# Patient Record
Sex: FEMALE | Race: WHITE | NOT HISPANIC OR LATINO | Employment: OTHER | URBAN - METROPOLITAN AREA
[De-identification: names, ages, dates, MRNs, and addresses within clinical notes are randomized per-mention and may not be internally consistent; named-entity substitution may affect disease eponyms.]

---

## 2017-01-10 ENCOUNTER — HOSPITAL ENCOUNTER (OUTPATIENT)
Dept: RADIOLOGY | Facility: HOSPITAL | Age: 72
Discharge: HOME | End: 2017-01-10
Attending: FAMILY MEDICINE
Payer: MEDICARE

## 2017-01-10 ENCOUNTER — TELEPHONE (OUTPATIENT)
Dept: INTERNAL MEDICINE | Facility: CLINIC | Age: 72
End: 2017-01-10

## 2017-01-10 ENCOUNTER — OFFICE VISIT (OUTPATIENT)
Dept: INTERNAL MEDICINE | Facility: CLINIC | Age: 72
End: 2017-01-10
Payer: MEDICARE

## 2017-01-10 VITALS
TEMPERATURE: 97 F | BODY MASS INDEX: 45.99 KG/M2 | OXYGEN SATURATION: 92 % | SYSTOLIC BLOOD PRESSURE: 138 MMHG | DIASTOLIC BLOOD PRESSURE: 78 MMHG | WEIGHT: 293 LBS | HEIGHT: 67 IN | HEART RATE: 88 BPM

## 2017-01-10 DIAGNOSIS — L03.115 CELLULITIS OF RIGHT LOWER EXTREMITY: Primary | ICD-10-CM

## 2017-01-10 DIAGNOSIS — R60.0 PEDAL EDEMA: ICD-10-CM

## 2017-01-10 DIAGNOSIS — K12.1 ORAL ULCER: ICD-10-CM

## 2017-01-10 PROCEDURE — 99999 PR PBB SHADOW E&M-EST. PATIENT-LVL III: CPT | Mod: PBBFAC,,, | Performed by: FAMILY MEDICINE

## 2017-01-10 PROCEDURE — 1125F AMNT PAIN NOTED PAIN PRSNT: CPT | Mod: S$GLB,,, | Performed by: FAMILY MEDICINE

## 2017-01-10 PROCEDURE — 3078F DIAST BP <80 MM HG: CPT | Mod: S$GLB,,, | Performed by: FAMILY MEDICINE

## 2017-01-10 PROCEDURE — 93970 EXTREMITY STUDY: CPT | Mod: TC

## 2017-01-10 PROCEDURE — 99214 OFFICE O/P EST MOD 30 MIN: CPT | Mod: S$GLB,,, | Performed by: FAMILY MEDICINE

## 2017-01-10 PROCEDURE — 3075F SYST BP GE 130 - 139MM HG: CPT | Mod: S$GLB,,, | Performed by: FAMILY MEDICINE

## 2017-01-10 PROCEDURE — 1160F RVW MEDS BY RX/DR IN RCRD: CPT | Mod: S$GLB,,, | Performed by: FAMILY MEDICINE

## 2017-01-10 PROCEDURE — 1159F MED LIST DOCD IN RCRD: CPT | Mod: S$GLB,,, | Performed by: FAMILY MEDICINE

## 2017-01-10 PROCEDURE — 1157F ADVNC CARE PLAN IN RCRD: CPT | Mod: S$GLB,,, | Performed by: FAMILY MEDICINE

## 2017-01-10 RX ORDER — DOXYCYCLINE HYCLATE 100 MG
100 TABLET ORAL EVERY 12 HOURS
Qty: 20 TABLET | Refills: 0 | Status: SHIPPED | OUTPATIENT
Start: 2017-01-10 | End: 2017-01-20

## 2017-01-10 RX ORDER — LEVOFLOXACIN 500 MG/1
500 TABLET, FILM COATED ORAL DAILY
Qty: 7 TABLET | Refills: 0 | Status: SHIPPED | OUTPATIENT
Start: 2017-01-10 | End: 2017-01-17

## 2017-01-10 RX ORDER — SULFAMETHOXAZOLE AND TRIMETHOPRIM 800; 160 MG/1; MG/1
1 TABLET ORAL 2 TIMES DAILY
Qty: 20 TABLET | Refills: 0 | Status: SHIPPED | OUTPATIENT
Start: 2017-01-10 | End: 2017-01-10

## 2017-01-10 RX ORDER — VALACYCLOVIR HYDROCHLORIDE 1 G/1
1000 TABLET, FILM COATED ORAL 2 TIMES DAILY
Qty: 8 TABLET | Refills: 1 | Status: SHIPPED | OUTPATIENT
Start: 2017-01-10 | End: 2017-10-19

## 2017-01-10 NOTE — PROGRESS NOTES
Chief Complaint:    Chief Complaint   Patient presents with    Mass     legs       History of Present Illness:  He presents today complaining of swelling and redness involving both legs but the redness is more from the right leg.  No fever.  She does have chronic swelling.  She mostly sits and is not able to raise her legs very much.  Does not walk very much.  She denies any chest pain or shortness of breath.  Also has had ulcers in the mouth which have been painful.    ROS:  Review of Systems   Constitutional: Negative for activity change, chills, fatigue, fever and unexpected weight change.   HENT: Negative for congestion, ear discharge, ear pain, hearing loss, postnasal drip and rhinorrhea.    Eyes: Negative for pain and visual disturbance.   Respiratory: Negative for cough, chest tightness and shortness of breath.    Cardiovascular: Negative for chest pain and palpitations.   Gastrointestinal: Negative for abdominal pain, diarrhea and vomiting.   Endocrine: Negative for heat intolerance.   Genitourinary: Negative for dysuria, flank pain, frequency and hematuria.   Musculoskeletal: Negative for back pain, gait problem and neck pain.   Skin: Negative for color change and rash.   Neurological: Negative for dizziness, tremors, seizures, numbness and headaches.   Psychiatric/Behavioral: Negative for agitation, hallucinations, self-injury, sleep disturbance and suicidal ideas. The patient is not nervous/anxious.        Past Medical History   Diagnosis Date    Abnormal nuclear stress test 11/1/2015    Background diabetic retinopathy 12/28/2015    CAD (coronary artery disease) 2002     stents    Cataract     Gait disorder 10/12/2012    Glaucoma     Obesity     CORINE (obstructive sleep apnea)     Other and unspecified hyperlipidemia     PD (Parkinson's disease) 2002     tremor-predominant (per patient)    Type II or unspecified type diabetes mellitus without mention of complication, not stated as uncontrolled  "2002       am    Unspecified essential hypertension        Social History:  Social History     Social History    Marital status:      Spouse name: N/A    Number of children: N/A    Years of education: N/A     Social History Main Topics    Smoking status: Former Smoker     Years: 40.00     Quit date: 10/12/2010    Smokeless tobacco: Never Used      Comment: On & off for the past few years    Alcohol use No    Drug use: No    Sexual activity: Not Currently     Other Topics Concern    None     Social History Narrative     . Lives with spouse. Has 2 children. 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       Family History:   family history includes Diabetes in her maternal grandmother; Glaucoma in her maternal grandmother and mother; Heart attack (age of onset: 65) in her father and mother; Hypertension in her father, maternal grandmother, and mother; Parkinsonism in her other and paternal aunt; Tremor in her father.    Health Maintenance   Topic Date Due    DEXA SCAN  06/13/2015    Eye Exam  12/28/2016    Foot Exam  02/18/2017    Urine Microalbumin  05/18/2017    Lipid Panel  10/19/2017    Hemoglobin A1c  10/19/2017    Mammogram  05/30/2018    TETANUS VACCINE  11/19/2025    Hepatitis C Screening  Addressed    Zoster Vaccine  Addressed    Pneumococcal (65+)  Completed    Influenza Vaccine  Completed       Physical Exam:    Vital Signs  Temp: 97 °F (36.1 °C)  Temp src: Tympanic  Pulse: 88  SpO2: (!) 92 %  BP: 138/78  Pain Score:   8  Pain Loc: Generalized  Height and Weight  Height: 5' 7" (170.2 cm)  Weight: (!) 140.7 kg (310 lb 3 oz)  BSA (Calculated - sq m): 2.58 sq meters  BMI (Calculated): 48.7  Weight in (lb) to have BMI = 25: 159.3]    Body mass index is 48.58 kg/(m^2).    Physical Exam   Constitutional: She is oriented to person, place, and time. She appears well-developed.   HENT:   Mouth/Throat: Oropharynx is clear and moist.       Eyes: Conjunctivae are " normal. Pupils are equal, round, and reactive to light.   Neck: Normal range of motion. Neck supple.   Cardiovascular: Normal rate, regular rhythm and normal heart sounds.    No murmur heard.  Pulmonary/Chest: Effort normal and breath sounds normal. No respiratory distress. She has no wheezes. She has no rales. She exhibits no tenderness.   Abdominal: Soft. She exhibits no distension and no mass. There is no tenderness. There is no guarding.   Musculoskeletal: She exhibits edema (3.  Spitting edema bilateral legs.). She exhibits no tenderness.        Legs:  Lymphadenopathy:     She has no cervical adenopathy.   Neurological: She is alert and oriented to person, place, and time. She has normal reflexes.   Skin: Skin is warm and dry.   Psychiatric: She has a normal mood and affect. Her behavior is normal. Judgment and thought content normal.       Lab Results   Component Value Date    CHOL 104 (L) 10/19/2016    CHOL 107 (L) 05/17/2016    CHOL 129 02/23/2016    TRIG 85 10/19/2016    TRIG 88 05/17/2016    TRIG 69 02/23/2016    HDL 35 (L) 10/19/2016    HDL 39 (L) 05/17/2016    HDL 48 02/23/2016    TOTALCHOLEST 3.0 10/19/2016    TOTALCHOLEST 2.7 05/17/2016    TOTALCHOLEST 2.7 02/23/2016    NONHDLCHOL 69 10/19/2016    NONHDLCHOL 68 05/17/2016    NONHDLCHOL 81 02/23/2016       Lab Results   Component Value Date    HGBA1C 7.1 (H) 10/19/2016           Assessment:      ICD-10-CM ICD-9-CM   1. Cellulitis of right lower extremity L03.115 682.6   2. Oral ulcer K12.1 528.9   3. Pedal edema R60.0 782.3         Plan:  1.  Venous Doppler to rule out DVT, patient has been given direct antibiotic.  Should the redness started to get worse please go to the ED.  2.  Oral apathae, prescription for Valtrex given.  3.  Pedal edema most likely dependent edema there is no evidence of CHF based on last echo.  Recommend using compression stocking and recommend increasing mobility.  Please come back for revisit of the redness  Orders Placed This  "Encounter   Procedures    US Lower Extremity Veins Bilateral       Current Outpatient Prescriptions   Medication Sig Dispense Refill    acetaminophen (TYLENOL) 325 MG tablet Take 2 tablets (650 mg total) by mouth every 6 (six) hours as needed.  0    blood sugar diagnostic Strp 1 strip by Misc.(Non-Drug; Combo Route) route 3 (three) times daily. Accuchek Felicia Plus Test Strips 300 strip 3    blood-glucose meter kit Accu-chek Felicia Plus Meter 1 each 0    carbidopa-levodopa  mg (SINEMET)  mg per tablet Take 1 tab 5 times daily. 150 tablet 11    carbidopa-levodopa  mg (SINEMET)  mg per tablet TAKE ONE TABLET BY MOUTH 5 TIMES A  tablet 11    insulin NPH-insulin regular, 70/30, (NOVOLIN 70/30) 100 unit/mL (70-30) injection Inject 55-60 Units into the skin 3 (three) times daily before meals. 5 vial 3    insulin syringe-needle U-100 1/2 mL 31 x 5/16" Syrg INJECT 1 SYRINGE INTO THE SKIN 3 TIMES DAILY 100 each 11    levothyroxine (SYNTHROID) 100 MCG tablet TAKE 1 TABLET BEFORE BREAKFAST 90 tablet 1    lidocaine-prilocaine (EMLA) cream Apply topically as needed. 25 g 0    metformin (GLUCOPHAGE) 500 MG tablet TAKE 1 TABLET (500 MG TOTAL) BY MOUTH 3 (THREE) TIMES DAILY. 270 tablet 3    metoprolol succinate (TOPROL-XL) 25 MG 24 hr tablet TAKE 1 TABLET EVERY DAY 90 tablet 0    multivit-min-FA-lycopen-lutein (CENTRUM SILVER) 0.4-300-250 mg-mcg-mcg Tab Take 1 tablet by mouth once daily.      MULTIVITAMIN W-MINERALS/LUTEIN (CENTRUM SILVER ORAL) Take by mouth Daily.      omeprazole (PRILOSEC) 40 MG capsule Take 1 capsule (40 mg total) by mouth once daily. 90 capsule 3    pramipexole (MIRAPEX) 1 MG tablet TAKE 1 TABLET BY MOUTH THREE TIMES DAILY 90 tablet 11    pravastatin (PRAVACHOL) 40 MG tablet TAKE 1 TABLET ONE TIME DAILY 90 tablet 3    ACCU-CHEK SOFTCLIX LANCETS Misc TEST THREE TIMES DAILY 300 each 3    aspirin (ECOTRIN) 81 MG EC tablet Take 1 tablet (81 mg total) by mouth once " daily. 30 tablet 0    doxycycline (VIBRA-TABS) 100 MG tablet Take 1 tablet (100 mg total) by mouth every 12 (twelve) hours. 20 tablet 0    isosorbide mononitrate (IMDUR) 120 MG 24 hr tablet Take 1 tablet (120 mg total) by mouth once daily. 90 tablet 3    levoFLOXacin (LEVAQUIN) 500 MG tablet Take 1 tablet (500 mg total) by mouth once daily. 7 tablet 0    triamcinolone acetonide 0.1% (KENALOG) 0.1 % cream Apply topically 2 (two) times daily. 1 Tube 1    valacyclovir (VALTREX) 1000 MG tablet Take 1 tablet (1,000 mg total) by mouth 2 (two) times daily. 8 tablet 1     No current facility-administered medications for this visit.        Medications Discontinued During This Encounter   Medication Reason    sulfamethoxazole-trimethoprim 800-160mg (BACTRIM DS) 800-160 mg Tab        Return in about 2 days (around 1/12/2017).      Dr Anna Penn MD    Disclaimer: This note is prepared using voice recognition system and as such is likely to have errors and is not proof read.

## 2017-01-10 NOTE — TELEPHONE ENCOUNTER
----- Message from Yusra Bosch sent at 1/10/2017 12:34 PM CST -----  Contact: Dawn Kumar  Ms Seymour states that patient is allergic to sulfur drugs and bactrim was called in, please call Ms Seymour back at [phone]. Thank you

## 2017-01-10 NOTE — MR AVS SNAPSHOT
[facility]  [address] [place] [address]  Phone: [phone]                  Ross Isbell   1/10/2017 10:20 AM   Office Visit    Description:  Female : 1945   Provider:  Anna Penn MD   Department:  [facility]           Reason for Visit     Mass           Diagnoses this Visit        Comments    Cellulitis of right lower extremity    -  Primary     Oral ulcer         Pedal edema                To Do List           Future Appointments        Provider Department Dept Phone    1/10/2017 12:30 PM [facility] -  [phone]    2017 10:00 AM Anna Penn MD [facility] Internal Medicine [phone]    2017 9:00 AM VINOD Cox MD [facility] - Ophthalmology [phone]      Goals (5 Years of Data)     None      Follow-Up and Disposition     Return in about 2 days (around 2017).       These Medications        Disp Refills Start End    doxycycline (VIBRA-TABS) 100 MG tablet 20 tablet 0 1/10/2017 2017    Take 1 tablet (100 mg total) by mouth every 12 (twelve) hours. - Oral    Pharmacy: [facility] [address] [place] [address] Ph #: [phone]       Notes to Pharmacy: Please review side effects with the pt.    valacyclovir (VALTREX) 1000 MG tablet 8 tablet 1 1/10/2017 2017    Take 1 tablet (1,000 mg total) by mouth 2 (two) times daily. - Oral    Pharmacy: [facility] [address] [place] [address] Ph #: [phone]       levoFLOXacin (LEVAQUIN) 500 MG tablet 7 tablet 0 1/10/2017 2017    Take 1 tablet (500 mg total) by mouth once daily. - Oral    Pharmacy: [facility] [address] [place] [address] Ph #: [phone]         [facility] On Call     [facility] On Call Nurse Care Line -  Assistance  Registered nurses in the [facility] On Call Center provide clinical advisement, health education, appointment booking, and other  "advisory services.  Call for this free service at [phone].             Medications           Message regarding Medications     Verify the changes and/or additions to your medication regime listed below are the same as discussed with your clinician today.  If any of these changes or additions are incorrect, please notify your healthcare provider.        START taking these NEW medications        Refills    doxycycline (VIBRA-TABS) 100 MG tablet 0    Sig: Take 1 tablet (100 mg total) by mouth every 12 (twelve) hours.    Class: Normal    Route: Oral    valacyclovir (VALTREX) 1000 MG tablet 1    Sig: Take 1 tablet (1,000 mg total) by mouth 2 (two) times daily.    Class: Normal    Route: Oral    levoFLOXacin (LEVAQUIN) 500 MG tablet 0    Sig: Take 1 tablet (500 mg total) by mouth once daily.    Class: Normal    Route: Oral           Verify that the below list of medications is an accurate representation of the medications you are currently taking.  If none reported, the list may be blank. If incorrect, please contact your healthcare provider. Carry this list with you in case of emergency.           Current Medications     acetaminophen (TYLENOL) 325 MG tablet Take 2 tablets (650 mg total) by mouth every 6 (six) hours as needed.    blood sugar diagnostic Strp 1 strip by Misc.(Non-Drug; Combo Route) route 3 (three) times daily. Accuchek Felicia Plus Test Strips    blood-glucose meter kit Accu-chek Felicia Plus Meter    carbidopa-levodopa  mg (SINEMET)  mg per tablet Take 1 tab 5 times daily.    carbidopa-levodopa  mg (SINEMET)  mg per tablet TAKE ONE TABLET BY MOUTH 5 TIMES A DAY    insulin NPH-insulin regular, 70/30, (NOVOLIN 70/30) 100 unit/mL (70-30) injection Inject 55-60 Units into the skin 3 (three) times daily before meals.    insulin syringe-needle U-100 1/2 mL 31 x 5/16" Syrg INJECT 1 SYRINGE INTO THE SKIN 3 TIMES DAILY    levothyroxine (SYNTHROID) 100 MCG tablet TAKE 1 TABLET BEFORE " "BREAKFAST    lidocaine-prilocaine (EMLA) cream Apply topically as needed.    metformin (GLUCOPHAGE) 500 MG tablet TAKE 1 TABLET (500 MG TOTAL) BY MOUTH 3 (THREE) TIMES DAILY.    metoprolol succinate (TOPROL-XL) 25 MG 24 hr tablet TAKE 1 TABLET EVERY DAY    multivit-min-FA-lycopen-lutein (CENTRUM SILVER) 0.4-300-250 mg-mcg-mcg Tab Take 1 tablet by mouth once daily.    MULTIVITAMIN W-MINERALS/LUTEIN (CENTRUM SILVER ORAL) Take by mouth Daily.    omeprazole (PRILOSEC) 40 MG capsule Take 1 capsule (40 mg total) by mouth once daily.    pramipexole (MIRAPEX) 1 MG tablet TAKE 1 TABLET BY MOUTH THREE TIMES DAILY    pravastatin (PRAVACHOL) 40 MG tablet TAKE 1 TABLET ONE TIME DAILY    ACCU-CHEK SOFTCLIX LANCETS Misc TEST THREE TIMES DAILY    aspirin (ECOTRIN) 81 MG EC tablet Take 1 tablet (81 mg total) by mouth once daily.    doxycycline (VIBRA-TABS) 100 MG tablet Take 1 tablet (100 mg total) by mouth every 12 (twelve) hours.    isosorbide mononitrate (IMDUR) 120 MG 24 hr tablet Take 1 tablet (120 mg total) by mouth once daily.    levoFLOXacin (LEVAQUIN) 500 MG tablet Take 1 tablet (500 mg total) by mouth once daily.    triamcinolone acetonide 0.1% (KENALOG) 0.1 % cream Apply topically 2 (two) times daily.    valacyclovir (VALTREX) 1000 MG tablet Take 1 tablet (1,000 mg total) by mouth 2 (two) times daily.           Clinical Reference Information           Vital Signs - Last Recorded  Most recent update: 1/10/2017 11:50 AM by Elsie Patino LPN    BP Pulse Temp Ht Wt SpO2    138/78 88 97 °F (36.1 °C) (Tympanic) 5' 7" (1.702 m) (!) 140.7 kg (310 lb 3 oz) (!) 92%    BMI                48.58 kg/m2          Blood Pressure          Most Recent Value    BP  138/78      Allergies as of 1/10/2017     Codeine    Codeine Sulfate    Diphenhydramine Hcl    Penicillins    Sulfa (Sulfonamide Antibiotics)      Immunizations Administered on Date of Encounter - 1/10/2017     None      Orders Placed During Today's Visit     Future " Labs/Procedures Expected by Expires    US Lower Extremity Veins Bilateral  1/10/2017 (Approximate) 1/10/2018

## 2017-01-13 ENCOUNTER — OFFICE VISIT (OUTPATIENT)
Dept: INTERNAL MEDICINE | Facility: CLINIC | Age: 72
End: 2017-01-13
Payer: MEDICARE

## 2017-01-13 VITALS
WEIGHT: 246.25 LBS | RESPIRATION RATE: 20 BRPM | OXYGEN SATURATION: 97 % | HEIGHT: 66 IN | BODY MASS INDEX: 39.58 KG/M2 | SYSTOLIC BLOOD PRESSURE: 125 MMHG | HEART RATE: 85 BPM | TEMPERATURE: 96 F | DIASTOLIC BLOOD PRESSURE: 67 MMHG

## 2017-01-13 DIAGNOSIS — R60.0 PEDAL EDEMA: Primary | ICD-10-CM

## 2017-01-13 DIAGNOSIS — G20.A1 PD (PARKINSON'S DISEASE): Chronic | ICD-10-CM

## 2017-01-13 DIAGNOSIS — E11.42 TYPE 2 DIABETES, CONTROLLED, WITH PERIPHERAL NEUROPATHY: Chronic | ICD-10-CM

## 2017-01-13 PROCEDURE — 3060F POS MICROALBUMINURIA REV: CPT | Mod: S$GLB,,, | Performed by: FAMILY MEDICINE

## 2017-01-13 PROCEDURE — 1126F AMNT PAIN NOTED NONE PRSNT: CPT | Mod: S$GLB,,, | Performed by: FAMILY MEDICINE

## 2017-01-13 PROCEDURE — 3074F SYST BP LT 130 MM HG: CPT | Mod: S$GLB,,, | Performed by: FAMILY MEDICINE

## 2017-01-13 PROCEDURE — 99213 OFFICE O/P EST LOW 20 MIN: CPT | Mod: S$GLB,,, | Performed by: FAMILY MEDICINE

## 2017-01-13 PROCEDURE — 2022F DILAT RTA XM EVC RTNOPTHY: CPT | Mod: S$GLB,,, | Performed by: FAMILY MEDICINE

## 2017-01-13 PROCEDURE — 1157F ADVNC CARE PLAN IN RCRD: CPT | Mod: S$GLB,,, | Performed by: FAMILY MEDICINE

## 2017-01-13 PROCEDURE — 3045F PR MOST RECENT HEMOGLOBIN A1C LEVEL 7.0-9.0%: CPT | Mod: S$GLB,,, | Performed by: FAMILY MEDICINE

## 2017-01-13 PROCEDURE — 3078F DIAST BP <80 MM HG: CPT | Mod: S$GLB,,, | Performed by: FAMILY MEDICINE

## 2017-01-13 PROCEDURE — 99999 PR PBB SHADOW E&M-EST. PATIENT-LVL IV: CPT | Mod: PBBFAC,,, | Performed by: FAMILY MEDICINE

## 2017-01-13 PROCEDURE — 1160F RVW MEDS BY RX/DR IN RCRD: CPT | Mod: S$GLB,,, | Performed by: FAMILY MEDICINE

## 2017-01-13 PROCEDURE — 1159F MED LIST DOCD IN RCRD: CPT | Mod: S$GLB,,, | Performed by: FAMILY MEDICINE

## 2017-01-13 PROCEDURE — 99499 UNLISTED E&M SERVICE: CPT | Mod: S$GLB,,, | Performed by: FAMILY MEDICINE

## 2017-01-13 RX ORDER — CARBIDOPA AND LEVODOPA 25; 250 MG/1; MG/1
TABLET ORAL
Qty: 150 TABLET | Refills: 11 | Status: SHIPPED | OUTPATIENT
Start: 2017-01-13 | End: 2017-02-06 | Stop reason: SDUPTHER

## 2017-01-13 RX ORDER — PRAMIPEXOLE DIHYDROCHLORIDE 1 MG/1
1 TABLET ORAL 3 TIMES DAILY
Qty: 90 TABLET | Refills: 11 | Status: SHIPPED | OUTPATIENT
Start: 2017-01-13 | End: 2017-02-06 | Stop reason: SDUPTHER

## 2017-01-13 RX ORDER — NAPROXEN SODIUM 220 MG
TABLET ORAL
Qty: 100 EACH | Refills: 11 | Status: SHIPPED | OUTPATIENT
Start: 2017-01-13 | End: 2018-05-15 | Stop reason: SDUPTHER

## 2017-01-13 RX ORDER — POTASSIUM CHLORIDE 20 MEQ/1
20 TABLET, EXTENDED RELEASE ORAL DAILY
Qty: 30 TABLET | Refills: 2 | Status: SHIPPED | OUTPATIENT
Start: 2017-01-13 | End: 2017-02-06 | Stop reason: SDUPTHER

## 2017-01-13 RX ORDER — FUROSEMIDE 20 MG/1
20 TABLET ORAL DAILY
Qty: 30 TABLET | Refills: 2 | Status: SHIPPED | OUTPATIENT
Start: 2017-01-13 | End: 2017-02-06 | Stop reason: SDUPTHER

## 2017-01-13 NOTE — MR AVS SNAPSHOT
[facility]  [address] [place] [address]  Phone: [phone]                  Ross Isbell   2017 10:00 AM   Office Visit    Description:  Female : 1945   Provider:  Anna Penn MD   Department:  [facility]           Reason for Visit     Follow-up           Diagnoses this Visit        Comments    Pedal edema    -  Primary     PD (Parkinson's disease)         Type 2 diabetes, controlled, with peripheral neuropathy                To Do List           Future Appointments        Provider Department Dept Phone    2017 1:20 PM Abby Germain DPM [facility] - Podiatry [phone]    2017 9:00 AM VINOD Cox MD [facility] - Ophthalmology [phone]    2017 9:00 AM SPECIMEN, [place] [facility] [phone] 9:30 AM LABORATORY, [place] [facility] [phone] 11:40 AM Anna Penn MD [facility] [phone]      Goals (5 Years of Data)     None      Follow-Up and Disposition     Return in about 3 months (around 2017).       These Medications        Disp Refills Start End    carbidopa-levodopa  mg (SINEMET)  mg per tablet 150 tablet 2017     Take 1 tab 5 times daily.    Pharmacy: [facility] [address] [place] [address] [id number] [place] [address] Ph #: [phone]       pramipexole (MIRAPEX) 1 MG tablet 90 tablet 2017     Take 1 tablet (1 mg total) by mouth 3 (three) times daily. - Oral    Pharmacy: [facility] [address] [place] [address] [place] [address] Ph #: [phone]       insulin syringe-needle U-100 0.5 mL 31 gauge x  Syrg 100 each 2017     INJECT 1 SYRINGE INTO THE SKIN 3 TIMES DAILY    Pharmacy: [facility] [address] [place] [id number] [place] [address] Ph #: [phone]       insulin NPH-insulin regular, 70/30, (NOVOLIN 70/30) 100 unit/mL (70-30) injection 5 vial 3  1/13/2017     Inject 55-60 Units into the skin 3 (three) times daily before meals. - Subcutaneous    Pharmacy: [address] #: [phone]       furosemide (LASIX) 20 MG tablet 30 tablet 2 1/13/2017 1/13/2018    Take 1 tablet (20 mg total) by mouth once daily. - Oral    Pharmacy: [address] #: [phone]       potassium chloride SA (K-DUR,KLOR-CON) 20 MEQ tablet 30 tablet 2 1/13/2017     Take 1 tablet (20 mEq total) by mouth once daily. - Oral    Pharmacy: [place] [id number] #: [phone]         [facility] On Call     [facility] On Call Nurse Care Line - 24/7 Assistance  Registered nurses in the [facility] On Call Center provide clinical advisement, health education, appointment booking, and other advisory services.  Call for this free service at [phone].             Medications           Message regarding Medications     Verify the changes and/or additions to your medication regime listed below are the same as discussed with your clinician today.  If any of these changes or additions are incorrect, please notify your healthcare provider.        START taking these NEW medications        Refills    furosemide (LASIX) 20 MG tablet 2    Sig: Take 1 tablet (20 mg total) by mouth once daily.    Class: Normal    Route: Oral    potassium chloride SA (K-DUR,KLOR-CON) 20 MEQ tablet 2    Sig: Take 1 tablet (20 mEq total) by mouth once daily.    Class: Normal    Route: Oral      CHANGE how you are taking these medications     Start Taking Instead of    pramipexole (MIRAPEX) 1 MG tablet pramipexole (MIRAPEX) 1 MG tablet    Dosage:  Take 1 tablet (1 mg total) by mouth 3 (three) times daily. Dosage:  TAKE 1 TABLET BY MOUTH THREE TIMES DAILY    Reason for Change:  Reorder     insulin syringe-needle U-100 0.5 mL 31 gauge x 5/16 Syrg insulin syringe-needle U-100 1/2  "mL 31 x 5/16" Syrg    Dosage:  INJECT 1 SYRINGE INTO THE SKIN 3 TIMES DAILY Dosage:  INJECT 1 SYRINGE INTO THE SKIN 3 TIMES DAILY    Reason for Change:  Reorder            Verify that the below list of medications is an accurate representation of the medications you are currently taking.  If none reported, the list may be blank. If incorrect, please contact your healthcare provider. Carry this list with you in case of emergency.           Current Medications     ACCU-CHEK SOFTCLIX LANCETS Misc TEST THREE TIMES DAILY    acetaminophen (TYLENOL) 325 MG tablet Take 2 tablets (650 mg total) by mouth every 6 (six) hours as needed.    blood sugar diagnostic Strp 1 strip by Misc.(Non-Drug; Combo Route) route 3 (three) times daily. Accuchek Felicia Plus Test Strips    blood-glucose meter kit Accu-chek Felicia Plus Meter    carbidopa-levodopa  mg (SINEMET)  mg per tablet Take 1 tab 5 times daily.    doxycycline (VIBRA-TABS) 100 MG tablet Take 1 tablet (100 mg total) by mouth every 12 (twelve) hours.    insulin NPH-insulin regular, 70/30, (NOVOLIN 70/30) 100 unit/mL (70-30) injection Inject 55-60 Units into the skin 3 (three) times daily before meals.    insulin syringe-needle U-100 0.5 mL 31 gauge x 5/16 Syrg INJECT 1 SYRINGE INTO THE SKIN 3 TIMES DAILY    levoFLOXacin (LEVAQUIN) 500 MG tablet Take 1 tablet (500 mg total) by mouth once daily.    levothyroxine (SYNTHROID) 100 MCG tablet TAKE 1 TABLET BEFORE BREAKFAST    lidocaine-prilocaine (EMLA) cream Apply topically as needed.    metformin (GLUCOPHAGE) 500 MG tablet TAKE 1 TABLET (500 MG TOTAL) BY MOUTH 3 (THREE) TIMES DAILY.    metoprolol succinate (TOPROL-XL) 25 MG 24 hr tablet TAKE 1 TABLET EVERY DAY    multivit-min-FA-lycopen-lutein (CENTRUM SILVER) 0.4-300-250 mg-mcg-mcg Tab Take 1 tablet by mouth once daily.    MULTIVITAMIN W-MINERALS/LUTEIN (CENTRUM SILVER ORAL) Take by mouth Daily.    omeprazole (PRILOSEC) 40 MG capsule Take 1 capsule (40 mg total) by mouth " "once daily.    pramipexole (MIRAPEX) 1 MG tablet Take 1 tablet (1 mg total) by mouth 3 (three) times daily.    pravastatin (PRAVACHOL) 40 MG tablet TAKE 1 TABLET ONE TIME DAILY    aspirin (ECOTRIN) 81 MG EC tablet Take 1 tablet (81 mg total) by mouth once daily.    furosemide (LASIX) 20 MG tablet Take 1 tablet (20 mg total) by mouth once daily.    isosorbide mononitrate (IMDUR) 120 MG 24 hr tablet Take 1 tablet (120 mg total) by mouth once daily.    potassium chloride SA (K-DUR,KLOR-CON) 20 MEQ tablet Take 1 tablet (20 mEq total) by mouth once daily.    triamcinolone acetonide 0.1% (KENALOG) 0.1 % cream Apply topically 2 (two) times daily.    valacyclovir (VALTREX) 1000 MG tablet Take 1 tablet (1,000 mg total) by mouth 2 (two) times daily.           Clinical Reference Information           Vital Signs - Last Recorded  Most recent update: 1/13/2017 10:15 AM by Glenna Ley MA    BP Pulse Temp Resp    125/67 (BP Location: Left arm, Patient Position: Sitting, BP Method: Manual) 85 96.2 °F (35.7 °C) (Tympanic) 20    Ht Wt SpO2 BMI    5' 6" (1.676 m) 111.7 kg (246 lb 4.1 oz) 97% 39.75 kg/m2      Blood Pressure          Most Recent Value    BP  125/67      Allergies as of 1/13/2017     Codeine    Codeine Sulfate    Diphenhydramine Hcl    Penicillins    Sulfa (Sulfonamide Antibiotics)      Immunizations Administered on Date of Encounter - 1/13/2017     None      Orders Placed During Today's Visit     Future Labs/Procedures Expected by Expires    Microalbumin/creatinine urine ratio  4/12/2017 (Approximate) 3/14/2018    CBC auto differential  4/13/2017 (Approximate) 7/12/2017    TSH  4/13/2017 (Approximate) 3/14/2018    Comprehensive metabolic panel  4/15/2017 (Approximate) 4/13/2018    Hemoglobin A1c  4/15/2017 (Approximate) 3/14/2018    Lipid panel  4/15/2017 (Approximate) 3/14/2018      "

## 2017-01-13 NOTE — PROGRESS NOTES
Chief Complaint:    Chief Complaint   Patient presents with    Follow-up       History of Present Illness:    She is here for recheck of her wound.  No fevers she's been taking antibiotic.    ROS:  Review of Systems    Past Medical History   Diagnosis Date    Abnormal nuclear stress test 11/1/2015    Background diabetic retinopathy 12/28/2015    CAD (coronary artery disease) 2002     stents    Cataract     Gait disorder 10/12/2012    Glaucoma     Obesity     CORINE (obstructive sleep apnea)     Other and unspecified hyperlipidemia     PD (Parkinson's disease) 2002     tremor-predominant (per patient)    Type II or unspecified type diabetes mellitus without mention of complication, not stated as uncontrolled 2002       am    Unspecified essential hypertension        Social History:  Social History     Social History    Marital status:      Spouse name: N/A    Number of children: N/A    Years of education: N/A     Social History Main Topics    Smoking status: Former Smoker     Years: 40.00     Quit date: 10/12/2010    Smokeless tobacco: Never Used      Comment: On & off for the past few years    Alcohol use No    Drug use: No    Sexual activity: Not Currently     Other Topics Concern    None     Social History Narrative     . Lives with spouse. Has 2 children. 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       Family History:   family history includes Diabetes in her maternal grandmother; Glaucoma in her maternal grandmother and mother; Heart attack (age of onset: 65) in her father and mother; Hypertension in her father, maternal grandmother, and mother; Parkinsonism in her other and paternal aunt; Tremor in her father.    Health Maintenance   Topic Date Due    DEXA SCAN  06/13/2015    Eye Exam  12/28/2016    Foot Exam  02/18/2017    Urine Microalbumin  05/18/2017    Mammogram  05/30/2017    Lipid Panel  10/19/2017    Hemoglobin A1c  10/19/2017    TETANUS  "VACCINE  11/19/2025    Hepatitis C Screening  Addressed    Zoster Vaccine  Addressed    Pneumococcal (65+)  Completed    Influenza Vaccine  Completed       Physical Exam:    Vital Signs  Temp: 96.2 °F (35.7 °C)  Temp src: Tympanic  Pulse: 85  Resp: 20  SpO2: 97 %  BP: 125/67  BP Location: Left arm  BP Method: Manual  Patient Position: Sitting  Pain Score: 0-No pain  Height and Weight  Height: 5' 6" (167.6 cm)  Weight: 111.7 kg (246 lb 4.1 oz)  BSA (Calculated - sq m): 2.28 sq meters  BMI (Calculated): 39.8  Weight in (lb) to have BMI = 25: 154.6]    Body mass index is 39.75 kg/(m^2).    Physical Exam  Examination of the ankles reveals that the redness appears to be somewhat improving, she still has some pedal edema.  Lab Results   Component Value Date    CHOL 104 (L) 10/19/2016    CHOL 107 (L) 05/17/2016    CHOL 129 02/23/2016    TRIG 85 10/19/2016    TRIG 88 05/17/2016    TRIG 69 02/23/2016    HDL 35 (L) 10/19/2016    HDL 39 (L) 05/17/2016    HDL 48 02/23/2016    TOTALCHOLEST 3.0 10/19/2016    TOTALCHOLEST 2.7 05/17/2016    TOTALCHOLEST 2.7 02/23/2016    NONHDLCHOL 69 10/19/2016    NONHDLCHOL 68 05/17/2016    NONHDLCHOL 81 02/23/2016       Lab Results   Component Value Date    HGBA1C 7.1 (H) 10/19/2016       Assessment:      ICD-10-CM ICD-9-CM   1. Pedal edema R60.0 782.3   2. PD (Parkinson's disease) G20 332.0   3. Type 2 diabetes, controlled, with peripheral neuropathy E11.42 250.60     357.2         Plan:  Please continue with antibiotic, the wound is redressed today continue with local antibody wound care.  Once the wounds heal completely recommend using compression stockings.  Also she's been prescribed Lasix 20 mg once a day to take and potassium tablets whenever she takes Lasix.  Keeping the legs elevated when in bed and will also help.  Follow-up in 3 months for routine diabetic check.  Orders Placed This Encounter   Procedures    Hemoglobin A1c    Microalbumin/creatinine urine ratio    Lipid panel "    Comprehensive metabolic panel    CBC auto differential    TSH       Current Outpatient Prescriptions   Medication Sig Dispense Refill    ACCU-CHEK SOFTCLIX LANCETS Misc TEST THREE TIMES DAILY 300 each 3    acetaminophen (TYLENOL) 325 MG tablet Take 2 tablets (650 mg total) by mouth every 6 (six) hours as needed.  0    blood sugar diagnostic Strp 1 strip by Misc.(Non-Drug; Combo Route) route 3 (three) times daily. Accuchek Felicia Plus Test Strips 300 strip 3    blood-glucose meter kit Accu-chek Felicia Plus Meter 1 each 0    carbidopa-levodopa  mg (SINEMET)  mg per tablet Take 1 tab 5 times daily. 150 tablet 11    doxycycline (VIBRA-TABS) 100 MG tablet Take 1 tablet (100 mg total) by mouth every 12 (twelve) hours. 20 tablet 0    insulin NPH-insulin regular, 70/30, (NOVOLIN 70/30) 100 unit/mL (70-30) injection Inject 55-60 Units into the skin 3 (three) times daily before meals. 5 vial 3    insulin syringe-needle U-100 0.5 mL 31 gauge x 5/16 Syrg INJECT 1 SYRINGE INTO THE SKIN 3 TIMES DAILY 100 each 11    levoFLOXacin (LEVAQUIN) 500 MG tablet Take 1 tablet (500 mg total) by mouth once daily. 7 tablet 0    levothyroxine (SYNTHROID) 100 MCG tablet TAKE 1 TABLET BEFORE BREAKFAST 90 tablet 1    lidocaine-prilocaine (EMLA) cream Apply topically as needed. 25 g 0    metformin (GLUCOPHAGE) 500 MG tablet TAKE 1 TABLET (500 MG TOTAL) BY MOUTH 3 (THREE) TIMES DAILY. 270 tablet 3    metoprolol succinate (TOPROL-XL) 25 MG 24 hr tablet TAKE 1 TABLET EVERY DAY 90 tablet 0    multivit-min-FA-lycopen-lutein (CENTRUM SILVER) 0.4-300-250 mg-mcg-mcg Tab Take 1 tablet by mouth once daily.      MULTIVITAMIN W-MINERALS/LUTEIN (CENTRUM SILVER ORAL) Take by mouth Daily.      omeprazole (PRILOSEC) 40 MG capsule Take 1 capsule (40 mg total) by mouth once daily. 90 capsule 3    pramipexole (MIRAPEX) 1 MG tablet Take 1 tablet (1 mg total) by mouth 3 (three) times daily. 90 tablet 11    pravastatin (PRAVACHOL) 40  "MG tablet TAKE 1 TABLET ONE TIME DAILY 90 tablet 3    aspirin (ECOTRIN) 81 MG EC tablet Take 1 tablet (81 mg total) by mouth once daily. 30 tablet 0    furosemide (LASIX) 20 MG tablet Take 1 tablet (20 mg total) by mouth once daily. 30 tablet 2    isosorbide mononitrate (IMDUR) 120 MG 24 hr tablet Take 1 tablet (120 mg total) by mouth once daily. 90 tablet 3    potassium chloride SA (K-DUR,KLOR-CON) 20 MEQ tablet Take 1 tablet (20 mEq total) by mouth once daily. 30 tablet 2    triamcinolone acetonide 0.1% (KENALOG) 0.1 % cream Apply topically 2 (two) times daily. 1 Tube 1    valacyclovir (VALTREX) 1000 MG tablet Take 1 tablet (1,000 mg total) by mouth 2 (two) times daily. 8 tablet 1     No current facility-administered medications for this visit.        Medications Discontinued During This Encounter   Medication Reason    carbidopa-levodopa  mg (SINEMET)  mg per tablet     furosemide (LASIX) 20 MG tablet     carbidopa-levodopa  mg (SINEMET)  mg per tablet Reorder    pramipexole (MIRAPEX) 1 MG tablet Reorder    insulin syringe-needle U-100 1/2 mL 31 x 5/16" Syrg Reorder    insulin NPH-insulin regular, 70/30, (NOVOLIN 70/30) 100 unit/mL (70-30) injection Reorder       Return in about 3 months (around 4/13/2017).      Dr Anna Penn MD    Disclaimer: This note is prepared using voice recognition system and as such is likely to have errors and is not proof read.  "

## 2017-01-16 ENCOUNTER — TELEPHONE (OUTPATIENT)
Dept: INTERNAL MEDICINE | Facility: CLINIC | Age: 72
End: 2017-01-16

## 2017-01-16 DIAGNOSIS — E11.9 TYPE II OR UNSPECIFIED TYPE DIABETES MELLITUS WITHOUT MENTION OF COMPLICATION, NOT STATED AS UNCONTROLLED: Primary | ICD-10-CM

## 2017-01-16 NOTE — TELEPHONE ENCOUNTER
Spoke with pt and they need a [facility] glucose meter [facility]. I will send this.

## 2017-01-16 NOTE — TELEPHONE ENCOUNTER
----- Message from Jessica Almaguer sent at 1/16/2017  7:18 AM CST -----  Contact: Pt   Pt is requesting a new Rx for diabetic supplies be sent to [facility] Pt can be reached at [phone] (home)

## 2017-01-18 ENCOUNTER — TELEPHONE (OUTPATIENT)
Dept: INTERNAL MEDICINE | Facility: CLINIC | Age: 72
End: 2017-01-18

## 2017-01-18 NOTE — TELEPHONE ENCOUNTER
Spoke with pt and insurance company will not cover Relion Novolin insulin , it has to be changed     Spoke with [facility] and they will change the rx, they said that the insulin got changed when the pt went into the hole with their insurance. It was changed to relion the [facility] brand to help with cost. Now they will change them back to a brand name insulin and it will cost them $37 for 3 boxes of insulin

## 2017-01-18 NOTE — TELEPHONE ENCOUNTER
----- Message from Tracy Woods sent at 1/18/2017  2:42 PM CST -----  Contact: pt  Pt requests refill for insulin. Pt asks that the nurse call her before ordering refill. Pt states she is completely out and would like refill today. Pt can be reached at  [phone] or [phone].

## 2017-02-06 DIAGNOSIS — I10 ESSENTIAL HYPERTENSION: ICD-10-CM

## 2017-02-06 DIAGNOSIS — I25.10 CORONARY ARTERY DISEASE INVOLVING NATIVE CORONARY ARTERY WITHOUT ANGINA PECTORIS, UNSPECIFIED WHETHER NATIVE OR TRANSPLANTED HEART: ICD-10-CM

## 2017-02-06 RX ORDER — FUROSEMIDE 20 MG/1
20 TABLET ORAL DAILY
Qty: 90 TABLET | Refills: 3 | Status: SHIPPED | OUTPATIENT
Start: 2017-02-06 | End: 2018-02-21

## 2017-02-06 RX ORDER — METOPROLOL SUCCINATE 25 MG/1
25 TABLET, EXTENDED RELEASE ORAL DAILY
Qty: 90 TABLET | Refills: 3 | Status: ON HOLD | OUTPATIENT
Start: 2017-02-06 | End: 2018-02-22 | Stop reason: HOSPADM

## 2017-02-06 RX ORDER — CARBIDOPA AND LEVODOPA 25; 250 MG/1; MG/1
TABLET ORAL
Qty: 150 TABLET | Refills: 11 | Status: ON HOLD | OUTPATIENT
Start: 2017-02-06 | End: 2018-02-22 | Stop reason: HOSPADM

## 2017-02-06 RX ORDER — PRAMIPEXOLE DIHYDROCHLORIDE 1 MG/1
1 TABLET ORAL 3 TIMES DAILY
Qty: 270 TABLET | Refills: 3 | Status: SHIPPED | OUTPATIENT
Start: 2017-02-06 | End: 2017-10-19 | Stop reason: SDUPTHER

## 2017-02-06 RX ORDER — ISOSORBIDE MONONITRATE 120 MG/1
120 TABLET, EXTENDED RELEASE ORAL DAILY
Qty: 90 TABLET | Refills: 3 | Status: SHIPPED | OUTPATIENT
Start: 2017-02-06 | End: 2017-11-14 | Stop reason: SDUPTHER

## 2017-02-06 RX ORDER — OMEPRAZOLE 40 MG/1
40 CAPSULE, DELAYED RELEASE ORAL DAILY
Qty: 90 CAPSULE | Refills: 3 | Status: SHIPPED | OUTPATIENT
Start: 2017-02-06 | End: 2017-03-13

## 2017-02-06 RX ORDER — LEVOTHYROXINE SODIUM 100 UG/1
TABLET ORAL
Qty: 90 TABLET | Refills: 3 | Status: SHIPPED | OUTPATIENT
Start: 2017-02-06 | End: 2018-01-18 | Stop reason: SDUPTHER

## 2017-02-06 RX ORDER — PRAVASTATIN SODIUM 40 MG/1
TABLET ORAL
Qty: 90 TABLET | Refills: 3 | Status: SHIPPED | OUTPATIENT
Start: 2017-02-06 | End: 2018-01-18 | Stop reason: SDUPTHER

## 2017-02-06 RX ORDER — POTASSIUM CHLORIDE 20 MEQ/1
20 TABLET, EXTENDED RELEASE ORAL DAILY
Qty: 90 TABLET | Refills: 3 | Status: SHIPPED | OUTPATIENT
Start: 2017-02-06 | End: 2018-07-05

## 2017-02-06 RX ORDER — METFORMIN HYDROCHLORIDE 500 MG/1
TABLET ORAL
Qty: 270 TABLET | Refills: 3 | Status: SHIPPED | OUTPATIENT
Start: 2017-02-06 | End: 2018-01-18 | Stop reason: SDUPTHER

## 2017-02-06 NOTE — TELEPHONE ENCOUNTER
----- Message from Yahaira Arias sent at 2/6/2017 10:24 AM CST -----  Pt states she has people health insurance and have some prescription change and sent to [facility] [address]. Pt can be reach at [phone].

## 2017-02-08 ENCOUNTER — TELEPHONE (OUTPATIENT)
Dept: INTERNAL MEDICINE | Facility: CLINIC | Age: 72
End: 2017-02-08

## 2017-02-08 NOTE — TELEPHONE ENCOUNTER
----- Message from Junie Saxena sent at 2/8/2017 10:52 AM CST -----  Pt is requesting a call from nurse to f/u on her insurance and medications.        Please call pt back at [phone]

## 2017-02-15 ENCOUNTER — OFFICE VISIT (OUTPATIENT)
Dept: PODIATRY | Facility: CLINIC | Age: 72
End: 2017-02-15
Payer: MEDICARE

## 2017-02-15 VITALS
WEIGHT: 246.25 LBS | SYSTOLIC BLOOD PRESSURE: 156 MMHG | DIASTOLIC BLOOD PRESSURE: 86 MMHG | RESPIRATION RATE: 18 BRPM | HEIGHT: 66 IN | HEART RATE: 91 BPM | BODY MASS INDEX: 39.58 KG/M2

## 2017-02-15 DIAGNOSIS — M20.42 HAMMER TOES OF BOTH FEET: ICD-10-CM

## 2017-02-15 DIAGNOSIS — E11.51 TYPE II DIABETES MELLITUS WITH PERIPHERAL CIRCULATORY DISORDER: Primary | ICD-10-CM

## 2017-02-15 DIAGNOSIS — L84 CORN OR CALLUS: ICD-10-CM

## 2017-02-15 DIAGNOSIS — B35.1 ONYCHOMYCOSIS DUE TO DERMATOPHYTE: ICD-10-CM

## 2017-02-15 DIAGNOSIS — M20.41 HAMMER TOES OF BOTH FEET: ICD-10-CM

## 2017-02-15 DIAGNOSIS — E11.42 DIABETIC POLYNEUROPATHY ASSOCIATED WITH TYPE 2 DIABETES MELLITUS: ICD-10-CM

## 2017-02-15 DIAGNOSIS — M62.469 GASTROCNEMIUS EQUINUS, UNSPECIFIED LATERALITY: ICD-10-CM

## 2017-02-15 PROCEDURE — 3079F DIAST BP 80-89 MM HG: CPT | Mod: S$GLB,,, | Performed by: PODIATRIST

## 2017-02-15 PROCEDURE — 11721 DEBRIDE NAIL 6 OR MORE: CPT | Mod: 59,Q8,S$GLB, | Performed by: PODIATRIST

## 2017-02-15 PROCEDURE — 3077F SYST BP >= 140 MM HG: CPT | Mod: S$GLB,,, | Performed by: PODIATRIST

## 2017-02-15 PROCEDURE — 1160F RVW MEDS BY RX/DR IN RCRD: CPT | Mod: S$GLB,,, | Performed by: PODIATRIST

## 2017-02-15 PROCEDURE — 2022F DILAT RTA XM EVC RTNOPTHY: CPT | Mod: S$GLB,,, | Performed by: PODIATRIST

## 2017-02-15 PROCEDURE — 1159F MED LIST DOCD IN RCRD: CPT | Mod: S$GLB,,, | Performed by: PODIATRIST

## 2017-02-15 PROCEDURE — 1125F AMNT PAIN NOTED PAIN PRSNT: CPT | Mod: S$GLB,,, | Performed by: PODIATRIST

## 2017-02-15 PROCEDURE — 3045F PR MOST RECENT HEMOGLOBIN A1C LEVEL 7.0-9.0%: CPT | Mod: S$GLB,,, | Performed by: PODIATRIST

## 2017-02-15 PROCEDURE — 3060F POS MICROALBUMINURIA REV: CPT | Mod: S$GLB,,, | Performed by: PODIATRIST

## 2017-02-15 PROCEDURE — 1157F ADVNC CARE PLAN IN RCRD: CPT | Mod: S$GLB,,, | Performed by: PODIATRIST

## 2017-02-15 PROCEDURE — 99999 PR PBB SHADOW E&M-EST. PATIENT-LVL III: CPT | Mod: PBBFAC,,, | Performed by: PODIATRIST

## 2017-02-15 PROCEDURE — 99203 OFFICE O/P NEW LOW 30 MIN: CPT | Mod: 25,S$GLB,, | Performed by: PODIATRIST

## 2017-02-15 PROCEDURE — 11056 PARNG/CUTG B9 HYPRKR LES 2-4: CPT | Mod: Q8,S$GLB,, | Performed by: PODIATRIST

## 2017-02-15 NOTE — PATIENT INSTRUCTIONS
Remember the importance of good nutrition and blood sugar control to help prevent foot complications of diabetes and see your internist at least ever six months. Always wear proper shoe gear. Inspect your feet daily. Always call the clinic immediately if you notice something on your feet that is not normal such as a blister, wound, or foreign object stuck in your foot.     Physical therapy as prescribed.

## 2017-02-15 NOTE — PROGRESS NOTES
Subjective:      Patient ID: Ross Isbell is a 71 y.o. female.    Chief Complaint: Diabetic Foot Exam (family doctor-Dr. [id number])    Ross Isbell is a 71 y.o. year-old diabetic female here by referral from Anna Penn MD with her  for diabetic foot evaluation and complaint of thick nails and calluses. The patient has been diagnosed with diabetes for 15 years now and is managing the diabetes under the care of Dr. Penn.  Ross has a past medical history of Abnormal nuclear stress test (11/1/2015); Background diabetic retinopathy (12/28/2015); CAD (coronary artery disease) (2002); Cataract; Gait disorder (10/12/2012); Glaucoma; Obesity; CORINE (obstructive sleep apnea); Other and unspecified hyperlipidemia; PD (Parkinson's disease) (2002); Type II or unspecified type diabetes mellitus without mention of complication, not stated as uncontrolled (2002 ); and Unspecified essential hypertension..    Ross Isbell denies history of podiatric care and any prior or previous history of wounds.     Ross Isbell does have complaints of numbness, tingling, burning of the feet. Ross Isbell is primarily here today for diabetic foot check and palliative care.    PCP: Anna Penn MD    Date Last Seen by PCP: January 13, 2017    Current shoe gear: vionic    Hemoglobin A1C   Date Value Ref Range Status   10/19/2016 7.1 (H) 4.5 - 6.2 % Final     Comment:     According to ADA guidelines, hemoglobin A1C <7.0% represents  optimal control in non-pregnant diabetic patients.  Different  metrics may apply to specific populations.   Standards of Medical Care in Diabetes - 2016.  For the purpose of screening for the presence of diabetes:  <5.7%     Consistent with the absence of diabetes  5.7-6.4%  Consistent with increasing risk for diabetes   (prediabetes)  >or=6.5%  Consistent with diabetes  Currently no consensus exists for use of hemoglobin A1C  for diagnosis of diabetes for children.     05/17/2016 7.4  (H) 4.5 - 6.2 % Final   12/22/2015 7.4 (H) 4.5 - 6.2 % Final         Review of Systems   Constitution: Negative for chills and fever.   Cardiovascular: Negative for chest pain.   Respiratory: Negative for shortness of breath.    Gastrointestinal: Negative for nausea and vomiting.           Objective:      Physical Exam   Constitutional: She is oriented to person, place, and time. She appears well-developed and well-nourished. No distress.   Cardiovascular:   Pulses:       Dorsalis pedis pulses are 2+ on the right side, and 2+ on the left side.        Posterior tibial pulses are 2+ on the right side, and 2+ on the left side.   Capillary fill time 3-5 seconds to digits bilateral feet.   Musculoskeletal:   Lower extremities:    Deformities: semi reducible hammertoe contractures bilaterally.    Normal arch height and rectus feet bilaterally.     5/5 muscle strength and tone in all four quadrants bilaterally.     Pain-free range of motion in all four quadrants with stiffness and limitation with 0 DF bilaterally.     Pain on palpation: None     Neurological: She is alert and oriented to person, place, and time. She displays no Babinski's sign on the right side. She displays no Babinski's sign on the left side.   Plantar protective threshold sensation by touch via 5.07 SWMF diminished to the digits bilaterally.      Skin:   Lower extremities:    Thin, dry, atrophic bilaterally. Digital hair absent bilaterally. Digits cool with proximal foot warmth.    Moderate bilateral ankle edema.    Varicosities, pigmentary changes bilaterally.     No wounds, drainage, malodor, erythema, interdigital maceration were noted.     Skin lesions: plantarmedial HIPJ and plantarmedial 1st mpj left.     Nails are thick, dystrophic and discolored bilateral feet.   Psychiatric: She has a normal mood and affect.   Nursing note and vitals reviewed.            Assessment:       Encounter Diagnoses   Name Primary?    Type II diabetes mellitus with  peripheral circulatory disorder Yes    Diabetic polyneuropathy associated with type 2 diabetes mellitus     Corn or callus     Onychomycosis due to dermatophyte     Hammer toes of both feet     Gastrocnemius equinus, unspecified laterality          Plan:       Ross was seen today for diabetic foot exam.    Diagnoses and all orders for this visit:    Type II diabetes mellitus with peripheral circulatory disorder  -     Ambulatory Referral to Physical/Occupational Therapy    Diabetic polyneuropathy associated with type 2 diabetes mellitus  -     Ambulatory Referral to Physical/Occupational Therapy    Corn or callus  -     Ambulatory Referral to Physical/Occupational Therapy    Onychomycosis due to dermatophyte  -     Ambulatory Referral to Physical/Occupational Therapy    Hammer toes of both feet  -     Ambulatory Referral to Physical/Occupational Therapy    Gastrocnemius equinus, unspecified laterality  -     Ambulatory Referral to Physical/Occupational Therapy      I counseled the patient on her conditions, their implications and medical management.      Because of continued pain and limitation in range of motion patient was referred to physical therapy.    After cleansing with alcohol prep pad, the above mentioned hyperkeratotic lesions were sharply excisionally debrided x2 utilizing #15 blade to a smooth base without incident. Patient tolerated procedure well and reported comfort to the debridement sites. Off-loading cushioned pads were applied and dispensed to the patient with information on how to order additional pads for maintenance care of keratotic build up. Patient was also given a prescription for urea lotion and information on over the counter alternatives to apply to areas of recurrent skin build up.  She was also given a prescription for diabetic shoes and insoles to better accommodate hammertoe contractures.    - Shoe inspection. Diabetic Foot Education. Patient reminded of the importance of  good nutrition and blood sugar control to help prevent podiatric complications of diabetes. Patient instructed on proper foot hygeine. We discussed wearing proper shoe gear, daily foot inspections, never walking without protective shoe gear, never putting sharp instruments to feet, routine podiatric nail visits every 2-3 months.      - With patient's permission, nails were aggressively reduced and debrided x 10 to their soft tissue attachment mechanically and with electric , removing all offending nail and debris. Patient relates relief following the procedure. She will continue to monitor the areas daily, inspect her feet, wear protective shoe gear when ambulatory, moisturizer to maintain skin integrity and follow in this office in approximately 2-3 months, sooner p.r.n.

## 2017-02-15 NOTE — LETTER
February 15, 2017      Anna Penn MD  [address] [address] [address]           [facility]  [address] [place] [id number]  Phone: [phone]  Fax: [phone]          Patient: Ross Isbell   MR Number: [id number]   YOB: 1945   Date of Visit: 2/15/2017       Dear Dr. Anna Penn:    Thank you for referring Ross Isbell to me for evaluation. Attached you will find relevant portions of my assessment and plan of care.    If you have questions, please do not hesitate to call me. I look forward to following Ross Isbell along with you.    Sincerely,    Elaine Owen, DPM    Enclosure  CC:  No Recipients    If you would like to receive this communication electronically, please contact [email or web address] or [phone] to request more information on [employer] Link access.    For providers and/or their staff who would like to refer a patient to [facility], please contact us through our one-stop-shop provider referral line, [facility] , at [phone].    If you feel you have received this communication in error or would no longer like to receive these types of communications, please e-mail [email or web address]

## 2017-02-15 NOTE — MR AVS SNAPSHOT
[facility]  [address] [facility] [address]  Phone: [phone]  Fax: [phone]                  Ross Isbell   2/15/2017 10:40 AM   Office Visit    Description:  Female : 1945   Provider:  Elaine Owen DPM   Department:  [facility]           Reason for Visit     Diabetic Foot Exam           Diagnoses this Visit        Comments    Type II diabetes mellitus with peripheral circulatory disorder    -  Primary     Diabetic polyneuropathy associated with type 2 diabetes mellitus         Corn or callus         Onychomycosis due to dermatophyte         Hammer toes of both feet         Gastrocnemius equinus, unspecified laterality                To Do List           Future Appointments        Provider Department Dept Phone    2017 9:00 AM SPECIMEN, [place] [facility] [phone] 9:30 AM LABORATORY, [place] [facility] [phone] 11:40 AM Anna Penn MD [facility] [phone] 10:00 AM VINOD Cox MD [facility] [phone]    2017 10:20 AM Elaine Owen DPM [facility] [phone]      Goals (5 Years of Data)     None      Follow-Up and Disposition     Return in about 3 months (around 5/15/2017).      [facility]     [facility] Nurse Care Line - 24/7 Assistance  Registered nurses in the [facility] provide clinical advisement, health education, appointment booking, and other advisory services.  Call for this free service at [phone].             Medications           Message regarding Medications     Verify the changes and/or additions to your medication regime listed below are the same as discussed with your clinician today.  If any of these changes or additions are incorrect, please notify your healthcare provider.             Verify that the below list of medications is an accurate  representation of the medications you are currently taking.  If none reported, the list may be blank. If incorrect, please contact your healthcare provider. Carry this list with you in case of emergency.           Current Medications     ACCU-CHEK SOFTCLIX LANCETS Misc TEST THREE TIMES DAILY    acetaminophen (TYLENOL) 325 MG tablet Take 2 tablets (650 mg total) by mouth every 6 (six) hours as needed.    aspirin (ECOTRIN) 81 MG EC tablet Take 1 tablet (81 mg total) by mouth once daily.    blood sugar diagnostic Strp 1 strip by Misc.(Non-Drug; Combo Route) route 3 (three) times daily. Accuchek Felicia Plus Test Strips    blood-glucose meter kit Accu-chek Felicia Plus Meter    carbidopa-levodopa  mg (SINEMET)  mg per tablet Take 1 tab 5 times daily.    furosemide (LASIX) 20 MG tablet Take 1 tablet (20 mg total) by mouth once daily.    insulin NPH-insulin regular, 70/30, (NOVOLIN 70/30) 100 unit/mL (70-30) injection Inject 55-60 Units into the skin 3 (three) times daily before meals.    insulin syringe-needle U-100 0.5 mL 31 gauge x 5/16 Syrg INJECT 1 SYRINGE INTO THE SKIN 3 TIMES DAILY    isosorbide mononitrate (IMDUR) 120 MG 24 hr tablet Take 1 tablet (120 mg total) by mouth once daily.    levothyroxine (SYNTHROID) 100 MCG tablet TAKE 1 TABLET BEFORE BREAKFAST    lidocaine-prilocaine (EMLA) cream Apply topically as needed.    metformin (GLUCOPHAGE) 500 MG tablet TAKE 1 TABLET (500 MG TOTAL) BY MOUTH 3 (THREE) TIMES DAILY.    metoprolol succinate (TOPROL-XL) 25 MG 24 hr tablet Take 1 tablet (25 mg total) by mouth once daily.    multivit-min-FA-lycopen-lutein (CENTRUM SILVER) 0.4-300-250 mg-mcg-mcg Tab Take 1 tablet by mouth once daily.    MULTIVITAMIN W-MINERALS/LUTEIN (CENTRUM SILVER ORAL) Take by mouth Daily.    omeprazole (PRILOSEC) 40 MG capsule Take 1 capsule (40 mg total) by mouth once daily.    potassium chloride SA (K-DUR,KLOR-CON) 20 MEQ tablet Take 1 tablet (20 mEq total) by mouth once daily.     "pramipexole (MIRAPEX) 1 MG tablet Take 1 tablet (1 mg total) by mouth 3 (three) times daily.    pravastatin (PRAVACHOL) 40 MG tablet TAKE 1 TABLET ONE TIME DAILY    triamcinolone acetonide 0.1% (KENALOG) 0.1 % cream Apply topically 2 (two) times daily.    valacyclovir (VALTREX) 1000 MG tablet Take 1 tablet (1,000 mg total) by mouth 2 (two) times daily.           Clinical Reference Information           Your Vitals Were     BP Pulse Resp Height Weight BMI    156/86 91 18 5' 6" (1.676 m) 111.7 kg (246 lb 4.1 oz) 39.75 kg/m2      Blood Pressure          Most Recent Value    BP  (!)  156/86      Allergies as of 2/15/2017     Codeine    Codeine Sulfate    Diphenhydramine Hcl    Penicillins    Sulfa (Sulfonamide Antibiotics)      Immunizations Administered on Date of Encounter - 2/15/2017     None      Orders Placed During Today's Visit      Normal Orders This Visit    Ambulatory Referral to Physical/Occupational Therapy       Instructions    Remember the importance of good nutrition and blood sugar control to help prevent foot complications of diabetes and see your internist at least ever six months. Always wear proper shoe gear. Inspect your feet daily. Always call the clinic immediately if you notice something on your feet that is not normal such as a blister, wound, or foreign object stuck in your foot.     Physical therapy as prescribed.             Language Assistance Services     ATTENTION: Language assistance services are available, free of charge. Please call [phone].      ATENCIÓN: Si vashti hawkins, tiene a tena disposición servicios gratuitos de asistencia lingüística. Llame al [phone].     CHÚ Ý: N?u b?n nói Ti?ng Vi?t, có các d?ch v? h? tr? ngôn ng? mi?n phí dành cho b?n. G?i s? [phone].         [facility] complies with applicable Federal civil rights laws and does not discriminate on the basis of race, color, national origin, age, disability, or sex.        "

## 2017-02-27 ENCOUNTER — TELEPHONE (OUTPATIENT)
Dept: PODIATRY | Facility: CLINIC | Age: 72
End: 2017-02-27

## 2017-02-27 NOTE — TELEPHONE ENCOUNTER
----- Message from Yvette Finnegan sent at 2/24/2017  8:44 AM CST -----  Contact: [facility]  [facility] calling to provide coverage decision that request is pending and doesn't meet the criti when calling back use member #[phone],ref#[id number]...[phone]

## 2017-02-27 NOTE — TELEPHONE ENCOUNTER
Phoned and spoke with a Representative in which explained to me that her Diabetic shoe prescription was partially denied and is currently under review.

## 2017-03-13 ENCOUNTER — TELEPHONE (OUTPATIENT)
Dept: INTERNAL MEDICINE | Facility: CLINIC | Age: 72
End: 2017-03-13

## 2017-03-13 RX ORDER — ESOMEPRAZOLE MAGNESIUM 40 MG/1
40 CAPSULE, DELAYED RELEASE ORAL
Qty: 30 CAPSULE | Refills: 11 | Status: SHIPPED | OUTPATIENT
Start: 2017-03-13 | End: 2017-10-18

## 2017-03-13 NOTE — TELEPHONE ENCOUNTER
Spoke with pt and she needs something else other than Omeprazole  Esomeprazole is [facility] preferred drug

## 2017-03-13 NOTE — TELEPHONE ENCOUNTER
----- Message from Lo Amaya sent at 3/13/2017  9:41 AM CDT -----  Pt at [phone]//states is calling regarding a prescription//all information given//please call//thanks/[facility]

## 2017-03-13 NOTE — TELEPHONE ENCOUNTER
----- Message from Gillian Venegas sent at 3/13/2017  1:27 PM CDT -----  Contact: pt  Pt returning nurse call, please call pt @ [phone]

## 2017-04-13 ENCOUNTER — LAB VISIT (OUTPATIENT)
Dept: LAB | Facility: HOSPITAL | Age: 72
End: 2017-04-13
Attending: FAMILY MEDICINE
Payer: MEDICARE

## 2017-04-13 DIAGNOSIS — E11.42 TYPE 2 DIABETES, CONTROLLED, WITH PERIPHERAL NEUROPATHY: Chronic | ICD-10-CM

## 2017-04-13 LAB
ALBUMIN SERPL BCP-MCNC: 3.6 G/DL
ALP SERPL-CCNC: 54 U/L
ALT SERPL W/O P-5'-P-CCNC: 13 U/L
ANION GAP SERPL CALC-SCNC: 13 MMOL/L
AST SERPL-CCNC: 31 U/L
BASOPHILS # BLD AUTO: 0.01 K/UL
BASOPHILS NFR BLD: 0.1 %
BILIRUB SERPL-MCNC: 0.7 MG/DL
BUN SERPL-MCNC: 19 MG/DL
CALCIUM SERPL-MCNC: 9.5 MG/DL
CHLORIDE SERPL-SCNC: 103 MMOL/L
CHOLEST/HDLC SERPL: 2.9 {RATIO}
CO2 SERPL-SCNC: 24 MMOL/L
CREAT SERPL-MCNC: 0.9 MG/DL
DIFFERENTIAL METHOD: NORMAL
EOSINOPHIL # BLD AUTO: 0.3 K/UL
EOSINOPHIL NFR BLD: 3.7 %
ERYTHROCYTE [DISTWIDTH] IN BLOOD BY AUTOMATED COUNT: 13.8 %
EST. GFR  (AFRICAN AMERICAN): >60 ML/MIN/1.73 M^2
EST. GFR  (NON AFRICAN AMERICAN): >60 ML/MIN/1.73 M^2
GLUCOSE SERPL-MCNC: 83 MG/DL
HCT VFR BLD AUTO: 37.7 %
HDL/CHOLESTEROL RATIO: 34.7 %
HDLC SERPL-MCNC: 101 MG/DL
HDLC SERPL-MCNC: 35 MG/DL
HGB BLD-MCNC: 12.3 G/DL
LDLC SERPL CALC-MCNC: 45.4 MG/DL
LYMPHOCYTES # BLD AUTO: 2.5 K/UL
LYMPHOCYTES NFR BLD: 37.1 %
MCH RBC QN AUTO: 28.1 PG
MCHC RBC AUTO-ENTMCNC: 32.6 %
MCV RBC AUTO: 86 FL
MONOCYTES # BLD AUTO: 0.7 K/UL
MONOCYTES NFR BLD: 9.7 %
NEUTROPHILS # BLD AUTO: 3.3 K/UL
NEUTROPHILS NFR BLD: 49.1 %
NONHDLC SERPL-MCNC: 66 MG/DL
PLATELET # BLD AUTO: 264 K/UL
PMV BLD AUTO: 10 FL
POTASSIUM SERPL-SCNC: 4 MMOL/L
PROT SERPL-MCNC: 6.5 G/DL
RBC # BLD AUTO: 4.37 M/UL
SODIUM SERPL-SCNC: 140 MMOL/L
TRIGL SERPL-MCNC: 103 MG/DL
TSH SERPL DL<=0.005 MIU/L-ACNC: 1.77 UIU/ML
WBC # BLD AUTO: 6.79 K/UL

## 2017-04-13 PROCEDURE — 80061 LIPID PANEL: CPT

## 2017-04-13 PROCEDURE — 85025 COMPLETE CBC W/AUTO DIFF WBC: CPT

## 2017-04-13 PROCEDURE — 84443 ASSAY THYROID STIM HORMONE: CPT

## 2017-04-13 PROCEDURE — 36415 COLL VENOUS BLD VENIPUNCTURE: CPT | Mod: PO

## 2017-04-13 PROCEDURE — 80053 COMPREHEN METABOLIC PANEL: CPT

## 2017-04-18 DIAGNOSIS — I25.10 CORONARY ARTERY DISEASE INVOLVING NATIVE CORONARY ARTERY WITHOUT ANGINA PECTORIS, UNSPECIFIED WHETHER NATIVE OR TRANSPLANTED HEART: ICD-10-CM

## 2017-04-19 ENCOUNTER — OFFICE VISIT (OUTPATIENT)
Dept: INTERNAL MEDICINE | Facility: CLINIC | Age: 72
End: 2017-04-19
Payer: MEDICARE

## 2017-04-19 VITALS
DIASTOLIC BLOOD PRESSURE: 90 MMHG | OXYGEN SATURATION: 91 % | HEART RATE: 105 BPM | SYSTOLIC BLOOD PRESSURE: 148 MMHG | TEMPERATURE: 97 F

## 2017-04-19 DIAGNOSIS — I10 ESSENTIAL HYPERTENSION: Chronic | ICD-10-CM

## 2017-04-19 DIAGNOSIS — Z78.0 ASYMPTOMATIC POSTMENOPAUSAL STATUS (AGE-RELATED) (NATURAL): ICD-10-CM

## 2017-04-19 DIAGNOSIS — Z12.39 BREAST CANCER SCREENING: ICD-10-CM

## 2017-04-19 DIAGNOSIS — G20.A1 PD (PARKINSON'S DISEASE): Chronic | ICD-10-CM

## 2017-04-19 DIAGNOSIS — E11.42 TYPE 2 DIABETES, CONTROLLED, WITH PERIPHERAL NEUROPATHY: Primary | Chronic | ICD-10-CM

## 2017-04-19 DIAGNOSIS — Z95.5 S/P CORONARY ARTERY STENT PLACEMENT: Chronic | ICD-10-CM

## 2017-04-19 PROCEDURE — 3077F SYST BP >= 140 MM HG: CPT | Mod: S$GLB,,, | Performed by: FAMILY MEDICINE

## 2017-04-19 PROCEDURE — 3045F PR MOST RECENT HEMOGLOBIN A1C LEVEL 7.0-9.0%: CPT | Mod: S$GLB,,, | Performed by: FAMILY MEDICINE

## 2017-04-19 PROCEDURE — 3080F DIAST BP >= 90 MM HG: CPT | Mod: S$GLB,,, | Performed by: FAMILY MEDICINE

## 2017-04-19 PROCEDURE — 1160F RVW MEDS BY RX/DR IN RCRD: CPT | Mod: S$GLB,,, | Performed by: FAMILY MEDICINE

## 2017-04-19 PROCEDURE — 1159F MED LIST DOCD IN RCRD: CPT | Mod: S$GLB,,, | Performed by: FAMILY MEDICINE

## 2017-04-19 PROCEDURE — 4010F ACE/ARB THERAPY RXD/TAKEN: CPT | Mod: S$GLB,,, | Performed by: FAMILY MEDICINE

## 2017-04-19 PROCEDURE — 99214 OFFICE O/P EST MOD 30 MIN: CPT | Mod: S$GLB,,, | Performed by: FAMILY MEDICINE

## 2017-04-19 PROCEDURE — 1126F AMNT PAIN NOTED NONE PRSNT: CPT | Mod: S$GLB,,, | Performed by: FAMILY MEDICINE

## 2017-04-19 PROCEDURE — 99999 PR PBB SHADOW E&M-EST. PATIENT-LVL III: CPT | Mod: PBBFAC,,, | Performed by: FAMILY MEDICINE

## 2017-04-19 PROCEDURE — 99499 UNLISTED E&M SERVICE: CPT | Mod: S$GLB,,, | Performed by: FAMILY MEDICINE

## 2017-04-19 RX ORDER — LOSARTAN POTASSIUM 50 MG/1
50 TABLET ORAL DAILY
Qty: 90 TABLET | Refills: 3 | Status: SHIPPED | OUTPATIENT
Start: 2017-04-19 | End: 2017-10-18

## 2017-04-19 NOTE — PROGRESS NOTES
Chief Complaint:    Chief Complaint   Patient presents with    Follow-up       History of Present Illness:    Patient is here for three-month follow-up,  His A1c has not done this time, last A1c was 7 current issues says her fasting sugars are about 100 no hypoglycemic episode.  Blood pressure is elevated today, patient is essentially wheelchair bound.  She has sleep apnea follows with pulmonary.  Coronary artery disease stable no new chest pain or shortness of breath.  She has Parkinson's disease which seems to be stable following with neurology.  Patient's chemistry panel is otherwise normal.    ROS:  Review of Systems   Constitutional: Negative for activity change, chills, fatigue, fever and unexpected weight change.   HENT: Negative for congestion, ear discharge, ear pain, hearing loss, postnasal drip and rhinorrhea.    Eyes: Negative for pain and visual disturbance.   Respiratory: Negative for cough, chest tightness and shortness of breath.    Cardiovascular: Negative for chest pain and palpitations.   Gastrointestinal: Negative for abdominal pain, diarrhea and vomiting.   Endocrine: Negative for heat intolerance.   Genitourinary: Negative for dysuria, flank pain, frequency and hematuria.   Musculoskeletal: Negative for back pain, gait problem and neck pain.   Skin: Negative for color change and rash.   Neurological: Negative for dizziness, tremors, seizures, numbness and headaches.   Psychiatric/Behavioral: Negative for agitation, hallucinations, self-injury, sleep disturbance and suicidal ideas. The patient is not nervous/anxious.        Past Medical History:   Diagnosis Date    Abnormal nuclear stress test 11/1/2015    Background diabetic retinopathy 12/28/2015    CAD (coronary artery disease) 2002    stents    Cataract     Gait disorder 10/12/2012    Glaucoma     Obesity     CORINE (obstructive sleep apnea)     Other and unspecified hyperlipidemia     PD (Parkinson's disease) 2002     tremor-predominant (per patient)    Type II or unspecified type diabetes mellitus without mention of complication, not stated as uncontrolled 2002      am    Unspecified essential hypertension        Social History:  Social History     Social History    Marital status:      Spouse name: N/A    Number of children: N/A    Years of education: N/A     Social History Main Topics    Smoking status: Former Smoker     Years: 40.00     Quit date: 10/12/2010    Smokeless tobacco: Never Used      Comment: On & off for the past few years    Alcohol use No    Drug use: No    Sexual activity: Not Currently     Other Topics Concern    None     Social History Narrative     . Lives with spouse. Has 2 children. 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       Family History:   family history includes Diabetes in her maternal grandmother; Glaucoma in her maternal grandmother and mother; Heart attack (age of onset: 65) in her father and mother; Hypertension in her father, maternal grandmother, and mother; Parkinsonism in her other and paternal aunt; Tremor in her father.    Health Maintenance   Topic Date Due    DEXA SCAN  06/13/2015    Eye Exam  12/28/2016    Mammogram  05/30/2017    Hemoglobin A1c  10/19/2017    Foot Exam  02/15/2018    Lipid Panel  04/13/2018    TETANUS VACCINE  11/19/2025    Hepatitis C Screening  Addressed    Zoster Vaccine  Addressed    Pneumococcal (65+)  Completed    Influenza Vaccine  Completed       Physical Exam:    Vital Signs  Temp: 96.9 °F (36.1 °C)  Temp src: Tympanic  Pulse: 105  SpO2: (!) 91 %  BP: (!) 148/90  BP Location: Left arm  BP Method: Manual  Patient Position: Sitting  Pain Score: 0-No pain]    There is no height or weight on file to calculate BMI.    Physical Exam   Constitutional: She is oriented to person, place, and time. She appears well-developed.   HENT:   Mouth/Throat: Oropharynx is clear and moist.   Eyes: Conjunctivae are normal.  Pupils are equal, round, and reactive to light.   Neck: Normal range of motion. Neck supple.   Cardiovascular: Normal rate, regular rhythm and normal heart sounds.    No murmur heard.  Pulmonary/Chest: Effort normal and breath sounds normal. No respiratory distress. She has no wheezes. She has no rales. She exhibits no tenderness.   Abdominal: Soft. She exhibits no distension and no mass. There is no tenderness. There is no guarding.   Musculoskeletal: She exhibits no edema or tenderness.   wc bound     Lymphadenopathy:     She has no cervical adenopathy.   Neurological: She is alert and oriented to person, place, and time.   Skin: Skin is warm and dry.   Psychiatric: She has a normal mood and affect. Her behavior is normal. Judgment and thought content normal.       Lab Results   Component Value Date    CHOL 101 (L) 04/13/2017    CHOL 104 (L) 10/19/2016    CHOL 107 (L) 05/17/2016    TRIG 103 04/13/2017    TRIG 85 10/19/2016    TRIG 88 05/17/2016    HDL 35 (L) 04/13/2017    HDL 35 (L) 10/19/2016    HDL 39 (L) 05/17/2016    TOTALCHOLEST 2.9 04/13/2017    TOTALCHOLEST 3.0 10/19/2016    TOTALCHOLEST 2.7 05/17/2016    NONHDLCHOL 66 04/13/2017    NONHDLCHOL 69 10/19/2016    NONHDLCHOL 68 05/17/2016       Lab Results   Component Value Date    HGBA1C 7.1 (H) 10/19/2016       Assessment:      ICD-10-CM ICD-9-CM   1. Type 2 diabetes, controlled, with peripheral neuropathy E11.42 250.60     357.2   2. Essential hypertension I10 401.9   3. Breast cancer screening Z12.39 V76.10   4. Asymptomatic postmenopausal status (age-related) (natural) Z78.0 V49.81   5. CAD: S/P coronary artery stent placement Z95.5 V45.82   6. PD (Parkinson's disease) G20 332.0         Plan:  Add losartan 50 mg once daily, continue metoprolol please monitor blood pressure carefully check it twice a day.  Other medical problems are stable we will continue current meds and plan.  Please continue to monitor portion sizes.  Schedule a mammogram and a DEXA  scan.  Follow-up 3 months.  Orders Placed This Encounter   Procedures    DXA Bone Density Spine And Hip    Mammo Digital Screening Bilat with CAD    Hemoglobin A1c    Comprehensive metabolic panel    Lipid panel    Microalbumin/creatinine urine ratio       Current Outpatient Prescriptions   Medication Sig Dispense Refill    ACCU-CHEK SOFTCLIX LANCETS Misc TEST THREE TIMES DAILY 300 each 3    acetaminophen (TYLENOL) 325 MG tablet Take 2 tablets (650 mg total) by mouth every 6 (six) hours as needed.  0    blood sugar diagnostic Strp 1 strip by Misc.(Non-Drug; Combo Route) route 3 (three) times daily. Accuchek Felicia Plus Test Strips 300 strip 3    blood-glucose meter kit Accu-chek Felicia Plus Meter 1 each 0    carbidopa-levodopa  mg (SINEMET)  mg per tablet Take 1 tab 5 times daily. 150 tablet 11    esomeprazole (NEXIUM) 40 MG capsule Take 1 capsule (40 mg total) by mouth before breakfast. 30 capsule 11    furosemide (LASIX) 20 MG tablet Take 1 tablet (20 mg total) by mouth once daily. 90 tablet 3    insulin NPH-insulin regular, 70/30, (NOVOLIN 70/30) 100 unit/mL (70-30) injection Inject 55-60 Units into the skin 3 (three) times daily before meals. 5 vial 3    insulin syringe-needle U-100 0.5 mL 31 gauge x 5/16 Syrg INJECT 1 SYRINGE INTO THE SKIN 3 TIMES DAILY 100 each 11    isosorbide mononitrate (IMDUR) 120 MG 24 hr tablet Take 1 tablet (120 mg total) by mouth once daily. 90 tablet 3    levothyroxine (SYNTHROID) 100 MCG tablet TAKE 1 TABLET BEFORE BREAKFAST 90 tablet 3    lidocaine-prilocaine (EMLA) cream Apply topically as needed. 25 g 0    metformin (GLUCOPHAGE) 500 MG tablet TAKE 1 TABLET (500 MG TOTAL) BY MOUTH 3 (THREE) TIMES DAILY. 270 tablet 3    metoprolol succinate (TOPROL-XL) 25 MG 24 hr tablet Take 1 tablet (25 mg total) by mouth once daily. 90 tablet 3    multivit-min-FA-lycopen-lutein (CENTRUM SILVER) 0.4-300-250 mg-mcg-mcg Tab Take 1 tablet by mouth once daily.       MULTIVITAMIN W-MINERALS/LUTEIN (CENTRUM SILVER ORAL) Take by mouth Daily.      potassium chloride SA (K-DUR,KLOR-CON) 20 MEQ tablet Take 1 tablet (20 mEq total) by mouth once daily. 90 tablet 3    pramipexole (MIRAPEX) 1 MG tablet Take 1 tablet (1 mg total) by mouth 3 (three) times daily. 270 tablet 3    pravastatin (PRAVACHOL) 40 MG tablet TAKE 1 TABLET ONE TIME DAILY 90 tablet 3    aspirin (ECOTRIN) 81 MG EC tablet Take 1 tablet (81 mg total) by mouth once daily. 30 tablet 0    losartan (COZAAR) 50 MG tablet Take 1 tablet (50 mg total) by mouth once daily. 90 tablet 3    triamcinolone acetonide 0.1% (KENALOG) 0.1 % cream Apply topically 2 (two) times daily. 1 Tube 1    valacyclovir (VALTREX) 1000 MG tablet Take 1 tablet (1,000 mg total) by mouth 2 (two) times daily. 8 tablet 1     No current facility-administered medications for this visit.        There are no discontinued medications.    Return in about 3 months (around 7/19/2017).      Dr Anna Penn MD    Disclaimer: This note is prepared using voice recognition system and as such is likely to have errors and is not proof read.

## 2017-04-19 NOTE — MR AVS SNAPSHOT
[facility]  [address] [place] [address]  Phone: [phone]                  Ross Isbell   2017 11:40 AM   Office Visit    Description:  Female : 1945   Provider:  Anna Penn MD   Department:  [facility]           Reason for Visit     Follow-up           Diagnoses this Visit        Comments    Type 2 diabetes, controlled, with peripheral neuropathy    -  Primary     Essential hypertension         Breast cancer screening         Asymptomatic postmenopausal status (age-related) (natural)         S/P coronary artery stent placement         PD (Parkinson's disease)                To Do List           Future Appointments        Provider Department Dept Phone    2017 10:00 AM VINOD Cox MD [facility] [phone]    2017 10:30 AM [facility] BMD1 [facility] [phone]    2017 11:00 AM ON[facility] MAMMO1 [facility] [phone]    2017 11:20 AM Lev Nichols MD [facility] Neurology [phone]    2017 10:20 AM Elaine Owen DPM [facility] Podiatry [phone]      Goals (5 Years of Data)     None      Follow-Up and Disposition     Return in about 3 months (around 2017).       These Medications        Disp Refills Start End    losartan (COZAAR) 50 MG tablet 90 tablet 3 2017    Take 1 tablet (50 mg total) by mouth once daily. - Oral    Pharmacy: [facility] [address] [place] [id number] [address] #: [phone]         [facility]     [facility] Nurse Care Line -  Assistance  Unless otherwise directed by your provider, please contact [facility], our nurse care line that is available for  assistance.     Registered nurses in the [facility] provide: appointment scheduling, clinical advisement, health education, and other advisory services.  Call: [phone] (toll free)               Medications            Message regarding Medications     Verify the changes and/or additions to your medication regime listed below are the same as discussed with your clinician today.  If any of these changes or additions are incorrect, please notify your healthcare provider.        START taking these NEW medications        Refills    losartan (COZAAR) 50 MG tablet 3    Sig: Take 1 tablet (50 mg total) by mouth once daily.    Class: Normal    Route: Oral           Verify that the below list of medications is an accurate representation of the medications you are currently taking.  If none reported, the list may be blank. If incorrect, please contact your healthcare provider. Carry this list with you in case of emergency.           Current Medications     ACCU-CHEK SOFTCLIX LANCETS Misc TEST THREE TIMES DAILY    acetaminophen (TYLENOL) 325 MG tablet Take 2 tablets (650 mg total) by mouth every 6 (six) hours as needed.    aspirin (ECOTRIN) 81 MG EC tablet Take 1 tablet (81 mg total) by mouth once daily.    blood sugar diagnostic Strp 1 strip by Misc.(Non-Drug; Combo Route) route 3 (three) times daily. Accuchek Felicia Plus Test Strips    blood-glucose meter kit Accu-chek Felicia Plus Meter    carbidopa-levodopa  mg (SINEMET)  mg per tablet Take 1 tab 5 times daily.    esomeprazole (NEXIUM) 40 MG capsule Take 1 capsule (40 mg total) by mouth before breakfast.    furosemide (LASIX) 20 MG tablet Take 1 tablet (20 mg total) by mouth once daily.    insulin NPH-insulin regular, 70/30, (NOVOLIN 70/30) 100 unit/mL (70-30) injection Inject 55-60 Units into the skin 3 (three) times daily before meals.    insulin syringe-needle U-100 0.5 mL 31 gauge x 5/16 Syrg INJECT 1 SYRINGE INTO THE SKIN 3 TIMES DAILY    isosorbide mononitrate (IMDUR) 120 MG 24 hr tablet Take 1 tablet (120 mg total) by mouth once daily.    levothyroxine (SYNTHROID) 100 MCG tablet TAKE 1 TABLET BEFORE BREAKFAST    lidocaine-prilocaine (EMLA) cream Apply topically as  needed.    losartan (COZAAR) 50 MG tablet Take 1 tablet (50 mg total) by mouth once daily.    metformin (GLUCOPHAGE) 500 MG tablet TAKE 1 TABLET (500 MG TOTAL) BY MOUTH 3 (THREE) TIMES DAILY.    metoprolol succinate (TOPROL-XL) 25 MG 24 hr tablet Take 1 tablet (25 mg total) by mouth once daily.    multivit-min-FA-lycopen-lutein (CENTRUM SILVER) 0.4-300-250 mg-mcg-mcg Tab Take 1 tablet by mouth once daily.    MULTIVITAMIN W-MINERALS/LUTEIN (CENTRUM SILVER ORAL) Take by mouth Daily.    potassium chloride SA (K-DUR,KLOR-CON) 20 MEQ tablet Take 1 tablet (20 mEq total) by mouth once daily.    pramipexole (MIRAPEX) 1 MG tablet Take 1 tablet (1 mg total) by mouth 3 (three) times daily.    pravastatin (PRAVACHOL) 40 MG tablet TAKE 1 TABLET ONE TIME DAILY    triamcinolone acetonide 0.1% (KENALOG) 0.1 % cream Apply topically 2 (two) times daily.    valacyclovir (VALTREX) 1000 MG tablet Take 1 tablet (1,000 mg total) by mouth 2 (two) times daily.           Clinical Reference Information           Your Vitals Were     BP Pulse Temp SpO2          148/90 (BP Location: Left arm, Patient Position: Sitting, BP Method: Manual) 105 96.9 °F (36.1 °C) (Tympanic) 91%        Blood Pressure          Most Recent Value    BP  (!)  148/90      Allergies as of 4/19/2017     Codeine    Codeine Sulfate    Diphenhydramine Hcl    Penicillins    Sulfa (Sulfonamide Antibiotics)      Immunizations Administered on Date of Encounter - 4/19/2017     None      Orders Placed During Today's Visit     Future Labs/Procedures Expected by Expires    DXA Bone Density Spine And Hip  4/19/2017 4/19/2018    Mammo Digital Screening Bilat with CAD  4/19/2017 (Approximate) 6/19/2018    Microalbumin/creatinine urine ratio  7/18/2017 (Approximate) 6/18/2018    Comprehensive metabolic panel  7/20/2017 (Approximate) 7/18/2018    Hemoglobin A1c  7/20/2017 (Approximate) 6/18/2018    Lipid panel  7/20/2017 (Approximate) 6/18/2018      Language Assistance Services      ATTENTION: Language assistance services are available, free of charge. Please call [phone].      ATENCIÓN: Si habla ross, tiene a tena disposición servicios gratuitos de asistencia lingüística. Llame al [phone].     CHÚ Ý: N?u b?n nói Ti?ng Vi?t, có các d?ch v? h? tr? ngôn ng? mi?n phí dành cho b?n. G?i s? [phone].         [facility] complies with applicable Federal civil rights laws and does not discriminate on the basis of race, color, national origin, age, disability, or sex.

## 2017-04-21 ENCOUNTER — TELEPHONE (OUTPATIENT)
Dept: OPHTHALMOLOGY | Facility: CLINIC | Age: 72
End: 2017-04-21

## 2017-05-08 ENCOUNTER — TELEPHONE (OUTPATIENT)
Dept: INTERNAL MEDICINE | Facility: CLINIC | Age: 72
End: 2017-05-08

## 2017-05-08 NOTE — TELEPHONE ENCOUNTER
----- Message from Yusra Bosch sent at 5/8/2017  2:21 PM CDT -----  Contact: Patient  Patient wants to give the nurse her BP and sugar readings for the week, please call her back [phone]. Thank you

## 2017-05-08 NOTE — TELEPHONE ENCOUNTER
Patients spouse dropped off patients blood sugar and blood pressure readings. Blood sugar ranged from 60-90 and blood pressure readings were pretty consistent in the 120's over 80's.

## 2017-05-19 ENCOUNTER — OFFICE VISIT (OUTPATIENT)
Dept: OPHTHALMOLOGY | Facility: CLINIC | Age: 72
End: 2017-05-19
Payer: MEDICARE

## 2017-05-19 DIAGNOSIS — H40.003 GLAUCOMA SUSPECT, BILATERAL: ICD-10-CM

## 2017-05-19 DIAGNOSIS — E11.9 TYPE 2 DIABETES MELLITUS WITHOUT COMPLICATION, WITH LONG-TERM CURRENT USE OF INSULIN: Primary | ICD-10-CM

## 2017-05-19 DIAGNOSIS — Z79.4 TYPE 2 DIABETES MELLITUS WITHOUT COMPLICATION, WITH LONG-TERM CURRENT USE OF INSULIN: Primary | ICD-10-CM

## 2017-05-19 PROCEDURE — 99999 PR PBB SHADOW E&M-EST. PATIENT-LVL II: CPT | Mod: PBBFAC,,, | Performed by: OPHTHALMOLOGY

## 2017-05-19 PROCEDURE — 92014 COMPRE OPH EXAM EST PT 1/>: CPT | Mod: S$GLB,,, | Performed by: OPHTHALMOLOGY

## 2017-05-19 PROCEDURE — 99499 UNLISTED E&M SERVICE: CPT | Mod: S$GLB,,, | Performed by: OPHTHALMOLOGY

## 2017-05-19 NOTE — PROGRESS NOTES
===============================  05/19/2017   Ross Isbell,   71 y.o. female   Last visit [facility]: :12/28/2015   Last visit eye dept. Visit date not found  [facility]:  Uncorrected distance visual acuity was 20/60 in the right eye and 20/60 in the left eye.  Tonometry     Tonometry (Applanation, 10:22 AM)      Right Left   Pressure 24 26                  Not recorded        Manifest Refraction     Manifest Refraction      Sphere Cylinder Axis Dist   Right -1.00 Sphere  20/30   Left -1.75 +0.25 180 20/30                 Chief Complaint   Patient presents with    Diabetic Eye Exam     DIABETIC EXAM        HPI     Diabetic Eye Exam    Additional comments: DIABETIC EXAM           Comments   Pt of dr woo  DM since 2002  GLAUCOMA SUSPECT - Dr. Moon       Last edited by VINOD Cox MD on 5/19/2017 11:17 AM. (History)      Read Studies:   Vitals  ________________  5/19/2017  Problem List Items Addressed This Visit     None      Visit Diagnoses     Type 2 diabetes mellitus without complication, with long-term current use of insulin    -  Primary    Glaucoma suspect, bilateral            Good from diabetic standpoint  Previously followed by Dr. Moon for COAG Suspect  High IOP today, but intra thoracic pressure and lid tension, very difficult  C:D 6-7  Recommend she return to Dr. Moon in next few weeks for VF and resume glaucoma care  rtc with me PRN.  .       ===========================

## 2017-05-19 NOTE — MR AVS SNAPSHOT
[facility]  [address] [place] [address]  Phone: [phone]  Fax: [phone]                  Ross Isbell   2017 10:00 AM   Office Visit    Description:  Female : 1945   Provider:  VINOD Cox MD   Department:  [facility]           Reason for Visit     Diabetic Eye Exam           Diagnoses this Visit        Comments    Type 2 diabetes mellitus without complication, with long-term current use of insulin    -  Primary     Glaucoma suspect, bilateral                To Do List           Future Appointments        Provider Department Dept Phone    2017 10:00 AM [facility] BMD1 [facility] [phone]    2017 10:40 AM ON[facility] MAMMO1 [facility] [phone]    2017 11:20 AM Lev Nichols MD [facility] Neurology [phone]    2017 10:20 AM Elaine Owen DPM [facility] Podiatry [phone]    2017 12:00 PM BOYLE, VISUAL-ONE [facility] [phone]      Goals (5 Years of Data)     None      Follow-Up and Disposition     Return in about 1 month (around 2017).      [facility]     [facility] Nurse Care Line - 24/ Assistance  Unless otherwise directed by your provider, please contact [facility], our nurse care line that is available for  assistance.     Registered nurses in the [facility] provide: appointment scheduling, clinical advisement, health education, and other advisory services.  Call: [phone] (toll free)               Medications           Message regarding Medications     Verify the changes and/or additions to your medication regime listed below are the same as discussed with your clinician today.  If any of these changes or additions are incorrect, please notify your healthcare provider.             Verify that the below list of medications is an accurate representation of the medications you are currently taking.  If none reported, the list  may be blank. If incorrect, please contact your healthcare provider. Carry this list with you in case of emergency.           Current Medications     ACCU-CHEK SOFTCLIX LANCETS Misc TEST THREE TIMES DAILY    acetaminophen (TYLENOL) 325 MG tablet Take 2 tablets (650 mg total) by mouth every 6 (six) hours as needed.    blood sugar diagnostic Strp 1 strip by Misc.(Non-Drug; Combo Route) route 3 (three) times daily. Accuchek Felicia Plus Test Strips    blood-glucose meter kit Accu-chek Felicia Plus Meter    carbidopa-levodopa  mg (SINEMET)  mg per tablet Take 1 tab 5 times daily.    esomeprazole (NEXIUM) 40 MG capsule Take 1 capsule (40 mg total) by mouth before breakfast.    furosemide (LASIX) 20 MG tablet Take 1 tablet (20 mg total) by mouth once daily.    insulin NPH-insulin regular, 70/30, (NOVOLIN 70/30) 100 unit/mL (70-30) injection Inject 55-60 Units into the skin 3 (three) times daily before meals.    insulin syringe-needle U-100 0.5 mL 31 gauge x 5/16 Syrg INJECT 1 SYRINGE INTO THE SKIN 3 TIMES DAILY    levothyroxine (SYNTHROID) 100 MCG tablet TAKE 1 TABLET BEFORE BREAKFAST    lidocaine-prilocaine (EMLA) cream Apply topically as needed.    losartan (COZAAR) 50 MG tablet Take 1 tablet (50 mg total) by mouth once daily.    metformin (GLUCOPHAGE) 500 MG tablet TAKE 1 TABLET (500 MG TOTAL) BY MOUTH 3 (THREE) TIMES DAILY.    metoprolol succinate (TOPROL-XL) 25 MG 24 hr tablet Take 1 tablet (25 mg total) by mouth once daily.    multivit-min-FA-lycopen-lutein (CENTRUM SILVER) 0.4-300-250 mg-mcg-mcg Tab Take 1 tablet by mouth once daily.    MULTIVITAMIN W-MINERALS/LUTEIN (CENTRUM SILVER ORAL) Take by mouth Daily.    potassium chloride SA (K-DUR,KLOR-CON) 20 MEQ tablet Take 1 tablet (20 mEq total) by mouth once daily.    pramipexole (MIRAPEX) 1 MG tablet Take 1 tablet (1 mg total) by mouth 3 (three) times daily.    pravastatin (PRAVACHOL) 40 MG tablet TAKE 1 TABLET ONE TIME DAILY    aspirin (ECOTRIN) 81 MG EC  tablet Take 1 tablet (81 mg total) by mouth once daily.    isosorbide mononitrate (IMDUR) 120 MG 24 hr tablet Take 1 tablet (120 mg total) by mouth once daily.    triamcinolone acetonide 0.1% (KENALOG) 0.1 % cream Apply topically 2 (two) times daily.    valacyclovir (VALTREX) 1000 MG tablet Take 1 tablet (1,000 mg total) by mouth 2 (two) times daily.           Clinical Reference Information           Allergies as of 5/19/2017     Codeine    Codeine Sulfate    Diphenhydramine Hcl    Penicillins    Sulfa (Sulfonamide Antibiotics)      Immunizations Administered on Date of Encounter - 5/19/2017     None      Language Assistance Services     ATTENTION: Language assistance services are available, free of charge. Please call [phone].      ATENCIÓN: Si maribella ross, tiene a tena disposición servicios gratuitos de asistencia lingüística. Llame al [phone].     CHÚ Ý: N?u b?n nói Ti?ng Vi?t, có các d?ch v? h? tr? ngôn ng? mi?n phí dành cho b?n. G?i s? [phone].         [facility] complies with applicable Federal civil rights laws and does not discriminate on the basis of race, color, national origin, age, disability, or sex.

## 2017-05-24 ENCOUNTER — TELEPHONE (OUTPATIENT)
Dept: PHARMACY | Facility: CLINIC | Age: 72
End: 2017-05-24

## 2017-05-24 NOTE — TELEPHONE ENCOUNTER
Patient was referred by Steffanie RUANO for assistance with [id number]/30. Spoke with  by phone. He states he can not drive to [place] or [place] to drop of the necessary paperwork to file for assistance so I told him he could mail me all the information I need to complete his application and he agreed. I mailed him a reminder letter which contained my contact info and told patient to call me with any questions or concerns.

## 2017-06-05 RX ORDER — CLOPIDOGREL BISULFATE 75 MG/1
TABLET ORAL
Qty: 30 TABLET | Refills: 0 | OUTPATIENT
Start: 2017-06-05

## 2017-06-26 ENCOUNTER — TELEPHONE (OUTPATIENT)
Dept: PHARMACY | Facility: CLINIC | Age: 72
End: 2017-06-26

## 2017-06-26 NOTE — TELEPHONE ENCOUNTER
Patients approved to receive Novolin  70/30 free of charge from  [employer] Patient Assistance Program. Enrollment is active thru 12/31/2017. Medication will be shipped to Dr. Penn's office for patient to  with 3 to 5 business days

## 2017-07-20 ENCOUNTER — HOSPITAL ENCOUNTER (OUTPATIENT)
Dept: RADIOLOGY | Facility: HOSPITAL | Age: 72
Discharge: HOME | End: 2017-07-20
Attending: FAMILY MEDICINE
Payer: MEDICARE

## 2017-07-20 VITALS — WEIGHT: 246 LBS | HEIGHT: 66 IN | BODY MASS INDEX: 39.53 KG/M2

## 2017-07-20 DIAGNOSIS — Z12.39 BREAST CANCER SCREENING: ICD-10-CM

## 2017-07-20 PROCEDURE — 77067 SCR MAMMO BI INCL CAD: CPT | Mod: TC

## 2017-07-20 PROCEDURE — 77067 SCR MAMMO BI INCL CAD: CPT | Mod: 26,,, | Performed by: RADIOLOGY

## 2017-07-21 ENCOUNTER — LAB VISIT (OUTPATIENT)
Dept: LAB | Facility: HOSPITAL | Age: 72
End: 2017-07-21
Attending: FAMILY MEDICINE
Payer: MEDICARE

## 2017-07-21 DIAGNOSIS — E11.42 TYPE 2 DIABETES, CONTROLLED, WITH PERIPHERAL NEUROPATHY: Chronic | ICD-10-CM

## 2017-07-21 LAB
CREAT UR-MCNC: 320 MG/DL
MICROALBUMIN UR DL<=1MG/L-MCNC: 96 UG/ML
MICROALBUMIN/CREATININE RATIO: 30 UG/MG

## 2017-07-21 PROCEDURE — 82570 ASSAY OF URINE CREATININE: CPT

## 2017-08-22 ENCOUNTER — OFFICE VISIT (OUTPATIENT)
Dept: FAMILY MEDICINE | Facility: CLINIC | Age: 72
End: 2017-08-22
Payer: MEDICARE

## 2017-08-22 VITALS
SYSTOLIC BLOOD PRESSURE: 126 MMHG | HEART RATE: 92 BPM | BODY MASS INDEX: 39.17 KG/M2 | OXYGEN SATURATION: 93 % | DIASTOLIC BLOOD PRESSURE: 72 MMHG | HEIGHT: 66 IN | TEMPERATURE: 97 F | WEIGHT: 243.75 LBS

## 2017-08-22 DIAGNOSIS — Z95.5 S/P CORONARY ARTERY STENT PLACEMENT: Chronic | ICD-10-CM

## 2017-08-22 DIAGNOSIS — E11.42 TYPE 2 DIABETES, CONTROLLED, WITH PERIPHERAL NEUROPATHY: Chronic | ICD-10-CM

## 2017-08-22 DIAGNOSIS — E66.01 MORBID OBESITY WITH BMI OF 40.0-44.9, ADULT: Chronic | ICD-10-CM

## 2017-08-22 DIAGNOSIS — Z79.4 INSULIN LONG-TERM USE: Chronic | ICD-10-CM

## 2017-08-22 DIAGNOSIS — I10 ESSENTIAL HYPERTENSION: Primary | Chronic | ICD-10-CM

## 2017-08-22 DIAGNOSIS — E03.4 HYPOTHYROIDISM DUE TO ACQUIRED ATROPHY OF THYROID: Chronic | ICD-10-CM

## 2017-08-22 DIAGNOSIS — E78.5 HYPERLIPIDEMIA LDL GOAL <70: ICD-10-CM

## 2017-08-22 PROCEDURE — 3074F SYST BP LT 130 MM HG: CPT | Mod: S$GLB,,, | Performed by: FAMILY MEDICINE

## 2017-08-22 PROCEDURE — 1159F MED LIST DOCD IN RCRD: CPT | Mod: S$GLB,,, | Performed by: FAMILY MEDICINE

## 2017-08-22 PROCEDURE — 3008F BODY MASS INDEX DOCD: CPT | Mod: S$GLB,,, | Performed by: FAMILY MEDICINE

## 2017-08-22 PROCEDURE — 3078F DIAST BP <80 MM HG: CPT | Mod: S$GLB,,, | Performed by: FAMILY MEDICINE

## 2017-08-22 PROCEDURE — 3044F HG A1C LEVEL LT 7.0%: CPT | Mod: S$GLB,,, | Performed by: FAMILY MEDICINE

## 2017-08-22 PROCEDURE — 99999 PR PBB SHADOW E&M-EST. PATIENT-LVL III: CPT | Mod: PBBFAC,,, | Performed by: FAMILY MEDICINE

## 2017-08-22 PROCEDURE — 1125F AMNT PAIN NOTED PAIN PRSNT: CPT | Mod: S$GLB,,, | Performed by: FAMILY MEDICINE

## 2017-08-22 PROCEDURE — 99214 OFFICE O/P EST MOD 30 MIN: CPT | Mod: S$GLB,,, | Performed by: FAMILY MEDICINE

## 2017-08-22 PROCEDURE — 99499 UNLISTED E&M SERVICE: CPT | Mod: S$GLB,,, | Performed by: FAMILY MEDICINE

## 2017-08-22 PROCEDURE — 4010F ACE/ARB THERAPY RXD/TAKEN: CPT | Mod: S$GLB,,, | Performed by: FAMILY MEDICINE

## 2017-08-22 NOTE — PROGRESS NOTES
Chief Complaint:    Chief Complaint   Patient presents with    Follow-up       History of Present Illness:    She presents today for three-month follow-up of chronic medical problems,  She is essentially wheelchair bound since she's had a small skin breakdown on her buttock region but it's improving now.  She has Parkinson's disease, sleep apnea, hypertension which is stable diabetes is also staying stable A1c has come down to 6.6.  Denies any the symptoms  Lipids and chemistries are also at goal.    ROS:  Review of Systems   Constitutional: Negative for activity change, chills, fatigue, fever and unexpected weight change.   HENT: Negative for congestion, ear discharge, ear pain, hearing loss, postnasal drip and rhinorrhea.    Eyes: Negative for pain and visual disturbance.   Respiratory: Negative for cough, chest tightness and shortness of breath.    Cardiovascular: Negative for chest pain and palpitations.   Gastrointestinal: Negative for abdominal pain, diarrhea and vomiting.   Endocrine: Negative for heat intolerance.   Genitourinary: Negative for dysuria, flank pain, frequency and hematuria.   Musculoskeletal: Negative for back pain, gait problem and neck pain.   Skin: Negative for color change and rash.   Neurological: Negative for dizziness, tremors, seizures, numbness and headaches.   Psychiatric/Behavioral: Negative for agitation, hallucinations, self-injury, sleep disturbance and suicidal ideas. The patient is not nervous/anxious.        Past Medical History:   Diagnosis Date    Abnormal nuclear stress test 11/1/2015    Background diabetic retinopathy 12/28/2015    CAD (coronary artery disease) 2002    stents    Cataract     Gait disorder 10/12/2012    Glaucoma     Obesity     CORINE (obstructive sleep apnea)     Other and unspecified hyperlipidemia     PD (Parkinson's disease) 2002    tremor-predominant (per patient)    Type II or unspecified type diabetes mellitus without mention of  "complication, not stated as uncontrolled 2002      am    Unspecified essential hypertension        Social History:  Social History     Social History    Marital status:      Spouse name: N/A    Number of children: N/A    Years of education: N/A     Social History Main Topics    Smoking status: Former Smoker     Years: 40.00     Quit date: 10/12/2010    Smokeless tobacco: Never Used      Comment: On & off for the past few years    Alcohol use No    Drug use: No    Sexual activity: Not Currently     Other Topics Concern    None     Social History Narrative     . Lives with spouse. Has 2 children. 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       Family History:   family history includes Breast cancer in her paternal grandmother; Diabetes in her maternal grandmother; Glaucoma in her maternal grandmother and mother; Heart attack (age of onset: 65) in her father and mother; Hypertension in her father, maternal grandmother, and mother; Ovarian cancer in her mother; Parkinsonism in her other and paternal aunt; Tremor in her father.    Health Maintenance   Topic Date Due    Influenza Vaccine  08/01/2017    Foot Exam  02/15/2018    Eye Exam  05/19/2018    Mammogram  07/20/2018    Lipid Panel  07/21/2018    Hemoglobin A1c  07/21/2018    DEXA SCAN  07/20/2020    TETANUS VACCINE  11/19/2025    Hepatitis C Screening  Addressed    Zoster Vaccine  Addressed    Pneumococcal (65+)  Completed       Physical Exam:    Vital Signs  Temp: 97 °F (36.1 °C)  Temp src: Tympanic  Pulse: 92  SpO2: (!) 93 %  BP: 126/72  BP Location: Left arm  Patient Position: Sitting  Pain Score:   7  Pain Loc: Buttocks  Height and Weight  Height: 5' 6" (167.6 cm)  Weight: 110.6 kg (243 lb 11.5 oz)  BSA (Calculated - sq m): 2.27 sq meters  BMI (Calculated): 39.4  Weight in (lb) to have BMI = 25: 154.6]    Body mass index is 39.34 kg/m².    Physical Exam   Constitutional: She is oriented to person, place, and " time. She appears well-developed.   HENT:   Mouth/Throat: Oropharynx is clear and moist.   Eyes: Conjunctivae are normal. Pupils are equal, round, and reactive to light.   Neck: Normal range of motion. Neck supple.   Cardiovascular: Normal rate, regular rhythm and normal heart sounds.    No murmur heard.  Pulmonary/Chest: Effort normal and breath sounds normal. No respiratory distress. She has no wheezes. She has no rales. She exhibits no tenderness.   Abdominal: Soft. She exhibits no distension and no mass. There is no tenderness. There is no guarding.   Musculoskeletal: She exhibits no edema or tenderness.   Patient is wheelchair-bound.   Lymphadenopathy:     She has no cervical adenopathy.   Neurological: She is alert and oriented to person, place, and time.   Skin: Skin is warm and dry.        Psychiatric: She has a normal mood and affect. Her behavior is normal. Judgment and thought content normal.       Lab Results   Component Value Date    CHOL 89 (L) 07/21/2017    CHOL 101 (L) 04/13/2017    CHOL 104 (L) 10/19/2016    TRIG 102 07/21/2017    TRIG 103 04/13/2017    TRIG 85 10/19/2016    HDL 32 (L) 07/21/2017    HDL 35 (L) 04/13/2017    HDL 35 (L) 10/19/2016    TOTALCHOLEST 2.8 07/21/2017    TOTALCHOLEST 2.9 04/13/2017    TOTALCHOLEST 3.0 10/19/2016    NONHDLCHOL 57 07/21/2017    NONHDLCHOL 66 04/13/2017    NONHDLCHOL 69 10/19/2016       Lab Results   Component Value Date    HGBA1C 6.6 (H) 07/21/2017       Assessment:      ICD-10-CM ICD-9-CM   1. Essential hypertension I10 401.9   2. Hyperlipidemia LDL goal <70 E78.5 272.4   3. Insulin long-term use Z79.4 V58.67   4. CAD: S/P coronary artery stent placement Z95.5 V45.82   5. Type 2 diabetes, controlled, with peripheral neuropathy E11.42 250.60     357.2   6. Hypothyroidism due to acquired atrophy of thyroid E03.4 244.8     246.8   7. Morbid obesity with BMI of 40.0-44.9, adult E66.01 278.01    Z68.41 V85.41         Plan:  Stage I decubitus please avoid pressure  does not stay in one spot for more than 30 minutes.  Local antibiotic dressing is recommended.  Medical problems is about stable no hypoglycemic episodes.  Diabetes at goal.  Up-to-date on health maintenance  Continue current meds follow up 3 months.  Orders Placed This Encounter   Procedures    Lipid panel    Hemoglobin A1c    Comprehensive metabolic panel    TSH       Current Outpatient Prescriptions   Medication Sig Dispense Refill    ACCU-CHEK SOFTCLIX LANCETS Misc TEST THREE TIMES DAILY 300 each 3    acetaminophen (TYLENOL) 325 MG tablet Take 2 tablets (650 mg total) by mouth every 6 (six) hours as needed.  0    aspirin (ECOTRIN) 81 MG EC tablet Take 1 tablet (81 mg total) by mouth once daily. 30 tablet 0    blood sugar diagnostic Strp 1 strip by Misc.(Non-Drug; Combo Route) route 3 (three) times daily. Accuchek Felicia Plus Test Strips 300 strip 3    blood-glucose meter kit Accu-chek Felicia Plus Meter 1 each 0    carbidopa-levodopa  mg (SINEMET)  mg per tablet Take 1 tab 5 times daily. 150 tablet 11    esomeprazole (NEXIUM) 40 MG capsule Take 1 capsule (40 mg total) by mouth before breakfast. 30 capsule 11    furosemide (LASIX) 20 MG tablet Take 1 tablet (20 mg total) by mouth once daily. 90 tablet 3    insulin syringe-needle U-100 0.5 mL 31 gauge x 5/16 Syrg INJECT 1 SYRINGE INTO THE SKIN 3 TIMES DAILY 100 each 11    isosorbide mononitrate (IMDUR) 120 MG 24 hr tablet Take 1 tablet (120 mg total) by mouth once daily. 90 tablet 3    levothyroxine (SYNTHROID) 100 MCG tablet TAKE 1 TABLET BEFORE BREAKFAST 90 tablet 3    losartan (COZAAR) 50 MG tablet Take 1 tablet (50 mg total) by mouth once daily. 90 tablet 3    metformin (GLUCOPHAGE) 500 MG tablet TAKE 1 TABLET (500 MG TOTAL) BY MOUTH 3 (THREE) TIMES DAILY. 270 tablet 3    metoprolol succinate (TOPROL-XL) 25 MG 24 hr tablet Take 1 tablet (25 mg total) by mouth once daily. 90 tablet 3    multivit-min-FA-lycopen-lutein (CENTRUM SILVER)  0.4-300-250 mg-mcg-mcg Tab Take 1 tablet by mouth once daily.      MULTIVITAMIN W-MINERALS/LUTEIN (CENTRUM SILVER ORAL) Take by mouth Daily.      potassium chloride SA (K-DUR,KLOR-CON) 20 MEQ tablet Take 1 tablet (20 mEq total) by mouth once daily. 90 tablet 3    pramipexole (MIRAPEX) 1 MG tablet Take 1 tablet (1 mg total) by mouth 3 (three) times daily. 270 tablet 3    pravastatin (PRAVACHOL) 40 MG tablet TAKE 1 TABLET ONE TIME DAILY 90 tablet 3    triamcinolone acetonide 0.1% (KENALOG) 0.1 % cream Apply topically 2 (two) times daily. 1 Tube 1    valacyclovir (VALTREX) 1000 MG tablet Take 1 tablet (1,000 mg total) by mouth 2 (two) times daily. 8 tablet 1     No current facility-administered medications for this visit.        Medications Discontinued During This Encounter   Medication Reason    insulin NPH-insulin regular, 70/30, (NOVOLIN 70/30) 100 unit/mL (70-30) injection Cost of medication    lidocaine-prilocaine (EMLA) cream Therapy completed       Return in about 3 months (around 11/22/2017).      Dr Anna Penn MD    Disclaimer: This note is prepared using voice recognition system and as such is likely to have errors and is not proof read.

## 2017-09-19 ENCOUNTER — TELEPHONE (OUTPATIENT)
Dept: FAMILY MEDICINE | Facility: CLINIC | Age: 72
End: 2017-09-19

## 2017-09-19 NOTE — TELEPHONE ENCOUNTER
----- Message from Kiley Keane sent at 9/19/2017  3:49 PM CDT -----  Call pt at [phone]///regarding a rx that I want to talk with you about,please call me//jeaneth bustos

## 2017-09-20 RX ORDER — HUMAN INSULIN 100 [USP'U]/ML
INJECTION, SUSPENSION SUBCUTANEOUS
Refills: 3 | OUTPATIENT
Start: 2017-09-20

## 2017-09-21 RX ORDER — HUMAN INSULIN 100 [USP'U]/ML
INJECTION, SUSPENSION SUBCUTANEOUS
Refills: 3 | OUTPATIENT
Start: 2017-09-21

## 2017-09-22 ENCOUNTER — OFFICE VISIT (OUTPATIENT)
Dept: OPHTHALMOLOGY | Facility: CLINIC | Age: 72
End: 2017-09-22
Payer: MEDICARE

## 2017-09-22 DIAGNOSIS — E11.3299 BACKGROUND DIABETIC RETINOPATHY: ICD-10-CM

## 2017-09-22 DIAGNOSIS — H40.003 GLAUCOMA SUSPECT, BILATERAL: Primary | ICD-10-CM

## 2017-09-22 DIAGNOSIS — E11.9 TYPE 2 DIABETES MELLITUS WITHOUT COMPLICATION, WITH LONG-TERM CURRENT USE OF INSULIN: ICD-10-CM

## 2017-09-22 DIAGNOSIS — Z79.4 TYPE 2 DIABETES MELLITUS WITHOUT COMPLICATION, WITH LONG-TERM CURRENT USE OF INSULIN: ICD-10-CM

## 2017-09-22 PROCEDURE — 92012 INTRM OPH EXAM EST PATIENT: CPT | Mod: S$GLB,,, | Performed by: OPHTHALMOLOGY

## 2017-09-22 PROCEDURE — 92133 CPTRZD OPH DX IMG PST SGM ON: CPT | Mod: S$GLB,,, | Performed by: OPHTHALMOLOGY

## 2017-09-22 PROCEDURE — 92083 EXTENDED VISUAL FIELD XM: CPT | Mod: S$GLB,,, | Performed by: OPHTHALMOLOGY

## 2017-09-22 PROCEDURE — 99499 UNLISTED E&M SERVICE: CPT | Mod: S$GLB,,, | Performed by: OPHTHALMOLOGY

## 2017-09-22 PROCEDURE — 99999 PR PBB SHADOW E&M-EST. PATIENT-LVL II: CPT | Mod: PBBFAC,,, | Performed by: OPHTHALMOLOGY

## 2017-09-22 RX ORDER — LATANOPROST 50 UG/ML
1 SOLUTION/ DROPS OPHTHALMIC NIGHTLY
Qty: 1 BOTTLE | Refills: 6 | Status: SHIPPED | OUTPATIENT
Start: 2017-09-22 | End: 2018-07-17 | Stop reason: SDUPTHER

## 2017-09-22 NOTE — PROGRESS NOTES
HPI     Glaucoma    Additional comments: HVF/IOP check per [facility]-resume care           Comments   Pt was followed by [facility] for diabetes and glaucoma, referred back to Dr. Moon to resume glaucoma care. Pt states no changes since she saw Dr. Cox. Not using any drops. Does note her eyes are crusty when she wake up   in the mornings, no pain or irritation. Vision is about the same.     Pt of dr woo  DM X 13 YEARS  GLAUCOMA SUSPECT       Last edited by Jose Lee on 9/22/2017  1:42 PM. (History)            Assessment /Plan     For exam results, see Encounter Report.      ICD-10-CM ICD-9-CM    1. Glaucoma suspect, bilateral H40.003 365.00 Kelly Visual Field - OU - Extended - Both Eyes  IOP not within acceptable range relative to target IOP with risk of irreversible visual loss. Additional treatment required.  Discussed options, risks, and benefits of additional medication, SLT laser, or incisional glaucoma surgery.     recommend med    Patient chooses med    Reviewed importance of continued compliance with treatment and follow up.      2. Type 2 diabetes mellitus without complication, with long-term current use of insulin E11.9 250.00 Followed by [facility]    Z79.4 V58.67    3. Background diabetic retinopathy E11.3299 250.50 Followed in the past with Dr Cox   MOCT Done today - no edema in either eye.     362.01        Please use your drops as follows:    Latanoprost once a day in both eyes(s)    Use this medication at the time of day that is easiest for you to remember. Please wipe the excess of this medication off the eyelid skin after instillation and place pressure on the lids near your nose for 1-2 minutes after using it.     Return to clinic 2 months IOP at [facility] with [facility]

## 2017-10-18 ENCOUNTER — OFFICE VISIT (OUTPATIENT)
Dept: NEUROLOGY | Facility: CLINIC | Age: 72
End: 2017-10-18
Payer: MEDICARE

## 2017-10-18 VITALS
HEART RATE: 68 BPM | BODY MASS INDEX: 38.89 KG/M2 | DIASTOLIC BLOOD PRESSURE: 64 MMHG | WEIGHT: 247.81 LBS | SYSTOLIC BLOOD PRESSURE: 108 MMHG | HEIGHT: 67 IN

## 2017-10-18 DIAGNOSIS — E66.01 MORBID OBESITY WITH BMI OF 40.0-44.9, ADULT: Chronic | ICD-10-CM

## 2017-10-18 DIAGNOSIS — G20.A1 PD (PARKINSON'S DISEASE): Primary | Chronic | ICD-10-CM

## 2017-10-18 PROCEDURE — 99999 PR PBB SHADOW E&M-EST. PATIENT-LVL III: CPT | Mod: PBBFAC,,, | Performed by: PSYCHIATRY & NEUROLOGY

## 2017-10-18 PROCEDURE — 99499 UNLISTED E&M SERVICE: CPT | Mod: S$GLB,,, | Performed by: PSYCHIATRY & NEUROLOGY

## 2017-10-18 PROCEDURE — 99214 OFFICE O/P EST MOD 30 MIN: CPT | Mod: S$GLB,,, | Performed by: PSYCHIATRY & NEUROLOGY

## 2017-10-18 NOTE — PROGRESS NOTES
This is a 72-year-old patient who has a long established history of Parkinson's disease.  Her Parkinson's disease is manifested by tremor as well as gait disturbance.  Her gait disturbance is aggravated also by her morbid obesity, Seda peripheral neuropathy, and lumbar canal stenosis.    The patient continues to be confined primarily to her wheelchair who is managed by her .  The patient states that she is able to do a stand and transfer on level surfaces.  As an example, she is able to get up from the wheelchair with assistance of the  twist and then own in her easy chair.  She has a lift chair that she stays in most of the day.  The patient continues to have significant difficulties with tremor which is primarily a resting tremor and is present in a bilateral distribution.    Additional Parkinson symptoms include speech difficulty.  The patient states that her voice volume is much softer now than it has been and that at times, is difficult for her to articulate clearly.  She has noted a slight tendency towards coughing with eating particularly with chunky foods such as meat.  The patient denies any aspiration of liquid.  She denies any constipation.  She denies any hallucinations.  She denies any dizziness or lightheadedness.  The patient seems to be tolerating her Parkinson medications without significant side effect.    The patient continues to be aware of significant numbness as well as weakness in both lower extremities related to her diabetic peripheral neuropathy as well as her lumbar canal stenosis.  Patient states that she has not had any severe incontinence of stool or urine but does have urinary urgency.      ROS:  GENERAL: No fever, chills, or weight loss.  SKIN: No rashes, itching or changes in color or texture of skin.  HEAD: No headaches or recent head trauma.  EYES: Visual acuity fine. No photophobia, ocular pain or diplopia.  EARS: Denies ear pain, discharge or vertigo.  NOSE: No  loss of smell, no epistaxis or postnasal drip.  MOUTH & THROAT: No hoarseness or change in voice. No excessive gum bleeding.  NODES: Denies swollen glands.  CHEST: Denies BENOIT, cyanosis, wheezing, cough and sputum production.  CARDIOVASCULAR: Denies chest pain, PND, orthopnea or reduced exercise tolerance.  ABDOMEN: Appetite fine. No weight loss. Denies diarrhea, abdominal pain, hematemesis or blood in stool.  URINARY: No flank pain, dysuria or hematuria.  MUSCULOSKELETAL: No joint stiffness or swelling.  NEUROLOGIC: No history of seizures, paralysis, or unexplained loss of consciousness    The patient's past history, family history and social history reviewed within the electronic medical record and with the patient and her .  No changes are made.    PE:   VITAL SIGNS: Blood pressure 108/64, pulse 68, weight 112.4 kg, height 5 foot 6-1/2 inches, BMI 39.40  APPEARANCE: Well nourished, well developed, in no acute distress as she sits in her wheelchair.    HEAD: Normocephalic, atraumatic.  EYES: PERRL. EOMI.  Non-icteric sclerae.    EARS: TM's intact. Light reflex normal. No retraction or perforation.    NOSE: Mucosa pink. Airway clear.  MOUTH & THROAT: No tonsillar enlargement. No pharyngeal erythema or exudate. No stridor.  NECK: Supple. No bruits.  CHEST: Lungs clear to auscultation.  CARDIOVASCULAR: Regular rhythm without significant murmurs.  ABDOMEN: Bowel sounds normal. Not distended.  MUSCULOSKELETAL:  No bony deformity seen.  Muscle tone is normal with passive range of motion at both wrists and both elbows.  No rigidity is found.  NEUROLOGIC:   Mental Status:  The patient is well oriented to person, time, place, and situation.  The patient is attentive to the environment and cooperative for the exam.  Cranial Nerves: II-XII grossly intact. Fundoscopic exam is normal.  No hemorrhage, exudate or papilledema is present. The extraocular muscles are intact in the cardinal directions of gaze.  No ptosis is  present. Facial features are symmetrical.  Speech is normal in fluency, diction, and phrasing.  Tongue protrudes in the midline.    Gait and Station: Patient is confined to her wheelchair.  Motor:  No downdrift of either arm when held at shoulder level.   strength is equally strong bilaterally.  She does have active ankle flexion and extension.  She has knee extension and knee flexion although this is markedly weakened and a generalized distribution.  Sensory: The patient reports diminished appreciation of vibratory sensation and monofilament testing in both lower extremities to the knees.  Cerebellar:  Resting tremor is seen in the fingers and hands bilaterally.  This is a typical pill-rolling tremor that is intermittent in appearance.  Finger tapping was done smoothly and rhythmically bilaterally although somewhat slowly.  She could not perform toe tapping.  Reflexes:  Stretch reflexes are absent both upper and lower extremities.  Plantar stimulation is flexor bilaterally and no pathological reflexes are seen      ASSESSMENT:  1.  Parkinson's disease  2.  Diabetes mellitus with diabetic peripheral neuropathy  3.  Morbid obesity (BMI 39.40)    RECOMMENDATIONS:  1.  The patient was advised to continue her current Parkinson medications.  There is no indication to increase or change these medications at the present time.  2.  The patient was advised that some form of physical exercise would be of great benefit to her if she could perform those exercises.  Patient states that she's tried in the past and was unable to continue any type therapy.  3.  Weight reduction was also discussed with the patient.  However the patient feels that because she cannot exercise, she will not be able to lose large quantities of weight.  4.  Return to neurology in 6 months.    This was a 40 minute visit with the patient and her  with over 50% of time spent counseling the patient regarding techniques for eating to prevent  aspiration as well as recommendations in regards to dietary management.    This note is generated with speech recognition software and is subject to transcription error and sound alike phrases that may be missed by proofreading.

## 2017-10-19 ENCOUNTER — OFFICE VISIT (OUTPATIENT)
Dept: CARDIOLOGY | Facility: CLINIC | Age: 72
End: 2017-10-19
Payer: MEDICARE

## 2017-10-19 VITALS
WEIGHT: 247 LBS | HEIGHT: 67 IN | HEART RATE: 106 BPM | SYSTOLIC BLOOD PRESSURE: 118 MMHG | BODY MASS INDEX: 38.77 KG/M2 | DIASTOLIC BLOOD PRESSURE: 72 MMHG

## 2017-10-19 DIAGNOSIS — I10 ESSENTIAL HYPERTENSION: Chronic | ICD-10-CM

## 2017-10-19 DIAGNOSIS — Z95.5 S/P CORONARY ARTERY STENT PLACEMENT: Chronic | ICD-10-CM

## 2017-10-19 DIAGNOSIS — E78.5 HYPERLIPIDEMIA LDL GOAL <70: Primary | ICD-10-CM

## 2017-10-19 DIAGNOSIS — I25.10 CAD (CORONARY ARTERY DISEASE): ICD-10-CM

## 2017-10-19 DIAGNOSIS — I25.10 CORONARY ARTERY DISEASE INVOLVING NATIVE CORONARY ARTERY OF NATIVE HEART WITHOUT ANGINA PECTORIS: Chronic | ICD-10-CM

## 2017-10-19 PROCEDURE — 99213 OFFICE O/P EST LOW 20 MIN: CPT | Mod: S$GLB,,, | Performed by: INTERNAL MEDICINE

## 2017-10-19 PROCEDURE — 93000 ELECTROCARDIOGRAM COMPLETE: CPT | Mod: S$GLB,,, | Performed by: INTERNAL MEDICINE

## 2017-10-19 PROCEDURE — 99499 UNLISTED E&M SERVICE: CPT | Mod: S$GLB,,, | Performed by: INTERNAL MEDICINE

## 2017-10-19 PROCEDURE — 99999 PR PBB SHADOW E&M-EST. PATIENT-LVL III: CPT | Mod: PBBFAC,,, | Performed by: INTERNAL MEDICINE

## 2017-10-19 RX ORDER — PRAMIPEXOLE DIHYDROCHLORIDE 1 MG/1
1 TABLET ORAL 3 TIMES DAILY
Qty: 270 TABLET | Refills: 3 | Status: SHIPPED | OUTPATIENT
Start: 2017-10-19 | End: 2018-11-27 | Stop reason: SDUPTHER

## 2017-10-19 NOTE — PROGRESS NOTES
"Subjective:    Patient ID:  Ross Isbell is a 72 y.o. female who presents for evaluation of Coronary Artery Disease; Hyperlipidemia; and Hypertension      HPI pt presents for f/u.  Pt followed by Dr. Pace and Dr. Miranda in past.  Has h/o CAD, HTN, DM, hyperlipidemia, AS, obesity, Parkinsons disease.  She had several cath done by Dr. Pace 2015 for cp and + stress test.  She was found to have severe multivessel CAD, turned down for CABG and had multivessel PCI TERRY 2015 Dr. Pace.  States she has chronic BENOIT, some occasional cp sxs but that they are stable and have not worsened since her PCI procedure.  ecg today shows sinus rhythm, LVH, no acute changes.  BP, DM and lipids are all controlled on current tx.    Review of Systems   Constitution: Positive for weakness and malaise/fatigue.   HENT: Negative.    Eyes: Negative.    Cardiovascular: Positive for chest pain and dyspnea on exertion.   Respiratory: Positive for shortness of breath.    Endocrine: Negative.    Hematologic/Lymphatic: Negative.    Skin: Negative.    Musculoskeletal: Positive for arthritis.   Gastrointestinal: Negative.    Genitourinary: Negative.    Neurological: Positive for tremors.   Psychiatric/Behavioral: Negative.    Allergic/Immunologic: Negative.        /72 (BP Location: Right arm)   Pulse 106   Ht 5' 6.5" (1.689 m)   Wt 112 kg (247 lb)   BMI 39.27 kg/m²          Objective:    Physical Exam   Constitutional: She is oriented to person, place, and time. Vital signs are normal. She appears well-developed and well-nourished. She is active and cooperative. She does not have a sickly appearance. She does not appear ill. No distress.   HENT:   Head: Normocephalic.   Neck: Neck supple. No JVD present. Carotid bruit is not present. No thyromegaly present.   Cardiovascular: Normal rate, regular rhythm, S1 normal, S2 normal and normal pulses.  PMI is not displaced.  Exam reveals no gallop and no friction rub.    Murmur heard.   Harsh " midsystolic murmur is present with a grade of 2/6  at the upper left sternal border  Pulses:       Radial pulses are 2+ on the right side, and 2+ on the left side.   Pulmonary/Chest: Effort normal and breath sounds normal. She has no wheezes. She has no rales.   Abdominal: Soft. Normal appearance and bowel sounds are normal. There is no tenderness.   Morbidly obese   Musculoskeletal: She exhibits no edema.   Lymphadenopathy:     She has no cervical adenopathy.   Neurological: She is alert and oriented to person, place, and time.   Skin: Skin is warm. She is not diaphoretic.   Psychiatric: She has a normal mood and affect. Her behavior is normal.   Nursing note and vitals reviewed.      Lab Results   Component Value Date    HGBA1C 6.6 (H) 07/21/2017     Lab Results   Component Value Date    CHOL 89 (L) 07/21/2017    CHOL 101 (L) 04/13/2017    CHOL 104 (L) 10/19/2016     Lab Results   Component Value Date    HDL 32 (L) 07/21/2017    HDL 35 (L) 04/13/2017    HDL 35 (L) 10/19/2016     Lab Results   Component Value Date    LDLCALC 36.6 (L) 07/21/2017    LDLCALC 45.4 (L) 04/13/2017    LDLCALC 52.0 (L) 10/19/2016     Lab Results   Component Value Date    TRIG 102 07/21/2017    TRIG 103 04/13/2017    TRIG 85 10/19/2016     Lab Results   Component Value Date    CHOLHDL 36.0 07/21/2017    CHOLHDL 34.7 04/13/2017    CHOLHDL 33.7 10/19/2016     Lab Results   Component Value Date    WBC 6.79 04/13/2017    HGB 12.3 04/13/2017    HCT 37.7 04/13/2017    MCV 86 04/13/2017     04/13/2017       Chemistry        Component Value Date/Time     07/21/2017 0920    K 4.0 07/21/2017 0920     07/21/2017 0920    CO2 21 (L) 07/21/2017 0920    BUN 20 07/21/2017 0920    CREATININE 0.8 07/21/2017 0920     (H) 07/21/2017 0920        Component Value Date/Time    CALCIUM 9.8 07/21/2017 0920    ALKPHOS 57 07/21/2017 0920    AST 32 07/21/2017 0920    ALT 19 07/21/2017 0920    BILITOT 0.4 07/21/2017 0920    ESTGFRAFRICA >60.0  07/21/2017 0920    EGFRNONAA >60.0 07/21/2017 0920              Assessment:       1. Hyperlipidemia LDL goal <70    2. CAD: S/P coronary artery stent placement    3. Essential hypertension    4. Coronary artery disease involving native coronary artery of native heart without angina pectoris         Plan:             Continue current medical tx.  Cardiac diet discussed with pt/.  Weight loss would be most helpful.    F/u with Dr. Pace at [facility] 6 months.

## 2017-11-14 RX ORDER — ISOSORBIDE MONONITRATE 120 MG/1
TABLET, EXTENDED RELEASE ORAL
Qty: 90 TABLET | Refills: 3 | Status: SHIPPED | OUTPATIENT
Start: 2017-11-14 | End: 2019-01-28 | Stop reason: SDUPTHER

## 2017-11-22 ENCOUNTER — IMMUNIZATION (OUTPATIENT)
Dept: FAMILY MEDICINE | Facility: CLINIC | Age: 72
End: 2017-11-22
Payer: MEDICARE

## 2017-11-22 ENCOUNTER — LAB VISIT (OUTPATIENT)
Dept: LAB | Facility: HOSPITAL | Age: 72
End: 2017-11-22
Attending: FAMILY MEDICINE
Payer: MEDICARE

## 2017-11-22 DIAGNOSIS — E78.5 HYPERLIPIDEMIA LDL GOAL <70: ICD-10-CM

## 2017-11-22 DIAGNOSIS — E11.42 TYPE 2 DIABETES, CONTROLLED, WITH PERIPHERAL NEUROPATHY: Chronic | ICD-10-CM

## 2017-11-22 DIAGNOSIS — I10 ESSENTIAL HYPERTENSION: Chronic | ICD-10-CM

## 2017-11-22 DIAGNOSIS — E03.4 HYPOTHYROIDISM DUE TO ACQUIRED ATROPHY OF THYROID: Chronic | ICD-10-CM

## 2017-11-22 LAB
ALBUMIN SERPL BCP-MCNC: 3.7 G/DL
ALP SERPL-CCNC: 47 U/L
ALT SERPL W/O P-5'-P-CCNC: 18 U/L
ANION GAP SERPL CALC-SCNC: 10 MMOL/L
AST SERPL-CCNC: 37 U/L
BILIRUB SERPL-MCNC: 0.4 MG/DL
BUN SERPL-MCNC: 19 MG/DL
CALCIUM SERPL-MCNC: 9.8 MG/DL
CHLORIDE SERPL-SCNC: 101 MMOL/L
CHOLEST SERPL-MCNC: 123 MG/DL
CHOLEST/HDLC SERPL: 3.1 {RATIO}
CO2 SERPL-SCNC: 27 MMOL/L
CREAT SERPL-MCNC: 0.8 MG/DL
EST. GFR  (AFRICAN AMERICAN): >60 ML/MIN/1.73 M^2
EST. GFR  (NON AFRICAN AMERICAN): >60 ML/MIN/1.73 M^2
ESTIMATED AVG GLUCOSE: 163 MG/DL
GLUCOSE SERPL-MCNC: 85 MG/DL
HBA1C MFR BLD HPLC: 7.3 %
HDLC SERPL-MCNC: 40 MG/DL
HDLC SERPL: 32.5 %
LDLC SERPL CALC-MCNC: 61.2 MG/DL
NONHDLC SERPL-MCNC: 83 MG/DL
POTASSIUM SERPL-SCNC: 4.2 MMOL/L
PROT SERPL-MCNC: 7 G/DL
SODIUM SERPL-SCNC: 138 MMOL/L
TRIGL SERPL-MCNC: 109 MG/DL
TSH SERPL DL<=0.005 MIU/L-ACNC: 3.16 UIU/ML

## 2017-11-22 PROCEDURE — G0008 ADMIN INFLUENZA VIRUS VAC: HCPCS | Mod: S$GLB,,, | Performed by: FAMILY MEDICINE

## 2017-11-22 PROCEDURE — 90662 IIV NO PRSV INCREASED AG IM: CPT | Mod: S$GLB,,, | Performed by: FAMILY MEDICINE

## 2017-11-22 PROCEDURE — 36415 COLL VENOUS BLD VENIPUNCTURE: CPT | Mod: PO

## 2017-11-22 PROCEDURE — 83036 HEMOGLOBIN GLYCOSYLATED A1C: CPT

## 2017-11-22 PROCEDURE — 80053 COMPREHEN METABOLIC PANEL: CPT

## 2017-11-22 PROCEDURE — 84443 ASSAY THYROID STIM HORMONE: CPT

## 2017-11-22 PROCEDURE — 80061 LIPID PANEL: CPT

## 2017-11-29 ENCOUNTER — OFFICE VISIT (OUTPATIENT)
Dept: FAMILY MEDICINE | Facility: CLINIC | Age: 72
End: 2017-11-29
Payer: MEDICARE

## 2017-11-29 VITALS
HEART RATE: 81 BPM | OXYGEN SATURATION: 95 % | TEMPERATURE: 97 F | WEIGHT: 251.19 LBS | DIASTOLIC BLOOD PRESSURE: 57 MMHG | SYSTOLIC BLOOD PRESSURE: 117 MMHG | BODY MASS INDEX: 39.43 KG/M2 | HEIGHT: 67 IN

## 2017-11-29 DIAGNOSIS — I10 ESSENTIAL HYPERTENSION: Chronic | ICD-10-CM

## 2017-11-29 DIAGNOSIS — Z95.5 S/P CORONARY ARTERY STENT PLACEMENT: Chronic | ICD-10-CM

## 2017-11-29 DIAGNOSIS — Z12.11 COLON CANCER SCREENING: ICD-10-CM

## 2017-11-29 DIAGNOSIS — E11.42 TYPE 2 DIABETES, CONTROLLED, WITH PERIPHERAL NEUROPATHY: Primary | Chronic | ICD-10-CM

## 2017-11-29 DIAGNOSIS — K62.5 RECTAL BLEED: ICD-10-CM

## 2017-11-29 DIAGNOSIS — R20.0 ARM NUMBNESS LEFT: ICD-10-CM

## 2017-11-29 DIAGNOSIS — E03.4 HYPOTHYROIDISM DUE TO ACQUIRED ATROPHY OF THYROID: Chronic | ICD-10-CM

## 2017-11-29 PROCEDURE — 99999 PR PBB SHADOW E&M-EST. PATIENT-LVL IV: CPT | Mod: PBBFAC,,, | Performed by: FAMILY MEDICINE

## 2017-11-29 PROCEDURE — 99214 OFFICE O/P EST MOD 30 MIN: CPT | Mod: S$GLB,,, | Performed by: FAMILY MEDICINE

## 2017-11-29 PROCEDURE — 99499 UNLISTED E&M SERVICE: CPT | Mod: S$GLB,,, | Performed by: FAMILY MEDICINE

## 2017-11-29 NOTE — PROGRESS NOTES
Chief Complaint:    Chief Complaint   Patient presents with    Follow-up       History of Present Illness:    Patient presents today for three-month follow-up,  She complains of some occasional rectal bleed sometimes slight discomfort upon having a bowel movement.  Is about time that is a colonoscopy screening more than 10 years since her last one.  She has not had any change of bowel habits.  Also has complaints of some numbness involving the left time.  Her diabetes is stable, blood pressures normal lipids chemistries stable.    ROS:  Review of Systems   Constitutional: Negative for activity change, chills, fatigue, fever and unexpected weight change.   HENT: Negative for congestion, ear discharge, ear pain, hearing loss, postnasal drip and rhinorrhea.    Eyes: Negative for pain and visual disturbance.   Respiratory: Negative for cough, chest tightness and shortness of breath.    Cardiovascular: Negative for chest pain and palpitations.   Gastrointestinal: Negative for abdominal pain, diarrhea and vomiting.   Endocrine: Negative for heat intolerance.   Genitourinary: Negative for dysuria, flank pain, frequency and hematuria.   Musculoskeletal: Negative for back pain, gait problem and neck pain.   Skin: Negative for color change and rash.   Neurological: Negative for dizziness, tremors, seizures, numbness and headaches.   Psychiatric/Behavioral: Negative for agitation, hallucinations, self-injury, sleep disturbance and suicidal ideas. The patient is not nervous/anxious.        Past Medical History:   Diagnosis Date    Abnormal nuclear stress test 11/1/2015    Background diabetic retinopathy 12/28/2015    CAD (coronary artery disease) 2002    stents    Cataract     Gait disorder 10/12/2012    Glaucoma     Obesity     CORINE (obstructive sleep apnea)     Other and unspecified hyperlipidemia     PD (Parkinson's disease) 2002    tremor-predominant (per patient)    Type II or unspecified type diabetes  "mellitus without mention of complication, not stated as uncontrolled 2002      am    Unspecified essential hypertension        Social History:  Social History     Social History    Marital status:      Spouse name: N/A    Number of children: N/A    Years of education: N/A     Social History Main Topics    Smoking status: Former Smoker     Years: 40.00     Quit date: 10/12/2010    Smokeless tobacco: Never Used      Comment: On & off for the past few years    Alcohol use No    Drug use: No    Sexual activity: Not Currently     Other Topics Concern    None     Social History Narrative     . Lives with spouse. Has 2 children. 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       Family History:   family history includes Breast cancer in her paternal grandmother; Diabetes in her maternal grandmother; Glaucoma in her maternal grandmother and mother; Heart attack (age of onset: 65) in her father and mother; Hypertension in her father, maternal grandmother, and mother; Ovarian cancer in her mother; Parkinsonism in her other and paternal aunt; Tremor in her father.    Health Maintenance   Topic Date Due    Foot Exam  02/15/2018    Mammogram  07/20/2018    Urine Microalbumin  07/21/2018    Eye Exam  09/22/2018    Lipid Panel  11/22/2018    Hemoglobin A1c  11/22/2018    DEXA SCAN  07/20/2020    TETANUS VACCINE  11/19/2025    Hepatitis C Screening  Addressed    Zoster Vaccine  Addressed    Pneumococcal (65+)  Completed    Influenza Vaccine  Completed       Physical Exam:    Vital Signs  Temp: 97.1 °F (36.2 °C)  Temp src: Tympanic  Pulse: 81  SpO2: 95 %  BP: (!) 117/57  BP Location: Right arm  Patient Position: Sitting  Pain Score:   8  Height and Weight  Height: 5' 6.5" (168.9 cm)  Weight: 114 kg (251 lb 3.4 oz)  BSA (Calculated - sq m): 2.31 sq meters  BMI (Calculated): 40  Weight in (lb) to have BMI = 25: 156.9]    Body mass index is 39.94 kg/m².    Physical Exam "   Constitutional: She is oriented to person, place, and time. She appears well-developed.   HENT:   Mouth/Throat: Oropharynx is clear and moist.   Eyes: Conjunctivae are normal. Pupils are equal, round, and reactive to light.   Neck: Normal range of motion. Neck supple.   Cardiovascular: Normal rate, regular rhythm and normal heart sounds.    No murmur heard.  Pulmonary/Chest: Effort normal and breath sounds normal. No respiratory distress. She has no wheezes. She has no rales. She exhibits no tenderness.   Abdominal: Soft. She exhibits no distension and no mass. There is no tenderness. There is no guarding.   Genitourinary:   Genitourinary Comments: Proctoscopic exam did reveal one internal hemorrhoid but there was no bleeders identified.  No masses upon digital rectal exam.   Musculoskeletal: She exhibits no edema or tenderness.   Lymphadenopathy:     She has no cervical adenopathy.   Neurological: She is alert and oriented to person, place, and time. She has normal reflexes.   Skin: Skin is warm and dry.   Psychiatric: She has a normal mood and affect. Her behavior is normal. Judgment and thought content normal.       Lab Results   Component Value Date    CHOL 123 11/22/2017    CHOL 89 (L) 07/21/2017    CHOL 101 (L) 04/13/2017    TRIG 109 11/22/2017    TRIG 102 07/21/2017    TRIG 103 04/13/2017    HDL 40 11/22/2017    HDL 32 (L) 07/21/2017    HDL 35 (L) 04/13/2017    TOTALCHOLEST 3.1 11/22/2017    TOTALCHOLEST 2.8 07/21/2017    TOTALCHOLEST 2.9 04/13/2017    NONHDLCHOL 83 11/22/2017    NONHDLCHOL 57 07/21/2017    NONHDLCHOL 66 04/13/2017       Lab Results   Component Value Date    HGBA1C 7.3 (H) 11/22/2017       Assessment:      ICD-10-CM ICD-9-CM   1. Type 2 diabetes, controlled, with peripheral neuropathy E11.42 250.60     357.2   2. Hypothyroidism due to acquired atrophy of thyroid E03.4 244.8     246.8   3. Essential hypertension I10 401.9   4. CAD: S/P coronary artery stent placement Z95.5 V45.82   5. Colon  cancer screening Z12.11 V76.51   6. Rectal bleed K62.5 569.3   7. Arm numbness left R20.0 782.0         Plan:  Proctoscopic examination is unremarkable but she does need a colonoscopy will send in a request.  Cholo a nerve conduction study to evaluate the numbness of the left arm.  Other medical problems is about stable continue current plan and work on losing some weight.  Follow-up 3 months  Orders Placed This Encounter   Procedures    Comprehensive metabolic panel    Lipid panel    Microalbumin/creatinine urine ratio    Hemoglobin A1c    Nerve conduction test       Current Outpatient Prescriptions   Medication Sig Dispense Refill    ACCU-CHEK SOFTCLIX LANCETS Misc TEST THREE TIMES DAILY 300 each 3    acetaminophen (TYLENOL) 325 MG tablet Take 2 tablets (650 mg total) by mouth every 6 (six) hours as needed.  0    blood sugar diagnostic Strp 1 strip by Misc.(Non-Drug; Combo Route) route 3 (three) times daily. Accuchek Felicia Plus Test Strips 300 strip 3    blood-glucose meter kit Accu-chek Felicia Plus Meter 1 each 0    carbidopa-levodopa  mg (SINEMET)  mg per tablet Take 1 tab 5 times daily. 150 tablet 11    furosemide (LASIX) 20 MG tablet Take 1 tablet (20 mg total) by mouth once daily. 90 tablet 3    insulin NPH-insulin regular, 70/30, (NOVOLIN 70/30) 100 unit/mL (70-30) injection Inject into the skin 2 (two) times daily.      insulin syringe-needle U-100 0.5 mL 31 gauge x 5/16 Syrg INJECT 1 SYRINGE INTO THE SKIN 3 TIMES DAILY 100 each 11    isosorbide mononitrate (IMDUR) 120 MG 24 hr tablet TAKE ONE TABLET BY MOUTH ONCE DAILY 90 tablet 3    latanoprost 0.005 % ophthalmic solution Place 1 drop into both eyes every evening. 1 Bottle 6    levothyroxine (SYNTHROID) 100 MCG tablet TAKE 1 TABLET BEFORE BREAKFAST 90 tablet 3    metformin (GLUCOPHAGE) 500 MG tablet TAKE 1 TABLET (500 MG TOTAL) BY MOUTH 3 (THREE) TIMES DAILY. 270 tablet 3    metoprolol succinate (TOPROL-XL) 25 MG 24 hr  tablet Take 1 tablet (25 mg total) by mouth once daily. 90 tablet 3    multivit-min-FA-lycopen-lutein (CENTRUM SILVER) 0.4-300-250 mg-mcg-mcg Tab Take 1 tablet by mouth once daily.      potassium chloride SA (K-DUR,KLOR-CON) 20 MEQ tablet Take 1 tablet (20 mEq total) by mouth once daily. 90 tablet 3    pramipexole (MIRAPEX) 1 MG tablet Take 1 tablet (1 mg total) by mouth 3 (three) times daily. 270 tablet 3    pravastatin (PRAVACHOL) 40 MG tablet TAKE 1 TABLET ONE TIME DAILY 90 tablet 3    aspirin (ECOTRIN) 81 MG EC tablet Take 1 tablet (81 mg total) by mouth once daily. 30 tablet 0    triamcinolone acetonide 0.1% (KENALOG) 0.1 % cream Apply topically 2 (two) times daily. 1 Tube 1     No current facility-administered medications for this visit.        There are no discontinued medications.    Return in about 3 months (around 2/28/2018).      Anna Penn MD

## 2018-01-18 DIAGNOSIS — I25.10 CORONARY ARTERY DISEASE INVOLVING NATIVE CORONARY ARTERY WITHOUT ANGINA PECTORIS, UNSPECIFIED WHETHER NATIVE OR TRANSPLANTED HEART: ICD-10-CM

## 2018-01-18 RX ORDER — PRAVASTATIN SODIUM 40 MG/1
TABLET ORAL
Qty: 90 TABLET | Refills: 3 | Status: SHIPPED | OUTPATIENT
Start: 2018-01-18 | End: 2018-11-19 | Stop reason: SDUPTHER

## 2018-01-18 RX ORDER — METFORMIN HYDROCHLORIDE 500 MG/1
TABLET ORAL
Qty: 270 TABLET | Refills: 3 | Status: SHIPPED | OUTPATIENT
Start: 2018-01-18 | End: 2018-12-18 | Stop reason: SDUPTHER

## 2018-01-18 RX ORDER — LEVOTHYROXINE SODIUM 100 UG/1
TABLET ORAL
Qty: 90 TABLET | Refills: 3 | Status: SHIPPED | OUTPATIENT
Start: 2018-01-18 | End: 2018-07-05 | Stop reason: SDUPTHER

## 2018-01-22 RX ORDER — HUMAN INSULIN 100 [USP'U]/ML
INJECTION, SUSPENSION SUBCUTANEOUS
Refills: 3 | OUTPATIENT
Start: 2018-01-22

## 2018-02-19 RX ORDER — CARBIDOPA AND LEVODOPA 25; 250 MG/1; MG/1
TABLET ORAL
Qty: 150 TABLET | Refills: 11 | Status: SHIPPED | OUTPATIENT
Start: 2018-02-19 | End: 2019-03-20 | Stop reason: SDUPTHER

## 2018-02-21 ENCOUNTER — HOSPITAL ENCOUNTER (OUTPATIENT)
Facility: HOSPITAL | Age: 73
Discharge: HOME | End: 2018-02-22
Attending: EMERGENCY MEDICINE | Admitting: INTERNAL MEDICINE
Payer: MEDICARE

## 2018-02-21 DIAGNOSIS — Z95.5 S/P CORONARY ARTERY STENT PLACEMENT: Chronic | ICD-10-CM

## 2018-02-21 DIAGNOSIS — R07.9 CHEST PAIN: ICD-10-CM

## 2018-02-21 DIAGNOSIS — R07.9 CHEST PAIN, UNSPECIFIED TYPE: Primary | ICD-10-CM

## 2018-02-21 LAB
ALBUMIN SERPL BCP-MCNC: 3.9 G/DL
ALP SERPL-CCNC: 47 U/L
ALT SERPL W/O P-5'-P-CCNC: 37 U/L
ANION GAP SERPL CALC-SCNC: 11 MMOL/L
AST SERPL-CCNC: 33 U/L
BACTERIA #/AREA URNS HPF: ABNORMAL /HPF
BASOPHILS # BLD AUTO: 0.02 K/UL
BASOPHILS NFR BLD: 0.2 %
BILIRUB SERPL-MCNC: 0.5 MG/DL
BILIRUB UR QL STRIP: NEGATIVE
BNP SERPL-MCNC: 19 PG/ML
BUN SERPL-MCNC: 17 MG/DL
CALCIUM SERPL-MCNC: 10.2 MG/DL
CHLORIDE SERPL-SCNC: 106 MMOL/L
CLARITY UR: ABNORMAL
CO2 SERPL-SCNC: 23 MMOL/L
COLOR UR: YELLOW
CREAT SERPL-MCNC: 0.9 MG/DL
DIFFERENTIAL METHOD: ABNORMAL
EOSINOPHIL # BLD AUTO: 0.3 K/UL
EOSINOPHIL NFR BLD: 2.7 %
ERYTHROCYTE [DISTWIDTH] IN BLOOD BY AUTOMATED COUNT: 13.1 %
EST. GFR  (AFRICAN AMERICAN): >60 ML/MIN/1.73 M^2
EST. GFR  (NON AFRICAN AMERICAN): >60 ML/MIN/1.73 M^2
ESTIMATED AVG GLUCOSE: 151 MG/DL
GLUCOSE SERPL-MCNC: 195 MG/DL
GLUCOSE UR QL STRIP: NEGATIVE
HBA1C MFR BLD HPLC: 6.9 %
HCT VFR BLD AUTO: 41.8 %
HGB BLD-MCNC: 13.9 G/DL
HGB UR QL STRIP: NEGATIVE
KETONES UR QL STRIP: ABNORMAL
LEUKOCYTE ESTERASE UR QL STRIP: ABNORMAL
LYMPHOCYTES # BLD AUTO: 2.6 K/UL
LYMPHOCYTES NFR BLD: 27.6 %
MCH RBC QN AUTO: 31.1 PG
MCHC RBC AUTO-ENTMCNC: 33.3 G/DL
MCV RBC AUTO: 94 FL
MICROSCOPIC COMMENT: ABNORMAL
MONOCYTES # BLD AUTO: 0.9 K/UL
MONOCYTES NFR BLD: 9.9 %
NEUTROPHILS # BLD AUTO: 5.5 K/UL
NEUTROPHILS NFR BLD: 59.6 %
NITRITE UR QL STRIP: NEGATIVE
PH UR STRIP: 6 [PH] (ref 5–8)
PLATELET # BLD AUTO: 205 K/UL
PMV BLD AUTO: 9.8 FL
POCT GLUCOSE: 105 MG/DL (ref 70–110)
POCT GLUCOSE: 139 MG/DL (ref 70–110)
POTASSIUM SERPL-SCNC: 4.4 MMOL/L
PROT SERPL-MCNC: 6.9 G/DL
PROT UR QL STRIP: NEGATIVE
RBC # BLD AUTO: 4.47 M/UL
RBC #/AREA URNS HPF: 4 /HPF (ref 0–4)
SODIUM SERPL-SCNC: 140 MMOL/L
SP GR UR STRIP: >=1.03 (ref 1–1.03)
SQUAMOUS #/AREA URNS HPF: 2 /HPF
TROPONIN I SERPL DL<=0.01 NG/ML-MCNC: 0.02 NG/ML
TROPONIN I SERPL DL<=0.01 NG/ML-MCNC: 0.03 NG/ML
URN SPEC COLLECT METH UR: ABNORMAL
UROBILINOGEN UR STRIP-ACNC: NEGATIVE EU/DL
WBC # BLD AUTO: 9.3 K/UL
WBC #/AREA URNS HPF: 20 /HPF (ref 0–5)
YEAST URNS QL MICRO: ABNORMAL

## 2018-02-21 PROCEDURE — 83036 HEMOGLOBIN GLYCOSYLATED A1C: CPT

## 2018-02-21 PROCEDURE — 99284 EMERGENCY DEPT VISIT MOD MDM: CPT | Mod: 25

## 2018-02-21 PROCEDURE — 81000 URINALYSIS NONAUTO W/SCOPE: CPT

## 2018-02-21 PROCEDURE — 93010 ELECTROCARDIOGRAM REPORT: CPT | Mod: ,,, | Performed by: INTERNAL MEDICINE

## 2018-02-21 PROCEDURE — 84484 ASSAY OF TROPONIN QUANT: CPT | Mod: 91

## 2018-02-21 PROCEDURE — 63600175 PHARM REV CODE 636 W HCPCS: Performed by: EMERGENCY MEDICINE

## 2018-02-21 PROCEDURE — 84484 ASSAY OF TROPONIN QUANT: CPT

## 2018-02-21 PROCEDURE — 85025 COMPLETE CBC W/AUTO DIFF WBC: CPT

## 2018-02-21 PROCEDURE — 25000003 PHARM REV CODE 250: Performed by: EMERGENCY MEDICINE

## 2018-02-21 PROCEDURE — G0378 HOSPITAL OBSERVATION PER HR: HCPCS

## 2018-02-21 PROCEDURE — 96374 THER/PROPH/DIAG INJ IV PUSH: CPT | Mod: 59

## 2018-02-21 PROCEDURE — 63600175 PHARM REV CODE 636 W HCPCS: Performed by: NURSE PRACTITIONER

## 2018-02-21 PROCEDURE — 25000003 PHARM REV CODE 250: Performed by: NURSE PRACTITIONER

## 2018-02-21 PROCEDURE — 80053 COMPREHEN METABOLIC PANEL: CPT

## 2018-02-21 PROCEDURE — 96372 THER/PROPH/DIAG INJ SC/IM: CPT

## 2018-02-21 PROCEDURE — 83880 ASSAY OF NATRIURETIC PEPTIDE: CPT

## 2018-02-21 RX ORDER — ISOSORBIDE MONONITRATE 60 MG/1
120 TABLET, EXTENDED RELEASE ORAL DAILY
Status: DISCONTINUED | OUTPATIENT
Start: 2018-02-22 | End: 2018-02-22 | Stop reason: HOSPADM

## 2018-02-21 RX ORDER — CARBIDOPA AND LEVODOPA 25; 250 MG/1; MG/1
1 TABLET ORAL
Status: DISCONTINUED | OUTPATIENT
Start: 2018-02-21 | End: 2018-02-22 | Stop reason: HOSPADM

## 2018-02-21 RX ORDER — ASPIRIN 81 MG/1
81 TABLET ORAL DAILY
Status: DISCONTINUED | OUTPATIENT
Start: 2018-02-22 | End: 2018-02-22 | Stop reason: HOSPADM

## 2018-02-21 RX ORDER — ENOXAPARIN SODIUM 100 MG/ML
40 INJECTION SUBCUTANEOUS EVERY 24 HOURS
Status: DISCONTINUED | OUTPATIENT
Start: 2018-02-21 | End: 2018-02-22 | Stop reason: HOSPADM

## 2018-02-21 RX ORDER — FAMOTIDINE 20 MG/1
20 TABLET, FILM COATED ORAL 2 TIMES DAILY
Status: DISCONTINUED | OUTPATIENT
Start: 2018-02-21 | End: 2018-02-22 | Stop reason: HOSPADM

## 2018-02-21 RX ORDER — PRAMIPEXOLE DIHYDROCHLORIDE 0.5 MG/1
1 TABLET ORAL 3 TIMES DAILY
Status: DISCONTINUED | OUTPATIENT
Start: 2018-02-21 | End: 2018-02-22 | Stop reason: HOSPADM

## 2018-02-21 RX ORDER — PRAVASTATIN SODIUM 20 MG/1
40 TABLET ORAL DAILY
Status: DISCONTINUED | OUTPATIENT
Start: 2018-02-22 | End: 2018-02-22 | Stop reason: HOSPADM

## 2018-02-21 RX ORDER — METOPROLOL SUCCINATE 25 MG/1
25 TABLET, EXTENDED RELEASE ORAL DAILY
Status: DISCONTINUED | OUTPATIENT
Start: 2018-02-22 | End: 2018-02-22

## 2018-02-21 RX ORDER — ACETAMINOPHEN 500 MG
1000 TABLET ORAL
Status: COMPLETED | OUTPATIENT
Start: 2018-02-21 | End: 2018-02-21

## 2018-02-21 RX ORDER — GLUCAGON 1 MG
1 KIT INJECTION
Status: DISCONTINUED | OUTPATIENT
Start: 2018-02-21 | End: 2018-02-22 | Stop reason: HOSPADM

## 2018-02-21 RX ORDER — NAPROXEN SODIUM 220 MG/1
324 TABLET, FILM COATED ORAL
Status: COMPLETED | OUTPATIENT
Start: 2018-02-21 | End: 2018-02-21

## 2018-02-21 RX ORDER — ONDANSETRON 2 MG/ML
4 INJECTION INTRAMUSCULAR; INTRAVENOUS EVERY 8 HOURS PRN
Status: DISCONTINUED | OUTPATIENT
Start: 2018-02-21 | End: 2018-02-22 | Stop reason: HOSPADM

## 2018-02-21 RX ORDER — IBUPROFEN 200 MG
24 TABLET ORAL
Status: DISCONTINUED | OUTPATIENT
Start: 2018-02-21 | End: 2018-02-22 | Stop reason: HOSPADM

## 2018-02-21 RX ORDER — IBUPROFEN 200 MG
16 TABLET ORAL
Status: DISCONTINUED | OUTPATIENT
Start: 2018-02-21 | End: 2018-02-22 | Stop reason: HOSPADM

## 2018-02-21 RX ORDER — LEVOTHYROXINE SODIUM 100 UG/1
100 TABLET ORAL
Status: DISCONTINUED | OUTPATIENT
Start: 2018-02-22 | End: 2018-02-22 | Stop reason: HOSPADM

## 2018-02-21 RX ORDER — NITROGLYCERIN 0.4 MG/1
0.4 TABLET SUBLINGUAL
Status: COMPLETED | OUTPATIENT
Start: 2018-02-21 | End: 2018-02-21

## 2018-02-21 RX ORDER — INSULIN ASPART 100 [IU]/ML
0-5 INJECTION, SOLUTION INTRAVENOUS; SUBCUTANEOUS
Status: DISCONTINUED | OUTPATIENT
Start: 2018-02-21 | End: 2018-02-22 | Stop reason: HOSPADM

## 2018-02-21 RX ADMIN — ENOXAPARIN SODIUM 40 MG: 100 INJECTION SUBCUTANEOUS at 07:02

## 2018-02-21 RX ADMIN — CARBIDOPA AND LEVODOPA 1 TABLET: 25; 250 TABLET ORAL at 07:02

## 2018-02-21 RX ADMIN — NITROGLYCERIN 0.4 MG: 0.4 TABLET SUBLINGUAL at 12:02

## 2018-02-21 RX ADMIN — PRAMIPEXOLE DIHYDROCHLORIDE 1 MG: 0.5 TABLET ORAL at 10:02

## 2018-02-21 RX ADMIN — ASPIRIN 81 MG 324 MG: 81 TABLET ORAL at 10:02

## 2018-02-21 RX ADMIN — ONDANSETRON 4 MG: 2 INJECTION INTRAMUSCULAR; INTRAVENOUS at 04:02

## 2018-02-21 RX ADMIN — ACETAMINOPHEN 1000 MG: 500 TABLET ORAL at 12:02

## 2018-02-21 RX ADMIN — CARBIDOPA AND LEVODOPA 1 TABLET: 25; 250 TABLET ORAL at 11:02

## 2018-02-21 NOTE — H&P
[facility] Hospital Medicine  History & Physical    Patient Name: Ross Isbell  MRN: [id number]  Admission Date: 2/21/2018  Attending Physician: Ivy Tam Do, MD   Primary Care Provider: Anna Penn MD         Patient information was obtained from patient, spouse/SO and ER records.     Subjective:     Principal Problem: Chest Pain    Chief Complaint:   Chief Complaint   Patient presents with    Chest Pain        HPI: Ross Isbell is a 72 y.o. female patient with a hx of CAD and MI x 3 with stents x 6 (last placed in 11/2015), Parkinson dx, DM, HTN, and hypopthyroidism who presents to the Emergency Department for sub-sternal CP.  Pt reports pain which radiated across her chest to left shoulder and down her left arm which onset gradually about 2 hours ago. Pt describes the pain as a tightness and pressure sensation. No mitigating or exacerbating factors reported. Associated sxs include: L arm numbness, nausea, and leg swelling. Patient denies any SOB, diaphoresis, palpitations, extremity weakness, leg pain, dizziness, cough, vomiting, and all other sxs at this time. No further complaints or concerns at this time. Pt states that she is wheelchair bound at home.     Past Medical History:   Diagnosis Date    Abnormal nuclear stress test 11/1/2015    Background diabetic retinopathy 12/28/2015    CAD (coronary artery disease) 2002    stents    Cataract     Gait disorder 10/12/2012    Glaucoma     MI (myocardial infarction)     Obesity     CORINE (obstructive sleep apnea)     Other and unspecified hyperlipidemia     PD (Parkinson's disease) 2002    tremor-predominant (per patient)    Type II or unspecified type diabetes mellitus without mention of complication, not stated as uncontrolled 2002      am    Unspecified essential hypertension        Past Surgical History:   Procedure Laterality Date    BREAST BIOPSY Left     benign - about 3 yrs ago    CORONARY ANGIOPLASTY  2002     CORONARY STENT PLACEMENT  2002    DILATION AND CURETTAGE OF UTERUS      FOREIGN BODY REMOVAL      INNER EAR SURGERY      TONSILLECTOMY, ADENOIDECTOMY         Review of patient's allergies indicates:   Allergen Reactions    Codeine      Other reaction(s): Nausea    Codeine sulfate      Unknown^    Diphenhydramine hcl      Unknown^  Other reaction(s): Rash    Penicillins      Unknown^  Other reaction(s): Hives    Sulfa (sulfonamide antibiotics) Hives and Nausea And Vomiting       No current facility-administered medications on file prior to encounter.      Current Outpatient Prescriptions on File Prior to Encounter   Medication Sig    aspirin (ECOTRIN) 81 MG EC tablet Take 1 tablet (81 mg total) by mouth once daily.    blood sugar diagnostic Strp 1 strip by Misc.(Non-Drug; Combo Route) route 3 (three) times daily. Accuchek Felicia Plus Test Strips    carbidopa-levodopa  mg (SINEMET)  mg per tablet Take 1 tab 5 times daily.    insulin NPH-insulin regular, 70/30, (NOVOLIN 70/30) 100 unit/mL (70-30) injection Inject into the skin 2 (two) times daily.    insulin syringe-needle U-100 0.5 mL 31 gauge x 5/16 Syrg INJECT 1 SYRINGE INTO THE SKIN 3 TIMES DAILY    isosorbide mononitrate (IMDUR) 120 MG 24 hr tablet TAKE ONE TABLET BY MOUTH ONCE DAILY    latanoprost 0.005 % ophthalmic solution Place 1 drop into both eyes every evening.    levothyroxine (SYNTHROID) 100 MCG tablet TAKE ONE TABLET BY MOUTH ONCE DAILY BEFORE BREAKFAST    metFORMIN (GLUCOPHAGE) 500 MG tablet TAKE ONE TABLET BY MOUTH THREE TIMES DAILY    metoprolol succinate (TOPROL-XL) 25 MG 24 hr tablet Take 1 tablet (25 mg total) by mouth once daily.    multivit-min-FA-lycopen-lutein (CENTRUM SILVER) 0.4-300-250 mg-mcg-mcg Tab Take 1 tablet by mouth once daily.    potassium chloride SA (K-DUR,KLOR-CON) 20 MEQ tablet Take 1 tablet (20 mEq total) by mouth once daily.    pramipexole (MIRAPEX) 1 MG tablet Take 1 tablet (1 mg total) by  mouth 3 (three) times daily.    pravastatin (PRAVACHOL) 40 MG tablet TAKE ONE TABLET BY MOUTH ONCE DAILY    ACCU-CHEK SOFTCLIX LANCETS Misc TEST THREE TIMES DAILY    acetaminophen (TYLENOL) 325 MG tablet Take 2 tablets (650 mg total) by mouth every 6 (six) hours as needed.    blood-glucose meter kit Accu-chek Felicia Plus Meter    carbidopa-levodopa  mg (SINEMET)  mg per tablet TAKE 1 TABLET BY MOUTH 5 TIMES DAILY    [DISCONTINUED] furosemide (LASIX) 20 MG tablet Take 1 tablet (20 mg total) by mouth once daily.    [DISCONTINUED] triamcinolone acetonide 0.1% (KENALOG) 0.1 % cream Apply topically 2 (two) times daily.     Family History     Problem Relation (Age of Onset)    Breast cancer Paternal Grandmother    Diabetes Maternal Grandmother    Glaucoma Mother, Maternal Grandmother    Heart attack Father (65), Mother (65)    Hypertension Father, Mother, Maternal Grandmother    Ovarian cancer Mother    Parkinsonism Other, Paternal Aunt    Tremor Father        Social History Main Topics    Smoking status: Former Smoker     Years: 40.00     Quit date: 10/12/2010    Smokeless tobacco: Never Used      Comment: On & off for the past few years    Alcohol use No    Drug use: No    Sexual activity: Not Currently     Review of Systems   Constitutional: Positive for activity change. Negative for appetite change, chills, fatigue and fever.   HENT: Negative for postnasal drip, sinus pressure and trouble swallowing.    Eyes: Negative.    Respiratory: Negative for cough, chest tightness, shortness of breath and wheezing.    Cardiovascular: Positive for chest pain and leg swelling. Negative for palpitations.   Gastrointestinal: Positive for nausea. Negative for abdominal distention, abdominal pain, diarrhea and vomiting.   Endocrine: Negative for cold intolerance and heat intolerance.   Genitourinary: Negative for dysuria, enuresis, flank pain, frequency and urgency.   Musculoskeletal: Positive for gait problem.  Negative for arthralgias, back pain and myalgias.   Skin: Negative for color change and wound.   Allergic/Immunologic: Negative for environmental allergies and food allergies.   Neurological: Positive for numbness (LUE). Negative for dizziness, syncope, weakness and headaches.   Hematological: Does not bruise/bleed easily.   Psychiatric/Behavioral: Negative for agitation, confusion and sleep disturbance. The patient is not nervous/anxious.      Objective:     Vital Signs (Most Recent):  Temp: 98 °F (36.7 °C) (02/21/18 0933)  Pulse: 82 (02/21/18 1431)  Resp: (!) 22 (02/21/18 1431)  BP: (!) 120/59 (02/21/18 1431)  SpO2: 95 % (02/21/18 1431) Vital Signs (24h Range):  Temp:  [98 °F (36.7 °C)] 98 °F (36.7 °C)  Pulse:  [80-90] 82  Resp:  [14-22] 22  SpO2:  [93 %-95 %] 95 %  BP: (111-159)/(53-92) 120/59        There is no height or weight on file to calculate BMI.    Physical Exam   Constitutional: She is oriented to person, place, and time. She appears well-developed and well-nourished.   HENT:   Head: Normocephalic.   Nose: Nose normal.   Mouth/Throat: Oropharynx is clear and moist.   Eyes: EOM are normal.   Neck: Normal range of motion. Neck supple.   Cardiovascular: Normal rate, regular rhythm and intact distal pulses.  Exam reveals no friction rub.    Murmur heard.  Pulmonary/Chest: Effort normal. She has decreased breath sounds in the right lower field and the left lower field.   Abdominal: Soft. Bowel sounds are normal. She exhibits no distension. There is no tenderness.   Genitourinary:   Genitourinary Comments: Deferred   Musculoskeletal: She exhibits edema (pedal ).   Neurological: She is alert and oriented to person, place, and time.   Skin: Skin is warm and dry. Capillary refill takes 2 to 3 seconds.   Psychiatric: She has a normal mood and affect. Her behavior is normal. Thought content normal.         CRANIAL NERVES     CN III, IV, VI   Extraocular motions are normal.        Significant Labs:   CBC:   Recent  Labs  Lab 02/21/18  1019   WBC 9.30   HGB 13.9   HCT 41.8        CMP:   Recent Labs  Lab 02/21/18  1019      K 4.4      CO2 23   *   BUN 17   CREATININE 0.9   CALCIUM 10.2   PROT 6.9   ALBUMIN 3.9   BILITOT 0.5   ALKPHOS 47*   AST 33   ALT 37   ANIONGAP 11   EGFRNONAA >60     Troponin:   Recent Labs  Lab 02/21/18  1019 02/21/18  1641   TROPONINI 0.023 0.028*       Significant Imaging:   Imaging Results          X-Ray Chest AP Portable (Final result)  Result time 02/21/18 10:14:12    Final result by MARIA VICTORIA Liang Sr., MD (02/21/18 10:14:12)                 Impression:        1. The lungs are clear.   2. The size of the heart is prominent. This may be secondary to magnification.  3. There are healed fractures on both sides of the thoracic cage.      Electronically signed by: MARIA VICTORIA LIANG MD  Date:     02/21/18  Time:    10:14              Narrative:    One-view chest x-ray    Clinical History: Chest pain    Finding: Comparison was made to prior examination performed on 12/22/2015. The size of the heart is prominent. The lungs are clear. There is no pneumothorax.  The costophrenic angles are sharp. There are healed fractures on both sides of the thoracic cage.                            Assessment/Plan:     Chest pain    -Pt admitted to Observation   -Cardiology consulted  -pain improved with Nitro  -Lovenox  Serial cardiac enzymes-will follow serial results    -Initial troponin negative  beta blocker, Imdur, and ASA  Nitrates prn  Oxygen therapy to maintain sats > 92 %  2 D ECHO results pending  Tobacco cessation  Evaluate for cardiac rehab referral - refer to outpatient visit  -EKG showed NSR with LVH            Hyperlipidemia LDL goal <70    -Statin continued           CAD: S/P coronary artery stent placement    -ASA, Beta blocker, Imdur, and Statin continued  -hx of MI x 3 with total of 6 stents placed             Morbid obesity with BMI of 40.0-44.9, adult    -BMI  -pt counseled to  maintain compliance with low sodium diet        Type 2 diabetes, controlled, with peripheral neuropathy    -Accuchecks and SSI continued         Hypothyroidism    -Synthroid continued          Essential hypertension    -home medications resumed           PD (Parkinson's disease)    -Sinemet and pramipexole continued            VTE Risk Mitigation     None             Cecilia Tinsley NP  Department of Hospital Medicine   [facility]

## 2018-02-21 NOTE — SUBJECTIVE & OBJECTIVE
Past Medical History:   Diagnosis Date    Abnormal nuclear stress test 11/1/2015    Background diabetic retinopathy 12/28/2015    CAD (coronary artery disease) 2002    stents    Cataract     Gait disorder 10/12/2012    Glaucoma     MI (myocardial infarction)     Obesity     CORINE (obstructive sleep apnea)     Other and unspecified hyperlipidemia     PD (Parkinson's disease) 2002    tremor-predominant (per patient)    Type II or unspecified type diabetes mellitus without mention of complication, not stated as uncontrolled 2002      am    Unspecified essential hypertension        Past Surgical History:   Procedure Laterality Date    BREAST BIOPSY Left     benign - about 3 yrs ago    CORONARY ANGIOPLASTY  2002    CORONARY STENT PLACEMENT  2002    DILATION AND CURETTAGE OF UTERUS      FOREIGN BODY REMOVAL      INNER EAR SURGERY      TONSILLECTOMY, ADENOIDECTOMY         Review of patient's allergies indicates:   Allergen Reactions    Codeine      Other reaction(s): Nausea    Codeine sulfate      Unknown^    Diphenhydramine hcl      Unknown^  Other reaction(s): Rash    Penicillins      Unknown^  Other reaction(s): Hives    Sulfa (sulfonamide antibiotics) Hives and Nausea And Vomiting       No current facility-administered medications on file prior to encounter.      Current Outpatient Prescriptions on File Prior to Encounter   Medication Sig    aspirin (ECOTRIN) 81 MG EC tablet Take 1 tablet (81 mg total) by mouth once daily.    blood sugar diagnostic Strp 1 strip by Misc.(Non-Drug; Combo Route) route 3 (three) times daily. Accuchek Felicia Plus Test Strips    carbidopa-levodopa  mg (SINEMET)  mg per tablet Take 1 tab 5 times daily.    insulin NPH-insulin regular, 70/30, (NOVOLIN 70/30) 100 unit/mL (70-30) injection Inject into the skin 2 (two) times daily.    insulin syringe-needle U-100 0.5 mL 31 gauge x 5/16 Syrg INJECT 1 SYRINGE INTO THE SKIN 3 TIMES DAILY    isosorbide  mononitrate (IMDUR) 120 MG 24 hr tablet TAKE ONE TABLET BY MOUTH ONCE DAILY    latanoprost 0.005 % ophthalmic solution Place 1 drop into both eyes every evening.    levothyroxine (SYNTHROID) 100 MCG tablet TAKE ONE TABLET BY MOUTH ONCE DAILY BEFORE BREAKFAST    metFORMIN (GLUCOPHAGE) 500 MG tablet TAKE ONE TABLET BY MOUTH THREE TIMES DAILY    metoprolol succinate (TOPROL-XL) 25 MG 24 hr tablet Take 1 tablet (25 mg total) by mouth once daily.    multivit-min-FA-lycopen-lutein (CENTRUM SILVER) 0.4-300-250 mg-mcg-mcg Tab Take 1 tablet by mouth once daily.    potassium chloride SA (K-DUR,KLOR-CON) 20 MEQ tablet Take 1 tablet (20 mEq total) by mouth once daily.    pramipexole (MIRAPEX) 1 MG tablet Take 1 tablet (1 mg total) by mouth 3 (three) times daily.    pravastatin (PRAVACHOL) 40 MG tablet TAKE ONE TABLET BY MOUTH ONCE DAILY    ACCU-CHEK SOFTCLIX LANCETS Misc TEST THREE TIMES DAILY    acetaminophen (TYLENOL) 325 MG tablet Take 2 tablets (650 mg total) by mouth every 6 (six) hours as needed.    blood-glucose meter kit Accu-chek Felicia Plus Meter    carbidopa-levodopa  mg (SINEMET)  mg per tablet TAKE 1 TABLET BY MOUTH 5 TIMES DAILY    [DISCONTINUED] furosemide (LASIX) 20 MG tablet Take 1 tablet (20 mg total) by mouth once daily.    [DISCONTINUED] triamcinolone acetonide 0.1% (KENALOG) 0.1 % cream Apply topically 2 (two) times daily.     Family History     Problem Relation (Age of Onset)    Breast cancer Paternal Grandmother    Diabetes Maternal Grandmother    Glaucoma Mother, Maternal Grandmother    Heart attack Father (65), Mother (65)    Hypertension Father, Mother, Maternal Grandmother    Ovarian cancer Mother    Parkinsonism Other, Paternal Aunt    Tremor Father        Social History Main Topics    Smoking status: Former Smoker     Years: 40.00     Quit date: 10/12/2010    Smokeless tobacco: Never Used      Comment: On & off for the past few years    Alcohol use No    Drug use: No     Sexual activity: Not Currently     Review of Systems   Constitutional: Positive for activity change. Negative for appetite change, chills, fatigue and fever.   HENT: Negative for postnasal drip, sinus pressure and trouble swallowing.    Eyes: Negative.    Respiratory: Negative for cough, chest tightness, shortness of breath and wheezing.    Cardiovascular: Positive for chest pain and leg swelling. Negative for palpitations.   Gastrointestinal: Positive for nausea. Negative for abdominal distention, abdominal pain, diarrhea and vomiting.   Endocrine: Negative for cold intolerance and heat intolerance.   Genitourinary: Negative for dysuria, enuresis, flank pain, frequency and urgency.   Musculoskeletal: Positive for gait problem. Negative for arthralgias, back pain and myalgias.   Skin: Negative for color change and wound.   Allergic/Immunologic: Negative for environmental allergies and food allergies.   Neurological: Positive for numbness (LUE). Negative for dizziness, syncope, weakness and headaches.   Hematological: Does not bruise/bleed easily.   Psychiatric/Behavioral: Negative for agitation, confusion and sleep disturbance. The patient is not nervous/anxious.      Objective:     Vital Signs (Most Recent):  Temp: 98 °F (36.7 °C) (02/21/18 0933)  Pulse: 82 (02/21/18 1431)  Resp: (!) 22 (02/21/18 1431)  BP: (!) 120/59 (02/21/18 1431)  SpO2: 95 % (02/21/18 1431) Vital Signs (24h Range):  Temp:  [98 °F (36.7 °C)] 98 °F (36.7 °C)  Pulse:  [80-90] 82  Resp:  [14-22] 22  SpO2:  [93 %-95 %] 95 %  BP: (111-159)/(53-92) 120/59        There is no height or weight on file to calculate BMI.    Physical Exam   Constitutional: She is oriented to person, place, and time. She appears well-developed and well-nourished.   HENT:   Head: Normocephalic.   Nose: Nose normal.   Mouth/Throat: Oropharynx is clear and moist.   Eyes: EOM are normal.   Neck: Normal range of motion. Neck supple.   Cardiovascular: Normal rate, regular rhythm  and intact distal pulses.  Exam reveals no friction rub.    Murmur heard.  Pulmonary/Chest: Effort normal. She has decreased breath sounds in the right lower field and the left lower field.   Abdominal: Soft. Bowel sounds are normal. She exhibits no distension. There is no tenderness.   Genitourinary:   Genitourinary Comments: Deferred   Musculoskeletal: She exhibits edema (pedal ).   Neurological: She is alert and oriented to person, place, and time.   Skin: Skin is warm and dry. Capillary refill takes 2 to 3 seconds.   Psychiatric: She has a normal mood and affect. Her behavior is normal. Thought content normal.         CRANIAL NERVES     CN III, IV, VI   Extraocular motions are normal.        Significant Labs:   CBC:   Recent Labs  Lab 02/21/18  1019   WBC 9.30   HGB 13.9   HCT 41.8        CMP:   Recent Labs  Lab 02/21/18  1019      K 4.4      CO2 23   *   BUN 17   CREATININE 0.9   CALCIUM 10.2   PROT 6.9   ALBUMIN 3.9   BILITOT 0.5   ALKPHOS 47*   AST 33   ALT 37   ANIONGAP 11   EGFRNONAA >60     Troponin:   Recent Labs  Lab 02/21/18  1019 02/21/18  1641   TROPONINI 0.023 0.028*       Significant Imaging:   Imaging Results          X-Ray Chest AP Portable (Final result)  Result time 02/21/18 10:14:12    Final result by MARIA VICTORIA Liang Sr., MD (02/21/18 10:14:12)                 Impression:        1. The lungs are clear.   2. The size of the heart is prominent. This may be secondary to magnification.  3. There are healed fractures on both sides of the thoracic cage.      Electronically signed by: MARIA VICTORIA LIANG MD  Date:     02/21/18  Time:    10:14              Narrative:    One-view chest x-ray    Clinical History: Chest pain    Finding: Comparison was made to prior examination performed on 12/22/2015. The size of the heart is prominent. The lungs are clear. There is no pneumothorax.  The costophrenic angles are sharp. There are healed fractures on both sides of the thoracic cage.

## 2018-02-21 NOTE — ED PROVIDER NOTES
SCRIBE #1 NOTE: I, Tanja Mcneil, am scribing for, and in the presence of, David Eldridge MD. I have scribed the entire note.      History      Chief Complaint   Patient presents with    Chest Pain       Review of patient's allergies indicates:   Allergen Reactions    Codeine      Other reaction(s): Nausea    Codeine sulfate      Unknown^    Diphenhydramine hcl      Unknown^  Other reaction(s): Rash    Penicillins      Unknown^  Other reaction(s): Hives    Sulfa (sulfonamide antibiotics) Hives and Nausea And Vomiting        HPI   HPI    2/21/2018, 9:37 AM   History obtained from the patient      History of Present Illness: Ross Isbell is a 72 y.o. female patient with a hx of Parkinson's who presents to the Emergency Department for sub-sternal CP that radiates across her chest which onset gradually about 2 hours ago. Pt describes the pain as a tightness and pressure sensation. Symptoms are constant and moderate in severity. No mitigating or exacerbating factors reported. Associated sxs include L arm numbness and leg swelling. Patient denies any SOB, diaphoresis, palpitations, extremity weakness, leg pain, dizziness, cough, n/v, and all other sxs at this time. Pt has had 3 pervious Mi's and previous stents placed. No further complaints or concerns at this time.         Arrival mode: Personal vehicle      PCP: Anna Penn MD       Past Medical History:  Past Medical History:   Diagnosis Date    Abnormal nuclear stress test 11/1/2015    Background diabetic retinopathy 12/28/2015    CAD (coronary artery disease) 2002    stents    Cataract     Gait disorder 10/12/2012    Glaucoma     Obesity     CORINE (obstructive sleep apnea)     Other and unspecified hyperlipidemia     PD (Parkinson's disease) 2002    tremor-predominant (per patient)    Type II or unspecified type diabetes mellitus without mention of complication, not stated as uncontrolled 2002      am    Unspecified essential  hypertension        Past Surgical History:  Past Surgical History:   Procedure Laterality Date    BREAST BIOPSY Left     benign - about 3 yrs ago    CORONARY ANGIOPLASTY  2002    CORONARY STENT PLACEMENT  2002    DILATION AND CURETTAGE OF UTERUS      FOREIGN BODY REMOVAL      INNER EAR SURGERY      TONSILLECTOMY, ADENOIDECTOMY           Family History:  Family History   Problem Relation Age of Onset    Parkinsonism Other     Tremor Father     Hypertension Father     Heart attack Father 65     MI    Parkinsonism Paternal Aunt     Glaucoma Mother     Hypertension Mother     Heart attack Mother 65     MI    Ovarian cancer Mother     Glaucoma Maternal Grandmother     Hypertension Maternal Grandmother     Diabetes Maternal Grandmother     Breast cancer Paternal Grandmother        Social History:  Social History     Social History Main Topics    Smoking status: Former Smoker     Years: 40.00     Quit date: 10/12/2010    Smokeless tobacco: Never Used      Comment: On & off for the past few years    Alcohol use No    Drug use: No    Sexual activity: Not Currently       ROS   Review of Systems   Constitutional: Negative for diaphoresis and fever.   HENT: Negative for sore throat.    Respiratory: Negative for cough and shortness of breath.    Cardiovascular: Positive for leg swelling. Negative for chest pain and palpitations.   Gastrointestinal: Negative for nausea and vomiting.   Genitourinary: Negative for dysuria.   Musculoskeletal: Negative for back pain.        (-) leg pain   Skin: Negative for rash.   Neurological: Positive for numbness. Negative for dizziness and weakness.   Hematological: Does not bruise/bleed easily.   All other systems reviewed and are negative.      Physical Exam      Initial Vitals   BP Pulse Resp Temp SpO2   02/21/18 1016 02/21/18 1016 02/21/18 1016 02/21/18 0933 02/21/18 1016   (!) 159/92 88 20 98 °F (36.7 °C) 95 %      MAP       02/21/18 1016       114.33           "    Physical Exam  Nursing Notes and Vital Signs Reviewed.  Constitutional: Patient is in no acute distress. Well-developed and well-nourished.  Head: Atraumatic. Normocephalic.  Eyes: PERRL. EOM intact. Conjunctivae are not pale. No scleral icterus.  ENT: Mucous membranes are moist. Oropharynx is clear and symmetric.    Neck: Supple. Full ROM. No lymphadenopathy.  Cardiovascular: Regular rate. Regular rhythm. No murmurs, rubs, or gallops. Distal pulses are 2+ and symmetric.  Pulmonary/Chest: No respiratory distress. Clear to auscultation bilaterally. No wheezing or rales.  Abdominal: Soft and non-distended.  There is no tenderness.  No rebound, guarding, or rigidity.   Musculoskeletal: Moves all extremities. No obvious deformities. Pitting edema in bilateral foot, ankle, and calf. Parkinsonian tremors in bilateral upper extremities.   Skin: Warm and dry.  Neurological:  Alert, awake, and appropriate.  Normal speech.  No acute focal neurological deficits are appreciated.  Psychiatric: Normal affect. Good eye contact. Appropriate in content.    ED Course    Procedures  ED Vital Signs:  Vitals:    02/21/18 0933 02/21/18 1016 02/21/18 1030 02/21/18 1047   BP:  (!) 159/92  (!) 111/53   Pulse:  88 90 80   Resp:  20  18   Temp: 98 °F (36.7 °C)      TempSrc: Oral      SpO2:  95%  (!) 93%   Height: 5' 6" (1.676 m)       02/21/18 1132 02/21/18 1158 02/21/18 1246 02/21/18 1302   BP: (!) 113/58  (!) 115/55 (!) 124/59   Pulse: 80 84 88 84   Resp: 14 20 (!) 21 15   Temp:       TempSrc:       SpO2: (!) 93% (!) 94% (!) 93% (!) 94%   Height:        02/21/18 1431   BP: (!) 120/59   Pulse: 82   Resp: (!) 22   Temp:    TempSrc:    SpO2: 95%   Height:        Abnormal Lab Results:  Labs Reviewed   CBC W/ AUTO DIFFERENTIAL - Abnormal; Notable for the following:        Result Value    MCH 31.1 (*)     All other components within normal limits   COMPREHENSIVE METABOLIC PANEL - Abnormal; Notable for the following:     Glucose 195 (*)     " Alkaline Phosphatase 47 (*)     All other components within normal limits   B-TYPE NATRIURETIC PEPTIDE   TROPONIN I        All Lab Results:  Results for orders placed or performed during the hospital encounter of 02/21/18   Brain natriuretic peptide   Result Value Ref Range    BNP 19 0 - 99 pg/mL   CBC auto differential   Result Value Ref Range    WBC 9.30 3.90 - 12.70 K/uL    RBC 4.47 4.00 - 5.40 M/uL    Hemoglobin 13.9 12.0 - 16.0 g/dL    Hematocrit 41.8 37.0 - 48.5 %    MCV 94 82 - 98 fL    MCH 31.1 (H) 27.0 - 31.0 pg    MCHC 33.3 32.0 - 36.0 g/dL    RDW 13.1 11.5 - 14.5 %    Platelets 205 150 - 350 K/uL    MPV 9.8 9.2 - 12.9 fL    Gran # (ANC) 5.5 1.8 - 7.7 K/uL    Lymph # 2.6 1.0 - 4.8 K/uL    Mono # 0.9 0.3 - 1.0 K/uL    Eos # 0.3 0.0 - 0.5 K/uL    Baso # 0.02 0.00 - 0.20 K/uL    Gran% 59.6 38.0 - 73.0 %    Lymph% 27.6 18.0 - 48.0 %    Mono% 9.9 4.0 - 15.0 %    Eosinophil% 2.7 0.0 - 8.0 %    Basophil% 0.2 0.0 - 1.9 %    Differential Method Automated    Comprehensive metabolic panel   Result Value Ref Range    Sodium 140 136 - 145 mmol/L    Potassium 4.4 3.5 - 5.1 mmol/L    Chloride 106 95 - 110 mmol/L    CO2 23 23 - 29 mmol/L    Glucose 195 (H) 70 - 110 mg/dL    BUN, Bld 17 8 - 23 mg/dL    Creatinine 0.9 0.5 - 1.4 mg/dL    Calcium 10.2 8.7 - 10.5 mg/dL    Total Protein 6.9 6.0 - 8.4 g/dL    Albumin 3.9 3.5 - 5.2 g/dL    Total Bilirubin 0.5 0.1 - 1.0 mg/dL    Alkaline Phosphatase 47 (L) 55 - 135 U/L    AST 33 10 - 40 U/L    ALT 37 10 - 44 U/L    Anion Gap 11 8 - 16 mmol/L    eGFR if African American >60 >60 mL/min/1.73 m^2    eGFR if non African American >60 >60 mL/min/1.73 m^2   Troponin I   Result Value Ref Range    Troponin I 0.023 0.000 - 0.026 ng/mL         Imaging Results:  Imaging Results          X-Ray Chest AP Portable (Final result)  Result time 02/21/18 10:14:12    Final result by MARIA VICTORIA Liang Sr., MD (02/21/18 10:14:12)                 Impression:        1. The lungs are clear.   2. The size of  the heart is prominent. This may be secondary to magnification.  3. There are healed fractures on both sides of the thoracic cage.      Electronically signed by: MARIA VICTORIA CHOW MD  Date:     02/21/18  Time:    10:14              Narrative:    One-view chest x-ray    Clinical History: Chest pain    Finding: Comparison was made to prior examination performed on 12/22/2015. The size of the heart is prominent. The lungs are clear. There is no pneumothorax.  The costophrenic angles are sharp. There are healed fractures on both sides of the thoracic cage.                             The EKG was ordered, reviewed, and independently interpreted by the ED provider.  Interpretation time: 0944  Rate: 90 BPM  Rhythm: normal sinus rhythm  Interpretation: Moderate voltage criteria for LVH. Prolonged Qt. Limited by tremors. No STEMI.           The Emergency Provider reviewed the vital signs and test results, which are outlined above.    ED Discussion     11:10 AM: Dr. Eldridge transfers care of pt to Dr. Shepard, pending lab results.    12:09 PM: Dr. Shepard re-evaluated pt. Pt is awake, alert, and in NAD at this time.  Pt states she is still experiencing some chest tightness. Pt reports she hasn't experienced CP in a couple years. Pt also complains of foot pain. Pt reports having 6 stents put in 6 months ago and a stress test performed a couple years ago.  2+DP pulse, no cellulitis, legs warm to touch bilaterally Pt does not take nitroglycerin on a normal day. D/w pt all pertinent results. D/w pt any concerns expressed at this time. Answered all questions. Pt expresses understanding at this time.    12:43 PM: Discussed case with NIKA Tinsley (Hospital Medicine). Dr. Bryant agrees with current care and management of pt and accepts admission. Pt pain free after NTG  Admitting Service: Hospital medicine   Admitting Physician: Dr. Bryant  Admit to: Tele-Obs    12:46 PM: Re-evaluated pt. CP was relieved by nitroglycerin. I have discussed test  results, shared treatment plan, and the need for admission with patient and family at bedside. Pt and family express understanding at this time and agree with all information. All questions answered. Pt and family have no further questions or concerns at this time. Pt is ready for admit.        ED Medication(s):  Medications   aspirin chewable tablet 324 mg (324 mg Oral Given 2/21/18 1010)   nitroGLYCERIN SL tablet 0.4 mg (0.4 mg Sublingual Given 2/21/18 1234)   acetaminophen tablet 1,000 mg (1,000 mg Oral Given 2/21/18 1233)       New Prescriptions    No medications on file             Medical Decision Making    Medical Decision Making:   Clinical Tests:   Lab Tests: Ordered and Reviewed  Radiological Study: Ordered and Reviewed  Medical Tests: Ordered and Reviewed           Scribe Attestation:   Scribe #1: I performed the above scribed service and the documentation accurately describes the services I performed. I attest to the accuracy of the note.    Attending:   Physician Attestation Statement for Scribe #1: I, David Eldridge MD, personally performed the services described in this documentation, as scribed by Tanja Mcneil, in my presence, and it is both accurate and complete.          Clinical Impression       ICD-10-CM ICD-9-CM   1. Chest pain, unspecified type R07.9 786.50       Disposition:   Disposition: Placed in Observation  Condition: Julissa Tam Do, MD  02/21/18 9211

## 2018-02-21 NOTE — ASSESSMENT & PLAN NOTE
-Pt admitted to Observation   -Cardiology consulted  -pain improved with Nitro  -Lovenox  Serial cardiac enzymes-will follow serial results    -Initial troponin negative  beta blocker, Imdur, and ASA  Nitrates prn  Oxygen therapy to maintain sats > 92 %  2 D ECHO results pending  Tobacco cessation  Evaluate for cardiac rehab referral - refer to outpatient visit

## 2018-02-21 NOTE — HPI
Ross Isbell is a 72 y.o. female patient with a hx of CAD and MI x 3 with stents x 6 (last placed in 11/2015), Parkinson dx, DM, HTN, and hypopthyroidism who presents to the Emergency Department for sub-sternal CP.  Pt reports pain which radiated across her chest to left shoulder and down her left arm which onset gradually about 2 hours ago. Pt describes the pain as a tightness and pressure sensation. No mitigating or exacerbating factors reported. Associated sxs include: L arm numbness, nausea, and leg swelling. Patient denies any SOB, diaphoresis, palpitations, extremity weakness, leg pain, dizziness, cough, vomiting, and all other sxs at this time. No further complaints or concerns at this time. Pt states that she is wheelchair bound at home.

## 2018-02-21 NOTE — ED TRIAGE NOTES
Patient reports pain to left side of chest into left shoulder. Patient with history of Parkinson so difficult to obtain BP in triage.

## 2018-02-22 VITALS
DIASTOLIC BLOOD PRESSURE: 59 MMHG | RESPIRATION RATE: 18 BRPM | TEMPERATURE: 97 F | HEIGHT: 66 IN | BODY MASS INDEX: 38.09 KG/M2 | SYSTOLIC BLOOD PRESSURE: 100 MMHG | OXYGEN SATURATION: 94 % | HEART RATE: 84 BPM | WEIGHT: 237 LBS

## 2018-02-22 LAB
ALBUMIN SERPL BCP-MCNC: 3.6 G/DL
ALP SERPL-CCNC: 43 U/L
ALT SERPL W/O P-5'-P-CCNC: 10 U/L
ANION GAP SERPL CALC-SCNC: 10 MMOL/L
AORTIC VALVE STENOSIS: ABNORMAL
AST SERPL-CCNC: 32 U/L
BASOPHILS # BLD AUTO: 0.02 K/UL
BASOPHILS NFR BLD: 0.2 %
BILIRUB SERPL-MCNC: 0.5 MG/DL
BUN SERPL-MCNC: 19 MG/DL
CALCIUM SERPL-MCNC: 9.3 MG/DL
CHLORIDE SERPL-SCNC: 106 MMOL/L
CO2 SERPL-SCNC: 24 MMOL/L
CREAT SERPL-MCNC: 0.9 MG/DL
DIASTOLIC DYSFUNCTION: NO
DIASTOLIC DYSFUNCTION: NO
DIFFERENTIAL METHOD: NORMAL
EOSINOPHIL # BLD AUTO: 0.3 K/UL
EOSINOPHIL NFR BLD: 3 %
ERYTHROCYTE [DISTWIDTH] IN BLOOD BY AUTOMATED COUNT: 13.2 %
EST. GFR  (AFRICAN AMERICAN): >60 ML/MIN/1.73 M^2
EST. GFR  (NON AFRICAN AMERICAN): >60 ML/MIN/1.73 M^2
GLUCOSE SERPL-MCNC: 124 MG/DL
HCT VFR BLD AUTO: 41.9 %
HGB BLD-MCNC: 13.6 G/DL
LYMPHOCYTES # BLD AUTO: 3 K/UL
LYMPHOCYTES NFR BLD: 31.2 %
MCH RBC QN AUTO: 30.7 PG
MCHC RBC AUTO-ENTMCNC: 32.5 G/DL
MCV RBC AUTO: 95 FL
MONOCYTES # BLD AUTO: 0.8 K/UL
MONOCYTES NFR BLD: 8.7 %
NEUTROPHILS # BLD AUTO: 5.5 K/UL
NEUTROPHILS NFR BLD: 56.9 %
PLATELET # BLD AUTO: 213 K/UL
PMV BLD AUTO: 10.3 FL
POCT GLUCOSE: 127 MG/DL (ref 70–110)
POCT GLUCOSE: 181 MG/DL (ref 70–110)
POCT GLUCOSE: 182 MG/DL (ref 70–110)
POTASSIUM SERPL-SCNC: 4.1 MMOL/L
PROT SERPL-MCNC: 6.4 G/DL
RBC # BLD AUTO: 4.43 M/UL
RETIRED EF AND QEF - SEE NOTES: 55 (ref 55–65)
SODIUM SERPL-SCNC: 140 MMOL/L
TROPONIN I SERPL DL<=0.01 NG/ML-MCNC: 0.01 NG/ML
WBC # BLD AUTO: 9.65 K/UL

## 2018-02-22 PROCEDURE — 93306 TTE W/DOPPLER COMPLETE: CPT | Mod: 26,,, | Performed by: INTERNAL MEDICINE

## 2018-02-22 PROCEDURE — 25000003 PHARM REV CODE 250: Performed by: NURSE PRACTITIONER

## 2018-02-22 PROCEDURE — 96375 TX/PRO/DX INJ NEW DRUG ADDON: CPT | Mod: 59

## 2018-02-22 PROCEDURE — 99214 OFFICE O/P EST MOD 30 MIN: CPT | Mod: 25,,, | Performed by: INTERNAL MEDICINE

## 2018-02-22 PROCEDURE — 80053 COMPREHEN METABOLIC PANEL: CPT

## 2018-02-22 PROCEDURE — 78452 HT MUSCLE IMAGE SPECT MULT: CPT | Mod: 26,,, | Performed by: INTERNAL MEDICINE

## 2018-02-22 PROCEDURE — 36415 COLL VENOUS BLD VENIPUNCTURE: CPT

## 2018-02-22 PROCEDURE — 63600175 PHARM REV CODE 636 W HCPCS: Performed by: PHYSICIAN ASSISTANT

## 2018-02-22 PROCEDURE — 63600175 PHARM REV CODE 636 W HCPCS: Performed by: NURSE PRACTITIONER

## 2018-02-22 PROCEDURE — 93016 CV STRESS TEST SUPVJ ONLY: CPT | Mod: ,,, | Performed by: INTERNAL MEDICINE

## 2018-02-22 PROCEDURE — G0378 HOSPITAL OBSERVATION PER HR: HCPCS

## 2018-02-22 PROCEDURE — 25000003 PHARM REV CODE 250: Performed by: PHYSICIAN ASSISTANT

## 2018-02-22 PROCEDURE — 85025 COMPLETE CBC W/AUTO DIFF WBC: CPT

## 2018-02-22 PROCEDURE — 93017 CV STRESS TEST TRACING ONLY: CPT | Performed by: GENERAL PRACTICE

## 2018-02-22 PROCEDURE — C8929 TTE W OR WO FOL WCON,DOPPLER: HCPCS

## 2018-02-22 PROCEDURE — 96374 THER/PROPH/DIAG INJ IV PUSH: CPT | Mod: 59

## 2018-02-22 PROCEDURE — 93018 CV STRESS TEST I&R ONLY: CPT | Mod: ,,, | Performed by: INTERNAL MEDICINE

## 2018-02-22 RX ORDER — METOPROLOL SUCCINATE 50 MG/1
50 TABLET, EXTENDED RELEASE ORAL DAILY
Qty: 30 TABLET | Refills: 1 | Status: SHIPPED | OUTPATIENT
Start: 2018-02-23 | End: 2018-07-05

## 2018-02-22 RX ORDER — METOPROLOL SUCCINATE 50 MG/1
50 TABLET, EXTENDED RELEASE ORAL DAILY
Status: DISCONTINUED | OUTPATIENT
Start: 2018-02-22 | End: 2018-02-22 | Stop reason: HOSPADM

## 2018-02-22 RX ORDER — REGADENOSON 0.08 MG/ML
0.4 INJECTION, SOLUTION INTRAVENOUS ONCE
Status: COMPLETED | OUTPATIENT
Start: 2018-02-22 | End: 2018-02-22

## 2018-02-22 RX ORDER — RANOLAZINE 500 MG/1
500 TABLET, EXTENDED RELEASE ORAL 2 TIMES DAILY
Qty: 60 TABLET | Refills: 1 | Status: SHIPPED | OUTPATIENT
Start: 2018-02-22 | End: 2018-03-23 | Stop reason: SDUPTHER

## 2018-02-22 RX ORDER — RANOLAZINE 500 MG/1
500 TABLET, EXTENDED RELEASE ORAL 2 TIMES DAILY
Status: DISCONTINUED | OUTPATIENT
Start: 2018-02-22 | End: 2018-02-22 | Stop reason: HOSPADM

## 2018-02-22 RX ORDER — ONDANSETRON 4 MG/1
4 TABLET, FILM COATED ORAL EVERY 8 HOURS PRN
Qty: 20 TABLET | Refills: 0 | Status: SHIPPED | OUTPATIENT
Start: 2018-02-22 | End: 2018-03-28

## 2018-02-22 RX ADMIN — PRAMIPEXOLE DIHYDROCHLORIDE 1 MG: 0.5 TABLET ORAL at 05:02

## 2018-02-22 RX ADMIN — ASPIRIN 81 MG: 81 TABLET, COATED ORAL at 09:02

## 2018-02-22 RX ADMIN — CARBIDOPA AND LEVODOPA 1 TABLET: 25; 250 TABLET ORAL at 01:02

## 2018-02-22 RX ADMIN — PRAMIPEXOLE DIHYDROCHLORIDE 1 MG: 0.5 TABLET ORAL at 01:02

## 2018-02-22 RX ADMIN — ISOSORBIDE MONONITRATE 120 MG: 60 TABLET, EXTENDED RELEASE ORAL at 09:02

## 2018-02-22 RX ADMIN — CARBIDOPA AND LEVODOPA 1 TABLET: 25; 250 TABLET ORAL at 05:02

## 2018-02-22 RX ADMIN — SALINE NASAL SPRAY 1 SPRAY: 1.5 SOLUTION NASAL at 06:02

## 2018-02-22 RX ADMIN — CARBIDOPA AND LEVODOPA 1 TABLET: 25; 250 TABLET ORAL at 09:02

## 2018-02-22 RX ADMIN — PRAVASTATIN SODIUM 40 MG: 20 TABLET ORAL at 09:02

## 2018-02-22 RX ADMIN — ONDANSETRON 4 MG: 2 INJECTION INTRAMUSCULAR; INTRAVENOUS at 01:02

## 2018-02-22 RX ADMIN — FAMOTIDINE 20 MG: 20 TABLET, FILM COATED ORAL at 09:02

## 2018-02-22 RX ADMIN — RANOLAZINE 500 MG: 500 TABLET, FILM COATED, EXTENDED RELEASE ORAL at 09:02

## 2018-02-22 RX ADMIN — LEVOTHYROXINE SODIUM 100 MCG: 100 TABLET ORAL at 05:02

## 2018-02-22 RX ADMIN — REGADENOSON 0.4 MG: 0.08 INJECTION, SOLUTION INTRAVENOUS at 03:02

## 2018-02-22 NOTE — HOSPITAL COURSE
On 2/21 patient was admitted to OBS secondary to Chest pain.  She has a h/o CAD s/p multiple stent placements.  She has denied CP and SOB today.  CXR negative.  Reports intermittent nausea but no vomiting or diaphoresis.  Troponin peaked at 0.028.  Cardiology consulted given history.  She is s/p stress test today that was free of evidence for myocardial ischemia or injury.  ECHO showed no evidence of pericardial effusion, intracavity mass, thrombi, or vegetation, EF 55-60%.  Metoprolol increased per cards, and Ranexa added.  Case d/w cardiology, ok to DC home today with outpatient f/u.  Case d/w Dr. Bryant.  Patient seen and examined and deemed medically stable to DC home today after she voids s/p Carlson removal.  Patient was instructed to f/u with her PCP within 3 days, and with cardiology within 1 week to monitor medication changes and verbalized + understanding.  Medications reconciled for DC home.

## 2018-02-22 NOTE — HPI
Ms. Everett is a 72 year old female patient with a PMHx of CAD s/p multivessel PCI in 2015 (proximal, mid, distal LAD, and D1), HTN, DM, hyperlipidemia, obesity, Parkinson's disease, and AS who presented to [facility] ED yesterday with a chief complaint of substernal chest pressure/tightness that began PTA. Per patient, the pain radiated down her left arm and upward to her shoulder. Associated symptoms included nausea. Patient denied any associated SOB, vomiting, or diaphoresis. Reported the pain persisted until she received nitro while in the ED. Initial workup negative and patient subsequently admitted for further evaluation and treatment. Cardiology consulted to assist with management. Patient seen and examined today, resting in bed. States she feels well. Complains of nausea. No recurrence of chest pain. No SOB. She reports compliance with her medications. Chart reviewed. Troponin 0.021>0.028>0.011. 2D echo pending. EKG reviewed, no acute changes from prior. Stress test pending.

## 2018-02-22 NOTE — PLAN OF CARE
Problem: Patient Care Overview  Goal: Plan of Care Review  Outcome: Ongoing (interventions implemented as appropriate)  POC discussed w/patient, verbalized understanding. NSR on monitor. VSS. Voids per valentine. Weight shift assistance provided.  No c/o pain.   Fall precautions in place, bed alarm on. Patient denies needs at this time.

## 2018-02-22 NOTE — ASSESSMENT & PLAN NOTE
-Patient with CAD s/p stents who presented with prolonged episode of chest pain, relieved with nitro  -Concerning for angina  -Serial troponin essentially normal (one bumped to 0.028)  -EKG without acute ischemic changes  -2D echo pending  -Will increase Metoprolol to 50 mg daily  -Add Ranexa 500 mg BID  -Stress test for further evaluation

## 2018-02-22 NOTE — SUBJECTIVE & OBJECTIVE
Past Medical History:   Diagnosis Date    Abnormal nuclear stress test 11/1/2015    Background diabetic retinopathy 12/28/2015    CAD (coronary artery disease) 2002    stents    Cataract     Gait disorder 10/12/2012    Glaucoma     MI (myocardial infarction)     Obesity     CORINE (obstructive sleep apnea)     Other and unspecified hyperlipidemia     PD (Parkinson's disease) 2002    tremor-predominant (per patient)    Type II or unspecified type diabetes mellitus without mention of complication, not stated as uncontrolled 2002      am    Unspecified essential hypertension        Past Surgical History:   Procedure Laterality Date    BREAST BIOPSY Left     benign - about 3 yrs ago    CORONARY ANGIOPLASTY  2002    CORONARY STENT PLACEMENT  2002    DILATION AND CURETTAGE OF UTERUS      FOREIGN BODY REMOVAL      INNER EAR SURGERY      TONSILLECTOMY, ADENOIDECTOMY         Review of patient's allergies indicates:   Allergen Reactions    Codeine      Other reaction(s): Nausea    Codeine sulfate      Unknown^    Diphenhydramine hcl      Unknown^  Other reaction(s): Rash    Penicillins      Unknown^  Other reaction(s): Hives    Sulfa (sulfonamide antibiotics) Hives and Nausea And Vomiting       No current facility-administered medications on file prior to encounter.      Current Outpatient Prescriptions on File Prior to Encounter   Medication Sig    aspirin (ECOTRIN) 81 MG EC tablet Take 1 tablet (81 mg total) by mouth once daily.    blood sugar diagnostic Strp 1 strip by Misc.(Non-Drug; Combo Route) route 3 (three) times daily. Accuchek Felicia Plus Test Strips    carbidopa-levodopa  mg (SINEMET)  mg per tablet Take 1 tab 5 times daily.    insulin NPH-insulin regular, 70/30, (NOVOLIN 70/30) 100 unit/mL (70-30) injection Inject into the skin 2 (two) times daily.    insulin syringe-needle U-100 0.5 mL 31 gauge x 5/16 Syrg INJECT 1 SYRINGE INTO THE SKIN 3 TIMES DAILY    isosorbide  mononitrate (IMDUR) 120 MG 24 hr tablet TAKE ONE TABLET BY MOUTH ONCE DAILY    latanoprost 0.005 % ophthalmic solution Place 1 drop into both eyes every evening.    levothyroxine (SYNTHROID) 100 MCG tablet TAKE ONE TABLET BY MOUTH ONCE DAILY BEFORE BREAKFAST    metFORMIN (GLUCOPHAGE) 500 MG tablet TAKE ONE TABLET BY MOUTH THREE TIMES DAILY    metoprolol succinate (TOPROL-XL) 25 MG 24 hr tablet Take 1 tablet (25 mg total) by mouth once daily.    multivit-min-FA-lycopen-lutein (CENTRUM SILVER) 0.4-300-250 mg-mcg-mcg Tab Take 1 tablet by mouth once daily.    potassium chloride SA (K-DUR,KLOR-CON) 20 MEQ tablet Take 1 tablet (20 mEq total) by mouth once daily.    pramipexole (MIRAPEX) 1 MG tablet Take 1 tablet (1 mg total) by mouth 3 (three) times daily.    pravastatin (PRAVACHOL) 40 MG tablet TAKE ONE TABLET BY MOUTH ONCE DAILY    ACCU-CHEK SOFTCLIX LANCETS Misc TEST THREE TIMES DAILY    acetaminophen (TYLENOL) 325 MG tablet Take 2 tablets (650 mg total) by mouth every 6 (six) hours as needed.    blood-glucose meter kit Accu-chek Felicia Plus Meter    carbidopa-levodopa  mg (SINEMET)  mg per tablet TAKE 1 TABLET BY MOUTH 5 TIMES DAILY     Family History     Problem Relation (Age of Onset)    Breast cancer Paternal Grandmother    Diabetes Maternal Grandmother    Glaucoma Mother, Maternal Grandmother    Heart attack Father (65), Mother (65)    Hypertension Father, Mother, Maternal Grandmother    Ovarian cancer Mother    Parkinsonism Other, Paternal Aunt    Tremor Father        Social History Main Topics    Smoking status: Former Smoker     Years: 40.00     Quit date: 10/12/2010    Smokeless tobacco: Never Used      Comment: On & off for the past few years    Alcohol use No    Drug use: No    Sexual activity: Not Currently     Review of Systems   Constitution: Negative.   HENT: Negative.    Eyes: Negative.    Cardiovascular: Positive for chest pain.   Respiratory: Negative.    Endocrine:  Negative.    Hematologic/Lymphatic: Negative.    Skin: Negative.    Musculoskeletal: Negative.    Gastrointestinal: Positive for nausea.   Genitourinary: Negative.    Neurological: Negative.    Psychiatric/Behavioral: Negative.    Allergic/Immunologic: Negative.      Objective:     Vital Signs (Most Recent):  Temp: 98.8 °F (37.1 °C) (02/22/18 0704)  Pulse: 87 (02/22/18 0900)  Resp: 17 (02/22/18 0704)  BP: 131/76 (02/22/18 0704)  SpO2: (!) 94 % (02/22/18 0704) Vital Signs (24h Range):  Temp:  [97.8 °F (36.6 °C)-98.8 °F (37.1 °C)] 98.8 °F (37.1 °C)  Pulse:  [70-92] 87  Resp:  [15-29] 17  SpO2:  [91 %-98 %] 94 %  BP: (114-168)/(55-78) 131/76     Weight: 107.5 kg (236 lb 15.9 oz)  Body mass index is 38.25 kg/m².    SpO2: (!) 94 %  O2 Device (Oxygen Therapy): room air      Intake/Output Summary (Last 24 hours) at 02/22/18 1206  Last data filed at 02/22/18 0600   Gross per 24 hour   Intake                0 ml   Output              875 ml   Net             -875 ml       Lines/Drains/Airways     Drain                 Urethral Catheter 02/21/18 1440 Double-lumen less than 1 day          Peripheral Intravenous Line                 Peripheral IV - Single Lumen 02/21/18 1020 Right Forearm 1 day                Physical Exam   Constitutional: She is oriented to person, place, and time. She appears well-developed and well-nourished. No distress.   HENT:   Head: Normocephalic and atraumatic.   Eyes: Pupils are equal, round, and reactive to light. Right eye exhibits no discharge. Left eye exhibits no discharge.   Neck: Neck supple. No JVD present.   Cardiovascular: Normal rate, regular rhythm, S1 normal and S2 normal.  Exam reveals no gallop, no S3, no S4 and no friction rub.    Murmur heard.   Harsh midsystolic murmur is present  at the upper right sternal border radiating to the neck  Pulmonary/Chest: Effort normal and breath sounds normal. No respiratory distress. She has no wheezes. She has no rales.   Abdominal: Soft. She  exhibits no distension. There is no tenderness. There is no rebound.   Obese   Musculoskeletal: She exhibits no edema.   Neurological: She is alert and oriented to person, place, and time.   Skin: Skin is warm and dry. She is not diaphoretic. No erythema.   Psychiatric: She has a normal mood and affect. Her behavior is normal. Thought content normal.   Nursing note and vitals reviewed.      Significant Labs:   CMP   Recent Labs  Lab 02/21/18  1019 02/22/18  0440    140   K 4.4 4.1    106   CO2 23 24   * 124*   BUN 17 19   CREATININE 0.9 0.9   CALCIUM 10.2 9.3   PROT 6.9 6.4   ALBUMIN 3.9 3.6   BILITOT 0.5 0.5   ALKPHOS 47* 43*   AST 33 32   ALT 37 10   ANIONGAP 11 10   ESTGFRAFRICA >60 >60   EGFRNONAA >60 >60   , CBC   Recent Labs  Lab 02/21/18  1019 02/22/18  0440   WBC 9.30 9.65   HGB 13.9 13.6   HCT 41.8 41.9    213   , Troponin   Recent Labs  Lab 02/21/18  1019 02/21/18  1641 02/21/18  2321   TROPONINI 0.023 0.028* 0.011    and All pertinent lab results from the last 24 hours have been reviewed.    Significant Imaging: Echocardiogram:   2D echo with color flow doppler:   Results for orders placed or performed during the hospital encounter of 02/21/18   2D echo with color flow doppler   Result Value Ref Range    EF 55 55 - 65    Diastolic Dysfunction No     Aortic Valve Stenosis MILD (A)    , EKG: Reviewed and X-Ray: CXR: X-Ray Chest 1 View (CXR): No results found for this visit on 02/21/18. and X-Ray Chest PA and Lateral (CXR): No results found for this visit on 02/21/18.

## 2018-02-22 NOTE — DISCHARGE SUMMARY
[facility] Hospital Medicine  Discharge Summary      Patient Name: Ross Isbell  MRN: [id number]  Admission Date: 2/21/2018  Hospital Length of Stay: 0 days  Discharge Date and Time:  02/22/2018 5:29 PM  Attending Physician: Teddy Bryant MD   Discharging Provider: Tanika Mckeon NP  Primary Care Provider: Anna Penn MD      HPI:   Ross Isbell is a 72 y.o. female patient with a hx of CAD and MI x 3 with stents x 6 (last placed in 11/2015), Parkinson dx, DM, HTN, and hypopthyroidism who presents to the Emergency Department for sub-sternal CP.  Pt reports pain which radiated across her chest to left shoulder and down her left arm which onset gradually about 2 hours ago. Pt describes the pain as a tightness and pressure sensation. No mitigating or exacerbating factors reported. Associated sxs include: L arm numbness, nausea, and leg swelling. Patient denies any SOB, diaphoresis, palpitations, extremity weakness, leg pain, dizziness, cough, vomiting, and all other sxs at this time. No further complaints or concerns at this time. Pt states that she is wheelchair bound at home.     * No surgery found *      Hospital Course:   On 2/21 patient was admitted to [facility] secondary to Chest pain.  She has a h/o CAD s/p multiple stent placements.  She has denied CP and SOB today.  CXR negative.  Reports intermittent nausea but no vomiting or diaphoresis.  Troponin peaked at 0.028.  Cardiology consulted given history.  She is s/p stress test today that was free of evidence for myocardial ischemia or injury.  ECHO showed no evidence of pericardial effusion, intracavity mass, thrombi, or vegetation, EF 55-60%.  Metoprolol increased per cards, and Ranexa added.  Case d/w cardiology, ok to DC home today with outpatient f/u.  Case d/w Dr. Bryant.  Patient seen and examined and deemed medically stable to DC home today after she voids s/p Carlson removal.  Patient was instructed to f/u with her PCP  within 3 days, and with cardiology within 1 week to monitor medication changes and verbalized + understanding.  Medications reconciled for DC home.     Consults:   Consults         Status Ordering Provider     Inpatient consult to Cardiology  Once     Provider:  Enrrique Huffman MD    Completed BINA BOSWELL          No new Assessment & Plan notes have been filed under this hospital service since the last note was generated.  Service: Hospital Medicine    Final Active Diagnoses:    Diagnosis Date Noted POA    PRINCIPAL PROBLEM:  CAD: S/P coronary artery stent placement [Z95.5] 10/06/2015 Not Applicable     Chronic    Hyperlipidemia LDL goal <70 [E78.5] 10/06/2015 Yes    Morbid obesity with BMI of 40.0-44.9, adult [E66.01, Z68.41] 06/17/2015 Not Applicable     Chronic    Type 2 diabetes, controlled, with peripheral neuropathy [E11.42] 05/29/2014 Yes     Chronic    Hypothyroidism [E03.9] 11/19/2013 Yes     Chronic    Essential hypertension [I10]  Yes     Chronic    PD (Parkinson's disease) [G20]  Yes     Chronic      Problems Resolved During this Admission:    Diagnosis Date Noted Date Resolved POA    Chest pain [R07.9] 12/23/2015 02/22/2018 Yes       Discharged Condition: stable    Disposition: Home or Self Care    Follow Up:  Follow-up Information     Anna Penn MD In 3 days.    Specialty:  Family Medicine  Contact information:  [address] [address] [address] [phone]             Enrrique Huffman MD In 1 week.    Specialties:  Cardiology, INTERVENTIONAL CARDIOLOGY  Why:  F/U with cardiology for medication management  Contact information:  [address] [facility] [address] [phone]                 Patient Instructions:     Diet diabetic     Activity as tolerated     Notify your health care provider if you experience any of the following:  persistent nausea and vomiting or diarrhea     Notify your health care provider if you experience any of the following:  severe uncontrolled pain      Notify your health care provider if you experience any of the following:  difficulty breathing or increased cough     Notify your health care provider if you experience any of the following:  persistent dizziness, light-headedness, or visual disturbances     Notify your health care provider if you experience any of the following:  increased confusion or weakness         Significant Diagnostic Studies: Labs:   BMP:   Recent Labs  Lab 02/21/18  1019 02/22/18  0440   * 124*    140   K 4.4 4.1    106   CO2 23 24   BUN 17 19   CREATININE 0.9 0.9   CALCIUM 10.2 9.3   , CBC   Recent Labs  Lab 02/21/18  1019 02/22/18  0440   WBC 9.30 9.65   HGB 13.9 13.6   HCT 41.8 41.9    213   , Lipid Panel   Lab Results   Component Value Date    CHOL 123 11/22/2017    HDL 40 11/22/2017    LDLCALC 61.2 (L) 11/22/2017    TRIG 109 11/22/2017    CHOLHDL 32.5 11/22/2017   , Troponin   Recent Labs  Lab 02/21/18  2321   TROPONINI 0.011    and A1C:   Recent Labs  Lab 11/22/17  0816 02/21/18  1652   HGBA1C 7.3* 6.9*       Pending Diagnostic Studies:     Procedure Component Value Units Date/Time    NM Myocardial Perfusion Spect Multi Pharmacologic [[id number]] Resulted:  02/22/18 0945    Order Status:  Sent Lab Status:  In process Updated:  02/22/18 1408         Imaging Results          X-Ray Chest AP Portable (Final result)  Result time 02/21/18 10:14:12    Final result by MARIA VICTORIA Liang Sr., MD (02/21/18 10:14:12)                 Impression:        1. The lungs are clear.   2. The size of the heart is prominent. This may be secondary to magnification.  3. There are healed fractures on both sides of the thoracic cage.      Electronically signed by: MARIA VICTORIA LIANG MD  Date:     02/21/18  Time:    10:14              Narrative:    One-view chest x-ray    Clinical History: Chest pain    Finding: Comparison was made to prior examination performed on 12/22/2015. The size of the heart is prominent. The lungs are clear. There  is no pneumothorax.  The costophrenic angles are sharp. There are healed fractures on both sides of the thoracic cage.                              Medications:  Reconciled Home Medications:   Current Discharge Medication List      START taking these medications    Details   ondansetron (ZOFRAN) 4 MG tablet Take 1 tablet (4 mg total) by mouth every 8 (eight) hours as needed for Nausea.  Qty: 20 tablet, Refills: 0      ranolazine (RANEXA) 500 MG Tb12 Take 1 tablet (500 mg total) by mouth 2 (two) times daily.  Qty: 60 tablet, Refills: 1         CONTINUE these medications which have CHANGED    Details   metoprolol succinate (TOPROL-XL) 50 MG 24 hr tablet Take 1 tablet (50 mg total) by mouth once daily.  Qty: 30 tablet, Refills: 1         CONTINUE these medications which have NOT CHANGED    Details   aspirin (ECOTRIN) 81 MG EC tablet Take 1 tablet (81 mg total) by mouth once daily.  Qty: 30 tablet, Refills: 0    Associated Diagnoses: Coronary artery disease involving native coronary artery of native heart without angina pectoris      blood sugar diagnostic Strp 1 strip by Misc.(Non-Drug; Combo Route) route 3 (three) times daily. Accuchek Felicia Plus Test Strips  Qty: 300 strip, Refills: 3      insulin NPH-insulin regular, 70/30, (NOVOLIN 70/30) 100 unit/mL (70-30) injection Inject into the skin 2 (two) times daily.      insulin syringe-needle U-100 0.5 mL 31 gauge x 5/16 Syrg INJECT 1 SYRINGE INTO THE SKIN 3 TIMES DAILY  Qty: 100 each, Refills: 11      isosorbide mononitrate (IMDUR) 120 MG 24 hr tablet TAKE ONE TABLET BY MOUTH ONCE DAILY  Qty: 90 tablet, Refills: 3      latanoprost 0.005 % ophthalmic solution Place 1 drop into both eyes every evening.  Qty: 1 Bottle, Refills: 6      levothyroxine (SYNTHROID) 100 MCG tablet TAKE ONE TABLET BY MOUTH ONCE DAILY BEFORE BREAKFAST  Qty: 90 tablet, Refills: 3      metFORMIN (GLUCOPHAGE) 500 MG tablet TAKE ONE TABLET BY MOUTH THREE TIMES DAILY  Qty: 270 tablet, Refills: 3       multivit-min-FA-lycopen-lutein (CENTRUM SILVER) 0.4-300-250 mg-mcg-mcg Tab Take 1 tablet by mouth once daily.      potassium chloride SA (K-DUR,KLOR-CON) 20 MEQ tablet Take 1 tablet (20 mEq total) by mouth once daily.  Qty: 90 tablet, Refills: 3      pramipexole (MIRAPEX) 1 MG tablet Take 1 tablet (1 mg total) by mouth 3 (three) times daily.  Qty: 270 tablet, Refills: 3      pravastatin (PRAVACHOL) 40 MG tablet TAKE ONE TABLET BY MOUTH ONCE DAILY  Qty: 90 tablet, Refills: 3    Associated Diagnoses: Coronary artery disease involving native coronary artery without angina pectoris, unspecified whether native or transplanted heart      ACCU-CHEK SOFTCLIX LANCETS Misc TEST THREE TIMES DAILY  Qty: 300 each, Refills: 3      acetaminophen (TYLENOL) 325 MG tablet Take 2 tablets (650 mg total) by mouth every 6 (six) hours as needed.  Refills: 0      blood-glucose meter kit Accu-chek Felicia Plus Meter  Qty: 1 each, Refills: 0      carbidopa-levodopa  mg (SINEMET)  mg per tablet TAKE 1 TABLET BY MOUTH 5 TIMES DAILY  Qty: 150 tablet, Refills: 11    Comments: Please consider 90 day supplies to promote better adherence         STOP taking these medications       furosemide (LASIX) 20 MG tablet Comments:   Reason for Stopping:         triamcinolone acetonide 0.1% (KENALOG) 0.1 % cream Comments:   Reason for Stopping:               Indwelling Lines/Drains at time of discharge:   Lines/Drains/Airways     Drain                 Urethral Catheter 02/21/18 1440 Double-lumen 1 day                Time spent on the discharge of patient: 40 minutes  Patient was seen and examined on the date of discharge and determined to be suitable for discharge.         Tanika Mckeon, URBAN, ACNP-BC  Department of Hospital Medicine  [facility]

## 2018-02-22 NOTE — CONSULTS
[facility]  Cardiology  Consult Note    Patient Name: Ross Isbell  MRN: [id number]  Admission Date: 2/21/2018  Hospital Length of Stay: 0 days  Code Status: Prior   Attending Provider: Teddy Bryant MD   Consulting Provider: Kimberly Gerber PA-C  Primary Care Physician: Anna Penn MD  Principal Problem:<principal problem not specified>    Patient information was obtained from patient, past medical records and ER records.     Inpatient consult to Cardiology  Consult performed by: KIMBERLY GERBER  Consult ordered by: BINA BOSWELL        Subjective:     Chief Complaint:  Chest pain    HPI:   Ms. Everett is a 72 year old female patient with a PMHx of CAD s/p multivessel PCI in 2015 (proximal, mid, distal LAD, and D1), HTN, DM, hyperlipidemia, obesity, Parkinson's disease, and AS who presented to [facility] ED yesterday with a chief complaint of substernal chest pressure/tightness that began PTA. Per patient, the pain radiated down her left arm and upward to her shoulder. Associated symptoms included nausea. Patient denied any associated SOB, vomiting, or diaphoresis. Reported the pain persisted until she received nitro while in the ED. Initial workup negative and patient subsequently admitted for further evaluation and treatment. Cardiology consulted to assist with management. Patient seen and examined today, resting in bed. States she feels well. Complains of nausea. No recurrence of chest pain. No SOB. She reports compliance with her medications. Chart reviewed. Troponin 0.021>0.028>0.011. 2D echo pending. EKG reviewed, no acute changes from prior. Stress test pending.     Past Medical History:   Diagnosis Date    Abnormal nuclear stress test 11/1/2015    Background diabetic retinopathy 12/28/2015    CAD (coronary artery disease) 2002    stents    Cataract     Gait disorder 10/12/2012    Glaucoma     MI (myocardial infarction)     Obesity     CORINE (obstructive sleep apnea)      Other and unspecified hyperlipidemia     PD (Parkinson's disease) 2002    tremor-predominant (per patient)    Type II or unspecified type diabetes mellitus without mention of complication, not stated as uncontrolled 2002      am    Unspecified essential hypertension        Past Surgical History:   Procedure Laterality Date    BREAST BIOPSY Left     benign - about 3 yrs ago    CORONARY ANGIOPLASTY  2002    CORONARY STENT PLACEMENT  2002    DILATION AND CURETTAGE OF UTERUS      FOREIGN BODY REMOVAL      INNER EAR SURGERY      TONSILLECTOMY, ADENOIDECTOMY         Review of patient's allergies indicates:   Allergen Reactions    Codeine      Other reaction(s): Nausea    Codeine sulfate      Unknown^    Diphenhydramine hcl      Unknown^  Other reaction(s): Rash    Penicillins      Unknown^  Other reaction(s): Hives    Sulfa (sulfonamide antibiotics) Hives and Nausea And Vomiting       No current facility-administered medications on file prior to encounter.      Current Outpatient Prescriptions on File Prior to Encounter   Medication Sig    aspirin (ECOTRIN) 81 MG EC tablet Take 1 tablet (81 mg total) by mouth once daily.    blood sugar diagnostic Strp 1 strip by Misc.(Non-Drug; Combo Route) route 3 (three) times daily. Accuchek Felicia Plus Test Strips    carbidopa-levodopa  mg (SINEMET)  mg per tablet Take 1 tab 5 times daily.    insulin NPH-insulin regular, 70/30, (NOVOLIN 70/30) 100 unit/mL (70-30) injection Inject into the skin 2 (two) times daily.    insulin syringe-needle U-100 0.5 mL 31 gauge x 5/16 Syrg INJECT 1 SYRINGE INTO THE SKIN 3 TIMES DAILY    isosorbide mononitrate (IMDUR) 120 MG 24 hr tablet TAKE ONE TABLET BY MOUTH ONCE DAILY    latanoprost 0.005 % ophthalmic solution Place 1 drop into both eyes every evening.    levothyroxine (SYNTHROID) 100 MCG tablet TAKE ONE TABLET BY MOUTH ONCE DAILY BEFORE BREAKFAST    metFORMIN (GLUCOPHAGE) 500 MG tablet TAKE ONE TABLET BY  MOUTH THREE TIMES DAILY    metoprolol succinate (TOPROL-XL) 25 MG 24 hr tablet Take 1 tablet (25 mg total) by mouth once daily.    multivit-min-FA-lycopen-lutein (CENTRUM SILVER) 0.4-300-250 mg-mcg-mcg Tab Take 1 tablet by mouth once daily.    potassium chloride SA (K-DUR,KLOR-CON) 20 MEQ tablet Take 1 tablet (20 mEq total) by mouth once daily.    pramipexole (MIRAPEX) 1 MG tablet Take 1 tablet (1 mg total) by mouth 3 (three) times daily.    pravastatin (PRAVACHOL) 40 MG tablet TAKE ONE TABLET BY MOUTH ONCE DAILY    ACCU-CHEK SOFTCLIX LANCETS Misc TEST THREE TIMES DAILY    acetaminophen (TYLENOL) 325 MG tablet Take 2 tablets (650 mg total) by mouth every 6 (six) hours as needed.    blood-glucose meter kit Accu-chek Felicia Plus Meter    carbidopa-levodopa  mg (SINEMET)  mg per tablet TAKE 1 TABLET BY MOUTH 5 TIMES DAILY     Family History     Problem Relation (Age of Onset)    Breast cancer Paternal Grandmother    Diabetes Maternal Grandmother    Glaucoma Mother, Maternal Grandmother    Heart attack Father (65), Mother (65)    Hypertension Father, Mother, Maternal Grandmother    Ovarian cancer Mother    Parkinsonism Other, Paternal Aunt    Tremor Father        Social History Main Topics    Smoking status: Former Smoker     Years: 40.00     Quit date: 10/12/2010    Smokeless tobacco: Never Used      Comment: On & off for the past few years    Alcohol use No    Drug use: No    Sexual activity: Not Currently     Review of Systems   Constitution: Negative.   HENT: Negative.    Eyes: Negative.    Cardiovascular: Positive for chest pain.   Respiratory: Negative.    Endocrine: Negative.    Hematologic/Lymphatic: Negative.    Skin: Negative.    Musculoskeletal: Negative.    Gastrointestinal: Positive for nausea.   Genitourinary: Negative.    Neurological: Negative.    Psychiatric/Behavioral: Negative.    Allergic/Immunologic: Negative.      Objective:     Vital Signs (Most Recent):  Temp: 98.8 °F  (37.1 °C) (02/22/18 0704)  Pulse: 87 (02/22/18 0900)  Resp: 17 (02/22/18 0704)  BP: 131/76 (02/22/18 0704)  SpO2: (!) 94 % (02/22/18 0704) Vital Signs (24h Range):  Temp:  [97.8 °F (36.6 °C)-98.8 °F (37.1 °C)] 98.8 °F (37.1 °C)  Pulse:  [70-92] 87  Resp:  [15-29] 17  SpO2:  [91 %-98 %] 94 %  BP: (114-168)/(55-78) 131/76     Weight: 107.5 kg (236 lb 15.9 oz)  Body mass index is 38.25 kg/m².    SpO2: (!) 94 %  O2 Device (Oxygen Therapy): room air      Intake/Output Summary (Last 24 hours) at 02/22/18 1206  Last data filed at 02/22/18 0600   Gross per 24 hour   Intake                0 ml   Output              875 ml   Net             -875 ml       Lines/Drains/Airways     Drain                 Urethral Catheter 02/21/18 1440 Double-lumen less than 1 day          Peripheral Intravenous Line                 Peripheral IV - Single Lumen 02/21/18 1020 Right Forearm 1 day                Physical Exam   Constitutional: She is oriented to person, place, and time. She appears well-developed and well-nourished. No distress.   HENT:   Head: Normocephalic and atraumatic.   Eyes: Pupils are equal, round, and reactive to light. Right eye exhibits no discharge. Left eye exhibits no discharge.   Neck: Neck supple. No JVD present.   Cardiovascular: Normal rate, regular rhythm, S1 normal and S2 normal.  Exam reveals no gallop, no S3, no S4 and no friction rub.    Murmur heard.   Harsh midsystolic murmur is present  at the upper right sternal border radiating to the neck  Pulmonary/Chest: Effort normal and breath sounds normal. No respiratory distress. She has no wheezes. She has no rales.   Abdominal: Soft. She exhibits no distension. There is no tenderness. There is no rebound.   Obese   Musculoskeletal: She exhibits no edema.   Neurological: She is alert and oriented to person, place, and time.   Skin: Skin is warm and dry. She is not diaphoretic. No erythema.   Psychiatric: She has a normal mood and affect. Her behavior is normal.  Thought content normal.   Nursing note and vitals reviewed.      Significant Labs:   CMP   Recent Labs  Lab 02/21/18  1019 02/22/18  0440    140   K 4.4 4.1    106   CO2 23 24   * 124*   BUN 17 19   CREATININE 0.9 0.9   CALCIUM 10.2 9.3   PROT 6.9 6.4   ALBUMIN 3.9 3.6   BILITOT 0.5 0.5   ALKPHOS 47* 43*   AST 33 32   ALT 37 10   ANIONGAP 11 10   ESTGFRAFRICA >60 >60   EGFRNONAA >60 >60   , CBC   Recent Labs  Lab 02/21/18  1019 02/22/18  0440   WBC 9.30 9.65   HGB 13.9 13.6   HCT 41.8 41.9    213   , Troponin   Recent Labs  Lab 02/21/18  1019 02/21/18  1641 02/21/18  2321   TROPONINI 0.023 0.028* 0.011    and All pertinent lab results from the last 24 hours have been reviewed.    Significant Imaging: Echocardiogram:   2D echo with color flow doppler:   Results for orders placed or performed during the hospital encounter of 02/21/18   2D echo with color flow doppler   Result Value Ref Range    EF 55 55 - 65    Diastolic Dysfunction No     Aortic Valve Stenosis MILD (A)    , EKG: Reviewed and X-Ray: CXR: X-Ray Chest 1 View (CXR): No results found for this visit on 02/21/18. and X-Ray Chest PA and Lateral (CXR): No results found for this visit on 02/21/18.    Assessment and Plan:   Patient with CAD history who presents with chest pain concerning for angina. Troponin essentially normal and EKG without acute ischemic changes. Will optimize regimen and check MPI stress test and 2D echo for further evaluation.     Chest pain    -Patient with CAD s/p stents who presented with prolonged episode of chest pain, relieved with nitro  -Concerning for angina  -Serial troponin essentially normal (one bumped to 0.028)  -EKG without acute ischemic changes  -2D echo pending  -Will increase Metoprolol to 50 mg daily  -Add Ranexa 500 mg BID  -Stress test for further evaluation        Hyperlipidemia LDL goal <70    -Continue statin        CAD: S/P coronary artery stent placement    -See plan under chest pain         Essential hypertension    -BP controlled  -See plan under chest pain for med rec's            VTE Risk Mitigation         Ordered     enoxaparin injection 40 mg  Daily     Route:  Subcutaneous        02/21/18 0082          Thank you for your consult. I will follow-up with patient. Please contact us if you have any additional questions.    Kimberly Vargas PA-C  Cardiology   [facility]    Chart reviewed. Dr. Huffman examined patient and agrees with plan as outlined above.

## 2018-02-22 NOTE — NURSING
Patient assessed for diabetes educational needs following chart review  She reports was diagnosed in 2002 and has attended diabetes education class in the past  She has a home glucose monitor and checks 3 times daily   She is out of test strips, but has info to call and reorder and will do so  She is consistent with administering her insulin   She uses the vial/syringe   Reports correct insulin storage and site selection/rotation  She verbalizes understanding of info to include target glucose values/hyper/hypoglycemia/portion control eating

## 2018-02-22 NOTE — NURSING
Pt. arrived to room via bed; Tele monitor initiated. Oriented to room x4. BG monitored/ POC reviewed with pt. Turn q2H/cardiology consult. Will continue to monitor.

## 2018-02-23 ENCOUNTER — TELEPHONE (OUTPATIENT)
Dept: CARDIOLOGY | Facility: HOSPITAL | Age: 73
End: 2018-02-23

## 2018-02-23 NOTE — PROGRESS NOTES
Discharge instructions explained. PT verbalized understanding. IV removed. Tele monitor removed. No signs of acute distress noted. PT transported via wheelchair to front entrance. Pt voided 100 ml.  Assisted to car with family member.

## 2018-02-26 ENCOUNTER — OFFICE VISIT (OUTPATIENT)
Dept: OPHTHALMOLOGY | Facility: CLINIC | Age: 73
End: 2018-02-26
Payer: MEDICARE

## 2018-02-26 DIAGNOSIS — Z79.4 TYPE 2 DIABETES MELLITUS WITHOUT COMPLICATION, WITH LONG-TERM CURRENT USE OF INSULIN: ICD-10-CM

## 2018-02-26 DIAGNOSIS — E11.9 TYPE 2 DIABETES MELLITUS WITHOUT COMPLICATION, WITH LONG-TERM CURRENT USE OF INSULIN: ICD-10-CM

## 2018-02-26 DIAGNOSIS — H40.003 GLAUCOMA SUSPECT, BILATERAL: Primary | ICD-10-CM

## 2018-02-26 PROCEDURE — 92012 INTRM OPH EXAM EST PATIENT: CPT | Mod: S$GLB,,, | Performed by: OPHTHALMOLOGY

## 2018-02-26 PROCEDURE — 99999 PR PBB SHADOW E&M-EST. PATIENT-LVL I: CPT | Mod: PBBFAC,,, | Performed by: OPHTHALMOLOGY

## 2018-02-26 NOTE — PROGRESS NOTES
HPI     Glaucoma suspect here for IOP check with tonopen.    DM X 13 YEARS  GLAUCOMA SUSPECT  Parkinson's     Latanoprost QAM OU    Last edited by Shakira Medina on 2/26/2018  8:50 AM. (History)            Assessment /Plan     For exam results, see Encounter Report.      ICD-10-CM ICD-9-CM    1. Glaucoma suspect, bilateral H40.003 365.00 Iop stable today. Follow    2. Type 2 diabetes mellitus without complication, with long-term current use of insulin E11.9 250.00 Follow     Z79.4 V58.67        RETURN TO CLINIC September DOA   Please use your drops as follows:    Latanoprost once a day in both eyes(s)    Use this medication at the time of day that is easiest for you to remember. Please wipe the excess of this medication off the eyelid skin after instillation and place pressure on the lids near your nose for 1-2 minutes after using it.

## 2018-03-19 RX ORDER — HUMAN INSULIN 100 [USP'U]/ML
INJECTION, SUSPENSION SUBCUTANEOUS
Qty: 10 ML | Refills: 3 | Status: SHIPPED | OUTPATIENT
Start: 2018-03-19 | End: 2018-03-28 | Stop reason: SDUPTHER

## 2018-03-20 NOTE — TELEPHONE ENCOUNTER
----- Message from Paris Piper sent at 3/20/2018 11:46 AM CDT -----  Contact: pt  Pt calling about her prescription for insulin, she can be reached [phone] Thanks

## 2018-03-21 ENCOUNTER — LAB VISIT (OUTPATIENT)
Dept: LAB | Facility: HOSPITAL | Age: 73
End: 2018-03-21
Attending: FAMILY MEDICINE
Payer: MEDICARE

## 2018-03-21 DIAGNOSIS — E11.42 TYPE 2 DIABETES, CONTROLLED, WITH PERIPHERAL NEUROPATHY: Chronic | ICD-10-CM

## 2018-03-21 LAB
ALBUMIN SERPL BCP-MCNC: 4.3 G/DL
ALP SERPL-CCNC: 52 U/L
ALT SERPL W/O P-5'-P-CCNC: 16 U/L
ANION GAP SERPL CALC-SCNC: 13 MMOL/L
AST SERPL-CCNC: 22 U/L
BILIRUB SERPL-MCNC: 0.6 MG/DL
BUN SERPL-MCNC: 24 MG/DL
CALCIUM SERPL-MCNC: 10.4 MG/DL
CHLORIDE SERPL-SCNC: 98 MMOL/L
CHOLEST SERPL-MCNC: 134 MG/DL
CHOLEST/HDLC SERPL: 3.5 {RATIO}
CO2 SERPL-SCNC: 25 MMOL/L
CREAT SERPL-MCNC: 1.1 MG/DL
EST. GFR  (AFRICAN AMERICAN): 58 ML/MIN/1.73 M^2
EST. GFR  (NON AFRICAN AMERICAN): 50.3 ML/MIN/1.73 M^2
ESTIMATED AVG GLUCOSE: 189 MG/DL
GLUCOSE SERPL-MCNC: 309 MG/DL
HBA1C MFR BLD HPLC: 8.2 %
HDLC SERPL-MCNC: 38 MG/DL
HDLC SERPL: 28.4 %
LDLC SERPL CALC-MCNC: 46 MG/DL
NONHDLC SERPL-MCNC: 96 MG/DL
POTASSIUM SERPL-SCNC: 4.4 MMOL/L
PROT SERPL-MCNC: 7.4 G/DL
SODIUM SERPL-SCNC: 136 MMOL/L
TRIGL SERPL-MCNC: 250 MG/DL

## 2018-03-21 PROCEDURE — 80053 COMPREHEN METABOLIC PANEL: CPT

## 2018-03-21 PROCEDURE — 80061 LIPID PANEL: CPT

## 2018-03-21 PROCEDURE — 83036 HEMOGLOBIN GLYCOSYLATED A1C: CPT

## 2018-03-21 PROCEDURE — 36415 COLL VENOUS BLD VENIPUNCTURE: CPT | Mod: PO

## 2018-03-22 ENCOUNTER — PATIENT OUTREACH (OUTPATIENT)
Dept: ADMINISTRATIVE | Facility: HOSPITAL | Age: 73
End: 2018-03-22

## 2018-03-23 ENCOUNTER — OFFICE VISIT (OUTPATIENT)
Dept: CARDIOLOGY | Facility: CLINIC | Age: 73
End: 2018-03-23
Payer: MEDICARE

## 2018-03-23 VITALS
SYSTOLIC BLOOD PRESSURE: 112 MMHG | WEIGHT: 236 LBS | HEIGHT: 66 IN | BODY MASS INDEX: 37.93 KG/M2 | DIASTOLIC BLOOD PRESSURE: 64 MMHG | HEART RATE: 76 BPM

## 2018-03-23 DIAGNOSIS — I10 ESSENTIAL HYPERTENSION: Chronic | ICD-10-CM

## 2018-03-23 DIAGNOSIS — I35.0 NONRHEUMATIC AORTIC VALVE STENOSIS: ICD-10-CM

## 2018-03-23 DIAGNOSIS — Z95.5 S/P CORONARY ARTERY STENT PLACEMENT: Primary | Chronic | ICD-10-CM

## 2018-03-23 DIAGNOSIS — E66.01 MORBID OBESITY WITH BMI OF 40.0-44.9, ADULT: Chronic | ICD-10-CM

## 2018-03-23 DIAGNOSIS — I25.10 CORONARY ARTERY DISEASE INVOLVING NATIVE CORONARY ARTERY OF NATIVE HEART WITHOUT ANGINA PECTORIS: Chronic | ICD-10-CM

## 2018-03-23 DIAGNOSIS — I20.9 AP (ANGINA PECTORIS): ICD-10-CM

## 2018-03-23 DIAGNOSIS — E11.42 TYPE 2 DIABETES, CONTROLLED, WITH PERIPHERAL NEUROPATHY: Chronic | ICD-10-CM

## 2018-03-23 DIAGNOSIS — E78.5 HYPERLIPIDEMIA LDL GOAL <70: ICD-10-CM

## 2018-03-23 PROCEDURE — 3074F SYST BP LT 130 MM HG: CPT | Mod: CPTII,S$GLB,, | Performed by: INTERNAL MEDICINE

## 2018-03-23 PROCEDURE — 3078F DIAST BP <80 MM HG: CPT | Mod: CPTII,S$GLB,, | Performed by: INTERNAL MEDICINE

## 2018-03-23 PROCEDURE — 99214 OFFICE O/P EST MOD 30 MIN: CPT | Mod: S$GLB,,, | Performed by: INTERNAL MEDICINE

## 2018-03-23 PROCEDURE — 99499 UNLISTED E&M SERVICE: CPT | Mod: S$GLB,,, | Performed by: INTERNAL MEDICINE

## 2018-03-23 PROCEDURE — 99999 PR PBB SHADOW E&M-EST. PATIENT-LVL II: CPT | Mod: PBBFAC,,, | Performed by: INTERNAL MEDICINE

## 2018-03-23 PROCEDURE — 3045F PR MOST RECENT HEMOGLOBIN A1C LEVEL 7.0-9.0%: CPT | Mod: CPTII,S$GLB,, | Performed by: INTERNAL MEDICINE

## 2018-03-23 RX ORDER — RANOLAZINE 500 MG/1
500 TABLET, EXTENDED RELEASE ORAL 2 TIMES DAILY
Qty: 60 TABLET | Refills: 12 | Status: SHIPPED | OUTPATIENT
Start: 2018-03-23 | End: 2018-07-05

## 2018-03-23 NOTE — PROGRESS NOTES
Subjective:    Patient ID:  Ross Isbell is a 72 y.o. female who presents for evaluation of Hypertension; Hyperlipidemia; Coronary Artery Disease; Risk Factor Management For Atherosclerosis; and Valvular Heart Disease      HPI pt presents for f/u.  Pt followed by Dr. Pace.   Has h/o CAD, HTN, DM, hyperlipidemia, AS, obesity, Parkinsons disease.  She had several cath done by Dr. Pace 2015 for cp and + stress test.  Pt admitted last month for cp, possible angina.    ranexa added, metoprolol increased.  Stress test no ischemia.  Echo normal EF, mild AS.  Pt is not having active anginal sxs at present time on current medical tx.  Has not had to take sl ntg since discharge.  No CHF sxs.  Denies dyspnea, pnd/orthopnea.  HTN well controlled on current meds.  Obesity is stable, no recent weight gain.  DM poorly controlled right now with HGAIC 8.2%.  Was off insulin for a few weeks now back on it.   LDL is excellent, < 70 but TG now elevated 250.    Current Outpatient Prescriptions:     ACCU-CHEK SOFTCLIX LANCETS [facility], TEST THREE TIMES DAILY, Disp: 300 each, Rfl: 3    acetaminophen (TYLENOL) 325 MG tablet, Take 2 tablets (650 mg total) by mouth every 6 (six) hours as needed., Disp: , Rfl: 0    aspirin (ECOTRIN) 81 MG EC tablet, Take 1 tablet (81 mg total) by mouth once daily., Disp: 30 tablet, Rfl: 0    blood sugar diagnostic Strp, 1 strip by Misc.(Non-Drug; Combo Route) route 3 (three) times daily. Accuchek Felicia Plus Test Strips, Disp: 300 strip, Rfl: 3    blood-glucose meter kit, Accu-chek Felicia Plus Meter, Disp: 1 each, Rfl: 0    carbidopa-levodopa  mg (SINEMET)  mg per tablet, TAKE 1 TABLET BY MOUTH 5 TIMES DAILY, Disp: 150 tablet, Rfl: 11    insulin NPH-insulin regular, 70/30, (NOVOLIN 70/30) 100 unit/mL (70-30) injection, Inject 55 Units into the skin 3 (three) times daily., Disp: 49.5 mL, Rfl: 6    insulin syringe-needle U-100 0.5 mL 31 gauge x 5/16 Syrg, INJECT 1 SYRINGE INTO THE SKIN 3  TIMES DAILY, Disp: 100 each, Rfl: 11    isosorbide mononitrate (IMDUR) 120 MG 24 hr tablet, TAKE ONE TABLET BY MOUTH ONCE DAILY, Disp: 90 tablet, Rfl: 3    latanoprost 0.005 % ophthalmic solution, Place 1 drop into both eyes every evening., Disp: 1 Bottle, Rfl: 6    levothyroxine (SYNTHROID) 100 MCG tablet, TAKE ONE TABLET BY MOUTH ONCE DAILY BEFORE BREAKFAST, Disp: 90 tablet, Rfl: 3    metFORMIN (GLUCOPHAGE) 500 MG tablet, TAKE ONE TABLET BY MOUTH THREE TIMES DAILY, Disp: 270 tablet, Rfl: 3    metoprolol succinate (TOPROL-XL) 50 MG 24 hr tablet, Take 1 tablet (50 mg total) by mouth once daily., Disp: 30 tablet, Rfl: 1    multivit-min-FA-lycopen-lutein (CENTRUM SILVER) 0.4-300-250 mg-mcg-mcg Tab, Take 1 tablet by mouth once daily., Disp: , Rfl:     NOVOLIN 70/30 U-100 INSULIN 100 unit/mL (70-30) injection, INJECT 55-60 UNITS INTO THE SKIN 3 TIMES DAILY BEFORE MEALS, Disp: 10 mL, Rfl: 3    ondansetron (ZOFRAN) 4 MG tablet, Take 1 tablet (4 mg total) by mouth every 8 (eight) hours as needed for Nausea., Disp: 20 tablet, Rfl: 0    potassium chloride SA (K-DUR,KLOR-CON) 20 MEQ tablet, Take 1 tablet (20 mEq total) by mouth once daily., Disp: 90 tablet, Rfl: 3    pramipexole (MIRAPEX) 1 MG tablet, Take 1 tablet (1 mg total) by mouth 3 (three) times daily., Disp: 270 tablet, Rfl: 3    pravastatin (PRAVACHOL) 40 MG tablet, TAKE ONE TABLET BY MOUTH ONCE DAILY, Disp: 90 tablet, Rfl: 3    ranolazine (RANEXA) 500 MG Tb12, Take 1 tablet (500 mg total) by mouth 2 (two) times daily., Disp: 60 tablet, Rfl: 12      Review of Systems   Constitution: Negative.   HENT: Negative.    Eyes: Negative.    Cardiovascular: Negative.    Respiratory: Negative.    Endocrine: Negative.    Hematologic/Lymphatic: Negative.    Skin: Negative.    Musculoskeletal: Negative.    Gastrointestinal: Negative.    Genitourinary: Negative.    Neurological: Positive for tremors.   Psychiatric/Behavioral: Negative.    Allergic/Immunologic: Negative.  "       /64 (BP Location: Right arm)   Pulse 76   Ht 5' 6" (1.676 m)   Wt 107 kg (236 lb)   BMI 38.09 kg/m²     Wt Readings from Last 3 Encounters:   03/23/18 107 kg (236 lb)   02/22/18 107.5 kg (236 lb 15.9 oz)   11/29/17 114 kg (251 lb 3.4 oz)     Temp Readings from Last 3 Encounters:   02/22/18 97.4 °F (36.3 °C) (Oral)   11/29/17 97.1 °F (36.2 °C) (Tympanic)   08/22/17 97 °F (36.1 °C) (Tympanic)     BP Readings from Last 3 Encounters:   03/23/18 112/64   02/22/18 (!) 100/59   11/29/17 (!) 117/57     Pulse Readings from Last 3 Encounters:   03/23/18 76   02/22/18 84   11/29/17 81          Objective:    Physical Exam   Constitutional: She is oriented to person, place, and time. Vital signs are normal. She appears well-developed and well-nourished. She is active and cooperative. She does not have a sickly appearance. She does not appear ill. No distress.   HENT:   Head: Normocephalic.   Neck: Neck supple. Normal carotid pulses and no JVD present. Carotid bruit is not present. No thyromegaly present.   Cardiovascular: Normal rate, regular rhythm, S1 normal, S2 normal and normal pulses.  PMI is not displaced.  Exam reveals no gallop and no friction rub.    Murmur heard.   Harsh systolic murmur is present with a grade of 2/6  at the upper left sternal border  Pulses:       Radial pulses are 2+ on the right side, and 2+ on the left side.   Pulmonary/Chest: Effort normal and breath sounds normal. She has no wheezes. She has no rales.   Abdominal: Soft. Normal appearance and bowel sounds are normal. She exhibits no mass. There is no tenderness.   obese   Musculoskeletal: She exhibits no edema.   Lymphadenopathy:     She has no cervical adenopathy.   Neurological: She is alert and oriented to person, place, and time.   Skin: Skin is warm. She is not diaphoretic.   Psychiatric: She has a normal mood and affect. Her behavior is normal.   Nursing note and vitals reviewed.          I have reviewed all pertinent labs " and cardiac studies.    Lab Results   Component Value Date    HGBA1C 8.2 (H) 03/21/2018     Lab Results   Component Value Date    CHOL 134 03/21/2018    CHOL 123 11/22/2017    CHOL 89 (L) 07/21/2017     Lab Results   Component Value Date    HDL 38 (L) 03/21/2018    HDL 40 11/22/2017    HDL 32 (L) 07/21/2017     Lab Results   Component Value Date    LDLCALC 46.0 (L) 03/21/2018    LDLCALC 61.2 (L) 11/22/2017    LDLCALC 36.6 (L) 07/21/2017     Lab Results   Component Value Date    TRIG 250 (H) 03/21/2018    TRIG 109 11/22/2017    TRIG 102 07/21/2017     Lab Results   Component Value Date    CHOLHDL 28.4 03/21/2018    CHOLHDL 32.5 11/22/2017    CHOLHDL 36.0 07/21/2017       Assessment:       1. CAD: S/P coronary artery stent placement    2. Coronary artery disease involving native coronary artery of native heart without angina pectoris    3. Essential hypertension    4. Hyperlipidemia LDL goal <70    5. Nonrheumatic aortic valve stenosis    6. AP (angina pectoris)    7. Morbid obesity with BMI of 40.0-44.9, adult    8. Type 2 diabetes, controlled, with peripheral neuropathy         Plan:             Discussed recent CV tests again with pt from her hospitalization last month with no ischemia noted on stress MPI and echo showing normal EF, mild AS.  Discussed AS condition with pt.  Stable CAD/CV conditions at present time on current medical tx.  Continue optimal medical tx for CAD.  Sl ntg prn for breakthrough angina.  Pt counseled on DM mgt and need to lower HGAIC to goal of < 7.0% and compliance with DM meds.  Weight loss discussed/encouraged.  Exercise as tolerated  Cardiac diet  No changes in meds today.      F/u 6 months with Dr. Pace or mid level.

## 2018-03-28 ENCOUNTER — OFFICE VISIT (OUTPATIENT)
Dept: FAMILY MEDICINE | Facility: CLINIC | Age: 73
End: 2018-03-28
Payer: MEDICARE

## 2018-03-28 VITALS
DIASTOLIC BLOOD PRESSURE: 56 MMHG | SYSTOLIC BLOOD PRESSURE: 102 MMHG | OXYGEN SATURATION: 93 % | HEART RATE: 89 BPM | TEMPERATURE: 97 F

## 2018-03-28 DIAGNOSIS — E11.42 TYPE 2 DIABETES, CONTROLLED, WITH PERIPHERAL NEUROPATHY: Primary | Chronic | ICD-10-CM

## 2018-03-28 DIAGNOSIS — I10 ESSENTIAL HYPERTENSION: Chronic | ICD-10-CM

## 2018-03-28 DIAGNOSIS — Z95.5 S/P CORONARY ARTERY STENT PLACEMENT: Chronic | ICD-10-CM

## 2018-03-28 DIAGNOSIS — E03.4 HYPOTHYROIDISM DUE TO ACQUIRED ATROPHY OF THYROID: Chronic | ICD-10-CM

## 2018-03-28 DIAGNOSIS — G20.A1 PD (PARKINSON'S DISEASE): Chronic | ICD-10-CM

## 2018-03-28 DIAGNOSIS — K59.00 CONSTIPATION, UNSPECIFIED CONSTIPATION TYPE: ICD-10-CM

## 2018-03-28 PROCEDURE — 3074F SYST BP LT 130 MM HG: CPT | Mod: CPTII,S$GLB,, | Performed by: FAMILY MEDICINE

## 2018-03-28 PROCEDURE — 3045F PR MOST RECENT HEMOGLOBIN A1C LEVEL 7.0-9.0%: CPT | Mod: CPTII,S$GLB,, | Performed by: FAMILY MEDICINE

## 2018-03-28 PROCEDURE — 3078F DIAST BP <80 MM HG: CPT | Mod: CPTII,S$GLB,, | Performed by: FAMILY MEDICINE

## 2018-03-28 PROCEDURE — 99499 UNLISTED E&M SERVICE: CPT | Mod: S$GLB,,, | Performed by: FAMILY MEDICINE

## 2018-03-28 PROCEDURE — 99214 OFFICE O/P EST MOD 30 MIN: CPT | Mod: S$GLB,,, | Performed by: FAMILY MEDICINE

## 2018-03-28 PROCEDURE — 99999 PR PBB SHADOW E&M-EST. PATIENT-LVL IV: CPT | Mod: PBBFAC,,, | Performed by: FAMILY MEDICINE

## 2018-03-28 RX ORDER — DOCUSATE SODIUM 100 MG/1
100 CAPSULE, LIQUID FILLED ORAL 2 TIMES DAILY
Qty: 60 CAPSULE | Refills: 3 | Status: SHIPPED | OUTPATIENT
Start: 2018-03-28 | End: 2018-07-18

## 2018-03-28 NOTE — PROGRESS NOTES
Chief Complaint:    Chief Complaint   Patient presents with    Follow-up       History of Present Illness:    Patient presents today for three-month follow-up,  Her A1c is up this time 8.2 because she ran out of her insulin.  Blood pressures normal  Lipids chemistries stable  She has coronary artery disease following with cardiology.  She complains of occasional constipation.      ROS:  Review of Systems   Constitutional: Negative for activity change, chills, fatigue, fever and unexpected weight change.   HENT: Negative for congestion, ear discharge, ear pain, hearing loss, postnasal drip and rhinorrhea.    Eyes: Negative for pain and visual disturbance.   Respiratory: Negative for cough, chest tightness and shortness of breath.    Cardiovascular: Negative for chest pain and palpitations.   Gastrointestinal: Positive for constipation. Negative for abdominal pain, diarrhea and vomiting.   Endocrine: Negative for heat intolerance.   Genitourinary: Negative for dysuria, flank pain, frequency and hematuria.   Musculoskeletal: Negative for back pain, gait problem and neck pain.   Skin: Negative for color change and rash.   Neurological: Negative for dizziness, tremors, seizures, numbness and headaches.   Psychiatric/Behavioral: Negative for agitation, hallucinations, self-injury, sleep disturbance and suicidal ideas. The patient is not nervous/anxious.        Past Medical History:   Diagnosis Date    Abnormal nuclear stress test 11/1/2015    Background diabetic retinopathy 12/28/2015    CAD (coronary artery disease) 2002    stents    Cataract     Gait disorder 10/12/2012    Glaucoma     MI (myocardial infarction)     Obesity     CORINE (obstructive sleep apnea)     Other and unspecified hyperlipidemia     PD (Parkinson's disease) 2002    tremor-predominant (per patient)    Type II or unspecified type diabetes mellitus without mention of complication, not stated as uncontrolled 2002      am    Unspecified  essential hypertension        Social History:  Social History     Social History    Marital status:      Spouse name: N/A    Number of children: N/A    Years of education: N/A     Social History Main Topics    Smoking status: Former Smoker     Years: 40.00     Quit date: 10/12/2010    Smokeless tobacco: Never Used      Comment: On & off for the past few years    Alcohol use No    Drug use: No    Sexual activity: Not Currently     Other Topics Concern    None     Social History Narrative     . Lives with spouse. Has 2 children. 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       Family History:   family history includes Breast cancer in her paternal grandmother; Diabetes in her maternal grandmother; Glaucoma in her maternal grandmother and mother; Heart attack (age of onset: 65) in her father and mother; Hypertension in her father, maternal grandmother, and mother; Ovarian cancer in her mother; Parkinsonism in her other and paternal aunt; Tremor in her father.    Health Maintenance   Topic Date Due    Mammogram  07/20/2018    Eye Exam  02/26/2019    Lipid Panel  03/21/2019    Hemoglobin A1c  03/21/2019    Urine Microalbumin  03/21/2019    Foot Exam  03/28/2019    DEXA SCAN  07/20/2020    TETANUS VACCINE  11/19/2025    Hepatitis C Screening  Addressed    Zoster Vaccine  Addressed    Pneumococcal (65+)  Completed    Influenza Vaccine  Completed       Physical Exam:    Vital Signs  Temp: 97 °F (36.1 °C)  Temp src: Tympanic  Pulse: 89  SpO2: (!) 93 %  BP: (!) 102/56  BP Location: Right arm  Patient Position: Sitting  Pain Score:   8  Pain Loc: Leg]    There is no height or weight on file to calculate BMI.    Physical Exam   Constitutional: She is oriented to person, place, and time. She appears well-developed.   HENT:   Mouth/Throat: Oropharynx is clear and moist.   Eyes: Conjunctivae are normal. Pupils are equal, round, and reactive to light.   Neck: Normal range of motion.  Neck supple.   Cardiovascular: Normal rate, regular rhythm and normal heart sounds.    No murmur heard.  Pulses:       Dorsalis pedis pulses are 3+ on the right side, and 3+ on the left side.        Posterior tibial pulses are 3+ on the right side, and 3+ on the left side.   Pulmonary/Chest: Effort normal and breath sounds normal. No respiratory distress. She has no wheezes. She has no rales. She exhibits no tenderness.   Abdominal: Soft. She exhibits no distension and no mass. There is no tenderness. There is no guarding.   Musculoskeletal: She exhibits no edema or tenderness.   She is wheelchair-bound   Feet:   Right Foot:   Protective Sensation: 10 sites tested. 10 sites sensed.   Left Foot:   Protective Sensation: 10 sites tested. 10 sites sensed.   Lymphadenopathy:     She has no cervical adenopathy.   Neurological: She is alert and oriented to person, place, and time. She has normal reflexes.   Skin: Skin is warm and dry.   Psychiatric: She has a normal mood and affect. Her behavior is normal. Judgment and thought content normal.         Diabetes Management Status    Statin: Taking  ACE/ARB: Not taking    Screening or Prevention Patient's value Goal Complete/Controlled?   HgA1C Testing and Control   Lab Results   Component Value Date    HGBA1C 8.2 (H) 03/21/2018      Annually/Less than 8% No   Lipid profile : 03/21/2018 Annually Yes   LDL control Lab Results   Component Value Date    LDLCALC 46.0 (L) 03/21/2018    Annually/Less than 100 mg/dl  Yes   Nephropathy screening Lab Results   Component Value Date    LABMICR 56.0 03/21/2018     Lab Results   Component Value Date    PROTEINUA Negative 02/21/2018    Annually Yes   Blood pressure BP Readings from Last 1 Encounters:   03/28/18 (!) 102/56    Less than 140/90 Yes   Dilated retinal exam : 02/26/2018 Annually Yes   Foot exam   : 03/28/2018 Annually Yes       Assessment:      ICD-10-CM ICD-9-CM   1. Type 2 diabetes, controlled, with peripheral neuropathy E11.42  250.60     357.2   2. Hypothyroidism due to acquired atrophy of thyroid E03.4 244.8     246.8   3. CAD: S/P coronary artery stent placement Z95.5 V45.82   4. Essential hypertension I10 401.9   5. PD (Parkinson's disease) G20 332.0   6. Constipation, unspecified constipation type K59.00 564.00         Plan:    Continue current plan please get back on insulin ASAP and start monitoring blood sugars carefully  Discussed carbohydrate-controlled discuss cutting back on junk food  Recommend Colace 100 mg twice a day to help with constipation  Medical problems is about stable  Follow-up 3 months    Orders Placed This Encounter   Procedures    Comprehensive metabolic panel    CBC auto differential    Hemoglobin A1c    Lipid panel    Microalbumin/creatinine urine ratio    TSH       Current Outpatient Prescriptions   Medication Sig Dispense Refill    ACCU-CHEK SOFTCLIX LANCETS Misc TEST THREE TIMES DAILY 300 each 3    acetaminophen (TYLENOL) 325 MG tablet Take 2 tablets (650 mg total) by mouth every 6 (six) hours as needed.  0    blood sugar diagnostic Strp 1 strip by Misc.(Non-Drug; Combo Route) route 3 (three) times daily. Accuchek Felicia Plus Test Strips 300 strip 3    blood-glucose meter kit Accu-chek Felicia Plus Meter 1 each 0    carbidopa-levodopa  mg (SINEMET)  mg per tablet TAKE 1 TABLET BY MOUTH 5 TIMES DAILY 150 tablet 11    insulin NPH-insulin regular, 70/30, (NOVOLIN 70/30) 100 unit/mL (70-30) injection Inject 55 Units into the skin 3 (three) times daily. 49.5 mL 6    insulin syringe-needle U-100 0.5 mL 31 gauge x 5/16 Syrg INJECT 1 SYRINGE INTO THE SKIN 3 TIMES DAILY 100 each 11    isosorbide mononitrate (IMDUR) 120 MG 24 hr tablet TAKE ONE TABLET BY MOUTH ONCE DAILY 90 tablet 3    latanoprost 0.005 % ophthalmic solution Place 1 drop into both eyes every evening. 1 Bottle 6    levothyroxine (SYNTHROID) 100 MCG tablet TAKE ONE TABLET BY MOUTH ONCE DAILY BEFORE BREAKFAST 90 tablet 3     metFORMIN (GLUCOPHAGE) 500 MG tablet TAKE ONE TABLET BY MOUTH THREE TIMES DAILY 270 tablet 3    metoprolol succinate (TOPROL-XL) 50 MG 24 hr tablet Take 1 tablet (50 mg total) by mouth once daily. 30 tablet 1    multivit-min-FA-lycopen-lutein (CENTRUM SILVER) 0.4-300-250 mg-mcg-mcg Tab Take 1 tablet by mouth once daily.      pramipexole (MIRAPEX) 1 MG tablet Take 1 tablet (1 mg total) by mouth 3 (three) times daily. 270 tablet 3    pravastatin (PRAVACHOL) 40 MG tablet TAKE ONE TABLET BY MOUTH ONCE DAILY 90 tablet 3    ranolazine (RANEXA) 500 MG Tb12 Take 1 tablet (500 mg total) by mouth 2 (two) times daily. 60 tablet 12    aspirin (ECOTRIN) 81 MG EC tablet Take 1 tablet (81 mg total) by mouth once daily. 30 tablet 0    docusate sodium (COLACE) 100 MG capsule Take 1 capsule (100 mg total) by mouth 2 (two) times daily. 60 capsule 3    potassium chloride SA (K-DUR,KLOR-CON) 20 MEQ tablet Take 1 tablet (20 mEq total) by mouth once daily. 90 tablet 3     No current facility-administered medications for this visit.        Medications Discontinued During This Encounter   Medication Reason    NOVOLIN 70/30 U-100 INSULIN 100 unit/mL (70-30) injection Duplicate Order    ondansetron (ZOFRAN) 4 MG tablet Patient no longer taking       Follow-up in about 3 months (around 6/28/2018).      Anna Penn MD

## 2018-04-17 DIAGNOSIS — I10 ESSENTIAL HYPERTENSION: ICD-10-CM

## 2018-04-17 RX ORDER — METOPROLOL SUCCINATE 25 MG/1
TABLET, EXTENDED RELEASE ORAL
Qty: 30 TABLET | Refills: 11 | Status: SHIPPED | OUTPATIENT
Start: 2018-04-17 | End: 2019-02-19 | Stop reason: SDUPTHER

## 2018-04-17 RX ORDER — LOSARTAN POTASSIUM 50 MG/1
TABLET ORAL
Qty: 90 TABLET | Refills: 3 | Status: SHIPPED | OUTPATIENT
Start: 2018-04-17 | End: 2019-03-21 | Stop reason: SDUPTHER

## 2018-05-15 RX ORDER — NAPROXEN SODIUM 220 MG
TABLET ORAL
Qty: 100 EACH | Refills: 11 | Status: SHIPPED | OUTPATIENT
Start: 2018-05-15

## 2018-05-21 ENCOUNTER — TELEPHONE (OUTPATIENT)
Dept: FAMILY MEDICINE | Facility: CLINIC | Age: 73
End: 2018-05-21

## 2018-05-21 ENCOUNTER — OFFICE VISIT (OUTPATIENT)
Dept: FAMILY MEDICINE | Facility: CLINIC | Age: 73
End: 2018-05-21
Payer: MEDICARE

## 2018-05-21 VITALS
HEART RATE: 100 BPM | OXYGEN SATURATION: 100 % | DIASTOLIC BLOOD PRESSURE: 62 MMHG | HEIGHT: 67 IN | SYSTOLIC BLOOD PRESSURE: 131 MMHG | TEMPERATURE: 98 F

## 2018-05-21 DIAGNOSIS — S09.93XA FACIAL INJURY, INITIAL ENCOUNTER: ICD-10-CM
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
## 2018-05-21 DIAGNOSIS — S89.90XA KNEE INJURY, UNSPECIFIED LATERALITY, INITIAL ENCOUNTER: ICD-10-CM

## 2018-05-21 PROCEDURE — 99999 PR PBB SHADOW E&M-EST. PATIENT-LVL III: CPT | Mod: PBBFAC,,, | Performed by: FAMILY MEDICINE

## 2018-05-21 PROCEDURE — 3078F DIAST BP <80 MM HG: CPT | Mod: CPTII,S$GLB,, | Performed by: FAMILY MEDICINE

## 2018-05-21 PROCEDURE — 3075F SYST BP GE 130 - 139MM HG: CPT | Mod: CPTII,S$GLB,, | Performed by: FAMILY MEDICINE

## 2018-05-21 PROCEDURE — 99213 OFFICE O/P EST LOW 20 MIN: CPT | Mod: S$GLB,,, | Performed by: FAMILY MEDICINE

## 2018-05-21 RX ORDER — CLINDAMYCIN HYDROCHLORIDE 150 MG/1
CAPSULE ORAL
COMMUNITY
Start: 2018-05-16 | End: 2018-07-18

## 2018-05-21 NOTE — PROGRESS NOTES
Chief Complaint:    Chief Complaint   Patient presents with    Fall       History of Present Illness:    Patient presents today because she fell about a week back while trying to turn a certain way.  She fell face down, did not pass out.  She did not seek any medical attention.  She denies any headache or memory loss or confusion, she is complaining of mostly knee pain.    ROS:  Review of Systems   Constitutional: Negative for activity change, chills, fatigue, fever and unexpected weight change.   HENT: Negative for congestion, ear discharge, ear pain, hearing loss, postnasal drip and rhinorrhea.    Eyes: Negative for pain and visual disturbance.   Respiratory: Negative for cough, chest tightness and shortness of breath.    Cardiovascular: Negative for chest pain and palpitations.   Gastrointestinal: Negative for abdominal pain, diarrhea and vomiting.   Endocrine: Negative for heat intolerance.   Genitourinary: Negative for dysuria, flank pain, frequency and hematuria.   Musculoskeletal: Positive for myalgias. Negative for back pain, gait problem and neck pain.   Skin: Negative for color change and rash.   Neurological: Negative for dizziness, tremors, seizures, numbness and headaches.   Psychiatric/Behavioral: Negative for agitation, hallucinations, self-injury, sleep disturbance and suicidal ideas. The patient is not nervous/anxious.        Past Medical History:   Diagnosis Date    Abnormal nuclear stress test 11/1/2015    Background diabetic retinopathy 12/28/2015    CAD (coronary artery disease) 2002    stents    Cataract     Gait disorder 10/12/2012    Glaucoma     MI (myocardial infarction)     Obesity     CORINE (obstructive sleep apnea)     Other and unspecified hyperlipidemia     PD (Parkinson's disease) 2002    tremor-predominant (per patient)    Type II or unspecified type diabetes mellitus without mention of complication, not stated as uncontrolled 2002      am    Unspecified essential  "hypertension        Social History:  Social History     Social History    Marital status:      Spouse name: N/A    Number of children: N/A    Years of education: N/A     Social History Main Topics    Smoking status: Former Smoker     Years: 40.00     Quit date: 10/12/2010    Smokeless tobacco: Never Used      Comment: On & off for the past few years    Alcohol use No    Drug use: No    Sexual activity: Not Currently     Other Topics Concern    None     Social History Narrative     . Lives with spouse. Has 2 children. 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       Family History:   family history includes Breast cancer in her paternal grandmother; Diabetes in her maternal grandmother; Glaucoma in her maternal grandmother and mother; Heart attack (age of onset: 65) in her father and mother; Hypertension in her father, maternal grandmother, and mother; Ovarian cancer in her mother; Parkinsonism in her other and paternal aunt; Tremor in her father.    Health Maintenance   Topic Date Due    Mammogram  07/20/2018    Influenza Vaccine  08/01/2018    Eye Exam  02/26/2019    Lipid Panel  03/21/2019    Hemoglobin A1c  03/21/2019    Foot Exam  03/28/2019    DEXA SCAN  07/20/2020    TETANUS VACCINE  11/19/2025    Hepatitis C Screening  Addressed    Zoster Vaccine  Addressed    Pneumococcal (65+)  Completed       Physical Exam:    Vital Signs  Temp: 98.1 °F (36.7 °C)  Temp src: Tympanic  Pulse: 100  SpO2: 100 %  BP: 131/62  BP Location: Left arm  Patient Position: Sitting  Pain Score: 10-Worst pain ever  Pain Loc: Face  Height and Weight  Height: 5' 6.5" (168.9 cm)  Weight in (lb) to have BMI = 25: 156.9]    There is no height or weight on file to calculate BMI.    Physical Exam   Constitutional: She is oriented to person, place, and time. She appears well-developed.   HENT:   Head:       Mouth/Throat: Oropharynx is clear and moist.   Eyes: Conjunctivae are normal. Pupils are " equal, round, and reactive to light.   Neck: Normal range of motion. Neck supple.   Cardiovascular: Normal rate, regular rhythm and normal heart sounds.    No murmur heard.  Pulmonary/Chest: Effort normal and breath sounds normal. No respiratory distress. She has no wheezes. She has no rales. She exhibits no tenderness.   Abdominal: Soft. She exhibits no distension and no mass. There is no tenderness. There is no guarding.   Musculoskeletal: She exhibits no edema or tenderness.        Right knee: She exhibits normal range of motion, no swelling and no effusion.        Left knee: She exhibits normal range of motion, no swelling, no effusion, no ecchymosis and no deformity.        Legs:  Lymphadenopathy:     She has no cervical adenopathy.   Neurological: She is alert and oriented to person, place, and time. She has normal reflexes.   Skin: Skin is warm and dry.   Psychiatric: She has a normal mood and affect. Her behavior is normal. Judgment and thought content normal.       Results for orders placed or performed in visit on 03/21/18   Comprehensive metabolic panel   Result Value Ref Range    Sodium 136 136 - 145 mmol/L    Potassium 4.4 3.5 - 5.1 mmol/L    Chloride 98 95 - 110 mmol/L    CO2 25 23 - 29 mmol/L    Glucose 309 (H) 70 - 110 mg/dL    BUN, Bld 24 (H) 8 - 23 mg/dL    Creatinine 1.1 0.5 - 1.4 mg/dL    Calcium 10.4 8.7 - 10.5 mg/dL    Total Protein 7.4 6.0 - 8.4 g/dL    Albumin 4.3 3.5 - 5.2 g/dL    Total Bilirubin 0.6 0.1 - 1.0 mg/dL    Alkaline Phosphatase 52 (L) 55 - 135 U/L    AST 22 10 - 40 U/L    ALT 16 10 - 44 U/L    Anion Gap 13 8 - 16 mmol/L    eGFR if African American 58.0 (A) >60 mL/min/1.73 m^2    eGFR if non  50.3 (A) >60 mL/min/1.73 m^2   Lipid panel   Result Value Ref Range    Cholesterol 134 120 - 199 mg/dL    Triglycerides 250 (H) 30 - 150 mg/dL    HDL 38 (L) 40 - 75 mg/dL    LDL Cholesterol 46.0 (L) 63.0 - 159.0 mg/dL    HDL/Chol Ratio 28.4 20.0 - 50.0 %    Total  Cholesterol/HDL Ratio 3.5 2.0 - 5.0    Non-HDL Cholesterol 96 mg/dL   Hemoglobin A1c   Result Value Ref Range    Hemoglobin A1C 8.2 (H) 4.0 - 5.6 %    Estimated Avg Glucose 189 (H) 68 - 131 mg/dL         Lab Results   Component Value Date    HGBA1C 8.2 (H) 03/21/2018       Assessment:      ICD-10-CM ICD-9-CM   1. Fall, initial encounter W19.XXXA E888.9   2. Facial injury, initial encounter S09.93XA 959.09   3. Knee injury, unspecified laterality, initial encounter S89.90XA 959.7         Plan:    Recommend doing CT of facial bones, x-ray of the knees, CT brain and cervical spine however patient is refusing to do any testing she says I know that nothing is broken.  I discussed with patient that it is not always possible to tell without proper imaging study and there is a any fracture or any other internal injury and sometimes not knowing about it can cause serious harm or death but she refused.  I have asked her to let me know if she changes her mind.  She has taken Tylenol for pain but does not want any narcotics because she gets very nauseated.  Will follow up as scheduled or sooner if needed.      No orders of the defined types were placed in this encounter.      Current Outpatient Prescriptions   Medication Sig Dispense Refill    ACCU-CHEK SOFTCLIX LANCETS [facility] [place] TEST THREE TIMES DAILY 300 each 3    acetaminophen (TYLENOL) 325 MG tablet Take 2 tablets (650 mg total) by mouth every 6 (six) hours as needed.  0    blood sugar diagnostic Strp 1 strip by Misc.(Non-Drug; Combo Route) route 3 (three) times daily. Accuchek Felicia Plus Test Strips 300 strip 3    blood-glucose meter kit Accu-chek Felicia Plus Meter 1 each 0    carbidopa-levodopa  mg (SINEMET)  mg per tablet TAKE 1 TABLET BY MOUTH 5 TIMES DAILY 150 tablet 11    insulin NPH-insulin regular, 70/30, (NOVOLIN 70/30) 100 unit/mL (70-30) injection Inject 55 Units into the skin 3 (three) times daily. 49.5 mL 6    insulin syringe-needle U-100 0.5 mL 31  gauge x 5/16 Syrg USE THREE TIMES DAILY 100 each 11    isosorbide mononitrate (IMDUR) 120 MG 24 hr tablet TAKE ONE TABLET BY MOUTH ONCE DAILY 90 tablet 3    latanoprost 0.005 % ophthalmic solution Place 1 drop into both eyes every evening. 1 Bottle 6    levothyroxine (SYNTHROID) 100 MCG tablet TAKE ONE TABLET BY MOUTH ONCE DAILY BEFORE BREAKFAST 90 tablet 3    losartan (COZAAR) 50 MG tablet TAKE ONE TABLET BY MOUTH ONCE DAILY 90 tablet 3    metFORMIN (GLUCOPHAGE) 500 MG tablet TAKE ONE TABLET BY MOUTH THREE TIMES DAILY 270 tablet 3    metoprolol succinate (TOPROL-XL) 25 MG 24 hr tablet TAKE ONE TABLET BY MOUTH ONCE DAILY 30 tablet 11    metoprolol succinate (TOPROL-XL) 50 MG 24 hr tablet Take 1 tablet (50 mg total) by mouth once daily. 30 tablet 1    multivit-min-FA-lycopen-lutein (CENTRUM SILVER) 0.4-300-250 mg-mcg-mcg Tab Take 1 tablet by mouth once daily.      pramipexole (MIRAPEX) 1 MG tablet Take 1 tablet (1 mg total) by mouth 3 (three) times daily. 270 tablet 3    pravastatin (PRAVACHOL) 40 MG tablet TAKE ONE TABLET BY MOUTH ONCE DAILY 90 tablet 3    aspirin (ECOTRIN) 81 MG EC tablet Take 1 tablet (81 mg total) by mouth once daily. 30 tablet 0    clindamycin (CLEOCIN) 150 MG capsule       docusate sodium (COLACE) 100 MG capsule Take 1 capsule (100 mg total) by mouth 2 (two) times daily. 60 capsule 3    potassium chloride SA (K-DUR,KLOR-CON) 20 MEQ tablet Take 1 tablet (20 mEq total) by mouth once daily. 90 tablet 3    ranolazine (RANEXA) 500 MG Tb12 Take 1 tablet (500 mg total) by mouth 2 (two) times daily. 60 tablet 12     No current facility-administered medications for this visit.        There are no discontinued medications.    No Follow-up on file.      Anna Penn MD

## 2018-05-21 NOTE — TELEPHONE ENCOUNTER
Spoke with pt and she had a fall on Wednesday, she fell and hurt her face, her eyes are swollen and black . Appt made for today

## 2018-05-21 NOTE — TELEPHONE ENCOUNTER
----- Message from Yahaira De La Garza sent at 5/21/2018  8:06 AM CDT -----  Contact: pt  Pt would like to be seen today. Pt fell last Wednesday and is very sore.

## 2018-06-28 ENCOUNTER — LAB VISIT (OUTPATIENT)
Dept: LAB | Facility: HOSPITAL | Age: 73
End: 2018-06-28
Attending: FAMILY MEDICINE
Payer: MEDICARE

## 2018-06-28 ENCOUNTER — PATIENT OUTREACH (OUTPATIENT)
Dept: ADMINISTRATIVE | Facility: HOSPITAL | Age: 73
End: 2018-06-28

## 2018-06-28 DIAGNOSIS — E11.42 TYPE 2 DIABETES, CONTROLLED, WITH PERIPHERAL NEUROPATHY: Chronic | ICD-10-CM

## 2018-06-28 DIAGNOSIS — E03.4 HYPOTHYROIDISM DUE TO ACQUIRED ATROPHY OF THYROID: Chronic | ICD-10-CM

## 2018-06-28 LAB
ALBUMIN SERPL BCP-MCNC: 3.8 G/DL
ALP SERPL-CCNC: 47 U/L
ALT SERPL W/O P-5'-P-CCNC: 21 U/L
ANION GAP SERPL CALC-SCNC: 10 MMOL/L
AST SERPL-CCNC: 32 U/L
BASOPHILS # BLD AUTO: 0.03 K/UL
BASOPHILS NFR BLD: 0.4 %
BILIRUB SERPL-MCNC: 0.7 MG/DL
BUN SERPL-MCNC: 23 MG/DL
CALCIUM SERPL-MCNC: 9.5 MG/DL
CHLORIDE SERPL-SCNC: 106 MMOL/L
CHOLEST SERPL-MCNC: 118 MG/DL
CHOLEST/HDLC SERPL: 3.4 {RATIO}
CO2 SERPL-SCNC: 24 MMOL/L
CREAT SERPL-MCNC: 0.9 MG/DL
DIFFERENTIAL METHOD: ABNORMAL
EOSINOPHIL # BLD AUTO: 0.4 K/UL
EOSINOPHIL NFR BLD: 4.2 %
ERYTHROCYTE [DISTWIDTH] IN BLOOD BY AUTOMATED COUNT: 12.9 %
EST. GFR  (AFRICAN AMERICAN): >60 ML/MIN/1.73 M^2
EST. GFR  (NON AFRICAN AMERICAN): >60 ML/MIN/1.73 M^2
ESTIMATED AVG GLUCOSE: 177 MG/DL
GLUCOSE SERPL-MCNC: 113 MG/DL
HBA1C MFR BLD HPLC: 7.8 %
HCT VFR BLD AUTO: 40.3 %
HDLC SERPL-MCNC: 35 MG/DL
HDLC SERPL: 29.7 %
HGB BLD-MCNC: 13 G/DL
IMM GRANULOCYTES # BLD AUTO: 0.05 K/UL
IMM GRANULOCYTES NFR BLD AUTO: 0.6 %
LDLC SERPL CALC-MCNC: 63.8 MG/DL
LYMPHOCYTES # BLD AUTO: 2.9 K/UL
LYMPHOCYTES NFR BLD: 33.7 %
MCH RBC QN AUTO: 30.5 PG
MCHC RBC AUTO-ENTMCNC: 32.3 G/DL
MCV RBC AUTO: 95 FL
MONOCYTES # BLD AUTO: 0.7 K/UL
MONOCYTES NFR BLD: 8.1 %
NEUTROPHILS # BLD AUTO: 4.6 K/UL
NEUTROPHILS NFR BLD: 53 %
NONHDLC SERPL-MCNC: 83 MG/DL
NRBC BLD-RTO: 0 /100 WBC
PLATELET # BLD AUTO: 272 K/UL
PMV BLD AUTO: 10.8 FL
POTASSIUM SERPL-SCNC: 4.3 MMOL/L
PROT SERPL-MCNC: 6.4 G/DL
RBC # BLD AUTO: 4.26 M/UL
SODIUM SERPL-SCNC: 140 MMOL/L
TRIGL SERPL-MCNC: 96 MG/DL
TSH SERPL DL<=0.005 MIU/L-ACNC: 7.57 UIU/ML
WBC # BLD AUTO: 8.57 K/UL

## 2018-06-28 PROCEDURE — 36415 COLL VENOUS BLD VENIPUNCTURE: CPT | Mod: PO

## 2018-06-28 PROCEDURE — 84439 ASSAY OF FREE THYROXINE: CPT

## 2018-06-28 PROCEDURE — 85025 COMPLETE CBC W/AUTO DIFF WBC: CPT

## 2018-06-28 PROCEDURE — 80061 LIPID PANEL: CPT

## 2018-06-28 PROCEDURE — 84443 ASSAY THYROID STIM HORMONE: CPT

## 2018-06-28 PROCEDURE — 83036 HEMOGLOBIN GLYCOSYLATED A1C: CPT

## 2018-06-28 PROCEDURE — 80053 COMPREHEN METABOLIC PANEL: CPT

## 2018-06-29 LAB — T4 FREE SERPL-MCNC: 0.95 NG/DL

## 2018-07-05 ENCOUNTER — HOSPITAL ENCOUNTER (OUTPATIENT)
Dept: RADIOLOGY | Facility: HOSPITAL | Age: 73
Discharge: HOME | End: 2018-07-05
Attending: FAMILY MEDICINE
Payer: MEDICARE

## 2018-07-05 ENCOUNTER — OFFICE VISIT (OUTPATIENT)
Dept: FAMILY MEDICINE | Facility: CLINIC | Age: 73
End: 2018-07-05
Payer: MEDICARE

## 2018-07-05 VITALS
OXYGEN SATURATION: 93 % | DIASTOLIC BLOOD PRESSURE: 59 MMHG | SYSTOLIC BLOOD PRESSURE: 124 MMHG | TEMPERATURE: 98 F | HEART RATE: 92 BPM

## 2018-07-05 DIAGNOSIS — R13.10 DYSPHAGIA, UNSPECIFIED TYPE: ICD-10-CM

## 2018-07-05 DIAGNOSIS — S69.91XA INJURY OF RIGHT WRIST, INITIAL ENCOUNTER: ICD-10-CM

## 2018-07-05 DIAGNOSIS — I10 ESSENTIAL HYPERTENSION: Primary | Chronic | ICD-10-CM

## 2018-07-05 DIAGNOSIS — Z95.5 S/P CORONARY ARTERY STENT PLACEMENT: Chronic | ICD-10-CM

## 2018-07-05 DIAGNOSIS — E11.42 TYPE 2 DIABETES, CONTROLLED, WITH PERIPHERAL NEUROPATHY: Chronic | ICD-10-CM

## 2018-07-05 DIAGNOSIS — E03.4 HYPOTHYROIDISM DUE TO ACQUIRED ATROPHY OF THYROID: Chronic | ICD-10-CM

## 2018-07-05 DIAGNOSIS — Z12.39 BREAST CANCER SCREENING: ICD-10-CM

## 2018-07-05 PROCEDURE — 99999 PR PBB SHADOW E&M-EST. PATIENT-LVL III: CPT | Mod: PBBFAC,,, | Performed by: FAMILY MEDICINE

## 2018-07-05 PROCEDURE — 73110 X-RAY EXAM OF WRIST: CPT | Mod: 26,RT,, | Performed by: RADIOLOGY

## 2018-07-05 PROCEDURE — 99214 OFFICE O/P EST MOD 30 MIN: CPT | Mod: S$GLB,,, | Performed by: FAMILY MEDICINE

## 2018-07-05 PROCEDURE — 3078F DIAST BP <80 MM HG: CPT | Mod: CPTII,S$GLB,, | Performed by: FAMILY MEDICINE

## 2018-07-05 PROCEDURE — 3074F SYST BP LT 130 MM HG: CPT | Mod: CPTII,S$GLB,, | Performed by: FAMILY MEDICINE

## 2018-07-05 PROCEDURE — 73110 X-RAY EXAM OF WRIST: CPT | Mod: TC,FY,PO,RT

## 2018-07-05 PROCEDURE — 3045F PR MOST RECENT HEMOGLOBIN A1C LEVEL 7.0-9.0%: CPT | Mod: CPTII,S$GLB,, | Performed by: FAMILY MEDICINE

## 2018-07-05 RX ORDER — LEVOTHYROXINE SODIUM 125 UG/1
125 TABLET ORAL
Qty: 30 TABLET | Refills: 5 | Status: SHIPPED | OUTPATIENT
Start: 2018-07-05 | End: 2019-01-16 | Stop reason: SDUPTHER

## 2018-07-05 NOTE — PROGRESS NOTES
Chief Complaint:    Chief Complaint   Patient presents with    Follow-up       History of Present Illness:   patient presents today for three-month follow-up,   She says she has been having some wrist pain ever since she sustained a fall.  Makes it hard for her to get out of a chair.    Her A1c is 7.8 slightly down from last time she says she is finding it hard to for the insulin.    Lipids chemistries stable   She complains of trouble swallowing she does suffer with Parkinson's disease.  He      ROS:  Review of Systems   Constitutional: Negative for activity change, chills, fatigue, fever and unexpected weight change.   HENT: Negative for congestion, ear discharge, ear pain, hearing loss, postnasal drip and rhinorrhea.    Eyes: Negative for pain and visual disturbance.   Respiratory: Negative for cough, chest tightness and shortness of breath.    Cardiovascular: Negative for chest pain and palpitations.   Gastrointestinal: Negative for abdominal pain, diarrhea and vomiting.   Endocrine: Negative for heat intolerance.   Genitourinary: Negative for dysuria, flank pain, frequency and hematuria.   Musculoskeletal: Negative for back pain, gait problem and neck pain.   Skin: Negative for color change and rash.   Neurological: Negative for dizziness, tremors, seizures, numbness and headaches.   Psychiatric/Behavioral: Negative for agitation, hallucinations, self-injury, sleep disturbance and suicidal ideas. The patient is not nervous/anxious.        Past Medical History:   Diagnosis Date    Abnormal nuclear stress test 11/1/2015    Background diabetic retinopathy 12/28/2015    CAD (coronary artery disease) 2002    stents    Cataract     Gait disorder 10/12/2012    Glaucoma     MI (myocardial infarction)     Obesity     CORINE (obstructive sleep apnea)     Other and unspecified hyperlipidemia     PD (Parkinson's disease) 2002    tremor-predominant (per patient)    Type II or unspecified type diabetes mellitus  without mention of complication, not stated as uncontrolled 2002      am    Unspecified essential hypertension        Social History:  Social History     Social History    Marital status:      Spouse name: N/A    Number of children: N/A    Years of education: N/A     Social History Main Topics    Smoking status: Former Smoker     Years: 40.00     Quit date: 10/12/2010    Smokeless tobacco: Never Used      Comment: On & off for the past few years    Alcohol use No    Drug use: No    Sexual activity: Not Currently     Other Topics Concern    None     Social History Narrative     . Lives with spouse. Has 2 children. 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       Family History:   family history includes Breast cancer in her paternal grandmother; Diabetes in her maternal grandmother; Glaucoma in her maternal grandmother and mother; Heart attack (age of onset: 65) in her father and mother; Hypertension in her father, maternal grandmother, and mother; Ovarian cancer in her mother; Parkinsonism in her other and paternal aunt; Tremor in her father.    Health Maintenance   Topic Date Due    Mammogram  07/20/2018    Influenza Vaccine  08/01/2018    Eye Exam  02/26/2019    Foot Exam  03/28/2019    Lipid Panel  06/28/2019    Hemoglobin A1c  06/28/2019    DEXA SCAN  07/20/2020    TETANUS VACCINE  11/19/2025    Hepatitis C Screening  Addressed    Zoster Vaccine  Addressed    Pneumococcal (65+)  Completed       Physical Exam:    Vital Signs  Temp: 97.5 °F (36.4 °C)  Temp src: Tympanic  Pulse: 92  SpO2: (!) 93 %  BP: (!) 124/59  BP Location: Left arm  Patient Position: Sitting  Pain Score:   5  Pain Loc: Leg]    There is no height or weight on file to calculate BMI.    Physical Exam   Constitutional: She is oriented to person, place, and time. She appears well-developed.   HENT:   Mouth/Throat: Oropharynx is clear and moist.   Eyes: Conjunctivae are normal. Pupils are equal,  round, and reactive to light.   Neck: Normal range of motion. Neck supple.   Cardiovascular: Normal rate, regular rhythm and normal heart sounds.    No murmur heard.  Pulmonary/Chest: Effort normal and breath sounds normal. No respiratory distress. She has no wheezes. She has no rales. She exhibits no tenderness.   Abdominal: Soft. She exhibits no distension and no mass. There is no tenderness. There is no guarding.   Musculoskeletal: She exhibits no edema or tenderness.        Hands:  Lymphadenopathy:     She has no cervical adenopathy.   Neurological: She is alert and oriented to person, place, and time. She has normal reflexes.   Skin: Skin is warm and dry.   Psychiatric: She has a normal mood and affect. Her behavior is normal. Judgment and thought content normal.         Diabetes Management Status    Statin: Taking  ACE/ARB: Taking    Screening or Prevention Patient's value Goal Complete/Controlled?   HgA1C Testing and Control   Lab Results   Component Value Date    HGBA1C 7.8 (H) 06/28/2018      Annually/Less than 8% Yes   Lipid profile : 06/28/2018 Annually Yes   LDL control Lab Results   Component Value Date    LDLCALC 63.8 06/28/2018    Annually/Less than 100 mg/dl  Yes   Nephropathy screening Lab Results   Component Value Date    LABMICR 19.0 06/28/2018     Lab Results   Component Value Date    PROTEINUA Negative 02/21/2018    Annually Yes   Blood pressure BP Readings from Last 1 Encounters:   07/05/18 (!) 124/59    Less than 140/90 Yes   Dilated retinal exam : 02/26/2018 Annually Yes   Foot exam   : 03/28/2018 Annually Yes       Assessment:      ICD-10-CM ICD-9-CM   1. Essential hypertension I10 401.9   2. Type 2 diabetes, controlled, with peripheral neuropathy E11.42 250.60     357.2   3. Hypothyroidism due to acquired atrophy of thyroid E03.4 244.8     246.8   4. CAD: S/P coronary artery stent placement Z95.5 V45.82   5. Injury of right wrist, initial encounter S69.91XA 959.3   6. Breast cancer  screening Z12.31 V76.10   7. Dysphagia, unspecified type R13.10 787.20         Plan:   he will x-ray the wrist to rule out a fracture.    Schedule a modified barium swallow up for the swallowing difficulty.    Other medical problems are stable    urge compliance with eating habits  Orders Placed This Encounter   Procedures    X-Ray Wrist Complete Right    Mammo Digital Screening Bilat with CAD    Fl Modified Barium Swallow Speech    Comprehensive metabolic panel    CBC auto differential    Lipid panel    Hemoglobin A1c    Microalbumin/creatinine urine ratio    SLP video swallow       Current Outpatient Prescriptions   Medication Sig Dispense Refill    ACCU-CHEK SOFTCLIX LANCETS Misc TEST THREE TIMES DAILY 300 each 3    acetaminophen (TYLENOL) 325 MG tablet Take 2 tablets (650 mg total) by mouth every 6 (six) hours as needed.  0    aspirin (ECOTRIN) 81 MG EC tablet Take 1 tablet (81 mg total) by mouth once daily. 30 tablet 0    blood sugar diagnostic Strp 1 strip by Misc.(Non-Drug; Combo Route) route 3 (three) times daily. Accuchek Felicia Plus Test Strips 300 strip 3    blood-glucose meter kit Accu-chek Felicia Plus Meter 1 each 0    carbidopa-levodopa  mg (SINEMET)  mg per tablet TAKE 1 TABLET BY MOUTH 5 TIMES DAILY 150 tablet 11    clindamycin (CLEOCIN) 150 MG capsule       insulin NPH-insulin regular, 70/30, (NOVOLIN 70/30) 100 unit/mL (70-30) injection Inject 55 Units into the skin 3 (three) times daily. (Patient taking differently: Inject 35 Units into the skin 3 (three) times daily. ) 49.5 mL 6    insulin syringe-needle U-100 0.5 mL 31 gauge x 5/16 Syrg USE THREE TIMES DAILY 100 each 11    isosorbide mononitrate (IMDUR) 120 MG 24 hr tablet TAKE ONE TABLET BY MOUTH ONCE DAILY 90 tablet 3    latanoprost 0.005 % ophthalmic solution Place 1 drop into both eyes every evening. 1 Bottle 6    levothyroxine (SYNTHROID) 125 MCG tablet Take 1 tablet (125 mcg total) by mouth before breakfast.  30 tablet 5    losartan (COZAAR) 50 MG tablet TAKE ONE TABLET BY MOUTH ONCE DAILY 90 tablet 3    metFORMIN (GLUCOPHAGE) 500 MG tablet TAKE ONE TABLET BY MOUTH THREE TIMES DAILY 270 tablet 3    metoprolol succinate (TOPROL-XL) 25 MG 24 hr tablet TAKE ONE TABLET BY MOUTH ONCE DAILY 30 tablet 11    multivit-min-FA-lycopen-lutein (CENTRUM SILVER) 0.4-300-250 mg-mcg-mcg Tab Take 1 tablet by mouth once daily.      pramipexole (MIRAPEX) 1 MG tablet Take 1 tablet (1 mg total) by mouth 3 (three) times daily. 270 tablet 3    pravastatin (PRAVACHOL) 40 MG tablet TAKE ONE TABLET BY MOUTH ONCE DAILY 90 tablet 3    docusate sodium (COLACE) 100 MG capsule Take 1 capsule (100 mg total) by mouth 2 (two) times daily. 60 capsule 3     No current facility-administered medications for this visit.        Medications Discontinued During This Encounter   Medication Reason    metoprolol succinate (TOPROL-XL) 50 MG 24 hr tablet Patient no longer taking    potassium chloride SA (K-DUR,KLOR-CON) 20 MEQ tablet Patient no longer taking    ranolazine (RANEXA) 500 MG Tb12 Patient no longer taking    levothyroxine (SYNTHROID) 100 MCG tablet Reorder       Follow-up in about 3 months (around 10/5/2018).      Anna Penn MD

## 2018-07-11 ENCOUNTER — TELEPHONE (OUTPATIENT)
Dept: FAMILY MEDICINE | Facility: CLINIC | Age: 73
End: 2018-07-11

## 2018-07-11 DIAGNOSIS — E11.42 DIABETIC POLYNEUROPATHY ASSOCIATED WITH TYPE 2 DIABETES MELLITUS: Primary | ICD-10-CM

## 2018-07-11 DIAGNOSIS — E11.42 TYPE 2 DIABETES MELLITUS WITH DIABETIC POLYNEUROPATHY, WITH LONG-TERM CURRENT USE OF INSULIN: ICD-10-CM

## 2018-07-11 DIAGNOSIS — Z79.4 TYPE 2 DIABETES MELLITUS WITH DIABETIC POLYNEUROPATHY, WITH LONG-TERM CURRENT USE OF INSULIN: ICD-10-CM

## 2018-07-18 ENCOUNTER — OFFICE VISIT (OUTPATIENT)
Dept: NEUROLOGY | Facility: CLINIC | Age: 73
End: 2018-07-18
Payer: MEDICARE

## 2018-07-18 VITALS
SYSTOLIC BLOOD PRESSURE: 140 MMHG | DIASTOLIC BLOOD PRESSURE: 80 MMHG | HEART RATE: 60 BPM | WEIGHT: 236 LBS | BODY MASS INDEX: 37.93 KG/M2 | HEIGHT: 66 IN

## 2018-07-18 DIAGNOSIS — G20.A1 PD (PARKINSON'S DISEASE): Primary | Chronic | ICD-10-CM

## 2018-07-18 DIAGNOSIS — I10 ESSENTIAL HYPERTENSION: Chronic | ICD-10-CM

## 2018-07-18 DIAGNOSIS — Z95.5 S/P CORONARY ARTERY STENT PLACEMENT: Chronic | ICD-10-CM

## 2018-07-18 DIAGNOSIS — I35.0 NONRHEUMATIC AORTIC VALVE STENOSIS: ICD-10-CM

## 2018-07-18 PROCEDURE — 99999 PR PBB SHADOW E&M-EST. PATIENT-LVL III: CPT | Mod: PBBFAC,,, | Performed by: PSYCHIATRY & NEUROLOGY

## 2018-07-18 PROCEDURE — 3079F DIAST BP 80-89 MM HG: CPT | Mod: CPTII,S$GLB,, | Performed by: PSYCHIATRY & NEUROLOGY

## 2018-07-18 PROCEDURE — 99214 OFFICE O/P EST MOD 30 MIN: CPT | Mod: S$GLB,,, | Performed by: PSYCHIATRY & NEUROLOGY

## 2018-07-18 PROCEDURE — 3077F SYST BP >= 140 MM HG: CPT | Mod: CPTII,S$GLB,, | Performed by: PSYCHIATRY & NEUROLOGY

## 2018-07-18 RX ORDER — LATANOPROST 50 UG/ML
SOLUTION/ DROPS OPHTHALMIC
Qty: 3 BOTTLE | Refills: 6 | Status: SHIPPED | OUTPATIENT
Start: 2018-07-18 | End: 2019-04-15 | Stop reason: SDUPTHER

## 2018-07-18 NOTE — PROGRESS NOTES
This is a 72-year-old patient who has an established diagnosis of parkinsonism as well as morbid obesity.  The patient returns today with her  for routine followup last having been seen in October 2017.    The patient states that since last being seen, she has become increasingly generally weakened to the point that she is now not able to ambulate at all.  The patient had previously been able to utilize a walker for short distances at a household level but now indicates that she can barely stand for pivot transfer.  The patient's  who has significant disabilities of his own, is having difficulty managing her at home.    The patient indicates that tremor is not always present but sometimes can interfere with activities of daily living.  The patient states that she spends most of her day in a wheelchair or in any easy chair.  The patient has not participated in physical therapy despite being recommended to her several times in the past.    The patient denies to me any dyspnea at rest but does become short of breath with activity and effort.  The patient has not had any chest pain. She denies any nausea or vomiting.  She denies any dizziness.      ROS:  GENERAL: No fever, chills,  or weight loss.  SKIN: No rashes, itching or changes in color or texture of skin.  HEAD: No headaches or recent head trauma.  EYES: Visual acuity fine. No photophobia, ocular pain or diplopia.  EARS: Denies ear pain, discharge or vertigo.  NOSE: No loss of smell, no epistaxis or postnasal drip.  MOUTH & THROAT: No hoarseness or change in voice. No excessive gum bleeding.  NODES: Denies swollen glands.  CHEST:  Reports BENOIT, but denies cyanosis, wheezing, cough and sputum production.  CARDIOVASCULAR: Denies chest pain, PND, orthopnea   ABDOMEN: Appetite fine. No weight loss. Denies diarrhea, abdominal pain, hematemesis or blood in stool.  URINARY: No flank pain, dysuria or hematuria.  PERIPHERAL VASCULAR: No claudication or  cyanosis.  MUSCULOSKELETAL: No joint stiffness or swelling. Denies back pain.  NEUROLOGIC: No history of seizures, or unexplained loss of consciousness.    The patient's past history, family history and social history were reviewed with the patient and her medical records.  No changes are made at this time.    PE:   VITAL SIGNS:  Blood pressure 140/80, pulse 68, weight 236 lb, height 5 ft 6 in, BMI 38.09  APPEARANCE: Well nourished, well developed, who is sitting in her wheelchair without any respiratory distress. She is not able to self propel her wheelchair.  Her  who ambulates with a single-point cane is able to manage the wheelchair.  HEAD: Normocephalic, atraumatic.  EYES: PERRL. EOMI.  Non-icteric sclerae.    EARS: TM's intact. Light reflex normal. No retraction or perforation.    NOSE: Mucosa pink. Airway clear.  MOUTH & THROAT: No tonsillar enlargement. No pharyngeal erythema or exudate. No stridor.  NECK: Supple. No bruits.  CHEST: Lungs clear to auscultation.  CARDIOVASCULAR: Regular rhythm without significant murmurs.  ABDOMEN: Bowel sounds normal. Not distended.  Significant truncal obesity is noted.  MUSCULOSKELETAL:  No bony deformity seen.  Muscle tone is normal. No cogwheel rigidity is felt with passive range of motion at either wrist or either elbow.  Muscle mass is extremely difficult to determine because of her obesity.  NEUROLOGIC:   Mental Status:  The patient is well oriented to person, time, place, and situation.  The patient is attentive to the environment and cooperative for the exam.  Cranial Nerves: II-XII grossly intact. Fundoscopic exam is normal.  No hemorrhage, exudate or papilledema is present. The extraocular muscles are intact in the cardinal directions of gaze.  No ptosis is present. Facial features are symmetrical.  Speech is normal in fluency, diction, and phrasing.  Tongue protrudes in the midline.    Gait and Station:  The patient is confined to her wheelchair.  Motor:   No downdrift of either arm when held at shoulder level.  Manual muscle testing of proximal and distal muscles of both upper and lower extremities demonstrates a rather severe degree of generalized upper and lower extremity weakness.  Sensory:  Intact both upper and lower extremities to pin prick, touch, and vibration.  Cerebellar:  Finger to nose done well.  The patient is able to perform finger tapping rather rapidly bilaterally. She however had marked difficulty with toe tapping.  I did not see any resting tremor on today's visit.  Reflexes:  Stretch reflexes are absent both upper and lower extremities.  Plantar stimulation is flexor bilaterally and no pathological reflexes are seen      Assessment:  1.  Parkinson's disease symptomatic  2.  Morbid obesity  3.  Severe generalized deconditioning    Recommendations:  1.  Referral to home health for physical therapy, occupational therapy and perhaps speech therapy to evaluate swallow because of her history of Parkinson's disease.  The patient has become severely deconditioned and is not produce being in any type of physical activity that is absolutely necessary to maintain her health.  2.  Continue same Parkinson's medications and routine follow-up with Neurology in 6 months.    This was a 35 min visit with the patient and her  with over 50% of the time spent counseling the patient regarding the significant deconditioning that she has and the need for active physical therapy.      This note is generated with speech recognition software and is subject to transcription error and sound alike phrases that may be missed by proofreading.

## 2018-07-19 ENCOUNTER — TELEPHONE (OUTPATIENT)
Dept: NEUROLOGY | Facility: CLINIC | Age: 73
End: 2018-07-19

## 2018-07-19 NOTE — TELEPHONE ENCOUNTER
----- Message from Petrona Blum LPN sent at 7/19/2018  9:38 AM CDT -----  Contact: [facility]      ----- Message -----  From: Emmie Cho  Sent: 7/19/2018   8:31 AM  To: [facility] Staff    Would like to consult with nurse regarding not being able to accept patient because they are not in network with her insurance. Please call back at [phone].      Thanks,  Emmie Cho

## 2018-07-20 ENCOUNTER — TELEPHONE (OUTPATIENT)
Dept: NEUROLOGY | Facility: CLINIC | Age: 73
End: 2018-07-20

## 2018-07-20 NOTE — TELEPHONE ENCOUNTER
----- Message from Janna Almaguer sent at 7/20/2018 11:40 AM CDT -----  Patient state she have questions/concerns. Please adv/call [phone].//cw

## 2018-07-20 NOTE — TELEPHONE ENCOUNTER
Returned pts call/asked her to contact her ins. Or her spouse and let us know who we can send her home health orders too. I've sent to [facility] and also [facility] and they both dont accept her ins.

## 2018-07-23 ENCOUNTER — TELEPHONE (OUTPATIENT)
Dept: FAMILY MEDICINE | Facility: CLINIC | Age: 73
End: 2018-07-23

## 2018-07-23 DIAGNOSIS — E13.42: Primary | ICD-10-CM

## 2018-07-24 ENCOUNTER — TELEPHONE (OUTPATIENT)
Dept: NEUROLOGY | Facility: CLINIC | Age: 73
End: 2018-07-24

## 2018-07-24 NOTE — TELEPHONE ENCOUNTER
----- Message from Vic Kellogg sent at 7/24/2018  4:31 PM CDT -----  Contact: pt   Is returning the nurse call.                                                         [phone] (home)

## 2018-07-24 NOTE — TELEPHONE ENCOUNTER
----- Message from Earline Huffman sent at 7/23/2018  4:48 PM CDT -----  Contact: pt  Calling in regards to please give pt a call back and please advise [phone] (home) [phone] (work)

## 2018-07-27 ENCOUNTER — TELEPHONE (OUTPATIENT)
Dept: NEUROLOGY | Facility: CLINIC | Age: 73
End: 2018-07-27

## 2018-07-27 NOTE — TELEPHONE ENCOUNTER
----- Message from Kimberley Rao sent at 7/27/2018  1:19 PM CDT -----  Contact: Pt   Pt request call back to see about getting Home Health Care. .[phone] ([place])

## 2018-07-27 NOTE — TELEPHONE ENCOUNTER
Pt will try to change ins. companys or contact peoples to find out who they use for Home health/and let us know so we can fax the referral to them,

## 2018-08-16 ENCOUNTER — HOSPITAL ENCOUNTER (OUTPATIENT)
Dept: RADIOLOGY | Facility: HOSPITAL | Age: 73
Discharge: HOME | End: 2018-08-16
Attending: FAMILY MEDICINE
Payer: MEDICARE

## 2018-08-16 VITALS — WEIGHT: 236 LBS | HEIGHT: 66 IN | BODY MASS INDEX: 37.93 KG/M2

## 2018-08-16 DIAGNOSIS — Z12.39 BREAST CANCER SCREENING: ICD-10-CM

## 2018-08-16 PROCEDURE — 77067 SCR MAMMO BI INCL CAD: CPT | Mod: 26,,, | Performed by: RADIOLOGY

## 2018-08-16 PROCEDURE — 77067 SCR MAMMO BI INCL CAD: CPT | Mod: TC

## 2018-10-05 ENCOUNTER — LAB VISIT (OUTPATIENT)
Dept: LAB | Facility: HOSPITAL | Age: 73
End: 2018-10-05
Attending: FAMILY MEDICINE
Payer: MEDICARE

## 2018-10-05 ENCOUNTER — IMMUNIZATION (OUTPATIENT)
Dept: FAMILY MEDICINE | Facility: CLINIC | Age: 73
End: 2018-10-05
Payer: MEDICARE

## 2018-10-05 DIAGNOSIS — E11.42 TYPE 2 DIABETES, CONTROLLED, WITH PERIPHERAL NEUROPATHY: Chronic | ICD-10-CM

## 2018-10-05 LAB
ALBUMIN SERPL BCP-MCNC: 3.8 G/DL
ALP SERPL-CCNC: 49 U/L
ALT SERPL W/O P-5'-P-CCNC: 23 U/L
ANION GAP SERPL CALC-SCNC: 10 MMOL/L
AST SERPL-CCNC: 39 U/L
BASOPHILS # BLD AUTO: 0.04 K/UL
BASOPHILS NFR BLD: 0.5 %
BILIRUB SERPL-MCNC: 0.6 MG/DL
BUN SERPL-MCNC: 16 MG/DL
CALCIUM SERPL-MCNC: 9.5 MG/DL
CHLORIDE SERPL-SCNC: 101 MMOL/L
CHOLEST SERPL-MCNC: 106 MG/DL
CHOLEST/HDLC SERPL: 3.3 {RATIO}
CO2 SERPL-SCNC: 25 MMOL/L
CREAT SERPL-MCNC: 0.9 MG/DL
DIFFERENTIAL METHOD: ABNORMAL
EOSINOPHIL # BLD AUTO: 0.3 K/UL
EOSINOPHIL NFR BLD: 3.2 %
ERYTHROCYTE [DISTWIDTH] IN BLOOD BY AUTOMATED COUNT: 13.4 %
EST. GFR  (AFRICAN AMERICAN): >60 ML/MIN/1.73 M^2
EST. GFR  (NON AFRICAN AMERICAN): >60 ML/MIN/1.73 M^2
ESTIMATED AVG GLUCOSE: 169 MG/DL
GLUCOSE SERPL-MCNC: 171 MG/DL
HBA1C MFR BLD HPLC: 7.5 %
HCT VFR BLD AUTO: 41.1 %
HDLC SERPL-MCNC: 32 MG/DL
HDLC SERPL: 30.2 %
HGB BLD-MCNC: 13.3 G/DL
IMM GRANULOCYTES # BLD AUTO: 0.05 K/UL
IMM GRANULOCYTES NFR BLD AUTO: 0.6 %
LDLC SERPL CALC-MCNC: 53.6 MG/DL
LYMPHOCYTES # BLD AUTO: 2.7 K/UL
LYMPHOCYTES NFR BLD: 32.8 %
MCH RBC QN AUTO: 30.2 PG
MCHC RBC AUTO-ENTMCNC: 32.4 G/DL
MCV RBC AUTO: 93 FL
MONOCYTES # BLD AUTO: 0.7 K/UL
MONOCYTES NFR BLD: 8.3 %
NEUTROPHILS # BLD AUTO: 4.4 K/UL
NEUTROPHILS NFR BLD: 54.6 %
NONHDLC SERPL-MCNC: 74 MG/DL
NRBC BLD-RTO: 0 /100 WBC
PLATELET # BLD AUTO: 249 K/UL
PMV BLD AUTO: 10.4 FL
POTASSIUM SERPL-SCNC: 4.3 MMOL/L
PROT SERPL-MCNC: 6.5 G/DL
RBC # BLD AUTO: 4.41 M/UL
SODIUM SERPL-SCNC: 136 MMOL/L
TRIGL SERPL-MCNC: 102 MG/DL
WBC # BLD AUTO: 8.1 K/UL

## 2018-10-05 PROCEDURE — 85025 COMPLETE CBC W/AUTO DIFF WBC: CPT

## 2018-10-05 PROCEDURE — 36415 COLL VENOUS BLD VENIPUNCTURE: CPT | Mod: PO

## 2018-10-05 PROCEDURE — 80053 COMPREHEN METABOLIC PANEL: CPT

## 2018-10-05 PROCEDURE — 80061 LIPID PANEL: CPT

## 2018-10-05 PROCEDURE — 90662 IIV NO PRSV INCREASED AG IM: CPT | Mod: PBBFAC,PO

## 2018-10-05 PROCEDURE — 83036 HEMOGLOBIN GLYCOSYLATED A1C: CPT

## 2018-10-18 ENCOUNTER — OFFICE VISIT (OUTPATIENT)
Dept: FAMILY MEDICINE | Facility: CLINIC | Age: 73
End: 2018-10-18
Payer: MEDICARE

## 2018-10-18 VITALS
HEIGHT: 67 IN | BODY MASS INDEX: 37.27 KG/M2 | DIASTOLIC BLOOD PRESSURE: 60 MMHG | TEMPERATURE: 96 F | OXYGEN SATURATION: 94 % | HEART RATE: 83 BPM | WEIGHT: 237.44 LBS | SYSTOLIC BLOOD PRESSURE: 120 MMHG

## 2018-10-18 DIAGNOSIS — E11.42 TYPE 2 DIABETES, CONTROLLED, WITH PERIPHERAL NEUROPATHY: Primary | Chronic | ICD-10-CM

## 2018-10-18 DIAGNOSIS — I10 ESSENTIAL HYPERTENSION: Chronic | ICD-10-CM

## 2018-10-18 DIAGNOSIS — E03.4 HYPOTHYROIDISM DUE TO ACQUIRED ATROPHY OF THYROID: Chronic | ICD-10-CM

## 2018-10-18 DIAGNOSIS — G20.A1 PD (PARKINSON'S DISEASE): Chronic | ICD-10-CM

## 2018-10-18 DIAGNOSIS — K21.9 GASTROESOPHAGEAL REFLUX DISEASE, ESOPHAGITIS PRESENCE NOT SPECIFIED: ICD-10-CM

## 2018-10-18 PROBLEM — I20.9 AP (ANGINA PECTORIS): Status: RESOLVED | Noted: 2018-03-23 | Resolved: 2018-10-18

## 2018-10-18 PROCEDURE — 99213 OFFICE O/P EST LOW 20 MIN: CPT | Mod: PBBFAC,PO | Performed by: FAMILY MEDICINE

## 2018-10-18 PROCEDURE — 3074F SYST BP LT 130 MM HG: CPT | Mod: CPTII,,, | Performed by: FAMILY MEDICINE

## 2018-10-18 PROCEDURE — 3078F DIAST BP <80 MM HG: CPT | Mod: CPTII,,, | Performed by: FAMILY MEDICINE

## 2018-10-18 PROCEDURE — 3045F PR MOST RECENT HEMOGLOBIN A1C LEVEL 7.0-9.0%: CPT | Mod: CPTII,,, | Performed by: FAMILY MEDICINE

## 2018-10-18 PROCEDURE — 1101F PT FALLS ASSESS-DOCD LE1/YR: CPT | Mod: CPTII,,, | Performed by: FAMILY MEDICINE

## 2018-10-18 PROCEDURE — 99214 OFFICE O/P EST MOD 30 MIN: CPT | Mod: S$PBB,,, | Performed by: FAMILY MEDICINE

## 2018-10-18 PROCEDURE — 99999 PR PBB SHADOW E&M-EST. PATIENT-LVL III: CPT | Mod: PBBFAC,,, | Performed by: FAMILY MEDICINE

## 2018-10-18 RX ORDER — POTASSIUM CHLORIDE 20 MEQ/1
20 TABLET, EXTENDED RELEASE ORAL DAILY
COMMUNITY
End: 2019-04-30 | Stop reason: SDUPTHER

## 2018-10-18 RX ORDER — PANTOPRAZOLE SODIUM 40 MG/1
40 TABLET, DELAYED RELEASE ORAL DAILY
Qty: 30 TABLET | Refills: 11 | Status: SHIPPED | OUTPATIENT
Start: 2018-10-18 | End: 2019-11-18

## 2018-10-18 NOTE — PROGRESS NOTES
Chief Complaint:    Chief Complaint   Patient presents with    Follow-up       History of Present Illness:    Patient is here for three-month follow-up,  A1c is down to 7.5, blood pressure is okay at home she monitors it  Lipids chemistries are at goal.  She wants to take a different good medication she says insurance will not pay for omeprazole or Nexium.  ROS:  Review of Systems   Constitutional: Negative for activity change, chills, fatigue, fever and unexpected weight change.   HENT: Negative for congestion, ear discharge, ear pain, hearing loss, postnasal drip and rhinorrhea.    Eyes: Negative for pain and visual disturbance.   Respiratory: Negative for cough, chest tightness and shortness of breath.    Cardiovascular: Negative for chest pain and palpitations.   Gastrointestinal: Negative for abdominal pain, diarrhea and vomiting.   Endocrine: Negative for heat intolerance.   Genitourinary: Negative for dysuria, flank pain, frequency and hematuria.   Musculoskeletal: Negative for back pain, gait problem and neck pain.   Skin: Negative for color change and rash.   Neurological: Negative for dizziness, tremors, seizures, numbness and headaches.   Psychiatric/Behavioral: Negative for agitation, hallucinations, self-injury, sleep disturbance and suicidal ideas. The patient is not nervous/anxious.        Past Medical History:   Diagnosis Date    Abnormal nuclear stress test 11/1/2015    Background diabetic retinopathy 12/28/2015    CAD (coronary artery disease) 2002    stents    Cataract     Gait disorder 10/12/2012    Glaucoma     MI (myocardial infarction)     Obesity     CORINE (obstructive sleep apnea)     Other and unspecified hyperlipidemia     PD (Parkinson's disease) 2002    tremor-predominant (per patient)    Type II or unspecified type diabetes mellitus without mention of complication, not stated as uncontrolled 2002      am    Unspecified essential hypertension        Social  "History:  Social History     Socioeconomic History    Marital status:      Spouse name: None    Number of children: None    Years of education: None    Highest education level: None   Social Needs    Financial resource strain: None    Food insecurity - worry: None    Food insecurity - inability: None    Transportation needs - medical: None    Transportation needs - non-medical: None   Occupational History    None   Tobacco Use    Smoking status: Former Smoker     Years: 40.00     Last attempt to quit: 10/12/2010     Years since quittin.0    Smokeless tobacco: Never Used    Tobacco comment: On & off for the past few years   Substance and Sexual Activity    Alcohol use: No     Alcohol/week: 0.0 oz    Drug use: No    Sexual activity: Not Currently   Other Topics Concern    None   Social History Narrative     . Lives with spouse. Has 2 children. 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       Family History:   family history includes Breast cancer in her paternal grandmother; Diabetes in her maternal grandmother; Glaucoma in her maternal grandmother and mother; Heart attack (age of onset: 65) in her father and mother; Hypertension in her father, maternal grandmother, and mother; Ovarian cancer in her mother; Parkinsonism in her other and paternal aunt; Tremor in her father.    Health Maintenance   Topic Date Due    Eye Exam  2019    Foot Exam  2019    Mammogram  2019    Lipid Panel  10/05/2019    Hemoglobin A1c  10/05/2019    DEXA SCAN  2020    TETANUS VACCINE  2025    Pneumococcal (65+)  Completed    Influenza Vaccine  Completed    Hepatitis C Screening  Addressed    Zoster Vaccine  Addressed       Physical Exam:    Vital Signs  Temp: 96.4 °F (35.8 °C)  Temp src: Tympanic  Pulse: 83  SpO2: (!) 94 %  BP: 120/60  BP Location: Left arm  Patient Position: Sitting  Pain Score: 0-No pain  Height and Weight  Height: 5' 6.5" (168.9 " cm)  Weight: 107.7 kg (237 lb 7 oz)  BSA (Calculated - sq m): 2.25 sq meters  BMI (Calculated): 37.8  Weight in (lb) to have BMI = 25: 156.9]    Body mass index is 37.75 kg/m².    Physical Exam   Constitutional: She is oriented to person, place, and time. She appears well-developed.   HENT:   Mouth/Throat: Oropharynx is clear and moist.   Eyes: Conjunctivae are normal. Pupils are equal, round, and reactive to light.   Neck: Normal range of motion. Neck supple.   Cardiovascular: Normal rate, regular rhythm and normal heart sounds.   No murmur heard.  Pulmonary/Chest: Effort normal and breath sounds normal. No respiratory distress. She has no wheezes. She has no rales. She exhibits no tenderness.   Abdominal: Soft. She exhibits no distension and no mass. There is no tenderness. There is no guarding.   Musculoskeletal: She exhibits no edema or tenderness.   wc bound     Lymphadenopathy:     She has no cervical adenopathy.   Neurological: She is alert and oriented to person, place, and time.   Skin: Skin is warm and dry.   Psychiatric: She has a normal mood and affect. Her behavior is normal. Judgment and thought content normal.       Lab Results   Component Value Date    CHOL 106 (L) 10/05/2018    CHOL 118 (L) 06/28/2018    CHOL 134 03/21/2018    TRIG 102 10/05/2018    TRIG 96 06/28/2018    TRIG 250 (H) 03/21/2018    HDL 32 (L) 10/05/2018    HDL 35 (L) 06/28/2018    HDL 38 (L) 03/21/2018    TOTALCHOLEST 3.3 10/05/2018    TOTALCHOLEST 3.4 06/28/2018    TOTALCHOLEST 3.5 03/21/2018    NONHDLCHOL 74 10/05/2018    NONHDLCHOL 83 06/28/2018    NONHDLCHOL 96 03/21/2018       Lab Results   Component Value Date    HGBA1C 7.5 (H) 10/05/2018       Assessment:      ICD-10-CM ICD-9-CM   1. Type 2 diabetes, controlled, with peripheral neuropathy E11.42 250.60     357.2   2. PD (Parkinson's disease) G20 332.0   3. Hypothyroidism due to acquired atrophy of thyroid E03.4 244.8     246.8   4. Essential hypertension I10 401.9   5.  Gastroesophageal reflux disease, esophagitis presence not specified K21.9 530.81         Plan:  Will start her on Protonix 40 mg once daily  Other medical problems stable  Please continue to monitor home blood pressure readings  Follow-up 3 months  Orders Placed This Encounter   Procedures    Comprehensive metabolic panel    CBC auto differential    Lipid panel    Hemoglobin A1c    Microalbumin/creatinine urine ratio    TSH       Current Outpatient Medications   Medication Sig Dispense Refill    ACCU-CHEK SOFTCLIX LANCETS Misc TEST THREE TIMES DAILY 300 each 3    acetaminophen (TYLENOL) 325 MG tablet Take 2 tablets (650 mg total) by mouth every 6 (six) hours as needed.  0    aspirin (ECOTRIN) 81 MG EC tablet Take 1 tablet (81 mg total) by mouth once daily. 30 tablet 0    blood sugar diagnostic Strp 1 strip by Misc.(Non-Drug; Combo Route) route 3 (three) times daily. Accuchek Felicia Plus Test Strips 300 strip 3    blood-glucose meter kit Accu-chek Felicia Plus Meter 1 each 0    carbidopa-levodopa  mg (SINEMET)  mg per tablet TAKE 1 TABLET BY MOUTH 5 TIMES DAILY 150 tablet 11    insulin NPH-insulin regular, 70/30, (NOVOLIN 70/30) 100 unit/mL (70-30) injection Inject 55 Units into the skin 3 (three) times daily. (Patient taking differently: Inject 35 Units into the skin 3 (three) times daily. ) 49.5 mL 6    insulin syringe-needle U-100 0.5 mL 31 gauge x 5/16 Syrg USE THREE TIMES DAILY 100 each 11    isosorbide mononitrate (IMDUR) 120 MG 24 hr tablet TAKE ONE TABLET BY MOUTH ONCE DAILY 90 tablet 3    latanoprost 0.005 % ophthalmic solution INSTILL ONE DROP INTO EACH EYE IN THE EVENING 3 Bottle 6    levothyroxine (SYNTHROID) 125 MCG tablet Take 1 tablet (125 mcg total) by mouth before breakfast. 30 tablet 5    losartan (COZAAR) 50 MG tablet TAKE ONE TABLET BY MOUTH ONCE DAILY 90 tablet 3    metFORMIN (GLUCOPHAGE) 500 MG tablet TAKE ONE TABLET BY MOUTH THREE TIMES DAILY 270 tablet 3     metoprolol succinate (TOPROL-XL) 25 MG 24 hr tablet TAKE ONE TABLET BY MOUTH ONCE DAILY 30 tablet 11    multivit-min-FA-lycopen-lutein (CENTRUM SILVER) 0.4-300-250 mg-mcg-mcg Tab Take 1 tablet by mouth once daily.      potassium chloride SA (K-DUR,KLOR-CON) 20 MEQ tablet Take 20 mEq by mouth once daily.      pramipexole (MIRAPEX) 1 MG tablet Take 1 tablet (1 mg total) by mouth 3 (three) times daily. 270 tablet 3    pravastatin (PRAVACHOL) 40 MG tablet TAKE ONE TABLET BY MOUTH ONCE DAILY 90 tablet 3    pantoprazole (PROTONIX) 40 MG tablet Take 1 tablet (40 mg total) by mouth once daily. 30 tablet 11     No current facility-administered medications for this visit.        There are no discontinued medications.    Follow-up in about 3 months (around 1/18/2019).      Dr Anna Penn MD    Disclaimer: This note is prepared using voice recognition system and as such is likely to have errors and is not proof read.

## 2018-11-19 DIAGNOSIS — I25.10 CORONARY ARTERY DISEASE INVOLVING NATIVE CORONARY ARTERY WITHOUT ANGINA PECTORIS, UNSPECIFIED WHETHER NATIVE OR TRANSPLANTED HEART: ICD-10-CM

## 2018-11-19 RX ORDER — PRAVASTATIN SODIUM 40 MG/1
TABLET ORAL
Qty: 90 TABLET | Refills: 3 | Status: SHIPPED | OUTPATIENT
Start: 2018-11-19

## 2018-11-19 RX ORDER — POTASSIUM CHLORIDE 1500 MG/1
TABLET, EXTENDED RELEASE ORAL
Qty: 90 TABLET | Refills: 3 | Status: SHIPPED | OUTPATIENT
Start: 2018-11-19 | End: 2019-04-30

## 2018-11-27 RX ORDER — PRAMIPEXOLE DIHYDROCHLORIDE 1 MG/1
TABLET ORAL
Qty: 270 TABLET | Refills: 3 | Status: SHIPPED | OUTPATIENT
Start: 2018-11-27

## 2018-12-18 RX ORDER — METFORMIN HYDROCHLORIDE 500 MG/1
TABLET ORAL
Qty: 270 TABLET | Refills: 3 | Status: SHIPPED | OUTPATIENT
Start: 2018-12-18

## 2019-01-16 RX ORDER — LEVOTHYROXINE SODIUM 125 UG/1
TABLET ORAL
Qty: 90 TABLET | Refills: 1 | Status: SHIPPED | OUTPATIENT
Start: 2019-01-16 | End: 2019-05-15 | Stop reason: SDUPTHER

## 2019-01-18 ENCOUNTER — LAB VISIT (OUTPATIENT)
Dept: LAB | Facility: HOSPITAL | Age: 74
End: 2019-01-18
Attending: FAMILY MEDICINE
Payer: MEDICARE

## 2019-01-18 DIAGNOSIS — E03.4 HYPOTHYROIDISM DUE TO ACQUIRED ATROPHY OF THYROID: Chronic | ICD-10-CM

## 2019-01-18 DIAGNOSIS — E11.42 TYPE 2 DIABETES, CONTROLLED, WITH PERIPHERAL NEUROPATHY: Chronic | ICD-10-CM

## 2019-01-18 LAB
ALBUMIN SERPL BCP-MCNC: 4 G/DL
ALP SERPL-CCNC: 51 U/L
ALT SERPL W/O P-5'-P-CCNC: 37 U/L
ANION GAP SERPL CALC-SCNC: 10 MMOL/L
AST SERPL-CCNC: 34 U/L
BASOPHILS # BLD AUTO: 0.03 K/UL
BASOPHILS NFR BLD: 0.4 %
BILIRUB SERPL-MCNC: 0.7 MG/DL
BUN SERPL-MCNC: 15 MG/DL
CALCIUM SERPL-MCNC: 10.4 MG/DL
CHLORIDE SERPL-SCNC: 100 MMOL/L
CHOLEST SERPL-MCNC: 163 MG/DL
CHOLEST/HDLC SERPL: 3.7 {RATIO}
CO2 SERPL-SCNC: 25 MMOL/L
CREAT SERPL-MCNC: 0.8 MG/DL
DIFFERENTIAL METHOD: NORMAL
EOSINOPHIL # BLD AUTO: 0.2 K/UL
EOSINOPHIL NFR BLD: 2.4 %
ERYTHROCYTE [DISTWIDTH] IN BLOOD BY AUTOMATED COUNT: 12.8 %
EST. GFR  (AFRICAN AMERICAN): >60 ML/MIN/1.73 M^2
EST. GFR  (NON AFRICAN AMERICAN): >60 ML/MIN/1.73 M^2
GLUCOSE SERPL-MCNC: 85 MG/DL
HCT VFR BLD AUTO: 40.8 %
HDLC SERPL-MCNC: 44 MG/DL
HDLC SERPL: 27 %
HGB BLD-MCNC: 13.3 G/DL
IMM GRANULOCYTES # BLD AUTO: 0.03 K/UL
IMM GRANULOCYTES NFR BLD AUTO: 0.4 %
LDLC SERPL CALC-MCNC: 97.2 MG/DL
LYMPHOCYTES # BLD AUTO: 2.5 K/UL
LYMPHOCYTES NFR BLD: 36.1 %
MCH RBC QN AUTO: 31 PG
MCHC RBC AUTO-ENTMCNC: 32.6 G/DL
MCV RBC AUTO: 95 FL
MONOCYTES # BLD AUTO: 0.6 K/UL
MONOCYTES NFR BLD: 8.8 %
NEUTROPHILS # BLD AUTO: 3.6 K/UL
NEUTROPHILS NFR BLD: 51.9 %
NONHDLC SERPL-MCNC: 119 MG/DL
NRBC BLD-RTO: 0 /100 WBC
PLATELET # BLD AUTO: 296 K/UL
PMV BLD AUTO: 10.1 FL
POTASSIUM SERPL-SCNC: 4.5 MMOL/L
PROT SERPL-MCNC: 7.3 G/DL
RBC # BLD AUTO: 4.29 M/UL
SODIUM SERPL-SCNC: 135 MMOL/L
T4 FREE SERPL-MCNC: 0.77 NG/DL
TRIGL SERPL-MCNC: 109 MG/DL
TSH SERPL DL<=0.005 MIU/L-ACNC: 19.83 UIU/ML
WBC # BLD AUTO: 6.95 K/UL

## 2019-01-18 PROCEDURE — 84443 ASSAY THYROID STIM HORMONE: CPT

## 2019-01-18 PROCEDURE — 80061 LIPID PANEL: CPT

## 2019-01-18 PROCEDURE — 83036 HEMOGLOBIN GLYCOSYLATED A1C: CPT

## 2019-01-18 PROCEDURE — 80053 COMPREHEN METABOLIC PANEL: CPT

## 2019-01-18 PROCEDURE — 36415 COLL VENOUS BLD VENIPUNCTURE: CPT | Mod: PO

## 2019-01-18 PROCEDURE — 84439 ASSAY OF FREE THYROXINE: CPT

## 2019-01-18 PROCEDURE — 85025 COMPLETE CBC W/AUTO DIFF WBC: CPT

## 2019-01-19 LAB
ESTIMATED AVG GLUCOSE: 160 MG/DL
HBA1C MFR BLD HPLC: 7.2 %

## 2019-01-23 ENCOUNTER — OFFICE VISIT (OUTPATIENT)
Dept: FAMILY MEDICINE | Facility: CLINIC | Age: 74
End: 2019-01-23
Payer: MEDICARE

## 2019-01-23 VITALS
DIASTOLIC BLOOD PRESSURE: 64 MMHG | BODY MASS INDEX: 37.91 KG/M2 | WEIGHT: 235.88 LBS | HEIGHT: 66 IN | OXYGEN SATURATION: 96 % | HEART RATE: 88 BPM | SYSTOLIC BLOOD PRESSURE: 126 MMHG

## 2019-01-23 DIAGNOSIS — E11.42 TYPE 2 DIABETES, CONTROLLED, WITH PERIPHERAL NEUROPATHY: Primary | Chronic | ICD-10-CM

## 2019-01-23 DIAGNOSIS — E03.4 HYPOTHYROIDISM DUE TO ACQUIRED ATROPHY OF THYROID: Chronic | ICD-10-CM

## 2019-01-23 DIAGNOSIS — I10 ESSENTIAL HYPERTENSION: Chronic | ICD-10-CM

## 2019-01-23 DIAGNOSIS — Z79.4 INSULIN LONG-TERM USE: Chronic | ICD-10-CM

## 2019-01-23 DIAGNOSIS — G20.A1 PARKINSON DISEASE: ICD-10-CM

## 2019-01-23 DIAGNOSIS — I25.10 CORONARY ARTERY DISEASE, ANGINA PRESENCE UNSPECIFIED, UNSPECIFIED VESSEL OR LESION TYPE, UNSPECIFIED WHETHER NATIVE OR TRANSPLANTED HEART: Primary | ICD-10-CM

## 2019-01-23 DIAGNOSIS — E66.01 MORBID OBESITY WITH BMI OF 40.0-44.9, ADULT: Chronic | ICD-10-CM

## 2019-01-23 DIAGNOSIS — E78.5 HYPERLIPIDEMIA LDL GOAL <70: ICD-10-CM

## 2019-01-23 PROCEDURE — 3074F SYST BP LT 130 MM HG: CPT | Mod: CPTII,S$GLB,, | Performed by: FAMILY MEDICINE

## 2019-01-23 PROCEDURE — 99214 PR OFFICE/OUTPT VISIT, EST, LEVL IV, 30-39 MIN: ICD-10-PCS | Mod: S$GLB,,, | Performed by: FAMILY MEDICINE

## 2019-01-23 PROCEDURE — 3074F PR MOST RECENT SYSTOLIC BLOOD PRESSURE < 130 MM HG: ICD-10-PCS | Mod: CPTII,S$GLB,, | Performed by: FAMILY MEDICINE

## 2019-01-23 PROCEDURE — 99214 OFFICE O/P EST MOD 30 MIN: CPT | Mod: S$GLB,,, | Performed by: FAMILY MEDICINE

## 2019-01-23 PROCEDURE — 1101F PT FALLS ASSESS-DOCD LE1/YR: CPT | Mod: CPTII,S$GLB,, | Performed by: FAMILY MEDICINE

## 2019-01-23 PROCEDURE — 1101F PR PT FALLS ASSESS DOC 0-1 FALLS W/OUT INJ PAST YR: ICD-10-PCS | Mod: CPTII,S$GLB,, | Performed by: FAMILY MEDICINE

## 2019-01-23 PROCEDURE — 3078F DIAST BP <80 MM HG: CPT | Mod: CPTII,S$GLB,, | Performed by: FAMILY MEDICINE

## 2019-01-23 PROCEDURE — 3045F PR MOST RECENT HEMOGLOBIN A1C LEVEL 7.0-9.0%: CPT | Mod: CPTII,S$GLB,, | Performed by: FAMILY MEDICINE

## 2019-01-23 PROCEDURE — 99999 PR PBB SHADOW E&M-EST. PATIENT-LVL III: ICD-10-PCS | Mod: PBBFAC,,, | Performed by: FAMILY MEDICINE

## 2019-01-23 PROCEDURE — 3078F PR MOST RECENT DIASTOLIC BLOOD PRESSURE < 80 MM HG: ICD-10-PCS | Mod: CPTII,S$GLB,, | Performed by: FAMILY MEDICINE

## 2019-01-23 PROCEDURE — 3045F PR MOST RECENT HEMOGLOBIN A1C LEVEL 7.0-9.0%: ICD-10-PCS | Mod: CPTII,S$GLB,, | Performed by: FAMILY MEDICINE

## 2019-01-23 PROCEDURE — 99999 PR PBB SHADOW E&M-EST. PATIENT-LVL III: CPT | Mod: PBBFAC,,, | Performed by: FAMILY MEDICINE

## 2019-01-23 NOTE — PROGRESS NOTES
Chief Complaint:    Chief Complaint   Patient presents with    Follow-up     3 mo f/u , doing okay today. Had a toothe pulled, has bruising in her face, shaking from Parkinson       History of Present Illness:    Patient is here for three-month follow-up,  A1c 7.2 blood pressure stable lipids chemistries at goal.  Complains of occasional pain in the left upper quadrant and when she changes her posture but no other symptoms no nausea vomiting diarrhea constipation.  This is been a chronic problem.  Thyroid levels are off because she ran out of her thyroid pills and her lipids are also off.    ROS:  Review of Systems   Constitutional: Negative for activity change, chills, fatigue, fever and unexpected weight change.   HENT: Negative for congestion, ear discharge, ear pain, hearing loss, postnasal drip and rhinorrhea.    Eyes: Negative for pain and visual disturbance.   Respiratory: Negative for cough, chest tightness and shortness of breath.    Cardiovascular: Negative for chest pain and palpitations.   Gastrointestinal: Negative for abdominal pain, diarrhea and vomiting.   Endocrine: Negative for heat intolerance.   Genitourinary: Negative for dysuria, flank pain, frequency and hematuria.   Musculoskeletal: Negative for back pain, gait problem and neck pain.   Skin: Negative for color change and rash.   Neurological: Negative for dizziness, tremors, seizures, numbness and headaches.   Psychiatric/Behavioral: Negative for agitation, hallucinations, self-injury, sleep disturbance and suicidal ideas. The patient is not nervous/anxious.        Past Medical History:   Diagnosis Date    Abnormal nuclear stress test 11/1/2015    Background diabetic retinopathy 12/28/2015    CAD (coronary artery disease) 2002    stents    Cataract     Gait disorder 10/12/2012    Glaucoma     MI (myocardial infarction)     Obesity     CORINE (obstructive sleep apnea)     Other and unspecified hyperlipidemia     PD (Parkinson's  disease)     tremor-predominant (per patient)    Type II or unspecified type diabetes mellitus without mention of complication, not stated as uncontrolled       am    Unspecified essential hypertension        Social History:  Social History     Socioeconomic History    Marital status:      Spouse name: None    Number of children: None    Years of education: None    Highest education level: None   Social Needs    Financial resource strain: None    Food insecurity - worry: None    Food insecurity - inability: None    Transportation needs - medical: None    Transportation needs - non-medical: None   Occupational History    None   Tobacco Use    Smoking status: Former Smoker     Years: 40.00     Last attempt to quit: 10/12/2010     Years since quittin.2    Smokeless tobacco: Never Used    Tobacco comment: On & off for the past few years   Substance and Sexual Activity    Alcohol use: No     Alcohol/week: 0.0 oz    Drug use: No    Sexual activity: Not Currently   Other Topics Concern    None   Social History Narrative     . Lives with spouse. Has 2 children. 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       Family History:   family history includes Breast cancer in her paternal grandmother; Diabetes in her maternal grandmother; Glaucoma in her maternal grandmother and mother; Heart attack (age of onset: 65) in her father and mother; Hypertension in her father, maternal grandmother, and mother; Ovarian cancer in her mother; Parkinsonism in her other and paternal aunt; Tremor in her father.    Health Maintenance   Topic Date Due    Foot Exam  2019    Mammogram  2019    Eye Exam  2019    Lipid Panel  2020    Hemoglobin A1c  2020    DEXA SCAN  2020    TETANUS VACCINE  2025    Pneumococcal Vaccine (65+ Low/Medium Risk)  Completed    Influenza Vaccine  Completed    Hepatitis C Screening  Addressed    Zoster  "Vaccine  Addressed       Physical Exam:    Vital Signs  Pulse: 88  SpO2: 96 %  BP: 126/64  BP Location: Left arm  Patient Position: Sitting  Pain Score: 0-No pain  Height and Weight  Height: 5' 6" (167.6 cm)  Weight: 107 kg (235 lb 14.3 oz)  BSA (Calculated - sq m): 2.23 sq meters  BMI (Calculated): 38.2  Weight in (lb) to have BMI = 25: 154.6]    Body mass index is 38.07 kg/m².    Physical Exam   Constitutional: She is oriented to person, place, and time. She appears well-developed.   HENT:   Mouth/Throat: Oropharynx is clear and moist.   Eyes: Conjunctivae are normal. Pupils are equal, round, and reactive to light.   Neck: Normal range of motion. Neck supple.   Cardiovascular: Normal rate, regular rhythm and normal heart sounds.   No murmur heard.  Pulmonary/Chest: Effort normal and breath sounds normal. No respiratory distress. She has no wheezes. She has no rales. She exhibits no tenderness.   Abdominal: Soft. She exhibits no distension and no mass. There is no tenderness. There is no guarding.   Musculoskeletal: She exhibits no edema or tenderness.   wc bound     Lymphadenopathy:     She has no cervical adenopathy.   Neurological: She is alert and oriented to person, place, and time.   Skin: Skin is warm and dry.   Psychiatric: She has a normal mood and affect. Her behavior is normal. Judgment and thought content normal.       Lab Results   Component Value Date    CHOL 163 01/18/2019    CHOL 106 (L) 10/05/2018    CHOL 118 (L) 06/28/2018    TRIG 109 01/18/2019    TRIG 102 10/05/2018    TRIG 96 06/28/2018    HDL 44 01/18/2019    HDL 32 (L) 10/05/2018    HDL 35 (L) 06/28/2018    TOTALCHOLEST 3.7 01/18/2019    TOTALCHOLEST 3.3 10/05/2018    TOTALCHOLEST 3.4 06/28/2018    NONHDLCHOL 119 01/18/2019    NONHDLCHOL 74 10/05/2018    NONHDLCHOL 83 06/28/2018       Lab Results   Component Value Date    HGBA1C 7.2 (H) 01/18/2019       Assessment:      ICD-10-CM ICD-9-CM   1. Type 2 diabetes, controlled, with peripheral " neuropathy E11.42 250.60     357.2   2. Hypothyroidism due to acquired atrophy of thyroid E03.4 244.8     246.8   3. Hyperlipidemia LDL goal <70 E78.5 272.4   4. Essential hypertension I10 401.9   5. Insulin long-term use Z79.4 V58.67   6. Parkinson disease G20 332.0   7. Morbid obesity with BMI of 40.0-44.9, adult E66.01 278.01    Z68.41 V85.41         Plan:  Patient has resumed her thyroid medication and statin drug  Other medical problems stable continue current meds and plan  The exam is unremarkable and the left upper quadrant and was limited by patient's obesity patient has been instructed to monitor his symptoms closely should she be get any worse or should she develop any new symptoms to call us back.    Follow-up 3 months  Orders Placed This Encounter   Procedures    CBC auto differential    Microalbumin/creatinine urine ratio    Lipid panel    Hemoglobin A1c    Comprehensive metabolic panel    TSH       Current Outpatient Medications   Medication Sig Dispense Refill    ACCU-CHEK SOFTCLIX LANCETS Misc TEST THREE TIMES DAILY 300 each 3    acetaminophen (TYLENOL) 325 MG tablet Take 2 tablets (650 mg total) by mouth every 6 (six) hours as needed.  0    aspirin (ECOTRIN) 81 MG EC tablet Take 1 tablet (81 mg total) by mouth once daily. 30 tablet 0    blood sugar diagnostic Strp 1 strip by Misc.(Non-Drug; Combo Route) route 3 (three) times daily. Accuchek Felicia Plus Test Strips 300 strip 3    blood-glucose meter kit Accu-chek Felicia Plus Meter 1 each 0    carbidopa-levodopa  mg (SINEMET)  mg per tablet TAKE 1 TABLET BY MOUTH 5 TIMES DAILY 150 tablet 11    insulin NPH-insulin regular, 70/30, (NOVOLIN 70/30) 100 unit/mL (70-30) injection Inject 55 Units into the skin 3 (three) times daily. (Patient taking differently: Inject 35 Units into the skin 3 (three) times daily. ) 49.5 mL 6    insulin syringe-needle U-100 0.5 mL 31 gauge x 5/16 Syrg USE THREE TIMES DAILY 100 each 11    isosorbide  mononitrate (IMDUR) 120 MG 24 hr tablet TAKE ONE TABLET BY MOUTH ONCE DAILY 90 tablet 3    KLOR-CON M20 20 mEq tablet TAKE ONE TABLET BY MOUTH ONCE DAILY 90 tablet 3    latanoprost 0.005 % ophthalmic solution INSTILL ONE DROP INTO EACH EYE IN THE EVENING 3 Bottle 6    levothyroxine (SYNTHROID) 125 MCG tablet TAKE 1 TABLET BY MOUTH IN THE MORNING BEFORE  BREAKFAST 90 tablet 1    losartan (COZAAR) 50 MG tablet TAKE ONE TABLET BY MOUTH ONCE DAILY 90 tablet 3    metFORMIN (GLUCOPHAGE) 500 MG tablet TAKE ONE TABLET BY MOUTH THREE TIMES DAILY 270 tablet 3    metoprolol succinate (TOPROL-XL) 25 MG 24 hr tablet TAKE ONE TABLET BY MOUTH ONCE DAILY 30 tablet 11    multivit-min-FA-lycopen-lutein (CENTRUM SILVER) 0.4-300-250 mg-mcg-mcg Tab Take 1 tablet by mouth once daily.      pantoprazole (PROTONIX) 40 MG tablet Take 1 tablet (40 mg total) by mouth once daily. 30 tablet 11    potassium chloride SA (K-DUR,KLOR-CON) 20 MEQ tablet Take 20 mEq by mouth once daily.      pramipexole (MIRAPEX) 1 MG tablet TAKE ONE TABLET BY MOUTH THREE TIMES DAILY 270 tablet 3    pravastatin (PRAVACHOL) 40 MG tablet TAKE ONE TABLET BY MOUTH ONCE DAILY 90 tablet 3     No current facility-administered medications for this visit.        There are no discontinued medications.    Follow-up in about 3 months (around 4/23/2019).      Dr Anna Penn MD    Disclaimer: This note is prepared using voice recognition system and as such is likely to have errors and is not proof read.

## 2019-01-24 ENCOUNTER — OFFICE VISIT (OUTPATIENT)
Dept: CARDIOLOGY | Facility: CLINIC | Age: 74
End: 2019-01-24
Payer: MEDICARE

## 2019-01-24 ENCOUNTER — CLINICAL SUPPORT (OUTPATIENT)
Dept: CARDIOLOGY | Facility: CLINIC | Age: 74
End: 2019-01-24
Payer: MEDICARE

## 2019-01-24 VITALS
BODY MASS INDEX: 38.05 KG/M2 | HEART RATE: 81 BPM | DIASTOLIC BLOOD PRESSURE: 58 MMHG | WEIGHT: 236.75 LBS | SYSTOLIC BLOOD PRESSURE: 122 MMHG | HEIGHT: 66 IN

## 2019-01-24 DIAGNOSIS — G20.A1 PD (PARKINSON'S DISEASE): Chronic | ICD-10-CM

## 2019-01-24 DIAGNOSIS — I35.0 NONRHEUMATIC AORTIC VALVE STENOSIS: ICD-10-CM

## 2019-01-24 DIAGNOSIS — E78.5 HYPERLIPIDEMIA LDL GOAL <70: ICD-10-CM

## 2019-01-24 DIAGNOSIS — Z79.4 INSULIN LONG-TERM USE: Chronic | ICD-10-CM

## 2019-01-24 DIAGNOSIS — I10 ESSENTIAL HYPERTENSION: Chronic | ICD-10-CM

## 2019-01-24 DIAGNOSIS — E66.01 MORBID OBESITY WITH BMI OF 40.0-44.9, ADULT: Chronic | ICD-10-CM

## 2019-01-24 DIAGNOSIS — I25.10 CORONARY ARTERY DISEASE, ANGINA PRESENCE UNSPECIFIED, UNSPECIFIED VESSEL OR LESION TYPE, UNSPECIFIED WHETHER NATIVE OR TRANSPLANTED HEART: ICD-10-CM

## 2019-01-24 DIAGNOSIS — R26.9 GAIT DISORDER: ICD-10-CM

## 2019-01-24 DIAGNOSIS — G20.A1 PARKINSON DISEASE: ICD-10-CM

## 2019-01-24 DIAGNOSIS — Z95.5 S/P CORONARY ARTERY STENT PLACEMENT: Chronic | ICD-10-CM

## 2019-01-24 DIAGNOSIS — E03.4 HYPOTHYROIDISM DUE TO ACQUIRED ATROPHY OF THYROID: Chronic | ICD-10-CM

## 2019-01-24 DIAGNOSIS — I25.10 CORONARY ARTERY DISEASE INVOLVING NATIVE CORONARY ARTERY OF NATIVE HEART WITHOUT ANGINA PECTORIS: Primary | Chronic | ICD-10-CM

## 2019-01-24 DIAGNOSIS — R20.2 PARESTHESIA: ICD-10-CM

## 2019-01-24 DIAGNOSIS — E11.42 TYPE 2 DIABETES, CONTROLLED, WITH PERIPHERAL NEUROPATHY: Chronic | ICD-10-CM

## 2019-01-24 DIAGNOSIS — E11.42 DIABETIC POLYNEUROPATHY ASSOCIATED WITH TYPE 2 DIABETES MELLITUS: ICD-10-CM

## 2019-01-24 DIAGNOSIS — G47.33 OSA (OBSTRUCTIVE SLEEP APNEA): Chronic | ICD-10-CM

## 2019-01-24 PROCEDURE — 99999 PR PBB SHADOW E&M-EST. PATIENT-LVL III: ICD-10-PCS | Mod: PBBFAC,,, | Performed by: INTERNAL MEDICINE

## 2019-01-24 PROCEDURE — 3078F PR MOST RECENT DIASTOLIC BLOOD PRESSURE < 80 MM HG: ICD-10-PCS | Mod: CPTII,S$GLB,, | Performed by: INTERNAL MEDICINE

## 2019-01-24 PROCEDURE — 99999 PR PBB SHADOW E&M-EST. PATIENT-LVL III: CPT | Mod: PBBFAC,,, | Performed by: INTERNAL MEDICINE

## 2019-01-24 PROCEDURE — 99214 PR OFFICE/OUTPT VISIT, EST, LEVL IV, 30-39 MIN: ICD-10-PCS | Mod: S$GLB,,, | Performed by: INTERNAL MEDICINE

## 2019-01-24 PROCEDURE — 3074F PR MOST RECENT SYSTOLIC BLOOD PRESSURE < 130 MM HG: ICD-10-PCS | Mod: CPTII,S$GLB,, | Performed by: INTERNAL MEDICINE

## 2019-01-24 PROCEDURE — 93000 ELECTROCARDIOGRAM COMPLETE: CPT | Mod: S$GLB,,, | Performed by: INTERNAL MEDICINE

## 2019-01-24 PROCEDURE — 99214 OFFICE O/P EST MOD 30 MIN: CPT | Mod: S$GLB,,, | Performed by: INTERNAL MEDICINE

## 2019-01-24 PROCEDURE — 3078F DIAST BP <80 MM HG: CPT | Mod: CPTII,S$GLB,, | Performed by: INTERNAL MEDICINE

## 2019-01-24 PROCEDURE — 3045F PR MOST RECENT HEMOGLOBIN A1C LEVEL 7.0-9.0%: CPT | Mod: CPTII,S$GLB,, | Performed by: INTERNAL MEDICINE

## 2019-01-24 PROCEDURE — 99499 UNLISTED E&M SERVICE: CPT | Mod: S$GLB,,, | Performed by: INTERNAL MEDICINE

## 2019-01-24 PROCEDURE — 1101F PR PT FALLS ASSESS DOC 0-1 FALLS W/OUT INJ PAST YR: ICD-10-PCS | Mod: CPTII,S$GLB,, | Performed by: INTERNAL MEDICINE

## 2019-01-24 PROCEDURE — 99499 RISK ADDL DX/OHS AUDIT: ICD-10-PCS | Mod: S$GLB,,, | Performed by: INTERNAL MEDICINE

## 2019-01-24 PROCEDURE — 93000 EKG 12-LEAD: ICD-10-PCS | Mod: S$GLB,,, | Performed by: INTERNAL MEDICINE

## 2019-01-24 PROCEDURE — 3074F SYST BP LT 130 MM HG: CPT | Mod: CPTII,S$GLB,, | Performed by: INTERNAL MEDICINE

## 2019-01-24 PROCEDURE — 3045F PR MOST RECENT HEMOGLOBIN A1C LEVEL 7.0-9.0%: ICD-10-PCS | Mod: CPTII,S$GLB,, | Performed by: INTERNAL MEDICINE

## 2019-01-24 PROCEDURE — 1101F PT FALLS ASSESS-DOCD LE1/YR: CPT | Mod: CPTII,S$GLB,, | Performed by: INTERNAL MEDICINE

## 2019-01-24 NOTE — PROGRESS NOTES
Subjective:   Patient ID:  Ross Isbell is a 73 y.o. female who presents for follow up of Follow-up (6 months per Dr. Huffman); light headed; and Leg Pain      HPI  A 72 yo feamle with parkinson who is very ;limited with ehr functional capacity she is on [facility] edentary side she has been washing clothes  She walks up the ramp of her house she does leg exercises. Shila gait is unsteady. Has had several falls. She ahs h/o cad that was treated by me percutaneously in 2015 after she was turned down for cabg. She had stenting of the lad diagonal and rca. .she has mild as. Has no angina that I can tell.she is diabetic he ra1c is 7.2% she tries to be compliant with diet. No chf symptoms. Has no angina last cardiolite negative in 1 year . Echo normal lvf as mild.  Has symptioms to suggest sleep apnea. Gets fatigued . She needs tooth extraction/.she is hypothyroid being addressed by DR DE LEON. HAS RAN OUT OF STATINS LEADING TO HER ABNORMAL LIPIDS.   Past Medical History:   Diagnosis Date    Abnormal nuclear stress test 11/1/2015    Background diabetic retinopathy 12/28/2015    CAD (coronary artery disease) 2002    stents    Cataract     Gait disorder 10/12/2012    Glaucoma     MI (myocardial infarction)     Obesity     CORINE (obstructive sleep apnea)     Other and unspecified hyperlipidemia     PD (Parkinson's disease) 2002    tremor-predominant (per patient)    Type II or unspecified type diabetes mellitus without mention of complication, not stated as uncontrolled 2002      am    Unspecified essential hypertension        Past Surgical History:   Procedure Laterality Date    BREAST BIOPSY Left     benign - about 3 yrs ago    CORONARY ANGIOPLASTY  2002    CORONARY STENT PLACEMENT  2002    DILATION AND CURETTAGE OF UTERUS      FOREIGN BODY REMOVAL      HEART CATH-LEFT Left 11/5/2015    [facility]    HEART CATH-LEFT Left 11/2/2015    [facility]    HEART CATH-LEFT/pci stent lad Left 11/10/2015    [facility] at [facility] CATH LAB    INNER EAR SURGERY      TONSILLECTOMY, ADENOIDECTOMY         Social History     Tobacco Use    Smoking status: Former Smoker     Years: 40.00     Last attempt to quit: 10/12/2010     Years since quittin.2    Smokeless tobacco: Never Used    Tobacco comment: On & off for the past few years   Substance Use Topics    Alcohol use: No     Alcohol/week: 0.0 oz    Drug use: No       Family History   Problem Relation Age of Onset    Parkinsonism Other     Tremor Father     Hypertension Father     Heart attack Father 65        MI    Parkinsonism Paternal Aunt     Glaucoma Mother     Hypertension Mother     Heart attack Mother 65        MI    Ovarian cancer Mother     Glaucoma Maternal Grandmother     Hypertension Maternal Grandmother     Diabetes Maternal Grandmother     Breast cancer Paternal Grandmother        Current Outpatient Medications   Medication Sig    ACCU-CHEK SOFTCLIX LANCETS Misc TEST THREE TIMES DAILY    acetaminophen (TYLENOL) 325 MG tablet Take 2 tablets (650 mg total) by mouth every 6 (six) hours as needed.    aspirin (ECOTRIN) 81 MG EC tablet Take 1 tablet (81 mg total) by mouth once daily.    blood sugar diagnostic Strp 1 strip by Misc.(Non-Drug; Combo Route) route 3 (three) times daily. Accuchek Felicia Plus Test Strips    blood-glucose meter kit Accu-chek Felicia Plus Meter    carbidopa-levodopa  mg (SINEMET)  mg per tablet TAKE 1 TABLET BY MOUTH 5 TIMES DAILY    insulin NPH-insulin regular, 70/30, (NOVOLIN 70/30) 100 unit/mL (70-30) injection Inject 55 Units into the skin 3 (three) times daily. (Patient taking differently: Inject 35 Units into the skin 3 (three) times daily. )    insulin syringe-needle U-100 0.5 mL 31 gauge x 5/16 Syrg USE THREE TIMES DAILY    isosorbide mononitrate (IMDUR) 120 MG 24 hr tablet TAKE ONE TABLET BY MOUTH ONCE  DAILY    latanoprost 0.005 % ophthalmic solution INSTILL ONE DROP INTO EACH EYE IN THE EVENING    levothyroxine (SYNTHROID) 125 MCG tablet TAKE 1 TABLET BY MOUTH IN THE MORNING BEFORE  BREAKFAST    losartan (COZAAR) 50 MG tablet TAKE ONE TABLET BY MOUTH ONCE DAILY    metFORMIN (GLUCOPHAGE) 500 MG tablet TAKE ONE TABLET BY MOUTH THREE TIMES DAILY    metoprolol succinate (TOPROL-XL) 25 MG 24 hr tablet TAKE ONE TABLET BY MOUTH ONCE DAILY    multivit-min-FA-lycopen-lutein (CENTRUM SILVER) 0.4-300-250 mg-mcg-mcg Tab Take 1 tablet by mouth once daily.    pantoprazole (PROTONIX) 40 MG tablet Take 1 tablet (40 mg total) by mouth once daily.    potassium chloride SA (K-DUR,KLOR-CON) 20 MEQ tablet Take 20 mEq by mouth once daily.    pramipexole (MIRAPEX) 1 MG tablet TAKE ONE TABLET BY MOUTH THREE TIMES DAILY    pravastatin (PRAVACHOL) 40 MG tablet TAKE ONE TABLET BY MOUTH ONCE DAILY    KLOR-CON M20 20 mEq tablet TAKE ONE TABLET BY MOUTH ONCE DAILY     No current facility-administered medications for this visit.      Current Outpatient Medications on File Prior to Visit   Medication Sig    ACCU-CHEK SOFTCLIX LANCETS Misc TEST THREE TIMES DAILY    acetaminophen (TYLENOL) 325 MG tablet Take 2 tablets (650 mg total) by mouth every 6 (six) hours as needed.    aspirin (ECOTRIN) 81 MG EC tablet Take 1 tablet (81 mg total) by mouth once daily.    blood sugar diagnostic Strp 1 strip by Misc.(Non-Drug; Combo Route) route 3 (three) times daily. Accuchek Felicia Plus Test Strips    blood-glucose meter kit Accu-chek Felicia Plus Meter    carbidopa-levodopa  mg (SINEMET)  mg per tablet TAKE 1 TABLET BY MOUTH 5 TIMES DAILY    insulin NPH-insulin regular, 70/30, (NOVOLIN 70/30) 100 unit/mL (70-30) injection Inject 55 Units into the skin 3 (three) times daily. (Patient taking differently: Inject 35 Units into the skin 3 (three) times daily. )    insulin syringe-needle U-100 0.5 mL 31 gauge x 5/16 Syrg USE THREE  TIMES DAILY    isosorbide mononitrate (IMDUR) 120 MG 24 hr tablet TAKE ONE TABLET BY MOUTH ONCE DAILY    latanoprost 0.005 % ophthalmic solution INSTILL ONE DROP INTO EACH EYE IN THE EVENING    levothyroxine (SYNTHROID) 125 MCG tablet TAKE 1 TABLET BY MOUTH IN THE MORNING BEFORE  BREAKFAST    losartan (COZAAR) 50 MG tablet TAKE ONE TABLET BY MOUTH ONCE DAILY    metFORMIN (GLUCOPHAGE) 500 MG tablet TAKE ONE TABLET BY MOUTH THREE TIMES DAILY    metoprolol succinate (TOPROL-XL) 25 MG 24 hr tablet TAKE ONE TABLET BY MOUTH ONCE DAILY    multivit-min-FA-lycopen-lutein (CENTRUM SILVER) 0.4-300-250 mg-mcg-mcg Tab Take 1 tablet by mouth once daily.    pantoprazole (PROTONIX) 40 MG tablet Take 1 tablet (40 mg total) by mouth once daily.    potassium chloride SA (K-DUR,KLOR-CON) 20 MEQ tablet Take 20 mEq by mouth once daily.    pramipexole (MIRAPEX) 1 MG tablet TAKE ONE TABLET BY MOUTH THREE TIMES DAILY    pravastatin (PRAVACHOL) 40 MG tablet TAKE ONE TABLET BY MOUTH ONCE DAILY    KLOR-CON M20 20 mEq tablet TAKE ONE TABLET BY MOUTH ONCE DAILY     No current facility-administered medications on file prior to visit.      Review of patient's allergies indicates:   Allergen Reactions    Codeine      Other reaction(s): Nausea    Codeine sulfate      Unknown^    Diphenhydramine hcl      Unknown^  Other reaction(s): Rash    Penicillins      Unknown^  Other reaction(s): Hives    Sulfa (sulfonamide antibiotics) Hives and Nausea And Vomiting       Review of Systems   Constitution: Positive for malaise/fatigue. Negative for diaphoresis, weakness and weight gain.   HENT: Negative for hoarse voice.    Eyes: Negative for double vision and visual disturbance.   Cardiovascular: Negative for chest pain, claudication, cyanosis, dyspnea on exertion, irregular heartbeat, leg swelling, near-syncope, orthopnea, palpitations, paroxysmal nocturnal dyspnea and syncope.   Respiratory: Positive for sleep disturbances due to breathing  and snoring. Negative for cough, hemoptysis and shortness of breath.    Hematologic/Lymphatic: Negative for bleeding problem. Does not bruise/bleed easily.   Skin: Negative for color change and poor wound healing.   Musculoskeletal: Positive for falls. Negative for muscle cramps, muscle weakness and myalgias.   Gastrointestinal: Negative for bloating, abdominal pain, change in bowel habit, diarrhea, heartburn, hematemesis, hematochezia, melena and nausea.   Neurological: Positive for disturbances in coordination and tremors. Negative for excessive daytime sleepiness, dizziness, headaches, light-headedness, loss of balance and numbness.   Psychiatric/Behavioral: Negative for memory loss. The patient does not have insomnia.    Allergic/Immunologic: Negative for hives.       Objective:   Physical Exam   Constitutional: She is oriented to person, place, and time. She appears well-developed and well-nourished. She does not appear ill. No distress.   HENT:   Head: Normocephalic and atraumatic.   Eyes: EOM are normal. Pupils are equal, round, and reactive to light. No scleral icterus.   Neck: Normal range of motion. Neck supple. Normal carotid pulses, no hepatojugular reflux and no JVD present. Carotid bruit is not present. No tracheal deviation present. No thyromegaly present.   Cardiovascular: Normal rate and regular rhythm. Exam reveals no gallop and no friction rub.   Murmur heard.   Harsh midsystolic murmur is present with a grade of 2/6 at the upper right sternal border radiating to the neck.  Pulses:       Carotid pulses are 2+ on the right side, and 2+ on the left side.       Radial pulses are 2+ on the right side, and 2+ on the left side.        Popliteal pulses are 2+ on the right side, and 2+ on the left side.        Dorsalis pedis pulses are 0 on the right side, and 0 on the left side.        Posterior tibial pulses are 0 on the right side, and 0 on the left side.   Pulmonary/Chest: Effort normal and breath  "sounds normal. No respiratory distress. She has no wheezes. She has no rhonchi. She has no rales. She exhibits no tenderness.   Abdominal: Soft. Normal appearance, normal aorta and bowel sounds are normal. She exhibits no distension, no abdominal bruit, no ascites and no pulsatile midline mass. There is no hepatomegaly. There is no tenderness.   obese   Musculoskeletal: She exhibits no edema.        Right shoulder: She exhibits no deformity.   Leg varicosities noted    Neurological: She is alert and oriented to person, place, and time. She has normal strength. No cranial nerve deficit. Coordination normal.   Skin: Skin is warm and dry. No rash noted. She is not diaphoretic. No cyanosis or erythema. Nails show no clubbing.   Psychiatric: She has a normal mood and affect. Her speech is normal and behavior is normal.   Nursing note and vitals reviewed.    Vitals:    01/24/19 1051   BP: (!) 122/58   BP Location: Right arm   Patient Position: Sitting   BP Method: Medium (Manual)   Pulse: 81   Weight: 107.4 kg (236 lb 11.7 oz)   Height: 5' 6" (1.676 m)     Lab Results   Component Value Date    CHOL 163 01/18/2019    CHOL 106 (L) 10/05/2018    CHOL 118 (L) 06/28/2018     Lab Results   Component Value Date    HDL 44 01/18/2019    HDL 32 (L) 10/05/2018    HDL 35 (L) 06/28/2018     Lab Results   Component Value Date    LDLCALC 97.2 01/18/2019    LDLCALC 53.6 (L) 10/05/2018    LDLCALC 63.8 06/28/2018     Lab Results   Component Value Date    TRIG 109 01/18/2019    TRIG 102 10/05/2018    TRIG 96 06/28/2018     Lab Results   Component Value Date    CHOLHDL 27.0 01/18/2019    CHOLHDL 30.2 10/05/2018    CHOLHDL 29.7 06/28/2018       Chemistry        Component Value Date/Time     (L) 01/18/2019 0811    K 4.5 01/18/2019 0811     01/18/2019 0811    CO2 25 01/18/2019 0811    BUN 15 01/18/2019 0811    CREATININE 0.8 01/18/2019 0811    GLU 85 01/18/2019 0811        Component Value Date/Time    CALCIUM 10.4 01/18/2019 0811    " ALKPHOS 51 (L) 01/18/2019 0811    AST 34 01/18/2019 0811    ALT 37 01/18/2019 0811    BILITOT 0.7 01/18/2019 0811    ESTGFRAFRICA >60.0 01/18/2019 0811    EGFRNONAA >60.0 01/18/2019 0811          Lab Results   Component Value Date    TSH 19.829 (H) 01/18/2019     Lab Results   Component Value Date    INR 1.0 12/22/2015    INR 1.1 10/22/2015     Lab Results   Component Value Date    WBC 6.95 01/18/2019    HGB 13.3 01/18/2019    HCT 40.8 01/18/2019    MCV 95 01/18/2019     01/18/2019     BMP  Sodium   Date Value Ref Range Status   01/18/2019 135 (L) 136 - 145 mmol/L Final     Potassium   Date Value Ref Range Status   01/18/2019 4.5 3.5 - 5.1 mmol/L Final     Chloride   Date Value Ref Range Status   01/18/2019 100 95 - 110 mmol/L Final     CO2   Date Value Ref Range Status   01/18/2019 25 23 - 29 mmol/L Final     BUN, Bld   Date Value Ref Range Status   01/18/2019 15 8 - 23 mg/dL Final     Creatinine   Date Value Ref Range Status   01/18/2019 0.8 0.5 - 1.4 mg/dL Final     Calcium   Date Value Ref Range Status   01/18/2019 10.4 8.7 - 10.5 mg/dL Final     Anion Gap   Date Value Ref Range Status   01/18/2019 10 8 - 16 mmol/L Final     eGFR if    Date Value Ref Range Status   01/18/2019 >60.0 >60 mL/min/1.73 m^2 Final     eGFR if non    Date Value Ref Range Status   01/18/2019 >60.0 >60 mL/min/1.73 m^2 Final     Comment:     Calculation used to obtain the estimated glomerular filtration  rate (eGFR) is the CKD-EPI equation.        Estimated Creatinine Clearance: 77.6 mL/min (based on SCr of 0.8 mg/dL).    Assessment:     1. Coronary artery disease involving native coronary artery of native heart without angina pectoris    2. PD (Parkinson's disease)    3. Essential hypertension    4. Hypothyroidism due to acquired atrophy of thyroid    5. Type 2 diabetes, controlled, with peripheral neuropathy    6. Insulin long-term use    7. Morbid obesity with BMI of 40.0-44.9, adult    8. CAD: S/P  coronary artery stent placement    9. CORINE (obstructive sleep apnea)    10. Gait disorder    11. Diabetic polyneuropathy associated with type 2 diabetes mellitus    12. Paresthesia    13. Parkinson disease    14. Nonrheumatic aortic valve stenosis    15. Hyperlipidemia LDL goal <70      ASYMPTOMATIC CAD AFTER MULTIVESSEL STENTING  LDL NOT ON TARGET DUE TO MISSING MEDS COUNSELED ABOUT COMPLIANCE WITH MED SAND DIET   A1C NOT ON TARGET NEEDS BETTER CONTROL COUNSELED.  HYPOTHYROID THERAPY PER PCP CAUSING SOME OF HER SYMPTOMS.  NEEDS TEETH EXTRACTION NO CONTRAINDICATION CARDIAC CAMP.  Plan:   Continue current therapy  Cardiac low salt diet.  Risk factor modification and excercise program.  F/u in 6 months with lipid cmp A1C.

## 2019-01-28 RX ORDER — ISOSORBIDE MONONITRATE 120 MG/1
120 TABLET, EXTENDED RELEASE ORAL DAILY
Qty: 90 TABLET | Refills: 3 | Status: SHIPPED | OUTPATIENT
Start: 2019-01-28

## 2019-02-19 DIAGNOSIS — I10 ESSENTIAL HYPERTENSION: ICD-10-CM

## 2019-02-20 RX ORDER — METOPROLOL SUCCINATE 25 MG/1
TABLET, EXTENDED RELEASE ORAL
Qty: 90 TABLET | Refills: 3 | Status: SHIPPED | OUTPATIENT
Start: 2019-02-20

## 2019-03-20 RX ORDER — CARBIDOPA AND LEVODOPA 25; 250 MG/1; MG/1
TABLET ORAL
Qty: 150 TABLET | Refills: 11 | Status: SHIPPED | OUTPATIENT
Start: 2019-03-20

## 2019-03-21 RX ORDER — LOSARTAN POTASSIUM 50 MG/1
TABLET ORAL
Qty: 90 TABLET | Refills: 3 | Status: SHIPPED | OUTPATIENT
Start: 2019-03-21

## 2019-03-21 RX ORDER — HUMAN INSULIN 100 [USP'U]/ML
INJECTION, SUSPENSION SUBCUTANEOUS
Qty: 10 ML | Refills: 6 | Status: SHIPPED | OUTPATIENT
Start: 2019-03-21 | End: 2019-03-22 | Stop reason: SDUPTHER

## 2019-03-22 RX ORDER — HUMAN INSULIN 100 [USP'U]/ML
55 INJECTION, SUSPENSION SUBCUTANEOUS 3 TIMES DAILY
Qty: 40 ML | Refills: 6 | Status: ON HOLD | OUTPATIENT
Start: 2019-03-22 | End: 2019-08-29 | Stop reason: SDUPTHER

## 2019-04-16 RX ORDER — LATANOPROST 50 UG/ML
SOLUTION/ DROPS OPHTHALMIC
Qty: 3 BOTTLE | Refills: 3 | Status: SHIPPED | OUTPATIENT
Start: 2019-04-16

## 2019-04-23 ENCOUNTER — LAB VISIT (OUTPATIENT)
Dept: LAB | Facility: HOSPITAL | Age: 74
End: 2019-04-23
Attending: FAMILY MEDICINE
Payer: MEDICARE

## 2019-04-23 DIAGNOSIS — E78.5 HYPERLIPIDEMIA LDL GOAL <70: ICD-10-CM

## 2019-04-23 DIAGNOSIS — E11.42 TYPE 2 DIABETES, CONTROLLED, WITH PERIPHERAL NEUROPATHY: Chronic | ICD-10-CM

## 2019-04-23 DIAGNOSIS — I10 ESSENTIAL HYPERTENSION: Chronic | ICD-10-CM

## 2019-04-23 DIAGNOSIS — E03.4 HYPOTHYROIDISM DUE TO ACQUIRED ATROPHY OF THYROID: Chronic | ICD-10-CM

## 2019-04-23 LAB
ALBUMIN SERPL BCP-MCNC: 3.2 G/DL (ref 3.5–5.2)
ALP SERPL-CCNC: 68 U/L (ref 55–135)
ALT SERPL W/O P-5'-P-CCNC: 10 U/L (ref 10–44)
ANION GAP SERPL CALC-SCNC: 14 MMOL/L (ref 8–16)
AST SERPL-CCNC: 81 U/L (ref 10–40)
BASOPHILS # BLD AUTO: 0.04 K/UL (ref 0–0.2)
BASOPHILS NFR BLD: 0.2 % (ref 0–1.9)
BILIRUB SERPL-MCNC: 1.2 MG/DL (ref 0.1–1)
BUN SERPL-MCNC: 24 MG/DL (ref 8–23)
CALCIUM SERPL-MCNC: 9.4 MG/DL (ref 8.7–10.5)
CHLORIDE SERPL-SCNC: 93 MMOL/L (ref 95–110)
CHOLEST SERPL-MCNC: 116 MG/DL (ref 120–199)
CHOLEST/HDLC SERPL: 4.8 {RATIO} (ref 2–5)
CO2 SERPL-SCNC: 23 MMOL/L (ref 23–29)
CREAT SERPL-MCNC: 1.1 MG/DL (ref 0.5–1.4)
DIFFERENTIAL METHOD: ABNORMAL
EOSINOPHIL # BLD AUTO: 0.1 K/UL (ref 0–0.5)
EOSINOPHIL NFR BLD: 0.6 % (ref 0–8)
ERYTHROCYTE [DISTWIDTH] IN BLOOD BY AUTOMATED COUNT: 12.5 % (ref 11.5–14.5)
EST. GFR  (AFRICAN AMERICAN): 57.6 ML/MIN/1.73 M^2
EST. GFR  (NON AFRICAN AMERICAN): 49.9 ML/MIN/1.73 M^2
ESTIMATED AVG GLUCOSE: 177 MG/DL (ref 68–131)
GLUCOSE SERPL-MCNC: 165 MG/DL (ref 70–110)
HBA1C MFR BLD HPLC: 7.8 % (ref 4–5.6)
HCT VFR BLD AUTO: 37.1 % (ref 37–48.5)
HDLC SERPL-MCNC: 24 MG/DL (ref 40–75)
HDLC SERPL: 20.7 % (ref 20–50)
HGB BLD-MCNC: 12.1 G/DL (ref 12–16)
IMM GRANULOCYTES # BLD AUTO: 0.22 K/UL (ref 0–0.04)
IMM GRANULOCYTES NFR BLD AUTO: 1.3 % (ref 0–0.5)
LDLC SERPL CALC-MCNC: 73.4 MG/DL (ref 63–159)
LYMPHOCYTES # BLD AUTO: 2 K/UL (ref 1–4.8)
LYMPHOCYTES NFR BLD: 11.7 % (ref 18–48)
MCH RBC QN AUTO: 29.4 PG (ref 27–31)
MCHC RBC AUTO-ENTMCNC: 32.6 G/DL (ref 32–36)
MCV RBC AUTO: 90 FL (ref 82–98)
MONOCYTES # BLD AUTO: 1.5 K/UL (ref 0.3–1)
MONOCYTES NFR BLD: 8.5 % (ref 4–15)
NEUTROPHILS # BLD AUTO: 13.3 K/UL (ref 1.8–7.7)
NEUTROPHILS NFR BLD: 77.7 % (ref 38–73)
NONHDLC SERPL-MCNC: 92 MG/DL
NRBC BLD-RTO: 0 /100 WBC
PLATELET # BLD AUTO: 433 K/UL (ref 150–350)
PMV BLD AUTO: 10 FL (ref 9.2–12.9)
POTASSIUM SERPL-SCNC: 4.2 MMOL/L (ref 3.5–5.1)
PROT SERPL-MCNC: 7.2 G/DL (ref 6–8.4)
RBC # BLD AUTO: 4.12 M/UL (ref 4–5.4)
SODIUM SERPL-SCNC: 130 MMOL/L (ref 136–145)
T4 FREE SERPL-MCNC: 1.08 NG/DL (ref 0.71–1.51)
TRIGL SERPL-MCNC: 93 MG/DL (ref 30–150)
TSH SERPL DL<=0.005 MIU/L-ACNC: 7.71 UIU/ML (ref 0.4–4)
WBC # BLD AUTO: 17.13 K/UL (ref 3.9–12.7)

## 2019-04-23 PROCEDURE — 84439 ASSAY OF FREE THYROXINE: CPT

## 2019-04-23 PROCEDURE — 85025 COMPLETE CBC W/AUTO DIFF WBC: CPT

## 2019-04-23 PROCEDURE — 80053 COMPREHEN METABOLIC PANEL: CPT

## 2019-04-23 PROCEDURE — 80061 LIPID PANEL: CPT

## 2019-04-23 PROCEDURE — 84443 ASSAY THYROID STIM HORMONE: CPT

## 2019-04-23 PROCEDURE — 83036 HEMOGLOBIN GLYCOSYLATED A1C: CPT

## 2019-04-23 PROCEDURE — 36415 COLL VENOUS BLD VENIPUNCTURE: CPT | Mod: PO

## 2019-04-30 ENCOUNTER — HOSPITAL ENCOUNTER (EMERGENCY)
Facility: HOSPITAL | Age: 74
Discharge: HOME | End: 2019-04-30
Attending: FAMILY MEDICINE
Payer: MEDICARE

## 2019-04-30 ENCOUNTER — OFFICE VISIT (OUTPATIENT)
Dept: FAMILY MEDICINE | Facility: CLINIC | Age: 74
End: 2019-04-30
Payer: MEDICARE

## 2019-04-30 VITALS
DIASTOLIC BLOOD PRESSURE: 68 MMHG | RESPIRATION RATE: 24 BRPM | TEMPERATURE: 97 F | HEIGHT: 66 IN | HEART RATE: 84 BPM | OXYGEN SATURATION: 94 % | BODY MASS INDEX: 35.57 KG/M2 | WEIGHT: 221.31 LBS | SYSTOLIC BLOOD PRESSURE: 144 MMHG

## 2019-04-30 VITALS
HEART RATE: 107 BPM | DIASTOLIC BLOOD PRESSURE: 64 MMHG | TEMPERATURE: 96 F | SYSTOLIC BLOOD PRESSURE: 122 MMHG | OXYGEN SATURATION: 95 %

## 2019-04-30 DIAGNOSIS — K64.8 INTERNAL HEMORRHOIDS WITHOUT COMPLICATION: ICD-10-CM

## 2019-04-30 DIAGNOSIS — E03.4 HYPOTHYROIDISM DUE TO ACQUIRED ATROPHY OF THYROID: Chronic | ICD-10-CM

## 2019-04-30 DIAGNOSIS — I10 ESSENTIAL HYPERTENSION: Chronic | ICD-10-CM

## 2019-04-30 DIAGNOSIS — R10.9 ABDOMINAL PAIN: ICD-10-CM

## 2019-04-30 DIAGNOSIS — E11.42 TYPE 2 DIABETES, CONTROLLED, WITH PERIPHERAL NEUROPATHY: Primary | Chronic | ICD-10-CM

## 2019-04-30 DIAGNOSIS — R10.84 GENERALIZED ABDOMINAL PAIN: ICD-10-CM

## 2019-04-30 DIAGNOSIS — T30.0 BURN: ICD-10-CM

## 2019-04-30 DIAGNOSIS — Z95.5 S/P CORONARY ARTERY STENT PLACEMENT: Chronic | ICD-10-CM

## 2019-04-30 LAB
ALBUMIN SERPL BCP-MCNC: 2.8 G/DL (ref 3.5–5.2)
ALP SERPL-CCNC: 57 U/L (ref 55–135)
ALT SERPL W/O P-5'-P-CCNC: 5 U/L (ref 10–44)
ANION GAP SERPL CALC-SCNC: 14 MMOL/L (ref 8–16)
AST SERPL-CCNC: 27 U/L (ref 10–40)
BASOPHILS # BLD AUTO: 0.01 K/UL (ref 0–0.2)
BASOPHILS NFR BLD: 0.1 % (ref 0–1.9)
BILIRUB SERPL-MCNC: 0.6 MG/DL (ref 0.1–1)
BILIRUB UR QL STRIP: NEGATIVE
BUN SERPL-MCNC: 16 MG/DL (ref 8–23)
CALCIUM SERPL-MCNC: 11 MG/DL (ref 8.7–10.5)
CHLORIDE SERPL-SCNC: 94 MMOL/L (ref 95–110)
CLARITY UR: CLEAR
CO2 SERPL-SCNC: 24 MMOL/L (ref 23–29)
COLOR UR: YELLOW
CREAT SERPL-MCNC: 0.9 MG/DL (ref 0.5–1.4)
DIFFERENTIAL METHOD: ABNORMAL
EOSINOPHIL # BLD AUTO: 0.1 K/UL (ref 0–0.5)
EOSINOPHIL NFR BLD: 0.8 % (ref 0–8)
ERYTHROCYTE [DISTWIDTH] IN BLOOD BY AUTOMATED COUNT: 13.2 % (ref 11.5–14.5)
EST. GFR  (AFRICAN AMERICAN): >60 ML/MIN/1.73 M^2
EST. GFR  (NON AFRICAN AMERICAN): >60 ML/MIN/1.73 M^2
GLUCOSE SERPL-MCNC: 175 MG/DL (ref 70–110)
GLUCOSE UR QL STRIP: NEGATIVE
HCT VFR BLD AUTO: 33.8 % (ref 37–48.5)
HGB BLD-MCNC: 11 G/DL (ref 12–16)
HGB UR QL STRIP: NEGATIVE
KETONES UR QL STRIP: NEGATIVE
LACTATE SERPL-SCNC: 1.3 MMOL/L (ref 0.5–2.2)
LACTATE SERPL-SCNC: 3.8 MMOL/L (ref 0.5–2.2)
LEUKOCYTE ESTERASE UR QL STRIP: NEGATIVE
LYMPHOCYTES # BLD AUTO: 2.1 K/UL (ref 1–4.8)
LYMPHOCYTES NFR BLD: 15.8 % (ref 18–48)
MCH RBC QN AUTO: 29.2 PG (ref 27–31)
MCHC RBC AUTO-ENTMCNC: 32.5 G/DL (ref 32–36)
MCV RBC AUTO: 90 FL (ref 82–98)
MONOCYTES # BLD AUTO: 1.2 K/UL (ref 0.3–1)
MONOCYTES NFR BLD: 9.1 % (ref 4–15)
NEUTROPHILS # BLD AUTO: 9.8 K/UL (ref 1.8–7.7)
NEUTROPHILS NFR BLD: 74.2 % (ref 38–73)
NITRITE UR QL STRIP: NEGATIVE
PH UR STRIP: 6 [PH] (ref 5–8)
PLATELET # BLD AUTO: 349 K/UL (ref 150–350)
PMV BLD AUTO: 8.5 FL (ref 9.2–12.9)
POTASSIUM SERPL-SCNC: 4.3 MMOL/L (ref 3.5–5.1)
PROCALCITONIN SERPL IA-MCNC: 0.19 NG/ML
PROT SERPL-MCNC: 6.5 G/DL (ref 6–8.4)
PROT UR QL STRIP: NEGATIVE
RBC # BLD AUTO: 3.77 M/UL (ref 4–5.4)
SODIUM SERPL-SCNC: 132 MMOL/L (ref 136–145)
SP GR UR STRIP: 1.01 (ref 1–1.03)
URN SPEC COLLECT METH UR: NORMAL
UROBILINOGEN UR STRIP-ACNC: NEGATIVE EU/DL
WBC # BLD AUTO: 13.17 K/UL (ref 3.9–12.7)

## 2019-04-30 PROCEDURE — 86803 HEPATITIS C AB TEST: CPT

## 2019-04-30 PROCEDURE — 3074F SYST BP LT 130 MM HG: CPT | Mod: CPTII,S$GLB,, | Performed by: FAMILY MEDICINE

## 2019-04-30 PROCEDURE — 99499 RISK ADDL DX/OHS AUDIT: ICD-10-PCS | Mod: S$GLB,,, | Performed by: FAMILY MEDICINE

## 2019-04-30 PROCEDURE — S0030 INJECTION, METRONIDAZOLE: HCPCS | Performed by: FAMILY MEDICINE

## 2019-04-30 PROCEDURE — 1101F PR PT FALLS ASSESS DOC 0-1 FALLS W/OUT INJ PAST YR: ICD-10-PCS | Mod: CPTII,S$GLB,, | Performed by: FAMILY MEDICINE

## 2019-04-30 PROCEDURE — 99999 PR PBB SHADOW E&M-EST. PATIENT-LVL III: ICD-10-PCS | Mod: PBBFAC,,, | Performed by: FAMILY MEDICINE

## 2019-04-30 PROCEDURE — 93010 ELECTROCARDIOGRAM REPORT: CPT | Mod: ,,, | Performed by: INTERNAL MEDICINE

## 2019-04-30 PROCEDURE — 3078F DIAST BP <80 MM HG: CPT | Mod: CPTII,S$GLB,, | Performed by: FAMILY MEDICINE

## 2019-04-30 PROCEDURE — 81003 URINALYSIS AUTO W/O SCOPE: CPT

## 2019-04-30 PROCEDURE — 96368 THER/DIAG CONCURRENT INF: CPT

## 2019-04-30 PROCEDURE — 83605 ASSAY OF LACTIC ACID: CPT

## 2019-04-30 PROCEDURE — 99214 PR OFFICE/OUTPT VISIT, EST, LEVL IV, 30-39 MIN: ICD-10-PCS | Mod: S$GLB,,, | Performed by: FAMILY MEDICINE

## 2019-04-30 PROCEDURE — 87040 BLOOD CULTURE FOR BACTERIA: CPT

## 2019-04-30 PROCEDURE — 85025 COMPLETE CBC W/AUTO DIFF WBC: CPT

## 2019-04-30 PROCEDURE — 3074F PR MOST RECENT SYSTOLIC BLOOD PRESSURE < 130 MM HG: ICD-10-PCS | Mod: CPTII,S$GLB,, | Performed by: FAMILY MEDICINE

## 2019-04-30 PROCEDURE — 99285 EMERGENCY DEPT VISIT HI MDM: CPT | Mod: 25

## 2019-04-30 PROCEDURE — 80053 COMPREHEN METABOLIC PANEL: CPT

## 2019-04-30 PROCEDURE — 96365 THER/PROPH/DIAG IV INF INIT: CPT

## 2019-04-30 PROCEDURE — 3045F PR MOST RECENT HEMOGLOBIN A1C LEVEL 7.0-9.0%: ICD-10-PCS | Mod: CPTII,S$GLB,, | Performed by: FAMILY MEDICINE

## 2019-04-30 PROCEDURE — 99214 OFFICE O/P EST MOD 30 MIN: CPT | Mod: S$GLB,,, | Performed by: FAMILY MEDICINE

## 2019-04-30 PROCEDURE — 96361 HYDRATE IV INFUSION ADD-ON: CPT

## 2019-04-30 PROCEDURE — 3045F PR MOST RECENT HEMOGLOBIN A1C LEVEL 7.0-9.0%: CPT | Mod: CPTII,S$GLB,, | Performed by: FAMILY MEDICINE

## 2019-04-30 PROCEDURE — 3078F PR MOST RECENT DIASTOLIC BLOOD PRESSURE < 80 MM HG: ICD-10-PCS | Mod: CPTII,S$GLB,, | Performed by: FAMILY MEDICINE

## 2019-04-30 PROCEDURE — 25000003 PHARM REV CODE 250: Performed by: FAMILY MEDICINE

## 2019-04-30 PROCEDURE — 93010 EKG 12-LEAD: ICD-10-PCS | Mod: ,,, | Performed by: INTERNAL MEDICINE

## 2019-04-30 PROCEDURE — 87186 SC STD MICRODIL/AGAR DIL: CPT

## 2019-04-30 PROCEDURE — 99499 UNLISTED E&M SERVICE: CPT | Mod: S$GLB,,, | Performed by: FAMILY MEDICINE

## 2019-04-30 PROCEDURE — 63600175 PHARM REV CODE 636 W HCPCS: Performed by: FAMILY MEDICINE

## 2019-04-30 PROCEDURE — 99999 PR PBB SHADOW E&M-EST. PATIENT-LVL III: CPT | Mod: PBBFAC,,, | Performed by: FAMILY MEDICINE

## 2019-04-30 PROCEDURE — 1101F PT FALLS ASSESS-DOCD LE1/YR: CPT | Mod: CPTII,S$GLB,, | Performed by: FAMILY MEDICINE

## 2019-04-30 PROCEDURE — 93005 ELECTROCARDIOGRAM TRACING: CPT

## 2019-04-30 PROCEDURE — 36415 COLL VENOUS BLD VENIPUNCTURE: CPT

## 2019-04-30 PROCEDURE — 84145 PROCALCITONIN (PCT): CPT

## 2019-04-30 RX ORDER — KETOROLAC TROMETHAMINE 30 MG/ML
15 INJECTION, SOLUTION INTRAMUSCULAR; INTRAVENOUS
Status: COMPLETED | OUTPATIENT
Start: 2019-04-30 | End: 2019-04-30

## 2019-04-30 RX ORDER — METRONIDAZOLE 500 MG/100ML
500 INJECTION, SOLUTION INTRAVENOUS
Status: COMPLETED | OUTPATIENT
Start: 2019-04-30 | End: 2019-04-30

## 2019-04-30 RX ADMIN — SODIUM CHLORIDE 3012 ML: 0.9 INJECTION, SOLUTION INTRAVENOUS at 04:04

## 2019-04-30 RX ADMIN — KETOROLAC TROMETHAMINE 15 MG: 30 INJECTION, SOLUTION INTRAMUSCULAR; INTRAVENOUS at 06:04

## 2019-04-30 RX ADMIN — METRONIDAZOLE 500 MG: 500 INJECTION, SOLUTION INTRAVENOUS at 06:04

## 2019-04-30 RX ADMIN — CEFTRIAXONE 2 G: 2 INJECTION, SOLUTION INTRAVENOUS at 06:04
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
## 2019-04-30 NOTE — ED PROVIDER NOTES
SCRIBE #1 NOTE: I, Leonora Chavarria, am scribing for, and in the presence of, Kirsten Cazares MD. I have scribed the HPI, ROS, and PEx.     SCRIBE #2 NOTE: I, Laurie De Paz, am scribing for, and in the presence of,  Jermaine Miller MD. I have scribed the remaining portions of the note not scribed by Scribe #1.      History     Chief Complaint   Patient presents with    Abdominal Pain     Pt c/o of lower abdominal pain xs 10 days with diarrhea.      Review of patient's allergies indicates:   Allergen Reactions    Codeine      Other reaction(s): Nausea    Codeine sulfate      Unknown^    Diphenhydramine hcl      Unknown^  Other reaction(s): Rash    Penicillins      Unknown^  Other reaction(s): Hives    Sulfa (sulfonamide antibiotics) Hives and Nausea And Vomiting         History of Present Illness     HPI    4/30/2019, 5:04 PM  History obtained from the patient      History of Present Illness: Ross Isbell is a 73 y.o. female patient with a PMHx of MI who presents to the Emergency Department for evaluation of lower abd pain which onset gradually 3 weeks ago. Pt sent by Dr. Penn ([facility]) for further evaluation. Symptoms are constant and moderate in severity. No mitigating or exacerbating factors reported. No associated sxs. Patient denies any fever, chills, CP, SOB, n/v/d, blood in stool, constipation, dysuria, frequency, and all other sxs at this time. No prior tx. No further complaints or concerns at this time.       Arrival mode: Personal vehicle     PCP: Anna Penn MD        Past Medical History:  Past Medical History:   Diagnosis Date    Abnormal nuclear stress test 11/1/2015    Background diabetic retinopathy 12/28/2015    CAD (coronary artery disease) 2002    stents    Cataract     Gait disorder 10/12/2012    Glaucoma     MI (myocardial infarction)     Obesity     CORINE (obstructive sleep apnea)     Other and unspecified hyperlipidemia     PD (Parkinson's disease) 2002     tremor-predominant (per patient)    Type II or unspecified type diabetes mellitus without mention of complication, not stated as uncontrolled       am    Unspecified essential hypertension        Past Surgical History:  Past Surgical History:   Procedure Laterality Date    BREAST BIOPSY Left     benign - about 3 yrs ago    CORONARY ANGIOPLASTY      CORONARY STENT PLACEMENT      DILATION AND CURETTAGE OF UTERUS      FOREIGN BODY REMOVAL      HEART CATH-LEFT Left 2015    [facility]    HEART CATH-LEFT Left 2015    [facility]    HEART CATH-LEFT/pci stent lad Left 11/10/2015    [facility]    INNER EAR SURGERY      TONSILLECTOMY, ADENOIDECTOMY           Family History:  Family History   Problem Relation Age of Onset    Parkinsonism Other     Tremor Father     Hypertension Father     Heart attack Father 65        MI    Parkinsonism Paternal Aunt     Glaucoma Mother     Hypertension Mother     Heart attack Mother 65        MI    Ovarian cancer Mother     Glaucoma Maternal Grandmother     Hypertension Maternal Grandmother     Diabetes Maternal Grandmother     Breast cancer Paternal Grandmother        Social History:  Social History     Tobacco Use    Smoking status: Former Smoker     Years: 40.00     Last attempt to quit: 10/12/2010     Years since quittin.5    Smokeless tobacco: Never Used    Tobacco comment: On & off for the past few years   Substance and Sexual Activity    Alcohol use: No     Alcohol/week: 0.0 oz    Drug use: No    Sexual activity: Not Currently        Review of Systems     Review of Systems   Constitutional: Negative for chills and fever.   HENT: Negative for congestion and sore throat.    Respiratory: Negative for cough and shortness of breath.    Cardiovascular: Negative for chest pain.   Gastrointestinal: Positive for abdominal  "pain (Lower). Negative for blood in stool, constipation, diarrhea, nausea and vomiting.   Genitourinary: Negative for dysuria and frequency.   Musculoskeletal: Negative for back pain and neck pain.   Skin: Negative for rash.   Neurological: Negative for dizziness, weakness, numbness and headaches.   Hematological: Does not bruise/bleed easily.   All other systems reviewed and are negative.       Physical Exam     Initial Vitals [04/30/19 1444]   BP Pulse Resp Temp SpO2   107/61 105 20 97.2 °F (36.2 °C) (!) 92 %      MAP       --          Physical Exam  Nursing Notes and Vital Signs Reviewed.  Constitutional: Patient is in no acute distress. Obese.  Head: Atraumatic. Normocephalic.  Eyes: PERRL. EOM intact. Conjunctivae are not pale. No scleral icterus.  ENT: Mucous membranes are moist. Oropharynx is clear and symmetric.    Neck: Supple. Full ROM. No lymphadenopathy.  Cardiovascular: Regular rate. Regular rhythm. No murmurs, rubs, or gallops. Distal pulses are 2+ and symmetric.  Pulmonary/Chest: No respiratory distress. Clear to auscultation bilaterally. No wheezing or rales.  Abdominal: Soft and non-distended. Bilat lower quadrant tenderness.  No rebound, guarding, or rigidity. Good bowel sounds.  Genitourinary: No CVA tenderness  Musculoskeletal: Moves all extremities. No obvious deformities. No edema. No calf tenderness.  Skin: Warm and dry.  Neurological:  Alert, awake, and appropriate.  Normal speech.  No acute focal neurological deficits are appreciated.  Psychiatric: Normal affect. Good eye contact. Appropriate in content.     ED Course   Procedures  ED Vital Signs:  Vitals:    04/30/19 1444 04/30/19 1600 04/30/19 1639 04/30/19 1717   BP: 107/61   (!) 142/68   Pulse: 105 87 89 86   Resp: 20   (!) 31   Temp: 97.2 °F (36.2 °C)      TempSrc: Oral      SpO2: (!) 92%   95%   Weight:  100.4 kg (221 lb 5.5 oz)     Height: 5' 6" (1.676 m)       04/30/19 1802 04/30/19 1901 04/30/19 2001 04/30/19 2046   BP: (!) 145/67 " (!) 125/59 (!) 126/57 (!) 120/57   Pulse: 89 84 83 83   Resp: (!) 24 (!) 27 (!) 22 (!) 23   Temp:       TempSrc:       SpO2: 95% (!) 94% (!) 94% 95%   Weight:       Height:        04/30/19 2131 04/30/19 2201 04/30/19 2231   BP: (!) 149/87 129/60 (!) 144/68   Pulse: 84 84 84   Resp: (!) 24 19 (!) 24   Temp:      TempSrc:      SpO2: (!) 94% 95% (!) 94%   Weight:      Height:          Abnormal Lab Results:  Labs Reviewed   CBC W/ AUTO DIFFERENTIAL - Abnormal; Notable for the following components:       Result Value    WBC 13.17 (*)     RBC 3.77 (*)     Hemoglobin 11.0 (*)     Hematocrit 33.8 (*)     MPV 8.5 (*)     Gran # (ANC) 9.8 (*)     Mono # 1.2 (*)     Gran% 74.2 (*)     Lymph% 15.8 (*)     All other components within normal limits   COMPREHENSIVE METABOLIC PANEL - Abnormal; Notable for the following components:    Sodium 132 (*)     Chloride 94 (*)     Glucose 175 (*)     Calcium 11.0 (*)     Albumin 2.8 (*)     ALT 5 (*)     All other components within normal limits   LACTIC ACID, PLASMA - Abnormal; Notable for the following components:    Lactate (Lactic Acid) 3.8 (*)     All other components within normal limits    Narrative:       LACTIC ACID critical result(s) called and verbal readback obtained   from JOVITA LEO RN, 04/30/2019 17:35   CULTURE, BLOOD   CULTURE, BLOOD   URINALYSIS, REFLEX TO URINE CULTURE    Narrative:     Preferred Collection Type->Urine, Clean Catch   PROCALCITONIN   LACTIC ACID, PLASMA   HEPATITIS C ANTIBODY        All Lab Results:  Results for orders placed or performed during the hospital encounter of 04/30/19   CBC auto differential   Result Value Ref Range    WBC 13.17 (H) 3.90 - 12.70 K/uL    RBC 3.77 (L) 4.00 - 5.40 M/uL    Hemoglobin 11.0 (L) 12.0 - 16.0 g/dL    Hematocrit 33.8 (L) 37.0 - 48.5 %    Mean Corpuscular Volume 90 82 - 98 fL    Mean Corpuscular Hemoglobin 29.2 27.0 - 31.0 pg    Mean Corpuscular Hemoglobin Conc 32.5 32.0 - 36.0 g/dL    RDW 13.2 11.5 - 14.5 %     Platelets 349 150 - 350 K/uL    MPV 8.5 (L) 9.2 - 12.9 fL    Gran # (ANC) 9.8 (H) 1.8 - 7.7 K/uL    Lymph # 2.1 1.0 - 4.8 K/uL    Mono # 1.2 (H) 0.3 - 1.0 K/uL    Eos # 0.1 0.0 - 0.5 K/uL    Baso # 0.01 0.00 - 0.20 K/uL    Gran% 74.2 (H) 38.0 - 73.0 %    Lymph% 15.8 (L) 18.0 - 48.0 %    Mono% 9.1 4.0 - 15.0 %    Eosinophil% 0.8 0.0 - 8.0 %    Basophil% 0.1 0.0 - 1.9 %    Differential Method Automated    Comprehensive metabolic panel   Result Value Ref Range    Sodium 132 (L) 136 - 145 mmol/L    Potassium 4.3 3.5 - 5.1 mmol/L    Chloride 94 (L) 95 - 110 mmol/L    CO2 24 23 - 29 mmol/L    Glucose 175 (H) 70 - 110 mg/dL    BUN, Bld 16 8 - 23 mg/dL    Creatinine 0.9 0.5 - 1.4 mg/dL    Calcium 11.0 (H) 8.7 - 10.5 mg/dL    Total Protein 6.5 6.0 - 8.4 g/dL    Albumin 2.8 (L) 3.5 - 5.2 g/dL    Total Bilirubin 0.6 0.1 - 1.0 mg/dL    Alkaline Phosphatase 57 55 - 135 U/L    AST 27 10 - 40 U/L    ALT 5 (L) 10 - 44 U/L    Anion Gap 14 8 - 16 mmol/L    eGFR if African American >60 >60 mL/min/1.73 m^2    eGFR if non African American >60 >60 mL/min/1.73 m^2   Lactic acid, plasma #1   Result Value Ref Range    Lactate (Lactic Acid) 3.8 (HH) 0.5 - 2.2 mmol/L   Urinalysis, Reflex to Urine Culture Urine, Clean Catch   Result Value Ref Range    Specimen UA Urine, Catheterized     Color, UA Yellow Yellow, Straw, Radha    Appearance, UA Clear Clear    pH, UA 6.0 5.0 - 8.0    Specific Gravity, UA 1.010 1.005 - 1.030    Protein, UA Negative Negative    Glucose, UA Negative Negative    Ketones, UA Negative Negative    Bilirubin (UA) Negative Negative    Occult Blood UA Negative Negative    Nitrite, UA Negative Negative    Urobilinogen, UA Negative <2.0 EU/dL    Leukocytes, UA Negative Negative   Procalcitonin   Result Value Ref Range    Procalcitonin 0.19 <0.25 ng/mL   Lactic acid, plasma #2   Result Value Ref Range    Lactate (Lactic Acid) 1.3 0.5 - 2.2 mmol/L       Imaging Results:  Imaging Results          CT Abdomen Pelvis  Without  Contrast (Final result)  Result time 04/30/19 18:21:22    Final result by Phong Perea Jr., MD (04/30/19 18:21:22)                 Impression:      1. No acute findings.  2. Small hiatal hernia.  3. Cholelithiasis.      Electronically signed by: Phong Perea Jr., MD  Date:    04/30/2019  Time:    18:21             Narrative:    EXAMINATION:  CT ABDOMEN PELVIS WITHOUT CONTRAST    CLINICAL HISTORY:  Abdominal pain, unspecified;    TECHNIQUE:  Low dose axial images, sagittal and coronal reformations were obtained from the lung bases to the pubic symphysis, Oral contrast was not administered.  All CT scans at this facility use dose modulation, iterative reconstruction and/or weight based dosing when appropriate to reduce radiation dose to as low as reasonably achievable.    COMPARISON:  None    FINDINGS:  Heart: Normal in size. No pericardial effusion.  Coronary artery calcifications.    Lung Bases: Posterior pleural lines and parenchymal bands likely relate to atelectasis or potentially scarring.  No dominant nodular densities.    Liver: Normal in size and attenuation, with no focal hepatic lesions.    Gallbladder: Multiple calcified stones within the gallbladder neck.    Bile Ducts: No evidence of dilated ducts.    Pancreas: No mass or peripancreatic fat stranding.    Spleen: Unremarkable.    Adrenals: Unremarkable.    Kidneys/ Ureters: Unremarkable.    Bladder: No evidence of wall thickening.    Reproductive organs: Unremarkable.    GI Tract/Mesentery: Small hiatal hernia.  No evidence of bowel obstruction or inflammation.    Peritoneal Space: No ascites. No free air.    Retroperitoneum: No lymphadenopathy.    Abdominal wall: Unremarkable.    Vasculature: No significant atherosclerosis or aneurysm.    Bones: No acute fracture.  Old bilateral rib fractures                               X-Ray Chest AP Portable (Final result)  Result time 04/30/19 16:50:02    Final result by Rashad Clark MD (04/30/19 16:50:02)                  Impression:      Patchy infiltrate left lower lobe with small left-sided pleural effusion.      Electronically signed by: Rashad Clark MD  Date:    04/30/2019  Time:    16:50             Narrative:    EXAMINATION:  XR CHEST AP PORTABLE    CLINICAL HISTORY:  Sepsis;    FINDINGS:  Single view of the chest.  Aorta demonstrates atherosclerotic disease. Tortuosity of the descending aorta can be seen with chronic hypertension.    Cardiac silhouette is enlarged but stable.  Patchy infiltrate left lower lobe with small left-sided pleural effusion.  No pneumothorax.  Bones demonstrate degenerative changes.                                 The EKG was ordered, reviewed, and independently interpreted by the ED provider.  Interpretation time: 1559  Rate: 97 BPM  Rhythm: normal sinus rhythm  Interpretation: No ST changes. No STEMI.           The Emergency Provider reviewed the vital signs and test results, which are outlined above.     ED Discussion     6:26 PM: Re-evaluated pt. Pt is resting comfortably and is in no acute distress.  D/w pt all pertinent results. D/w pt any concerns expressed at this time. Answered all questions. Pt expresses understanding at this time.    8:00 PM: Dr. Cazares transfers care of pt to Dr. Miller, pending lab results.    9:54 PM: Dr. Miller evaluated pt. Pt is resting comfortably and is in no acute distress.  Reexamined the pt.  Exam is completely normal.  D/w pt all pertinent results. Elevated lactate likely secondary to poor PO intake.  Pt has normal pro calcitonin with no fever, cough, SOB, urinary complaints, SSTI or any other clinical findings of infectious pathology at this time.  Lactate cleared with IV fluids.  Serial abdominal exams were consistent for a soft and non-tender abdomen.  Pt has been wheel chair bound due to Parkinson's and her  was counseled to call her PCP and discuss the options for home health as well as physical therapy.  D/w pt any concerns expressed  at this time. Answered all questions. Pt expresses understanding at this time.    10:21 PM: Reassessed pt at this time. Discussed with pt all pertinent ED information and results. Discussed pt dx and plan of tx. Spoke with pt and family that the pt's health is declining secondary to Parkinson's disease. Pt will need to f/u with PCP for inpatient physical therapy or Home Health. Pt and family verbalize understanding. Gave pt all f/u and return to the ED instructions. All questions and concerns were addressed at this time. Pt expresses understanding of information and instructions, and is comfortable with plan to discharge. Pt is stable for discharge.    I discussed with patient and/or family/caretaker that evaluation in the ED does not suggest any emergent or life threatening medical conditions requiring immediate intervention beyond what was provided in the ED, and I believe patient is safe for discharge.  Regardless, an unremarkable evaluation in the ED does not preclude the development or presence of a serious of life threatening condition. As such, patient was instructed to return immediately for any worsening or change in current symptoms.    ED Medication(s):  Medications   sodium chloride 0.9% bolus 3,012 mL (0 mL/kg × 100.4 kg Intravenous Stopped 4/30/19 1800)   cefTRIAXone (ROCEPHIN) 2 g in dextrose 5 % 50 mL IVPB (0 g Intravenous Stopped 4/30/19 1903)   metronidazole IVPB 500 mg (0 mg Intravenous Stopped 4/30/19 1916)   ketorolac injection 15 mg (15 mg Intravenous Given 4/30/19 1802)       Discharge Medication List as of 4/30/2019 10:21 PM          Follow-up Information     Schedule an appointment as soon as possible for a visit  with Anna Penn MD.    Specialty:  Family Medicine  Why:  to discuss home health and physical therapy options  Contact information:  [address] [address] [address]  [phone]             Go to  [facility]    Specialty:  Emergency Medicine  Why:   As needed, If symptoms worsen  Contact information:  [address] [facility] [address] [phone]                       Medical Decision Making:   Clinical Tests:   Lab Tests: Reviewed and Ordered  Radiological Study: Reviewed and Ordered  Medical Tests: Reviewed and Ordered             Scribe Attestation:   Scribe #1: I performed the above scribed service and the documentation accurately describes the services I performed. I attest to the accuracy of the note.     Attending:   Physician Attestation Statement for Scribe #1: I, Kirsten Cazares MD, personally performed the services described in this documentation, as scribed by Leonora Chavarria, in my presence, and it is both accurate and complete.       Scribe Attestation:   Scribe #2: I performed the above scribed service and the documentation accurately describes the services I performed. I attest to the accuracy of the note.    Attending Attestation:           Physician Attestation for Scribe:    Physician Attestation Statement for Scribe #2: I, Jermaine Miller MD, reviewed documentation, as scribed by Laurie De Paz in my presence, and it is both accurate and complete. I also acknowledge and confirm the content of the note done by Scribe #1.           Clinical Impression       ICD-10-CM ICD-9-CM   1. Abdominal pain R10.9 789.00       Disposition:   Disposition: Discharged  Condition: Stable         Jermaine Miller MD  05/01/19 0027

## 2019-05-01 ENCOUNTER — TELEPHONE (OUTPATIENT)
Dept: FAMILY MEDICINE | Facility: CLINIC | Age: 74
End: 2019-05-01

## 2019-05-01 ENCOUNTER — TELEPHONE (OUTPATIENT)
Dept: EMERGENCY MEDICINE | Facility: HOSPITAL | Age: 74
End: 2019-05-01

## 2019-05-01 DIAGNOSIS — G20.A1 PD (PARKINSON'S DISEASE): Primary | Chronic | ICD-10-CM

## 2019-05-01 DIAGNOSIS — E78.5 HYPERLIPIDEMIA LDL GOAL <70: ICD-10-CM

## 2019-05-01 DIAGNOSIS — I10 ESSENTIAL HYPERTENSION: Chronic | ICD-10-CM

## 2019-05-01 DIAGNOSIS — R26.9 GAIT DISORDER: ICD-10-CM

## 2019-05-01 DIAGNOSIS — E03.9 HYPOTHYROIDISM, UNSPECIFIED TYPE: Chronic | ICD-10-CM

## 2019-05-01 DIAGNOSIS — E11.42 TYPE 2 DIABETES, CONTROLLED, WITH PERIPHERAL NEUROPATHY: Chronic | ICD-10-CM

## 2019-05-01 LAB — HCV AB SERPL QL IA: NEGATIVE

## 2019-05-01 NOTE — TELEPHONE ENCOUNTER
----- Message from Aniya Cuevas sent at 5/1/2019 11:14 AM CDT -----  Contact: Pt   Pt is calling regarding requesting to have nurse call pt back. Pt states that she was in the hospital last night on 04/30/2019/ Pt states that doctors told her that they could not help her and reasons for the hospital visit was related to Her  Parkinson. Pt states that she was advised to follow up with PCP regarding  Home Health Care and Physical Therapy.  ..[phone] (home) [phone] (work)            ..Thank You  Dyana Cuevas

## 2019-05-01 NOTE — ED NOTES
Pt resting in bed in NAD.   at BS.  Denies any new complaints or needs.  Bed low, SR up.  Call light in reach.

## 2019-05-01 NOTE — TELEPHONE ENCOUNTER
Patients  called and said that they were discharged from the ER last night, he said that the ER doctor told them nothing was wrong with her and that she just needed us to order home health to come out and do some therapy to help her regain  Her strength back , her Parkinson's disease was causing her to have weakness .      If okay with HH , please sign pended order     KEEP the first order and DO NOT ORDER for the second set

## 2019-05-01 NOTE — ED NOTES
Patient sitting up in bed, no acute distress noted, awake, alert, and oriented x 3, calm, respirations even and unlabored, and skin warm and dry. Patient denies needs at this time. Side rails up x 2, call light in reach, bed low and locked. Family at bedside. Will continue to monitor.

## 2019-05-02 ENCOUNTER — TELEPHONE (OUTPATIENT)
Dept: FAMILY MEDICINE | Facility: CLINIC | Age: 74
End: 2019-05-02

## 2019-05-03 LAB
BACTERIA BLD CULT: NORMAL

## 2019-05-06 ENCOUNTER — TELEPHONE (OUTPATIENT)
Dept: FAMILY MEDICINE | Facility: CLINIC | Age: 74
End: 2019-05-06

## 2019-05-06 NOTE — TELEPHONE ENCOUNTER
----- Message from Divya Carrasco sent at 5/6/2019 11:21 AM CDT -----  Contact: Greer- [facility]  Greer is calling to speak with Petrona in regards to home health orders for pt. Paige is requesting pt's last clinical notes. Please call Paige back at [phone] or fax to [phone]. Request is pending until notes are received.       Thanks,   Divya Carrasco

## 2019-05-06 NOTE — TELEPHONE ENCOUNTER
----- Message from Sandy Fonseca sent at 5/6/2019  3:25 PM CDT -----  Contact: [facility]- [phone]   Would  Like to consult with the nurse about orders.  Please call back at [phone].  x-

## 2019-05-06 NOTE — TELEPHONE ENCOUNTER
Spoke with Greer with [facility] home health nurse is requesting a SW visit. Verbal order for SW visit given to Greer at this time.

## 2019-05-07 ENCOUNTER — TELEPHONE (OUTPATIENT)
Dept: FAMILY MEDICINE | Facility: CLINIC | Age: 74
End: 2019-05-07

## 2019-05-07 NOTE — TELEPHONE ENCOUNTER
----- Message from Paula Jasmine sent at 5/7/2019  9:58 AM CDT -----  Contact: pt   Pt is calling staff requesting more test strips for diabetes because some was bad. Pt is now testing  three times a day . Pt stated that the supplies comes from [facility] [employer].          Pt call back [phone]   thanks

## 2019-05-10 ENCOUNTER — TELEPHONE (OUTPATIENT)
Dept: FAMILY MEDICINE | Facility: CLINIC | Age: 74
End: 2019-05-10

## 2019-05-10 NOTE — TELEPHONE ENCOUNTER
Spoke with Mounika gave a verbal to start process with evaluating for a rehab facility. She will send any necessary paperwork if needed.

## 2019-05-10 NOTE — TELEPHONE ENCOUNTER
----- Message from Sally Jones sent at 5/10/2019  1:44 PM CDT -----  Contact: Mounika with [facility]  States the caregiver and patient are requesting going into rehab facility. Need orders if approved by provider. Please call nurse @ [phone] or [phone]. Thanks, clara

## 2019-05-15 ENCOUNTER — TELEPHONE (OUTPATIENT)
Dept: NEUROLOGY | Facility: CLINIC | Age: 74
End: 2019-05-15

## 2019-05-15 RX ORDER — LEVOTHYROXINE SODIUM 125 UG/1
TABLET ORAL
Qty: 90 TABLET | Refills: 1 | Status: SHIPPED | OUTPATIENT
Start: 2019-05-15

## 2019-05-15 NOTE — TELEPHONE ENCOUNTER
----- Message from Jennifer Rodarte sent at 5/15/2019 11:02 AM CDT -----  Contact: pt   Pt states that she need to speak with nurse. Pt didn't state the reason for the call.   .[phone] (home) [phone] (work)

## 2019-05-15 NOTE — TELEPHONE ENCOUNTER
----- Message from Aniya Cuevas sent at 5/15/2019 10:56 AM CDT -----  Contact: Pt    Pt  is calling .Type:  Needs Medical Advice    Who Called: Pt    Symptoms (please be specific): Groin pain  How long has patient had these symptoms:  A month now.   Pharmacy name and phone #:  .  [facility] [address] [place], [place] - [address] [address] [place] [address]  [address] [address] [address]  Phone: [phone] Fax: [phone]  Would the patient rather a call back or a response via [facility]? Call Back   Best Call Back Number:  [phone]         .Thank You  Dyana Cuevas

## 2019-05-15 NOTE — TELEPHONE ENCOUNTER
"Spoke with patient's caregiver/ Jean-Pierre and informed him of Dr Penn's response. Jean-Pierre said, "I don't want to hear it. I don't want to to hear any of that. I can't get her up there and he knows that. Bye," and hung up the phone.  "

## 2019-05-15 NOTE — TELEPHONE ENCOUNTER
Spoke with patient's caregiver/ Jean-Pierre and states patient is requesting something be called in for groin pain. Jean-Pierre states patient has seen Dr Penn for the pain. Please advise.

## 2019-05-15 NOTE — TELEPHONE ENCOUNTER
Spoke to pt and her spouse, I cannot understand her, she is in pain and  sent her to the ER, they both are asking for pain medication, they couldn't find anything in the hospital wrong, she thinks its her parkinsons/ please advise

## 2019-05-15 NOTE — TELEPHONE ENCOUNTER
We do not know where the pain is coming from, ct was neg, I will like to revaluate her and then decide the best option for her.

## 2019-05-15 NOTE — TELEPHONE ENCOUNTER
Pt called and I think she was confused, she states Penn sent her to the ER and she is home now and in a lot of pain and requesting something for pain and  said its not Parkinson related  Can you Please advise

## 2019-05-15 NOTE — TELEPHONE ENCOUNTER
Spoke with patient and requesting something for groin pain. See telephone message from 5/15/19 @ 1103 am.    Please advise.

## 2019-05-22 ENCOUNTER — TELEPHONE (OUTPATIENT)
Dept: FAMILY MEDICINE | Facility: CLINIC | Age: 74
End: 2019-05-22

## 2019-05-22 NOTE — TELEPHONE ENCOUNTER
Will need to have her see a gastroenterologist on a asap basis.  We do not know the source of pain at this time.  I know that the CAT scan was normal.  Okay to take to the ED.

## 2019-05-22 NOTE — TELEPHONE ENCOUNTER
Spoke with Mounika with [facility] and informed her of Dr Penn's response. Mounika verbalized understanding.

## 2019-05-22 NOTE — TELEPHONE ENCOUNTER
----- Message from Sun Moran sent at 5/22/2019  2:19 PM CDT -----  Contact: ken from [facility]   Type:  Needs Medical Advice    Who Called: ken from [facility]   Symptoms (please be specific): severe abdominal pains   How long has patient had these symptoms:  About a mnth   Pharmacy name and phone #:    Would the patient rather a call back or a response via [facility]? Phone   Best Call Back Number: [phone]  Additional Information:

## 2019-05-22 NOTE — TELEPHONE ENCOUNTER
Spoke with Mounika with [facility] and states patient is still complaining of severe abdominal pain x a month. Mounika states she will go see the pt tomorrow morning (saw her Monday 5/20/19) and if the patient is still complaining of pain, she'll send her to the ER. Please advise.

## 2019-05-23 ENCOUNTER — HOSPITAL ENCOUNTER (INPATIENT)
Facility: HOSPITAL | Age: 74
LOS: 7 days | Discharge: NURSING, REHAB OR LONG-TERM CARE | DRG: 981 | End: 2019-05-30
Attending: EMERGENCY MEDICINE | Admitting: INTERNAL MEDICINE
Payer: MEDICARE

## 2019-05-23 DIAGNOSIS — R78.81 GRAM-POSITIVE COCCI BACTEREMIA: ICD-10-CM

## 2019-05-23 DIAGNOSIS — G06.1 ABSCESS IN EPIDURAL SPACE OF LUMBAR SPINE: Primary | ICD-10-CM

## 2019-05-23 DIAGNOSIS — B95.5 STREPTOCOCCAL BACTEREMIA: ICD-10-CM

## 2019-05-23 DIAGNOSIS — E66.01 MORBID OBESITY WITH BMI OF 40.0-44.9, ADULT: Chronic | ICD-10-CM

## 2019-05-23 DIAGNOSIS — R78.81 STREPTOCOCCAL BACTEREMIA: ICD-10-CM

## 2019-05-23 DIAGNOSIS — L89.103 PRESSURE INJURY OF BACK, STAGE 3: ICD-10-CM

## 2019-05-23 DIAGNOSIS — R26.9 GAIT DISORDER: ICD-10-CM

## 2019-05-23 DIAGNOSIS — R07.9 CHEST PAIN: ICD-10-CM

## 2019-05-23 DIAGNOSIS — Z79.4 INSULIN LONG-TERM USE: Chronic | ICD-10-CM

## 2019-05-23 DIAGNOSIS — Z98.890 STATUS POST LUMBAR LAMINECTOMY: ICD-10-CM

## 2019-05-23 DIAGNOSIS — M46.26 OSTEOMYELITIS OF LUMBAR SPINE: ICD-10-CM

## 2019-05-23 DIAGNOSIS — N39.0 URINARY TRACT INFECTION WITHOUT HEMATURIA, SITE UNSPECIFIED: ICD-10-CM

## 2019-05-23 PROBLEM — E87.1 HYPONATREMIA: Status: ACTIVE | Noted: 2019-05-23

## 2019-05-23 LAB
ALBUMIN SERPL BCP-MCNC: 2.9 G/DL (ref 3.5–5.2)
ALP SERPL-CCNC: 90 U/L (ref 55–135)
ALT SERPL W/O P-5'-P-CCNC: 6 U/L (ref 10–44)
ANION GAP SERPL CALC-SCNC: 14 MMOL/L (ref 8–16)
AST SERPL-CCNC: 22 U/L (ref 10–40)
BACTERIA #/AREA URNS HPF: ABNORMAL /HPF
BASOPHILS # BLD AUTO: 0.02 K/UL (ref 0–0.2)
BASOPHILS NFR BLD: 0.2 % (ref 0–1.9)
BILIRUB SERPL-MCNC: 0.5 MG/DL (ref 0.1–1)
BILIRUB UR QL STRIP: NEGATIVE
BUN SERPL-MCNC: 16 MG/DL (ref 8–23)
CALCIUM SERPL-MCNC: 9.4 MG/DL (ref 8.7–10.5)
CHLORIDE SERPL-SCNC: 94 MMOL/L (ref 95–110)
CLARITY UR: CLEAR
CO2 SERPL-SCNC: 21 MMOL/L (ref 23–29)
COLOR UR: YELLOW
CREAT SERPL-MCNC: 0.7 MG/DL (ref 0.5–1.4)
CRP SERPL-MCNC: 41.5 MG/L (ref 0–8.2)
DIFFERENTIAL METHOD: ABNORMAL
EOSINOPHIL # BLD AUTO: 0.1 K/UL (ref 0–0.5)
EOSINOPHIL NFR BLD: 1.1 % (ref 0–8)
ERYTHROCYTE [DISTWIDTH] IN BLOOD BY AUTOMATED COUNT: 13.8 % (ref 11.5–14.5)
ERYTHROCYTE [SEDIMENTATION RATE] IN BLOOD BY WESTERGREN METHOD: 101 MM/HR (ref 0–20)
EST. GFR  (AFRICAN AMERICAN): >60 ML/MIN/1.73 M^2
EST. GFR  (NON AFRICAN AMERICAN): >60 ML/MIN/1.73 M^2
GLUCOSE SERPL-MCNC: 57 MG/DL (ref 70–110)
GLUCOSE UR QL STRIP: NEGATIVE
HCT VFR BLD AUTO: 33.5 % (ref 37–48.5)
HGB BLD-MCNC: 11 G/DL (ref 12–16)
HGB UR QL STRIP: NEGATIVE
KETONES UR QL STRIP: NEGATIVE
LEUKOCYTE ESTERASE UR QL STRIP: NEGATIVE
LIPASE SERPL-CCNC: 17 U/L (ref 4–60)
LYMPHOCYTES # BLD AUTO: 1.7 K/UL (ref 1–4.8)
LYMPHOCYTES NFR BLD: 13.2 % (ref 18–48)
MCH RBC QN AUTO: 28.3 PG (ref 27–31)
MCHC RBC AUTO-ENTMCNC: 32.8 G/DL (ref 32–36)
MCV RBC AUTO: 86 FL (ref 82–98)
MICROSCOPIC COMMENT: ABNORMAL
MONOCYTES # BLD AUTO: 1.2 K/UL (ref 0.3–1)
MONOCYTES NFR BLD: 9.5 % (ref 4–15)
NEUTROPHILS # BLD AUTO: 10 K/UL (ref 1.8–7.7)
NEUTROPHILS NFR BLD: 76 % (ref 38–73)
NITRITE UR QL STRIP: POSITIVE
PH UR STRIP: 6 [PH] (ref 5–8)
PLATELET # BLD AUTO: 454 K/UL (ref 150–350)
PMV BLD AUTO: 8.2 FL (ref 9.2–12.9)
POCT GLUCOSE: 119 MG/DL (ref 70–110)
POCT GLUCOSE: 49 MG/DL (ref 70–110)
POTASSIUM SERPL-SCNC: 4.1 MMOL/L (ref 3.5–5.1)
PROT SERPL-MCNC: 7.2 G/DL (ref 6–8.4)
PROT UR QL STRIP: ABNORMAL
RBC # BLD AUTO: 3.89 M/UL (ref 4–5.4)
SODIUM SERPL-SCNC: 129 MMOL/L (ref 136–145)
SP GR UR STRIP: 1.02 (ref 1–1.03)
URN SPEC COLLECT METH UR: ABNORMAL
UROBILINOGEN UR STRIP-ACNC: NEGATIVE EU/DL
WBC # BLD AUTO: 13.08 K/UL (ref 3.9–12.7)
WBC #/AREA URNS HPF: 3 /HPF (ref 0–5)

## 2019-05-23 PROCEDURE — 86140 C-REACTIVE PROTEIN: CPT

## 2019-05-23 PROCEDURE — 36415 COLL VENOUS BLD VENIPUNCTURE: CPT

## 2019-05-23 PROCEDURE — 25000003 PHARM REV CODE 250: Performed by: EMERGENCY MEDICINE

## 2019-05-23 PROCEDURE — 25500020 PHARM REV CODE 255: Performed by: EMERGENCY MEDICINE

## 2019-05-23 PROCEDURE — 21400001 HC TELEMETRY ROOM

## 2019-05-23 PROCEDURE — 25000003 PHARM REV CODE 250: Performed by: NURSE PRACTITIONER

## 2019-05-23 PROCEDURE — 93010 EKG 12-LEAD: ICD-10-PCS | Mod: ,,, | Performed by: INTERNAL MEDICINE

## 2019-05-23 PROCEDURE — 63600175 PHARM REV CODE 636 W HCPCS: Performed by: EMERGENCY MEDICINE

## 2019-05-23 PROCEDURE — 96375 TX/PRO/DX INJ NEW DRUG ADDON: CPT

## 2019-05-23 PROCEDURE — 63600175 PHARM REV CODE 636 W HCPCS: Performed by: NURSE PRACTITIONER

## 2019-05-23 PROCEDURE — 11000001 HC ACUTE MED/SURG PRIVATE ROOM

## 2019-05-23 PROCEDURE — 85025 COMPLETE CBC W/AUTO DIFF WBC: CPT

## 2019-05-23 PROCEDURE — 83690 ASSAY OF LIPASE: CPT

## 2019-05-23 PROCEDURE — 87186 SC STD MICRODIL/AGAR DIL: CPT

## 2019-05-23 PROCEDURE — 81000 URINALYSIS NONAUTO W/SCOPE: CPT

## 2019-05-23 PROCEDURE — 87040 BLOOD CULTURE FOR BACTERIA: CPT | Mod: 59

## 2019-05-23 PROCEDURE — P9612 CATHETERIZE FOR URINE SPEC: HCPCS

## 2019-05-23 PROCEDURE — 96365 THER/PROPH/DIAG IV INF INIT: CPT | Mod: 59

## 2019-05-23 PROCEDURE — 96361 HYDRATE IV INFUSION ADD-ON: CPT

## 2019-05-23 PROCEDURE — 93005 ELECTROCARDIOGRAM TRACING: CPT

## 2019-05-23 PROCEDURE — 99291 CRITICAL CARE FIRST HOUR: CPT

## 2019-05-23 PROCEDURE — 80053 COMPREHEN METABOLIC PANEL: CPT

## 2019-05-23 PROCEDURE — 85651 RBC SED RATE NONAUTOMATED: CPT

## 2019-05-23 PROCEDURE — 93010 ELECTROCARDIOGRAM REPORT: CPT | Mod: ,,, | Performed by: INTERNAL MEDICINE

## 2019-05-23 RX ORDER — GLUCAGON 1 MG
1 KIT INJECTION
Status: DISCONTINUED | OUTPATIENT
Start: 2019-05-23 | End: 2019-05-26

## 2019-05-23 RX ORDER — HEPARIN SODIUM 5000 [USP'U]/ML
5000 INJECTION, SOLUTION INTRAVENOUS; SUBCUTANEOUS EVERY 8 HOURS
Status: DISCONTINUED | OUTPATIENT
Start: 2019-05-23 | End: 2019-05-24

## 2019-05-23 RX ORDER — METOPROLOL SUCCINATE 25 MG/1
25 TABLET, EXTENDED RELEASE ORAL DAILY
Status: DISCONTINUED | OUTPATIENT
Start: 2019-05-24 | End: 2019-05-30 | Stop reason: HOSPADM

## 2019-05-23 RX ORDER — ASPIRIN 81 MG/1
81 TABLET ORAL DAILY
Status: DISCONTINUED | OUTPATIENT
Start: 2019-05-24 | End: 2019-05-24

## 2019-05-23 RX ORDER — IBUPROFEN 200 MG
16 TABLET ORAL
Status: DISCONTINUED | OUTPATIENT
Start: 2019-05-23 | End: 2019-05-26

## 2019-05-23 RX ORDER — MORPHINE SULFATE 4 MG/ML
4 INJECTION, SOLUTION INTRAMUSCULAR; INTRAVENOUS
Status: COMPLETED | OUTPATIENT
Start: 2019-05-23 | End: 2019-05-23

## 2019-05-23 RX ORDER — PRAMIPEXOLE DIHYDROCHLORIDE 0.5 MG/1
1 TABLET ORAL 3 TIMES DAILY
Status: DISCONTINUED | OUTPATIENT
Start: 2019-05-23 | End: 2019-05-30 | Stop reason: HOSPADM

## 2019-05-23 RX ORDER — CARBIDOPA AND LEVODOPA 25; 250 MG/1; MG/1
1 TABLET ORAL 4 TIMES DAILY
Status: DISCONTINUED | OUTPATIENT
Start: 2019-05-23 | End: 2019-05-30 | Stop reason: HOSPADM

## 2019-05-23 RX ORDER — ONDANSETRON 2 MG/ML
4 INJECTION INTRAMUSCULAR; INTRAVENOUS
Status: COMPLETED | OUTPATIENT
Start: 2019-05-23 | End: 2019-05-23

## 2019-05-23 RX ORDER — PANTOPRAZOLE SODIUM 40 MG/1
40 TABLET, DELAYED RELEASE ORAL DAILY
Status: DISCONTINUED | OUTPATIENT
Start: 2019-05-24 | End: 2019-05-30 | Stop reason: HOSPADM

## 2019-05-23 RX ORDER — VANCOMYCIN HCL IN 5 % DEXTROSE 1.5G/250ML
1500 PLASTIC BAG, INJECTION (ML) INTRAVENOUS
Status: DISCONTINUED | OUTPATIENT
Start: 2019-05-24 | End: 2019-05-26

## 2019-05-23 RX ORDER — ONDANSETRON 2 MG/ML
4 INJECTION INTRAMUSCULAR; INTRAVENOUS EVERY 8 HOURS PRN
Status: DISCONTINUED | OUTPATIENT
Start: 2019-05-23 | End: 2019-05-29

## 2019-05-23 RX ORDER — PRAVASTATIN SODIUM 20 MG/1
40 TABLET ORAL DAILY
Status: DISCONTINUED | OUTPATIENT
Start: 2019-05-24 | End: 2019-05-30 | Stop reason: HOSPADM

## 2019-05-23 RX ORDER — ACETAMINOPHEN 325 MG/1
650 TABLET ORAL EVERY 6 HOURS PRN
Status: DISCONTINUED | OUTPATIENT
Start: 2019-05-23 | End: 2019-05-30 | Stop reason: HOSPADM

## 2019-05-23 RX ORDER — LOSARTAN POTASSIUM 50 MG/1
50 TABLET ORAL DAILY
Status: DISCONTINUED | OUTPATIENT
Start: 2019-05-24 | End: 2019-05-30 | Stop reason: HOSPADM

## 2019-05-23 RX ORDER — SODIUM CHLORIDE 9 MG/ML
INJECTION, SOLUTION INTRAVENOUS ONCE
Status: COMPLETED | OUTPATIENT
Start: 2019-05-23 | End: 2019-05-23

## 2019-05-23 RX ORDER — LATANOPROST 50 UG/ML
1 SOLUTION/ DROPS OPHTHALMIC NIGHTLY
Status: DISCONTINUED | OUTPATIENT
Start: 2019-05-23 | End: 2019-05-30 | Stop reason: HOSPADM

## 2019-05-23 RX ORDER — IBUPROFEN 200 MG
24 TABLET ORAL
Status: DISCONTINUED | OUTPATIENT
Start: 2019-05-23 | End: 2019-05-26

## 2019-05-23 RX ORDER — LEVOTHYROXINE SODIUM 125 UG/1
125 TABLET ORAL
Status: DISCONTINUED | OUTPATIENT
Start: 2019-05-24 | End: 2019-05-30 | Stop reason: HOSPADM

## 2019-05-23 RX ORDER — INSULIN ASPART 100 [IU]/ML
0-5 INJECTION, SOLUTION INTRAVENOUS; SUBCUTANEOUS
Status: DISCONTINUED | OUTPATIENT
Start: 2019-05-23 | End: 2019-05-26

## 2019-05-23 RX ORDER — ISOSORBIDE MONONITRATE 60 MG/1
120 TABLET, EXTENDED RELEASE ORAL DAILY
Status: DISCONTINUED | OUTPATIENT
Start: 2019-05-24 | End: 2019-05-30 | Stop reason: HOSPADM

## 2019-05-23 RX ORDER — MORPHINE SULFATE 2 MG/ML
2 INJECTION, SOLUTION INTRAMUSCULAR; INTRAVENOUS EVERY 6 HOURS PRN
Status: DISCONTINUED | OUTPATIENT
Start: 2019-05-23 | End: 2019-05-24

## 2019-05-23 RX ADMIN — PIPERACILLIN AND TAZOBACTAM 4.5 G: 4; .5 INJECTION, POWDER, LYOPHILIZED, FOR SOLUTION INTRAVENOUS; PARENTERAL at 01:05

## 2019-05-23 RX ADMIN — SODIUM CHLORIDE 1000 ML: 0.9 INJECTION, SOLUTION INTRAVENOUS at 12:05

## 2019-05-23 RX ADMIN — ACETAMINOPHEN 650 MG: 325 TABLET ORAL at 07:05

## 2019-05-23 RX ADMIN — DEXTROSE MONOHYDRATE 25 G: 25 INJECTION, SOLUTION INTRAVENOUS at 09:05

## 2019-05-23 RX ADMIN — IOHEXOL 30 ML: 350 INJECTION, SOLUTION INTRAVENOUS at 12:05

## 2019-05-23 RX ADMIN — VANCOMYCIN HYDROCHLORIDE 2500 MG: 100 INJECTION, POWDER, LYOPHILIZED, FOR SOLUTION INTRAVENOUS at 02:05

## 2019-05-23 RX ADMIN — SODIUM CHLORIDE: 0.9 INJECTION, SOLUTION INTRAVENOUS at 05:05

## 2019-05-23 RX ADMIN — MORPHINE SULFATE 2 MG: 2 INJECTION, SOLUTION INTRAMUSCULAR; INTRAVENOUS at 04:05

## 2019-05-23 RX ADMIN — CARBIDOPA AND LEVODOPA 1 TABLET: 25; 250 TABLET ORAL at 08:05

## 2019-05-23 RX ADMIN — PRAMIPEXOLE DIHYDROCHLORIDE 1 MG: 0.5 TABLET ORAL at 08:05

## 2019-05-23 RX ADMIN — MORPHINE SULFATE 4 MG: 4 INJECTION INTRAVENOUS at 10:05

## 2019-05-23 RX ADMIN — CARBIDOPA AND LEVODOPA 1 TABLET: 25; 250 TABLET ORAL at 05:05

## 2019-05-23 RX ADMIN — IOHEXOL 100 ML: 350 INJECTION, SOLUTION INTRAVENOUS at 12:05

## 2019-05-23 RX ADMIN — ONDANSETRON 4 MG: 2 INJECTION INTRAMUSCULAR; INTRAVENOUS at 04:05

## 2019-05-23 RX ADMIN — HEPARIN SODIUM 5000 UNITS: 5000 INJECTION, SOLUTION INTRAVENOUS; SUBCUTANEOUS at 09:05

## 2019-05-23 RX ADMIN — ONDANSETRON 4 MG: 2 INJECTION INTRAMUSCULAR; INTRAVENOUS at 10:05

## 2019-05-23 RX ADMIN — LATANOPROST 1 DROP: 50 SOLUTION OPHTHALMIC at 09:05

## 2019-05-23 RX ADMIN — PIPERACILLIN AND TAZOBACTAM 4.5 G: 4; .5 INJECTION, POWDER, LYOPHILIZED, FOR SOLUTION INTRAVENOUS; PARENTERAL at 09:05

## 2019-05-23 NOTE — HPI
Ross Isbell is a 73 year old female with a PMHx of DM II, Parkinson's, CORINE, MI, CAD, and Glaucoma who presented to the Emergency Department with c/o fatigue. Associated symptoms include: generalized weakness and BLE pain x 2 weeks. Pt was informed by home health nurse to go to ED for increasing weakness, abdominal pain and leg pain. ED workup showed: WBC 13.08K, Hgb/Hct 11.0/33.5, Na+ 129, lipase 17. UA (+) nitrites, few bacteria. CXR with no acute findings. CT abdomen/pelvis with contrast showed moderate inflammatory changes centered on the intervertebral disc space between the L2 and L3 vertebral bodies. There is bony destruction on both sides of the intervertebral disc space between the L2 and L3 vertebral bodies.  This is consistent with the patient's history and characteristic of discitis and osteomyelitis. ED discussed case with Dr. Nicolas (neurosurgery) who reported pt did not need to be transferred out at this time. Obtain MRI thoracic/lumbar spine w wo contrast. Start pt on IV Vancomycin and Zosyn but consult Dr. Gomez (ID). Daughter: Uzma Gdofrey [phone] is the surrogate decision maker. Admitted for Osteomyelitis of lumbar spine.

## 2019-05-23 NOTE — H&P
[facility] Hospital Medicine  History & Physical    Patient Name: Ross Isbell  MRN: [id number]  Admission Date: 5/23/2019  Attending Physician: Teddy Bryant MD   Primary Care Provider: Anna Penn MD         Patient information was obtained from patient and ER records.     Subjective:     Principal Problem:Osteomyelitis of lumbar spine    Chief Complaint:   Chief Complaint   Patient presents with    Fatigue     sent per home health nurse with increasing weakness and increasing abdominal/leg pain.  home health nurse states need for nursing home placement as fmy can't take care of her at home        HPI: Ross Isbell is a 73 year old female with a PMHx of DM II, Parkinson's, CORINE, MI, CAD, and Glaucoma who presented to the Emergency Department with c/o fatigue. Associated symptoms include: generalized weakness and BLE pain x 2 weeks. Pt was informed by home health nurse to go to ED for increasing weakness, abdominal pain and leg pain. ED workup showed: WBC 13.08K, Hgb/Hct 11.0/33.5, Na+ 129, lipase 17. UA (+) nitrites, few bacteria. CXR with no acute findings. CT abdomen/pelvis with contrast showed moderate inflammatory changes centered on the intervertebral disc space between the L2 and L3 vertebral bodies. There is bony destruction on both sides of the intervertebral disc space between the L2 and L3 vertebral bodies.  This is consistent with the patient's history and characteristic of discitis and osteomyelitis. ED discussed case with Dr. Nicolas (neurosurgery) who reported pt did not need to be transferred out at this time. Obtain MRI thoracic/lumbar spine w wo contrast. Start pt on IV Vancomycin and Zosyn but consult Dr. Gomez (ID).     Past Medical History:   Diagnosis Date    Abnormal nuclear stress test 11/1/2015    Background diabetic retinopathy 12/28/2015    CAD (coronary artery disease) 2002    stents    Cataract     Gait disorder 10/12/2012    Glaucoma     MI  (myocardial infarction)     Obesity     CORINE (obstructive sleep apnea)     Other and unspecified hyperlipidemia     PD (Parkinson's disease) 2002    tremor-predominant (per patient)    Type II or unspecified type diabetes mellitus without mention of complication, not stated as uncontrolled 2002      am    Unspecified essential hypertension        Past Surgical History:   Procedure Laterality Date    BREAST BIOPSY Left     benign - about 3 yrs ago    CORONARY ANGIOPLASTY  2002    CORONARY STENT PLACEMENT  2002    DILATION AND CURETTAGE OF UTERUS      FOREIGN BODY REMOVAL      HEART CATH-LEFT Left 11/5/2015    [facility]    HEART CATH-LEFT Left 11/2/2015    [facility] CATH [facility]    HEART CATH-LEFT/pci stent lad Left 11/10/2015    [facility] CATH LAB    INNER EAR SURGERY      TONSILLECTOMY, ADENOIDECTOMY         Review of patient's allergies indicates:   Allergen Reactions    Codeine      Other reaction(s): Nausea    Codeine sulfate      Unknown^    Diphenhydramine hcl      Unknown^  Other reaction(s): Rash    Sulfa (sulfonamide antibiotics) Hives and Nausea And Vomiting    Penicillins Rash     Rash only as a child  Tolerated ceftriaxone 4/30/19       No current facility-administered medications on file prior to encounter.      Current Outpatient Medications on File Prior to Encounter   Medication Sig    aspirin (ECOTRIN) 81 MG EC tablet Take 1 tablet (81 mg total) by mouth once daily.    carbidopa-levodopa  mg (SINEMET)  mg per tablet TAKE 1 TABLET BY MOUTH 5 TIMES DAILY    isosorbide mononitrate (IMDUR) 120 MG 24 hr tablet Take 1 tablet (120 mg total) by mouth once daily.    levothyroxine (SYNTHROID) 125 MCG tablet TAKE 1 TABLET BY MOUTH IN THE MORNING BEFORE BREAKFAST    losartan (COZAAR) 50 MG tablet TAKE 1 TABLET BY MOUTH ONCE DAILY    metFORMIN (GLUCOPHAGE) 500 MG tablet TAKE  ONE TABLET BY MOUTH THREE TIMES DAILY    metoprolol succinate (TOPROL-XL) 25 MG 24 hr tablet TAKE 1 TABLET BY MOUTH ONCE DAILY    NOVOLIN 70/30 U-100 INSULIN 100 unit/mL (70-30) injection Inject 55 Units into the skin 3 (three) times daily.    pantoprazole (PROTONIX) 40 MG tablet Take 1 tablet (40 mg total) by mouth once daily.    pramipexole (MIRAPEX) 1 MG tablet TAKE ONE TABLET BY MOUTH THREE TIMES DAILY    pravastatin (PRAVACHOL) 40 MG tablet TAKE ONE TABLET BY MOUTH ONCE DAILY    ACCU-CHEK SOFTCLIX LANCETS Misc TEST THREE TIMES DAILY    acetaminophen (TYLENOL) 325 MG tablet Take 2 tablets (650 mg total) by mouth every 6 (six) hours as needed.    blood sugar diagnostic Strp 1 strip by Misc.(Non-Drug; Combo Route) route 3 (three) times daily. Accuchek Felicia Plus Test Strips    blood-glucose meter kit Accu-chek Felicia Plus Meter    insulin syringe-needle U-100 0.5 mL 31 gauge x 5/16 Syrg USE THREE TIMES DAILY    latanoprost 0.005 % ophthalmic solution INSTILL 1 DROP INTO EACH EYE IN THE EVENING    multivit-min-FA-lycopen-lutein (CENTRUM SILVER) 0.4-300-250 mg-mcg-mcg Tab Take 1 tablet by mouth once daily.     Family History     Problem Relation (Age of Onset)    Breast cancer Paternal Grandmother    Diabetes Maternal Grandmother    Glaucoma Mother, Maternal Grandmother    Heart attack Father (65), Mother (65)    Hypertension Father, Mother, Maternal Grandmother    Ovarian cancer Mother    Parkinsonism Other, Paternal Aunt    Tremor Father        Tobacco Use    Smoking status: Former Smoker     Years: 40.00     Last attempt to quit: 10/12/2010     Years since quittin.6    Smokeless tobacco: Never Used    Tobacco comment: On & off for the past few years   Substance and Sexual Activity    Alcohol use: No     Alcohol/week: 0.0 oz    Drug use: No    Sexual activity: Not Currently     Review of Systems   Constitutional: Positive for activity change. Negative for chills and fever.   HENT: Negative for  trouble swallowing.    Eyes: Negative for visual disturbance.   Respiratory: Negative for apnea, cough, choking, chest tightness, shortness of breath, wheezing and stridor.    Cardiovascular: Negative for chest pain, palpitations and leg swelling.   Gastrointestinal: Positive for abdominal pain and nausea. Negative for constipation, diarrhea and vomiting.   Genitourinary: Negative for difficulty urinating, dysuria, flank pain, frequency and urgency.   Musculoskeletal: Positive for arthralgias and gait problem. Negative for back pain, joint swelling, myalgias, neck pain and neck stiffness.   Skin: Negative for color change.   Neurological: Positive for weakness. Negative for dizziness, tremors, seizures, syncope, facial asymmetry, speech difficulty, light-headedness, numbness and headaches.   Psychiatric/Behavioral: Negative for agitation and behavioral problems.     Objective:     Vital Signs (Most Recent):  Temp: 97.1 °F (36.2 °C) (05/23/19 1017)  Pulse: 90 (05/23/19 1502)  Resp: 17 (05/23/19 1502)  BP: 124/60 (05/23/19 1502)  SpO2: 97 % (05/23/19 1502) Vital Signs (24h Range):  Temp:  [97.1 °F (36.2 °C)] 97.1 °F (36.2 °C)  Pulse:  [85-90] 90  Resp:  [15-17] 17  SpO2:  [91 %-97 %] 97 %  BP: (116-128)/(58-69) 124/60     Weight: 104.8 kg (231 lb)  Body mass index is 37.28 kg/m².    Physical Exam   Constitutional: She is oriented to person, place, and time. She appears well-developed. She is cooperative. She is easily aroused. She appears distressed (appears in pain from BLE cramping).   HENT:   Head: Normocephalic and atraumatic.   Eyes: EOM are normal.   Neck: Normal range of motion. Neck supple.   Cardiovascular: Normal rate, regular rhythm and intact distal pulses.   No murmur heard.  Pulmonary/Chest: Effort normal and breath sounds normal. No stridor. No respiratory distress. She has no wheezes. She has no rales. She exhibits no tenderness.   Abdominal: Soft. Bowel sounds are normal. She exhibits no distension.  There is no tenderness. There is no rebound and no guarding.   Genitourinary:   Genitourinary Comments: Deferred   Musculoskeletal: She exhibits tenderness. She exhibits no edema or deformity.   Neurological: She is alert, oriented to person, place, and time and easily aroused. No sensory deficit.   BUE tremors, hx of parkinson's   Skin: Skin is warm and dry. Capillary refill takes less than 2 seconds.   Psychiatric: She has a normal mood and affect. Her behavior is normal. Thought content normal.   Nursing note and vitals reviewed.        CRANIAL NERVES     CN III, IV, VI   Extraocular motions are normal.        Significant Labs:   CBC:   Recent Labs   Lab 05/23/19  1036   WBC 13.08*   HGB 11.0*   HCT 33.5*   *     CMP:   Recent Labs   Lab 05/23/19  1036   *   K 4.1   CL 94*   CO2 21*   GLU 57*   BUN 16   CREATININE 0.7   CALCIUM 9.4   PROT 7.2   ALBUMIN 2.9*   BILITOT 0.5   ALKPHOS 90   AST 22   ALT 6*   ANIONGAP 14   EGFRNONAA >60     Lipase:   Recent Labs   Lab 05/23/19  1036   LIPASE 17     Urine Studies:   Recent Labs   Lab 05/23/19  1055   COLORU Yellow   APPEARANCEUA Clear   PHUR 6.0   SPECGRAV 1.025   PROTEINUA Trace*   GLUCUA Negative   KETONESU Negative   BILIRUBINUA Negative   OCCULTUA Negative   NITRITE Positive*   UROBILINOGEN Negative   LEUKOCYTESUR Negative   WBCUA 3   BACTERIA Few*       Significant Imaging:   Imaging Results          CT Abdomen Pelvis With Contrast (Final result)  Result time 05/23/19 13:14:23    Final result by MARIA VICTORIA Liang Sr., MD (05/23/19 13:14:23)                 Impression:      1. There are moderate inflammatory changes centered on the intervertebral disc space between the L2 and L3 vertebral bodies. There is bony destruction on both sides of the intervertebral disc space between the L2 and L3 vertebral bodies.  This is consistent with the patient's history and characteristic of discitis and osteomyelitis.  2. This is a limited examination secondary to  patient motion. The appendix is not well seen secondary to the motion artifact.  3. The spleen is enlarged. It measures 13.7 cm in length.  4. There is grade 1 anterolisthesis of L4 on L5.  5. There is mild cardiomegaly.  6. The above findings and impressions were discussed with Dr. Shepard via the telephone at 13:10 on 05/23/2019.  I observed these findings at 13:05 on 05/23/2019.  All CT scans at this facility use dose modulation, iterative reconstruction, and/or weight base dosing when appropriate to reduce radiation dose when appropriate to reduce radiation dose to as low as reasonably achievable.      Electronically signed by: Farzad Liang MD  Date:    05/23/2019  Time:    13:14             Narrative:    EXAMINATION:  CT ABDOMEN PELVIS WITH CONTRAST    CLINICAL HISTORY:  Abd pain, fever, abscess suspected;    TECHNIQUE:  Standard abdomen and pelvis CT protocol with oral and IV contrast was performed.    COMPARISON:  04/30/2019    FINDINGS:  Finding: There is mild cardiomegaly.  There are mild dependent atelectatic changes in both lungs.  There is no pneumothorax or pleural effusion.  There are healed fractures on the left side of the thoracic cage.    This is a limited examination secondary to patient motion.  The appendix is not well seen secondary to the motion artifact.  There is no gastrointestinal obstruction.  There are small stones in the dependent portion of the gallbladder.  There is moderate generalized atrophy of the pancreas.  The spleen is enlarged.  It measures 13.7 cm in length.  The liver, pancreas, adrenals, and kidneys are normal in appearance. The ureters and the urinary bladder are normal in appearance.  The uterus and ovaries are not optimally visualized.  There is no free fluid within the abdomen or pelvis. There is no pneumoperitoneum.  There are 5 lumbar-type vertebral bodies.  There are moderate inflammatory changes centered on the intervertebral disc space between the L2 and L3 vertebral  bodies.  There is bony destruction on both sides of the intervertebral disc space between the L2 and L3 vertebral bodies.  There is grade 1 anterolisthesis of L4 on L5.                               X-Ray Chest AP Portable (Final result)  Result time 05/23/19 10:57:30    Final result by Lior Juárez MD (05/23/19 10:57:30)                 Impression:      Interval significant improvement in appearance of the left lung base patchy opacification compared to 04/30/2019.      Electronically signed by: Lior Juárez  Date:    05/23/2019  Time:    10:57             Narrative:    EXAMINATION:  XR CHEST AP PORTABLE    CLINICAL HISTORY:  cough;.    TECHNIQUE:  Single frontal portable view of the chest was performed.    COMPARISON:  04/30/2019    FINDINGS:  Support devices: None    Interval significant improvement in appearance of the left lung base patchy opacification compared to 04/30/2019.  No appreciable pleural effusion.  Chronic interstitial coarsening unchanged.  Atherosclerotic calcifications again noted.  Mild cardiomegaly.  Unchanged healed fracture deformities of the left ribs.  No pneumothorax.  Degenerative changes of the shoulders, moderate to severe.                              Assessment/Plan:     * Osteomyelitis of lumbar spine  - CT abdomen/pelvis with contrast showed moderate inflammatory changes centered on the intervertebral disc space between the L2 and L3 vertebral bodies. There is bony destruction on both sides of the intervertebral disc space between the L2 and L3 vertebral bodies.  This is consistent with the patient's history and characteristic of discitis and osteomyelitis  - ED discussed case with Neurosurgery  - MRI thoracic and lumbar spine w wo contrast pending  - Inpatient consult to ID  - Blood cultures obtained, results pending  - Neurovascular checks  - Analgesics PRN    Hyponatremia  - Na+ 129  - IVF  - Neuro checks      UTI (urinary tract infection)  - UA (+) nitrites. Micro few  bacteria  - Continue Zosyn  - Urine culture pending      Type 2 diabetes, controlled, with peripheral neuropathy  - HgbA1c on 04/23/19 -- 7.8  - Accuchecks and low dose SSI  - Pt takes Novolin 70/30 55 units SQ TID. Glucose in ED 57 mg/dL. Will hold Novolin for now, use low dose SSI, and monitor glucose      Hypothyroidism  - Continue Levothyroxine      Essential hypertension  - Continue home medications including Metoprolol and Losartan      CAD (coronary artery disease)  - Continue ASA, Statin, and Imdur      PD (Parkinson's disease)  - Continue Pramipexole and Sinemet      VTE Risk Mitigation (From admission, onward)        Ordered     heparin (porcine) injection 5,000 Units  Every 8 hours      05/23/19 3684             KACIE Cowart  Department of Hospital Medicine   [facility]

## 2019-05-23 NOTE — ED PROVIDER NOTES
"SCRIBE #1 NOTE: I, Rajan Pandey, am scribing for, and in the presence of, Ivy Tam Do, MD. I have scribed the entire note.       History     Chief Complaint   Patient presents with    Fatigue     sent per home health nurse with increasing weakness and increasing abdominal/leg pain.  home health nurse states need for nursing home placement as fmy can't take care of her at home     Review of patient's allergies indicates:   Allergen Reactions    Codeine      Other reaction(s): Nausea    Codeine sulfate      Unknown^    Diphenhydramine hcl      Unknown^  Other reaction(s): Rash    Sulfa (sulfonamide antibiotics) Hives and Nausea And Vomiting    Penicillins Rash     Rash only as a child  Tolerated ceftriaxone 4/30/19  Tolerated piperacillin-tazobactam 5/23/19         History of Present Illness     HPI    5/23/2019, 10:27 AM  History obtained from the patient and EMS      History of Present Illness: Ross Isbell is a 73 y.o. female patient with a PMHx of Parkinson's Disease, CAD, MI, and DM who presents to the Emergency Department for evaluation of generalized weakness. Pt sent to ED by home health nurse who stated pt "needs placement" on account of pt's family being unable to care for her, pt being unable to walk, and pt having chronic abdominal and leg pain. Symptoms are constant and moderate in severity. No mitigating or exacerbating factors reported. No other sxs reported. Patient denies any chest pain, SOB, fever, chills, nausea, vomiting, diarrhea, back pain, and all other sxs at this time. No further complaints or concerns at this time. Pt states she has not walked in 5 years.       Arrival mode: EMS     PCP: Anna Penn MD        Past Medical History:  Past Medical History:   Diagnosis Date    Abnormal nuclear stress test 11/1/2015    Background diabetic retinopathy 12/28/2015    CAD (coronary artery disease) 2002    stents    Cataract     Gait disorder 10/12/2012    Glaucoma     MI " (myocardial infarction)     Obesity     CORINE (obstructive sleep apnea)     Other and unspecified hyperlipidemia     PD (Parkinson's disease)     tremor-predominant (per patient)    Type II or unspecified type diabetes mellitus without mention of complication, not stated as uncontrolled       am    Unspecified essential hypertension        Past Surgical History:  Past Surgical History:   Procedure Laterality Date    BREAST BIOPSY Left     benign - about 3 yrs ago    CORONARY ANGIOPLASTY  2002    CORONARY STENT PLACEMENT      DILATION AND CURETTAGE OF UTERUS      FOREIGN BODY REMOVAL      HEART CATH-LEFT Left 2015    [facility]    HEART CATH-LEFT Left 2015    [facility] CATH [facility]    HEART CATH-LEFT/pci stent lad Left 11/10/2015    [facility] at [facility] CATH LAB    INNER EAR SURGERY      TONSILLECTOMY, ADENOIDECTOMY           Family History:  Family History   Problem Relation Age of Onset    Parkinsonism Other     Tremor Father     Hypertension Father     Heart attack Father 65        MI    Parkinsonism Paternal Aunt     Glaucoma Mother     Hypertension Mother     Heart attack Mother 65        MI    Ovarian cancer Mother     Glaucoma Maternal Grandmother     Hypertension Maternal Grandmother     Diabetes Maternal Grandmother     Breast cancer Paternal Grandmother        Social History:  Social History     Tobacco Use    Smoking status: Former Smoker     Years: 40.00     Last attempt to quit: 10/12/2010     Years since quittin.6    Smokeless tobacco: Never Used    Tobacco comment: On & off for the past few years   Substance and Sexual Activity    Alcohol use: No     Alcohol/week: 0.0 oz    Drug use: No    Sexual activity: Not Currently        Review of Systems     Review of Systems   Constitutional: Negative for chills and fever.   HENT: Negative for sore throat.     Respiratory: Negative for shortness of breath.    Cardiovascular: Negative for chest pain.   Gastrointestinal: Positive for abdominal pain. Negative for diarrhea, nausea and vomiting.   Genitourinary: Negative for dysuria.   Musculoskeletal: Negative for back pain.        (+) leg pain   Skin: Negative for rash.   Neurological: Positive for weakness (generalized).   Hematological: Does not bruise/bleed easily.   All other systems reviewed and are negative.       Physical Exam     Initial Vitals   BP Pulse Resp Temp SpO2   05/23/19 1013 05/23/19 1013 05/23/19 1013 05/23/19 1017 05/23/19 1013   116/62 85 15 97.1 °F (36.2 °C) 95 %      MAP       --                 Physical Exam  Nursing Notes and Vital Signs Reviewed.  Constitutional: Patient is in no acute distress. Well-developed and well-nourished.  Head: Atraumatic. Normocephalic.  Eyes: PERRL. EOM intact. Conjunctivae are not pale. No scleral icterus.  ENT: Mucous membranes are moist. Oropharynx is clear and symmetric.    Neck: Supple. Full ROM. No lymphadenopathy.  Cardiovascular: Regular rate. Regular rhythm. No murmurs, rubs, or gallops. Distal pulses are 2+ and symmetric.  Pulmonary/Chest: No respiratory distress. Clear to auscultation bilaterally. No wheezing or rales.  Abdominal: Soft and non-distended.  There is minimal tenderness throughout on palpation. No rebound, guarding, or rigidity. No point tenderness. Good bowel sounds.  Genitourinary: No CVA tenderness  Musculoskeletal: Moves all extremities. No obvious deformities. No edema.  Skin: Warm and dry. Stage 2 lumbar decubitus ulcer noted.   Neurological:  Alert, awake, and appropriate.  Normal speech.  No acute focal neurological deficits are appreciated. Weak tremors.   Psychiatric: Normal affect. Good eye contact. Appropriate in content.     ED Course   Critical Care  Date/Time: 5/23/2019 2:15 PM  Performed by: Ivy Tam Do, MD  Authorized by: Ivy Tam Do, MD   Direct patient critical  "care time: 10 minutes  Additional history critical care time: 8 minutes  Ordering / reviewing critical care time: 7 minutes  Documentation critical care time: 5 minutes  Consulting other physicians critical care time: 5 minutes  Total critical care time (exclusive of procedural time) : 35 minutes  Critical care time was exclusive of separately billable procedures and treating other patients and teaching time.  Critical care was necessary to treat or prevent imminent or life-threatening deterioration of the following conditions: osteomyelitis   Critical care was time spent personally by me on the following activities: blood draw for specimens, discussions with consultants, development of treatment plan with patient or surrogate, interpretation of cardiac output measurements, evaluation of patient's response to treatment, examination of patient, obtaining history from patient or surrogate, ordering and performing treatments and interventions, ordering and review of laboratory studies, ordering and review of radiographic studies, pulse oximetry, re-evaluation of patient's condition and review of old charts.        ED Vital Signs:  Vitals:    05/23/19 1013 05/23/19 1017 05/23/19 1202 05/23/19 1232   BP: 116/62  128/69 116/63   Pulse: 85  85    Resp: 15      Temp:  97.1 °F (36.2 °C)     TempSrc: Oral Axillary     SpO2: 95%  97%    Weight: 104.8 kg (231 lb)      Height: 5' 6" (1.676 m)       05/23/19 1331 05/23/19 1347 05/23/19 1402 05/23/19 1502   BP: 119/63 127/63 (!) 125/58 124/60   Pulse: 88 88 89 90   Resp:   16 17   Temp:       TempSrc:       SpO2: (!) 94% (!) 93% (!) 91% 97%   Weight:       Height:        05/23/19 1543   BP: 131/78   Pulse: 92   Resp: 16   Temp: 97.8 °F (36.6 °C)   TempSrc:    SpO2: (!) 92%   Weight:    Height:        Abnormal Lab Results:  Labs Reviewed   CBC W/ AUTO DIFFERENTIAL - Abnormal; Notable for the following components:       Result Value    WBC 13.08 (*)     RBC 3.89 (*)     Hemoglobin " 11.0 (*)     Hematocrit 33.5 (*)     Platelets 454 (*)     MPV 8.2 (*)     Gran # (ANC) 10.0 (*)     Mono # 1.2 (*)     Gran% 76.0 (*)     Lymph% 13.2 (*)     All other components within normal limits   COMPREHENSIVE METABOLIC PANEL - Abnormal; Notable for the following components:    Sodium 129 (*)     Chloride 94 (*)     CO2 21 (*)     Glucose 57 (*)     Albumin 2.9 (*)     ALT 6 (*)     All other components within normal limits   URINALYSIS, REFLEX TO URINE CULTURE - Abnormal; Notable for the following components:    Protein, UA Trace (*)     Nitrite, UA Positive (*)     All other components within normal limits    Narrative:     Preferred Collection Type->Urine, Catheterized   URINALYSIS MICROSCOPIC - Abnormal; Notable for the following components:    Bacteria Few (*)     All other components within normal limits    Narrative:     Preferred Collection Type->Urine, Catheterized   CULTURE, BLOOD   CULTURE, BLOOD   LIPASE        All Lab Results:  Results for orders placed or performed during the hospital encounter of 05/23/19   CBC W/ AUTO DIFFERENTIAL   Result Value Ref Range    WBC 13.08 (H) 3.90 - 12.70 K/uL    RBC 3.89 (L) 4.00 - 5.40 M/uL    Hemoglobin 11.0 (L) 12.0 - 16.0 g/dL    Hematocrit 33.5 (L) 37.0 - 48.5 %    Mean Corpuscular Volume 86 82 - 98 fL    Mean Corpuscular Hemoglobin 28.3 27.0 - 31.0 pg    Mean Corpuscular Hemoglobin Conc 32.8 32.0 - 36.0 g/dL    RDW 13.8 11.5 - 14.5 %    Platelets 454 (H) 150 - 350 K/uL    MPV 8.2 (L) 9.2 - 12.9 fL    Gran # (ANC) 10.0 (H) 1.8 - 7.7 K/uL    Lymph # 1.7 1.0 - 4.8 K/uL    Mono # 1.2 (H) 0.3 - 1.0 K/uL    Eos # 0.1 0.0 - 0.5 K/uL    Baso # 0.02 0.00 - 0.20 K/uL    Gran% 76.0 (H) 38.0 - 73.0 %    Lymph% 13.2 (L) 18.0 - 48.0 %    Mono% 9.5 4.0 - 15.0 %    Eosinophil% 1.1 0.0 - 8.0 %    Basophil% 0.2 0.0 - 1.9 %    Differential Method Automated    Comp. Metabolic Panel   Result Value Ref Range    Sodium 129 (L) 136 - 145 mmol/L    Potassium 4.1 3.5 - 5.1 mmol/L     Chloride 94 (L) 95 - 110 mmol/L    CO2 21 (L) 23 - 29 mmol/L    Glucose 57 (L) 70 - 110 mg/dL    BUN, Bld 16 8 - 23 mg/dL    Creatinine 0.7 0.5 - 1.4 mg/dL    Calcium 9.4 8.7 - 10.5 mg/dL    Total Protein 7.2 6.0 - 8.4 g/dL    Albumin 2.9 (L) 3.5 - 5.2 g/dL    Total Bilirubin 0.5 0.1 - 1.0 mg/dL    Alkaline Phosphatase 90 55 - 135 U/L    AST 22 10 - 40 U/L    ALT 6 (L) 10 - 44 U/L    Anion Gap 14 8 - 16 mmol/L    eGFR if African American >60 >60 mL/min/1.73 m^2    eGFR if non African American >60 >60 mL/min/1.73 m^2   Lipase   Result Value Ref Range    Lipase 17 4 - 60 U/L   Urinalysis, Reflex to Urine Culture Urine, Catheterized   Result Value Ref Range    Specimen UA Urine, Catheterized     Color, UA Yellow Yellow, Straw, Radha    Appearance, UA Clear Clear    pH, UA 6.0 5.0 - 8.0    Specific Gravity, UA 1.025 1.005 - 1.030    Protein, UA Trace (A) Negative    Glucose, UA Negative Negative    Ketones, UA Negative Negative    Bilirubin (UA) Negative Negative    Occult Blood UA Negative Negative    Nitrite, UA Positive (A) Negative    Urobilinogen, UA Negative <2.0 EU/dL    Leukocytes, UA Negative Negative   Urinalysis Microscopic   Result Value Ref Range    WBC, UA 3 0 - 5 /hpf    Bacteria Few (A) None-Occ /hpf    Microscopic Comment SEE COMMENT    C-reactive protein   Result Value Ref Range    CRP 41.5 (H) 0.0 - 8.2 mg/L         Imaging Results:  Imaging Results          MRI Thoracic Spine W WO Cont (In process)                MRI Lumbar Spine W WO Cont (In process)                CT Abdomen Pelvis With Contrast (Final result)  Result time 05/23/19 13:14:23    Final result by MARIA VICTORIA Liang Sr., MD (05/23/19 13:14:23)                 Impression:      1. There are moderate inflammatory changes centered on the intervertebral disc space between the L2 and L3 vertebral bodies. There is bony destruction on both sides of the intervertebral disc space between the L2 and L3 vertebral bodies.  This is consistent with the  patient's history and characteristic of discitis and osteomyelitis.  2. This is a limited examination secondary to patient motion. The appendix is not well seen secondary to the motion artifact.  3. The spleen is enlarged. It measures 13.7 cm in length.  4. There is grade 1 anterolisthesis of L4 on L5.  5. There is mild cardiomegaly.  6. The above findings and impressions were discussed with Dr. Shepard via the telephone at 13:10 on 05/23/2019.  I observed these findings at 13:05 on 05/23/2019.  All CT scans at this facility use dose modulation, iterative reconstruction, and/or weight base dosing when appropriate to reduce radiation dose when appropriate to reduce radiation dose to as low as reasonably achievable.      Electronically signed by: Farzad Liang MD  Date:    05/23/2019  Time:    13:14             Narrative:    EXAMINATION:  CT ABDOMEN PELVIS WITH CONTRAST    CLINICAL HISTORY:  Abd pain, fever, abscess suspected;    TECHNIQUE:  Standard abdomen and pelvis CT protocol with oral and IV contrast was performed.    COMPARISON:  04/30/2019    FINDINGS:  Finding: There is mild cardiomegaly.  There are mild dependent atelectatic changes in both lungs.  There is no pneumothorax or pleural effusion.  There are healed fractures on the left side of the thoracic cage.    This is a limited examination secondary to patient motion.  The appendix is not well seen secondary to the motion artifact.  There is no gastrointestinal obstruction.  There are small stones in the dependent portion of the gallbladder.  There is moderate generalized atrophy of the pancreas.  The spleen is enlarged.  It measures 13.7 cm in length.  The liver, pancreas, adrenals, and kidneys are normal in appearance. The ureters and the urinary bladder are normal in appearance.  The uterus and ovaries are not optimally visualized.  There is no free fluid within the abdomen or pelvis. There is no pneumoperitoneum.  There are 5 lumbar-type vertebral bodies.   There are moderate inflammatory changes centered on the intervertebral disc space between the L2 and L3 vertebral bodies.  There is bony destruction on both sides of the intervertebral disc space between the L2 and L3 vertebral bodies.  There is grade 1 anterolisthesis of L4 on L5.                               X-Ray Chest AP Portable (Final result)  Result time 05/23/19 10:57:30    Final result by Lior Juárez MD (05/23/19 10:57:30)                 Impression:      Interval significant improvement in appearance of the left lung base patchy opacification compared to 04/30/2019.      Electronically signed by: Lior Juárez  Date:    05/23/2019  Time:    10:57             Narrative:    EXAMINATION:  XR CHEST AP PORTABLE    CLINICAL HISTORY:  cough;.    TECHNIQUE:  Single frontal portable view of the chest was performed.    COMPARISON:  04/30/2019    FINDINGS:  Support devices: None    Interval significant improvement in appearance of the left lung base patchy opacification compared to 04/30/2019.  No appreciable pleural effusion.  Chronic interstitial coarsening unchanged.  Atherosclerotic calcifications again noted.  Mild cardiomegaly.  Unchanged healed fracture deformities of the left ribs.  No pneumothorax.  Degenerative changes of the shoulders, moderate to severe.                                 The EKG was ordered, reviewed, and independently interpreted by the ED provider.  Interpretation time: 1033   Rate: 84 BPM  Rhythm: Accelerated junctional rhythm   Interpretation: voltage criteria for left ventricular hypertrophy. Cannot rule out septal infarct. No STEMI.           The Emergency Provider reviewed the vital signs and test results, which are outlined above.     ED Discussion     1:33 PM: Discussed pt's case with Dr. Nicolas (Spinal Surgery) who states pt does not need to be transferred, pt needs a inpatient MRI of thoracic and lumbar spine to decide if pt needs surgical vs regular biopsy. States he will  start pt on Zosyn and Vanco but advises to consult Dr. Gomez for antibiotics choices.    1:42 PM: Re-evaluated pt. I have discussed test results, shared treatment plan, and the need for admission with patient at bedside. Pt states she does not feel she can take care of herself anymore and states she would liked to be placed in a nursing home. Pt express understanding at this time and agree with all information. All questions answered. Pt  have no further questions or concerns at this time. Pt is ready for admit.     1:59 PM: Discussed case with Joycelyn Mcgraw ([facility]). Dr. Bryant agrees with current care and management of pt and accepts admission.   Admitting Service: Hospital Medicine  Admitting Physician: Dr. Bryant  Admit to: Med surg        ED Medication(s):  Medications   acetaminophen tablet 650 mg (has no administration in time range)   aspirin EC tablet 81 mg (has no administration in time range)   isosorbide mononitrate 24 hr tablet 120 mg (has no administration in time range)   levothyroxine tablet 125 mcg (has no administration in time range)   losartan tablet 50 mg (has no administration in time range)   metoprolol succinate (TOPROL-XL) 24 hr tablet 25 mg (has no administration in time range)   pantoprazole EC tablet 40 mg (has no administration in time range)   pravastatin tablet 40 mg (has no administration in time range)   pramipexole tablet 1 mg (has no administration in time range)   carbidopa-levodopa  mg per tablet 1 tablet (1 tablet Oral Given 5/23/19 1745)   glucose chewable tablet 16 g (has no administration in time range)   glucose chewable tablet 24 g (has no administration in time range)   dextrose 50% injection 12.5 g (has no administration in time range)   dextrose 50% injection 25 g (has no administration in time range)   glucagon (human recombinant) injection 1 mg (has no administration in time range)   insulin aspart U-100 pen 0-5 Units (has no administration in  time range)   latanoprost 0.005 % ophthalmic solution 1 drop (has no administration in time range)   ondansetron injection 4 mg (4 mg Intravenous Given 5/23/19 1638)   promethazine (PHENERGAN) 6.25 mg in dextrose 5 % 50 mL IVPB (has no administration in time range)   morphine injection 2 mg (2 mg Intravenous Given 5/23/19 1632)   heparin (porcine) injection 5,000 Units (has no administration in time range)   piperacillin-tazobactam 4.5 g in dextrose 5 % 100 mL IVPB (ready to mix system) (has no administration in time range)   morphine injection 4 mg (4 mg Intravenous Given 5/23/19 1041)   ondansetron injection 4 mg (4 mg Intravenous Given 5/23/19 1040)   iohexol (OMNIPAQUE 350) injection 30 mL (30 mLs Oral Given 5/23/19 1244)   sodium chloride 0.9% bolus 1,000 mL (0 mLs Intravenous Stopped 5/23/19 1415)   iohexol (OMNIPAQUE 350) injection 100 mL (100 mLs Intravenous Given 5/23/19 1244)   piperacillin-tazobactam 4.5 g in dextrose 5 % 100 mL IVPB (ready to mix system) (0 g Intravenous Stopped 5/23/19 1509)   vancomycin (VANCOCIN) 2,500 mg in dextrose 5 % 500 mL IVPB (2,500 mg Intravenous New Bag 5/23/19 1414)   0.9%  NaCl infusion ( Intravenous New Bag 5/23/19 1730)       Current Discharge Medication List                    Medical Decision Making:   Clinical Tests:   Lab Tests: Ordered and Reviewed  Radiological Study: Ordered and Reviewed  Medical Tests: Ordered and Reviewed             Scribe Attestation:   Scribe #1: I performed the above scribed service and the documentation accurately describes the services I performed. I attest to the accuracy of the note.     Attending:   Physician Attestation Statement for Scribe #1: I, Ivy Tam Do, MD, personally performed the services described in this documentation, as scribed by Rajan Pandey, in my presence, and it is both accurate and complete.           Clinical Impression       ICD-10-CM ICD-9-CM   1. Urinary tract infection without hematuria, site unspecified  N39.0 599.0   2. Chest pain R07.9 786.50   3. Osteomyelitis of lumbar spine M46.26 730.28       Disposition:   Disposition: Admitted  Condition: Fair         Ivy Tam Do, MD  05/23/19 0257

## 2019-05-23 NOTE — ASSESSMENT & PLAN NOTE
- CT abdomen/pelvis with contrast showed moderate inflammatory changes centered on the intervertebral disc space between the L2 and L3 vertebral bodies. There is bony destruction on both sides of the intervertebral disc space between the L2 and L3 vertebral bodies.  This is consistent with the patient's history and characteristic of discitis and osteomyelitis  - ED discussed case with Neurosurgery  - MRI thoracic and lumbar spine w wo contrast pending  - Inpatient consult to ID  - Blood cultures obtained, results pending  - Neurovascular checks  - Analgesics PRN

## 2019-05-23 NOTE — SUBJECTIVE & OBJECTIVE
Past Medical History:   Diagnosis Date    Abnormal nuclear stress test 11/1/2015    Background diabetic retinopathy 12/28/2015    CAD (coronary artery disease) 2002    stents    Cataract     Gait disorder 10/12/2012    Glaucoma     MI (myocardial infarction)     Obesity     CORINE (obstructive sleep apnea)     Other and unspecified hyperlipidemia     PD (Parkinson's disease) 2002    tremor-predominant (per patient)    Type II or unspecified type diabetes mellitus without mention of complication, not stated as uncontrolled 2002      am    Unspecified essential hypertension        Past Surgical History:   Procedure Laterality Date    BREAST BIOPSY Left     benign - about 3 yrs ago    CORONARY ANGIOPLASTY  2002    CORONARY STENT PLACEMENT  2002    DILATION AND CURETTAGE OF UTERUS      FOREIGN BODY REMOVAL      HEART CATH-LEFT Left 11/5/2015    [facility] C[facility]    HEART CATH-LEFT Left 11/2/2015    [facility] C[facility]    HEART CATH-LEFT/pci stent lad Left 11/10/2015    [facility] CATH [facility]    INNER EAR SURGERY      TONSILLECTOMY, ADENOIDECTOMY         Review of patient's allergies indicates:   Allergen Reactions    Codeine      Other reaction(s): Nausea    Codeine sulfate      Unknown^    Diphenhydramine hcl      Unknown^  Other reaction(s): Rash    Sulfa (sulfonamide antibiotics) Hives and Nausea And Vomiting    Penicillins Rash     Rash only as a child  Tolerated ceftriaxone 4/30/19       No current facility-administered medications on file prior to encounter.      Current Outpatient Medications on File Prior to Encounter   Medication Sig    aspirin (ECOTRIN) 81 MG EC tablet Take 1 tablet (81 mg total) by mouth once daily.    carbidopa-levodopa  mg (SINEMET)  mg per tablet TAKE 1 TABLET BY MOUTH 5 TIMES DAILY    isosorbide mononitrate (IMDUR) 120 MG 24 hr tablet Take 1 tablet (120 mg  total) by mouth once daily.    levothyroxine (SYNTHROID) 125 MCG tablet TAKE 1 TABLET BY MOUTH IN THE MORNING BEFORE BREAKFAST    losartan (COZAAR) 50 MG tablet TAKE 1 TABLET BY MOUTH ONCE DAILY    metFORMIN (GLUCOPHAGE) 500 MG tablet TAKE ONE TABLET BY MOUTH THREE TIMES DAILY    metoprolol succinate (TOPROL-XL) 25 MG 24 hr tablet TAKE 1 TABLET BY MOUTH ONCE DAILY    NOVOLIN 70/30 U-100 INSULIN 100 unit/mL (70-30) injection Inject 55 Units into the skin 3 (three) times daily.    pantoprazole (PROTONIX) 40 MG tablet Take 1 tablet (40 mg total) by mouth once daily.    pramipexole (MIRAPEX) 1 MG tablet TAKE ONE TABLET BY MOUTH THREE TIMES DAILY    pravastatin (PRAVACHOL) 40 MG tablet TAKE ONE TABLET BY MOUTH ONCE DAILY    ACCU-CHEK SOFTCLIX LANCETS Misc TEST THREE TIMES DAILY    acetaminophen (TYLENOL) 325 MG tablet Take 2 tablets (650 mg total) by mouth every 6 (six) hours as needed.    blood sugar diagnostic Strp 1 strip by Misc.(Non-Drug; Combo Route) route 3 (three) times daily. Accuchek Felicia Plus Test Strips    blood-glucose meter kit Accu-chek Felicia Plus Meter    insulin syringe-needle U-100 0.5 mL 31 gauge x 5/16 Syrg USE THREE TIMES DAILY    latanoprost 0.005 % ophthalmic solution INSTILL 1 DROP INTO EACH EYE IN THE EVENING    multivit-min-FA-lycopen-lutein (CENTRUM SILVER) 0.4-300-250 mg-mcg-mcg Tab Take 1 tablet by mouth once daily.     Family History     Problem Relation (Age of Onset)    Breast cancer Paternal Grandmother    Diabetes Maternal Grandmother    Glaucoma Mother, Maternal Grandmother    Heart attack Father (65), Mother (65)    Hypertension Father, Mother, Maternal Grandmother    Ovarian cancer Mother    Parkinsonism Other, Paternal Aunt    Tremor Father        Tobacco Use    Smoking status: Former Smoker     Years: 40.00     Last attempt to quit: 10/12/2010     Years since quittin.6    Smokeless tobacco: Never Used    Tobacco comment: On & off for the past few years    Substance and Sexual Activity    Alcohol use: No     Alcohol/week: 0.0 oz    Drug use: No    Sexual activity: Not Currently     Review of Systems   Constitutional: Positive for activity change. Negative for chills and fever.   HENT: Negative for trouble swallowing.    Eyes: Negative for visual disturbance.   Respiratory: Negative for apnea, cough, choking, chest tightness, shortness of breath, wheezing and stridor.    Cardiovascular: Negative for chest pain, palpitations and leg swelling.   Gastrointestinal: Positive for abdominal pain and nausea. Negative for constipation, diarrhea and vomiting.   Genitourinary: Negative for difficulty urinating, dysuria, flank pain, frequency and urgency.   Musculoskeletal: Positive for arthralgias and gait problem. Negative for back pain, joint swelling, myalgias, neck pain and neck stiffness.   Skin: Negative for color change.   Neurological: Positive for weakness. Negative for dizziness, tremors, seizures, syncope, facial asymmetry, speech difficulty, light-headedness, numbness and headaches.   Psychiatric/Behavioral: Negative for agitation and behavioral problems.     Objective:     Vital Signs (Most Recent):  Temp: 97.1 °F (36.2 °C) (05/23/19 1017)  Pulse: 90 (05/23/19 1502)  Resp: 17 (05/23/19 1502)  BP: 124/60 (05/23/19 1502)  SpO2: 97 % (05/23/19 1502) Vital Signs (24h Range):  Temp:  [97.1 °F (36.2 °C)] 97.1 °F (36.2 °C)  Pulse:  [85-90] 90  Resp:  [15-17] 17  SpO2:  [91 %-97 %] 97 %  BP: (116-128)/(58-69) 124/60     Weight: 104.8 kg (231 lb)  Body mass index is 37.28 kg/m².    Physical Exam   Constitutional: She is oriented to person, place, and time. She appears well-developed. She is cooperative. She is easily aroused. She appears distressed (appears in pain from BLE cramping).   HENT:   Head: Normocephalic and atraumatic.   Eyes: EOM are normal.   Neck: Normal range of motion. Neck supple.   Cardiovascular: Normal rate, regular rhythm and intact distal pulses.    No murmur heard.  Pulmonary/Chest: Effort normal and breath sounds normal. No stridor. No respiratory distress. She has no wheezes. She has no rales. She exhibits no tenderness.   Abdominal: Soft. Bowel sounds are normal. She exhibits no distension. There is no tenderness. There is no rebound and no guarding.   Genitourinary:   Genitourinary Comments: Deferred   Musculoskeletal: She exhibits tenderness. She exhibits no edema or deformity.   Neurological: She is alert, oriented to person, place, and time and easily aroused. No sensory deficit.   BUE tremors, hx of parkinson's   Skin: Skin is warm and dry. Capillary refill takes less than 2 seconds.   Psychiatric: She has a normal mood and affect. Her behavior is normal. Thought content normal.   Nursing note and vitals reviewed.        CRANIAL NERVES     CN III, IV, VI   Extraocular motions are normal.        Significant Labs:   CBC:   Recent Labs   Lab 05/23/19  1036   WBC 13.08*   HGB 11.0*   HCT 33.5*   *     CMP:   Recent Labs   Lab 05/23/19  1036   *   K 4.1   CL 94*   CO2 21*   GLU 57*   BUN 16   CREATININE 0.7   CALCIUM 9.4   PROT 7.2   ALBUMIN 2.9*   BILITOT 0.5   ALKPHOS 90   AST 22   ALT 6*   ANIONGAP 14   EGFRNONAA >60     Lipase:   Recent Labs   Lab 05/23/19  1036   LIPASE 17     Urine Studies:   Recent Labs   Lab 05/23/19  1055   COLORU Yellow   APPEARANCEUA Clear   PHUR 6.0   SPECGRAV 1.025   PROTEINUA Trace*   GLUCUA Negative   KETONESU Negative   BILIRUBINUA Negative   OCCULTUA Negative   NITRITE Positive*   UROBILINOGEN Negative   LEUKOCYTESUR Negative   WBCUA 3   BACTERIA Few*       Significant Imaging:   Imaging Results          CT Abdomen Pelvis With Contrast (Final result)  Result time 05/23/19 13:14:23    Final result by MARIA VICTORIA Liang Sr., MD (05/23/19 13:14:23)                 Impression:      1. There are moderate inflammatory changes centered on the intervertebral disc space between the L2 and L3 vertebral bodies. There is  bony destruction on both sides of the intervertebral disc space between the L2 and L3 vertebral bodies.  This is consistent with the patient's history and characteristic of discitis and osteomyelitis.  2. This is a limited examination secondary to patient motion. The appendix is not well seen secondary to the motion artifact.  3. The spleen is enlarged. It measures 13.7 cm in length.  4. There is grade 1 anterolisthesis of L4 on L5.  5. There is mild cardiomegaly.  6. The above findings and impressions were discussed with Dr. Shepard via the telephone at 13:10 on 05/23/2019.  I observed these findings at 13:05 on 05/23/2019.  All CT scans at this facility use dose modulation, iterative reconstruction, and/or weight base dosing when appropriate to reduce radiation dose when appropriate to reduce radiation dose to as low as reasonably achievable.      Electronically signed by: Farzad Liang MD  Date:    05/23/2019  Time:    13:14             Narrative:    EXAMINATION:  CT ABDOMEN PELVIS WITH CONTRAST    CLINICAL HISTORY:  Abd pain, fever, abscess suspected;    TECHNIQUE:  Standard abdomen and pelvis CT protocol with oral and IV contrast was performed.    COMPARISON:  04/30/2019    FINDINGS:  Finding: There is mild cardiomegaly.  There are mild dependent atelectatic changes in both lungs.  There is no pneumothorax or pleural effusion.  There are healed fractures on the left side of the thoracic cage.    This is a limited examination secondary to patient motion.  The appendix is not well seen secondary to the motion artifact.  There is no gastrointestinal obstruction.  There are small stones in the dependent portion of the gallbladder.  There is moderate generalized atrophy of the pancreas.  The spleen is enlarged.  It measures 13.7 cm in length.  The liver, pancreas, adrenals, and kidneys are normal in appearance. The ureters and the urinary bladder are normal in appearance.  The uterus and ovaries are not optimally  visualized.  There is no free fluid within the abdomen or pelvis. There is no pneumoperitoneum.  There are 5 lumbar-type vertebral bodies.  There are moderate inflammatory changes centered on the intervertebral disc space between the L2 and L3 vertebral bodies.  There is bony destruction on both sides of the intervertebral disc space between the L2 and L3 vertebral bodies.  There is grade 1 anterolisthesis of L4 on L5.                               X-Ray Chest AP Portable (Final result)  Result time 05/23/19 10:57:30    Final result by Lior Juárez MD (05/23/19 10:57:30)                 Impression:      Interval significant improvement in appearance of the left lung base patchy opacification compared to 04/30/2019.      Electronically signed by: Lior Juárez  Date:    05/23/2019  Time:    10:57             Narrative:    EXAMINATION:  XR CHEST AP PORTABLE    CLINICAL HISTORY:  cough;.    TECHNIQUE:  Single frontal portable view of the chest was performed.    COMPARISON:  04/30/2019    FINDINGS:  Support devices: None    Interval significant improvement in appearance of the left lung base patchy opacification compared to 04/30/2019.  No appreciable pleural effusion.  Chronic interstitial coarsening unchanged.  Atherosclerotic calcifications again noted.  Mild cardiomegaly.  Unchanged healed fracture deformities of the left ribs.  No pneumothorax.  Degenerative changes of the shoulders, moderate to severe.

## 2019-05-23 NOTE — ASSESSMENT & PLAN NOTE
- HgbA1c on 04/23/19 -- 7.8  - Accuchecks and low dose SSI  - Pt takes Novolin 70/30 55 units SQ TID. Glucose in ED 57 mg/dL. Will hold Novolin for now, use low dose SSI, and monitor glucose

## 2019-05-24 ENCOUNTER — TELEPHONE (OUTPATIENT)
Dept: ADMINISTRATIVE | Facility: CLINIC | Age: 74
End: 2019-05-24

## 2019-05-24 PROBLEM — G06.1 ABSCESS IN EPIDURAL SPACE OF LUMBAR SPINE: Status: ACTIVE | Noted: 2019-05-24

## 2019-05-24 PROBLEM — L30.4 INTERTRIGO: Status: ACTIVE | Noted: 2019-05-24

## 2019-05-24 PROBLEM — T14.8XXA DEEP TISSUE INJURY: Status: ACTIVE | Noted: 2019-05-24

## 2019-05-24 PROBLEM — L89.103 PRESSURE INJURY OF BACK, STAGE 3: Status: ACTIVE | Noted: 2019-05-24

## 2019-05-24 PROBLEM — R78.81 GRAM-POSITIVE COCCI BACTEREMIA: Status: ACTIVE | Noted: 2019-05-24

## 2019-05-24 LAB
ABO + RH BLD: NORMAL
ALBUMIN SERPL BCP-MCNC: 2.7 G/DL (ref 3.5–5.2)
ALP SERPL-CCNC: 92 U/L (ref 55–135)
ALT SERPL W/O P-5'-P-CCNC: 8 U/L (ref 10–44)
ANION GAP SERPL CALC-SCNC: 8 MMOL/L (ref 8–16)
APTT BLDCRRT: 35.3 SEC (ref 21–32)
AST SERPL-CCNC: 31 U/L (ref 10–40)
BASOPHILS # BLD AUTO: 0.01 K/UL (ref 0–0.2)
BASOPHILS NFR BLD: 0.1 % (ref 0–1.9)
BILIRUB SERPL-MCNC: 0.7 MG/DL (ref 0.1–1)
BLD GP AB SCN CELLS X3 SERPL QL: NORMAL
BUN SERPL-MCNC: 10 MG/DL (ref 8–23)
CALCIUM SERPL-MCNC: 8.9 MG/DL (ref 8.7–10.5)
CHLORIDE SERPL-SCNC: 95 MMOL/L (ref 95–110)
CO2 SERPL-SCNC: 25 MMOL/L (ref 23–29)
CREAT SERPL-MCNC: 0.8 MG/DL (ref 0.5–1.4)
DIFFERENTIAL METHOD: ABNORMAL
EOSINOPHIL # BLD AUTO: 0.1 K/UL (ref 0–0.5)
EOSINOPHIL NFR BLD: 0.7 % (ref 0–8)
ERYTHROCYTE [DISTWIDTH] IN BLOOD BY AUTOMATED COUNT: 13.9 % (ref 11.5–14.5)
EST. GFR  (AFRICAN AMERICAN): >60 ML/MIN/1.73 M^2
EST. GFR  (NON AFRICAN AMERICAN): >60 ML/MIN/1.73 M^2
GLUCOSE SERPL-MCNC: 78 MG/DL (ref 70–110)
GRAM STN SPEC: NORMAL
GRAM STN SPEC: NORMAL
HCT VFR BLD AUTO: 32 % (ref 37–48.5)
HGB BLD-MCNC: 10.2 G/DL (ref 12–16)
INR PPP: 1.1 (ref 0.8–1.2)
LYMPHOCYTES # BLD AUTO: 1.4 K/UL (ref 1–4.8)
LYMPHOCYTES NFR BLD: 12.8 % (ref 18–48)
MCH RBC QN AUTO: 27.6 PG (ref 27–31)
MCHC RBC AUTO-ENTMCNC: 31.9 G/DL (ref 32–36)
MCV RBC AUTO: 87 FL (ref 82–98)
MONOCYTES # BLD AUTO: 1.3 K/UL (ref 0.3–1)
MONOCYTES NFR BLD: 11.7 % (ref 4–15)
NEUTROPHILS # BLD AUTO: 8.1 K/UL (ref 1.8–7.7)
NEUTROPHILS NFR BLD: 74.7 % (ref 38–73)
PLATELET # BLD AUTO: 383 K/UL (ref 150–350)
PMV BLD AUTO: 8.4 FL (ref 9.2–12.9)
POCT GLUCOSE: 101 MG/DL (ref 70–110)
POCT GLUCOSE: 115 MG/DL (ref 70–110)
POCT GLUCOSE: 92 MG/DL (ref 70–110)
POTASSIUM SERPL-SCNC: 3.7 MMOL/L (ref 3.5–5.1)
PROT SERPL-MCNC: 6.6 G/DL (ref 6–8.4)
PROTHROMBIN TIME: 11.9 SEC (ref 9–12.5)
RBC # BLD AUTO: 3.7 M/UL (ref 4–5.4)
SODIUM SERPL-SCNC: 128 MMOL/L (ref 136–145)
VANCOMYCIN TROUGH SERPL-MCNC: 14.6 UG/ML (ref 10–22)
WBC # BLD AUTO: 10.85 K/UL (ref 3.9–12.7)

## 2019-05-24 PROCEDURE — 97167 OT EVAL HIGH COMPLEX 60 MIN: CPT

## 2019-05-24 PROCEDURE — 21400001 HC TELEMETRY ROOM

## 2019-05-24 PROCEDURE — 85025 COMPLETE CBC W/AUTO DIFF WBC: CPT

## 2019-05-24 PROCEDURE — 99900038 HC OT GENERIC THERAPY SCREENING (STAT)

## 2019-05-24 PROCEDURE — 97530 THERAPEUTIC ACTIVITIES: CPT

## 2019-05-24 PROCEDURE — 63600175 PHARM REV CODE 636 W HCPCS: Performed by: RADIOLOGY

## 2019-05-24 PROCEDURE — 80053 COMPREHEN METABOLIC PANEL: CPT

## 2019-05-24 PROCEDURE — 99223 1ST HOSP IP/OBS HIGH 75: CPT | Mod: ,,, | Performed by: NEUROLOGICAL SURGERY

## 2019-05-24 PROCEDURE — 87075 CULTR BACTERIA EXCEPT BLOOD: CPT

## 2019-05-24 PROCEDURE — 25000003 PHARM REV CODE 250: Performed by: NURSE PRACTITIONER

## 2019-05-24 PROCEDURE — 25000003 PHARM REV CODE 250: Performed by: INTERNAL MEDICINE

## 2019-05-24 PROCEDURE — 96372 THER/PROPH/DIAG INJ SC/IM: CPT

## 2019-05-24 PROCEDURE — 99223 PR INITIAL HOSPITAL CARE,LEVL III: ICD-10-PCS | Mod: ,,, | Performed by: NEUROLOGICAL SURGERY

## 2019-05-24 PROCEDURE — 63600175 PHARM REV CODE 636 W HCPCS: Performed by: NURSE PRACTITIONER

## 2019-05-24 PROCEDURE — 85610 PROTHROMBIN TIME: CPT

## 2019-05-24 PROCEDURE — 86901 BLOOD TYPING SEROLOGIC RH(D): CPT

## 2019-05-24 PROCEDURE — 85730 THROMBOPLASTIN TIME PARTIAL: CPT

## 2019-05-24 PROCEDURE — 97163 PT EVAL HIGH COMPLEX 45 MIN: CPT

## 2019-05-24 PROCEDURE — 97802 MEDICAL NUTRITION INDIV IN: CPT

## 2019-05-24 PROCEDURE — 86920 COMPATIBILITY TEST SPIN: CPT

## 2019-05-24 PROCEDURE — 87070 CULTURE OTHR SPECIMN AEROBIC: CPT

## 2019-05-24 PROCEDURE — 36415 COLL VENOUS BLD VENIPUNCTURE: CPT

## 2019-05-24 PROCEDURE — 80202 ASSAY OF VANCOMYCIN: CPT

## 2019-05-24 PROCEDURE — 63600175 PHARM REV CODE 636 W HCPCS: Performed by: INTERNAL MEDICINE

## 2019-05-24 PROCEDURE — 87205 SMEAR GRAM STAIN: CPT

## 2019-05-24 PROCEDURE — 99900037 HC PT THERAPY SCREENING (STAT)

## 2019-05-24 RX ORDER — MORPHINE SULFATE 2 MG/ML
2 INJECTION, SOLUTION INTRAMUSCULAR; INTRAVENOUS EVERY 4 HOURS PRN
Status: DISCONTINUED | OUTPATIENT
Start: 2019-05-24 | End: 2019-05-26

## 2019-05-24 RX ORDER — HYDROCODONE BITARTRATE AND ACETAMINOPHEN 10; 325 MG/1; MG/1
1 TABLET ORAL EVERY 6 HOURS PRN
Status: DISCONTINUED | OUTPATIENT
Start: 2019-05-24 | End: 2019-05-30 | Stop reason: HOSPADM

## 2019-05-24 RX ORDER — HYDROCODONE BITARTRATE AND ACETAMINOPHEN 7.5; 325 MG/1; MG/1
1 TABLET ORAL EVERY 6 HOURS PRN
Status: DISCONTINUED | OUTPATIENT
Start: 2019-05-24 | End: 2019-05-30 | Stop reason: HOSPADM

## 2019-05-24 RX ORDER — MIDAZOLAM HYDROCHLORIDE 1 MG/ML
INJECTION INTRAMUSCULAR; INTRAVENOUS CODE/TRAUMA/SEDATION MEDICATION
Status: COMPLETED | OUTPATIENT
Start: 2019-05-24 | End: 2019-05-24

## 2019-05-24 RX ORDER — MORPHINE SULFATE 2 MG/ML
2 INJECTION, SOLUTION INTRAMUSCULAR; INTRAVENOUS
Status: DISCONTINUED | OUTPATIENT
Start: 2019-05-24 | End: 2019-05-26

## 2019-05-24 RX ORDER — MICONAZOLE NITRATE 2 %
POWDER (GRAM) TOPICAL 2 TIMES DAILY
Status: DISCONTINUED | OUTPATIENT
Start: 2019-05-24 | End: 2019-05-30 | Stop reason: HOSPADM

## 2019-05-24 RX ORDER — SODIUM CHLORIDE 9 MG/ML
INJECTION, SOLUTION INTRAVENOUS CONTINUOUS
Status: DISCONTINUED | OUTPATIENT
Start: 2019-05-24 | End: 2019-05-30 | Stop reason: HOSPADM

## 2019-05-24 RX ORDER — METHOCARBAMOL 500 MG/1
500 TABLET, FILM COATED ORAL 4 TIMES DAILY PRN
Status: DISCONTINUED | OUTPATIENT
Start: 2019-05-24 | End: 2019-05-25

## 2019-05-24 RX ORDER — CEFEPIME HYDROCHLORIDE 2 G/50ML
2 INJECTION, SOLUTION INTRAVENOUS
Status: DISCONTINUED | OUTPATIENT
Start: 2019-05-24 | End: 2019-05-25

## 2019-05-24 RX ORDER — FENTANYL CITRATE 50 UG/ML
INJECTION, SOLUTION INTRAMUSCULAR; INTRAVENOUS CODE/TRAUMA/SEDATION MEDICATION
Status: COMPLETED | OUTPATIENT
Start: 2019-05-24 | End: 2019-05-24

## 2019-05-24 RX ADMIN — Medication: at 09:05

## 2019-05-24 RX ADMIN — CEFEPIME HYDROCHLORIDE 2 G: 2 INJECTION, SOLUTION INTRAVENOUS at 09:05

## 2019-05-24 RX ADMIN — Medication: at 10:05

## 2019-05-24 RX ADMIN — HEPARIN SODIUM 5000 UNITS: 5000 INJECTION, SOLUTION INTRAVENOUS; SUBCUTANEOUS at 01:05

## 2019-05-24 RX ADMIN — HYDROCODONE BITARTRATE AND ACETAMINOPHEN 1 TABLET: 7.5; 325 TABLET ORAL at 05:05

## 2019-05-24 RX ADMIN — ACETAMINOPHEN 650 MG: 325 TABLET ORAL at 05:05

## 2019-05-24 RX ADMIN — PRAMIPEXOLE DIHYDROCHLORIDE 1 MG: 0.5 TABLET ORAL at 09:05

## 2019-05-24 RX ADMIN — ASPIRIN 81 MG: 81 TABLET, COATED ORAL at 08:05

## 2019-05-24 RX ADMIN — ONDANSETRON 4 MG: 2 INJECTION INTRAMUSCULAR; INTRAVENOUS at 08:05

## 2019-05-24 RX ADMIN — PRAMIPEXOLE DIHYDROCHLORIDE 1 MG: 0.5 TABLET ORAL at 08:05

## 2019-05-24 RX ADMIN — LEVOTHYROXINE SODIUM 125 MCG: 125 TABLET ORAL at 05:05

## 2019-05-24 RX ADMIN — MORPHINE SULFATE 2 MG: 2 INJECTION, SOLUTION INTRAMUSCULAR; INTRAVENOUS at 07:05

## 2019-05-24 RX ADMIN — SODIUM CHLORIDE: 0.9 INJECTION, SOLUTION INTRAVENOUS at 03:05

## 2019-05-24 RX ADMIN — LATANOPROST 1 DROP: 50 SOLUTION OPHTHALMIC at 09:05

## 2019-05-24 RX ADMIN — PIPERACILLIN AND TAZOBACTAM 4.5 G: 4; .5 INJECTION, POWDER, LYOPHILIZED, FOR SOLUTION INTRAVENOUS; PARENTERAL at 05:05

## 2019-05-24 RX ADMIN — CARBIDOPA AND LEVODOPA 1 TABLET: 25; 250 TABLET ORAL at 09:05

## 2019-05-24 RX ADMIN — VANCOMYCIN HYDROCHLORIDE 1500 MG: 100 INJECTION, POWDER, LYOPHILIZED, FOR SOLUTION INTRAVENOUS at 01:05

## 2019-05-24 RX ADMIN — LOSARTAN POTASSIUM 50 MG: 50 TABLET, FILM COATED ORAL at 08:05

## 2019-05-24 RX ADMIN — MORPHINE SULFATE 2 MG: 2 INJECTION, SOLUTION INTRAMUSCULAR; INTRAVENOUS at 01:05

## 2019-05-24 RX ADMIN — MIDAZOLAM HYDROCHLORIDE 0.5 MG: 1 INJECTION, SOLUTION INTRAMUSCULAR; INTRAVENOUS at 03:05

## 2019-05-24 RX ADMIN — HEPARIN SODIUM 5000 UNITS: 5000 INJECTION, SOLUTION INTRAVENOUS; SUBCUTANEOUS at 05:05

## 2019-05-24 RX ADMIN — CARBIDOPA AND LEVODOPA 1 TABLET: 25; 250 TABLET ORAL at 08:05

## 2019-05-24 RX ADMIN — METOPROLOL SUCCINATE 25 MG: 25 TABLET, EXTENDED RELEASE ORAL at 08:05

## 2019-05-24 RX ADMIN — CARBIDOPA AND LEVODOPA 1 TABLET: 25; 250 TABLET ORAL at 05:05

## 2019-05-24 RX ADMIN — ISOSORBIDE MONONITRATE 120 MG: 60 TABLET, EXTENDED RELEASE ORAL at 08:05

## 2019-05-24 RX ADMIN — VANCOMYCIN HYDROCHLORIDE 1500 MG: 100 INJECTION, POWDER, LYOPHILIZED, FOR SOLUTION INTRAVENOUS at 04:05

## 2019-05-24 RX ADMIN — PIPERACILLIN AND TAZOBACTAM 4.5 G: 4; .5 INJECTION, POWDER, LYOPHILIZED, FOR SOLUTION INTRAVENOUS; PARENTERAL at 01:05

## 2019-05-24 RX ADMIN — PRAMIPEXOLE DIHYDROCHLORIDE 1 MG: 0.5 TABLET ORAL at 05:05

## 2019-05-24 RX ADMIN — CARBIDOPA AND LEVODOPA 1 TABLET: 25; 250 TABLET ORAL at 01:05

## 2019-05-24 RX ADMIN — PRAVASTATIN SODIUM 40 MG: 20 TABLET ORAL at 08:05

## 2019-05-24 RX ADMIN — FENTANYL CITRATE 25 MCG: 50 INJECTION, SOLUTION INTRAMUSCULAR; INTRAVENOUS at 03:05

## 2019-05-24 RX ADMIN — PANTOPRAZOLE SODIUM 40 MG: 40 TABLET, DELAYED RELEASE ORAL at 08:05

## 2019-05-24 RX ADMIN — SODIUM CHLORIDE: 0.9 INJECTION, SOLUTION INTRAVENOUS at 04:05

## 2019-05-24 NOTE — PT/OT/SLP PROGRESS
Physical Therapy      Patient Name:  Ross Isbell   MRN:  [id number]    PMELA BARBA INITIATED THIS AM VIA CHART REVIEW, PT CURRENTLY WAITING FOR PAIN MED, WILL ASSESS PT LATER THIS AM    Kirsten Dang, PT   5/24/2019  [id number]

## 2019-05-24 NOTE — PT/OT/SLP EVAL
Physical Therapy Evaluation    Patient Name:  Ross Isbell   MRN:  [id number]    Recommendations:     Discharge Recommendations:  nursing facility, skilled   Discharge Equipment Recommendations: none   Barriers to discharge: None    Assessment:     Ross Isbell is a 73 y.o. female admitted with a medical diagnosis of Osteomyelitis of lumbar spine.  She presents with the following impairments/functional limitations:  weakness, impaired endurance, impaired functional mobilty, impaired balance, decreased lower extremity function, decreased upper extremity function, decreased coordination, decreased safety awareness, pain.    Rehab Prognosis: Fair; patient would benefit from acute skilled PT services to address these deficits and reach maximum level of function.    Recent Surgery: * No surgery found *     Plan:     During this hospitalization, patient to be seen 5 x/week to address the identified rehab impairments via therapeutic activities, therapeutic exercises and progress toward the following goals:    · Plan of Care Expires:  05/31/19    Subjective     Chief Complaint: ABD. PAIN  Patient/Family Comments/goals:   Pain/Comfort:  · Pain Rating 1: 10/10  · Location - Orientation 1: lower  · Location 1: abdomen    Patients cultural, spiritual, Catholic conflicts given the current situation:      Living Environment:  PT LIVES WITH  WHO IS ABLE TO ASSIST AS NEEDED, PROVIDES 24 HOUR CARE, 1 STORY HOUSE WITH RAMP TO ENTER, W/C BOUND FOR APPROX. 8 YEARS, NO GAIT, REQUIRES ASSISTANCE FOR ALL TF'S, SLEEPS IN LIFT CHAIR,  ASSISTS WITH BATHING AND DRESSING, SPONGE BATH ONLY  Prior to admission, patients level of function was.  Equipment used at home: wheelchair, hospital bed, rollator, grab bar, raised toilet(LIFT CHAIR).  DME owned (not currently used): none.  Upon discharge, patient will have assistance from .    Objective:     Communicated with NURSE GRIFFIN prior to session.  Patient found supine  with telemetry, peripheral IV, oxygen  upon PT entry to room.    General Precautions: Standard, fall, respiratory   Orthopedic Precautions:N/A   Braces: N/A     Exams:  · Cognitive Exam:  Patient is oriented to Person, Place, Time and Situation  · Postural Exam:  Patient presented with the following abnormalities:    · -       Rounded shoulders  · -       Forward head  · Sensation:    · -       Intact  · RLE ROM: LIMITED  · RLE Strength: LIMITED  · LLE ROM: LIMITED  · LLE Strength: LIMITED    Functional Mobility:  · Bed Mobility:     · Rolling Left:  contact guard assistance  · Rolling Right: contact guard assistance  · Scooting: maximal assistance  · Supine to Sit: maximal assistance  · Sit to Supine: maximal assistance  · Balance: POOR    Therapeutic Activities and Exercises:   PT EDUCATED IN ROLE OF P.T. AND POC, ATTEMPTED SITTING AT EOB BUT PT UNABLE TO TOLERATE ABD. PAIN SO ASSISTED BACK TO SUPINE, PT ABLE TO LATERAL ROLL WITH CGA FOR CHANGING SOILED LINEN AND SOILED BRIEF, PT EDUCATED IN BLE THEREX TO PERFORM WHILE SUPINE IN BED    AM-PAC 6 CLICK MOBILITY  Total Score:9     Patient left right sidelying with all lines intact, call button in reach, NURSE notified and DAUGHTER present.    GOALS:   Multidisciplinary Problems     Physical Therapy Goals        Problem: Physical Therapy Goal    Goal Priority Disciplines Outcome Goal Variances Interventions   Physical Therapy Goal     PT, PT/OT      Description:  LTG'S TO BE MET IN 7 DAYS (5-31-19)  1. PT WILL REQUIRE MODA FOR BED MOBILITY  2. PT WILL REQUIRE MODA FOR TF'S  3. PT WILL TOLERATE BLE THEREX X 20 REPS AROM                      History:     Past Medical History:   Diagnosis Date    Abnormal nuclear stress test 11/1/2015    Background diabetic retinopathy 12/28/2015    CAD (coronary artery disease) 2002    stents    Cataract     Gait disorder 10/12/2012    Glaucoma     MI (myocardial infarction)     Obesity     CORINE (obstructive sleep apnea)      Other and unspecified hyperlipidemia     PD (Parkinson's disease) 2002    tremor-predominant (per patient)    Type II or unspecified type diabetes mellitus without mention of complication, not stated as uncontrolled 2002      am    Unspecified essential hypertension        Past Surgical History:   Procedure Laterality Date    BREAST BIOPSY Left     benign - about 3 yrs ago    CORONARY ANGIOPLASTY  2002    CORONARY STENT PLACEMENT  2002    DILATION AND CURETTAGE OF UTERUS      FOREIGN BODY REMOVAL      HEART CATH-LEFT Left 11/5/2015    [facility]    HEART CATH-LEFT Left 11/2/2015    [facility]    HEART CATH-LEFT/pci stent lad Left 11/10/2015    [facility] CATH LAB    INNER EAR SURGERY      TONSILLECTOMY, ADENOIDECTOMY         Time Tracking:     PT Received On: 05/24/19  PT Start Time: 1045     PT Stop Time: 1110  PT Total Time (min): 25 min     Billable Minutes: Evaluation 15 and Therapeutic Activity 10     PT ENCOURAGED TO CALL FOR ASSISTANCE WITH ALL NEEDS DUE TO FALL RISK STATUS, PT AGREEABLE    Kirsten Dang, PT  05/24/2019

## 2019-05-24 NOTE — CONSULTS
Pharmacokinetic Initial Assessment: IV Vancomycin    Assessment/Plan:  Initiate IV vancomycin with a loading dose of 2500 mg once followed by a maintenance dose of vancomycin 1500 mg IV every 12 hours    Trough due tomorrow 5/24 @ 1400 (checking before 3rd dose instead of 4th to assess for toxicity, not efficacy)  Goal trough: 15-20 mcg/ml    Pharmacy will continue to follow and monitor vancomycin.      Please contact pharmacy at extension [phone] with any questions regarding this assessment.     Thank you for the consult,   Katherine McArdle, Pharm.D. 5/23/2019 8:30 PM      Patient brief summary:  Ross Isbell is a 73 y.o. female initiated on antimicrobial therapy with IV vancomycin for treatment of osteomyelitis of lumbar spine.     Drug Allergies:   Review of patient's allergies indicates:   Allergen Reactions    Codeine      Other reaction(s): Nausea    Codeine sulfate      Unknown^    Diphenhydramine hcl      Unknown^  Other reaction(s): Rash    Sulfa (sulfonamide antibiotics) Hives and Nausea And Vomiting    Penicillins Rash     Rash only as a child  Tolerated ceftriaxone 4/30/19  Tolerated piperacillin-tazobactam 5/23/19     Actual Body Weight:   104.8 kg    Renal Function:   Estimated Creatinine Clearance: 87.6 mL/min (based on SCr of 0.7 mg/dL).   Note: pt also on Zosyn & received 100 mL IV contrast today (& is no longer on IV fluids) --> we will monitor vancomycin levels closely    CBC (last 72 hours):  Recent Labs   Lab Result Units 05/23/19  1036   WBC K/uL 13.08*   Hemoglobin g/dL 11.0*   Hematocrit % 33.5*   Platelets K/uL 454*   Gran% % 76.0*   Lymph% % 13.2*   Mono% % 9.5   Eosinophil% % 1.1   Basophil% % 0.2   Differential Method  Automated     Metabolic Panel (last 72 hours):  Recent Labs   Lab Result Units 05/23/19  1036 05/23/19  1055   Sodium mmol/L 129*  --    Potassium mmol/L 4.1  --    Chloride mmol/L 94*  --    CO2 mmol/L 21*  --    Glucose mg/dL 57*  --    Glucose, UA   --  Negative    BUN, Bld mg/dL 16  --    Creatinine mg/dL 0.7  --    Albumin g/dL 2.9*  --    Total Bilirubin mg/dL 0.5  --    Alkaline Phosphatase U/L 90  --    AST U/L 22  --    ALT U/L 6*  --      Microbiologic Results:  Microbiology Results (last 7 days)       Procedure Component Value Units Date/Time    Blood culture #2 **CANNOT BE ORDERED STAT** [[id number]] Collected:  05/23/19 1345    Order Status:  Sent Specimen:  Blood from Peripheral, Wrist, Left Updated:  05/23/19 1345    Blood culture #1 **CANNOT BE ORDERED STAT** [[id number]] Collected:  05/23/19 1328    Order Status:  Sent Specimen:  Blood from Peripheral, Antecubital, Left Updated:  05/23/19 1322

## 2019-05-24 NOTE — PLAN OF CARE
Problem: Adult Inpatient Plan of Care  Goal: Absence of Hospital-Acquired Illness or Injury  Outcome: Ongoing (interventions implemented as appropriate)  Intervention: Prevent Skin Injury  Patient on turn schedule every two hours.  Intervention: Prevent Infection  Aseptic technique maintained.    Goal: Optimal Comfort and Wellbeing  Outcome: Ongoing (interventions implemented as appropriate)  Intervention: Monitor Pain and Promote Comfort  Patient complains of excruciating pain in lower back and legs upon assessment. Pain regimen orders in place.      Comments: Chart reviewed. No signs or symptoms of acute distress noted.

## 2019-05-24 NOTE — PLAN OF CARE
Problem: Adult Inpatient Plan of Care  Goal: Plan of Care Review  Outcome: Ongoing (interventions implemented as appropriate)  Recommendations     Recommendation: 1. Add cardiac restriction to current diet. 2. Add boost glucose control BID. 3.Will continue to monitor.   Intervention: coordination of care   Goals: Meet > 85 % EEN/EPN while admitted  Nutrition Goal Status: new  Communication of RD Recs: (POC, sticky note, reviewed with RN)

## 2019-05-24 NOTE — PT/OT/SLP PROGRESS
Occupational Therapy      Patient Name:  Ross Isbell   MRN:  [id number]    Patient not seen today secondary to eval initiated via chart review and interview. Pt c/o severe pain and unable to complete eval. Will complete eval at later time  Cyndie Hernandez OT  5/24/2019   0915

## 2019-05-24 NOTE — INTERVAL H&P NOTE
The patient has been examined and the H&P has been reviewed:    I concur with the findings and no changes have occurred since H&P was written.        Active Hospital Problems    Diagnosis  POA    *Osteomyelitis of lumbar spine [M46.26]  Yes    UTI (urinary tract infection) [N39.0]  Yes    Hyponatremia [E87.1]  Yes    Type 2 diabetes, controlled, with peripheral neuropathy [E11.42]  Yes     Chronic    Hypothyroidism [E03.9]  Yes     Chronic    Essential hypertension [I10]  Yes     Chronic    PD (Parkinson's disease) [G20]  Yes     Chronic    CAD (coronary artery disease) [I25.10]  Yes     Chronic     stents        Resolved Hospital Problems   No resolved problems to display.

## 2019-05-24 NOTE — SUBJECTIVE & OBJECTIVE
Interval History: Blood culture shows GPC. Needle Bx of L 2-3 ordered. ID consult pending.    Review of Systems   Constitutional: Positive for activity change. Negative for chills and fever.   HENT: Negative for trouble swallowing.    Eyes: Negative for visual disturbance.   Respiratory: Negative for apnea, cough, choking, chest tightness, shortness of breath, wheezing and stridor.    Cardiovascular: Negative for chest pain, palpitations and leg swelling.   Gastrointestinal: Positive for abdominal pain, diarrhea and nausea. Negative for constipation and vomiting.   Genitourinary: Negative for difficulty urinating, dysuria, flank pain, frequency and urgency.   Musculoskeletal: Positive for arthralgias, back pain (lumbar) and gait problem. Negative for joint swelling, myalgias, neck pain and neck stiffness.   Skin: Negative for color change.   Neurological: Positive for weakness. Negative for dizziness, tremors, seizures, syncope, facial asymmetry, speech difficulty, light-headedness, numbness and headaches.   Psychiatric/Behavioral: Positive for dysphoric mood. Negative for agitation and behavioral problems.     Objective:     Vital Signs (Most Recent):  Temp: 98.8 °F (37.1 °C) (05/24/19 1207)  Pulse: 86 (05/24/19 1207)  Resp: 18 (05/24/19 1207)  BP: (!) 90/50 (05/24/19 1207)  SpO2: 96 % (05/24/19 1207) Vital Signs (24h Range):  Temp:  [97.3 °F (36.3 °C)-101 °F (38.3 °C)] 98.8 °F (37.1 °C)  Pulse:  [] 86  Resp:  [16-22] 18  SpO2:  [90 %-99 %] 96 %  BP: ()/(50-93) 90/50     Weight: 104.8 kg (231 lb)  Body mass index is 37.28 kg/m².    Intake/Output Summary (Last 24 hours) at 5/24/2019 6449  Last data filed at 5/24/2019 1300  Gross per 24 hour   Intake 1300 ml   Output --   Net 1300 ml      Physical Exam   Constitutional: She is oriented to person, place, and time. She appears well-developed. She is cooperative. She is easily aroused. She appears distressed (appears in pain from BLE cramping).   HENT:    Head: Normocephalic and atraumatic.   Eyes: EOM are normal.   Neck: Normal range of motion. Neck supple.   Cardiovascular: Normal rate, regular rhythm and intact distal pulses.   No murmur heard.  Pulmonary/Chest: Effort normal and breath sounds normal. No stridor. No respiratory distress. She has no wheezes. She has no rales. She exhibits no tenderness.   Abdominal: Soft. She exhibits no distension. There is tenderness (BL quadrant). There is no rebound and no guarding.   Diminished Bowel sounds   Genitourinary:   Genitourinary Comments: Deferred   Musculoskeletal: She exhibits tenderness (back and abdomen). She exhibits no edema or deformity.   Neurological: She is alert, oriented to person, place, and time and easily aroused. No sensory deficit.   BUE tremors, hx of parkinson's   Skin: Skin is warm and dry. Capillary refill takes less than 2 seconds.   Psychiatric: She has a normal mood and affect. Her behavior is normal. Thought content normal.   Nursing note and vitals reviewed.    Significant Labs:   Blood Culture:   Recent Labs   Lab 05/23/19  1328 05/23/19  1345   LABBLOO Gram stain aer bottle: Gram positive cocci   Results called to and read back by:Krista Abdi RN 05/24/2019  14:22 Gram stain aer bottle: Gram positive cocci   Results called to and read back by:Krista Abdi RN 05/24/2019  14:23     CBC:   Recent Labs   Lab 05/23/19  1036 05/24/19  0533   WBC 13.08* 10.85   HGB 11.0* 10.2*   HCT 33.5* 32.0*   * 383*     CMP:   Recent Labs   Lab 05/23/19  1036 05/24/19  0533   * 128*   K 4.1 3.7   CL 94* 95   CO2 21* 25   GLU 57* 78   BUN 16 10   CREATININE 0.7 0.8   CALCIUM 9.4 8.9   PROT 7.2 6.6   ALBUMIN 2.9* 2.7*   BILITOT 0.5 0.7   ALKPHOS 90 92   AST 22 31   ALT 6* 8*   ANIONGAP 14 8   EGFRNONAA >60 >60       Significant Imaging: I have reviewed and interpreted all pertinent imaging results/findings within the past 24 hours.

## 2019-05-24 NOTE — ASSESSMENT & PLAN NOTE
- CT abdomen/pelvis with contrast showed moderate inflammatory changes centered on the intervertebral disc space between the L2 and L3 vertebral bodies. There is bony destruction on both sides of the intervertebral disc space between the L2 and L3 vertebral bodies.  This is consistent with the patient's history and characteristic of discitis and osteomyelitis  - Neurosurgery, Dr. Nicolas, states no surgical intervention at this time  -Needs Needle bx L 2-3 ordered 5/24/19  - MRI thoracic and lumbar spine: osteomyelitis  - Inpatient consult to ID  - Blood cultures: GPC  - Neurovascular checks  - Analgesics PRN

## 2019-05-24 NOTE — PROGRESS NOTES
"  [facility] -   Adult Nutrition  Progress Note    SUMMARY     Recommendations    Recommendation: 1. Add cardiac restriction to current diet. 2. Add boost glucose control BID. 3.Will continue to monitor.   Intervention: coordination of care   Goals: Meet > 85 % EEN/EPN while admitted  Nutrition Goal Status: new  Communication of RD Recs: (POC, sticky note, reviewed with RN)    Reason for Assessment    Reason For Assessment: identified at risk by screening criteria   Dx:  1. Urinary tract infection without hematuria, site unspecified    2. Chest pain    3. Osteomyelitis of lumbar spine      Hx: CAD, MI, Obesity, CORINE, PD, HTN, DM type 2.   General info comments: Pt on nutrition risk list for cultural or Taoism food preferences. Pt and pt's daugher state that pt eats everything. Pt does not have any cultural/Taoism food preferences. Pt reports good appetite and intake PTA, no wt loss. Per epic records, pt weighed 221 lbs on 4/30/19, current wt= 231 lbs (no recent wt loss). NFPE not performed d/t pt w/ no s/s of malnutrition. Per pt's daughter, PO intake 50 % today.  Discharge plan: diabetic cardiac diet       Nutrition Risk Screen    Nutrition Risk Screen: cultural or Taoism food preferences    Nutrition/Diet History    Spiritual, Cultural Beliefs, Mandaeism Practices, Values that Affect Care: no    Anthropometrics    Temp: 98.8 °F (37.1 °C)  Height Method: Stated  Height: 5' 6" (167.6 cm)  Height (inches): 66 in  Weight Method: Bed Scale  Weight: 104.8 kg (231 lb)  Weight (lb): 231 lb  Ideal Body Weight (IBW), Female: 130 lb  % Ideal Body Weight, Female (lb): 177.69 lb  BMI (Calculated): 37.4  BMI Grade: 35 - 39.9 - obesity - grade II       Lab/Procedures/Meds    Pertinent Labs Reviewed: reviewed  BMP  Lab Results   Component Value Date     (L) 05/24/2019    K 3.7 05/24/2019    CL 95 05/24/2019    CO2 25 05/24/2019    BUN 10 05/24/2019    CREATININE 0.8 05/24/2019    CALCIUM 8.9 05/24/2019 "    ANIONGAP 8 05/24/2019    ESTGFRAFRICA >60 05/24/2019    EGFRNONAA >60 05/24/2019     Lab Results   Component Value Date    CALCIUM 8.9 05/24/2019     Lab Results   Component Value Date    ALBUMIN 2.7 (L) 05/24/2019     Recent Labs   Lab 05/24/19  1223   POCTGLUCOSE 115*     Lab Results   Component Value Date    HGBA1C 7.8 (H) 04/23/2019       Pertinent Medications Reviewed: reviewed      Estimated/Assessed Needs    Weight Used For Calorie Calculations: 104.8 kg (231 lb 0.7 oz)  Energy Calorie Requirements (kcal): 1569 - 1882  Energy Need Method: [place]-St Jeor(x1 - 1.2 )  Protein Requirements: 104 g  Weight Used For Protein Calculations: 104.8 kg (231 lb 0.7 oz)     Estimated Fluid Requirement Method: RDA Method(or per MD)  RDA Method (mL): 1569  CHO Requirement: 50 % EEN      Nutrition Prescription Ordered    Nutrition Order Comments: diabetic 1500 kcal diet     Evaluation of Received Nutrient/Fluid Intake          Intake/Output Summary (Last 24 hours) at 5/24/2019 1243  Last data filed at 5/24/2019 0632  Gross per 24 hour   Intake 1900 ml   Output --   Net 1900 ml       % Intake of Estimated Energy Needs: 75 - 100 %  % Meal Intake: 75 - 100 %    Nutrition Risk      1xweekly    Assessment and Plan    Nutrition Problem  Decreased nutrient needs (sodium, fat)     Related to (etiology):   Past medical hx     Signs and Symptoms (as evidenced by):   Hx of CAD, HTN and MI    Interventions/Recommendations (treatment strategy):  See above     Nutrition Diagnosis Status:   New      Monitor and Evaluation    Food and Nutrient Intake: energy intake, food and beverage intake  Food and Nutrient Adminstration: diet order  Anthropometric Measurements: weight  Biochemical Data, Medical Tests and Procedures: electrolyte and renal panel, glucose/endocrine profile  Nutrition-Focused Physical Findings: overall appearance              Nutrition Follow-Up    RD Follow-up?: Yes

## 2019-05-24 NOTE — SEDATION DOCUMENTATION
Pt in ct on table prone with bilateral arms above head, vs monitor in place, vss.  Pt verbalized understanding of procedure.

## 2019-05-24 NOTE — CONSULTS
[facility] -   Neurosurgery  Consult Note    Consults  Subjective:     Chief Complaint/Reason for Admission: **    History of Present Illness:   Patient is a 73-year-old female who presented to the ER with worsening abdominal and lower back pain worsening over the past several weeks.  Patient is currently a nursing home resident and staff had been unable to take care of her secondary to her symptoms.  The patient states that she has had lower back pain, abdominal pain, and lower extremity pain 10/10 along with numbness and tingling.  She does not have weakness however she is unable to maintain a CD position or a walking position secondary to streak extreme pain.  She has not walked for over a month.  She denies any urinary retention    She has a past medical history of Parkinson's, diabetes, coronary artery disease, glaucoma, MI  CT of the chest abdomen pelvis was ordered and diskitis between L2 and L3 was found  Patient was subsequently admitted to the medical service and ID was consulted.  The patient was started on vanc and Zosyn empirically.    MRI was obtained in the patient had  Fluid is identified throughout the L2-L3 disc compatible with osteomyelitis.  Severe associated marrow edema identified within L2 and L3 with mild collapse of the left L3 superior endplate.  Findings compatible with osteomyelitis.  Mixed signal intensity is identified within the anterior epidural space, extending from the posterior aspect of L2 to the through the posterior aspect of L3 measuring approximately 3.8 cm (cc) by 3.7 cm (transverse) by 0.5 cm (AP).  Finding highly suspicious for epidural abscess.  Associated advanced facet arthropathy identified at L2-L3 the combination of findings results in moderate central canal stenosis.  Severe neural foraminal stenosis also identified at L2-3.  Severe paravertebral soft tissue edema is identified with severe soft tissue edema within the iliopsoas muscle bellies.  No  discrete intramuscular abscess detected.    Patient had blood cultures positive for gram-positive cocci  CT-guided biopsy was performed earlier this afternoon      PTA Medications   Medication Sig    aspirin (ECOTRIN) 81 MG EC tablet Take 1 tablet (81 mg total) by mouth once daily.    carbidopa-levodopa  mg (SINEMET)  mg per tablet TAKE 1 TABLET BY MOUTH 5 TIMES DAILY    isosorbide mononitrate (IMDUR) 120 MG 24 hr tablet Take 1 tablet (120 mg total) by mouth once daily.    levothyroxine (SYNTHROID) 125 MCG tablet TAKE 1 TABLET BY MOUTH IN THE MORNING BEFORE BREAKFAST    losartan (COZAAR) 50 MG tablet TAKE 1 TABLET BY MOUTH ONCE DAILY    metFORMIN (GLUCOPHAGE) 500 MG tablet TAKE ONE TABLET BY MOUTH THREE TIMES DAILY    metoprolol succinate (TOPROL-XL) 25 MG 24 hr tablet TAKE 1 TABLET BY MOUTH ONCE DAILY    NOVOLIN 70/30 U-100 INSULIN 100 unit/mL (70-30) injection Inject 55 Units into the skin 3 (three) times daily.    pantoprazole (PROTONIX) 40 MG tablet Take 1 tablet (40 mg total) by mouth once daily.    pramipexole (MIRAPEX) 1 MG tablet TAKE ONE TABLET BY MOUTH THREE TIMES DAILY    pravastatin (PRAVACHOL) 40 MG tablet TAKE ONE TABLET BY MOUTH ONCE DAILY    ACCU-CHEK SOFTCLIX LANCETS Misc TEST THREE TIMES DAILY    acetaminophen (TYLENOL) 325 MG tablet Take 2 tablets (650 mg total) by mouth every 6 (six) hours as needed.    blood sugar diagnostic Strp 1 strip by Misc.(Non-Drug; Combo Route) route 3 (three) times daily. Accuchek Felicia Plus Test Strips    blood-glucose meter kit Accu-chek Felicia Plus Meter    insulin syringe-needle U-100 0.5 mL 31 gauge x 5/16 Syrg USE THREE TIMES DAILY    latanoprost 0.005 % ophthalmic solution INSTILL 1 DROP INTO EACH EYE IN THE EVENING    multivit-min-FA-lycopen-lutein (CENTRUM SILVER) 0.4-300-250 mg-mcg-mcg Tab Take 1 tablet by mouth once daily.       Review of patient's allergies indicates:   Allergen Reactions    Codeine      Other reaction(s):  Nausea.  Patient tolerated morphine 5/2019 with no reported problems.     Codeine sulfate      Unknown^    Diphenhydramine hcl      Unknown^  Other reaction(s): Rash    Sulfa (sulfonamide antibiotics) Hives and Nausea And Vomiting    Penicillins Rash     Rash only as a child  Tolerated ceftriaxone 4/30/19  Tolerated piperacillin-tazobactam 5/23/19       Past Medical History:   Diagnosis Date    Abnormal nuclear stress test 11/1/2015    Background diabetic retinopathy 12/28/2015    CAD (coronary artery disease) 2002    stents    Cataract     Gait disorder 10/12/2012    Glaucoma     MI (myocardial infarction)     Obesity     CORINE (obstructive sleep apnea)     Other and unspecified hyperlipidemia     PD (Parkinson's disease) 2002    tremor-predominant (per patient)    Type II or unspecified type diabetes mellitus without mention of complication, not stated as uncontrolled 2002      am    Unspecified essential hypertension      Past Surgical History:   Procedure Laterality Date    BREAST BIOPSY Left     benign - about 3 yrs ago    CORONARY ANGIOPLASTY  2002    CORONARY STENT PLACEMENT  2002    DILATION AND CURETTAGE OF UTERUS      FOREIGN BODY REMOVAL      HEART CATH-LEFT Left 11/5/2015    [facility]    HEART CATH-LEFT Left 11/2/2015    [facility] CATH LAB    HEART CATH-LEFT/pci stent lad Left 11/10/2015    [facility] CATH LAB    INNER EAR SURGERY      TONSILLECTOMY, ADENOIDECTOMY       Family History     Problem Relation (Age of Onset)    Breast cancer Paternal Grandmother    Diabetes Maternal Grandmother    Glaucoma Mother, Maternal Grandmother    Heart attack Father (65), Mother (65)    Hypertension Father, Mother, Maternal Grandmother    Ovarian cancer Mother    Parkinsonism Other, Paternal Aunt    Tremor Father        Tobacco Use    Smoking status: Former Smoker     Years: 40.00     Last  attempt to quit: 10/12/2010     Years since quittin.6    Smokeless tobacco: Never Used    Tobacco comment: On & off for the past few years   Substance and Sexual Activity    Alcohol use: No     Alcohol/week: 0.0 oz    Drug use: No    Sexual activity: Not Currently     Review of Systems   Constitutional: Positive for activity change and fatigue.   HENT: Negative for ear discharge and mouth sores.    Respiratory: Positive for cough.    Cardiovascular: Positive for leg swelling. Negative for chest pain.   Gastrointestinal: Positive for abdominal pain.   Endocrine: Positive for cold intolerance.   Genitourinary: Positive for flank pain. Negative for difficulty urinating.   Musculoskeletal: Positive for arthralgias, back pain, gait problem and myalgias.   Skin: Positive for rash.   Neurological: Positive for weakness and numbness. Negative for facial asymmetry and headaches.   Psychiatric/Behavioral: Positive for agitation and confusion.     Objective:     Weight: 104.8 kg (231 lb)  Body mass index is 37.28 kg/m².  Vital Signs (Most Recent):  Temp: 97.5 °F (36.4 °C) (19 1645)  Pulse: 78 (19 1645)  Resp: 18 (19 164)  BP: 105/61 (19 1645)  SpO2: 96 % (19 1645) Vital Signs (24h Range):  Temp:  [97.3 °F (36.3 °C)-101 °F (38.3 °C)] 97.5 °F (36.4 °C)  Pulse:  [] 78  Resp:  [18-96] 18  SpO2:  [22 %-100 %] 96 %  BP: ()/(50-93) 105/61     Date 19 0700 - 19 0659   Shift 5336-9235 3265-2962 5783-3010 24 Hour Total   INTAKE   P.O. 400   400   Shift Total(mL/kg) 400(3.8)   400(3.8)   OUTPUT   Shift Total(mL/kg)       Weight (kg) 104.8 104.8 104.8 104.8                 Nursing note and vitals reviewed  Gen:  Patient has dementia at baseline however answers questions and is cooperative             Appears to be in severe distress secondary to pain  Skin: no rashes or lesions identified   Head:Normocephalic and atraumatic.  Nose: Nose normal.    Eyes: EOM are normal. Pupils  are equal, round, and reactive to light.   Neck: Neck supple. No masses or lesions palpated  Cardiovascular: Intact distal pulses.    Abdominal: Soft.  Patient has tenderness radiating from her back around her abdominal wall  Neurological:  Dementia at [facility] No cranial nerve deficit.  Coordination normal. Normal muscle tone  Psychiatric: Normal mood and affect. Behavior is normal.      Back:        Tenderness bilaterally Paraspinal muscle spasms    Not able to assess Pain with flexion and extention    Limited secondary to pain Range of motion     Positive bilaterally to 15° Straight leg raise     Motor   Right Right Left Left  Level Group   5 4 5 4 L2 Hip flexor (Psoas)   5 4 5 4 L3 Leg extension (Quads)   5 4 5 4 L4 Dorsiflexion & foot inversion (Tibialis Anterior)   5 4 5 4 L5 Great toe extension ( EHL)   5 4 5 4 S1 Foot eversion (Gastroc, PL & PB)     Sensation  NL Decreased (R/L/BL) Level Sensation     x bilateral light touch L2 Anterio-medial thigh    x L3 Medial thigh around knee    x L4 Medial foot    x L5 Dorsum foot    x S1 Lateral foot     Reflex  2+  Patellar tendon (L4)   2+  Achilles tendon (S1)                Neurosurgery Physical Exam    Significant Labs:  Recent Labs   Lab 05/23/19  1036 05/24/19  0533   GLU 57* 78   * 128*   K 4.1 3.7   CL 94* 95   CO2 21* 25   BUN 16 10   CREATININE 0.7 0.8   CALCIUM 9.4 8.9     Recent Labs   Lab 05/23/19  1036 05/24/19  0533   WBC 13.08* 10.85   HGB 11.0* 10.2*   HCT 33.5* 32.0*   * 383*     Recent Labs   Lab 05/24/19  1422   INR 1.1   APTT 35.3*     Microbiology Results (last 7 days)     Procedure Component Value Units Date/Time    Blood culture #2 **CANNOT BE ORDERED STAT** [[id number]] Collected:  05/23/19 1345    Order Status:  Completed Specimen:  Blood from Peripheral, Wrist, Left Updated:  05/24/19 1717     Blood Culture, Routine Gram stain aer bottle: Gram positive cocci      Blood Culture, Routine Results called to and read back by:Krista  Jin FRAZIER 05/24/2019  14:23     Blood Culture, Routine Gram stain flaco bottle: Gram positive cocci      Blood Culture, Routine Positive results previously called    Gram stain [[id number]] Collected:  05/24/19 1540    Order Status:  Sent Specimen:  Aspirate from Back Updated:  05/24/19 1627    Culture, Anaerobe [[id number]] Collected:  05/24/19 1540    Order Status:  Sent Specimen:  Aspirate from Back Updated:  05/24/19 1541    Aerobic culture [[id number]] Collected:  05/24/19 1540    Order Status:  Sent Specimen:  Aspirate from Back Updated:  05/24/19 1541    Blood culture #1 **CANNOT BE ORDERED STAT** [[id number]] Collected:  05/23/19 1328    Order Status:  Completed Specimen:  Blood from Peripheral, Antecubital, Left Updated:  05/24/19 1521     Blood Culture, Routine Gram stain aer bottle: Gram positive cocci      Blood Culture, Routine Results called to and read back by:Krista Abdi RN 05/24/2019  14:22     Blood Culture, Routine Gram stain flaco bottle: Gram positive cocci      Blood Culture, Routine Positive results previously called    Clostridium difficile EIA [[id number]]     Order Status:  No result Specimen:  Stool         CMP:   Recent Labs   Lab 05/23/19  1036 05/24/19  0533   GLU 57* 78   CALCIUM 9.4 8.9   ALBUMIN 2.9* 2.7*   PROT 7.2 6.6   * 128*   K 4.1 3.7   CO2 21* 25   CL 94* 95   BUN 16 10   CREATININE 0.7 0.8   ALKPHOS 90 92   ALT 6* 8*   AST 22 31   BILITOT 0.5 0.7     Significant Diagnostics:  MRI:  And CT scan results reviewed see above    Assessment/Plan:     Active Diagnoses:    Diagnosis Date Noted POA    PRINCIPAL PROBLEM:  Osteomyelitis of lumbar spine [M46.26] 05/23/2019 Yes    Gram-positive cocci bacteremia [R78.81] 05/24/2019 Yes    UTI (urinary tract infection) [N39.0] 05/23/2019 Yes    Hyponatremia [E87.1] 05/23/2019 Yes    Type 2 diabetes, controlled, with peripheral neuropathy [E11.42] 05/29/2014 Yes     Chronic    Hypothyroidism [E03.9] 11/19/2013 Yes     Chronic     Essential hypertension [I10]  Yes     Chronic    PD (Parkinson's disease) [G20]  Yes     Chronic    CAD (coronary artery disease) [I25.10]  Yes     Chronic      Problems Resolved During this Admission:       Thank you for your consult. I will follow-up with patient. Please contact us if you have any additional questions.  Patient is a is in severe distress secondary to lower back pain.  She has stenosis at L2-3 with diskitis and epidural abscess located anterior to the thecal sac.  Patient attempted to sit up with physical therapy however she was unable secondary to severe pain.  We have obtained biopsy for tissue diagnosis however after speaking with infectious disease along with the patient's family.  Would be willing to take the patient back for a posterior laminectomy at L2-3 for drainage of epidural abscess.  They understand the goal of the surgery is not to remove all of the abscess however just to irrigate and decompress the thecal sac and removed as much as the abscesses possible with leaving epidural antibiotics to hopefully help with some of her pain control.  With the diskitis she will likely continue to have some lower back pain until the infection is treated however I believe her leg pain and her abdominal symptoms hopefully.    The patient was informed of all benefits and potential risk of the operation including but not limited to:  Pain, infection, bleeding, coma, paralysis, death.  Cerebrospinal fluid leak, failure of any instrumentation, the need for additional procedures in the future. No guarantee was given that this procedure would alleviate all of the symptoms.    NPO after midnight, type and screen today, labs are already in the system, hold heparin in a.m., I will meet the patient's daughter and the patient's  in the a.m. to sign consent.  Case tentatively scheduled for 8:00 a.m. tomorrow morning.    Stanislav Nicolas MD  Neurosurgery  [facility] -

## 2019-05-24 NOTE — OP NOTE
Pre Op Diagnosis: discitis     Post Op Diagnosis: same     Procedure:  Lumbar Disc FNA     Procedure performed by: Amber SIBLEY, Krystal REID     Written Informed Consent Obtained: Yes     Specimen Removed:  yes     Estimated Blood Loss:  minimal     Findings: Local anesthesia and moderate sedation were used.     The patient tolerated the procedure well and there were no complications.      Sterile technique was performed in the lower back, lidocaine was used as a local anesthetic.  Multiple samples taken from the L2 disc space and aspirate.  Pt tolerated the procedure well without immediate complications.  Please see radiologist report for details. F/u with PCP and/or ordering physician.

## 2019-05-24 NOTE — PLAN OF CARE
Problem: Adult Inpatient Plan of Care  Goal: Plan of Care Review  Outcome: Ongoing (interventions implemented as appropriate)  Pt complains of generalized back pain. Pain medication is effective. Pt denies numbness/tingling to upper and lower extremities. Blood glucose is being monitored. Pt became afebrile. PRN medication is effective. No injuries. Will continue to monitor. 12 hour chart check is completed.
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
## 2019-05-24 NOTE — HOSPITAL COURSE
5/24/19 Dr. Nicolas, Neurosurgeon, doesn't recommend spinal surgery, But Needle biopsy of L 2-3 disk abscess with cultures. This am, patient c/o of severe abdominal pain 20/10, Norco 10 mg ordered PRN with IV 2 mg Morphine PRN. Blood culture is Positive for GPC. Echo pending. Patient had severe back pain and refused OT/PT. Daughter stated she has had diarrhea x 1 month. Stool for C.Diff ordered. T. Max 101. IV Vanco and Zosyn. IR, Dr. Juárez, consulted for Needle biopsy. Daughter at bedside. ID consult pending. Cont Vanc and Zosyn. CT abdomen/pelvis positive for osteomyelitis.  To OR with Dr. Nicolas 25 May for laminectomy and vertebroplasty.  Post-procedure in ICU due to post-op hypotension.  Able to wean vasopressors.  Anemia due to blood loss related to the procedure.  Transfused 1 unit PRBC.  Interval improvement in abdominal and back pain.  Tolerated limited degree of physical therapy.  Pursuing skilled placement.  Discharge plan to transfer to [facility] skilled nursing facility.  Complete 8 week Ceftriaxone 2 g daily IV stop date 23 July.  Weekly ESR and CRP.  PT and OT.  Prevena incisional wound VAC.

## 2019-05-24 NOTE — PROGRESS NOTES
[facility] Hospital Medicine  Progress Note    Patient Name: Ross Isbell  MRN: [id number]  Patient Class: IP- Inpatient   Admission Date: 5/23/2019  Length of Stay: 1 days  Attending Physician: Teddy Bryant MD  Primary Care Provider: Anna Penn MD    Subjective:     Principal Problem:Osteomyelitis of lumbar spine    HPI:  Ross Isbell is a 73 year old female with a PMHx of DM II, Parkinson's, CORINE, MI, CAD, and Glaucoma who presented to the Emergency Department with c/o fatigue. Associated symptoms include: generalized weakness and BLE pain x 2 weeks. Pt was informed by home health nurse to go to ED for increasing weakness, abdominal pain and leg pain. ED workup showed: WBC 13.08K, Hgb/Hct 11.0/33.5, Na+ 129, lipase 17. UA (+) nitrites, few bacteria. CXR with no acute findings. CT abdomen/pelvis with contrast showed moderate inflammatory changes centered on the intervertebral disc space between the L2 and L3 vertebral bodies. There is bony destruction on both sides of the intervertebral disc space between the L2 and L3 vertebral bodies.  This is consistent with the patient's history and characteristic of discitis and osteomyelitis. ED discussed case with Dr. Nicolas (neurosurgery) who reported pt did not need to be transferred out at this time. Obtain MRI thoracic/lumbar spine w wo contrast. Start pt on IV Vancomycin and Zosyn but consult Dr. Gomez (ID). Daughter: Uzma Godfrey [phone] is the surrogate decision maker. Admitted for Osteomyelitis of lumbar spine.    Hospital Course:  Dr. Nicolas, Neurosurgeon, doesn't recommend spinal surgery, But Needle biopsy of L 2-3 disk abscess with cultures. Blood culture is Positive for GPC. Echo pending. Patient had severe back pain and refused OT/PT. Daughter stated she has had diarrhea x 1 month. Stool for C.Diff ordered. T. Max 101. IV Vanco and Zosyn. IR, Dr. Juárez, consulted for Needle biopsy. Daughter at bedside. ID consult  pending. Cont Vanc and Zosyn. CT abdomen/pelvis positive for osteomyelitis.     Interval History: Blood culture shows GPC. Needle Bx of L 2-3 ordered. ID consult pending.    Review of Systems   Constitutional: Positive for activity change. Negative for chills and fever.   HENT: Negative for trouble swallowing.    Eyes: Negative for visual disturbance.   Respiratory: Negative for apnea, cough, choking, chest tightness, shortness of breath, wheezing and stridor.    Cardiovascular: Negative for chest pain, palpitations and leg swelling.   Gastrointestinal: Positive for abdominal pain, diarrhea and nausea. Negative for constipation and vomiting.   Genitourinary: Negative for difficulty urinating, dysuria, flank pain, frequency and urgency.   Musculoskeletal: Positive for arthralgias, back pain (lumbar) and gait problem. Negative for joint swelling, myalgias, neck pain and neck stiffness.   Skin: Negative for color change.   Neurological: Positive for weakness. Negative for dizziness, tremors, seizures, syncope, facial asymmetry, speech difficulty, light-headedness, numbness and headaches.   Psychiatric/Behavioral: Positive for dysphoric mood. Negative for agitation and behavioral problems.     Objective:     Vital Signs (Most Recent):  Temp: 98.8 °F (37.1 °C) (05/24/19 1207)  Pulse: 86 (05/24/19 1207)  Resp: 18 (05/24/19 1207)  BP: (!) 90/50 (05/24/19 1207)  SpO2: 96 % (05/24/19 1207) Vital Signs (24h Range):  Temp:  [97.3 °F (36.3 °C)-101 °F (38.3 °C)] 98.8 °F (37.1 °C)  Pulse:  [] 86  Resp:  [16-22] 18  SpO2:  [90 %-99 %] 96 %  BP: ()/(50-93) 90/50     Weight: 104.8 kg (231 lb)  Body mass index is 37.28 kg/m².    Intake/Output Summary (Last 24 hours) at 5/24/2019 8151  Last data filed at 5/24/2019 1300  Gross per 24 hour   Intake 1300 ml   Output --   Net 1300 ml      Physical Exam   Constitutional: She is oriented to person, place, and time. She appears well-developed. She is cooperative. She is easily  aroused. She appears distressed (appears in pain from BLE cramping).   HENT:   Head: Normocephalic and atraumatic.   Eyes: EOM are normal.   Neck: Normal range of motion. Neck supple.   Cardiovascular: Normal rate, regular rhythm and intact distal pulses.   No murmur heard.  Pulmonary/Chest: Effort normal and breath sounds normal. No stridor. No respiratory distress. She has no wheezes. She has no rales. She exhibits no tenderness.   Abdominal: Soft. She exhibits no distension. There is tenderness (BL quadrant). There is no rebound and no guarding.   Diminished Bowel sounds   Genitourinary:   Genitourinary Comments: Deferred   Musculoskeletal: She exhibits tenderness (back and abdomen). She exhibits no edema or deformity.   Neurological: She is alert, oriented to person, place, and time and easily aroused. No sensory deficit.   BUE tremors, hx of parkinson's   Skin: Skin is warm and dry. Capillary refill takes less than 2 seconds.   Psychiatric: She has a normal mood and affect. Her behavior is normal. Thought content normal.   Nursing note and vitals reviewed.    Significant Labs:   Blood Culture:   Recent Labs   Lab 05/23/19  1328 05/23/19  1345   LABBLOO Gram stain aer bottle: Gram positive cocci   Results called to and read back by:Krista Abdi RN 05/24/2019  14:22 Gram stain aer bottle: Gram positive cocci   Results called to and read back by:Krista Abdi RN 05/24/2019  14:23     CBC:   Recent Labs   Lab 05/23/19  1036 05/24/19  0533   WBC 13.08* 10.85   HGB 11.0* 10.2*   HCT 33.5* 32.0*   * 383*     CMP:   Recent Labs   Lab 05/23/19  1036 05/24/19  0533   * 128*   K 4.1 3.7   CL 94* 95   CO2 21* 25   GLU 57* 78   BUN 16 10   CREATININE 0.7 0.8   CALCIUM 9.4 8.9   PROT 7.2 6.6   ALBUMIN 2.9* 2.7*   BILITOT 0.5 0.7   ALKPHOS 90 92   AST 22 31   ALT 6* 8*   ANIONGAP 14 8   EGFRNONAA >60 >60       Significant Imaging: I have reviewed and interpreted all pertinent imaging results/findings within  the past 24 hours.    Assessment/Plan:      * Osteomyelitis of lumbar spine  - CT abdomen/pelvis with contrast showed moderate inflammatory changes centered on the intervertebral disc space between the L2 and L3 vertebral bodies. There is bony destruction on both sides of the intervertebral disc space between the L2 and L3 vertebral bodies.  This is consistent with the patient's history and characteristic of discitis and osteomyelitis  - Neurosurgery, Dr. Nicolas, states no surgical intervention at this time  -Needs Needle bx L 2-3 ordered 5/24/19  - MRI thoracic and lumbar spine: osteomyelitis  - Inpatient consult to ID  - Blood cultures: GPC  - Neurovascular checks  - Analgesics PRN    Gram-positive cocci bacteremia  - ID consult  -On IV Vanco/Zosyn      Hyponatremia  - Na+ 128, monitor  - IVF  - Neuro checks      UTI (urinary tract infection)  - UA (+) nitrites. Micro few bacteria  - Continue Zosyn  - Urine culture pending      Type 2 diabetes, controlled, with peripheral neuropathy  - HgbA1c on 04/23/19 -- 7.8  - Accuchecks and low dose SSI  - Pt takes Novolin 70/30 55 units SQ TID. Glucose in ED 57 mg/dL. Will hold Novolin for now, use low dose SSI, and monitor glucose      Hypothyroidism  - Continue Levothyroxine      Essential hypertension  - Continue home medications including Metoprolol and Losartan      CAD (coronary artery disease)  - Continue ASA, Statin, and Imdur      PD (Parkinson's disease)  - Continue Pramipexole and Sinemet        VTE Risk Mitigation (From admission, onward)        Ordered     heparin (porcine) injection 5,000 Units  Every 8 hours      05/23/19 7602              Lillie Millard NP  Department of Hospital Medicine   [facility]

## 2019-05-24 NOTE — NURSING
Chart reviewed for diabetes  Patient has been seen by this nurse on previous admit  No further action unless diabetes educational needs are identifed

## 2019-05-24 NOTE — PLAN OF CARE
Attempted to meet with patient and family, patient in procedure. Spoke with patient's primary caregiver, Jessenia Forman, patient's daughter @ [phone]. Patient current with [facility].  Patient's hh nurse told patient and family that the patient needs nursing home placement. Discussed possible skilled placement.  Encouraged her to tour the facilities that she is considering. CM will follow. Updated white board with 's name and number. Transitional Care Folder, Discharge Planning Begins on Admission pamphlet, [facility] Delivery pamphlet, Advance Directive information given to patient along with the contact information given.Instructed patient or family to call with any questions or concerns.    Discussed accessing the Awesome[employer] via the AwesomeTouch Instant Activation. Patient has active portal.          [facility] [address] [place], [place] - [address] [address] [address]  [address] [address] [address]  Phone: [phone] Fax: [phone]    Anna Penn MD  Payor: [employer] MEDICARE / Plan: [employer] 65 / Product Type: Medicare Advantage /           05/24/19 1529   Discharge Assessment   Assessment Type Discharge Planning Assessment   Confirmed/corrected address and phone number on facesheet? Yes   Assessment information obtained from? Caregiver;Medical Record   Expected Length of Stay (days)   (tbd)   Communicated expected length of stay with patient/caregiver yes   Prior to hospitilization cognitive status: Alert/Oriented   Prior to hospitalization functional status: Assistive Equipment   Current cognitive status: Alert/Oriented   Current Functional Status: Needs Assistance;Partially Dependent   Facility Arrived From: home   Lives With spouse   Able to Return to Prior Arrangements   (tbd)   Is patient able to care for self after discharge? Unable to determine at this time (comments)   Who are your caregiver(s) and their phone number(s)? Jessenia  Dasia ( daughter ) [phone]   Patient's perception of discharge disposition skilled nursing facility   Readmission Within the Last 30 Days no previous admission in last 30 days   Patient currently being followed by outpatient case management? No   Patient currently receives home health services? Yes  ([facility] )   Patient currently receives any other outside agency services? No   Equipment Currently Used at Home wheelchair;hospital bed;raised toilet;grab bar   Do you have any problems affording any of your prescribed medications? No   Is the patient taking medications as prescribed? yes   Does the patient have transportation home? Yes   Transportation Anticipated family or friend will provide   Does the patient receive services at the [facility]? No   Discharge Plan A Skilled Nursing Facility   Discharge Plan B Home;Home with family;Home Health   DME Needed Upon Discharge  none   Patient/Family in Agreement with Plan yes

## 2019-05-24 NOTE — H&P (VIEW-ONLY)
[facility] -   Neurosurgery  Consult Note    Consults  Subjective:     Chief Complaint/Reason for Admission: **    History of Present Illness:   Patient is a 73-year-old female who presented to the ER with worsening abdominal and lower back pain worsening over the past several weeks.  Patient is currently a nursing home resident and staff had been unable to take care of her secondary to her symptoms.  The patient states that she has had lower back pain, abdominal pain, and lower extremity pain 10/10 along with numbness and tingling.  She does not have weakness however she is unable to maintain a CD position or a walking position secondary to streak extreme pain.  She has not walked for over a month.  She denies any urinary retention    She has a past medical history of Parkinson's, diabetes, coronary artery disease, glaucoma, MI  CT of the chest abdomen pelvis was ordered and diskitis between L2 and L3 was found  Patient was subsequently admitted to the medical service and ID was consulted.  The patient was started on vanc and Zosyn empirically.    MRI was obtained in the patient had  Fluid is identified throughout the L2-L3 disc compatible with osteomyelitis.  Severe associated marrow edema identified within L2 and L3 with mild collapse of the left L3 superior endplate.  Findings compatible with osteomyelitis.  Mixed signal intensity is identified within the anterior epidural space, extending from the posterior aspect of L2 to the through the posterior aspect of L3 measuring approximately 3.8 cm (cc) by 3.7 cm (transverse) by 0.5 cm (AP).  Finding highly suspicious for epidural abscess.  Associated advanced facet arthropathy identified at L2-L3 the combination of findings results in moderate central canal stenosis.  Severe neural foraminal stenosis also identified at L2-3.  Severe paravertebral soft tissue edema is identified with severe soft tissue edema within the iliopsoas muscle bellies.  No  discrete intramuscular abscess detected.    Patient had blood cultures positive for gram-positive cocci  CT-guided biopsy was performed earlier this afternoon      PTA Medications   Medication Sig    aspirin (ECOTRIN) 81 MG EC tablet Take 1 tablet (81 mg total) by mouth once daily.    carbidopa-levodopa  mg (SINEMET)  mg per tablet TAKE 1 TABLET BY MOUTH 5 TIMES DAILY    isosorbide mononitrate (IMDUR) 120 MG 24 hr tablet Take 1 tablet (120 mg total) by mouth once daily.    levothyroxine (SYNTHROID) 125 MCG tablet TAKE 1 TABLET BY MOUTH IN THE MORNING BEFORE BREAKFAST    losartan (COZAAR) 50 MG tablet TAKE 1 TABLET BY MOUTH ONCE DAILY    metFORMIN (GLUCOPHAGE) 500 MG tablet TAKE ONE TABLET BY MOUTH THREE TIMES DAILY    metoprolol succinate (TOPROL-XL) 25 MG 24 hr tablet TAKE 1 TABLET BY MOUTH ONCE DAILY    NOVOLIN 70/30 U-100 INSULIN 100 unit/mL (70-30) injection Inject 55 Units into the skin 3 (three) times daily.    pantoprazole (PROTONIX) 40 MG tablet Take 1 tablet (40 mg total) by mouth once daily.    pramipexole (MIRAPEX) 1 MG tablet TAKE ONE TABLET BY MOUTH THREE TIMES DAILY    pravastatin (PRAVACHOL) 40 MG tablet TAKE ONE TABLET BY MOUTH ONCE DAILY    ACCU-CHEK SOFTCLIX LANCETS Misc TEST THREE TIMES DAILY    acetaminophen (TYLENOL) 325 MG tablet Take 2 tablets (650 mg total) by mouth every 6 (six) hours as needed.    blood sugar diagnostic Strp 1 strip by Misc.(Non-Drug; Combo Route) route 3 (three) times daily. Accuchek Felicia Plus Test Strips    blood-glucose meter kit Accu-chek Felicia Plus Meter    insulin syringe-needle U-100 0.5 mL 31 gauge x 5/16 Syrg USE THREE TIMES DAILY    latanoprost 0.005 % ophthalmic solution INSTILL 1 DROP INTO EACH EYE IN THE EVENING    multivit-min-FA-lycopen-lutein (CENTRUM SILVER) 0.4-300-250 mg-mcg-mcg Tab Take 1 tablet by mouth once daily.       Review of patient's allergies indicates:   Allergen Reactions    Codeine      Other reaction(s):  Nausea.  Patient tolerated morphine 5/2019 with no reported problems.     Codeine sulfate      Unknown^    Diphenhydramine hcl      Unknown^  Other reaction(s): Rash    Sulfa (sulfonamide antibiotics) Hives and Nausea And Vomiting    Penicillins Rash     Rash only as a child  Tolerated ceftriaxone 4/30/19  Tolerated piperacillin-tazobactam 5/23/19       Past Medical History:   Diagnosis Date    Abnormal nuclear stress test 11/1/2015    Background diabetic retinopathy 12/28/2015    CAD (coronary artery disease) 2002    stents    Cataract     Gait disorder 10/12/2012    Glaucoma     MI (myocardial infarction)     Obesity     CORINE (obstructive sleep apnea)     Other and unspecified hyperlipidemia     PD (Parkinson's disease) 2002    tremor-predominant (per patient)    Type II or unspecified type diabetes mellitus without mention of complication, not stated as uncontrolled 2002      am    Unspecified essential hypertension      Past Surgical History:   Procedure Laterality Date    BREAST BIOPSY Left     benign - about 3 yrs ago    CORONARY ANGIOPLASTY  2002    CORONARY STENT PLACEMENT  2002    DILATION AND CURETTAGE OF UTERUS      FOREIGN BODY REMOVAL      HEART CATH-LEFT Left 11/5/2015    [facility]    HEART CATH-LEFT Left 11/2/2015    [facility] C[facility]    HEART CATH-LEFT/pci stent lad Left 11/10/2015    [facility] CATH [facility]    INNER EAR SURGERY      TONSILLECTOMY, ADENOIDECTOMY       Family History     Problem Relation (Age of Onset)    Breast cancer Paternal Grandmother    Diabetes Maternal Grandmother    Glaucoma Mother, Maternal Grandmother    Heart attack Father (65), Mother (65)    Hypertension Father, Mother, Maternal Grandmother    Ovarian cancer Mother    Parkinsonism Other, Paternal Aunt    Tremor Father        Tobacco Use    Smoking status: Former Smoker     Years: 40.00     Last  attempt to quit: 10/12/2010     Years since quittin.6    Smokeless tobacco: Never Used    Tobacco comment: On & off for the past few years   Substance and Sexual Activity    Alcohol use: No     Alcohol/week: 0.0 oz    Drug use: No    Sexual activity: Not Currently     Review of Systems   Constitutional: Positive for activity change and fatigue.   HENT: Negative for ear discharge and mouth sores.    Respiratory: Positive for cough.    Cardiovascular: Positive for leg swelling. Negative for chest pain.   Gastrointestinal: Positive for abdominal pain.   Endocrine: Positive for cold intolerance.   Genitourinary: Positive for flank pain. Negative for difficulty urinating.   Musculoskeletal: Positive for arthralgias, back pain, gait problem and myalgias.   Skin: Positive for rash.   Neurological: Positive for weakness and numbness. Negative for facial asymmetry and headaches.   Psychiatric/Behavioral: Positive for agitation and confusion.     Objective:     Weight: 104.8 kg (231 lb)  Body mass index is 37.28 kg/m².  Vital Signs (Most Recent):  Temp: 97.5 °F (36.4 °C) (19 1645)  Pulse: 78 (19 1645)  Resp: 18 (19 164)  BP: 105/61 (19 1645)  SpO2: 96 % (19 1645) Vital Signs (24h Range):  Temp:  [97.3 °F (36.3 °C)-101 °F (38.3 °C)] 97.5 °F (36.4 °C)  Pulse:  [] 78  Resp:  [18-96] 18  SpO2:  [22 %-100 %] 96 %  BP: ()/(50-93) 105/61     Date 19 0700 - 19 0659   Shift 9588-7163 8481-1548 8231-6459 24 Hour Total   INTAKE   P.O. 400   400   Shift Total(mL/kg) 400(3.8)   400(3.8)   OUTPUT   Shift Total(mL/kg)       Weight (kg) 104.8 104.8 104.8 104.8                 Nursing note and vitals reviewed  Gen:  Patient has dementia at baseline however answers questions and is cooperative             Appears to be in severe distress secondary to pain  Skin: no rashes or lesions identified   Head:Normocephalic and atraumatic.  Nose: Nose normal.    Eyes: EOM are normal. Pupils  are equal, round, and reactive to light.   Neck: Neck supple. No masses or lesions palpated  Cardiovascular: Intact distal pulses.    Abdominal: Soft.  Patient has tenderness radiating from her back around her abdominal wall  Neurological:  D[facility] at [facility] No cranial nerve deficit.  Coordination normal. Normal muscle tone  Psychiatric: Normal mood and affect. Behavior is normal.      Back:        Tenderness bilaterally Paraspinal muscle spasms    Not able to assess Pain with flexion and extention    Limited secondary to pain Range of motion     Positive bilaterally to 15° Straight leg raise     Motor   Right Right Left Left  Level Group   5 4 5 4 L2 Hip flexor (Psoas)   5 4 5 4 L3 Leg extension (Quads)   5 4 5 4 L4 Dorsiflexion & foot inversion (Tibialis Anterior)   5 4 5 4 L5 Great toe extension ( EHL)   5 4 5 4 S1 Foot eversion (Gastroc, PL & PB)     Sensation  NL Decreased (R/L/BL) Level Sensation     x bilateral light touch L2 Anterio-medial thigh    x L3 Medial thigh around knee    x L4 Medial foot    x L5 Dorsum foot    x S1 Lateral foot     Reflex  2+  Patellar tendon (L4)   2+  Achilles tendon (S1)                Neurosurgery Physical Exam    Significant Labs:  Recent Labs   Lab 05/23/19  1036 05/24/19  0533   GLU 57* 78   * 128*   K 4.1 3.7   CL 94* 95   CO2 21* 25   BUN 16 10   CREATININE 0.7 0.8   CALCIUM 9.4 8.9     Recent Labs   Lab 05/23/19  1036 05/24/19  0533   WBC 13.08* 10.85   HGB 11.0* 10.2*   HCT 33.5* 32.0*   * 383*     Recent Labs   Lab 05/24/19  1422   INR 1.1   APTT 35.3*     Microbiology Results (last 7 days)     Procedure Component Value Units Date/Time    Blood culture #2 **CANNOT BE ORDERED STAT** [[id number]] Collected:  05/23/19 1345    Order Status:  Completed Specimen:  Blood from Peripheral, Wrist, Left Updated:  05/24/19 1717     Blood Culture, Routine Gram stain aer bottle: Gram positive cocci      Blood Culture, Routine Results called to and read back by:Krista  Jin FRAZIER 05/24/2019  14:23     Blood Culture, Routine Gram stain flaco bottle: Gram positive cocci      Blood Culture, Routine Positive results previously called    Gram stain [[id number]] Collected:  05/24/19 1540    Order Status:  Sent Specimen:  Aspirate from Back Updated:  05/24/19 1627    Culture, Anaerobe [[id number]] Collected:  05/24/19 1540    Order Status:  Sent Specimen:  Aspirate from Back Updated:  05/24/19 1541    Aerobic culture [[id number]] Collected:  05/24/19 1540    Order Status:  Sent Specimen:  Aspirate from Back Updated:  05/24/19 1541    Blood culture #1 **CANNOT BE ORDERED STAT** [[id number]] Collected:  05/23/19 1328    Order Status:  Completed Specimen:  Blood from Peripheral, Antecubital, Left Updated:  05/24/19 1521     Blood Culture, Routine Gram stain aer bottle: Gram positive cocci      Blood Culture, Routine Results called to and read back by:Krista Abdi RN 05/24/2019  14:22     Blood Culture, Routine Gram stain flaco bottle: Gram positive cocci      Blood Culture, Routine Positive results previously called    Clostridium difficile EIA [[id number]]     Order Status:  No result Specimen:  Stool         CMP:   Recent Labs   Lab 05/23/19  1036 05/24/19  0533   GLU 57* 78   CALCIUM 9.4 8.9   ALBUMIN 2.9* 2.7*   PROT 7.2 6.6   * 128*   K 4.1 3.7   CO2 21* 25   CL 94* 95   BUN 16 10   CREATININE 0.7 0.8   ALKPHOS 90 92   ALT 6* 8*   AST 22 31   BILITOT 0.5 0.7     Significant Diagnostics:  MRI:  And CT scan results reviewed see above    Assessment/Plan:     Active Diagnoses:    Diagnosis Date Noted POA    PRINCIPAL PROBLEM:  Osteomyelitis of lumbar spine [M46.26] 05/23/2019 Yes    Gram-positive cocci bacteremia [R78.81] 05/24/2019 Yes    UTI (urinary tract infection) [N39.0] 05/23/2019 Yes    Hyponatremia [E87.1] 05/23/2019 Yes    Type 2 diabetes, controlled, with peripheral neuropathy [E11.42] 05/29/2014 Yes     Chronic    Hypothyroidism [E03.9] 11/19/2013 Yes     Chronic     Essential hypertension [I10]  Yes     Chronic    PD (Parkinson's disease) [G20]  Yes     Chronic    CAD (coronary artery disease) [I25.10]  Yes     Chronic      Problems Resolved During this Admission:       Thank you for your consult. I will follow-up with patient. Please contact us if you have any additional questions.  Patient is a is in severe distress secondary to lower back pain.  She has stenosis at L2-3 with diskitis and epidural abscess located anterior to the thecal sac.  Patient attempted to sit up with physical therapy however she was unable secondary to severe pain.  We have obtained biopsy for tissue diagnosis however after speaking with infectious disease along with the patient's family.  Would be willing to take the patient back for a posterior laminectomy at L2-3 for drainage of epidural abscess.  They understand the goal of the surgery is not to remove all of the abscess however just to irrigate and decompress the thecal sac and removed as much as the abscesses possible with leaving epidural antibiotics to hopefully help with some of her pain control.  With the diskitis she will likely continue to have some lower back pain until the infection is treated however I believe her leg pain and her abdominal symptoms hopefully.    The patient was informed of all benefits and potential risk of the operation including but not limited to:  Pain, infection, bleeding, coma, paralysis, death.  Cerebrospinal fluid leak, failure of any instrumentation, the need for additional procedures in the future. No guarantee was given that this procedure would alleviate all of the symptoms.    NPO after midnight, type and screen today, labs are already in the system, hold heparin in a.m., I will meet the patient's daughter and the patient's  in the a.m. to sign consent.  Case tentatively scheduled for 8:00 a.m. tomorrow morning.    Stanislav Nicolas MD  Neurosurgery  [facility] -

## 2019-05-24 NOTE — PT/OT/SLP EVAL
Occupational Therapy   Evaluation    Name: Ross Isbell  MRN: [id number]  Admitting Diagnosis:  Osteomyelitis of lumbar spine      Recommendations:     Discharge Recommendations: nursing facility, skilled  Discharge Equipment Recommendations:  none  Barriers to discharge:  None    Assessment:     Ross Isbell is a 73 y.o. female with a medical diagnosis of Osteomyelitis of lumbar spine.  She presents with debility and generalized weakness. Performance deficits affecting function: weakness, impaired endurance, impaired self care skills, impaired functional mobilty, gait instability, impaired balance, decreased upper extremity function, decreased lower extremity function, decreased safety awareness, pain.      Rehab Prognosis: Fair; patient would benefit from acute skilled OT services to address these deficits and reach maximum level of function.       Plan:     Patient to be seen 3 x/week to address the above listed problems via self-care/home management, therapeutic activities, therapeutic exercises  · Plan of Care Expires: 05/31/19  · Plan of Care Reviewed with: patient    Subjective     Chief Complaint: debility and generalized weakness  Patient/Family Comments/goals:     Occupational Profile:  Living Environment: lives with spouse with ramp the enter  Previous level of function:  Assistance with adl's  Roles and Routines: occupational therapy  Equipment Used at Home:  wheelchair  Assistance upon Discharge:     Pain/Comfort:  · Pain Rating 1: 10/10  · Location - Side 1: Bilateral  · Location - Orientation 1: upper  · Location 1: abdomen    Patients cultural, spiritual, Gnosticist conflicts given the current situation:      Objective:     Communicated with: nurse and epic chart review prior to session.  Patient found right sidelying upon OT entry to room.    General Precautions: Standard, fall   Orthopedic Precautions:N/A   Braces:       Occupational Performance:    Bed Mobility:    · Patient completed  Rolling/Turning to Left with  minimum assistance  · Patient completed Rolling/Turning to Right with minimum assistance  · Patient completed Supine to Sit with maximal assistance and 2 persons  · Patient completed Sit to Supine with maximal assistance and 2 persons    Activities of Daily Living:  · Upper Body Dressing: total assistance .  · Toileting: total assistance .    Cognitive/Visual Perceptual:  Cognitive/Psychosocial Skills:     -       Oriented to: Person, Place, Time and Situation   -       Follows Commands/attention:Follows one-step commands  -       Communication: clear/fluent  -       Memory: No Deficits noted  -       Safety awareness/insight to disability: impaired   Visual/Perceptual:      -Intact .    Physical Exam:  Upper Extremity Range of Motion:     -       Right Upper Extremity: WFL  -       Left Upper Extremity: WFL  Upper Extremity Strength:    -       Right Upper Extremity: mmt: 3/5 grossly  -       Left Upper Extremity: mmt: 3/5 grossly   Strength:    -       Right Upper Extremity: mmt: 3/5 grossly  -       Left Upper Extremity: mmt: 3/5 grossly    AMPAC 6 Click ADL:  AMPAC Total Score: 9    Treatment & Education:    Education:    Patient left right sidelying with all lines intact, call button in reach, nurse Jerica  notified and cna and daughter present    GOALS:   Multidisciplinary Problems     Occupational Therapy Goals        Problem: Occupational Therapy Goal    Goal Priority Disciplines Outcome Interventions   Occupational Therapy Goal     OT, PT/OT     Description:  ot goals to be met 5-31-19  s with ue dressing  Pt will tolerate 1 set x 15 reps b ue rom exercise  Min a with bed supine<>sit  min a with bsc t/f's                    History:     Past Medical History:   Diagnosis Date    Abnormal nuclear stress test 11/1/2015    Background diabetic retinopathy 12/28/2015    CAD (coronary artery disease) 2002    stents    Cataract     Gait disorder 10/12/2012    Glaucoma     MI  (myocardial infarction)     Obesity     CORINE (obstructive sleep apnea)     Other and unspecified hyperlipidemia     PD (Parkinson's disease) 2002    tremor-predominant (per patient)    Type II or unspecified type diabetes mellitus without mention of complication, not stated as uncontrolled 2002      am    Unspecified essential hypertension        Past Surgical History:   Procedure Laterality Date    BREAST BIOPSY Left     benign - about 3 yrs ago    CORONARY ANGIOPLASTY  2002    CORONARY STENT PLACEMENT  2002    DILATION AND CURETTAGE OF UTERUS      FOREIGN BODY REMOVAL      HEART CATH-LEFT Left 11/5/2015    [facility] C[facility]    HEART CATH-LEFT Left 11/2/2015    [facility] CATH LAB    HEART CATH-LEFT/pci stent lad Left 11/10/2015    [facility] CATH LAB    INNER EAR SURGERY      TONSILLECTOMY, ADENOIDECTOMY         Time Tracking:     OT Date of Treatment: 05/24/19  OT Start Time: 1100  OT Stop Time: 1125  OT Total Time (min): 25 min    Billable Minutes:Evaluation 10 minutes  Therapeutic Activity 15 minutes    Cyndie Hernandez, OT  5/24/2019

## 2019-05-24 NOTE — SEDATION DOCUMENTATION
Procedure complete, pt tolerated well.  vss band aid placed to back puncture site, site WDL.  Pt tolerated well.  Vss.  Pt transferred to pt bed and transported to pt room.  Report called to pt nurse Krista.

## 2019-05-25 ENCOUNTER — ANESTHESIA EVENT (OUTPATIENT)
Dept: SURGERY | Facility: HOSPITAL | Age: 74
DRG: 981 | End: 2019-05-25
Payer: MEDICARE

## 2019-05-25 ENCOUNTER — ANESTHESIA (OUTPATIENT)
Dept: SURGERY | Facility: HOSPITAL | Age: 74
DRG: 981 | End: 2019-05-25
Payer: MEDICARE

## 2019-05-25 PROBLEM — M46.26 ACUTE OSTEOMYELITIS OF LUMBAR SPINE: Status: ACTIVE | Noted: 2019-05-25

## 2019-05-25 PROBLEM — Z98.890 STATUS POST LUMBAR LAMINECTOMY: Status: ACTIVE | Noted: 2019-05-25

## 2019-05-25 PROBLEM — I95.81 POSTPROCEDURAL HYPOTENSION: Status: ACTIVE | Noted: 2019-05-25

## 2019-05-25 PROBLEM — R07.9 CHEST PAIN: Status: ACTIVE | Noted: 2019-05-25

## 2019-05-25 LAB
ALBUMIN SERPL BCP-MCNC: 2.8 G/DL (ref 3.5–5.2)
ALP SERPL-CCNC: 83 U/L (ref 55–135)
ALT SERPL W/O P-5'-P-CCNC: 7 U/L (ref 10–44)
ANION GAP SERPL CALC-SCNC: 10 MMOL/L (ref 8–16)
AST SERPL-CCNC: 30 U/L (ref 10–40)
BASOPHILS # BLD AUTO: 0.02 K/UL (ref 0–0.2)
BASOPHILS NFR BLD: 0.2 % (ref 0–1.9)
BILIRUB SERPL-MCNC: 0.5 MG/DL (ref 0.1–1)
BUN SERPL-MCNC: 9 MG/DL (ref 8–23)
CALCIUM SERPL-MCNC: 9.1 MG/DL (ref 8.7–10.5)
CHLORIDE SERPL-SCNC: 96 MMOL/L (ref 95–110)
CO2 SERPL-SCNC: 25 MMOL/L (ref 23–29)
CREAT SERPL-MCNC: 0.8 MG/DL (ref 0.5–1.4)
DIFFERENTIAL METHOD: ABNORMAL
EOSINOPHIL # BLD AUTO: 0.1 K/UL (ref 0–0.5)
EOSINOPHIL NFR BLD: 1.5 % (ref 0–8)
ERYTHROCYTE [DISTWIDTH] IN BLOOD BY AUTOMATED COUNT: 13.9 % (ref 11.5–14.5)
EST. GFR  (AFRICAN AMERICAN): >60 ML/MIN/1.73 M^2
EST. GFR  (NON AFRICAN AMERICAN): >60 ML/MIN/1.73 M^2
GLUCOSE SERPL-MCNC: 122 MG/DL (ref 70–110)
HCT VFR BLD AUTO: 30.8 % (ref 37–48.5)
HCT VFR BLD AUTO: 32.9 % (ref 37–48.5)
HGB BLD-MCNC: 10.6 G/DL (ref 12–16)
HGB BLD-MCNC: 9.9 G/DL (ref 12–16)
LYMPHOCYTES # BLD AUTO: 1.8 K/UL (ref 1–4.8)
LYMPHOCYTES NFR BLD: 20.4 % (ref 18–48)
MAGNESIUM SERPL-MCNC: 1.4 MG/DL (ref 1.6–2.6)
MCH RBC QN AUTO: 28.1 PG (ref 27–31)
MCHC RBC AUTO-ENTMCNC: 32.2 G/DL (ref 32–36)
MCV RBC AUTO: 87 FL (ref 82–98)
MONOCYTES # BLD AUTO: 0.9 K/UL (ref 0.3–1)
MONOCYTES NFR BLD: 10.7 % (ref 4–15)
NEUTROPHILS # BLD AUTO: 5.8 K/UL (ref 1.8–7.7)
NEUTROPHILS NFR BLD: 67.2 % (ref 38–73)
PLATELET # BLD AUTO: 394 K/UL (ref 150–350)
PMV BLD AUTO: 8.7 FL (ref 9.2–12.9)
POCT GLUCOSE: 138 MG/DL (ref 70–110)
POCT GLUCOSE: 149 MG/DL (ref 70–110)
POCT GLUCOSE: 156 MG/DL (ref 70–110)
POCT GLUCOSE: 180 MG/DL (ref 70–110)
POTASSIUM SERPL-SCNC: 3.8 MMOL/L (ref 3.5–5.1)
PROT SERPL-MCNC: 6.8 G/DL (ref 6–8.4)
RBC # BLD AUTO: 3.77 M/UL (ref 4–5.4)
SODIUM SERPL-SCNC: 131 MMOL/L (ref 136–145)
VANCOMYCIN TROUGH SERPL-MCNC: 17.7 UG/ML (ref 10–22)
WBC # BLD AUTO: 8.66 K/UL (ref 3.9–12.7)

## 2019-05-25 PROCEDURE — 27800903 OPTIME MED/SURG SUP & DEVICES OTHER IMPLANTS: Performed by: NEUROLOGICAL SURGERY

## 2019-05-25 PROCEDURE — 99233 PR SUBSEQUENT HOSPITAL CARE,LEVL III: ICD-10-PCS | Mod: ,,, | Performed by: NEUROLOGICAL SURGERY

## 2019-05-25 PROCEDURE — 80202 ASSAY OF VANCOMYCIN: CPT

## 2019-05-25 PROCEDURE — 63600175 PHARM REV CODE 636 W HCPCS: Performed by: INTERNAL MEDICINE

## 2019-05-25 PROCEDURE — 36000712 HC OR TIME LEV V 1ST 15 MIN: Performed by: NEUROLOGICAL SURGERY

## 2019-05-25 PROCEDURE — 25000003 PHARM REV CODE 250: Performed by: INTERNAL MEDICINE

## 2019-05-25 PROCEDURE — 99233 SBSQ HOSP IP/OBS HIGH 50: CPT | Mod: ,,, | Performed by: NEUROLOGICAL SURGERY

## 2019-05-25 PROCEDURE — 37000009 HC ANESTHESIA EA ADD 15 MINS: Performed by: NEUROLOGICAL SURGERY

## 2019-05-25 PROCEDURE — 25000003 PHARM REV CODE 250: Performed by: NEUROLOGICAL SURGERY

## 2019-05-25 PROCEDURE — 99291 PR CRITICAL CARE, E/M 30-74 MINUTES: ICD-10-PCS | Mod: ,,, | Performed by: INTERNAL MEDICINE

## 2019-05-25 PROCEDURE — 25000003 PHARM REV CODE 250: Performed by: NURSE ANESTHETIST, CERTIFIED REGISTERED

## 2019-05-25 PROCEDURE — 25000003 PHARM REV CODE 250: Performed by: NURSE PRACTITIONER

## 2019-05-25 PROCEDURE — 36415 COLL VENOUS BLD VENIPUNCTURE: CPT

## 2019-05-25 PROCEDURE — 99291 CRITICAL CARE FIRST HOUR: CPT | Mod: ,,, | Performed by: INTERNAL MEDICINE

## 2019-05-25 PROCEDURE — 71000039 HC RECOVERY, EACH ADD'L HOUR: Performed by: NEUROLOGICAL SURGERY

## 2019-05-25 PROCEDURE — 80053 COMPREHEN METABOLIC PANEL: CPT

## 2019-05-25 PROCEDURE — 20000000 HC ICU ROOM

## 2019-05-25 PROCEDURE — P9045 ALBUMIN (HUMAN), 5%, 250 ML: HCPCS | Mod: JG | Performed by: NURSE ANESTHETIST, CERTIFIED REGISTERED

## 2019-05-25 PROCEDURE — P9045 ALBUMIN (HUMAN), 5%, 250 ML: HCPCS | Mod: JG | Performed by: STUDENT IN AN ORGANIZED HEALTH CARE EDUCATION/TRAINING PROGRAM

## 2019-05-25 PROCEDURE — 85018 HEMOGLOBIN: CPT

## 2019-05-25 PROCEDURE — 63600175 PHARM REV CODE 636 W HCPCS: Performed by: NURSE PRACTITIONER

## 2019-05-25 PROCEDURE — 25000003 PHARM REV CODE 250: Performed by: STUDENT IN AN ORGANIZED HEALTH CARE EDUCATION/TRAINING PROGRAM

## 2019-05-25 PROCEDURE — 36000713 HC OR TIME LEV V EA ADD 15 MIN: Performed by: NEUROLOGICAL SURGERY

## 2019-05-25 PROCEDURE — 27201423 OPTIME MED/SURG SUP & DEVICES STERILE SUPPLY: Performed by: NEUROLOGICAL SURGERY

## 2019-05-25 PROCEDURE — 85014 HEMATOCRIT: CPT

## 2019-05-25 PROCEDURE — 71000033 HC RECOVERY, INTIAL HOUR: Performed by: NEUROLOGICAL SURGERY

## 2019-05-25 PROCEDURE — C1729 CATH, DRAINAGE: HCPCS | Performed by: NEUROLOGICAL SURGERY

## 2019-05-25 PROCEDURE — 36569 INSJ PICC 5 YR+ W/O IMAGING: CPT

## 2019-05-25 PROCEDURE — C1751 CATH, INF, PER/CENT/MIDLINE: HCPCS

## 2019-05-25 PROCEDURE — C9290 INJ, BUPIVACAINE LIPOSOME: HCPCS | Performed by: NEUROLOGICAL SURGERY

## 2019-05-25 PROCEDURE — A4216 STERILE WATER/SALINE, 10 ML: HCPCS | Performed by: STUDENT IN AN ORGANIZED HEALTH CARE EDUCATION/TRAINING PROGRAM

## 2019-05-25 PROCEDURE — 63600175 PHARM REV CODE 636 W HCPCS: Performed by: NURSE ANESTHETIST, CERTIFIED REGISTERED

## 2019-05-25 PROCEDURE — 85025 COMPLETE CBC W/AUTO DIFF WBC: CPT

## 2019-05-25 PROCEDURE — 83735 ASSAY OF MAGNESIUM: CPT

## 2019-05-25 PROCEDURE — 63600175 PHARM REV CODE 636 W HCPCS: Performed by: NEUROLOGICAL SURGERY

## 2019-05-25 PROCEDURE — 37000008 HC ANESTHESIA 1ST 15 MINUTES: Performed by: NEUROLOGICAL SURGERY

## 2019-05-25 PROCEDURE — C1713 ANCHOR/SCREW BN/BN,TIS/BN: HCPCS | Performed by: NEUROLOGICAL SURGERY

## 2019-05-25 PROCEDURE — 63600175 PHARM REV CODE 636 W HCPCS: Mod: JG | Performed by: STUDENT IN AN ORGANIZED HEALTH CARE EDUCATION/TRAINING PROGRAM

## 2019-05-25 DEVICE — SCREW HORIZON SOLERA 6.5X35
Type: IMPLANTABLE DEVICE | Site: BACK | Status: NON-FUNCTIONAL
Removed: 2019-08-26

## 2019-05-25 DEVICE — SET SCREW HORIZON SOLERA 5.5-6
Type: IMPLANTABLE DEVICE | Site: BACK | Status: NON-FUNCTIONAL
Removed: 2019-08-26

## 2019-05-25 DEVICE — SCREW HORIZON SOLERA 6.5X40
Type: IMPLANTABLE DEVICE | Site: BACK | Status: NON-FUNCTIONAL
Removed: 2019-08-26

## 2019-05-25 RX ORDER — NEOSTIGMINE METHYLSULFATE 1 MG/ML
INJECTION, SOLUTION INTRAVENOUS
Status: DISCONTINUED | OUTPATIENT
Start: 2019-05-25 | End: 2019-05-25

## 2019-05-25 RX ORDER — LIDOCAINE HYDROCHLORIDE 10 MG/ML
INJECTION INFILTRATION; PERINEURAL
Status: DISCONTINUED | OUTPATIENT
Start: 2019-05-25 | End: 2019-05-25

## 2019-05-25 RX ORDER — ACETAMINOPHEN 10 MG/ML
1000 INJECTION, SOLUTION INTRAVENOUS ONCE
Status: COMPLETED | OUTPATIENT
Start: 2019-05-25 | End: 2019-05-25

## 2019-05-25 RX ORDER — ALBUMIN HUMAN 50 G/1000ML
12.5 SOLUTION INTRAVENOUS ONCE
Status: DISCONTINUED | OUTPATIENT
Start: 2019-05-25 | End: 2019-05-26

## 2019-05-25 RX ORDER — PROPOFOL 10 MG/ML
VIAL (ML) INTRAVENOUS
Status: DISCONTINUED | OUTPATIENT
Start: 2019-05-25 | End: 2019-05-25

## 2019-05-25 RX ORDER — VANCOMYCIN HYDROCHLORIDE 1 G/20ML
INJECTION, POWDER, LYOPHILIZED, FOR SOLUTION INTRAVENOUS
Status: DISCONTINUED | OUTPATIENT
Start: 2019-05-25 | End: 2019-05-25 | Stop reason: HOSPADM

## 2019-05-25 RX ORDER — SODIUM CHLORIDE, SODIUM LACTATE, POTASSIUM CHLORIDE, CALCIUM CHLORIDE 600; 310; 30; 20 MG/100ML; MG/100ML; MG/100ML; MG/100ML
INJECTION, SOLUTION INTRAVENOUS CONTINUOUS PRN
Status: DISCONTINUED | OUTPATIENT
Start: 2019-05-25 | End: 2019-05-25

## 2019-05-25 RX ORDER — METHOCARBAMOL 750 MG/1
750 TABLET, FILM COATED ORAL EVERY 8 HOURS PRN
Status: DISCONTINUED | OUTPATIENT
Start: 2019-05-25 | End: 2019-05-30 | Stop reason: HOSPADM

## 2019-05-25 RX ORDER — MIDAZOLAM HYDROCHLORIDE 1 MG/ML
INJECTION, SOLUTION INTRAMUSCULAR; INTRAVENOUS
Status: DISCONTINUED | OUTPATIENT
Start: 2019-05-25 | End: 2019-05-25

## 2019-05-25 RX ORDER — ROCURONIUM BROMIDE 10 MG/ML
INJECTION, SOLUTION INTRAVENOUS
Status: DISCONTINUED | OUTPATIENT
Start: 2019-05-25 | End: 2019-05-25

## 2019-05-25 RX ORDER — ONDANSETRON 2 MG/ML
4 INJECTION INTRAMUSCULAR; INTRAVENOUS DAILY PRN
Status: DISCONTINUED | OUTPATIENT
Start: 2019-05-25 | End: 2019-05-26

## 2019-05-25 RX ORDER — FENTANYL CITRATE 50 UG/ML
INJECTION, SOLUTION INTRAMUSCULAR; INTRAVENOUS
Status: DISCONTINUED | OUTPATIENT
Start: 2019-05-25 | End: 2019-05-25

## 2019-05-25 RX ORDER — BUPIVACAINE HYDROCHLORIDE 2.5 MG/ML
INJECTION, SOLUTION EPIDURAL; INFILTRATION; INTRACAUDAL
Status: DISCONTINUED | OUTPATIENT
Start: 2019-05-25 | End: 2019-05-25 | Stop reason: HOSPADM

## 2019-05-25 RX ORDER — ALBUMIN HUMAN 50 G/1000ML
SOLUTION INTRAVENOUS CONTINUOUS PRN
Status: DISCONTINUED | OUTPATIENT
Start: 2019-05-25 | End: 2019-05-25

## 2019-05-25 RX ORDER — ALBUMIN HUMAN 50 G/1000ML
SOLUTION INTRAVENOUS
Status: DISPENSED
Start: 2019-05-25 | End: 2019-05-26

## 2019-05-25 RX ORDER — FENTANYL CITRATE 50 UG/ML
25 INJECTION, SOLUTION INTRAMUSCULAR; INTRAVENOUS EVERY 5 MIN PRN
Status: COMPLETED | OUTPATIENT
Start: 2019-05-25 | End: 2019-05-25

## 2019-05-25 RX ORDER — SODIUM CHLORIDE 0.9 % (FLUSH) 0.9 %
3 SYRINGE (ML) INJECTION
Status: DISCONTINUED | OUTPATIENT
Start: 2019-05-25 | End: 2019-05-26

## 2019-05-25 RX ORDER — BACITRACIN ZINC 500 UNIT/G
OINTMENT (GRAM) TOPICAL
Status: DISCONTINUED | OUTPATIENT
Start: 2019-05-25 | End: 2019-05-25 | Stop reason: HOSPADM

## 2019-05-25 RX ORDER — SODIUM CHLORIDE 0.9 % (FLUSH) 0.9 %
3 SYRINGE (ML) INJECTION EVERY 8 HOURS
Status: DISCONTINUED | OUTPATIENT
Start: 2019-05-25 | End: 2019-05-26

## 2019-05-25 RX ORDER — GLYCOPYRROLATE 0.2 MG/ML
INJECTION INTRAMUSCULAR; INTRAVENOUS
Status: DISCONTINUED | OUTPATIENT
Start: 2019-05-25 | End: 2019-05-25

## 2019-05-25 RX ORDER — HYDROMORPHONE HYDROCHLORIDE 2 MG/ML
0.2 INJECTION, SOLUTION INTRAMUSCULAR; INTRAVENOUS; SUBCUTANEOUS EVERY 5 MIN PRN
Status: DISCONTINUED | OUTPATIENT
Start: 2019-05-25 | End: 2019-05-26

## 2019-05-25 RX ORDER — EPHEDRINE SULFATE 50 MG/ML
INJECTION, SOLUTION INTRAVENOUS
Status: DISCONTINUED | OUTPATIENT
Start: 2019-05-25 | End: 2019-05-25

## 2019-05-25 RX ORDER — PHENYLEPHRINE HYDROCHLORIDE 10 MG/ML
INJECTION INTRAVENOUS
Status: DISCONTINUED | OUTPATIENT
Start: 2019-05-25 | End: 2019-05-25

## 2019-05-25 RX ADMIN — Medication 3 ML: at 09:05

## 2019-05-25 RX ADMIN — PHENYLEPHRINE HYDROCHLORIDE 0.5 MCG/KG/MIN: 10 INJECTION INTRAVENOUS at 05:05

## 2019-05-25 RX ADMIN — HYDROMORPHONE HYDROCHLORIDE 0.2 MG: 2 INJECTION INTRAMUSCULAR; INTRAVENOUS; SUBCUTANEOUS at 05:05

## 2019-05-25 RX ADMIN — CARBIDOPA AND LEVODOPA 1 TABLET: 25; 250 TABLET ORAL at 08:05

## 2019-05-25 RX ADMIN — FENTANYL CITRATE 25 MCG: 50 INJECTION INTRAMUSCULAR; INTRAVENOUS at 04:05

## 2019-05-25 RX ADMIN — ROCURONIUM BROMIDE 50 MG: 10 INJECTION, SOLUTION INTRAVENOUS at 10:05

## 2019-05-25 RX ADMIN — NEOSTIGMINE METHYLSULFATE 3 MG: 1 INJECTION INTRAVENOUS at 03:05

## 2019-05-25 RX ADMIN — ISOSORBIDE MONONITRATE 120 MG: 60 TABLET, EXTENDED RELEASE ORAL at 08:05

## 2019-05-25 RX ADMIN — LOSARTAN POTASSIUM 50 MG: 50 TABLET, FILM COATED ORAL at 08:05

## 2019-05-25 RX ADMIN — HYDROCODONE BITARTRATE AND ACETAMINOPHEN 1 TABLET: 7.5; 325 TABLET ORAL at 02:05

## 2019-05-25 RX ADMIN — EPHEDRINE SULFATE 25 MG: 50 INJECTION INTRAMUSCULAR; INTRAVENOUS; SUBCUTANEOUS at 10:05

## 2019-05-25 RX ADMIN — PRAMIPEXOLE DIHYDROCHLORIDE 1 MG: 0.5 TABLET ORAL at 08:05

## 2019-05-25 RX ADMIN — PRAVASTATIN SODIUM 40 MG: 20 TABLET ORAL at 08:05

## 2019-05-25 RX ADMIN — PHENYLEPHRINE HYDROCHLORIDE 100 MCG: 10 INJECTION INTRAVENOUS at 12:05

## 2019-05-25 RX ADMIN — METOPROLOL SUCCINATE 25 MG: 25 TABLET, EXTENDED RELEASE ORAL at 08:05

## 2019-05-25 RX ADMIN — PROPOFOL 90 MG: 10 INJECTION, EMULSION INTRAVENOUS at 10:05

## 2019-05-25 RX ADMIN — ROCURONIUM BROMIDE 30 MG: 10 INJECTION, SOLUTION INTRAVENOUS at 11:05

## 2019-05-25 RX ADMIN — FENTANYL CITRATE 100 MCG: 50 INJECTION, SOLUTION INTRAMUSCULAR; INTRAVENOUS at 10:05

## 2019-05-25 RX ADMIN — LATANOPROST 1 DROP: 50 SOLUTION OPHTHALMIC at 08:05

## 2019-05-25 RX ADMIN — FENTANYL CITRATE 25 MCG: 50 INJECTION INTRAMUSCULAR; INTRAVENOUS at 03:05

## 2019-05-25 RX ADMIN — LEVOTHYROXINE SODIUM 125 MCG: 125 TABLET ORAL at 06:05

## 2019-05-25 RX ADMIN — SODIUM CHLORIDE, SODIUM LACTATE, POTASSIUM CHLORIDE, AND CALCIUM CHLORIDE: 600; 310; 30; 20 INJECTION, SOLUTION INTRAVENOUS at 10:05

## 2019-05-25 RX ADMIN — FENTANYL CITRATE 25 MCG: 50 INJECTION INTRAMUSCULAR; INTRAVENOUS at 05:05

## 2019-05-25 RX ADMIN — VANCOMYCIN HYDROCHLORIDE 1500 MG: 100 INJECTION, POWDER, LYOPHILIZED, FOR SOLUTION INTRAVENOUS at 05:05

## 2019-05-25 RX ADMIN — ACETAMINOPHEN 1000 MG: 10 INJECTION, SOLUTION INTRAVENOUS at 03:05

## 2019-05-25 RX ADMIN — VANCOMYCIN HYDROCHLORIDE 1500 MG: 100 INJECTION, POWDER, LYOPHILIZED, FOR SOLUTION INTRAVENOUS at 09:05

## 2019-05-25 RX ADMIN — SODIUM CHLORIDE, SODIUM LACTATE, POTASSIUM CHLORIDE, AND CALCIUM CHLORIDE 500 ML: .6; .31; .03; .02 INJECTION, SOLUTION INTRAVENOUS at 05:05

## 2019-05-25 RX ADMIN — ROBINUL 0.4 MG: 0.2 INJECTION INTRAMUSCULAR; INTRAVENOUS at 03:05

## 2019-05-25 RX ADMIN — Medication: at 08:05

## 2019-05-25 RX ADMIN — CEFTRIAXONE 2 G: 2 INJECTION, SOLUTION INTRAVENOUS at 08:05

## 2019-05-25 RX ADMIN — SODIUM CHLORIDE: 0.9 INJECTION, SOLUTION INTRAVENOUS at 05:05

## 2019-05-25 RX ADMIN — PROMETHAZINE HYDROCHLORIDE 6.25 MG: 25 INJECTION INTRAMUSCULAR; INTRAVENOUS at 06:05

## 2019-05-25 RX ADMIN — PANTOPRAZOLE SODIUM 40 MG: 40 TABLET, DELAYED RELEASE ORAL at 08:05

## 2019-05-25 RX ADMIN — PHENYLEPHRINE HYDROCHLORIDE 200 MCG: 10 INJECTION INTRAVENOUS at 11:05

## 2019-05-25 RX ADMIN — BUPIVACAINE 266 MG: 13.3 INJECTION, SUSPENSION, LIPOSOMAL INFILTRATION at 01:05

## 2019-05-25 RX ADMIN — ALBUMIN (HUMAN): 12.5 INJECTION, SOLUTION INTRAVENOUS at 01:05

## 2019-05-25 RX ADMIN — ONDANSETRON 4 MG: 2 INJECTION INTRAMUSCULAR; INTRAVENOUS at 02:05

## 2019-05-25 RX ADMIN — MIDAZOLAM 2 MG: 1 INJECTION INTRAMUSCULAR; INTRAVENOUS at 10:05

## 2019-05-25 RX ADMIN — HYDROMORPHONE HYDROCHLORIDE 0.2 MG: 2 INJECTION INTRAMUSCULAR; INTRAVENOUS; SUBCUTANEOUS at 04:05

## 2019-05-25 RX ADMIN — SODIUM CHLORIDE, SODIUM LACTATE, POTASSIUM CHLORIDE, AND CALCIUM CHLORIDE 1000 ML: .6; .31; .03; .02 INJECTION, SOLUTION INTRAVENOUS at 04:05

## 2019-05-25 RX ADMIN — SODIUM CHLORIDE, SODIUM LACTATE, POTASSIUM CHLORIDE, AND CALCIUM CHLORIDE: 600; 310; 30; 20 INJECTION, SOLUTION INTRAVENOUS at 11:05

## 2019-05-25 RX ADMIN — LIDOCAINE HYDROCHLORIDE 50 MG: 10 INJECTION, SOLUTION INFILTRATION; PERINEURAL at 10:05

## 2019-05-25 RX ADMIN — SODIUM CHLORIDE 500 ML: 0.9 INJECTION, SOLUTION INTRAVENOUS at 06:05

## 2019-05-25 NOTE — CONSULTS
[facility]  Infectious Disease  Consult Note    Patient Name: Ross Isbell  MRN: [id number]  Admission Date: 5/23/2019  Hospital Length of Stay: 2 days  Attending Physician: Teddy Bryant MD  Primary Care Provider: Anna Penn MD     Isolation Status: Special Contact    Patient information was obtained from patient, past medical records and ER records.      Consults  Assessment/Plan:     * Osteomyelitis of lumbar spine  05/24- will need up to 6 weeks of parenteral therapy , will continue Rocephin /vanco until final drug susceptibility of organisim is known     Abscess in epidural space of lumbar spine  05/24- case discussed with primary team/Dr Nicolas -will plan for surgery in AM,  Will continue Rocephin/Vancomycin .  Blood cultures-strep   Will follow repeat blood cultures    Gram-positive cocci bacteremia  05/24- will follow final ID and drug susceptibility of organism.  Will continue Vanco/rocephin    Type 2 diabetes, controlled, with peripheral neuropathy  05/24- insulin sliding scale, closely monitor glucose    Essential hypertension  05/24, BP control as per primary team    PD (Parkinson's disease)  05/24- out patient neurology follow up .        Thank you for your consult. I will follow-up with patient. Please contact us if you have any additional questions.    Dada Gomez MD  Infectious Disease  [facility]    Subjective:     Principal Problem: Osteomyelitis of lumbar spine    HPI: 73 year old female with history of DM II, Parkinson's, CORINE, MI, CAD, and Glaucoma who presented to the Emergency Department with c/o fatigue. There is associated symptom of generalized weakness, bilateral lower extremity pain. Since admission, all cultures were reviewed -   Blood cultures -  05/23- strep   04/30-  Streptococcus gallinaceus   She was seen in the ED on 04/30 and blood cultures at that time was also positive for strep gallinaceous .She was discharged from the ED as her  symptoms were attributed to Parkinsonism .CT abdomen/pelvis with contrast showed moderate inflammatory changes centered on the intervertebral disc space between the L2 and L3 vertebral bodies. There is bony destruction on both sides of the intervertebral disc space between the L2 and L3 vertebral bodies.  This is consistent with the patient's history and characteristic of discitis and osteomyelitis.   MRI of the thoracic spine -Negative for evidence of discitis or osteomyelitis of the thoracic spine.    Low-grade scattered degenerative changes.  MRI of the lumbar spine-Findings compatible with discitis L2-3, with osteomyelitis of L2 and L3.  Additional evidence of epidural abscess within the anterior epidural space.  Severe paravertebral soft tissue edema, as detailed above.  3. Small amount of fluid within the L3-L4 disc space suspicious for early osteomyelitis      Past Medical History:   Diagnosis Date    Abnormal nuclear stress test 11/1/2015    Background diabetic retinopathy 12/28/2015    CAD (coronary artery disease) 2002    stents    Cataract     Gait disorder 10/12/2012    Glaucoma     MI (myocardial infarction)     Obesity     CORINE (obstructive sleep apnea)     Other and unspecified hyperlipidemia     PD (Parkinson's disease) 2002    tremor-predominant (per patient)    Type II or unspecified type diabetes mellitus without mention of complication, not stated as uncontrolled 2002      am    Unspecified essential hypertension        Past Surgical History:   Procedure Laterality Date    BREAST BIOPSY Left     benign - about 3 yrs ago    CORONARY ANGIOPLASTY  2002    CORONARY STENT PLACEMENT  2002    DILATION AND CURETTAGE OF UTERUS      FOREIGN BODY REMOVAL      HEART CATH-LEFT Left 11/5/2015    [facility]    HEART CATH-LEFT Left 11/2/2015    [facility] CATH [facility]    HEART CATH-LEFT/pci stent lad Left 11/10/2015     [facility]    INNER EAR SURGERY      TONSILLECTOMY, ADENOIDECTOMY         Review of patient's allergies indicates:   Allergen Reactions    Codeine      Other reaction(s): Nausea.  Patient tolerated morphine 5/2019 with no reported problems.     Codeine sulfate      Unknown^    Diphenhydramine hcl      Unknown^  Other reaction(s): Rash    Sulfa (sulfonamide antibiotics) Hives and Nausea And Vomiting    Penicillins Rash     Rash only as a child  Tolerated ceftriaxone 4/30/19  Tolerated piperacillin-tazobactam 5/23/19       Medications:  Medications Prior to Admission   Medication Sig    aspirin (ECOTRIN) 81 MG EC tablet Take 1 tablet (81 mg total) by mouth once daily.    carbidopa-levodopa  mg (SINEMET)  mg per tablet TAKE 1 TABLET BY MOUTH 5 TIMES DAILY    isosorbide mononitrate (IMDUR) 120 MG 24 hr tablet Take 1 tablet (120 mg total) by mouth once daily.    levothyroxine (SYNTHROID) 125 MCG tablet TAKE 1 TABLET BY MOUTH IN THE MORNING BEFORE BREAKFAST    losartan (COZAAR) 50 MG tablet TAKE 1 TABLET BY MOUTH ONCE DAILY    metFORMIN (GLUCOPHAGE) 500 MG tablet TAKE ONE TABLET BY MOUTH THREE TIMES DAILY    metoprolol succinate (TOPROL-XL) 25 MG 24 hr tablet TAKE 1 TABLET BY MOUTH ONCE DAILY    NOVOLIN 70/30 U-100 INSULIN 100 unit/mL (70-30) injection Inject 55 Units into the skin 3 (three) times daily.    pantoprazole (PROTONIX) 40 MG tablet Take 1 tablet (40 mg total) by mouth once daily.    pramipexole (MIRAPEX) 1 MG tablet TAKE ONE TABLET BY MOUTH THREE TIMES DAILY    pravastatin (PRAVACHOL) 40 MG tablet TAKE ONE TABLET BY MOUTH ONCE DAILY    ACCU-CHEK SOFTCLIX LANCETS Misc TEST THREE TIMES DAILY    acetaminophen (TYLENOL) 325 MG tablet Take 2 tablets (650 mg total) by mouth every 6 (six) hours as needed.    blood sugar diagnostic Strp 1 strip by Misc.(Non-Drug; Combo Route) route 3 (three) times daily. Accuchek Felicia Plus Test Strips     blood-glucose meter kit Accu-chek Felicia Plus Meter    insulin syringe-needle U-100 0.5 mL 31 gauge x 5/16 Syrg USE THREE TIMES DAILY    latanoprost 0.005 % ophthalmic solution INSTILL 1 DROP INTO EACH EYE IN THE EVENING    multivit-min-FA-lycopen-lutein (CENTRUM SILVER) 0.4-300-250 mg-mcg-mcg Tab Take 1 tablet by mouth once daily.     Antibiotics (From admission, onward)    Start     Stop Route Frequency Ordered    05/24/19 2230  ceFEPIme in dextrose 5% 2 gram/50 mL IVPB 2 g      -- IV Every 12 hours (non-standard times) 05/24/19 2120 05/24/19 0230  vancomycin 1.5 g in 5 % dextrose 250 mL IVPB      -- IV Every 12 hours (non-standard times) 05/23/19 2001        Antifungals (From admission, onward)    Start     Stop Route Frequency Ordered    05/24/19 1015  miconazole NITRATE 2 % top powder      -- Top 2 times daily 05/24/19 0909        Antivirals (From admission, onward)    None           Immunization History   Administered Date(s) Administered    Influenza 10/30/2007, 10/29/2009, 12/02/2010    Influenza - High Dose 01/05/2012, 10/31/2012, 10/23/2014, 10/19/2016, 11/22/2017, 10/05/2018    Pneumococcal Conjugate - 13 Valent 11/19/2015    Pneumococcal Polysaccharide - 23 Valent 12/02/2010    Tdap 11/19/2015       Family History     Problem Relation (Age of Onset)    Breast cancer Paternal Grandmother    Diabetes Maternal Grandmother    Glaucoma Mother, Maternal Grandmother    Heart attack Father (65), Mother (65)    Hypertension Father, Mother, Maternal Grandmother    Ovarian cancer Mother    Parkinsonism Other, Paternal Aunt    Tremor Father        Social History     Socioeconomic History    Marital status:      Spouse name: Not on file    Number of children: Not on file    Years of education: Not on file    Highest education level: Not on file   Occupational History    Not on file   Social Needs    Financial resource strain: Not on file    Food insecurity:     Worry: Not on file      Inability: Not on file    Transportation needs:     Medical: Not on file     Non-medical: Not on file   Tobacco Use    Smoking status: Former Smoker     Years: 40.00     Last attempt to quit: 10/12/2010     Years since quittin.6    Smokeless tobacco: Never Used    Tobacco comment: On & off for the past few years   Substance and Sexual Activity    Alcohol use: No     Alcohol/week: 0.0 oz    Drug use: No    Sexual activity: Not Currently   Lifestyle    Physical activity:     Days per week: Not on file     Minutes per session: Not on file    Stress: Not on file   Relationships    Social connections:     Talks on phone: Not on file     Gets together: Not on file     Attends Spiritism service: Not on file     Active member of club or organization: Not on file     Attends meetings of clubs or organizations: Not on file     Relationship status: Not on file   Other Topics Concern    Not on file   Social History Narrative     . Lives with spouse. Has 2 children. 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     Review of Systems   Constitutional: Positive for activity change. Negative for chills and fever.   HENT: Negative for trouble swallowing.    Eyes: Negative for visual disturbance.   Respiratory: Negative for apnea, cough, choking, chest tightness, shortness of breath, wheezing and stridor.    Cardiovascular: Negative for chest pain, palpitations and leg swelling.   Gastrointestinal: Positive for abdominal pain, diarrhea and nausea. Negative for constipation and vomiting.   Genitourinary: Negative for difficulty urinating, dysuria, flank pain, frequency and urgency.   Musculoskeletal: Positive for arthralgias, back pain (lumbar) and gait problem. Negative for joint swelling, myalgias, neck pain and neck stiffness.   Skin: Negative for color change.   Neurological: Positive for weakness. Negative for dizziness, tremors, seizures, syncope, facial asymmetry, speech difficulty,  light-headedness, numbness and headaches.   Psychiatric/Behavioral: Positive for dysphoric mood. Negative for agitation and behavioral problems.     Objective:     Vital Signs (Most Recent):  Temp: 97.6 °F (36.4 °C) (05/25/19 0312)  Pulse: 70 (05/25/19 0312)  Resp: 18 (05/25/19 0312)  BP: (!) 114/51 (05/25/19 0312)  SpO2: 97 % (05/25/19 0312) Vital Signs (24h Range):  Temp:  [97.3 °F (36.3 °C)-98.8 °F (37.1 °C)] 97.6 °F (36.4 °C)  Pulse:  [70-88] 70  Resp:  [16-96] 18  SpO2:  [22 %-100 %] 97 %  BP: ()/(50-68) 114/51     Weight: 104.8 kg (231 lb)  Body mass index is 37.28 kg/m².    Estimated Creatinine Clearance: 76.6 mL/min (based on SCr of 0.8 mg/dL).    Physical Exam   Constitutional: She is oriented to person, place, and time. She appears well-developed. She is cooperative. She is easily aroused. She appears distressed (appears in pain from BLE cramping).   HENT:   Head: Normocephalic and atraumatic.   Eyes: EOM are normal.   Neck: Normal range of motion. Neck supple.   Cardiovascular: Normal rate, regular rhythm and intact distal pulses.   No murmur heard.  Pulmonary/Chest: Effort normal and breath sounds normal. No stridor. No respiratory distress. She has no wheezes. She has no rales. She exhibits no tenderness.   Abdominal: Soft. She exhibits no distension. There is tenderness (BL quadrant). There is no rebound and no guarding.   Diminished Bowel sounds   Genitourinary:   Genitourinary Comments: Deferred   Musculoskeletal: She exhibits tenderness (back and abdomen). She exhibits no edema or deformity.   Neurological: She is alert, oriented to person, place, and time and easily aroused. No sensory deficit.   BUE tremors, hx of parkinson's   Skin: Skin is warm and dry. Capillary refill takes less than 2 seconds.   Psychiatric: She has a normal mood and affect. Her behavior is normal. Thought content normal.   Nursing note and vitals reviewed.      Significant Labs:   Blood Culture:   Recent Labs   Lab  04/30/19  1610 04/30/19  1640 05/23/19  1328 05/23/19  1345   LABBLOO Gram stain aer bottle: Gram positive cocci in chains resembling Strep  Gram stain flaco bottle: Gram positive cocci in chains resembling Strep  Results called to and read back by: Adwoa Ramirez RN 05/01/2019  10:43  STREPTOCOCCUS SPECIES  Further identified as Streptococcus gallinaceus  For susceptibility see order #[id number]   Gram stain aer bottle: Gram positive cocci in chains resembling Strep  Gram stain flaco bottle: Gram positive cocci in chains resembling Strep  Results called to and read back by: Adwoa Ramirez RN 05/01/2019  10:42  STREPTOCOCCUS SPECIES  Further identified as Streptococcus gallinaceus   Gram stain aer bottle: Gram positive cocci   Results called to and read back by:Krista Abdi RN 05/24/2019  14:22  Gram stain flaco bottle: Gram positive cocci   Positive results previously called Gram stain aer bottle: Gram positive cocci   Results called to and read back by:Krista Abdi RN 05/24/2019  14:23  Gram stain flaco bottle: Gram positive cocci   Positive results previously called     CBC:   Recent Labs   Lab 05/23/19  1036 05/24/19  0533 05/25/19  0509   WBC 13.08* 10.85 8.66   HGB 11.0* 10.2* 10.6*   HCT 33.5* 32.0* 32.9*   * 383* 394*     All pertinent labs within the past 24 hours have been reviewed.    Significant Imaging: I have reviewed all pertinent imaging results/findings within the past 24 hours.

## 2019-05-25 NOTE — SUBJECTIVE & OBJECTIVE
Past Medical History:   Diagnosis Date    Abnormal nuclear stress test 11/1/2015    Background diabetic retinopathy 12/28/2015    CAD (coronary artery disease) 2002    stents    Cataract     Gait disorder 10/12/2012    Glaucoma     MI (myocardial infarction)     Obesity     CORINE (obstructive sleep apnea)     Other and unspecified hyperlipidemia     PD (Parkinson's disease) 2002    tremor-predominant (per patient)    Type II or unspecified type diabetes mellitus without mention of complication, not stated as uncontrolled 2002      am    Unspecified essential hypertension        Past Surgical History:   Procedure Laterality Date    BREAST BIOPSY Left     benign - about 3 yrs ago    CORONARY ANGIOPLASTY  2002    CORONARY STENT PLACEMENT  2002    DILATION AND CURETTAGE OF UTERUS      FOREIGN BODY REMOVAL      HEART CATH-LEFT Left 11/5/2015    [facility] C[facility]    HEART CATH-LEFT Left 11/2/2015    [facility] CATH LAB    HEART CATH-LEFT/pci stent lad Left 11/10/2015    [facility] at [facility] CATH LAB    INNER EAR SURGERY      TONSILLECTOMY, ADENOIDECTOMY         Review of patient's allergies indicates:   Allergen Reactions    Codeine      Other reaction(s): Nausea.  Patient tolerated morphine 5/2019 with no reported problems.     Codeine sulfate      Unknown^    Diphenhydramine hcl      Unknown^  Other reaction(s): Rash    Sulfa (sulfonamide antibiotics) Hives and Nausea And Vomiting    Penicillins Rash     Rash only as a child  Tolerated ceftriaxone 4/30/19  Tolerated piperacillin-tazobactam 5/23/19       Medications:  Medications Prior to Admission   Medication Sig    aspirin (ECOTRIN) 81 MG EC tablet Take 1 tablet (81 mg total) by mouth once daily.    carbidopa-levodopa  mg (SINEMET)  mg per tablet TAKE 1 TABLET BY MOUTH 5 TIMES DAILY    isosorbide mononitrate (IMDUR) 120 MG 24 hr tablet Take 1  tablet (120 mg total) by mouth once daily.    levothyroxine (SYNTHROID) 125 MCG tablet TAKE 1 TABLET BY MOUTH IN THE MORNING BEFORE BREAKFAST    losartan (COZAAR) 50 MG tablet TAKE 1 TABLET BY MOUTH ONCE DAILY    metFORMIN (GLUCOPHAGE) 500 MG tablet TAKE ONE TABLET BY MOUTH THREE TIMES DAILY    metoprolol succinate (TOPROL-XL) 25 MG 24 hr tablet TAKE 1 TABLET BY MOUTH ONCE DAILY    NOVOLIN 70/30 U-100 INSULIN 100 unit/mL (70-30) injection Inject 55 Units into the skin 3 (three) times daily.    pantoprazole (PROTONIX) 40 MG tablet Take 1 tablet (40 mg total) by mouth once daily.    pramipexole (MIRAPEX) 1 MG tablet TAKE ONE TABLET BY MOUTH THREE TIMES DAILY    pravastatin (PRAVACHOL) 40 MG tablet TAKE ONE TABLET BY MOUTH ONCE DAILY    ACCU-CHEK SOFTCLIX LANCETS Misc TEST THREE TIMES DAILY    acetaminophen (TYLENOL) 325 MG tablet Take 2 tablets (650 mg total) by mouth every 6 (six) hours as needed.    blood sugar diagnostic Strp 1 strip by Misc.(Non-Drug; Combo Route) route 3 (three) times daily. Accuchek Felicia Plus Test Strips    blood-glucose meter kit Accu-chek Felicia Plus Meter    insulin syringe-needle U-100 0.5 mL 31 gauge x 5/16 Syrg USE THREE TIMES DAILY    latanoprost 0.005 % ophthalmic solution INSTILL 1 DROP INTO EACH EYE IN THE EVENING    multivit-min-FA-lycopen-lutein (CENTRUM SILVER) 0.4-300-250 mg-mcg-mcg Tab Take 1 tablet by mouth once daily.     Antibiotics (From admission, onward)    Start     Stop Route Frequency Ordered    05/24/19 2230  ceFEPIme in dextrose 5% 2 gram/50 mL IVPB 2 g      -- IV Every 12 hours (non-standard times) 05/24/19 2120    05/24/19 0230  vancomycin 1.5 g in 5 % dextrose 250 mL IVPB      -- IV Every 12 hours (non-standard times) 05/23/19 2001        Antifungals (From admission, onward)    Start     Stop Route Frequency Ordered    05/24/19 1015  miconazole NITRATE 2 % top powder      -- Top 2 times daily 05/24/19 0909        Antivirals (From admission, onward)     None           Immunization History   Administered Date(s) Administered    Influenza 10/30/2007, 10/29/2009, 2010    Influenza - High Dose 2012, 10/31/2012, 10/23/2014, 10/19/2016, 2017, 10/05/2018    Pneumococcal Conjugate - 13 Valent 2015    Pneumococcal Polysaccharide - 23 Valent 2010    Tdap 2015       Family History     Problem Relation (Age of Onset)    Breast cancer Paternal Grandmother    Diabetes Maternal Grandmother    Glaucoma Mother, Maternal Grandmother    Heart attack Father (65), Mother (65)    Hypertension Father, Mother, Maternal Grandmother    Ovarian cancer Mother    Parkinsonism Other, Paternal Aunt    Tremor Father        Social History     Socioeconomic History    Marital status:      Spouse name: Not on file    Number of children: Not on file    Years of education: Not on file    Highest education level: Not on file   Occupational History    Not on file   Social Needs    Financial resource strain: Not on file    Food insecurity:     Worry: Not on file     Inability: Not on file    Transportation needs:     Medical: Not on file     Non-medical: Not on file   Tobacco Use    Smoking status: Former Smoker     Years: 40.00     Last attempt to quit: 10/12/2010     Years since quittin.6    Smokeless tobacco: Never Used    Tobacco comment: On & off for the past few years   Substance and Sexual Activity    Alcohol use: No     Alcohol/week: 0.0 oz    Drug use: No    Sexual activity: Not Currently   Lifestyle    Physical activity:     Days per week: Not on file     Minutes per session: Not on file    Stress: Not on file   Relationships    Social connections:     Talks on phone: Not on file     Gets together: Not on file     Attends Denominational service: Not on file     Active member of club or organization: Not on file     Attends meetings of clubs or organizations: Not on file     Relationship status: Not on file   Other Topics Concern     Not on file   Social History Narrative     . Lives with spouse. Has 2 children. 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     Review of Systems   Constitutional: Positive for activity change. Negative for chills and fever.   HENT: Negative for trouble swallowing.    Eyes: Negative for visual disturbance.   Respiratory: Negative for apnea, cough, choking, chest tightness, shortness of breath, wheezing and stridor.    Cardiovascular: Negative for chest pain, palpitations and leg swelling.   Gastrointestinal: Positive for abdominal pain, diarrhea and nausea. Negative for constipation and vomiting.   Genitourinary: Negative for difficulty urinating, dysuria, flank pain, frequency and urgency.   Musculoskeletal: Positive for arthralgias, back pain (lumbar) and gait problem. Negative for joint swelling, myalgias, neck pain and neck stiffness.   Skin: Negative for color change.   Neurological: Positive for weakness. Negative for dizziness, tremors, seizures, syncope, facial asymmetry, speech difficulty, light-headedness, numbness and headaches.   Psychiatric/Behavioral: Positive for dysphoric mood. Negative for agitation and behavioral problems.     Objective:     Vital Signs (Most Recent):  Temp: 97.6 °F (36.4 °C) (05/25/19 0312)  Pulse: 70 (05/25/19 0312)  Resp: 18 (05/25/19 0312)  BP: (!) 114/51 (05/25/19 0312)  SpO2: 97 % (05/25/19 0312) Vital Signs (24h Range):  Temp:  [97.3 °F (36.3 °C)-98.8 °F (37.1 °C)] 97.6 °F (36.4 °C)  Pulse:  [70-88] 70  Resp:  [16-96] 18  SpO2:  [22 %-100 %] 97 %  BP: ()/(50-68) 114/51     Weight: 104.8 kg (231 lb)  Body mass index is 37.28 kg/m².    Estimated Creatinine Clearance: 76.6 mL/min (based on SCr of 0.8 mg/dL).    Physical Exam   Constitutional: She is oriented to person, place, and time. She appears well-developed. She is cooperative. She is easily aroused. She appears distressed (appears in pain from BLE cramping).   HENT:   Head: Normocephalic and  atraumatic.   Eyes: EOM are normal.   Neck: Normal range of motion. Neck supple.   Cardiovascular: Normal rate, regular rhythm and intact distal pulses.   No murmur heard.  Pulmonary/Chest: Effort normal and breath sounds normal. No stridor. No respiratory distress. She has no wheezes. She has no rales. She exhibits no tenderness.   Abdominal: Soft. She exhibits no distension. There is tenderness (BL quadrant). There is no rebound and no guarding.   Diminished Bowel sounds   Genitourinary:   Genitourinary Comments: Deferred   Musculoskeletal: She exhibits tenderness (back and abdomen). She exhibits no edema or deformity.   Neurological: She is alert, oriented to person, place, and time and easily aroused. No sensory deficit.   BUE tremors, hx of parkinson's   Skin: Skin is warm and dry. Capillary refill takes less than 2 seconds.   Psychiatric: She has a normal mood and affect. Her behavior is normal. Thought content normal.   Nursing note and vitals reviewed.      Significant Labs:   Blood Culture:   Recent Labs   Lab 04/30/19  1610 04/30/19  1640 05/23/19  1328 05/23/19  1345   LABBLOO Gram stain aer bottle: Gram positive cocci in chains resembling Strep  Gram stain flaco bottle: Gram positive cocci in chains resembling Strep  Results called to and read back by: Adwoa Ramirez RN 05/01/2019  10:43  STREPTOCOCCUS SPECIES  Further identified as Streptococcus gallinaceus  For susceptibility see order #[id number]   Gram stain aer bottle: Gram positive cocci in chains resembling Strep  Gram stain flaco bottle: Gram positive cocci in chains resembling Strep  Results called to and read back by: Adwoa Ramirez RN 05/01/2019  10:42  STREPTOCOCCUS SPECIES  Further identified as Streptococcus gallinaceus   Gram stain aer bottle: Gram positive cocci   Results called to and read back by:Krista Abdi RN 05/24/2019  14:22  Gram stain flaco bottle: Gram positive cocci   Positive results previously called Gram stain aer bottle: Gram  positive cocci   Results called to and read back by:Krista Abdi RN 05/24/2019  14:23  Gram stain flaco bottle: Gram positive cocci   Positive results previously called     CBC:   Recent Labs   Lab 05/23/19  1036 05/24/19  0533 05/25/19  0509   WBC 13.08* 10.85 8.66   HGB 11.0* 10.2* 10.6*   HCT 33.5* 32.0* 32.9*   * 383* 394*     All pertinent labs within the past 24 hours have been reviewed.    Significant Imaging: I have reviewed all pertinent imaging results/findings within the past 24 hours.

## 2019-05-25 NOTE — PLAN OF CARE
Problem: Adult Inpatient Plan of Care  Goal: Plan of Care Review  Outcome: Ongoing (interventions implemented as appropriate)  Vitals signs closely monitored. Fall precautions in place.  Pain assessed. Safety promoted. Infection risks reduced.     Admitted to ICU from PACU at 1825. Davie paused to assess blood pressure. 500 mL NS bolus given. Surgical site- Mid line back c/d/i. Hemovac drain wdl. PIV x2 . Pt in NAD but very drowsy

## 2019-05-25 NOTE — PLAN OF CARE
Problem: Adult Inpatient Plan of Care  Goal: Plan of Care Review  Outcome: Ongoing (interventions implemented as appropriate)  No acute distress noted. Iv fluids infusing. Blood glucose being monitored. Safety measures are in place. Call bell within reach. Pain being managed. Pt free of falls. Will continue to monitor. Chart check complete.

## 2019-05-25 NOTE — PROGRESS NOTES
Attempted to meet with pt at bedside to verify demographics; however, pt not in room.  Reached out to Narendra with [employer] who instructed CM to reach out to orthopedics at [phone]; however, phoned number multiple times; no answer.   Referral faxed to [facility] [phone] for LSO brace.  Email fax confirmation received.

## 2019-05-25 NOTE — HPI
73 year old female with history of DM II, Parkinson's, CORINE, MI, CAD, and Glaucoma who presented to the Emergency Department with c/o fatigue. There is associated symptom of generalized weakness, bilateral lower extremity pain. Since admission, all cultures were reviewed -   Blood cultures -  05/23- strep   04/30-  Streptococcus gallinaceus   She was seen in the ED on 04/30 and blood cultures at that time was also positive for strep gallinaceous .She was discharged from the ED as her symptoms were attributed to Parkinsonism .CT abdomen/pelvis with contrast showed moderate inflammatory changes centered on the intervertebral disc space between the L2 and L3 vertebral bodies. There is bony destruction on both sides of the intervertebral disc space between the L2 and L3 vertebral bodies.  This is consistent with the patient's history and characteristic of discitis and osteomyelitis.   MRI of the thoracic spine -Negative for evidence of discitis or osteomyelitis of the thoracic spine.    Low-grade scattered degenerative changes.  MRI of the lumbar spine-Findings compatible with discitis L2-3, with osteomyelitis of L2 and L3.  Additional evidence of epidural abscess within the anterior epidural space.  Severe paravertebral soft tissue edema, as detailed above.  3. Small amount of fluid within the L3-L4 disc space suspicious for early osteomyelitis

## 2019-05-25 NOTE — PLAN OF CARE
Problem: Adult Inpatient Plan of Care  Goal: Patient-Specific Goal (Individualization)  Outcome: Ongoing (interventions implemented as appropriate)  Patient remains injury free.  No signs or symptoms of distress noted.  Patient denies any pain or discomfort at this time.  Will continue to monitor.

## 2019-05-25 NOTE — ASSESSMENT & PLAN NOTE
05/24- will need up to 6 weeks of parenteral therapy , will continue Rocephin /vanco until final drug susceptibility of organisim is known

## 2019-05-25 NOTE — ANESTHESIA PREPROCEDURE EVALUATION
05/25/2019  Ross Isbell is a 73 y.o., female.    Anesthesia Evaluation    I have reviewed the Patient Summary Reports.    I have reviewed the Nursing Notes.   I have reviewed the Medications.     Review of Systems  Anesthesia Hx:  No problems with previous Anesthesia Denies Hx of Anesthetic complications  History of prior surgery of interest to airway management or planning:  Denies Personal Hx of Anesthesia complications.   Social:  Former Smoker    Cardiovascular:   Hypertension Past MI CAD  CABG/stent   Coronary Artery Disease: S/P Percutaneous Coronary Intervention (PCI) Hx of Myocardial Infarction  Hypertension, Essential Hypertension    Pulmonary:   Sleep Apnea    Neurological:  Movement Disorder Dx, Parkinson's Disease   Endocrine:   Diabetes, type 2        Physical Exam  General:  Obesity            Mental Status:  Mental Status Findings:  Cooperative, Alert and Oriented         Anesthesia Plan  Type of Anesthesia, risks & benefits discussed:  Anesthesia Type:  general  Patient's Preference:   Intra-op Monitoring Plan: standard ASA monitors  Intra-op Monitoring Plan Comments:   Post Op Pain Control Plan: multimodal analgesia  Post Op Pain Control Plan Comments:   Induction:   IV  Beta Blocker:  Patient is not currently on a Beta-Blocker (No further documentation required).       Informed Consent: Patient understands risks and agrees with Anesthesia plan.  Questions answered. Anesthesia consent signed with patient.  ASA Score: 3     Day of Surgery Review of History & Physical: I have interviewed and examined the patient. I have reviewed the patient's H&P dated:    H&P update referred to the surgeon.         Ready For Surgery From Anesthesia Perspective.

## 2019-05-25 NOTE — PT/OT/SLP PROGRESS
Physical Therapy      Patient Name:  Ross Isbell   MRN:  [id number]    Patient not seen today secondary to surgery procedure. Will follow-up tomorrow.    Gavin Ruano, LYNDA

## 2019-05-25 NOTE — PROGRESS NOTES
Plan:  Based on the reconstructed CT scan showing the extensive destruction of the endplate particularly at L3 with a shift of the vertebral body on L2 and L3 along with the extensive osteomyelitis and diskitis at these levels I recommend stabilization with posterior laminectomy and debridement of the disc space. The family has updated the consent and are willing to proceed with the operation.  Will plan for L1 through L4 posterior stabilization and pedicle screw fusion with laminectomy and debridement at L2-3.      Interval History:  Patient with L2-3 osteomyelitis and diskitis.  I initially spoke with the family about potentially performing a 1 level laminectomy for stenosis and debridement of the disc space. However CT reconstructions of the lumbar spine show extensive destruction of the vertebral endplate particularly at L3 with a shift of the vertebral body at L2 and L3.  I have discussed treatment options with them    She remains in severe pain 10/10 and has generalized weakness throughout.  She has been unable to ambulate on her own for the past month.  She complains of pain radiating around her abdomen.  Sensation is diminished to light touch bilateral lower extremities    Medications:  Continuous Infusions:   sodium chloride 0.9% 75 mL/hr at 05/24/19 1659     Scheduled Meds:   carbidopa-levodopa  mg  1 tablet Oral QID    cefTRIAXone (ROCEPHIN) IVPB  2 g Intravenous Q24H    isosorbide mononitrate  120 mg Oral Daily    latanoprost  1 drop Both Eyes QHS    levothyroxine  125 mcg Oral Before breakfast    lorazepam  1 mg Intravenous Once    losartan  50 mg Oral Daily    metoprolol succinate  25 mg Oral Daily    miconazole NITRATE 2 %   Topical (Top) BID    pantoprazole  40 mg Oral Daily    pramipexole  1 mg Oral TID    pravastatin  40 mg Oral Daily    vancomycin (VANCOCIN) IVPB  1,500 mg Intravenous Q12H     PRN Meds:acetaminophen, dextrose 50%, dextrose 50%, glucagon (human recombinant),  glucose, glucose, HYDROcodone-acetaminophen, HYDROcodone-acetaminophen, insulin aspart U-100, methocarbamol, morphine, morphine, ondansetron, promethazine (PHENERGAN) IVPB     Review of Systems  Objective:     Weight: 104.8 kg (231 lb)  Body mass index is 37.28 kg/m².  Vital Signs (Most Recent):  Temp: 97.5 °F (36.4 °C) (05/25/19 0741)  Pulse: 67 (05/25/19 0741)  Resp: 20 (05/25/19 0741)  BP: 134/63 (05/25/19 0741)  SpO2: 97 % (05/25/19 0741) Vital Signs (24h Range):  Temp:  [97.5 °F (36.4 °C)-98.8 °F (37.1 °C)] 97.5 °F (36.4 °C)  Pulse:  [67-88] 67  Resp:  [16-96] 20  SpO2:  [22 %-100 %] 97 %  BP: ()/(50-68) 134/63     Date 05/25/19 0700 - 05/26/19 0659   Shift 7724-1909 9082-4979 6787-4901 24 Hour Total   INTAKE   P.O. 0   0   Shift Total(mL/kg) 0(0)   0(0)   OUTPUT   Shift Total(mL/kg)       Weight (kg) 104.8 104.8 104.8 104.8             Nursing note and vitals reviewed  Gen:Oriented to person, place, and time.             Appears stated age   Skin: no rashes or lesions identified   Head:Normocephalic and atraumatic.  Nose: Nose normal.    Eyes: EOM are normal. Pupils are equal, round, and reactive to light.   Neck: Neck supple. No masses or lesions palpated  Cardiovascular: Intact distal pulses.    Abdominal: Soft.   Neurological: Alert and oriented to person, place, and time.  No cranial nerve deficit.  Coordination normal. Normal muscle tone  Psychiatric: Normal mood and affect. Behavior is normal.      Back:       None  Paraspinal muscle spasms   None  Pain with flexion and extention   WNL  Range of motion    Neg  Straight leg raise     Motor   Right Right Left Left  Level Group    4  4 L2 Hip flexor (Psoas)    4  4 L3 Leg extension (Quads)    4  4 L4 Dorsiflexion & foot inversion (Tibialis Anterior)    4  4 L5 Great toe extension ( EHL)    4  4 S1 Foot eversion (Gastroc, PL & PB)     Sensation  NL Decreased (R/L/BL) Level Sensation     Diminished to light touch L2-S1 bilaterally L2 Anterio-medial  thigh     L3 Medial thigh around knee     L4 Medial foot     L5 Dorsum foot     S1 Lateral foot     Significant Labs:  Recent Labs   Lab 05/23/19  1036 05/24/19  0533 05/25/19  0509   GLU 57* 78 122*   * 128* 131*   K 4.1 3.7 3.8   CL 94* 95 96   CO2 21* 25 25   BUN 16 10 9   CREATININE 0.7 0.8 0.8   CALCIUM 9.4 8.9 9.1   MG  --   --  1.4*     Recent Labs   Lab 05/23/19  1036 05/24/19  0533 05/25/19  0509   WBC 13.08* 10.85 8.66   HGB 11.0* 10.2* 10.6*   HCT 33.5* 32.0* 32.9*   * 383* 394*     Recent Labs   Lab 05/24/19  1422   INR 1.1   APTT 35.3*     Microbiology Results (last 7 days)     Procedure Component Value Units Date/Time    Gram stain [[id number]] Collected:  05/24/19 1540    Order Status:  Completed Specimen:  Aspirate from Back Updated:  05/24/19 2307     Gram Stain Result Rare WBC's      No organisms seen    Culture, Anaerobe [[id number]] Collected:  05/24/19 1540    Order Status:  Sent Specimen:  Aspirate from Back Updated:  05/24/19 2208    Aerobic culture [[id number]] Collected:  05/24/19 1540    Order Status:  Sent Specimen:  Aspirate from Back Updated:  05/24/19 2208    Blood culture #2 **CANNOT BE ORDERED STAT** [[id number]] Collected:  05/23/19 1345    Order Status:  Completed Specimen:  Blood from Peripheral, Wrist, Left Updated:  05/24/19 1717     Blood Culture, Routine Gram stain aer bottle: Gram positive cocci      Blood Culture, Routine Results called to and read back by:Krista Abdi RN 05/24/2019  14:23     Blood Culture, Routine Gram stain flaco bottle: Gram positive cocci      Blood Culture, Routine Positive results previously called    Blood culture #1 **CANNOT BE ORDERED STAT** [[id number]] Collected:  05/23/19 1328    Order Status:  Completed Specimen:  Blood from Peripheral, Antecubital, Left Updated:  05/24/19 1521     Blood Culture, Routine Gram stain aer bottle: Gram positive cocci      Blood Culture, Routine Results called to and read back by:Krista Abdi RN 05/24/2019   14:22     Blood Culture, Routine Gram stain flaco bottle: Gram positive cocci      Blood Culture, Routine Positive results previously called    Clostridium difficile EIA [[id number]]     Order Status:  No result Specimen:  Stool         All pertinent labs from the last 24 hours have been reviewed.    Significant Diagnostics:  I have reviewed all pertinent imaging results/findings within the past 24 hours.

## 2019-05-25 NOTE — ANESTHESIA POSTPROCEDURE EVALUATION
Anesthesia Post Evaluation    Patient: Ross Isbell    Procedure(s) Performed: Procedure(s) (LRB):  FUSION, SPINE, POSTERIOR SPINAL COLUMN, LUMBAR, USING COMPUTER-ASSISTED NAVIGATION  FUSION, SPINE, LUMBAR, TLIF, POSTERIOR APPROACH, USING PEDICLE SCREW (N/A)  LAMINECTOMY, SPINE, LUMBAR    Final Anesthesia Type: general  Patient location during evaluation: ICU  Patient participation: Yes- Able to Participate  Level of consciousness: awake and confused  Post-procedure vital signs reviewed and stable: stable on neosynephrine infusion   Pain management: adequate  Airway patency: patent  PONV status at discharge: No PONV  Anesthetic complications: no      Cardiovascular status: hypotensive (stable on neosynephrine infusion )  Respiratory status: unassisted  Hydration status: euvolemic            Vitals Value Taken Time   BP 83/46 5/25/2019  5:04 PM   Temp 36.4 °C (97.5 °F) 5/25/2019  7:41 AM   Pulse 92 5/25/2019  5:09 PM   Resp 18 5/25/2019  5:09 PM   SpO2 89 % 5/25/2019  5:09 PM   Vitals shown include unvalidated device data.      No case tracking events are documented in the log.      Pain/Tripp Score: Pain Rating Prior to Med Admin: 8 (5/25/2019  4:30 PM)  Pain Rating Post Med Admin: 6 (5/25/2019  9:37 AM)  Tripp Score: 7 (5/25/2019  3:45 PM)

## 2019-05-25 NOTE — ANESTHESIA RELEASE NOTE
"Anesthesia Release from PACU Note    Patient: Ross Isbell    Procedure(s) Performed: Procedure(s) (LRB):  FUSION, SPINE, POSTERIOR SPINAL COLUMN, LUMBAR, USING COMPUTER-ASSISTED NAVIGATION  FUSION, SPINE, LUMBAR, TLIF, POSTERIOR APPROACH, USING PEDICLE SCREW (N/A)  LAMINECTOMY, SPINE, LUMBAR    Anesthesia type: general    Post pain: Adequate analgesia    Post assessment: no apparent anesthetic complications    Last Vitals:   Visit Vitals  /63 (Patient Position: Lying)   Pulse 72   Temp 36.4 °C (97.5 °F) (Oral)   Resp 20   Ht 5' 6" (1.676 m)   Wt 104.8 kg (231 lb)   SpO2 97%   Breastfeeding? No   BMI 37.28 kg/m²       Post vital signs: stable    Level of consciousness: awake    Nausea/Vomiting: no nausea/no vomiting    Complications: none    Airway Patency: patent    Respiratory: unassisted    Cardiovascular: stable and blood pressure at baseline    Hydration: euvolemic  "

## 2019-05-25 NOTE — ASSESSMENT & PLAN NOTE
05/24- case discussed with primary team/Dr Nicolas -will plan for surgery in AM,  Will continue Rocephin/Vancomycin .  Blood cultures-strep   Will follow repeat blood cultures

## 2019-05-25 NOTE — TRANSFER OF CARE
"Anesthesia Transfer of Care Note    Patient: Ross Isbell    Procedure(s) Performed: Procedure(s) (LRB):  FUSION, SPINE, POSTERIOR SPINAL COLUMN, LUMBAR, USING COMPUTER-ASSISTED NAVIGATION  FUSION, SPINE, LUMBAR, TLIF, POSTERIOR APPROACH, USING PEDICLE SCREW (N/A)  LAMINECTOMY, SPINE, LUMBAR    Patient location: PACU    Anesthesia Type: general    Transport from OR: Transported from OR on room air with adequate spontaneous ventilation    Post pain: adequate analgesia    Post assessment: no apparent anesthetic complications    Post vital signs: stable    Level of consciousness: awake    Nausea/Vomiting: no nausea/vomiting    Complications: none    Transfer of care protocol was followed      Last vitals:   Visit Vitals  /63 (Patient Position: Lying)   Pulse 72   Temp 36.4 °C (97.5 °F) (Oral)   Resp 20   Ht 5' 6" (1.676 m)   Wt 104.8 kg (231 lb)   SpO2 97%   Breastfeeding? No   BMI 37.28 kg/m²     "

## 2019-05-25 NOTE — INTERVAL H&P NOTE
The patient has been examined and the H&P has been reviewed:    I concur with the findings and no changes have occurred since H&P was written.    Anesthesia/Surgery risks, benefits and alternative options discussed and understood by patient/family.          Active Hospital Problems    Diagnosis  POA    *Osteomyelitis of lumbar spine [M46.26]  Yes    Abscess in epidural space of lumbar spine [G06.1]  Yes     Priority: 1 - High    Gram-positive cocci bacteremia [R78.81]  Yes    Pressure injury of back, stage 3 [L89.103]  Yes    Intertrigo [L30.4]  Yes    Deep tissue injury [T14.8XXA]  Yes    UTI (urinary tract infection) [N39.0]  Yes    Hyponatremia [E87.1]  Yes    Type 2 diabetes, controlled, with peripheral neuropathy [E11.42]  Yes     Chronic    Hypothyroidism [E03.9]  Yes     Chronic    Essential hypertension [I10]  Yes     Chronic    PD (Parkinson's disease) [G20]  Yes     Chronic    CAD (coronary artery disease) [I25.10]  Yes     Chronic     stents        Resolved Hospital Problems   No resolved problems to display.

## 2019-05-25 NOTE — BRIEF OP NOTE
[facility] -   Brief Operative Note    SUMMARY     Surgery Date: 5/25/2019     Surgeon(s) and Role:     * Stanislav Nicolas MD - Primary    Assisting Surgeon: None    Pre-op Diagnosis:  Osteomyelitis of lumbar spine [M46.26]  Gait disorder [R26.9]    Post-op Diagnosis:  Post-Op Diagnosis Codes:     * Osteomyelitis of lumbar spine [M46.26]     * Gait disorder [R26.9]    Procedure(s) (LRB):  FUSION, SPINE, POSTERIOR SPINAL COLUMN, LUMBAR, USING COMPUTER-ASSISTED NAVIGATION  L1-4 with L2-3 laminectomy for debridement and drainage of epidural abscess     Anesthesia: General    Description of Procedure: L2-3 osteomyelitis with epidural abscess     Description of the findings of the procedure: bone destruction and instability L2-3     Estimated Blood Loss: * No values recorded between 5/25/2019 10:56 AM and 5/25/2019  3:15 PM *         Specimens:   Specimen (12h ago, onward)    None

## 2019-05-26 PROBLEM — B95.5 STREPTOCOCCAL BACTEREMIA: Status: ACTIVE | Noted: 2019-05-24

## 2019-05-26 PROBLEM — E87.8 ELECTROLYTE ABNORMALITY: Status: ACTIVE | Noted: 2019-05-26

## 2019-05-26 PROBLEM — Z98.890 S/P LUMBAR LAMINECTOMY: Status: ACTIVE | Noted: 2019-05-26

## 2019-05-26 LAB
ALBUMIN SERPL BCP-MCNC: 2.6 G/DL (ref 3.5–5.2)
ANION GAP SERPL CALC-SCNC: 7 MMOL/L (ref 8–16)
AORTIC VALVE STENOSIS: ABNORMAL
BACTERIA BLD CULT: NORMAL
BASOPHILS # BLD AUTO: 0.01 K/UL (ref 0–0.2)
BASOPHILS NFR BLD: 0.1 % (ref 0–1.9)
BLD PROD TYP BPU: NORMAL
BLOOD UNIT EXPIRATION DATE: NORMAL
BLOOD UNIT TYPE CODE: 6200
BLOOD UNIT TYPE: NORMAL
BUN SERPL-MCNC: 6 MG/DL (ref 8–23)
CALCIUM SERPL-MCNC: 8.5 MG/DL (ref 8.7–10.5)
CHLORIDE SERPL-SCNC: 99 MMOL/L (ref 95–110)
CO2 SERPL-SCNC: 26 MMOL/L (ref 23–29)
CODING SYSTEM: NORMAL
CREAT SERPL-MCNC: 0.7 MG/DL (ref 0.5–1.4)
DIFFERENTIAL METHOD: ABNORMAL
DISPENSE STATUS: NORMAL
EOSINOPHIL # BLD AUTO: 0.1 K/UL (ref 0–0.5)
EOSINOPHIL NFR BLD: 1 % (ref 0–8)
ERYTHROCYTE [DISTWIDTH] IN BLOOD BY AUTOMATED COUNT: 14.1 % (ref 11.5–14.5)
EST. GFR  (AFRICAN AMERICAN): >60 ML/MIN/1.73 M^2
EST. GFR  (NON AFRICAN AMERICAN): >60 ML/MIN/1.73 M^2
ESTIMATED PA SYSTOLIC PRESSURE: 36.24
GLUCOSE SERPL-MCNC: 114 MG/DL (ref 70–110)
HCT VFR BLD AUTO: 22.8 % (ref 37–48.5)
HCT VFR BLD AUTO: 23.9 % (ref 37–48.5)
HGB BLD-MCNC: 7.6 G/DL (ref 12–16)
HGB BLD-MCNC: 8 G/DL (ref 12–16)
LYMPHOCYTES # BLD AUTO: 1.6 K/UL (ref 1–4.8)
LYMPHOCYTES NFR BLD: 14.1 % (ref 18–48)
MAGNESIUM SERPL-MCNC: 1.3 MG/DL (ref 1.6–2.6)
MCH RBC QN AUTO: 28.4 PG (ref 27–31)
MCHC RBC AUTO-ENTMCNC: 33.3 G/DL (ref 32–36)
MCV RBC AUTO: 85 FL (ref 82–98)
MITRAL VALVE MOBILITY: NORMAL
MONOCYTES # BLD AUTO: 1.2 K/UL (ref 0.3–1)
MONOCYTES NFR BLD: 10.1 % (ref 4–15)
NEUTROPHILS # BLD AUTO: 8.6 K/UL (ref 1.8–7.7)
NEUTROPHILS NFR BLD: 75.8 % (ref 38–73)
NUM UNITS TRANS PACKED RBC: NORMAL
PHOSPHATE SERPL-MCNC: 2.1 MG/DL (ref 2.7–4.5)
PLATELET # BLD AUTO: 363 K/UL (ref 150–350)
PMV BLD AUTO: 7.8 FL (ref 9.2–12.9)
POCT GLUCOSE: 115 MG/DL (ref 70–110)
POCT GLUCOSE: 191 MG/DL (ref 70–110)
POCT GLUCOSE: 216 MG/DL (ref 70–110)
POTASSIUM SERPL-SCNC: 4.3 MMOL/L (ref 3.5–5.1)
RBC # BLD AUTO: 2.68 M/UL (ref 4–5.4)
RETIRED EF AND QEF - SEE NOTES: 60 (ref 55–65)
SODIUM SERPL-SCNC: 132 MMOL/L (ref 136–145)
TRICUSPID VALVE REGURGITATION: ABNORMAL
WBC # BLD AUTO: 11.55 K/UL (ref 3.9–12.7)

## 2019-05-26 PROCEDURE — 93306 TTE W/DOPPLER COMPLETE: CPT | Mod: 26,,, | Performed by: INTERNAL MEDICINE

## 2019-05-26 PROCEDURE — 83735 ASSAY OF MAGNESIUM: CPT

## 2019-05-26 PROCEDURE — 97110 THERAPEUTIC EXERCISES: CPT

## 2019-05-26 PROCEDURE — 63600175 PHARM REV CODE 636 W HCPCS: Performed by: NEUROLOGICAL SURGERY

## 2019-05-26 PROCEDURE — 99233 PR SUBSEQUENT HOSPITAL CARE,LEVL III: ICD-10-PCS | Mod: ,,, | Performed by: INTERNAL MEDICINE

## 2019-05-26 PROCEDURE — 85014 HEMATOCRIT: CPT

## 2019-05-26 PROCEDURE — 80069 RENAL FUNCTION PANEL: CPT

## 2019-05-26 PROCEDURE — 25000003 PHARM REV CODE 250: Performed by: INTERNAL MEDICINE

## 2019-05-26 PROCEDURE — 99233 SBSQ HOSP IP/OBS HIGH 50: CPT | Mod: ,,, | Performed by: INTERNAL MEDICINE

## 2019-05-26 PROCEDURE — 21400001 HC TELEMETRY ROOM

## 2019-05-26 PROCEDURE — 63600175 PHARM REV CODE 636 W HCPCS: Performed by: NURSE PRACTITIONER

## 2019-05-26 PROCEDURE — 63600175 PHARM REV CODE 636 W HCPCS: Performed by: INTERNAL MEDICINE

## 2019-05-26 PROCEDURE — 94761 N-INVAS EAR/PLS OXIMETRY MLT: CPT

## 2019-05-26 PROCEDURE — C8929 TTE W OR WO FOL WCON,DOPPLER: HCPCS

## 2019-05-26 PROCEDURE — 97163 PT EVAL HIGH COMPLEX 45 MIN: CPT

## 2019-05-26 PROCEDURE — 93306 2D ECHO WITH COLOR FLOW DOPPLER: ICD-10-PCS | Mod: 26,,, | Performed by: INTERNAL MEDICINE

## 2019-05-26 PROCEDURE — 85018 HEMOGLOBIN: CPT

## 2019-05-26 PROCEDURE — G8981 BODY POS CURRENT STATUS: HCPCS | Mod: CM

## 2019-05-26 PROCEDURE — 85025 COMPLETE CBC W/AUTO DIFF WBC: CPT

## 2019-05-26 PROCEDURE — P9016 RBC LEUKOCYTES REDUCED: HCPCS

## 2019-05-26 PROCEDURE — 97167 OT EVAL HIGH COMPLEX 60 MIN: CPT

## 2019-05-26 PROCEDURE — 25000003 PHARM REV CODE 250: Performed by: NURSE PRACTITIONER

## 2019-05-26 PROCEDURE — 99900035 HC TECH TIME PER 15 MIN (STAT)

## 2019-05-26 PROCEDURE — G8982 BODY POS GOAL STATUS: HCPCS | Mod: CL

## 2019-05-26 PROCEDURE — 25000003 PHARM REV CODE 250: Performed by: NEUROLOGICAL SURGERY

## 2019-05-26 PROCEDURE — 36430 TRANSFUSION BLD/BLD COMPNT: CPT

## 2019-05-26 RX ORDER — IBUPROFEN 200 MG
16 TABLET ORAL
Status: DISCONTINUED | OUTPATIENT
Start: 2019-05-26 | End: 2019-05-30 | Stop reason: HOSPADM

## 2019-05-26 RX ORDER — HYDROCODONE BITARTRATE AND ACETAMINOPHEN 500; 5 MG/1; MG/1
TABLET ORAL
Status: DISCONTINUED | OUTPATIENT
Start: 2019-05-26 | End: 2019-05-26

## 2019-05-26 RX ORDER — OXYCODONE AND ACETAMINOPHEN 5; 325 MG/1; MG/1
1 TABLET ORAL EVERY 4 HOURS PRN
Status: DISCONTINUED | OUTPATIENT
Start: 2019-05-26 | End: 2019-05-30 | Stop reason: HOSPADM

## 2019-05-26 RX ORDER — VANCOMYCIN HCL IN 5 % DEXTROSE 1G/250ML
1000 PLASTIC BAG, INJECTION (ML) INTRAVENOUS
Status: DISCONTINUED | OUTPATIENT
Start: 2019-05-26 | End: 2019-05-26

## 2019-05-26 RX ORDER — INSULIN ASPART 100 [IU]/ML
1-10 INJECTION, SOLUTION INTRAVENOUS; SUBCUTANEOUS
Status: DISCONTINUED | OUTPATIENT
Start: 2019-05-26 | End: 2019-05-30 | Stop reason: HOSPADM

## 2019-05-26 RX ORDER — LANOLIN ALCOHOL/MO/W.PET/CERES
400 CREAM (GRAM) TOPICAL 2 TIMES DAILY
Status: DISPENSED | OUTPATIENT
Start: 2019-05-26 | End: 2019-05-27

## 2019-05-26 RX ORDER — ACETAMINOPHEN 325 MG/1
650 TABLET ORAL EVERY 6 HOURS PRN
Status: DISCONTINUED | OUTPATIENT
Start: 2019-05-26 | End: 2019-05-26

## 2019-05-26 RX ORDER — HYDROMORPHONE HYDROCHLORIDE 1 MG/ML
2 INJECTION, SOLUTION INTRAMUSCULAR; INTRAVENOUS; SUBCUTANEOUS
Status: DISCONTINUED | OUTPATIENT
Start: 2019-05-26 | End: 2019-05-30 | Stop reason: HOSPADM

## 2019-05-26 RX ORDER — HYDROCODONE BITARTRATE AND ACETAMINOPHEN 500; 5 MG/1; MG/1
TABLET ORAL
Status: DISCONTINUED | OUTPATIENT
Start: 2019-05-26 | End: 2019-05-30 | Stop reason: HOSPADM

## 2019-05-26 RX ORDER — IBUPROFEN 200 MG
24 TABLET ORAL
Status: DISCONTINUED | OUTPATIENT
Start: 2019-05-26 | End: 2019-05-30 | Stop reason: HOSPADM

## 2019-05-26 RX ORDER — MAG HYDROX/ALUMINUM HYD/SIMETH 200-200-20
30 SUSPENSION, ORAL (FINAL DOSE FORM) ORAL EVERY 4 HOURS PRN
Status: DISCONTINUED | OUTPATIENT
Start: 2019-05-26 | End: 2019-05-30 | Stop reason: HOSPADM

## 2019-05-26 RX ORDER — ONDANSETRON 8 MG/1
8 TABLET, ORALLY DISINTEGRATING ORAL EVERY 6 HOURS PRN
Status: DISCONTINUED | OUTPATIENT
Start: 2019-05-26 | End: 2019-05-30 | Stop reason: HOSPADM

## 2019-05-26 RX ORDER — MORPHINE SULFATE 4 MG/ML
4 INJECTION, SOLUTION INTRAMUSCULAR; INTRAVENOUS EVERY 4 HOURS PRN
Status: DISCONTINUED | OUTPATIENT
Start: 2019-05-26 | End: 2019-05-30 | Stop reason: HOSPADM

## 2019-05-26 RX ORDER — DOCUSATE SODIUM 100 MG/1
100 CAPSULE, LIQUID FILLED ORAL DAILY
Status: DISCONTINUED | OUTPATIENT
Start: 2019-05-26 | End: 2019-05-30 | Stop reason: HOSPADM

## 2019-05-26 RX ORDER — GLUCAGON 1 MG
1 KIT INJECTION
Status: DISCONTINUED | OUTPATIENT
Start: 2019-05-26 | End: 2019-05-30 | Stop reason: HOSPADM

## 2019-05-26 RX ORDER — MORPHINE SULFATE 4 MG/ML
0.5 INJECTION, SOLUTION INTRAMUSCULAR; INTRAVENOUS
Status: DISCONTINUED | OUTPATIENT
Start: 2019-05-26 | End: 2019-05-30 | Stop reason: HOSPADM

## 2019-05-26 RX ORDER — MAGNESIUM SULFATE HEPTAHYDRATE 40 MG/ML
2 INJECTION, SOLUTION INTRAVENOUS ONCE
Status: COMPLETED | OUTPATIENT
Start: 2019-05-26 | End: 2019-05-26

## 2019-05-26 RX ADMIN — CEFTRIAXONE 2 G: 2 INJECTION, SOLUTION INTRAVENOUS at 08:05

## 2019-05-26 RX ADMIN — LEVOTHYROXINE SODIUM 125 MCG: 125 TABLET ORAL at 07:05

## 2019-05-26 RX ADMIN — METOPROLOL SUCCINATE 25 MG: 25 TABLET, EXTENDED RELEASE ORAL at 08:05

## 2019-05-26 RX ADMIN — CARBIDOPA AND LEVODOPA 1 TABLET: 25; 250 TABLET ORAL at 08:05

## 2019-05-26 RX ADMIN — INSULIN ASPART 2 UNITS: 100 INJECTION, SOLUTION INTRAVENOUS; SUBCUTANEOUS at 11:05

## 2019-05-26 RX ADMIN — HYDROCODONE BITARTRATE AND ACETAMINOPHEN 1 TABLET: 7.5; 325 TABLET ORAL at 03:05

## 2019-05-26 RX ADMIN — MAGNESIUM SULFATE IN WATER 2 G: 40 INJECTION, SOLUTION INTRAVENOUS at 10:05

## 2019-05-26 RX ADMIN — Medication: at 08:05

## 2019-05-26 RX ADMIN — MAGNESIUM OXIDE TAB 400 MG (241.3 MG ELEMENTAL MG) 400 MG: 400 (241.3 MG) TAB at 10:05

## 2019-05-26 RX ADMIN — LATANOPROST 1 DROP: 50 SOLUTION OPHTHALMIC at 07:05

## 2019-05-26 RX ADMIN — CARBIDOPA AND LEVODOPA 1 TABLET: 25; 250 TABLET ORAL at 07:05

## 2019-05-26 RX ADMIN — Medication: at 07:05

## 2019-05-26 RX ADMIN — PRAMIPEXOLE DIHYDROCHLORIDE 1 MG: 0.5 TABLET ORAL at 07:05

## 2019-05-26 RX ADMIN — VANCOMYCIN HYDROCHLORIDE 1500 MG: 100 INJECTION, POWDER, LYOPHILIZED, FOR SOLUTION INTRAVENOUS at 10:05

## 2019-05-26 RX ADMIN — PANTOPRAZOLE SODIUM 40 MG: 40 TABLET, DELAYED RELEASE ORAL at 08:05

## 2019-05-26 RX ADMIN — CARBIDOPA AND LEVODOPA 1 TABLET: 25; 250 TABLET ORAL at 05:05

## 2019-05-26 RX ADMIN — PRAMIPEXOLE DIHYDROCHLORIDE 1 MG: 0.5 TABLET ORAL at 08:05

## 2019-05-26 RX ADMIN — SODIUM CHLORIDE: 0.9 INJECTION, SOLUTION INTRAVENOUS at 08:05

## 2019-05-26 RX ADMIN — THERA TABS 1 TABLET: TAB at 08:05

## 2019-05-26 RX ADMIN — HYDROMORPHONE HYDROCHLORIDE 2 MG: 1 INJECTION, SOLUTION INTRAMUSCULAR; INTRAVENOUS; SUBCUTANEOUS at 07:05

## 2019-05-26 RX ADMIN — PRAVASTATIN SODIUM 40 MG: 20 TABLET ORAL at 08:05

## 2019-05-26 RX ADMIN — PHENYLEPHRINE HYDROCHLORIDE 0.2 MCG/KG/MIN: 10 INJECTION INTRAVENOUS at 07:05

## 2019-05-26 RX ADMIN — CARBIDOPA AND LEVODOPA 1 TABLET: 25; 250 TABLET ORAL at 01:05

## 2019-05-26 RX ADMIN — PRAMIPEXOLE DIHYDROCHLORIDE 1 MG: 0.5 TABLET ORAL at 03:05

## 2019-05-26 RX ADMIN — DOCUSATE SODIUM 100 MG: 100 CAPSULE, LIQUID FILLED ORAL at 08:05

## 2019-05-26 NOTE — ASSESSMENT & PLAN NOTE
SP drainage for source control  Keep MAP> 65  IVF increased to 125 mls/hr  Davie synephrine ordered: PICC ordered since will also need extended abx  Emperic Abx: VANCOMYCIN and CEFTRIAXONE

## 2019-05-26 NOTE — PLAN OF CARE
Problem: Occupational Therapy Goal  Goal: Occupational Therapy Goal  ot goals to be met 5-31-19  s with ue dressing  Pt will tolerate 1 set x 15 reps b ue rom exercise  Min a with bed supine<>sit  min a with bsc t/f's   Outcome: Ongoing (interventions implemented as appropriate)  CONT TO ADDRESS ESTABLISHED GOALS WITH PLAN TO UPGRADE OR DOWNGRADE AS NEEDED.

## 2019-05-26 NOTE — PROGRESS NOTES
[facility] Hospital Medicine  Progress Note    Patient Name: Ross Isbell  MRN: [id number]  Patient Class: IP- Inpatient   Admission Date: 5/23/2019  Length of Stay: 3 days  Attending Physician: Teddy Bryant MD  Primary Care Provider: Anna Penn MD        Subjective:     Principal Problem:Status post lumbar laminectomy    HPI:  Ross Isbell is a 73 year old female with a PMHx of DM II, Parkinson's, CORINE, MI, CAD, and Glaucoma who presented to the Emergency Department with c/o fatigue. Associated symptoms include: generalized weakness and BLE pain x 2 weeks. Pt was informed by home health nurse to go to ED for increasing weakness, abdominal pain and leg pain. ED workup showed: WBC 13.08K, Hgb/Hct 11.0/33.5, Na+ 129, lipase 17. UA (+) nitrites, few bacteria. CXR with no acute findings. CT abdomen/pelvis with contrast showed moderate inflammatory changes centered on the intervertebral disc space between the L2 and L3 vertebral bodies. There is bony destruction on both sides of the intervertebral disc space between the L2 and L3 vertebral bodies.  This is consistent with the patient's history and characteristic of discitis and osteomyelitis. ED discussed case with Dr. Nicolas (neurosurgery) who reported pt did not need to be transferred out at this time. Obtain MRI thoracic/lumbar spine w wo contrast. Start pt on IV Vancomycin and Zosyn but consult Dr. Gomez (ID). Daughter: Uzma Godfrey [phone] is the surrogate decision maker. Admitted for Osteomyelitis of lumbar spine.    Hospital Course:  5/24/19 Dr. Nicolas, Neurosurgeon, doesn't recommend spinal surgery, But Needle biopsy of L 2-3 disk abscess with cultures. This am, patient c/o of severe abdominal pain 20/10, Norco 10 mg ordered PRN with IV 2 mg Morphine PRN. Blood culture is Positive for GPC. Echo pending. Patient had severe back pain and refused OT/PT. Daughter stated she has had diarrhea x 1 month. Stool for C.Diff  ordered. T. Max 101. IV Vanco and Zosyn. IR, Dr. Juárez, consulted for Needle biopsy. Daughter at bedside. ID consult pending. Cont Vanc and Zosyn. CT abdomen/pelvis positive for osteomyelitis.  To OR with Dr. Nicolas 25 May for laminectomy and vertebroplasty.  Post-procedure in ICU due to post-op hypotension.  Able to wean vasopressors.  Anemia due to blood loss related to the procedure.  Transfused 1 unit PRBC.Interval improvement in abdominal and back pain.    Interval History:  Able to wean vasopressors.  Anemia due to blood loss related to the procedure.  Transfused 1 unit PRBC.Interval improvement in abdominal and back pain.    Review of Systems   Constitutional: Positive for activity change. Negative for chills and fever.   HENT: Negative for trouble swallowing.    Eyes: Negative for visual disturbance.   Respiratory: Negative for apnea, cough, choking, chest tightness, shortness of breath, wheezing and stridor.    Cardiovascular: Negative for chest pain, palpitations and leg swelling.   Gastrointestinal: Positive for abdominal pain. Negative for constipation, diarrhea, nausea and vomiting.   Genitourinary: Negative for difficulty urinating, dysuria, flank pain, frequency and urgency.   Musculoskeletal: Positive for arthralgias, back pain (lumbar) and gait problem. Negative for joint swelling, myalgias, neck pain and neck stiffness.   Skin: Negative for color change.   Neurological: Positive for weakness. Negative for dizziness, tremors, seizures, syncope, facial asymmetry, speech difficulty, light-headedness, numbness and headaches.   Psychiatric/Behavioral: Positive for dysphoric mood. Negative for agitation and behavioral problems.     Objective:     Vital Signs (Most Recent):  Temp: 100 °F (37.8 °C) (05/26/19 1115)  Pulse: 93 (05/26/19 1115)  Resp: (!) 23 (05/26/19 1115)  BP: (!) 100/34 (05/26/19 1100)  SpO2: 96 % (05/26/19 1115) Vital Signs (24h Range):  Temp:  [97 °F (36.1 °C)-100 °F (37.8 °C)] 100 °F  (37.8 °C)  Pulse:  [] 93  Resp:  [6-33] 23  SpO2:  [89 %-100 %] 96 %  BP: ()/(28-83) 100/34     Weight: 104.8 kg (231 lb)  Body mass index is 37.28 kg/m².    Intake/Output Summary (Last 24 hours) at 5/26/2019 1115  Last data filed at 5/26/2019 1100  Gross per 24 hour   Intake 4548.03 ml   Output 2665 ml   Net 1883.03 ml      Physical Exam   Constitutional: She is oriented to person, place, and time. She appears well-developed. She is cooperative. She is easily aroused. She appears distressed (appears in pain from BLE cramping).   HENT:   Head: Normocephalic and atraumatic.   Eyes: EOM are normal.   Neck: Normal range of motion. Neck supple.   Cardiovascular: Normal rate, regular rhythm and intact distal pulses.   No murmur heard.  Pulmonary/Chest: Effort normal and breath sounds normal. No stridor. No respiratory distress. She has no wheezes. She has no rales. She exhibits no tenderness.   Abdominal: Soft. She exhibits no distension. There is tenderness (BL quadrant). There is no rebound and no guarding.   Diminished Bowel sounds   Genitourinary:   Genitourinary Comments: Deferred   Musculoskeletal: She exhibits tenderness (back and abdomen). She exhibits no edema or deformity.   Neurological: She is alert, oriented to person, place, and time and easily aroused. No sensory deficit.   BUE tremors, hx of parkinson's   Skin: Skin is warm and dry. Capillary refill takes less than 2 seconds.   Psychiatric: She has a normal mood and affect. Her behavior is normal. Thought content normal.   Nursing note and vitals reviewed.    Significant Labs:   Blood Culture:   No results for input(s): LABBLOO in the last 48 hours.  CBC:   Recent Labs   Lab 05/25/19  0509 05/25/19  1521 05/26/19  0324   WBC 8.66  --  11.55   HGB 10.6* 9.9* 7.6*   HCT 32.9* 30.8* 22.8*   *  --  363*     CMP:   Recent Labs   Lab 05/25/19  0509 05/26/19  0324   * 132*   K 3.8 4.3   CL 96 99   CO2 25 26   * 114*   BUN 9 6*    CREATININE 0.8 0.7   CALCIUM 9.1 8.5*   PROT 6.8  --    ALBUMIN 2.8* 2.6*   BILITOT 0.5  --    ALKPHOS 83  --    AST 30  --    ALT 7*  --    ANIONGAP 10 7*   EGFRNONAA >60 >60       Significant Imaging: I have reviewed and interpreted all pertinent imaging results/findings within the past 24 hours.    Assessment/Plan:      * Status post lumbar laminectomy  Laminectomy and vertebroplasty by Dr. Nicolas 25 May.  Modest post-op blood loss.  Transfusing 1 unit PRBC.    Abscess in epidural space of lumbar spine  Laminectomy and drainage per Neurosurgery.  Cultures growing Streptococcus species awaiting antibiotic sensitivity.    Streptococcal bacteremia  - ID consult  -On IV Vanco/Zosyn  Adjust based on culture results.  Streptococci.    Hyponatremia  - Na+ 128, monitor  - IVF 75 cc/hr  - Neuro checks      UTI (urinary tract infection)  - UA (+) nitrites. Micro few bacteria  - Continue Zosyn  - Urine culture pending      Osteomyelitis of lumbar spine  - CT abdomen/pelvis with contrast showed moderate inflammatory changes centered on the intervertebral disc space between the L2 and L3 vertebral bodies. There is bony destruction on both sides of the intervertebral disc space between the L2 and L3 vertebral bodies.  This is consistent with the patient's history and characteristic of discitis and osteomyelitis  - Neurosurgery, Dr. Nicloas, states no surgical intervention at this time  -Needs Needle bx L 2-3 ordered 5/24/19  - MRI thoracic and lumbar spine: osteomyelitis  - Inpatient consult to ID  - Blood cultures: GPC  - Neurovascular checks  - Analgesics PRN    Type 2 diabetes, controlled, with peripheral neuropathy  - HgbA1c on 04/23/19 -- 7.8  - Accuchecks and low dose SSI  - Pt takes Novolin 70/30 55 units SQ TID. Glucose in ED 57 mg/dL. Will hold Novolin for now, use low dose SSI, and monitor glucose      Hypothyroidism  - Continue Levothyroxine      Essential hypertension  - Continue home medications including  Metoprolol and Losartan      CAD (coronary artery disease)  - Continue ASA, Statin, and Imdur      PD (Parkinson's disease)  - Continue Pramipexole and Sinemet        VTE Risk Mitigation (From admission, onward)        Ordered     IP VTE HIGH RISK PATIENT  Once      05/26/19 0627     Place QUIQUE hose  Until discontinued      05/26/19 0627     Place sequential compression device  Until discontinued      05/26/19 0627          Critical care time spent on the evaluation and treatment of severe organ dysfunction, review of pertinent labs and imaging studies, discussions with consulting providers and discussions with patient/family: 30 minutes.    Teddy Bryant MD  Department of Hospital Medicine   [facility]

## 2019-05-26 NOTE — SUBJECTIVE & OBJECTIVE
Interval History: 73 year old woman  with strep bacteremia/epidural abscess s/p L1-4 with L2-3 laminectomy for debridement and drainage of epidural abscess .  She is drowsy post operative procedure.    Review of Systems   Unable to perform ROS: Acuity of condition     Objective:     Vital Signs (Most Recent):  Temp: 99 °F (37.2 °C) (05/26/19 0200)  Pulse: 95 (05/26/19 0500)  Resp: 20 (05/26/19 0500)  BP: (!) 125/52 (05/26/19 0500)  SpO2: 98 % (05/26/19 0500) Vital Signs (24h Range):  Temp:  [97 °F (36.1 °C)-99 °F (37.2 °C)] 99 °F (37.2 °C)  Pulse:  [] 95  Resp:  [6-33] 20  SpO2:  [89 %-100 %] 98 %  BP: ()/(28-83) 125/52     Weight: 104.8 kg (231 lb)  Body mass index is 37.28 kg/m².    Estimated Creatinine Clearance: 87.6 mL/min (based on SCr of 0.7 mg/dL).    Physical Exam   Constitutional: She is oriented to person, place, and time. She appears well-developed. She is cooperative. She is easily aroused. No distress (-  ).   HENT:   Head: Normocephalic and atraumatic.   Eyes: EOM are normal.   Neck: Normal range of motion. Neck supple.   Cardiovascular: Normal rate, regular rhythm and intact distal pulses.   No murmur heard.  Pulmonary/Chest: Effort normal and breath sounds normal. No stridor. No respiratory distress. She has no wheezes. She has no rales. She exhibits no tenderness.   Abdominal: Soft. She exhibits no distension. There is tenderness (BL quadrant). There is no rebound and no guarding.   Diminished Bowel sounds   Genitourinary:   Genitourinary Comments: Deferred   Musculoskeletal: She exhibits tenderness (back and abdomen). She exhibits no edema or deformity.   Neurological: She is alert, oriented to person, place, and time and easily aroused. No sensory deficit.   BUE tremors, hx of parkinson's   Skin: Skin is warm and dry. Capillary refill takes less than 2 seconds.   Psychiatric: She has a normal mood and affect. Her behavior is normal. Thought content normal.   Nursing note and vitals  reviewed.      Significant Labs:   Blood Culture:   Recent Labs   Lab 04/30/19  1610 04/30/19  1640 05/23/19  1328 05/23/19  1345   LABBLOO Gram stain aer bottle: Gram positive cocci in chains resembling Strep  Gram stain flaco bottle: Gram positive cocci in chains resembling Strep  Results called to and read back by: Adwoa Ramirez RN 05/01/2019  10:43  STREPTOCOCCUS SPECIES  Further identified as Streptococcus gallinaceus  For susceptibility see order #[id number]   Gram stain aer bottle: Gram positive cocci in chains resembling Strep  Gram stain flaco bottle: Gram positive cocci in chains resembling Strep  Results called to and read back by: Adwoa Ramirez RN 05/01/2019  10:42  STREPTOCOCCUS SPECIES  Further identified as Streptococcus gallinaceus   Gram stain aer bottle: Gram positive cocci   Results called to and read back by:Krista Abdi RN 05/24/2019  14:22  Gram stain flaco bottle: Gram positive cocci   Positive results previously called  STREPTOCOCCUS SPECIES  Identification and susceptibility pending   Gram stain aer bottle: Gram positive cocci   Results called to and read back by:Krista Abdi RN 05/24/2019  14:23  Gram stain flaco bottle: Gram positive cocci   Positive results previously called  STREPTOCOCCUS SPECIES  Identification pending  For susceptibility see order #[id number]       BMP:   Recent Labs   Lab 05/25/19  0509 05/26/19  0324   * 114*   * 132*   K 3.8 4.3   CL 96 99   CO2 25 26   BUN 9 6*   CREATININE 0.8 0.7   CALCIUM 9.1 8.5*   MG 1.4*  --      CBC:   Recent Labs   Lab 05/25/19  0509 05/25/19  1521 05/26/19  0324   WBC 8.66  --  11.55   HGB 10.6* 9.9* 7.6*   HCT 32.9* 30.8* 22.8*   *  --  363*     All pertinent labs within the past 24 hours have been reviewed.    Significant Imaging: I have reviewed all pertinent imaging results/findings within the past 24 hours.

## 2019-05-26 NOTE — PROGRESS NOTES
PICC line placed, CXR confirmed placement.  Neosynephrine gtt restarted for b/p support.      Patient is much more awake, alert and appropriate than when first admitted.  Speech is clearer and is easily reoriented to time and situation.

## 2019-05-26 NOTE — ASSESSMENT & PLAN NOTE
Wound care per surgery  POD #1  Neuro vital signs  Bed rest  Monitor drain output  Pain intervention: HYDROCORDONE ACETAMINOPHEN and Fentanyl IV

## 2019-05-26 NOTE — ASSESSMENT & PLAN NOTE
Laminectomy and drainage per Neurosurgery.  Cultures growing Streptococcus species awaiting antibiotic sensitivity.

## 2019-05-26 NOTE — SUBJECTIVE & OBJECTIVE
Past Medical History:   Diagnosis Date    Abnormal nuclear stress test 11/1/2015    Background diabetic retinopathy 12/28/2015    CAD (coronary artery disease) 2002    stents    Cataract     Gait disorder 10/12/2012    Glaucoma     MI (myocardial infarction)     Obesity     CORINE (obstructive sleep apnea)     Other and unspecified hyperlipidemia     PD (Parkinson's disease) 2002    tremor-predominant (per patient)    Type II or unspecified type diabetes mellitus without mention of complication, not stated as uncontrolled 2002      am    Unspecified essential hypertension        Past Surgical History:   Procedure Laterality Date    BREAST BIOPSY Left     benign - about 3 yrs ago    CORONARY ANGIOPLASTY  2002    CORONARY STENT PLACEMENT  2002    DILATION AND CURETTAGE OF UTERUS      FOREIGN BODY REMOVAL      HEART CATH-LEFT Left 11/5/2015    Performed by [facility] C[facility] LAB    HEART CATH-LEFT Left 11/2/2015    Performed by Con[facility] at [facility] [place] CATH LAB    HEART CATH-LEFT/pci stent lad Left 11/10/2015    Performed by Con[facility] at [facility] [place] CATH LAB    INNER EAR SURGERY      TONSILLECTOMY, ADENOIDECTOMY         Review of patient's allergies indicates:   Allergen Reactions    Codeine      Other reaction(s): Nausea.  Patient tolerated morphine 5/2019 with no reported problems.     Codeine sulfate      Unknown^    Diphenhydramine hcl      Unknown^  Other reaction(s): Rash    Sulfa (sulfonamide antibiotics) Hives and Nausea And Vomiting    Penicillins Rash     Rash only as a child  Tolerated ceftriaxone 4/30/19  Tolerated piperacillin-tazobactam 5/23/19       Family History     Problem Relation (Age of Onset)    Breast cancer Paternal Grandmother    Diabetes Maternal Grandmother    Glaucoma Mother, Maternal Grandmother    Heart attack Father (65), Mother (65)    Hypertension Father, Mother, Maternal Grandmother    Ovarian cancer Mother    Parkinsonism Other, Paternal  Aunt    Tremor Father        Tobacco Use    Smoking status: Former Smoker     Years: 40.00     Last attempt to quit: 10/12/2010     Years since quittin.6    Smokeless tobacco: Never Used    Tobacco comment: On & off for the past few years   Substance and Sexual Activity    Alcohol use: No     Alcohol/week: 0.0 oz    Drug use: No    Sexual activity: Not Currently         Review of Systems   Reason unable to perform ROS:     Constitutional: Positive for fatigue.   Respiratory: Negative for cough and shortness of breath.    Cardiovascular: Negative.    Gastrointestinal: Negative.    Endocrine: Negative.    Genitourinary: Negative.    Musculoskeletal: Positive for back pain.   Allergic/Immunologic: Negative.    Neurological: Negative.      Objective:     Vital Signs (Most Recent):  Temp: 98.9 °F (37.2 °C) (19 1045)  Pulse: 93 (19 1045)  Resp: (!) 26 (19 1045)  BP: (!) 87/40 (19 1045)  SpO2: (!) 94 % (19 1045) Vital Signs (24h Range):  Temp:  [97 °F (36.1 °C)-99 °F (37.2 °C)] 98.9 °F (37.2 °C)  Pulse:  [] 93  Resp:  [6-33] 26  SpO2:  [89 %-100 %] 94 %  BP: ()/(28-83) 87/40     Weight: 104.8 kg (231 lb)  Body mass index is 37.28 kg/m².      Intake/Output Summary (Last 24 hours) at 2019 1103  Last data filed at 2019 1000  Gross per 24 hour   Intake 4226.4 ml   Output 2390 ml   Net 1836.4 ml       Physical Exam   Constitutional: She is oriented to person, place, and time. Vital signs are normal. She appears well-developed and well-nourished. She appears ill. Nasal cannula in place.       HENT:   Head: Normocephalic and atraumatic.   Nose: Nose normal.   Eyes: Pupils are equal, round, and reactive to light. Conjunctivae and EOM are normal.   Neck: Normal range of motion. Neck supple.   Cardiovascular: Normal rate and regular rhythm.   No murmur heard.  Pulmonary/Chest: Effort normal and breath sounds normal.   Abdominal: Soft. Bowel sounds are normal.    Musculoskeletal: Normal range of motion. She exhibits no edema.   Neurological: She is alert and oriented to person, place, and time. No cranial nerve deficit.   Skin: Skin is warm and dry. Capillary refill takes 2 to 3 seconds.   Psychiatric: She has a normal mood and affect.   Nursing note and vitals reviewed.      Vents:  Oxygen Concentration (%): 98 (05/25/19 1545)    Lines/Drains/Airways     Peripherally Inserted Central Catheter Line                 PICC Double Lumen 05/25/19 2040 left basilic less than 1 day          Drain                 Urethral Catheter 05/25/19 1040 Latex;Straight-tip 16 Fr. 1 day         Closed/Suction Drain 05/25/19 1414 Medial Back Accordion 10 Fr. less than 1 day          Peripheral Intravenous Line                 Peripheral IV - Single Lumen 05/23/19 1350 20 G Left Antecubital 2 days         Peripheral IV - Single Lumen 05/25/19 1027 18 G Right Hand 1 day                Significant Labs:    CBC/Anemia Profile:  Recent Labs   Lab 05/25/19  0509 05/25/19  1521 05/26/19  0324   WBC 8.66  --  11.55   HGB 10.6* 9.9* 7.6*   HCT 32.9* 30.8* 22.8*   *  --  363*   MCV 87  --  85   RDW 13.9  --  14.1        Chemistries:  Recent Labs   Lab 05/25/19  0509 05/26/19  0324   * 132*   K 3.8 4.3   CL 96 99   CO2 25 26   BUN 9 6*   CREATININE 0.8 0.7   CALCIUM 9.1 8.5*   ALBUMIN 2.8* 2.6*   PROT 6.8  --    BILITOT 0.5  --    ALKPHOS 83  --    ALT 7*  --    AST 30  --    MG 1.4* 1.3*   PHOS  --  2.1*       ABGs: No results for input(s): PH, PCO2, HCO3, POCSATURATED, BE in the last 48 hours.  Blood Culture: No results for input(s): LABBLOO in the last 48 hours.  Lactic Acid: No results for input(s): LACTATE in the last 48 hours.  Troponin: No results for input(s): TROPONINI in the last 48 hours.  All pertinent labs within the past 24 hours have been reviewed.    Significant Imaging:   I have reviewed and interpreted all pertinent imaging results/findings within the past 24 hours.     MRI  L spine  FINDINGS:  Examination is degraded due to motion artifact.  Minimal grade 1 spondylolisthesis.  Otherwise the alignment of the lumbar spine is normal.    Fluid is identified throughout the L2-L3 disc compatible with osteomyelitis.  Severe associated marrow edema identified within L2 and L3 with mild collapse of the left L3 superior endplate.  Findings compatible with osteomyelitis.  Mixed signal intensity is identified within the anterior epidural space, extending from the posterior aspect of L2 to the through the posterior aspect of L3 measuring approximately 3.8 cm (cc) by 3.7 cm (transverse) by 0.5 cm (AP).  Finding highly suspicious for epidural abscess.  Associated advanced facet arthropathy identified at L2-L3 the combination of findings results in moderate central canal stenosis.  Severe neural foraminal stenosis also identified at L2-3.  Severe paravertebral soft tissue edema is identified with severe soft tissue edema within the iliopsoas muscle bellies.  No discrete intramuscular abscess detected.    At L3-4, minimal fluid identified within the disc space which could indicate early discitis.  Mild to moderate generalized disc bulging and moderate facet arthropathy.  Central canal is patent.  Left lateral recess stenosis is present.  Severe bilateral neural foraminal stenosis.    At L4-5, grade 1 spondylolisthesis is present with mild disc bulging and severe facet arthropathy.  Moderate-severe central canal and lateral recess stenosis.  Moderate neural foraminal stenosis.    Moderate facet arthropathy identified L5-S1.  Central canal neural foramina patent.    Paravertebral soft tissues demonstrate moderate grade soft tissue edema within the posterior paravertebral musculature at the L3 through L5 levels, worse on the left.      Impression       1. Limited exam due to motion.  2. Findings compatible with discitis L2-3, with osteomyelitis of L2 and L3.  Additional evidence of epidural abscess within  the anterior epidural space.  Severe paravertebral soft tissue edema, as detailed above.  3. Small amount of fluid within the L3-L4 disc space suspicious for early osteomyelitis.     CXR  Interval significant improvement in appearance of the left lung base patchy opacification compared to 04/30/2019.  No appreciable pleural effusion.  Chronic interstitial coarsening unchanged.  Atherosclerotic calcifications again noted.  Mild cardiomegaly.  Unchanged healed fracture deformities of the left ribs.  No pneumothorax.  Degenerative changes of the shoulders, moderate to severe.      Impression       Interval significant improvement in appearance of the left lung base patchy opacification compared to 04/30/2019.      04/30  STREPTOCOCCUS SPECIES   Further identified as Streptococcus gallinaceus     Resulting Agency [facility]   Susceptibility      Streptococcus species     CULTURE, BLOOD     Cefepime <=0.25 mcg/mL Sensitive     Ceftriaxone <=0.25 mcg/mL Sensitive     Penicillin <=0.03 mcg/mL Sensitive     Vancomycin 0.5 mcg/mL Sensitive

## 2019-05-26 NOTE — PROGRESS NOTES
Admitted from PACU s/p Epidural abscess I and D with lumbar spinal fusion.  Admitted for Hypotension on Neosynephrine gtt. Arouses to name. Is lethargic with delayed responses.  Speech is garbled / slurred, but responses to orientation questions were eventually appropriate.  HUMPHREY with generalized weakness.  +sensation to BLE.  Able to correctly identify R and L when pressure applied to nailbeds. BBS CTA.  Oxygenating well on 2L nasal cannula.  Obese. Abd. Soft - BS + x 4 quads.  Carlson with CYU.  MD Tillman to bedside for assessment.  Ordered to give 500cc NS bolus and stop Davie gtt to assess if pressor required. /80 at this time.  Back with midline dressing CDI, has Drain to Hvac with dark bloody drainage noted.

## 2019-05-26 NOTE — PLAN OF CARE
Problem: Adult Inpatient Plan of Care  Goal: Plan of Care Review  Outcome: Ongoing (interventions implemented as appropriate)  Pt awake and alert, pt getting more restless and intermittent confusion throughout the shift.  Pt is NSR/ST on the heart monitor.  Resp: sats stable on 2L NC, pt has increased work of breathing with she get restless, IVF decreased.  GI: pt had one large soft/formed BM today.  : valentine intact with good uop.  Skin: midline back incision CDI with hemovac drain intact.  Pt turned and repositioned with use of pillows and wedge.  PICC intact with no redness, swelling or drainage.  Bed low, wheels locked, call light in reach.  Pt instructed to call for assistance. Family at bedside.  Plan of care reviewed.  Pt verbalizes understanding. Will continue to monitor.

## 2019-05-26 NOTE — ASSESSMENT & PLAN NOTE
05/24- will need up to 6 weeks of parenteral therapy , will continue Rocephin /vanco until final drug susceptibility of organisim is known   05/25- operative note reviewed and imaging test reviewed .  Neurosurgery input noted.  S/p L1-4 with L2-3 laminectomy for debridement and drainage of epidural abscess-will follow drug susceptibility of strep .  Will plan to adjust therapy soon. Will continue Rocephin /vanco

## 2019-05-26 NOTE — PROGRESS NOTES
MD Nicolas phoned, spoke to FREDDIE BROCK - updated on condition and new orders per MD Tillman.  No new orders.  Patient has been stable off Neosynephrine thus far.  PICC line placement ordered and team called out.

## 2019-05-26 NOTE — ASSESSMENT & PLAN NOTE
Await Echo  Site: epidural abscess has been surgically treated  Abx: vancomycin , Ceftriaxone  PICC for extended Abx

## 2019-05-26 NOTE — ASSESSMENT & PLAN NOTE
Wound care per surgery  Neuro vital sign Q 1 hrs  Bed rest  Monitor drain output  Pain intervention: HYDROCORDONE ACETAMINOPHEN and Fentanyl IV

## 2019-05-26 NOTE — OP NOTE
[facility] -   Neurosurgery  Operative Note    SUMMARY      Date of Procedure: 5/25/2019     Procedure: Procedure(s) (LRB):  FUSION, SPINE, POSTERIOR SPINAL COLUMN, LUMBAR, USING COMPUTER-ASSISTED NAVIGATION L1-4    LAMINECTOMY, SPINE, LUMBAR 2.3 with resection and drainage of epidural abscess and disc material     Surgeon(s) and Role:     * Stanislav Nicolas MD - Primary    Assisting Surgeon: None    Pre-Operative Diagnosis: Osteomyelitis of lumbar spine [M46.26]  Gait disorder [R26.9]    Post-Operative Diagnosis: Post-Op Diagnosis Codes:     * Osteomyelitis of lumbar spine [M46.26]     * Gait disorder [R26.9]    Anesthesia: General    Technical Procedures Used:   1.  Placement of pedicle screws bilaterally L1-L4  2.  Medial facetectomy laminectomy bilaterally at L2 and L3 for epidural abscess  3.  Removal of disc material at L2-3 for debridement of epidural abscess and diskitis  4.  Posterior lateral arthrodesis from L1-L4  5.  Use of allograft bone  6.  Use of autograft bone  7.  Use of intraoperative sterotactic navigation      Description of the Findings of the Procedure:  Osteomyelitis and diskitis at L2-3    Complications: No    Estimated Blood Loss (EBL): * No values recorded between 5/25/2019 10:56 AM and 5/25/2019  3:16 PM *           Specimens:   Specimen (12h ago, onward)    None           Implants:   Implant Name Type Inv. Item Serial No.  Lot No. LRB No. Used   MAGNIFUSE BONE GRAFT   [id number]   N/A 1   FENESTRATED SCREW      N/A 2   MAS SCREW      N/A 2   SCREW HORIZON SOLERA 6.5X35 - [id number]  SCREW HORIZON SOLERA 6.5X35  [employer]  N/A 3   SCREW HORIZON SOLERA 6.5X40 - [id number]  SCREW HORIZON SOLERA 6.5X40  [employer]  N/A 1   SET SCREW HORIZON SOLERA 5.5-6 - [id number]  SET SCREW HORIZON SOLERA 5.5-6  [employer] [place]  N/A 8   RODS COLBOT CROME      N/A 2              Condition: Stable    Disposition: ICU - extubated and stable.    Attestation: I was present and  scrubbed for the entire procedure.     Patient is a 73-year-old female who has a 1 month long history of abdominal pain and lower back pain which had progressively worsened in severity.  She presented to the ER with the inability to walk secondary to severe back pain and pain radiating down the lower extremities.  CT of the chest abdomen pelvis revealed bone destruction with possible diskitis at L2-3.  MRI was done which did reveal endplate destruction at L2-3 with evidence of diskitis and epidural abscess ventrally at L2-3 causing severe to moderate stenosis in the canal.  Patient was started on prophylactic antibiotics.  She had blood cultures drawn which did show streptococcal bacteremia.  Options were discussed with the patient and family initially was planned for laminectomy with debridement of the disc material however secondary to the severe bone destruction it was decided to take the patient back for diskectomy laminectomy debridemen of the tissue along with stabilization from L1- L4.    The patient was informed of all benefits and potential risk of the operation including but not limited to:  Pain, infection, bleeding, coma, paralysis, death.  Cerebrospinal fluid leak, failure of any instrumentation, the need for additional procedures in the future. No guarantee was given that this procedure would alleviate all of the symptoms.    Description of procedure  The patient was transferred to the operating room and was given her prescribed IV antibiotics.  The Anesthesia Service sedated and intubated the patient.  The eyes were taped shut after ointment was applied to prevent corneal abrasion.  A Donna Hugger was placed over the upper body to maintain control of the core body temperature.  Carlson catheter was inserted.  The patient was turned prone on the Ace table.  All pressure points were carefully padded.       Operative Technique:  The patient was prepped and draped in the standard sterile fashion.  The  C-arm fluoroscopy was draped and brought into the operative field and the L1-4 level was identified.  The skin was subsequently opened sharply with a #10 blade.  Dissection was carried down superiorly and inferiorly in the midline to expose the supraspinous ligament and the lamina.  The musculature was elevated subperiosteally to expose the facets on the bilaterally with the Bovie electrocautery as well as the Shore elevator.  Hemostasis was achieved.  Self retraining retractors were then inserted.     Next the stereotactic reference frame was fastened to the spinous process at the level above the planned procedure.  Sterile drapes were then prior placed around the operative site.  The O-arm navigation system was then brought into the operative field and intraoperative spin was done.  The O-arm was then removed.  Using stereotactic navigation a 4.5mm tap was used to create the trajectory for the pedicle screws from L1-4 levels bilaterally.  A ball-tip probe was used to feel down the pedicle tracts to make sure there was bone all the way around.  Next the lumbar pedicle screws were placed bilaterally using stereotactic navigation at L1-4 levels.      At this point, a high-speed drill was used to remove the spinous process, lamina, and medial facets at the L2-3 level. The thecal sac was completely decompressed and the nerve roots were visualized and the foramen remained appeared free bilaterally.  Next using a [place] tool the thecal sac was gently retracted medially and a [place] was used to feel anterior to the thecal sac revealing the compressive phlegmon anteriorly to the vertebral body.  This was removed and irrigated as much as possible.  Next using a downgoing curette the disc space bilaterally was debrided.  The entire solution was irrigate with a vancomycin solution.  After an adequate decompression was obtained our attention turned towards finishing the instrumentation portion of the procedure.     Next the  inter screw distance was measured and a speedy was placed bilaterally into the poly axial screws.  A final O arm spin was done to confirm the accuracy of the placement of the instrumentation.  Once I was satisfied the the pedicle screws were in an ideal location. Inner threaded caps were secured to the polyaxial screws to ensure fixation of the rods. I secured the inner threaded caps to 80 feet/pounds with the final torquing wrench.     The patient's own bone chips which were stripped of any remaining muscle and processed in the bone mill was placed laterally for the auto graft bone.  Then a 10 cm Magna Fuse bone product was placed posterior-laterally to the levels L 1-4 after decortication for the arthrodesis and allograft product for the case.      The wound was copiously irrigated with antibiotic saline solution.  A 1/8 Hemovac drain was placed and brought out through a separate stab incision. 1 g of vancomycin powder was placed to the surgical cavity.  The fascia was subsequently closed utilizing 0 Vicryl sutures.  Exparel was injected into the muscle for postoperative pain control.  The subcuticular layer was closed with a 2-0 Vicryl in an inverted fashion.  The skin was then approximated with staples.  The incision was dressed in a clean and dry dressing.     All sponge counts, needle counts and instrument counts were correct at the end of the case x 2.  The patient tolerated the procedure well without any complication and was transferred in a stable condition to the recovery room and ultimately transferred to the ICU for further care

## 2019-05-26 NOTE — PT/OT/SLP EVAL
Physical Therapy Evaluation    Patient Name:  Ross Isbell   MRN:  [id number]    Recommendations:     Discharge Recommendations:  nursing facility, skilled   Discharge Equipment Recommendations: none   Barriers to discharge: Decreased caregiver support    Assessment:     Ross Isbell is a 73 y.o. female admitted with a medical diagnosis of Status post lumbar laminectomy.  She presents with the following impairments/functional limitations:  weakness, impaired endurance, impaired self care skills, impaired functional mobilty, pain, orthopedic precautions.  Unable to perform any upright exercises today as still waiting for lumbar brace.    Rehab Prognosis: Fair; patient would benefit from acute skilled PT services to address these deficits and reach maximum level of function.    Recent Surgery: Procedure(s) (LRB):  FUSION, SPINE, POSTERIOR SPINAL COLUMN, LUMBAR, USING COMPUTER-ASSISTED NAVIGATION  FUSION, SPINE, LUMBAR, TLIF, POSTERIOR APPROACH, USING PEDICLE SCREW (N/A)  LAMINECTOMY, SPINE, LUMBAR 1 Day Post-Op    Plan:     During this hospitalization, patient to be seen 5 x/week to address the identified rehab impairments via therapeutic activities, therapeutic exercises and progress toward the following goals:    · Plan of Care Expires:  06/02/19    Subjective     Chief Complaint: pain  Patient/Family Comments/goals: able to sit with brace on  Pain/Comfort:  · Pain Rating 1: 4/10  · Location 1: back  · Pain Rating Post-Intervention 1: 4/10    Patients cultural, spiritual, [employer]ism conflicts given the current situation:      Living Environment:  See [facility] 5/24   Prior to admission, patients level of function was needs device and assist.  Pt WC bound.  Transfer only with assist.  Equipment used at home: hospital bed, wheelchair, grab bar.  DME owned (not currently used): none.  Upon discharge, patient will have assistance from .    Objective:     Communicated with nurse Patricia prior to session.  Patient  found supine with telemetry, peripheral IV, oxygen, blood pressure cuff  upon PT entry to room.    General Precautions: Standard, fall, respiratory   Orthopedic Precautions:spinal precautions(Needs brace for all out of bed)   Braces:       Exams:  · RLE ROM: Deficits: mod loss  · RLE Strength: Deficits: 2/5  · LLE ROM: Deficits: mod loss  · LLE Strength: Deficits: 2/5    Functional Mobility:  · Deferred.      Therapeutic Activities and Exercises:   Assessed LE ROM and MMT.  Pt instructed in LE exercise to perform until brace arrives.  COmpleted exercises today.  Ankle pumps, heel slides, quad sets, and glute sets    AM-PAC 6 CLICK MOBILITY  Total Score:7     Patient left supine with all lines intact, call button in reach and nursing notified.    GOALS:   Multidisciplinary Problems     Physical Therapy Goals        Problem: Physical Therapy Goal    Goal Priority Disciplines Outcome Goal Variances Interventions   Physical Therapy Goal     PT, PT/OT      Description:  LTG'S TO BE MET IN 7 DAYS (5-31-19)  1. PT WILL REQUIRE MODA FOR BED MOBILITY  2. PT WILL REQUIRE MODA FOR TF'S  3. PT WILL TOLERATE BLE THEREX X 20 REPS AROM             Problem: Physical Therapy Goal    Goal Priority Disciplines Outcome Goal Variances Interventions   Physical Therapy Goal     PT, PT/OT      Description:  PT eval complete. Prior goals remain appropriate and should be met by 6/2/2019                    History:     Past Medical History:   Diagnosis Date    Abnormal nuclear stress test 11/1/2015    Background diabetic retinopathy 12/28/2015    CAD (coronary artery disease) 2002    stents    Cataract     Gait disorder 10/12/2012    Glaucoma     MI (myocardial infarction)     Obesity     CORINE (obstructive sleep apnea)     Other and unspecified hyperlipidemia     PD (Parkinson's disease) 2002    tremor-predominant (per patient)    Type II or unspecified type diabetes mellitus without mention of complication, not stated as  uncontrolled 2002      am    Unspecified essential hypertension        Past Surgical History:   Procedure Laterality Date    BREAST BIOPSY Left     benign - about 3 yrs ago    CORONARY ANGIOPLASTY  2002    CORONARY STENT PLACEMENT  2002    DILATION AND CURETTAGE OF UTERUS      FOREIGN BODY REMOVAL      HEART CATH-LEFT Left 11/5/2015    [facility] CATH LAB    HEART CATH-LEFT Left 11/2/2015    [facility] at [facility] CATH LAB    HEART CATH-LEFT/pci stent lad Left 11/10/2015    [facility] at [facility] CATH LAB    INNER EAR SURGERY      TONSILLECTOMY, ADENOIDECTOMY         Time Tracking:     PT Received On: 05/26/19  PT Start Time: 0900     PT Stop Time: 0925  PT Total Time (min): 25 min     Billable Minutes: Evaluation 15 and Therapeutic Exercise 10      Juan Lala, PT  05/26/2019

## 2019-05-26 NOTE — PROGRESS NOTES
Therapy with vancomycin complete and/or consult discontinued by provider.  Pharmacy will sign off, please re-consult as needed.    Thank you for allowing us to participate in this patient's care.    Trudy Cota, PharmD 5/26/2019 3:46 PM

## 2019-05-26 NOTE — PROCEDURES
"Ross Isbell is a 73 y.o. female patient.    Temp: 98.2 °F (36.8 °C) (05/25/19 1837)  Pulse: 92 (05/25/19 2000)  Resp: 16 (05/25/19 2000)  BP: 104/62 (05/25/19 2000)  SpO2: 100 % (05/25/19 2000)  Weight: 104.8 kg (231 lb) (05/23/19 1013)  Height: 5' 6" (167.6 cm) (05/23/19 1013)    PICC  Date/Time: 5/25/2019 8:40 PM  Location procedure was performed: [facility] PICC LINE PLACEMENT  Performed by: Zena Barnes RN  Consent Done: Yes  Time out: Immediately prior to procedure a time out was called to verify the correct patient, procedure, equipment, support staff and site/side marked as required  Indications: med administration  Anesthesia: local infiltration  Local anesthetic: lidocaine 1% without epinephrine  Anesthetic Total (mL): 5  Preparation: skin prepped with ChloraPrep  Skin prep agent dried: skin prep agent completely dried prior to procedure  Sterile barriers: all five maximum sterile barriers used - cap, mask, sterile gown, sterile gloves, and large sterile sheet  Hand hygiene: hand hygiene performed prior to central venous catheter insertion  Location details: left basilic  Catheter type: double lumen  Catheter size: 5 Fr  Catheter Length: 42cm    Ultrasound guidance: yes  Vessel Caliber: small and patent, compressibility normal  Vascular Doppler: not done  Needle advanced into vessel with real time Ultrasound guidance.  Guidewire confirmed in vessel.  Sterile sheath used.  no esophageal manometryNumber of attempts: 1  Post-procedure: blood return through all ports, chlorhexidine patch and sterile dressing applied  Estimated blood loss (mL): 0  Specimens: No  Implants: No  Assessment: placement verified by x-ray, tip termination and successful placement  Complications: none          Zena Barnes  5/25/2019    "

## 2019-05-26 NOTE — PROGRESS NOTES
[facility]  Neurosurgery  Progress Note    Subjective:     History of Present Illness: No notes on file    Post-Op Info:  Procedure(s) (LRB):  FUSION, SPINE, POSTERIOR SPINAL COLUMN, LUMBAR, USING COMPUTER-ASSISTED NAVIGATION  FUSION, SPINE, LUMBAR, TLIF, POSTERIOR APPROACH, USING PEDICLE SCREW (N/A)  LAMINECTOMY, SPINE, LUMBAR   1 Day Post-Op   Overnight transferred to ICU for hypotension now on IV fluids and vasopressors which have been weaned down overnight  Clinically the patient states that her back pain is improved  She is resting comfortably without pain  Eating breakfast with her daughter in the room  No complaints of pain down the legs   She has numbness and tingling to her feet which was present prior to the operation  Overall she states she feels better    Vital signs reviewed  Labs reviewed-H&H is noted.  Spoke with medicine likely transfuse later this morning  Hemovac drain 330 cc overnight will leave in place  Incision clean dry flat with dressing in place  Moves extremities well  Sensations intact to light touch  Antigravity in both lower extremities  Abdomen soft nontender nondistended    Assessment/Plan:   BP and labs being managed by Medicine  Streptococcal bacteremia with osteomyelitis and diskitis being managed by infectious disease currently on vanc and Rocephin  Encourage mobility today  Will order LSO brace when out of bed  Leave HV drain in another day      Stanislav Nicolas MD  Neurosurgery  [facility]

## 2019-05-26 NOTE — SUBJECTIVE & OBJECTIVE
Interval History:  To OR with Dr. Nicolas 25 May for laminectomy.  Post-procedure in ICU due to post-op hypotension.    Review of Systems   Constitutional: Positive for activity change. Negative for chills and fever.   HENT: Negative for trouble swallowing.    Eyes: Negative for visual disturbance.   Respiratory: Negative for apnea, cough, choking, chest tightness, shortness of breath, wheezing and stridor.    Cardiovascular: Negative for chest pain, palpitations and leg swelling.   Gastrointestinal: Positive for abdominal pain, diarrhea and nausea. Negative for constipation and vomiting.   Genitourinary: Negative for difficulty urinating, dysuria, flank pain, frequency and urgency.   Musculoskeletal: Positive for arthralgias, back pain (lumbar) and gait problem. Negative for joint swelling, myalgias, neck pain and neck stiffness.   Skin: Negative for color change.   Neurological: Positive for weakness. Negative for dizziness, tremors, seizures, syncope, facial asymmetry, speech difficulty, light-headedness, numbness and headaches.   Psychiatric/Behavioral: Positive for dysphoric mood. Negative for agitation and behavioral problems.     Objective:     Vital Signs (Most Recent):  Temp: 98.2 °F (36.8 °C) (05/25/19 1837)  Pulse: 92 (05/25/19 1930)  Resp: (!) 6 (05/25/19 1930)  BP: (!) 96/54 (05/25/19 1930)  SpO2: 100 % (05/25/19 1930) Vital Signs (24h Range):  Temp:  [97 °F (36.1 °C)-98.2 °F (36.8 °C)] 98.2 °F (36.8 °C)  Pulse:  [] 92  Resp:  [6-33] 6  SpO2:  [89 %-100 %] 100 %  BP: ()/(28-83) 96/54     Weight: 104.8 kg (231 lb)  Body mass index is 37.28 kg/m².    Intake/Output Summary (Last 24 hours) at 5/25/2019 2000  Last data filed at 5/25/2019 1900  Gross per 24 hour   Intake 2000 ml   Output 535 ml   Net 1465 ml      Physical Exam   Constitutional: She is oriented to person, place, and time. She appears well-developed. She is cooperative. She is easily aroused. She appears distressed (appears in pain  from BLE cramping).   HENT:   Head: Normocephalic and atraumatic.   Eyes: EOM are normal.   Neck: Normal range of motion. Neck supple.   Cardiovascular: Normal rate, regular rhythm and intact distal pulses.   No murmur heard.  Pulmonary/Chest: Effort normal and breath sounds normal. No stridor. No respiratory distress. She has no wheezes. She has no rales. She exhibits no tenderness.   Abdominal: Soft. She exhibits no distension. There is tenderness (BL quadrant). There is no rebound and no guarding.   Diminished Bowel sounds   Genitourinary:   Genitourinary Comments: Deferred   Musculoskeletal: She exhibits tenderness (back and abdomen). She exhibits no edema or deformity.   Neurological: She is alert, oriented to person, place, and time and easily aroused. No sensory deficit.   BUE tremors, hx of parkinson's   Skin: Skin is warm and dry. Capillary refill takes less than 2 seconds.   Psychiatric: She has a normal mood and affect. Her behavior is normal. Thought content normal.   Nursing note and vitals reviewed.    Significant Labs:   Blood Culture:   No results for input(s): LABBLOO in the last 48 hours.  CBC:   Recent Labs   Lab 05/24/19  0533 05/25/19  0509 05/25/19  1521   WBC 10.85 8.66  --    HGB 10.2* 10.6* 9.9*   HCT 32.0* 32.9* 30.8*   * 394*  --      CMP:   Recent Labs   Lab 05/24/19  0533 05/25/19  0509   * 131*   K 3.7 3.8   CL 95 96   CO2 25 25   GLU 78 122*   BUN 10 9   CREATININE 0.8 0.8   CALCIUM 8.9 9.1   PROT 6.6 6.8   ALBUMIN 2.7* 2.8*   BILITOT 0.7 0.5   ALKPHOS 92 83   AST 31 30   ALT 8* 7*   ANIONGAP 8 10   EGFRNONAA >60 >60       Significant Imaging: I have reviewed and interpreted all pertinent imaging results/findings within the past 24 hours.

## 2019-05-26 NOTE — SUBJECTIVE & OBJECTIVE
Past Medical History:   Diagnosis Date    Abnormal nuclear stress test 11/1/2015    Background diabetic retinopathy 12/28/2015    CAD (coronary artery disease) 2002    stents    Cataract     Gait disorder 10/12/2012    Glaucoma     MI (myocardial infarction)     Obesity     CORINE (obstructive sleep apnea)     Other and unspecified hyperlipidemia     PD (Parkinson's disease) 2002    tremor-predominant (per patient)    Type II or unspecified type diabetes mellitus without mention of complication, not stated as uncontrolled 2002      am    Unspecified essential hypertension        Past Surgical History:   Procedure Laterality Date    BREAST BIOPSY Left     benign - about 3 yrs ago    CORONARY ANGIOPLASTY  2002    CORONARY STENT PLACEMENT  2002    DILATION AND CURETTAGE OF UTERUS      FOREIGN BODY REMOVAL      HEART CATH-LEFT Left 11/5/2015    [facility] C[facility]    HEART CATH-LEFT Left 11/2/2015    [facility] CATH LAB    HEART CATH-LEFT/pci stent lad Left 11/10/2015    [facility] at [facility] CATH LAB    INNER EAR SURGERY      TONSILLECTOMY, ADENOIDECTOMY         Review of patient's allergies indicates:   Allergen Reactions    Codeine      Other reaction(s): Nausea.  Patient tolerated morphine 5/2019 with no reported problems.     Codeine sulfate      Unknown^    Diphenhydramine hcl      Unknown^  Other reaction(s): Rash    Sulfa (sulfonamide antibiotics) Hives and Nausea And Vomiting    Penicillins Rash     Rash only as a child  Tolerated ceftriaxone 4/30/19  Tolerated piperacillin-tazobactam 5/23/19       Family History     Problem Relation (Age of Onset)    Breast cancer Paternal Grandmother    Diabetes Maternal Grandmother    Glaucoma Mother, Maternal Grandmother    Heart attack Father (65), Mother (65)    Hypertension Father, Mother, Maternal Grandmother    Ovarian cancer Mother    Parkinsonism Other, Paternal  Aunt    Tremor Father        Tobacco Use    Smoking status: Former Smoker     Years: 40.00     Last attempt to quit: 10/12/2010     Years since quittin.6    Smokeless tobacco: Never Used    Tobacco comment: On & off for the past few years   Substance and Sexual Activity    Alcohol use: No     Alcohol/week: 0.0 oz    Drug use: No    Sexual activity: Not Currently         Review of Systems   Unable to perform ROS: Acuity of condition (patient drowsy from PACU)     Objective:     Vital Signs (Most Recent):  Temp: 98.2 °F (36.8 °C) (19)  Pulse: 94 (19)  Resp: 16 (19)  BP: (!) 100/43 (19)  SpO2: 97 % (19) Vital Signs (24h Range):  Temp:  [97 °F (36.1 °C)-98.2 °F (36.8 °C)] 98.2 °F (36.8 °C)  Pulse:  [] 94  Resp:  [13-33] 16  SpO2:  [89 %-99 %] 97 %  BP: ()/(28-83) 100/43     Weight: 104.8 kg (231 lb)  Body mass index is 37.28 kg/m².      Intake/Output Summary (Last 24 hours) at 2019 1910  Last data filed at 2019 1800  Gross per 24 hour   Intake 1500 ml   Output 275 ml   Net 1225 ml       Physical Exam   Constitutional: She is oriented to person, place, and time. Vital signs are normal. She appears well-developed and well-nourished. She appears ill. Nasal cannula in place.       HENT:   Head: Normocephalic and atraumatic.   Nose: Nose normal.   Eyes: Pupils are equal, round, and reactive to light. Conjunctivae and EOM are normal.   Neck: Normal range of motion. Neck supple.   Cardiovascular: Normal rate and regular rhythm.   No murmur heard.  Pulmonary/Chest: Effort normal and breath sounds normal.   Abdominal: Soft. Bowel sounds are normal.   Musculoskeletal: Normal range of motion. She exhibits no edema.   Neurological: She is alert and oriented to person, place, and time. No cranial nerve deficit.   Skin: Skin is warm and dry. Capillary refill takes 2 to 3 seconds.   Psychiatric: She has a normal mood and affect.   Nursing note and  vitals reviewed.      Vents:  Oxygen Concentration (%): 98 (05/25/19 1545)    Lines/Drains/Airways     Drain                 Closed/Suction Drain 05/25/19 1414 Medial Back Accordion 10 Fr. less than 1 day         Urethral Catheter 05/25/19 1040 Latex;Straight-tip 16 Fr. less than 1 day          Peripheral Intravenous Line                 Peripheral IV - Single Lumen 05/23/19 1350 20 G Left Antecubital 2 days         Peripheral IV - Single Lumen 05/25/19 1027 18 G Right Hand less than 1 day                Significant Labs:    CBC/Anemia Profile:  Recent Labs   Lab 05/24/19  0533 05/25/19  0509 05/25/19  1521   WBC 10.85 8.66  --    HGB 10.2* 10.6* 9.9*   HCT 32.0* 32.9* 30.8*   * 394*  --    MCV 87 87  --    RDW 13.9 13.9  --         Chemistries:  Recent Labs   Lab 05/24/19  0533 05/25/19  0509   * 131*   K 3.7 3.8   CL 95 96   CO2 25 25   BUN 10 9   CREATININE 0.8 0.8   CALCIUM 8.9 9.1   ALBUMIN 2.7* 2.8*   PROT 6.6 6.8   BILITOT 0.7 0.5   ALKPHOS 92 83   ALT 8* 7*   AST 31 30   MG  --  1.4*       ABGs: No results for input(s): PH, PCO2, HCO3, POCSATURATED, BE in the last 48 hours.  Blood Culture: No results for input(s): LABBLOO in the last 48 hours.  Lactic Acid: No results for input(s): LACTATE in the last 48 hours.  Troponin: No results for input(s): TROPONINI in the last 48 hours.  All pertinent labs within the past 24 hours have been reviewed.    Significant Imaging:   I have reviewed and interpreted all pertinent imaging results/findings within the past 24 hours.     MRI L spine  FINDINGS:  Examination is degraded due to motion artifact.  Minimal grade 1 spondylolisthesis.  Otherwise the alignment of the lumbar spine is normal.    Fluid is identified throughout the L2-L3 disc compatible with osteomyelitis.  Severe associated marrow edema identified within L2 and L3 with mild collapse of the left L3 superior endplate.  Findings compatible with osteomyelitis.  Mixed signal intensity is identified  within the anterior epidural space, extending from the posterior aspect of L2 to the through the posterior aspect of L3 measuring approximately 3.8 cm (cc) by 3.7 cm (transverse) by 0.5 cm (AP).  Finding highly suspicious for epidural abscess.  Associated advanced facet arthropathy identified at L2-L3 the combination of findings results in moderate central canal stenosis.  Severe neural foraminal stenosis also identified at L2-3.  Severe paravertebral soft tissue edema is identified with severe soft tissue edema within the iliopsoas muscle bellies.  No discrete intramuscular abscess detected.    At L3-4, minimal fluid identified within the disc space which could indicate early discitis.  Mild to moderate generalized disc bulging and moderate facet arthropathy.  Central canal is patent.  Left lateral recess stenosis is present.  Severe bilateral neural foraminal stenosis.    At L4-5, grade 1 spondylolisthesis is present with mild disc bulging and severe facet arthropathy.  Moderate-severe central canal and lateral recess stenosis.  Moderate neural foraminal stenosis.    Moderate facet arthropathy identified L5-S1.  Central canal neural foramina patent.    Paravertebral soft tissues demonstrate moderate grade soft tissue edema within the posterior paravertebral musculature at the L3 through L5 levels, worse on the left.      Impression       1. Limited exam due to motion.  2. Findings compatible with discitis L2-3, with osteomyelitis of L2 and L3.  Additional evidence of epidural abscess within the anterior epidural space.  Severe paravertebral soft tissue edema, as detailed above.  3. Small amount of fluid within the L3-L4 disc space suspicious for early osteomyelitis.     CXR  Interval significant improvement in appearance of the left lung base patchy opacification compared to 04/30/2019.  No appreciable pleural effusion.  Chronic interstitial coarsening unchanged.  Atherosclerotic calcifications again noted.  Mild  cardiomegaly.  Unchanged healed fracture deformities of the left ribs.  No pneumothorax.  Degenerative changes of the shoulders, moderate to severe.      Impression       Interval significant improvement in appearance of the left lung base patchy opacification compared to 04/30/2019.     Component      Latest Ref Rng & Units 5/23/2019           1:45 PM   Blood Culture, Routine       STREPTOCOCCUS SPECIES . . .     Component      Latest Ref Rng & Units 5/23/2019           1:45 PM   Blood Culture, Routine       Positive results previously called     Component      Latest Ref Rng & Units 5/23/2019           1:45 PM   Blood Culture, Routine       Gram stain flaco bottle: Gram positive cocci     Component      Latest Ref Rng & Units 5/23/2019           1:45 PM   Blood Culture, Routine       Results called to and read back by:Krista Abdi RN 05/24/2019  14:23     Component      Latest Ref Rng & Units 5/23/2019           1:45 PM   Blood Culture, Routine       Gram stain aer bottle: Gram positive cocci

## 2019-05-26 NOTE — CONSULTS
[facility] -   Critical Care Medicine  Consult Note    Patient Name: Ross Isbell  MRN: [id number]  Admission Date: 5/23/2019  Hospital Length of Stay: 2 days  Code Status: Prior  Attending Physician: Teddy Bryant MD   Primary Care Provider: Anna Penn MD   Principal Problem: Status post lumbar laminectomy      Subjective:     HPI:  Ms Gume Isbell is 73 years old  Admitted to MICu post op  FUSION, SPINE, POSTERIOR SPINAL COLUMN, LUMBAR, USING COMPUTER-ASSISTED NAVIGATION  L1-4 with L2-3 laminectomy for debridement and drainage of epidural abscess  Was on davie synephrine Gtt weaned off, NS bolus 500mls given  Intraop received 1700LR and 250 mls Alb, UO 800mls, then PACU 1500mls LR and 250 mls alb and UO was 175 mls.  Admitted 05/23: weakness; abdominal pain, fever.  Imaging determined: discitis L2-3, with osteomyelitis of L2 and L3.  Additional evidence of epidural abscess within the anterior epidural space.  Severe paravertebral soft tissue edema, Small amount of fluid within the L3-L4 disc space suspicious for early osteomyelitis  Blood cultures +ve for STREPTOCOCUS    Hospital/ICU Course:  05/25: examined at bedside: x 2 peripher lines, will need PICC for extended abx, and if Davie needs restarted    Past Medical History:   Diagnosis Date    Abnormal nuclear stress test 11/1/2015    Background diabetic retinopathy 12/28/2015    CAD (coronary artery disease) 2002    stents    Cataract     Gait disorder 10/12/2012    Glaucoma     MI (myocardial infarction)     Obesity     CORINE (obstructive sleep apnea)     Other and unspecified hyperlipidemia     PD (Parkinson's disease) 2002    tremor-predominant (per patient)    Type II or unspecified type diabetes mellitus without mention of complication, not stated as uncontrolled 2002      am    Unspecified essential hypertension        Past Surgical History:   Procedure Laterality Date    BREAST BIOPSY Left     benign - about 3 yrs  ago    CORONARY ANGIOPLASTY  2002    CORONARY STENT PLACEMENT      DILATION AND CURETTAGE OF UTERUS      FOREIGN BODY REMOVAL      HEART CATH-LEFT Left 2015    [facility] CATH [facility]    HEART CATH-LEFT Left 2015    [facility] CATH LAB    HEART CATH-LEFT/pci stent lad Left 11/10/2015    [facility] CATH LAB    INNER EAR SURGERY      TONSILLECTOMY, ADENOIDECTOMY         Review of patient's allergies indicates:   Allergen Reactions    Codeine      Other reaction(s): Nausea.  Patient tolerated morphine 2019 with no reported problems.     Codeine sulfate      Unknown^    Diphenhydramine hcl      Unknown^  Other reaction(s): Rash    Sulfa (sulfonamide antibiotics) Hives and Nausea And Vomiting    Penicillins Rash     Rash only as a child  Tolerated ceftriaxone 19  Tolerated piperacillin-tazobactam 19       Family History     Problem Relation (Age of Onset)    Breast cancer Paternal Grandmother    Diabetes Maternal Grandmother    Glaucoma Mother, Maternal Grandmother    Heart attack Father (65), Mother (65)    Hypertension Father, Mother, Maternal Grandmother    Ovarian cancer Mother    Parkinsonism Other, Paternal Aunt    Tremor Father        Tobacco Use    Smoking status: Former Smoker     Years: 40.00     Last attempt to quit: 10/12/2010     Years since quittin.6    Smokeless tobacco: Never Used    Tobacco comment: On & off for the past few years   Substance and Sexual Activity    Alcohol use: No     Alcohol/week: 0.0 oz    Drug use: No    Sexual activity: Not Currently         Review of Systems   Unable to perform ROS: Acuity of condition (patient drowsy from PACU)     Objective:     Vital Signs (Most Recent):  Temp: 98.2 °F (36.8 °C) (19)  Pulse: 94 (19)  Resp: 16 (19)  BP: (!) 100/43 (19)  SpO2: 97 % (19) Vital Signs (24h  Range):  Temp:  [97 °F (36.1 °C)-98.2 °F (36.8 °C)] 98.2 °F (36.8 °C)  Pulse:  [] 94  Resp:  [13-33] 16  SpO2:  [89 %-99 %] 97 %  BP: ()/(28-83) 100/43     Weight: 104.8 kg (231 lb)  Body mass index is 37.28 kg/m².      Intake/Output Summary (Last 24 hours) at 5/25/2019 1910  Last data filed at 5/25/2019 1800  Gross per 24 hour   Intake 1500 ml   Output 275 ml   Net 1225 ml       Physical Exam   Constitutional: She is oriented to person, place, and time. Vital signs are normal. She appears well-developed and well-nourished. She appears ill. Nasal cannula in place.       HENT:   Head: Normocephalic and atraumatic.   Nose: Nose normal.   Eyes: Pupils are equal, round, and reactive to light. Conjunctivae and EOM are normal.   Neck: Normal range of motion. Neck supple.   Cardiovascular: Normal rate and regular rhythm.   No murmur heard.  Pulmonary/Chest: Effort normal and breath sounds normal.   Abdominal: Soft. Bowel sounds are normal.   Musculoskeletal: Normal range of motion. She exhibits no edema.   Neurological: She is alert and oriented to person, place, and time. No cranial nerve deficit.   Skin: Skin is warm and dry. Capillary refill takes 2 to 3 seconds.   Psychiatric: She has a normal mood and affect.   Nursing note and vitals reviewed.      Vents:  Oxygen Concentration (%): 98 (05/25/19 1545)    Lines/Drains/Airways     Drain                 Closed/Suction Drain 05/25/19 1414 Medial Back Accordion 10 Fr. less than 1 day         Urethral Catheter 05/25/19 1040 Latex;Straight-tip 16 Fr. less than 1 day          Peripheral Intravenous Line                 Peripheral IV - Single Lumen 05/23/19 1350 20 G Left Antecubital 2 days         Peripheral IV - Single Lumen 05/25/19 1027 18 G Right Hand less than 1 day                Significant Labs:    CBC/Anemia Profile:  Recent Labs   Lab 05/24/19  0533 05/25/19  0509 05/25/19  1521   WBC 10.85 8.66  --    HGB 10.2* 10.6* 9.9*   HCT 32.0* 32.9* 30.8*   PLT  383* 394*  --    MCV 87 87  --    RDW 13.9 13.9  --         Chemistries:  Recent Labs   Lab 05/24/19  0533 05/25/19  0509   * 131*   K 3.7 3.8   CL 95 96   CO2 25 25   BUN 10 9   CREATININE 0.8 0.8   CALCIUM 8.9 9.1   ALBUMIN 2.7* 2.8*   PROT 6.6 6.8   BILITOT 0.7 0.5   ALKPHOS 92 83   ALT 8* 7*   AST 31 30   MG  --  1.4*       ABGs: No results for input(s): PH, PCO2, HCO3, POCSATURATED, BE in the last 48 hours.  Blood Culture: No results for input(s): LABBLOO in the last 48 hours.  Lactic Acid: No results for input(s): LACTATE in the last 48 hours.  Troponin: No results for input(s): TROPONINI in the last 48 hours.  All pertinent labs within the past 24 hours have been reviewed.    Significant Imaging:   I have reviewed and interpreted all pertinent imaging results/findings within the past 24 hours.     MRI L spine  FINDINGS:  Examination is degraded due to motion artifact.  Minimal grade 1 spondylolisthesis.  Otherwise the alignment of the lumbar spine is normal.    Fluid is identified throughout the L2-L3 disc compatible with osteomyelitis.  Severe associated marrow edema identified within L2 and L3 with mild collapse of the left L3 superior endplate.  Findings compatible with osteomyelitis.  Mixed signal intensity is identified within the anterior epidural space, extending from the posterior aspect of L2 to the through the posterior aspect of L3 measuring approximately 3.8 cm (cc) by 3.7 cm (transverse) by 0.5 cm (AP).  Finding highly suspicious for epidural abscess.  Associated advanced facet arthropathy identified at L2-L3 the combination of findings results in moderate central canal stenosis.  Severe neural foraminal stenosis also identified at L2-3.  Severe paravertebral soft tissue edema is identified with severe soft tissue edema within the iliopsoas muscle bellies.  No discrete intramuscular abscess detected.    At L3-4, minimal fluid identified within the disc space which could indicate early  discitis.  Mild to moderate generalized disc bulging and moderate facet arthropathy.  Central canal is patent.  Left lateral recess stenosis is present.  Severe bilateral neural foraminal stenosis.    At L4-5, grade 1 spondylolisthesis is present with mild disc bulging and severe facet arthropathy.  Moderate-severe central canal and lateral recess stenosis.  Moderate neural foraminal stenosis.    Moderate facet arthropathy identified L5-S1.  Central canal neural foramina patent.    Paravertebral soft tissues demonstrate moderate grade soft tissue edema within the posterior paravertebral musculature at the L3 through L5 levels, worse on the left.      Impression       1. Limited exam due to motion.  2. Findings compatible with discitis L2-3, with osteomyelitis of L2 and L3.  Additional evidence of epidural abscess within the anterior epidural space.  Severe paravertebral soft tissue edema, as detailed above.  3. Small amount of fluid within the L3-L4 disc space suspicious for early osteomyelitis.     CXR  Interval significant improvement in appearance of the left lung base patchy opacification compared to 04/30/2019.  No appreciable pleural effusion.  Chronic interstitial coarsening unchanged.  Atherosclerotic calcifications again noted.  Mild cardiomegaly.  Unchanged healed fracture deformities of the left ribs.  No pneumothorax.  Degenerative changes of the shoulders, moderate to severe.      Impression       Interval significant improvement in appearance of the left lung base patchy opacification compared to 04/30/2019.     Component      Latest Ref Rng & Units 5/23/2019           1:45 PM   Blood Culture, Routine       STREPTOCOCCUS SPECIES . . .     Component      Latest Ref Rng & Units 5/23/2019           1:45 PM   Blood Culture, Routine       Positive results previously called     Component      Latest Ref Rng & Units 5/23/2019           1:45 PM   Blood Culture, Routine       Gram stain flaco bottle: Gram  positive cocci     Component      Latest Ref Rng & Units 5/23/2019           1:45 PM   Blood Culture, Routine       Results called to and read back by:Krista Abdi RN 05/24/2019  14:23     Component      Latest Ref Rng & Units 5/23/2019           1:45 PM   Blood Culture, Routine       Gram stain aer bottle: Gram positive cocci       ABG  No results for input(s): PH, PO2, PCO2, HCO3, BE in the last 168 hours.  Assessment/Plan:     Neuro  PD (Parkinson's disease)  Continue CARBIDOPA-Levodopa QiD    Ophtho  LATANOPROST eye drops    Derm  Intertrigo  Miconazole to affected areas    Pulmonary  Incentive spirometry  Keep Spo2 >90%  Deep breathing and atelectasis prevention  PRN bronchodilators: short acting      Cardiac/Vascular  CAD: S/P coronary artery stent placement  Metoprolol 25 mg Q24  Imdur 120 mg Q24    Essential hypertension  Hold LOSARTAN for today    CAD (coronary artery disease)  Metoprolol 25 mg Q24  Imdur 120 mg Q2    Renal/  Keep UO 0.5 -1 ml/kg/min    ID  Abscess in epidural space of lumbar spine  SP drainage for source control  Keep MAP> 65  IVF increased to 125 mls/hr  Davie synephrine ordered: PICC ordered since will also need extended abx  Emperic Abx: VANCOMYCIN and CEFTRIAXONE    Endocrine  Type 2 diabetes, controlled, with peripheral neuropathy  Sliding scale  Monitor and treat  Goal 110-180    Hypothyroidism  Continue levothyroxine: 125 mcg    GI  Pantoprazole EC    Orthopedic  * Status post lumbar laminectomy  Wound care per surgery  Neuro vital sign Q 1 hrs  Bed rest  Monitor drain output  Pain intervention: HYDROCORDONE ACETAMINOPHEN and Fentanyl IV      Other  Postprocedural hypotension  IVF increased to 125/hr  NS bolus given  Neosynephrine GTT for MAP 65      Critical Care Daily Checklist:    A: Awake: RASS Goal/Actual Goal:    Actual:     B: Spontaneous Breathing Trial Performed?     C: SAT & SBT Coordinated?  NA                      D: Delirium: CAM-ICU     E: Early Mobility Performed? No    F: Feeding Goal: Goals: Meet > 85 % EEN/EPN while admitted  Status: Nutrition Goal Status: new   Current Diet Order   Procedures    Diet NPO Except for: Medication, Ice Chips, Sips with Medication     Order Specific Question:   Except for     Answer:   Medication     Order Specific Question:   Except for     Answer:   Ice Chips     Order Specific Question:   Except for     Answer:   Sips with Medication      AS: Analgesia/Sedation Analgesia per surgical team   T: Thromboembolic Prophylaxis SCD   H: HOB > 300 Yes   U: Stress Ulcer Prophylaxis (if needed) YES   G: Glucose Control Monitor and treat goal 110 to 180   B: Bowel Function Stool Occurrence: 1   I: Indwelling Catheter (Lines & Carlson) Necessity X 2 peripheral   D: De-escalation of Antimicrobials/Pharmacotherapies Based on cultures    Plan for the day/ETD Improve UO    Code Status:  Family/Goals of Care: Prior  Back home     Critical Care Time: 45 minutes  Critical secondary to Patient has a condition that poses threat to life and bodily function: Post procedural hypotension, Osteomyelitis,      Critical care was time spent personally by me on the following activities: development of treatment plan with patient or surrogate and bedside caregivers, discussions with consultants, evaluation of patient's response to treatment, examination of patient, ordering and performing treatments and interventions, ordering and review of laboratory studies, ordering and review of radiographic studies, pulse oximetry, re-evaluation of patient's condition. This critical care time did not overlap with that of any other provider or involve time for any procedures.    Thank you for your consult. I will follow-up with patient. Please contact us if you have any additional questions.     Rajan Tillman MD  Critical Care Medicine  [facility] -

## 2019-05-26 NOTE — HOSPITAL COURSE
05/25: examined at bedside: x 2 peripher lines, will need PICC for extended abx, and if Davie needs restarted  05/26: at baseline, examined, will need LSO brace when out of bed. Off pressors since 0800. UO: adequate

## 2019-05-26 NOTE — ASSESSMENT & PLAN NOTE
Laminectomy and vertebroplasty by Dr. Nicolas 25 May.  Modest post-op blood loss.  Transfusing 1 unit PRBC.

## 2019-05-26 NOTE — ASSESSMENT & PLAN NOTE
05/24- will follow final ID and drug susceptibility of organism.  Will continue Vanco/rocephin  05/25- isolate is strep -will continue close follow up of repeat blood culture.  Continue Rocephin.

## 2019-05-26 NOTE — SUBJECTIVE & OBJECTIVE
Interval History:  Able to wean vasopressors.  Anemia due to blood loss related to the procedure.  Transfused 1 unit PRBC.Interval improvement in abdominal and back pain.    Review of Systems   Constitutional: Positive for activity change. Negative for chills and fever.   HENT: Negative for trouble swallowing.    Eyes: Negative for visual disturbance.   Respiratory: Negative for apnea, cough, choking, chest tightness, shortness of breath, wheezing and stridor.    Cardiovascular: Negative for chest pain, palpitations and leg swelling.   Gastrointestinal: Positive for abdominal pain. Negative for constipation, diarrhea, nausea and vomiting.   Genitourinary: Negative for difficulty urinating, dysuria, flank pain, frequency and urgency.   Musculoskeletal: Positive for arthralgias, back pain (lumbar) and gait problem. Negative for joint swelling, myalgias, neck pain and neck stiffness.   Skin: Negative for color change.   Neurological: Positive for weakness. Negative for dizziness, tremors, seizures, syncope, facial asymmetry, speech difficulty, light-headedness, numbness and headaches.   Psychiatric/Behavioral: Positive for dysphoric mood. Negative for agitation and behavioral problems.     Objective:     Vital Signs (Most Recent):  Temp: 100 °F (37.8 °C) (05/26/19 1115)  Pulse: 93 (05/26/19 1115)  Resp: (!) 23 (05/26/19 1115)  BP: (!) 100/34 (05/26/19 1100)  SpO2: 96 % (05/26/19 1115) Vital Signs (24h Range):  Temp:  [97 °F (36.1 °C)-100 °F (37.8 °C)] 100 °F (37.8 °C)  Pulse:  [] 93  Resp:  [6-33] 23  SpO2:  [89 %-100 %] 96 %  BP: ()/(28-83) 100/34     Weight: 104.8 kg (231 lb)  Body mass index is 37.28 kg/m².    Intake/Output Summary (Last 24 hours) at 5/26/2019 1115  Last data filed at 5/26/2019 1100  Gross per 24 hour   Intake 4548.03 ml   Output 2665 ml   Net 1883.03 ml      Physical Exam   Constitutional: She is oriented to person, place, and time. She appears well-developed. She is cooperative. She is  easily aroused. She appears distressed (appears in pain from BLE cramping).   HENT:   Head: Normocephalic and atraumatic.   Eyes: EOM are normal.   Neck: Normal range of motion. Neck supple.   Cardiovascular: Normal rate, regular rhythm and intact distal pulses.   No murmur heard.  Pulmonary/Chest: Effort normal and breath sounds normal. No stridor. No respiratory distress. She has no wheezes. She has no rales. She exhibits no tenderness.   Abdominal: Soft. She exhibits no distension. There is tenderness (BL quadrant). There is no rebound and no guarding.   Diminished Bowel sounds   Genitourinary:   Genitourinary Comments: Deferred   Musculoskeletal: She exhibits tenderness (back and abdomen). She exhibits no edema or deformity.   Neurological: She is alert, oriented to person, place, and time and easily aroused. No sensory deficit.   BUE tremors, hx of parkinson's   Skin: Skin is warm and dry. Capillary refill takes less than 2 seconds.   Psychiatric: She has a normal mood and affect. Her behavior is normal. Thought content normal.   Nursing note and vitals reviewed.    Significant Labs:   Blood Culture:   No results for input(s): LABBLOO in the last 48 hours.  CBC:   Recent Labs   Lab 05/25/19  0509 05/25/19  1521 05/26/19  0324   WBC 8.66  --  11.55   HGB 10.6* 9.9* 7.6*   HCT 32.9* 30.8* 22.8*   *  --  363*     CMP:   Recent Labs   Lab 05/25/19  0509 05/26/19  0324   * 132*   K 3.8 4.3   CL 96 99   CO2 25 26   * 114*   BUN 9 6*   CREATININE 0.8 0.7   CALCIUM 9.1 8.5*   PROT 6.8  --    ALBUMIN 2.8* 2.6*   BILITOT 0.5  --    ALKPHOS 83  --    AST 30  --    ALT 7*  --    ANIONGAP 10 7*   EGFRNONAA >60 >60       Significant Imaging: I have reviewed and interpreted all pertinent imaging results/findings within the past 24 hours.

## 2019-05-26 NOTE — PLAN OF CARE
Problem: Adult Inpatient Plan of Care  Goal: Plan of Care Review  Outcome: Ongoing (interventions implemented as appropriate)  POC and interventions discussed with patient and spouse - BARBY.  S/P Lumbar spine Abscess I and D with fusion and Laminectomy.  Hypotension in PACU - to ICU with Neosynephrine gtt infusing.  PICC line placed, tolerated well.  Is awake and alert - confused at times, reorients well.  Follows commands. HUMPHREY with gen. Weakness.  Reports some slight numbness to BLE, but reports this has been chronic.  Denies tingling. Incision with duoderm dressing - CDI.  Has drain to lumbar spine to Hvac - wnl.  Carlson with CYU.  Heart with RRR.  BBS CTA - oxygenating well on nasal cannula.  Minimal reports of pain.  Sleeping between care.  Afebrile

## 2019-05-26 NOTE — PT/OT/SLP EVAL
Occupational Therapy   Evaluation    Name: Ross Isbell  MRN: [id number]  Admitting Diagnosis:  Status post lumbar laminectomy 1 Day Post-Op    Recommendations:     Discharge Recommendations: nursing facility, skilled  Discharge Equipment Recommendations:  none  Barriers to discharge:  None    Assessment:     Ross Isbell is a 73 y.o. female with a medical diagnosis of Status post lumbar laminectomy.  She presents with SELF CARE DEBILITY. Performance deficits affecting function: weakness, impaired endurance, impaired self care skills, gait instability, impaired functional mobilty, impaired balance, decreased upper extremity function, decreased lower extremity function, pain.      Rehab Prognosis: Good; patient would benefit from acute skilled OT services to address these deficits and reach maximum level of function.       Plan:     Patient to be seen 3 x/week to address the above listed problems via self-care/home management, therapeutic activities, therapeutic exercises  · Plan of Care Expires: 06/02/19  · Plan of Care Reviewed with: patient    Subjective     Chief Complaint: BACK PAIN  Patient/Family Comments/goals: NONE STATED    Occupational Profile:  Living Environment: PT LIVES WITH .  Previous level of function: PT STATED SHE WAS W/C BOUND AND SPONGE BATHED.  Roles and Routines: PT STATED SHE WAS W/C BOUND AND SPONGE BATHED.  Equipment Used at Home:  hospital bed, wheelchair, grab bar, bath bench, bedside commode(WALK-IN SHOWER)  Assistance upon Discharge: TBD    Pain/Comfort:  · Pain Rating 1: 4/10  · Location - Orientation 1: lower  · Location 1: back  · Pain Addressed 1: Reposition, Distraction, Cessation of Activity  · Pain Rating Post-Intervention 1: 4/10    Patients cultural, spiritual, Mormon conflicts given the current situation: no    Objective:     Communicated with: NURSE prior to session.  Patient found supine with telemetry, peripheral IV, oxygen, blood pressure cuff, valentine  catheter, SCD(DRAIN) upon OT entry to room.    General Precautions: Standard, fall, respiratory   Orthopedic Precautions:spinal precautions(BACK BRACE WHEN OOB)   Braces: (AWAITING BACK BRACE)     Occupational Performance:    Bed Mobility:    · PT UNABLE TO PARTICIPATE THIS DATE.    Functional Mobility/Transfers:  · FX MOBILITY AND T/F'S NT DUE TO PT WITH PRECAUTION FOR BACK BRACE WHEN OOB--AWAITING BACK BRACE.      Activities of Daily Living:  · Feeding:  DTR FEEDING PT AT PRESENT      · Upper Body Dressing: dependence      · Lower Body Dressing: dependence      · Toileting: GILLESPIE AT PRESENT        Cognitive/Visual Perceptual:  Cognitive/Psychosocial Skills:     -       Oriented to: PT WITH DECREASED ALERTNESS THIS DATE.     Physical Exam:  Balance:    -       UNABLE TO ASSESS THIS DATE--AWAITING BRACE.  Upper Extremity Range of Motion:     -       Right Upper Extremity: WFL  -       Left Upper Extremity: WFL    AMPAC 6 Click ADL:  AMPAC Total Score: 6    Treatment & Education:  EVAL COMPLETED TODAY.  PT WITH DECREASED PARTICIPATION DUE TO DECREASED ALERTNESS.  PT ALSO AWAITING BACK BRACE TO ALLOW OOB AX.  Education:    Patient left supine with all lines intact, call button in reach and NURSE present    GOALS:   Multidisciplinary Problems     Occupational Therapy Goals        Problem: Occupational Therapy Goal    Goal Priority Disciplines Outcome Interventions   Occupational Therapy Goal     OT, PT/OT     Description:  ot goals to be met 5-31-19  s with ue dressing  Pt will tolerate 1 set x 15 reps b ue rom exercise  Min a with bed supine<>sit  min a with bsc t/f's                    History:     Past Medical History:   Diagnosis Date    Abnormal nuclear stress test 11/1/2015    Background diabetic retinopathy 12/28/2015    CAD (coronary artery disease) 2002    stents    Cataract     Gait disorder 10/12/2012    Glaucoma     MI (myocardial infarction)     Obesity     CORINE (obstructive sleep apnea)     Other  and unspecified hyperlipidemia     PD (Parkinson's disease) 2002    tremor-predominant (per patient)    Type II or unspecified type diabetes mellitus without mention of complication, not stated as uncontrolled 2002      am    Unspecified essential hypertension        Past Surgical History:   Procedure Laterality Date    BREAST BIOPSY Left     benign - about 3 yrs ago    CORONARY ANGIOPLASTY  2002    CORONARY STENT PLACEMENT  2002    DILATION AND CURETTAGE OF UTERUS      FOREIGN BODY REMOVAL      HEART CATH-LEFT Left 11/5/2015    [facility]    HEART CATH-LEFT Left 11/2/2015    [facility] CATH LAB    HEART CATH-LEFT/pci stent lad Left 11/10/2015    [facility] CATH LAB    INNER EAR SURGERY      TONSILLECTOMY, ADENOIDECTOMY         Time Tracking:     OT Date of Treatment: 05/26/19  OT Start Time: 0919  OT Stop Time: 0931  OT Total Time (min): 12 min    Billable Minutes:Evaluation 12    Chelsy Anderson OT  5/26/2019

## 2019-05-26 NOTE — PROGRESS NOTES
Pt transferred to tele room 228, via bed, portable oxygen, and tele monitor.  Bedside report given.  Meds given to Grant FRAZIER.  Family at bedside.

## 2019-05-26 NOTE — ASSESSMENT & PLAN NOTE
SP drainage for source control  Keep MAP> 65  IVF increased to 125 mls/hr  Davie synephrine weaned off  Emperic Abx: VANCOMYCIN and CEFTRIAXONE

## 2019-05-26 NOTE — PROGRESS NOTES
[facility]  Infectious Disease  Progress Note    Patient Name: Ross Isbell  MRN: [id number]  Admission Date: 5/23/2019  Length of Stay: 3 days  Attending Physician: Teddy Bryant MD  Primary Care Provider: Anna Penn MD    Isolation Status: Special Contact  Assessment/Plan:      Streptococcal bacteremia  05/24- will follow final ID and drug susceptibility of organism.  Will continue Vanco/rocephin  05/25- isolate is strep -will continue close follow up of repeat blood culture.  Continue Rocephin.    Osteomyelitis of lumbar spine  05/24- will need up to 6 weeks of parenteral therapy , will continue Rocephin /vanco until final drug susceptibility of organisim is known   05/25- operative note reviewed and imaging test reviewed .  Neurosurgery input noted.  S/p L1-4 with L2-3 laminectomy for debridement and drainage of epidural abscess-will follow drug susceptibility of strep .  Will plan to adjust therapy soon. Will continue Rocephin /vanco    Type 2 diabetes, controlled, with peripheral neuropathy  05/24- insulin sliding scale, closely monitor glucose    Essential hypertension  05/24, BP control as per primary team    PD (Parkinson's disease)  05/24- out patient neurology follow up .        Anticipated Disposition:     Thank you for your consult. I will follow-up with patient. Please contact us if you have any additional questions.    Dada Gomez MD  Infectious Disease  [facility]    Subjective:     Principal Problem:Status post lumbar laminectomy    HPI: 73 year old female with history of DM II, Parkinson's, CORINE, MI, CAD, and Glaucoma who presented to the Emergency Department with c/o fatigue. There is associated symptom of generalized weakness, bilateral lower extremity pain. Since admission, all cultures were reviewed -   Blood cultures -  05/23- strep   04/30-  Streptococcus gallinaceus   She was seen in the ED on 04/30 and blood cultures at that time was also  positive for strep gallinaceous .She was discharged from the ED as her symptoms were attributed to Parkinsonism .CT abdomen/pelvis with contrast showed moderate inflammatory changes centered on the intervertebral disc space between the L2 and L3 vertebral bodies. There is bony destruction on both sides of the intervertebral disc space between the L2 and L3 vertebral bodies.  This is consistent with the patient's history and characteristic of discitis and osteomyelitis.   MRI of the thoracic spine -Negative for evidence of discitis or osteomyelitis of the thoracic spine.    Low-grade scattered degenerative changes.  MRI of the lumbar spine-Findings compatible with discitis L2-3, with osteomyelitis of L2 and L3.  Additional evidence of epidural abscess within the anterior epidural space.  Severe paravertebral soft tissue edema, as detailed above.  3. Small amount of fluid within the L3-L4 disc space suspicious for early osteomyelitis    Interval History: 73 year old woman  with strep bacteremia/epidural abscess s/p L1-4 with L2-3 laminectomy for debridement and drainage of epidural abscess .  She is drowsy post operative procedure.    Review of Systems   Unable to perform ROS: Acuity of condition     Objective:     Vital Signs (Most Recent):  Temp: 99 °F (37.2 °C) (05/26/19 0200)  Pulse: 95 (05/26/19 0500)  Resp: 20 (05/26/19 0500)  BP: (!) 125/52 (05/26/19 0500)  SpO2: 98 % (05/26/19 0500) Vital Signs (24h Range):  Temp:  [97 °F (36.1 °C)-99 °F (37.2 °C)] 99 °F (37.2 °C)  Pulse:  [] 95  Resp:  [6-33] 20  SpO2:  [89 %-100 %] 98 %  BP: ()/(28-83) 125/52     Weight: 104.8 kg (231 lb)  Body mass index is 37.28 kg/m².    Estimated Creatinine Clearance: 87.6 mL/min (based on SCr of 0.7 mg/dL).    Physical Exam   Constitutional: She is oriented to person, place, and time. She appears well-developed. She is cooperative. She is easily aroused. No distress (-  ).   HENT:   Head: Normocephalic and atraumatic.    Eyes: EOM are normal.   Neck: Normal range of motion. Neck supple.   Cardiovascular: Normal rate, regular rhythm and intact distal pulses.   No murmur heard.  Pulmonary/Chest: Effort normal and breath sounds normal. No stridor. No respiratory distress. She has no wheezes. She has no rales. She exhibits no tenderness.   Abdominal: Soft. She exhibits no distension. There is tenderness (BL quadrant). There is no rebound and no guarding.   Diminished Bowel sounds   Genitourinary:   Genitourinary Comments: Deferred   Musculoskeletal: She exhibits tenderness (back and abdomen). She exhibits no edema or deformity.   Neurological: She is alert, oriented to person, place, and time and easily aroused. No sensory deficit.   BUE tremors, hx of parkinson's   Skin: Skin is warm and dry. Capillary refill takes less than 2 seconds.   Psychiatric: She has a normal mood and affect. Her behavior is normal. Thought content normal.   Nursing note and vitals reviewed.      Significant Labs:   Blood Culture:   Recent Labs   Lab 04/30/19  1610 04/30/19  1640 05/23/19  1328 05/23/19  1345   LABBLOO Gram stain aer bottle: Gram positive cocci in chains resembling Strep  Gram stain flaco bottle: Gram positive cocci in chains resembling Strep  Results called to and read back by: Adwoa Ramirez RN 05/01/2019  10:43  STREPTOCOCCUS SPECIES  Further identified as Streptococcus gallinaceus  For susceptibility see order #[id number]   Gram stain aer bottle: Gram positive cocci in chains resembling Strep  Gram stain flaco bottle: Gram positive cocci in chains resembling Strep  Results called to and read back by: Adwoa Ramirez RN 05/01/2019  10:42  STREPTOCOCCUS SPECIES  Further identified as Streptococcus gallinaceus   Gram stain aer bottle: Gram positive cocci   Results called to and read back by:Krista Abdi RN 05/24/2019  14:22  Gram stain flaco bottle: Gram positive cocci   Positive results previously called  STREPTOCOCCUS SPECIES  Identification  and susceptibility pending   Gram stain aer bottle: Gram positive cocci   Results called to and read back by:Krista Abdi RN 05/24/2019  14:23  Gram stain flaco bottle: Gram positive cocci   Positive results previously called  STREPTOCOCCUS SPECIES  Identification pending  For susceptibility see order #[id number]       BMP:   Recent Labs   Lab 05/25/19  0509 05/26/19  0324   * 114*   * 132*   K 3.8 4.3   CL 96 99   CO2 25 26   BUN 9 6*   CREATININE 0.8 0.7   CALCIUM 9.1 8.5*   MG 1.4*  --      CBC:   Recent Labs   Lab 05/25/19  0509 05/25/19  1521 05/26/19  0324   WBC 8.66  --  11.55   HGB 10.6* 9.9* 7.6*   HCT 32.9* 30.8* 22.8*   *  --  363*     All pertinent labs within the past 24 hours have been reviewed.    Significant Imaging: I have reviewed all pertinent imaging results/findings within the past 24 hours.

## 2019-05-26 NOTE — PROGRESS NOTES
[facility]  Pulmonology  Progress Note    Patient Name: Ross Isbell  MRN: [id number]  Admission Date: 5/23/2019  Hospital Length of Stay: 3 days  Code Status: Prior  Attending Provider: Teddy Bryant MD  Primary Care Provider: Anna Penn MD   Principal Problem: Status post lumbar laminectomy    Ms Gume Isbell is 73 years old  Admitted to [facility] post op  FUSION, SPINE, POSTERIOR SPINAL COLUMN, LUMBAR, USING COMPUTER-ASSISTED NAVIGATION  L1-4 with L2-3 laminectomy for debridement and drainage of epidural abscess  Was on davie synephrine Gtt weaned off, NS bolus 500mls given  Intraop received 1700LR and 250 mls Alb, UO 800mls, then PACU 1500mls LR and 250 mls alb and UO was 175 mls.  Admitted 05/23: weakness; abdominal pain, fever.  Imaging determined: discitis L2-3, with osteomyelitis of L2 and L3.  Additional evidence of epidural abscess within the anterior epidural space.  Severe paravertebral soft tissue edema, Small amount of fluid within the L3-L4 disc space suspicious for early osteomyelitis  Blood cultures +ve for STREPTOCOCUS    05/25: examined at bedside: x 2 peripher lines, will need PICC for extended abx, and if Davie needs restarted  05/26: at baseline, examined, will need LSO brace when out of bed. Off pressors since 0800. UO: adequate    Subjective:     Past Medical History:   Diagnosis Date    Abnormal nuclear stress test 11/1/2015    Background diabetic retinopathy 12/28/2015    CAD (coronary artery disease) 2002    stents    Cataract     Gait disorder 10/12/2012    Glaucoma     MI (myocardial infarction)     Obesity     CORINE (obstructive sleep apnea)     Other and unspecified hyperlipidemia     PD (Parkinson's disease) 2002    tremor-predominant (per patient)    Type II or unspecified type diabetes mellitus without mention of complication, not stated as uncontrolled 2002      am    Unspecified essential hypertension        Past Surgical History:   Procedure  Laterality Date    BREAST BIOPSY Left     benign - about 3 yrs ago    CORONARY ANGIOPLASTY  2002    CORONARY STENT PLACEMENT      DILATION AND CURETTAGE OF UTERUS      FOREIGN BODY REMOVAL      HEART CATH-LEFT Left 2015    [facility] CATH LAB    HEART CATH-LEFT Left 2015    [facility] CATH LAB    HEART CATH-LEFT/pci stent lad Left 11/10/2015    Per[facility] at [facility] CATH LAB    INNER EAR SURGERY      TONSILLECTOMY, ADENOIDECTOMY         Review of patient's allergies indicates:   Allergen Reactions    Codeine      Other reaction(s): Nausea.  Patient tolerated morphine 2019 with no reported problems.     Codeine sulfate      Unknown^    Diphenhydramine hcl      Unknown^  Other reaction(s): Rash    Sulfa (sulfonamide antibiotics) Hives and Nausea And Vomiting    Penicillins Rash     Rash only as a child  Tolerated ceftriaxone 19  Tolerated piperacillin-tazobactam 19       Family History     Problem Relation (Age of Onset)    Breast cancer Paternal Grandmother    Diabetes Maternal Grandmother    Glaucoma Mother, Maternal Grandmother    Heart attack Father (65), Mother (65)    Hypertension Father, Mother, Maternal Grandmother    Ovarian cancer Mother    Parkinsonism Other, Paternal Aunt    Tremor Father        Tobacco Use    Smoking status: Former Smoker     Years: 40.00     Last attempt to quit: 10/12/2010     Years since quittin.6    Smokeless tobacco: Never Used    Tobacco comment: On & off for the past few years   Substance and Sexual Activity    Alcohol use: No     Alcohol/week: 0.0 oz    Drug use: No    Sexual activity: Not Currently         Review of Systems   Reason unable to perform ROS:     Constitutional: Positive for fatigue.   Respiratory: Negative for cough and shortness of breath.    Cardiovascular: Negative.    Gastrointestinal: Negative.    Endocrine: Negative.    Genitourinary:  Negative.    Musculoskeletal: Positive for back pain.   Allergic/Immunologic: Negative.    Neurological: Negative.      Objective:     Vital Signs (Most Recent):  Temp: 98.9 °F (37.2 °C) (05/26/19 1045)  Pulse: 93 (05/26/19 1045)  Resp: (!) 26 (05/26/19 1045)  BP: (!) 87/40 (05/26/19 1045)  SpO2: (!) 94 % (05/26/19 1045) Vital Signs (24h Range):  Temp:  [97 °F (36.1 °C)-99 °F (37.2 °C)] 98.9 °F (37.2 °C)  Pulse:  [] 93  Resp:  [6-33] 26  SpO2:  [89 %-100 %] 94 %  BP: ()/(28-83) 87/40     Weight: 104.8 kg (231 lb)  Body mass index is 37.28 kg/m².      Intake/Output Summary (Last 24 hours) at 5/26/2019 1103  Last data filed at 5/26/2019 1000  Gross per 24 hour   Intake 4226.4 ml   Output 2390 ml   Net 1836.4 ml       Physical Exam   Constitutional: She is oriented to person, place, and time. Vital signs are normal. She appears well-developed and well-nourished. She appears ill. Nasal cannula in place.       HENT:   Head: Normocephalic and atraumatic.   Nose: Nose normal.   Eyes: Pupils are equal, round, and reactive to light. Conjunctivae and EOM are normal.   Neck: Normal range of motion. Neck supple.   Cardiovascular: Normal rate and regular rhythm.   No murmur heard.  Pulmonary/Chest: Effort normal and breath sounds normal.   Abdominal: Soft. Bowel sounds are normal.   Musculoskeletal: Normal range of motion. She exhibits no edema.   Neurological: She is alert and oriented to person, place, and time. No cranial nerve deficit.   Skin: Skin is warm and dry. Capillary refill takes 2 to 3 seconds.   Psychiatric: She has a normal mood and affect.   Nursing note and vitals reviewed.      Vents:  Oxygen Concentration (%): 98 (05/25/19 1545)    Lines/Drains/Airways     Peripherally Inserted Central Catheter Line                 PICC Double Lumen 05/25/19 2040 left basilic less than 1 day          Drain                 Urethral Catheter 05/25/19 1040 Latex;Straight-tip 16 Fr. 1 day         Closed/Suction Drain  05/25/19 1414 Medial Back Accordion 10 Fr. less than 1 day          Peripheral Intravenous Line                 Peripheral IV - Single Lumen 05/23/19 1350 20 G Left Antecubital 2 days         Peripheral IV - Single Lumen 05/25/19 1027 18 G Right Hand 1 day                Significant Labs:    CBC/Anemia Profile:  Recent Labs   Lab 05/25/19  0509 05/25/19  1521 05/26/19  0324   WBC 8.66  --  11.55   HGB 10.6* 9.9* 7.6*   HCT 32.9* 30.8* 22.8*   *  --  363*   MCV 87  --  85   RDW 13.9  --  14.1        Chemistries:  Recent Labs   Lab 05/25/19  0509 05/26/19  0324   * 132*   K 3.8 4.3   CL 96 99   CO2 25 26   BUN 9 6*   CREATININE 0.8 0.7   CALCIUM 9.1 8.5*   ALBUMIN 2.8* 2.6*   PROT 6.8  --    BILITOT 0.5  --    ALKPHOS 83  --    ALT 7*  --    AST 30  --    MG 1.4* 1.3*   PHOS  --  2.1*       ABGs: No results for input(s): PH, PCO2, HCO3, POCSATURATED, BE in the last 48 hours.  Blood Culture: No results for input(s): LABBLOO in the last 48 hours.  Lactic Acid: No results for input(s): LACTATE in the last 48 hours.  Troponin: No results for input(s): TROPONINI in the last 48 hours.  All pertinent labs within the past 24 hours have been reviewed.    Significant Imaging:   I have reviewed and interpreted all pertinent imaging results/findings within the past 24 hours.     MRI L spine  FINDINGS:  Examination is degraded due to motion artifact.  Minimal grade 1 spondylolisthesis.  Otherwise the alignment of the lumbar spine is normal.    Fluid is identified throughout the L2-L3 disc compatible with osteomyelitis.  Severe associated marrow edema identified within L2 and L3 with mild collapse of the left L3 superior endplate.  Findings compatible with osteomyelitis.  Mixed signal intensity is identified within the anterior epidural space, extending from the posterior aspect of L2 to the through the posterior aspect of L3 measuring approximately 3.8 cm (cc) by 3.7 cm (transverse) by 0.5 cm (AP).  Finding highly  suspicious for epidural abscess.  Associated advanced facet arthropathy identified at L2-L3 the combination of findings results in moderate central canal stenosis.  Severe neural foraminal stenosis also identified at L2-3.  Severe paravertebral soft tissue edema is identified with severe soft tissue edema within the iliopsoas muscle bellies.  No discrete intramuscular abscess detected.    At L3-4, minimal fluid identified within the disc space which could indicate early discitis.  Mild to moderate generalized disc bulging and moderate facet arthropathy.  Central canal is patent.  Left lateral recess stenosis is present.  Severe bilateral neural foraminal stenosis.    At L4-5, grade 1 spondylolisthesis is present with mild disc bulging and severe facet arthropathy.  Moderate-severe central canal and lateral recess stenosis.  Moderate neural foraminal stenosis.    Moderate facet arthropathy identified L5-S1.  Central canal neural foramina patent.    Paravertebral soft tissues demonstrate moderate grade soft tissue edema within the posterior paravertebral musculature at the L3 through L5 levels, worse on the left.      Impression       1. Limited exam due to motion.  2. Findings compatible with discitis L2-3, with osteomyelitis of L2 and L3.  Additional evidence of epidural abscess within the anterior epidural space.  Severe paravertebral soft tissue edema, as detailed above.  3. Small amount of fluid within the L3-L4 disc space suspicious for early osteomyelitis.     CXR  Interval significant improvement in appearance of the left lung base patchy opacification compared to 04/30/2019.  No appreciable pleural effusion.  Chronic interstitial coarsening unchanged.  Atherosclerotic calcifications again noted.  Mild cardiomegaly.  Unchanged healed fracture deformities of the left ribs.  No pneumothorax.  Degenerative changes of the shoulders, moderate to severe.      Impression       Interval significant improvement in  appearance of the left lung base patchy opacification compared to 04/30/2019.      04/30  STREPTOCOCCUS SPECIES   Further identified as Streptococcus gallinaceus     Resulting Agency [facility]   Susceptibility      Streptococcus species     CULTURE, BLOOD     Cefepime <=0.25 mcg/mL Sensitive     Ceftriaxone <=0.25 mcg/mL Sensitive     Penicillin <=0.03 mcg/mL Sensitive     Vancomycin 0.5 mcg/mL Sensitive               ABG  No results for input(s): PH, PO2, PCO2, HCO3, BE in the last 168 hours.  Assessment/Plan:     * Status post lumbar laminectomy  Wound care per surgery  POD #1  Neuro vital signs  Bed rest  Monitor drain output  Pain intervention: HYDROCORDONE ACETAMINOPHEN and Fentanyl IV      Electrolyte abnormality  Replete K+ and Mg+    Postprocedural hypotension  Resolved  Continue IVF   125/hr       Intertrigo  Miconazole to affected areas BID    Abscess in epidural space of lumbar spine  SP drainage for source control  Keep MAP> 65  IVF increased to 125 mls/hr  Davie synephrine weaned off  Emperic Abx: VANCOMYCIN and CEFTRIAXONE    Streptococcal bacteremia  Await Echo  Site: epidural abscess has been surgically treated  Abx: vancomycin , Ceftriaxone  PICC for extended Abx    Type 2 diabetes, controlled, with peripheral neuropathy  Sliding scale: Moderate scale  Monitor and treat  Goal 110-180    Hypothyroidism  Continue levothyroxine: 125 mcg    Essential hypertension  Hold LOSARTAN for today 05/26    CAD (coronary artery disease)  Metoprolol 25 mg Q24  Imdur 120 mg Q2    PD (Parkinson's disease)  Continue CARBIDOPA-Levodopa QiD      Will sign off on Transfer       I have reviewed all labs and imaging studies and compared to previous results. I have also discussed labs with all the teams in the medical care of the patient and my plan is outlined below        Rajan Tillman MD  Pulmonology  [facility]

## 2019-05-26 NOTE — PROGRESS NOTES
[facility] Hospital Medicine  Progress Note    Patient Name: Ross Isbell  MRN: [id number]  Patient Class: IP- Inpatient   Admission Date: 5/23/2019  Length of Stay: 2 days  Attending Physician: Teddy Bryant MD  Primary Care Provider: Anna Penn MD        Subjective:     Principal Problem:Status post lumbar laminectomy    HPI:  Ross Isbell is a 73 year old female with a PMHx of DM II, Parkinson's, CORINE, MI, CAD, and Glaucoma who presented to the Emergency Department with c/o fatigue. Associated symptoms include: generalized weakness and BLE pain x 2 weeks. Pt was informed by home health nurse to go to ED for increasing weakness, abdominal pain and leg pain. ED workup showed: WBC 13.08K, Hgb/Hct 11.0/33.5, Na+ 129, lipase 17. UA (+) nitrites, few bacteria. CXR with no acute findings. CT abdomen/pelvis with contrast showed moderate inflammatory changes centered on the intervertebral disc space between the L2 and L3 vertebral bodies. There is bony destruction on both sides of the intervertebral disc space between the L2 and L3 vertebral bodies.  This is consistent with the patient's history and characteristic of discitis and osteomyelitis. ED discussed case with Dr. Nicolas (neurosurgery) who reported pt did not need to be transferred out at this time. Obtain MRI thoracic/lumbar spine w wo contrast. Start pt on IV Vancomycin and Zosyn but consult Dr. Gomez (ID). Daughter: Uzma Godfrey [phone] is the surrogate decision maker. Admitted for Osteomyelitis of lumbar spine.    Hospital Course:  5/24/19 Dr. Nicolas, Neurosurgeon, doesn't recommend spinal surgery, But Needle biopsy of L 2-3 disk abscess with cultures. This am, patient c/o of severe abdominal pain 20/10, Norco 10 mg ordered PRN with IV 2 mg Morphine PRN. Blood culture is Positive for GPC. Echo pending. Patient had severe back pain and refused OT/PT. Daughter stated she has had diarrhea x 1 month. Stool for C.Diff  ordered. T. Max 101. IV Vanco and Zosyn. IR, Dr. Juárez, consulted for Needle biopsy. Daughter at bedside. ID consult pending. Cont Vanc and Zosyn. CT abdomen/pelvis positive for osteomyelitis.  To OR with Dr. Nicolas 25 May for laminectomy.  Post-procedure in ICU due to post-op hypotension.    Interval History:  To OR with Dr. Nicolas 25 May for laminectomy.  Post-procedure in ICU due to post-op hypotension.    Review of Systems   Constitutional: Positive for activity change. Negative for chills and fever.   HENT: Negative for trouble swallowing.    Eyes: Negative for visual disturbance.   Respiratory: Negative for apnea, cough, choking, chest tightness, shortness of breath, wheezing and stridor.    Cardiovascular: Negative for chest pain, palpitations and leg swelling.   Gastrointestinal: Positive for abdominal pain, diarrhea and nausea. Negative for constipation and vomiting.   Genitourinary: Negative for difficulty urinating, dysuria, flank pain, frequency and urgency.   Musculoskeletal: Positive for arthralgias, back pain (lumbar) and gait problem. Negative for joint swelling, myalgias, neck pain and neck stiffness.   Skin: Negative for color change.   Neurological: Positive for weakness. Negative for dizziness, tremors, seizures, syncope, facial asymmetry, speech difficulty, light-headedness, numbness and headaches.   Psychiatric/Behavioral: Positive for dysphoric mood. Negative for agitation and behavioral problems.     Objective:     Vital Signs (Most Recent):  Temp: 98.2 °F (36.8 °C) (05/25/19 1837)  Pulse: 92 (05/25/19 1930)  Resp: (!) 6 (05/25/19 1930)  BP: (!) 96/54 (05/25/19 1930)  SpO2: 100 % (05/25/19 1930) Vital Signs (24h Range):  Temp:  [97 °F (36.1 °C)-98.2 °F (36.8 °C)] 98.2 °F (36.8 °C)  Pulse:  [] 92  Resp:  [6-33] 6  SpO2:  [89 %-100 %] 100 %  BP: ()/(28-83) 96/54     Weight: 104.8 kg (231 lb)  Body mass index is 37.28 kg/m².    Intake/Output Summary (Last 24 hours) at 5/25/2019  2000  Last data filed at 5/25/2019 1900  Gross per 24 hour   Intake 2000 ml   Output 535 ml   Net 1465 ml      Physical Exam   Constitutional: She is oriented to person, place, and time. She appears well-developed. She is cooperative. She is easily aroused. She appears distressed (appears in pain from BLE cramping).   HENT:   Head: Normocephalic and atraumatic.   Eyes: EOM are normal.   Neck: Normal range of motion. Neck supple.   Cardiovascular: Normal rate, regular rhythm and intact distal pulses.   No murmur heard.  Pulmonary/Chest: Effort normal and breath sounds normal. No stridor. No respiratory distress. She has no wheezes. She has no rales. She exhibits no tenderness.   Abdominal: Soft. She exhibits no distension. There is tenderness (BL quadrant). There is no rebound and no guarding.   Diminished Bowel sounds   Genitourinary:   Genitourinary Comments: Deferred   Musculoskeletal: She exhibits tenderness (back and abdomen). She exhibits no edema or deformity.   Neurological: She is alert, oriented to person, place, and time and easily aroused. No sensory deficit.   BUE tremors, hx of parkinson's   Skin: Skin is warm and dry. Capillary refill takes less than 2 seconds.   Psychiatric: She has a normal mood and affect. Her behavior is normal. Thought content normal.   Nursing note and vitals reviewed.    Significant Labs:   Blood Culture:   No results for input(s): LABBLOO in the last 48 hours.  CBC:   Recent Labs   Lab 05/24/19  0533 05/25/19  0509 05/25/19  1521   WBC 10.85 8.66  --    HGB 10.2* 10.6* 9.9*   HCT 32.0* 32.9* 30.8*   * 394*  --      CMP:   Recent Labs   Lab 05/24/19  0533 05/25/19  0509   * 131*   K 3.7 3.8   CL 95 96   CO2 25 25   GLU 78 122*   BUN 10 9   CREATININE 0.8 0.8   CALCIUM 8.9 9.1   PROT 6.6 6.8   ALBUMIN 2.7* 2.8*   BILITOT 0.7 0.5   ALKPHOS 92 83   AST 31 30   ALT 8* 7*   ANIONGAP 8 10   EGFRNONAA >60 >60       Significant Imaging: I have reviewed and interpreted all  pertinent imaging results/findings within the past 24 hours.    Assessment/Plan:      Abscess in epidural space of lumbar spine  Laminectomy and drainage per Neurosurgery.    Gram-positive cocci bacteremia  - ID consult  -On IV Vanco/Zosyn  Adjust based on culture results.  Streptococci.    Hyponatremia  - Na+ 128, monitor  - IVF 75 cc/hr  - Neuro checks      UTI (urinary tract infection)  - UA (+) nitrites. Micro few bacteria  - Continue Zosyn  - Urine culture pending      Osteomyelitis of lumbar spine  - CT abdomen/pelvis with contrast showed moderate inflammatory changes centered on the intervertebral disc space between the L2 and L3 vertebral bodies. There is bony destruction on both sides of the intervertebral disc space between the L2 and L3 vertebral bodies.  This is consistent with the patient's history and characteristic of discitis and osteomyelitis  - Neurosurgery, Dr. Nicolas, states no surgical intervention at this time  -Needs Needle bx L 2-3 ordered 5/24/19  - MRI thoracic and lumbar spine: osteomyelitis  - Inpatient consult to ID  - Blood cultures: GPC  - Neurovascular checks  - Analgesics PRN    Type 2 diabetes, controlled, with peripheral neuropathy  - HgbA1c on 04/23/19 -- 7.8  - Accuchecks and low dose SSI  - Pt takes Novolin 70/30 55 units SQ TID. Glucose in ED 57 mg/dL. Will hold Novolin for now, use low dose SSI, and monitor glucose      Hypothyroidism  - Continue Levothyroxine      Essential hypertension  - Continue home medications including Metoprolol and Losartan      CAD (coronary artery disease)  - Continue ASA, Statin, and Imdur      PD (Parkinson's disease)  - Continue Pramipexole and Sinemet        VTE Risk Mitigation (From admission, onward)        Ordered     Place sequential compression device  Until discontinued      05/25/19 1938          Critical care time spent on the evaluation and treatment of severe organ dysfunction, review of pertinent labs and imaging studies, discussions  with consulting providers and discussions with patient/family: 30 minutes.    Teddy Bryant MD  Department of Hospital Medicine   [facility]

## 2019-05-27 ENCOUNTER — TELEPHONE (OUTPATIENT)
Dept: NEUROSURGERY | Facility: CLINIC | Age: 74
End: 2019-05-27

## 2019-05-27 DIAGNOSIS — M46.26 OSTEOMYELITIS OF LUMBAR SPINE: Primary | ICD-10-CM

## 2019-05-27 LAB
ANION GAP SERPL CALC-SCNC: 7 MMOL/L (ref 8–16)
BASOPHILS # BLD AUTO: 0.01 K/UL (ref 0–0.2)
BASOPHILS NFR BLD: 0.1 % (ref 0–1.9)
BUN SERPL-MCNC: 8 MG/DL (ref 8–23)
CALCIUM SERPL-MCNC: 8.7 MG/DL (ref 8.7–10.5)
CHLORIDE SERPL-SCNC: 95 MMOL/L (ref 95–110)
CO2 SERPL-SCNC: 27 MMOL/L (ref 23–29)
CREAT SERPL-MCNC: 0.7 MG/DL (ref 0.5–1.4)
DIFFERENTIAL METHOD: ABNORMAL
EOSINOPHIL # BLD AUTO: 0.3 K/UL (ref 0–0.5)
EOSINOPHIL NFR BLD: 1.9 % (ref 0–8)
ERYTHROCYTE [DISTWIDTH] IN BLOOD BY AUTOMATED COUNT: 14.3 % (ref 11.5–14.5)
EST. GFR  (AFRICAN AMERICAN): >60 ML/MIN/1.73 M^2
EST. GFR  (NON AFRICAN AMERICAN): >60 ML/MIN/1.73 M^2
GLUCOSE SERPL-MCNC: 134 MG/DL (ref 70–110)
HCT VFR BLD AUTO: 24.8 % (ref 37–48.5)
HGB BLD-MCNC: 8.2 G/DL (ref 12–16)
LYMPHOCYTES # BLD AUTO: 2.2 K/UL (ref 1–4.8)
LYMPHOCYTES NFR BLD: 16 % (ref 18–48)
MCH RBC QN AUTO: 28.5 PG (ref 27–31)
MCHC RBC AUTO-ENTMCNC: 33.1 G/DL (ref 32–36)
MCV RBC AUTO: 86 FL (ref 82–98)
MONOCYTES # BLD AUTO: 1.4 K/UL (ref 0.3–1)
MONOCYTES NFR BLD: 10.1 % (ref 4–15)
NEUTROPHILS # BLD AUTO: 9.8 K/UL (ref 1.8–7.7)
NEUTROPHILS NFR BLD: 71.9 % (ref 38–73)
PLATELET # BLD AUTO: 286 K/UL (ref 150–350)
PMV BLD AUTO: 8 FL (ref 9.2–12.9)
POTASSIUM SERPL-SCNC: 3.8 MMOL/L (ref 3.5–5.1)
RBC # BLD AUTO: 2.88 M/UL (ref 4–5.4)
SODIUM SERPL-SCNC: 129 MMOL/L (ref 136–145)
WBC # BLD AUTO: 13.66 K/UL (ref 3.9–12.7)

## 2019-05-27 PROCEDURE — 99900035 HC TECH TIME PER 15 MIN (STAT)

## 2019-05-27 PROCEDURE — 25000003 PHARM REV CODE 250: Performed by: INTERNAL MEDICINE

## 2019-05-27 PROCEDURE — 21400001 HC TELEMETRY ROOM

## 2019-05-27 PROCEDURE — 97110 THERAPEUTIC EXERCISES: CPT

## 2019-05-27 PROCEDURE — 97530 THERAPEUTIC ACTIVITIES: CPT

## 2019-05-27 PROCEDURE — 63600175 PHARM REV CODE 636 W HCPCS: Performed by: INTERNAL MEDICINE

## 2019-05-27 PROCEDURE — 25000003 PHARM REV CODE 250: Performed by: PHYSICIAN ASSISTANT

## 2019-05-27 PROCEDURE — 27000221 HC OXYGEN, UP TO 24 HOURS

## 2019-05-27 PROCEDURE — 80048 BASIC METABOLIC PNL TOTAL CA: CPT

## 2019-05-27 PROCEDURE — 85025 COMPLETE CBC W/AUTO DIFF WBC: CPT

## 2019-05-27 PROCEDURE — 94761 N-INVAS EAR/PLS OXIMETRY MLT: CPT

## 2019-05-27 RX ORDER — MUPIROCIN 20 MG/G
OINTMENT TOPICAL DAILY
Status: DISCONTINUED | OUTPATIENT
Start: 2019-05-27 | End: 2019-05-30 | Stop reason: HOSPADM

## 2019-05-27 RX ADMIN — Medication: at 08:05

## 2019-05-27 RX ADMIN — LEVOTHYROXINE SODIUM 125 MCG: 125 TABLET ORAL at 06:05

## 2019-05-27 RX ADMIN — HYDROCODONE BITARTRATE AND ACETAMINOPHEN 1 TABLET: 10; 325 TABLET ORAL at 07:05

## 2019-05-27 RX ADMIN — Medication: at 11:05

## 2019-05-27 RX ADMIN — DOCUSATE SODIUM 100 MG: 100 CAPSULE, LIQUID FILLED ORAL at 08:05

## 2019-05-27 RX ADMIN — HYDROMORPHONE HYDROCHLORIDE 2 MG: 1 INJECTION, SOLUTION INTRAMUSCULAR; INTRAVENOUS; SUBCUTANEOUS at 06:05

## 2019-05-27 RX ADMIN — PRAMIPEXOLE DIHYDROCHLORIDE 1 MG: 0.5 TABLET ORAL at 02:05

## 2019-05-27 RX ADMIN — CEFTRIAXONE 2 G: 2 INJECTION, SOLUTION INTRAVENOUS at 08:05

## 2019-05-27 RX ADMIN — MORPHINE SULFATE 0.5 MG: 4 INJECTION INTRAVENOUS at 02:05

## 2019-05-27 RX ADMIN — THERA TABS 1 TABLET: TAB at 08:05

## 2019-05-27 RX ADMIN — CARBIDOPA AND LEVODOPA 1 TABLET: 25; 250 TABLET ORAL at 08:05

## 2019-05-27 RX ADMIN — MORPHINE SULFATE 4 MG: 4 INJECTION INTRAVENOUS at 03:05

## 2019-05-27 RX ADMIN — METOPROLOL SUCCINATE 25 MG: 25 TABLET, EXTENDED RELEASE ORAL at 08:05

## 2019-05-27 RX ADMIN — MUPIROCIN: 20 OINTMENT TOPICAL at 11:05

## 2019-05-27 RX ADMIN — PRAVASTATIN SODIUM 40 MG: 20 TABLET ORAL at 08:05

## 2019-05-27 RX ADMIN — CARBIDOPA AND LEVODOPA 1 TABLET: 25; 250 TABLET ORAL at 04:05

## 2019-05-27 RX ADMIN — LATANOPROST 1 DROP: 50 SOLUTION OPHTHALMIC at 11:05

## 2019-05-27 RX ADMIN — LOSARTAN POTASSIUM 50 MG: 50 TABLET, FILM COATED ORAL at 08:05

## 2019-05-27 RX ADMIN — PANTOPRAZOLE SODIUM 40 MG: 40 TABLET, DELAYED RELEASE ORAL at 08:05

## 2019-05-27 RX ADMIN — PRAMIPEXOLE DIHYDROCHLORIDE 1 MG: 0.5 TABLET ORAL at 11:05

## 2019-05-27 RX ADMIN — PRAMIPEXOLE DIHYDROCHLORIDE 1 MG: 0.5 TABLET ORAL at 08:05

## 2019-05-27 RX ADMIN — CARBIDOPA AND LEVODOPA 1 TABLET: 25; 250 TABLET ORAL at 11:05

## 2019-05-27 RX ADMIN — CARBIDOPA AND LEVODOPA 1 TABLET: 25; 250 TABLET ORAL at 12:05

## 2019-05-27 RX ADMIN — ISOSORBIDE MONONITRATE 120 MG: 60 TABLET, EXTENDED RELEASE ORAL at 08:05

## 2019-05-27 RX ADMIN — HYDROCODONE BITARTRATE AND ACETAMINOPHEN 1 TABLET: 10; 325 TABLET ORAL at 12:05

## 2019-05-27 NOTE — PROGRESS NOTES
Pt pulled out hemovac from incision site. Some moist drainage on bandage. Output in hemovac was 25 cc. Notified Dr. Nicolas.

## 2019-05-27 NOTE — PROGRESS NOTES
[facility]  Infectious Disease  Progress Note    Patient Name: Ross Isbell  MRN: [id number]  Admission Date: 5/23/2019  Length of Stay: 4 days  Attending Physician: Teddy Bryant MD  Primary Care Provider: Anna Penn MD    Isolation Status: No active isolations  Assessment/Plan:      Acute osteomyelitis of lumbar spine  05/26- will need prolonged antimicrobial therapy .  Will continue Rocephin   PICC line inserted.  Weekly ESR and CRP while on therapy .    Streptococcal bacteremia  05/24- will follow final ID and drug susceptibility of organism.  Will continue Vanco/rocephin  05/25- isolate is strep -will continue close follow up of repeat blood culture.  Continue Rocephin.  05/26- will plan to complete up to 6 weeks of IV Rocephin.  Will follow repeat blood culture.    Essential hypertension  05/24, BP control as per primary team    PD (Parkinson's disease)  05/24- out patient neurology follow up .        Anticipated Disposition:     Thank you for your consult. I will follow-up with patient. Please contact us if you have any additional questions.    Dada Gomez MD  Infectious Disease  [facility]    Subjective:     Principal Problem:Status post lumbar laminectomy    HPI: 73 year old female with history of DM II, Parkinson's, CORINE, MI, CAD, and Glaucoma who presented to the Emergency Department with c/o fatigue. There is associated symptom of generalized weakness, bilateral lower extremity pain. Since admission, all cultures were reviewed -   Blood cultures -  05/23- strep   04/30-  Streptococcus gallinaceus   She was seen in the ED on 04/30 and blood cultures at that time was also positive for strep gallinaceous .She was discharged from the ED as her symptoms were attributed to Parkinsonism .CT abdomen/pelvis with contrast showed moderate inflammatory changes centered on the intervertebral disc space between the L2 and L3 vertebral bodies. There is bony destruction on  both sides of the intervertebral disc space between the L2 and L3 vertebral bodies.  This is consistent with the patient's history and characteristic of discitis and osteomyelitis.   MRI of the thoracic spine -Negative for evidence of discitis or osteomyelitis of the thoracic spine.    Low-grade scattered degenerative changes.  MRI of the lumbar spine-Findings compatible with discitis L2-3, with osteomyelitis of L2 and L3.  Additional evidence of epidural abscess within the anterior epidural space.  Severe paravertebral soft tissue edema, as detailed above.  3. Small amount of fluid within the L3-L4 disc space suspicious for early osteomyelitis    Interval History: 73 year old woman  with strep bacteremia/epidural abscess s/p L1-4 with L2-3 laminectomy for debridement and drainage of epidural abscess .  She is drowsy post operative procedure.  05/26- she has episodes of confusion, nurse is by bedside  Blood culture=pan sensitive strep   Review of Systems   Unable to perform ROS: Acuity of condition     Objective:     Vital Signs (Most Recent):  Temp: 100.3 °F (37.9 °C) (05/27/19 0441)  Pulse: 94 (05/27/19 0441)  Resp: (!) 28 (05/27/19 0441)  BP: (!) 119/58 (05/27/19 0441)  SpO2: 95 % (05/27/19 0441) Vital Signs (24h Range):  Temp:  [96.6 °F (35.9 °C)-100.4 °F (38 °C)] 100.3 °F (37.9 °C)  Pulse:  [] 94  Resp:  [14-32] 28  SpO2:  [93 %-100 %] 95 %  BP: ()/(34-71) 119/58     Weight: 100.6 kg (221 lb 12.5 oz)  Body mass index is 35.8 kg/m².    Estimated Creatinine Clearance: 85.7 mL/min (based on SCr of 0.7 mg/dL).    Physical Exam   Constitutional: She appears well-developed. She is cooperative. She is easily aroused. No distress (-  ).   HENT:   Head: Normocephalic and atraumatic.   Eyes: EOM are normal.   Neck: Normal range of motion. Neck supple.   Cardiovascular: Normal rate, regular rhythm and intact distal pulses.   No murmur heard.  Pulmonary/Chest: Effort normal and breath sounds normal. No stridor.  No respiratory distress. She has no wheezes. She has no rales. She exhibits no tenderness.   Abdominal: Soft. She exhibits no distension. There is tenderness (BL quadrant). There is no rebound and no guarding.   Diminished Bowel sounds   Genitourinary:   Genitourinary Comments: Deferred   Musculoskeletal: She exhibits tenderness (back and abdomen). She exhibits no edema or deformity.   Neurological: She is alert and easily aroused. No sensory deficit.   BUE tremors, hx of parkinson's  Has episodes of confusion    Skin: Skin is warm and dry. Capillary refill takes less than 2 seconds.   Psychiatric: She has a normal mood and affect. Her behavior is normal. Thought content normal.   Nursing note and vitals reviewed.      Significant Labs:   Blood Culture:   Recent Labs   Lab 04/30/19  1610 04/30/19  1640 05/23/19  1328 05/23/19  1345   LABBLOO Gram stain aer bottle: Gram positive cocci in chains resembling Strep  Gram stain flaco bottle: Gram positive cocci in chains resembling Strep  Results called to and read back by: Adwoa Ramirez RN 05/01/2019  10:43  STREPTOCOCCUS SPECIES  Further identified as Streptococcus gallinaceus  For susceptibility see order #[id number]   Gram stain aer bottle: Gram positive cocci in chains resembling Strep  Gram stain flaco bottle: Gram positive cocci in chains resembling Strep  Results called to and read back by: Adwoa Ramirez RN 05/01/2019  10:42  STREPTOCOCCUS SPECIES  Further identified as Streptococcus gallinaceus   Gram stain aer bottle: Gram positive cocci   Results called to and read back by:Krista Abdi RN 05/24/2019  14:22  Gram stain flaco bottle: Gram positive cocci   Positive results previously called  STREPTOCOCCUS SPECIES  Further Identified as Streptococcus gallinaceus   Gram stain aer bottle: Gram positive cocci   Results called to and read back by:Krista Abdi RN 05/24/2019  14:23  Gram stain flaco bottle: Gram positive cocci   Positive results previously called   STREPTOCOCCUS SPECIES  Further Identified as Streptococcus gallinaceus  For susceptibility see order #[id number]       BMP:   Recent Labs   Lab 05/26/19 0324   *   *   K 4.3   CL 99   CO2 26   BUN 6*   CREATININE 0.7   CALCIUM 8.5*   MG 1.3*     CBC:   Recent Labs   Lab 05/25/19  1521 05/26/19  0324 05/26/19  1650   WBC  --  11.55  --    HGB 9.9* 7.6* 8.0*   HCT 30.8* 22.8* 23.9*   PLT  --  363*  --      All pertinent labs within the past 24 hours have been reviewed.    Significant Imaging: I have reviewed all pertinent imaging results/findings within the past 24 hours.

## 2019-05-27 NOTE — PT/OT/SLP PROGRESS
Physical Therapy  Treatment    Ross Isbell   MRN: [id number]   Admitting Diagnosis: Status post lumbar laminectomy    PT Received On: 05/27/19  PT Start Time: 1120     PT Stop Time: 1145    PT Total Time (min): 25 min       Billable Minutes:  Therapeutic Activity 15 and Therapeutic Exercise 10    Treatment Type: Treatment  PT/PTA: PT       General Precautions: Standard, fall, respiratory  Orthopedic Precautions: spinal precautions   Braces: LSO , BRACE NOW AVAILABLE IN ROOM    Subjective:  Communicated with NURSE ELENI  prior to session.  Pain/Comfort  Pain Rating 1: 10/10  Location 1: back    Objective:   Patient found with: telemetry, peripheral IV, oxygen    Functional Mobility:  Therapeutic Activities and Exercises:  PT FOUND SUPINE IN BED UPON ARRIVAL, AGREEABLE TO TX., DAUGHTER PRESENT AND VERY HELPFUL WITH ENCOURAGEMENT, SUP>SIT WITH MAXA X 2, PT AND DAUGHTER EDUCATED IN DONNING/DOFFING LSO BRACE, TOTAL ASSIST FOR DONNING ROBE AND BRACE, SEATED SCOOT TO EOB WITH MAXA, PT PRESENTS WITH POOR SITTING BALANCE DUE TO STRONG POSTERIOR LEAN INITIALLY, MAXA X 2 FOR SIT<>STAND TF AND STAND PIVOT TF TO CHAIR, VERY UNSTEADY, POOR SAFETY DUE TO B PF CONTRACTURES, PT EDUCATED IN AND PERFORMED BLE THEREX X 20 REPS AAROM WITH REST    AM-PAC 6 CLICK MOBILITY  How much help from another person does this patient currently need?   1 = Unable, Total/Dependent Assistance  2 = A lot, Maximum/Moderate Assistance  3 = A little, Minimum/Contact Guard/Supervision  4 = None, Modified [place]/Independent    Turning over in bed (including adjusting bedclothes, sheets and blankets)?: 2  Sitting down on and standing up from a chair with arms (e.g., wheelchair, bedside commode, etc.): 1  Moving from lying on back to sitting on the side of the bed?: 1  Moving to and from a bed to a chair (including a wheelchair)?: 1  Need to walk in hospital room?: 1  Climbing 3-5 steps with a railing?: 1  Basic Mobility Total Score: 7    AM-PAC Raw  Score CMS G-Code Modifier Level of Impairment Assistance   6 % Total / Unable   7 - 9 CM 80 - 100% Maximal Assist   10 - 14 CL 60 - 80% Moderate Assist   15 - 19 CK 40 - 60% Moderate Assist   20 - 22 CJ 20 - 40% Minimal Assist   23 CI 1-20% SBA / CGA   24 CH 0% Independent/ Mod I     Patient left up in chair with all lines intact, call button in reach, chair alarm on, NURSE notified and DAUGHTER present.    Assessment:  Ross Isbell is a 73 y.o. female with a medical diagnosis of Status post lumbar laminectomy and presents with IMPAIRED FUNCTIONAL MOBILITY. PT WILL BENEFIT FROM CONT. SKILLED P.T. TO ADDRESS IMPAIRMENTS    Rehab identified problem list/impairments: Rehab identified problem list/impairments: weakness, impaired functional mobilty, impaired endurance, gait instability, impaired balance, decreased coordination, decreased safety awareness, pain, decreased lower extremity function    Rehab potential is fair.    Activity tolerance: Fair    Discharge recommendations: Discharge Facility/Level of Care Needs: nursing facility, skilled     Barriers to discharge:      Equipment recommendations: Equipment Needed After Discharge: none     GOALS:   Multidisciplinary Problems     Physical Therapy Goals        Problem: Physical Therapy Goal    Goal Priority Disciplines Outcome Goal Variances Interventions   Physical Therapy Goal     PT, PT/OT Ongoing (interventions implemented as appropriate)     Description:  LTG'S TO BE MET IN 7 DAYS (5-31-19)  1. PT WILL REQUIRE MODA FOR BED MOBILITY  2. PT WILL REQUIRE MODA FOR TF'S  3. PT WILL TOLERATE BLE THEREX X 20 REPS AROM             Problem: Physical Therapy Goal    Goal Priority Disciplines Outcome Goal Variances Interventions   Physical Therapy Goal     PT, PT/OT      Description:  PT eval complete. Prior goals remain appropriate and should be met by 6/2/2019                    PLAN:    Patient to be seen 5 x/week  to address the above listed problems via  therapeutic activities, therapeutic exercises  Plan of Care expires: 06/02/19  Plan of Care reviewed with: patient    PT ENCOURAGED TO CALL FOR ASSISTANCE WITH ALL NEEDS DUE TO FALL RISK STATUS, PT AGREEABLE    Kirsten Dang, PT  05/27/2019

## 2019-05-27 NOTE — PLAN OF CARE
Problem: Physical Therapy Goal  Goal: Physical Therapy Goal  LTG'S TO BE MET IN 7 DAYS (5-31-19)  1. PT WILL REQUIRE MODA FOR BED MOBILITY  2. PT WILL REQUIRE MODA FOR TF'S  3. PT WILL TOLERATE BLE THEREX X 20 REPS AROM     Outcome: Ongoing (interventions implemented as appropriate)  FAIR TOLERANCE TO TX. , LIMITED BY PAIN, MAXA X 2 FOR BED MOBILITY AND TF TO CHAIR

## 2019-05-27 NOTE — PT/OT/SLP PROGRESS
Occupational Therapy  Treatment    Ross Isbell   MRN: [id number]   Admitting Diagnosis: Status post lumbar laminectomy    OT Date of Treatment: 05/27/19   OT Start Time: 1115  OT Stop Time: 1145  OT Total Time (min): 30 min    Billable Minutes:  Therapeutic Activity 15 minutes and Therapeutic Exercise 15 minutes    General Precautions: Standard, fall, respiratory  Orthopedic Precautions: spinal precautions  Braces: TLSO         Subjective:  Communicated with nurse and epic chart review prior to session.  Pain/Comfort  Pain Rating 1: 10/10  Location - Side 1: Bilateral  Location - Orientation 1: lower  Location 1: back(during movement)    Objective:  Patient found with: telemetry, peripheral IV, oxygen, blood pressure cuff, valentine catheter     Functional Mobility:  Bed Mobility:   max a   Transfers:     max a x 2      Activities of Daily Living:     Feeding adaptive equipment: na     UE adaptive equipment: total a      LE adaptive equipment: total a              Bathing adaptive equipment: na  Balance:   Static Sit: poor-  Dynamic Sit: poor-  Static Stand: poor-  Dynamic stand: poor-    Therapeutic Activities and Exercises:  Pt seen in room and required max encouragement during tx session. Pt req max a with log rolling and vc's for instructions.  Pt req max a x 2 with bed mobility supine >sit . Pt req total a with hua/doff back brace. Pt req max a x 2 with sit<>stand and max a x 2 with bed >bedside chair.pt performed 1 set x 15 reps b ue rom exercise seated in bed side chair. Pt left sitting in bed side chair with all needs met and daughter present tin room.  AM-PAC 6 CLICK ADL   How much help from another person does this patient currently need?   1 = Unable, Total/Dependent Assistance  2 = A lot, Maximum/Moderate Assistance  3 = A little, Minimum/Contact Guard/Supervision  4 = None, Modified [place]/Independent    Putting on and taking off regular lower body clothing? : 2  Bathing (including washing,  "rinsing, drying)?: 2  Toileting, which includes using toilet, bedpan, or urinal? : 2  Putting on and taking off regular upper body clothing?: 2  Taking care of personal grooming such as brushing teeth?: 2  Eating meals?: 3  Daily Activity Total Score: 13     AM-PAC Raw Score CMS "G-Code Modifier Level of Impairment Assistance   6 % Total / Unable   7 - 8 CM 80 - 100% Maximal Assist   9-13 CL 60 - 80% Moderate Assist   14 - 19 CK 40 - 60% Moderate Assist   20 - 22 CJ 20 - 40% Minimal Assist   23 CI 1-20% SBA / CGA   24 CH 0% Independent/ Mod I       Patient left up in chair with all lines intact, call button in reach, chair alarm on, nurse notified and daughter present    ASSESSMENT:  Ross Isbell is a 73 y.o. female with a medical diagnosis of Status post lumbar laminectomy and presents with [id number] and generalized weakness. Pt will continue to benefit from skilled o.t..    Rehab identified problem list/impairments: Rehab identified problem list/impairments: weakness, impaired endurance, impaired functional mobilty, impaired self care skills, gait instability, impaired balance, decreased upper extremity function, decreased lower extremity function, decreased safety awareness, pain    Rehab potential is good.    Activity tolerance: Good    Discharge recommendations: Discharge Facility/Level of Care Needs: nursing facility, skilled     Barriers to discharge: Barriers to Discharge: None    Equipment recommendations: none     GOALS:   Multidisciplinary Problems     Occupational Therapy Goals        Problem: Occupational Therapy Goal    Goal Priority Disciplines Outcome Interventions   Occupational Therapy Goal     OT, PT/OT Ongoing (interventions implemented as appropriate)    Description:  ot goals to be met 5-31-19  s with ue dressing  Pt will tolerate 1 set x 15 reps b ue rom exercise  Min a with bed supine<>sit  min a with bsc t/f's                    Plan:  Patient to be seen 3 x/week to address the " above listed problems via self-care/home management, therapeutic activities, therapeutic exercises  Plan of Care expires: 06/02/19  Plan of Care reviewed with: patient         Cyndie Hernandez, OT  05/27/2019

## 2019-05-27 NOTE — PLAN OF CARE
Problem: Adult Inpatient Plan of Care  Goal: Plan of Care Review  Outcome: Ongoing (interventions implemented as appropriate)  The patient has been sinus rhythm on the monitor. Blood glucose monitored. Pain managed with prn medication. Pt has had an uneventful night and is resting quietly, will continue to monitor.

## 2019-05-27 NOTE — PROGRESS NOTES
Addendum:  Pt seen this evening   She moves her legs in bed however still unable to ambulate with PT   Has complaints of pain as she did prior to surgery   Incision was checked she has two weeping bed sores next to her incision site   The incision site itself looks clean and without drainage   AP   Will consult wound care tomorrow morning     Stanislav Nicolas MD  Neurosurgery               [facility]  Neurosurgery  Progress Note    Subjective:     History of Present Illness: No notes on file    Post-Op Info:  Procedure(s) (LRB):  FUSION, SPINE, POSTERIOR SPINAL COLUMN, LUMBAR, USING COMPUTER-ASSISTED NAVIGATION  FUSION, SPINE, LUMBAR, TLIF, POSTERIOR APPROACH, USING PEDICLE SCREW (N/A)  LAMINECTOMY, SPINE, LUMBAR   2 Days Post-Op     Patient slightly groggy secondary to Dilaudid and Morphine administration this AM   Clinically the patient states that her back pain is improved  No complaints of pain down the legs   She has numbness and tingling to her feet which was present prior to the operation  Patient pulled out hemovac drain overnight   Overall she states she feels better    Vital signs reviewed  Hemovac drain pulled out overnight; suture removed at bedside  Incision clean dry flat with dressing in place, dressing changed at bedside  Moves extremities well  Sensations intact to light touch  Antigravity in both lower extremities  Abdomen soft nontender nondistended    Assessment/Plan:     BP and labs being managed by Medicine  Streptococcal bacteremia with osteomyelitis and diskitis being managed by infectious disease currently on Rocephin  Encourage mobility today  Will order LSO brace when out of bed  Dressing changed at bedside  Hemovac suture removed; hemovac pulled out overnight     Katherine Jacques PA-C  Neurosurgery  [facility]

## 2019-05-27 NOTE — PT/OT/SLP PROGRESS
Physical Therapy  Treatment    Ross Isbell   MRN: [id number]   Admitting Diagnosis: Status post lumbar laminectomy    PT Received On: 05/27/19  PT Start Time: 0720     PT Stop Time: 0735    PT Total Time (min): 15 min       Billable Minutes:  Therapeutic Activity 15    Treatment Type: Treatment  PT/PTA: PT         General Precautions: Standard, fall, respiratory  Orthopedic Precautions: spinal precautions   Braces: LSO, BRACE NOT AVAILABLE YET    Subjective:  Communicated with NURSE ELENI prior to session.  Pain/Comfort  Pain Rating 1: 10/10  Location 1: back    Objective:   Patient found with: telemetry, peripheral IV, oxygen    Functional Mobility:  Therapeutic Activities and Exercises:  PT FOUND SUPINE IN BED UPON ARRIVAL, LETHARGIC BUT ABLE TO AROUSE, UPSET ABOUT NOT BEING ABLE TO EAT BREAKFAST YET, REVIEW BLE THEREX TO PERFORM WHILE SUPINE IN BED, PT ASSISTED IN SET UP FOR BRUSHING TEETH, AIDE CALLED FOR ASSISTING PT FURTHER IN ADL'S AND IN EATING    AM-PAC 6 CLICK MOBILITY  How much help from another person does this patient currently need?   1 = Unable, Total/Dependent Assistance  2 = A lot, Maximum/Moderate Assistance  3 = A little, Minimum/Contact Guard/Supervision  4 = None, Modified [place]/Independent    Turning over in bed (including adjusting bedclothes, sheets and blankets)?: 2  Sitting down on and standing up from a chair with arms (e.g., wheelchair, bedside commode, etc.): 1  Moving from lying on back to sitting on the side of the bed?: 1  Moving to and from a bed to a chair (including a wheelchair)?: 1  Need to walk in hospital room?: 1  Climbing 3-5 steps with a railing?: 1  Basic Mobility Total Score: 7    AM-PAC Raw Score CMS G-Code Modifier Level of Impairment Assistance   6 % Total / Unable   7 - 9 CM 80 - 100% Maximal Assist   10 - 14 CL 60 - 80% Moderate Assist   15 - 19 CK 40 - 60% Moderate Assist   20 - 22 CJ 20 - 40% Minimal Assist   23 CI 1-20% SBA / CGA   24 CH 0%  Independent/ Mod I     Patient left HOB elevated with all lines intact, call button in reach and NURSE notified.    Assessment:  Ross Isbell is a 73 y.o. female with a medical diagnosis of Status post lumbar laminectomy and presents with IMPAIRED FUNCTIONAL MOBILITY. PT WILL BENEFIT FROM CONT. SKILLED P.T. TO ADDRESS IMPAIRMENTS    Rehab identified problem list/impairments: Rehab identified problem list/impairments: weakness, impaired functional mobilty, impaired endurance, gait instability, impaired balance, decreased coordination, decreased safety awareness, pain, decreased lower extremity function    Rehab potential is fair.    Activity tolerance: Fair    Discharge recommendations: Discharge Facility/Level of Care Needs: nursing facility, skilled     Barriers to discharge:      Equipment recommendations: Equipment Needed After Discharge: none     GOALS:   Multidisciplinary Problems     Physical Therapy Goals        Problem: Physical Therapy Goal    Goal Priority Disciplines Outcome Goal Variances Interventions   Physical Therapy Goal     PT, PT/OT Ongoing (interventions implemented as appropriate)     Description:  LTG'S TO BE MET IN 7 DAYS (5-31-19)  1. PT WILL REQUIRE MODA FOR BED MOBILITY  2. PT WILL REQUIRE MODA FOR TF'S  3. PT WILL TOLERATE BLE THEREX X 20 REPS AROM             Problem: Physical Therapy Goal    Goal Priority Disciplines Outcome Goal Variances Interventions   Physical Therapy Goal     PT, PT/OT      Description:  PT eval complete. Prior goals remain appropriate and should be met by 6/2/2019                    PLAN:    Patient to be seen 5 x/week  to address the above listed problems via therapeutic activities, therapeutic exercises  Plan of Care expires: 06/02/19  Plan of Care reviewed with: patient    PT ENCOURAGED TO CALL FOR ASSISTANCE WITH ALL NEEDS DUE TO FALL RISK STATUS, PT AGREEABLE    Kirsten Dang, PT  05/27/2019

## 2019-05-27 NOTE — SUBJECTIVE & OBJECTIVE
Interval History: 73 year old woman  with strep bacteremia/epidural abscess s/p L1-4 with L2-3 laminectomy for debridement and drainage of epidural abscess .  She is drowsy post operative procedure.  05/26- she has episodes of confusion, nurse is by bedside  Blood culture=pan sensitive strep   Review of Systems   Unable to perform ROS: Acuity of condition     Objective:     Vital Signs (Most Recent):  Temp: 100.3 °F (37.9 °C) (05/27/19 0441)  Pulse: 94 (05/27/19 0441)  Resp: (!) 28 (05/27/19 0441)  BP: (!) 119/58 (05/27/19 0441)  SpO2: 95 % (05/27/19 0441) Vital Signs (24h Range):  Temp:  [96.6 °F (35.9 °C)-100.4 °F (38 °C)] 100.3 °F (37.9 °C)  Pulse:  [] 94  Resp:  [14-32] 28  SpO2:  [93 %-100 %] 95 %  BP: ()/(34-71) 119/58     Weight: 100.6 kg (221 lb 12.5 oz)  Body mass index is 35.8 kg/m².    Estimated Creatinine Clearance: 85.7 mL/min (based on SCr of 0.7 mg/dL).    Physical Exam   Constitutional: She appears well-developed. She is cooperative. She is easily aroused. No distress (-  ).   HENT:   Head: Normocephalic and atraumatic.   Eyes: EOM are normal.   Neck: Normal range of motion. Neck supple.   Cardiovascular: Normal rate, regular rhythm and intact distal pulses.   No murmur heard.  Pulmonary/Chest: Effort normal and breath sounds normal. No stridor. No respiratory distress. She has no wheezes. She has no rales. She exhibits no tenderness.   Abdominal: Soft. She exhibits no distension. There is tenderness (BL quadrant). There is no rebound and no guarding.   Diminished Bowel sounds   Genitourinary:   Genitourinary Comments: Deferred   Musculoskeletal: She exhibits tenderness (back and abdomen). She exhibits no edema or deformity.   Neurological: She is alert and easily aroused. No sensory deficit.   BUE tremors, hx of parkinson's  Has episodes of confusion    Skin: Skin is warm and dry. Capillary refill takes less than 2 seconds.   Psychiatric: She has a normal mood and affect. Her behavior is  normal. Thought content normal.   Nursing note and vitals reviewed.      Significant Labs:   Blood Culture:   Recent Labs   Lab 04/30/19  1610 04/30/19  1640 05/23/19  1328 05/23/19  1345   LABBLOO Gram stain aer bottle: Gram positive cocci in chains resembling Strep  Gram stain flaco bottle: Gram positive cocci in chains resembling Strep  Results called to and read back by: Adwoa Ramirez RN 05/01/2019  10:43  STREPTOCOCCUS SPECIES  Further identified as Streptococcus gallinaceus  For susceptibility see order #[id number]   Gram stain aer bottle: Gram positive cocci in chains resembling Strep  Gram stain flaco bottle: Gram positive cocci in chains resembling Strep  Results called to and read back by: Adwoa Ramirez RN 05/01/2019  10:42  STREPTOCOCCUS SPECIES  Further identified as Streptococcus gallinaceus   Gram stain aer bottle: Gram positive cocci   Results called to and read back by:Krista Abdi RN 05/24/2019  14:22  Gram stain flaco bottle: Gram positive cocci   Positive results previously called  STREPTOCOCCUS SPECIES  Further Identified as Streptococcus gallinaceus   Gram stain aer bottle: Gram positive cocci   Results called to and read back by:Krista Abdi RN 05/24/2019  14:23  Gram stain flaco bottle: Gram positive cocci   Positive results previously called  STREPTOCOCCUS SPECIES  Further Identified as Streptococcus gallinaceus  For susceptibility see order #[id number]       BMP:   Recent Labs   Lab 05/26/19  0324   *   *   K 4.3   CL 99   CO2 26   BUN 6*   CREATININE 0.7   CALCIUM 8.5*   MG 1.3*     CBC:   Recent Labs   Lab 05/25/19  1521 05/26/19  0324 05/26/19  1650   WBC  --  11.55  --    HGB 9.9* 7.6* 8.0*   HCT 30.8* 22.8* 23.9*   PLT  --  363*  --      All pertinent labs within the past 24 hours have been reviewed.    Significant Imaging: I have reviewed all pertinent imaging results/findings within the past 24 hours.

## 2019-05-27 NOTE — ASSESSMENT & PLAN NOTE
05/26- will need prolonged antimicrobial therapy .  Will continue Rocephin   PICC line inserted.  Weekly ESR and CRP while on therapy .

## 2019-05-27 NOTE — ASSESSMENT & PLAN NOTE
05/24- will follow final ID and drug susceptibility of organism.  Will continue Vanco/rocephin  05/25- isolate is strep -will continue close follow up of repeat blood culture.  Continue Rocephin.  05/26- will plan to complete up to 6 weeks of IV Rocephin.  Will follow repeat blood culture.

## 2019-05-27 NOTE — PLAN OF CARE
Consult received for skilled placement. Met with patient and her daughter. Reviewed laminated list. Preference letter obtained for [facility]. Referral faxed via [facility] to [facility].       05/27/19 8358   Post-Acute Status   Post-Acute Authorization Placement   Post-Acute Placement Status Referrals Sent

## 2019-05-28 LAB
ANION GAP SERPL CALC-SCNC: 6 MMOL/L (ref 8–16)
BACTERIA SPEC AEROBE CULT: NO GROWTH
BASOPHILS # BLD AUTO: 0.02 K/UL (ref 0–0.2)
BASOPHILS NFR BLD: 0.2 % (ref 0–1.9)
BLD PROD TYP BPU: NORMAL
BLOOD UNIT EXPIRATION DATE: NORMAL
BLOOD UNIT TYPE CODE: 6200
BLOOD UNIT TYPE: NORMAL
BUN SERPL-MCNC: 8 MG/DL (ref 8–23)
CALCIUM SERPL-MCNC: 8.5 MG/DL (ref 8.7–10.5)
CHLORIDE SERPL-SCNC: 96 MMOL/L (ref 95–110)
CO2 SERPL-SCNC: 27 MMOL/L (ref 23–29)
CODING SYSTEM: NORMAL
CREAT SERPL-MCNC: 0.6 MG/DL (ref 0.5–1.4)
DIFFERENTIAL METHOD: ABNORMAL
DISPENSE STATUS: NORMAL
EOSINOPHIL # BLD AUTO: 0.5 K/UL (ref 0–0.5)
EOSINOPHIL NFR BLD: 3.7 % (ref 0–8)
ERYTHROCYTE [DISTWIDTH] IN BLOOD BY AUTOMATED COUNT: 14.3 % (ref 11.5–14.5)
EST. GFR  (AFRICAN AMERICAN): >60 ML/MIN/1.73 M^2
EST. GFR  (NON AFRICAN AMERICAN): >60 ML/MIN/1.73 M^2
GLUCOSE SERPL-MCNC: 141 MG/DL (ref 70–110)
HCT VFR BLD AUTO: 22.6 % (ref 37–48.5)
HGB BLD-MCNC: 7.5 G/DL (ref 12–16)
LYMPHOCYTES # BLD AUTO: 1.7 K/UL (ref 1–4.8)
LYMPHOCYTES NFR BLD: 13.6 % (ref 18–48)
MCH RBC QN AUTO: 28.6 PG (ref 27–31)
MCHC RBC AUTO-ENTMCNC: 33.2 G/DL (ref 32–36)
MCV RBC AUTO: 86 FL (ref 82–98)
MONOCYTES # BLD AUTO: 1.1 K/UL (ref 0.3–1)
MONOCYTES NFR BLD: 8.5 % (ref 4–15)
NEUTROPHILS # BLD AUTO: 9.4 K/UL (ref 1.8–7.7)
NEUTROPHILS NFR BLD: 74 % (ref 38–73)
NUM UNITS TRANS PACKED RBC: NORMAL
PLATELET # BLD AUTO: 285 K/UL (ref 150–350)
PMV BLD AUTO: 8 FL (ref 9.2–12.9)
POCT GLUCOSE: 168 MG/DL (ref 70–110)
POTASSIUM SERPL-SCNC: 3.7 MMOL/L (ref 3.5–5.1)
RBC # BLD AUTO: 2.62 M/UL (ref 4–5.4)
SODIUM SERPL-SCNC: 129 MMOL/L (ref 136–145)
WBC # BLD AUTO: 12.69 K/UL (ref 3.9–12.7)

## 2019-05-28 PROCEDURE — 63600175 PHARM REV CODE 636 W HCPCS: Performed by: INTERNAL MEDICINE

## 2019-05-28 PROCEDURE — 85025 COMPLETE CBC W/AUTO DIFF WBC: CPT

## 2019-05-28 PROCEDURE — 97110 THERAPEUTIC EXERCISES: CPT

## 2019-05-28 PROCEDURE — 94799 UNLISTED PULMONARY SVC/PX: CPT

## 2019-05-28 PROCEDURE — 97535 SELF CARE MNGMENT TRAINING: CPT

## 2019-05-28 PROCEDURE — 21400001 HC TELEMETRY ROOM

## 2019-05-28 PROCEDURE — 97530 THERAPEUTIC ACTIVITIES: CPT

## 2019-05-28 PROCEDURE — 94761 N-INVAS EAR/PLS OXIMETRY MLT: CPT

## 2019-05-28 PROCEDURE — 80048 BASIC METABOLIC PNL TOTAL CA: CPT

## 2019-05-28 PROCEDURE — 27000221 HC OXYGEN, UP TO 24 HOURS

## 2019-05-28 PROCEDURE — 99900031 HC PATIENT EDUCATION (STAT)

## 2019-05-28 PROCEDURE — 87040 BLOOD CULTURE FOR BACTERIA: CPT

## 2019-05-28 PROCEDURE — 25000003 PHARM REV CODE 250: Performed by: INTERNAL MEDICINE

## 2019-05-28 PROCEDURE — 25000003 PHARM REV CODE 250: Performed by: PHYSICIAN ASSISTANT

## 2019-05-28 RX ADMIN — PRAMIPEXOLE DIHYDROCHLORIDE 1 MG: 0.5 TABLET ORAL at 09:05

## 2019-05-28 RX ADMIN — ONDANSETRON 4 MG: 2 INJECTION INTRAMUSCULAR; INTRAVENOUS at 06:05

## 2019-05-28 RX ADMIN — PRAMIPEXOLE DIHYDROCHLORIDE 1 MG: 0.5 TABLET ORAL at 02:05

## 2019-05-28 RX ADMIN — PRAVASTATIN SODIUM 40 MG: 20 TABLET ORAL at 09:05

## 2019-05-28 RX ADMIN — HYDROCODONE BITARTRATE AND ACETAMINOPHEN 1 TABLET: 10; 325 TABLET ORAL at 01:05

## 2019-05-28 RX ADMIN — MUPIROCIN: 20 OINTMENT TOPICAL at 09:05

## 2019-05-28 RX ADMIN — HYDROCODONE BITARTRATE AND ACETAMINOPHEN 1 TABLET: 10; 325 TABLET ORAL at 10:05

## 2019-05-28 RX ADMIN — LEVOTHYROXINE SODIUM 125 MCG: 125 TABLET ORAL at 04:05

## 2019-05-28 RX ADMIN — MORPHINE SULFATE 4 MG: 4 INJECTION INTRAVENOUS at 04:05

## 2019-05-28 RX ADMIN — HYDROCODONE BITARTRATE AND ACETAMINOPHEN 1 TABLET: 10; 325 TABLET ORAL at 04:05

## 2019-05-28 RX ADMIN — CARBIDOPA AND LEVODOPA 1 TABLET: 25; 250 TABLET ORAL at 01:05

## 2019-05-28 RX ADMIN — CEFTRIAXONE 2 G: 2 INJECTION, SOLUTION INTRAVENOUS at 09:05

## 2019-05-28 RX ADMIN — THERA TABS 1 TABLET: TAB at 09:05

## 2019-05-28 RX ADMIN — LOSARTAN POTASSIUM 50 MG: 50 TABLET, FILM COATED ORAL at 09:05

## 2019-05-28 RX ADMIN — METOPROLOL SUCCINATE 25 MG: 25 TABLET, EXTENDED RELEASE ORAL at 09:05

## 2019-05-28 RX ADMIN — Medication: at 09:05

## 2019-05-28 RX ADMIN — MORPHINE SULFATE 4 MG: 4 INJECTION INTRAVENOUS at 02:05

## 2019-05-28 RX ADMIN — PANTOPRAZOLE SODIUM 40 MG: 40 TABLET, DELAYED RELEASE ORAL at 09:05

## 2019-05-28 RX ADMIN — CARBIDOPA AND LEVODOPA 1 TABLET: 25; 250 TABLET ORAL at 09:05

## 2019-05-28 RX ADMIN — LATANOPROST 1 DROP: 50 SOLUTION OPHTHALMIC at 09:05

## 2019-05-28 RX ADMIN — CARBIDOPA AND LEVODOPA 1 TABLET: 25; 250 TABLET ORAL at 04:05

## 2019-05-28 RX ADMIN — PROMETHAZINE HYDROCHLORIDE 6.25 MG: 25 INJECTION INTRAMUSCULAR; INTRAVENOUS at 07:05

## 2019-05-28 RX ADMIN — ISOSORBIDE MONONITRATE 120 MG: 60 TABLET, EXTENDED RELEASE ORAL at 09:05

## 2019-05-28 NOTE — PT/OT/SLP PROGRESS
Physical Therapy  Treatment    Ross Isbell   MRN: [id number]   Admitting Diagnosis: Status post lumbar laminectomy    PT Received On: 05/28/19  PT Start Time: 1040     PT Stop Time: 1105    PT Total Time (min): 25 min       Billable Minutes:  Therapeutic Activity 15 and Therapeutic Exercise 10    Treatment Type: Treatment  PT/PTA: PT       General Precautions: Standard, fall, respiratory  Orthopedic Precautions: spinal precautions   Braces: LSO    Subjective:  Communicated with NURSE ELENI prior to session.  Pain/Comfort  Pain Rating 1: 10/10  Location 1: abdomen    Objective:   Patient found with: telemetry, peripheral IV, oxygen    Functional Mobility:  Therapeutic Activities and Exercises:  PT FOUND SUPINE IN BED UPON ARRIVAL, AGREEABLE TO TX WITH MAX ENCOURAGEMENT.,  PRESENT, SUP>SIT WITH MAXA X 2, LIMITED BY STRONG POSTERIOR PUSH AND PT CONSTANTLY WANTING TO LAY BACK DOWN, MAX ENCOURAGEMENT TO PARTICIPATE AND SIT UPRIGHT, PT WITH POOR- STATIC SITTING BALANCE, PT AND  EDUCATED IN DONNING/DOFFING LSO BRACE, TOTAL ASSIST FOR DONNING ROBE AND BRACE, SEATED SCOOT TO EOB WITH MAXA, MAXA X 2 FOR SIT<>STAND TF X 4 TRIALS WITH SEATED REST, MAXA X 2 FOR STAND PIVOT TF TO BEDSIDE CHAIR, VERY UNSTEADY DUE TO PT CROSSING FEET DURING TF, POOR SAFETY DUE TO B PF CONTRACTURES, PT EDUCATED IN AND PERFORMED BLE THEREX X 20 REPS AAROM WITH REST    AM-PAC 6 CLICK MOBILITY  How much help from another person does this patient currently need?   1 = Unable, Total/Dependent Assistance  2 = A lot, Maximum/Moderate Assistance  3 = A little, Minimum/Contact Guard/Supervision  4 = None, Modified [place]/Independent    Turning over in bed (including adjusting bedclothes, sheets and blankets)?: 2  Sitting down on and standing up from a chair with arms (e.g., wheelchair, bedside commode, etc.): 2  Moving from lying on back to sitting on the side of the bed?: 2  Moving to and from a bed to a chair (including a  wheelchair)?: 2  Need to walk in hospital room?: 1  Climbing 3-5 steps with a railing?: 1  Basic Mobility Total Score: 10    AM-PAC Raw Score CMS G-Code Modifier Level of Impairment Assistance   6 % Total / Unable   7 - 9 CM 80 - 100% Maximal Assist   10 - 14 CL 60 - 80% Moderate Assist   15 - 19 CK 40 - 60% Moderate Assist   20 - 22 CJ 20 - 40% Minimal Assist   23 CI 1-20% SBA / CGA   24 CH 0% Independent/ Mod I     Patient left up in chair with all lines intact, call button in reach, chair alarm on, NURSE notified and  present.    Assessment:  Ross Isbell is a 73 y.o. female with a medical diagnosis of Status post lumbar laminectomy and presents with IMPAIRED FUNCTIONAL MOBILITY. PT WILL BENEFIT FROM CONT. SKILLED P.T. TO ADDRESS IMPAIRMENTS    Rehab identified problem list/impairments: Rehab identified problem list/impairments: weakness, impaired functional mobilty, impaired endurance, impaired balance, decreased coordination, decreased safety awareness, decreased ROM, pain, decreased lower extremity function    Rehab potential is good.    Activity tolerance: Fair    Discharge recommendations: Discharge Facility/Level of Care Needs: nursing facility, skilled     Barriers to discharge:      Equipment recommendations: Equipment Needed After Discharge: none     GOALS:   Multidisciplinary Problems     Physical Therapy Goals        Problem: Physical Therapy Goal    Goal Priority Disciplines Outcome Goal Variances Interventions   Physical Therapy Goal     PT, PT/OT Ongoing (interventions implemented as appropriate)     Description:  LTG'S TO BE MET IN 7 DAYS (5-31-19)  1. PT WILL REQUIRE MODA FOR BED MOBILITY  2. PT WILL REQUIRE MODA FOR TF'S  3. PT WILL TOLERATE BLE THEREX X 20 REPS AROM             Problem: Physical Therapy Goal    Goal Priority Disciplines Outcome Goal Variances Interventions   Physical Therapy Goal     PT, PT/OT      Description:  PT eval complete. Prior goals remain appropriate  and should be met by 6/2/2019                    PLAN:    Patient to be seen 5 x/week  to address the above listed problems via therapeutic activities, therapeutic exercises  Plan of Care expires: 06/02/19  Plan of Care reviewed with: patient, spouse    PT ENCOURAGED TO CALL FOR ASSISTANCE WITH ALL NEEDS DUE TO FALL RISK STATUS, PT AGREEABLE    Kirsten Dang, PT  05/28/2019

## 2019-05-28 NOTE — SUBJECTIVE & OBJECTIVE
Interval History: 73 year old woman  with strep bacteremia/epidural abscess s/p L1-4 with L2-3 laminectomy for debridement and drainage of epidural abscess .  She is drowsy post operative procedure.  05/26- she has episodes of confusion, nurse is by bedside  Blood culture=pan sensitive strep   05/27- she remains afebrile, last blood culture-05/23- strep    at bedside  Review of Systems   Constitutional: Positive for activity change. Negative for chills and fever.   HENT: Negative for trouble swallowing.    Eyes: Negative for visual disturbance.   Respiratory: Negative for apnea, cough, choking, chest tightness, shortness of breath, wheezing and stridor.    Cardiovascular: Negative for chest pain, palpitations and leg swelling.   Gastrointestinal: Negative for abdominal pain, constipation, diarrhea, nausea and vomiting.   Genitourinary: Negative for difficulty urinating, dysuria, flank pain, frequency and urgency.   Musculoskeletal: Positive for back pain (lumbar). Negative for arthralgias, gait problem, joint swelling, myalgias, neck pain and neck stiffness.   Skin: Negative for color change.   Neurological: Positive for weakness. Negative for dizziness, tremors, seizures, syncope, facial asymmetry, speech difficulty, light-headedness, numbness and headaches.   Psychiatric/Behavioral: Negative for agitation, behavioral problems and dysphoric mood.     Objective:     Vital Signs (Most Recent):  Temp: 98.3 °F (36.8 °C) (05/28/19 0317)  Pulse: 77 (05/28/19 0317)  Resp: 18 (05/28/19 0317)  BP: (!) 120/55 (05/28/19 0317)  SpO2: 96 % (05/28/19 0317) Vital Signs (24h Range):  Temp:  [97.8 °F (36.6 °C)-99.7 °F (37.6 °C)] 98.3 °F (36.8 °C)  Pulse:  [] 77  Resp:  [16-30] 18  SpO2:  [94 %-99 %] 96 %  BP: ()/(51-58) 120/55     Weight: 100.6 kg (221 lb 12.5 oz)  Body mass index is 35.8 kg/m².    Estimated Creatinine Clearance: 99.9 mL/min (based on SCr of 0.6 mg/dL).    Physical Exam   Constitutional: She  appears well-developed. She is cooperative. She is easily aroused. No distress (-  ).   HENT:   Head: Normocephalic and atraumatic.   Eyes: EOM are normal.   Neck: Normal range of motion. Neck supple.   Cardiovascular: Normal rate, regular rhythm and intact distal pulses.   No murmur heard.  Pulmonary/Chest: Effort normal and breath sounds normal. No stridor. No respiratory distress. She has no wheezes. She has no rales. She exhibits no tenderness.   Abdominal: Soft. She exhibits no distension. There is tenderness (BL quadrant). There is no rebound and no guarding.   Diminished Bowel sounds   Genitourinary:   Genitourinary Comments: Deferred   Musculoskeletal: She exhibits tenderness (back and abdomen). She exhibits no edema or deformity.   Neurological: She is alert and easily aroused. No sensory deficit.   BUE tremors, hx of parkinson's  Has episodes of confusion    Skin: Skin is warm and dry. Capillary refill takes less than 2 seconds.   Psychiatric: She has a normal mood and affect. Her behavior is normal. Thought content normal.   Nursing note and vitals reviewed.      Significant Labs:   Blood Culture:   Recent Labs   Lab 04/30/19  1610 04/30/19  1640 05/23/19  1328 05/23/19  1345   LABBLOO Gram stain aer bottle: Gram positive cocci in chains resembling Strep  Gram stain flaco bottle: Gram positive cocci in chains resembling Strep  Results called to and read back by: Adwoa Ramirez RN 05/01/2019  10:43  STREPTOCOCCUS SPECIES  Further identified as Streptococcus gallinaceus  For susceptibility see order #[id number]   Gram stain aer bottle: Gram positive cocci in chains resembling Strep  Gram stain flaco bottle: Gram positive cocci in chains resembling Strep  Results called to and read back by: Adwoa Ramirez RN 05/01/2019  10:42  STREPTOCOCCUS SPECIES  Further identified as Streptococcus gallinaceus   Gram stain aer bottle: Gram positive cocci   Results called to and read back by:Krista Abdi RN 05/24/2019  14:22   Gram stain flaco bottle: Gram positive cocci   Positive results previously called  STREPTOCOCCUS SPECIES  Further Identified as Streptococcus gallinaceus   Gram stain aer bottle: Gram positive cocci   Results called to and read back by:Krista Abdi RN 05/24/2019  14:23  Gram stain flaco bottle: Gram positive cocci   Positive results previously called  STREPTOCOCCUS SPECIES  Further Identified as Streptococcus gallinaceus  For susceptibility see order #[id number]       BMP:   Recent Labs   Lab 05/28/19  0535   *   *   K 3.7   CL 96   CO2 27   BUN 8   CREATININE 0.6   CALCIUM 8.5*     CBC:   Recent Labs   Lab 05/26/19  1650 05/27/19  0606 05/28/19  0535   WBC  --  13.66* 12.69   HGB 8.0* 8.2* 7.5*   HCT 23.9* 24.8* 22.6*   PLT  --  286 285     All pertinent labs within the past 24 hours have been reviewed.    Significant Imaging: I have reviewed all pertinent imaging results/findings within the past 24 hours.

## 2019-05-28 NOTE — NURSING TRANSFER
Nursing Transfer Note      5/27/2019     Transfer from 228 to [address]    Transfer via patient bed    Transfer by CT tech

## 2019-05-28 NOTE — PT/OT/SLP PROGRESS
Occupational Therapy  Treatment    Ross Isbell   MRN: [id number]   Admitting Diagnosis: Status post lumbar laminectomy    OT Date of Treatment: 05/28/19   OT Start Time: 1010  OT Stop Time: 1050  OT Total Time (min): 40 min    Billable Minutes:  Self Care/Home Management 15 minutes, Therapeutic Activity 15 minutes and Therapeutic Exercise 10 minutes    General Precautions: Standard, fall, respiratory  Orthopedic Precautions: spinal precautions  Braces: LSO         Subjective:  Communicated with nurse Calderon and epic chart review prior to session.  Pain/Comfort  Pain Rating 1: 10/10  Location - Side 1: Bilateral  Location - Orientation 1: lower  Location 1: back    Objective:        Functional Mobility:  Bed Mobility:  Pt educated on log rolling . Pt verbalized     Transfers:          Activities of Daily Living:     Feeding adaptive equipment: na     UE adaptive equipment: max a      LE adaptive equipment: total a                    Bathing adaptive equipment: na    Balance:   Static Sit: POOR: Needs MODERATE assist to maintain  Dynamic Sit: 0: N/A  Static Stand: poor-  Dynamic stand: poor-  Therapeutic Activities and Exercises:  Pt req max a x 2 with sit<>stand with poor posture scissoring gait pattern and dashawn pivot with scissoring pattern from eob bedside chair. Attempted functional mobility with scissoring gait pattern. Spouse and pt educated on hua /doff back brace. Pt performed 1 set x 10 reps b ue rom exercise Pt left sitting in bed side chair with all needs met and call button near and spouse present.    AM-PAC 6 CLICK ADL   How much help from another person does this patient currently need?   1 = Unable, Total/Dependent Assistance  2 = A lot, Maximum/Moderate Assistance  3 = A little, Minimum/Contact Guard/Supervision  4 = None, Modified [place]/Independent    Putting on and taking off regular lower body clothing? : 2  Bathing (including washing, rinsing, drying)?: 2  Toileting, which includes using  "toilet, bedpan, or urinal? : 2  Putting on and taking off regular upper body clothing?: 2  Taking care of personal grooming such as brushing teeth?: 2  Eating meals?: 3  Daily Activity Total Score: 13     AM-PAC Raw Score CMS "G-Code Modifier Level of Impairment Assistance   6 % Total / Unable   7 - 8 CM 80 - 100% Maximal Assist   9-13 CL 60 - 80% Moderate Assist   14 - 19 CK 40 - 60% Moderate Assist   20 - 22 CJ 20 - 40% Minimal Assist   23 CI 1-20% SBA / CGA   24 CH 0% Independent/ Mod I       Patient left up in chair with all lines intact, call button in reach, chair alarm on and nurse notified    ASSESSMENT:  Ross Isbell is a 73 y.o. female with a medical diagnosis of Status post lumbar laminectomy and presents with debility and generalized weakness. Pt will continues to benefit from skilled O.T..    Rehab identified problem list/impairments: Rehab identified problem list/impairments: impaired endurance, impaired functional mobilty, decreased upper extremity function, weakness, impaired self care skills, impaired balance, gait instability    Rehab potential is good.    Activity tolerance: Good    Discharge recommendations: Discharge Facility/Level of Care Needs: nursing facility, skilled     Barriers to discharge: Barriers to Discharge: Decreased caregiver support    Equipment recommendations: hospital bed, tub bench     GOALS:   Multidisciplinary Problems     Occupational Therapy Goals        Problem: Occupational Therapy Goal    Goal Priority Disciplines Outcome Interventions   Occupational Therapy Goal     OT, PT/OT Ongoing (interventions implemented as appropriate)    Description:  ot goals to be met 5-31-19  s with ue dressing  Pt will tolerate 1 set x 15 reps b ue rom exercise  Min a with bed supine<>sit  min a with bsc t/f's                    Plan:  Patient to be seen 3 x/week to address the above listed problems via self-care/home management, therapeutic activities, therapeutic " exercises  Plan of Care expires: 06/03/19  Plan of Care reviewed with: patient, spouse         Cyndie Hernandez, OT  05/28/2019

## 2019-05-28 NOTE — PROGRESS NOTES
[facility] Hospital Medicine  Progress Note    Patient Name: Ross Isbell  MRN: [id number]  Patient Class: IP- Inpatient   Admission Date: 5/23/2019  Length of Stay: 5 days  Attending Physician: Teddy Bryant MD  Primary Care Provider: Anna Penn MD        Subjective:     Principal Problem:Status post lumbar laminectomy    HPI:  Ross Isbell is a 73 year old female with a PMHx of DM II, Parkinson's, CORINE, MI, CAD, and Glaucoma who presented to the Emergency Department with c/o fatigue. Associated symptoms include: generalized weakness and BLE pain x 2 weeks. Pt was informed by home health nurse to go to ED for increasing weakness, abdominal pain and leg pain. ED workup showed: WBC 13.08K, Hgb/Hct 11.0/33.5, Na+ 129, lipase 17. UA (+) nitrites, few bacteria. CXR with no acute findings. CT abdomen/pelvis with contrast showed moderate inflammatory changes centered on the intervertebral disc space between the L2 and L3 vertebral bodies. There is bony destruction on both sides of the intervertebral disc space between the L2 and L3 vertebral bodies.  This is consistent with the patient's history and characteristic of discitis and osteomyelitis. ED discussed case with Dr. Nicolas (neurosurgery) who reported pt did not need to be transferred out at this time. Obtain MRI thoracic/lumbar spine w wo contrast. Start pt on IV Vancomycin and Zosyn but consult Dr. Gomez (ID). Daughter: Uzma Godfrey [phone] is the surrogate decision maker. Admitted for Osteomyelitis of lumbar spine.    Hospital Course:  5/24/19 Dr. Nicolas, Neurosurgeon, doesn't recommend spinal surgery, But Needle biopsy of L 2-3 disk abscess with cultures. This am, patient c/o of severe abdominal pain 20/10, Norco 10 mg ordered PRN with IV 2 mg Morphine PRN. Blood culture is Positive for GPC. Echo pending. Patient had severe back pain and refused OT/PT. Daughter stated she has had diarrhea x 1 month. Stool for C.Diff  ordered. T. Max 101. IV Vanco and Zosyn. IR, Dr. Juárez, consulted for Needle biopsy. Daughter at bedside. ID consult pending. Cont Vanc and Zosyn. CT abdomen/pelvis positive for osteomyelitis.  To OR with Dr. Nicolas 25 May for laminectomy and vertebroplasty.  Post-procedure in ICU due to post-op hypotension.  Able to wean vasopressors.  Anemia due to blood loss related to the procedure.  Transfused 1 unit PRBC.  Interval improvement in abdominal and back pain.  Tolerated limited degree of physical therapy.  Pursuing skilled placement.    Interval History:  Interval improvement in abdominal and back pain.  Tolerated limited degree of physical therapy.  Pursuing skilled placement.    Review of Systems   Constitutional: Positive for activity change. Negative for chills and fever.   HENT: Negative for trouble swallowing.    Eyes: Negative for visual disturbance.   Respiratory: Negative for apnea, cough, choking, chest tightness, shortness of breath, wheezing and stridor.    Cardiovascular: Negative for chest pain, palpitations and leg swelling.   Gastrointestinal: Negative for abdominal pain, constipation, diarrhea, nausea and vomiting.   Genitourinary: Negative for difficulty urinating, dysuria, flank pain, frequency and urgency.   Musculoskeletal: Positive for back pain (lumbar). Negative for arthralgias, gait problem, joint swelling, myalgias, neck pain and neck stiffness.   Skin: Negative for color change.   Neurological: Positive for weakness. Negative for dizziness, tremors, seizures, syncope, facial asymmetry, speech difficulty, light-headedness, numbness and headaches.   Psychiatric/Behavioral: Negative for agitation, behavioral problems and dysphoric mood.     Objective:     Vital Signs (Most Recent):  Temp: 98.3 °F (36.8 °C) (05/28/19 1555)  Pulse: 90 (05/28/19 1555)  Resp: 16 (05/28/19 1555)  BP: (!) 111/53 (05/28/19 1555)  SpO2: 96 % (05/28/19 1555) Vital Signs (24h Range):  Temp:  [97.7 °F (36.5 °C)-98.8  °F (37.1 °C)] 98.3 °F (36.8 °C)  Pulse:  [77-96] 90  Resp:  [14-30] 16  SpO2:  [95 %-99 %] 96 %  BP: ()/(51-60) 111/53     Weight: 100.6 kg (221 lb 12.5 oz)  Body mass index is 35.8 kg/m².    Intake/Output Summary (Last 24 hours) at 5/28/2019 1716  Last data filed at 5/28/2019 1200  Gross per 24 hour   Intake 1449.17 ml   Output --   Net 1449.17 ml      Physical Exam   Constitutional: She is oriented to person, place, and time. She appears well-developed. She is cooperative. She is easily aroused. She appears distressed (appears in pain from BLE cramping).   HENT:   Head: Normocephalic and atraumatic.   Eyes: EOM are normal.   Neck: Normal range of motion. Neck supple.   Cardiovascular: Normal rate, regular rhythm and intact distal pulses.   No murmur heard.  Pulmonary/Chest: Effort normal and breath sounds normal. No stridor. No respiratory distress. She has no wheezes. She has no rales. She exhibits no tenderness.   Abdominal: Soft. She exhibits no distension. There is tenderness (BL quadrant). There is no rebound and no guarding.   Diminished Bowel sounds   Genitourinary:   Genitourinary Comments: Deferred   Musculoskeletal: She exhibits tenderness (back and abdomen). She exhibits no edema or deformity.   Neurological: She is alert, oriented to person, place, and time and easily aroused. No sensory deficit.   BUE tremors, hx of parkinson's   Skin: Skin is warm and dry. Capillary refill takes less than 2 seconds.   Psychiatric: She has a normal mood and affect. Her behavior is normal. Thought content normal.   Nursing note and vitals reviewed.    Significant Labs:   Blood Culture:   No results for input(s): LABBLOO in the last 48 hours.  CBC:   Recent Labs   Lab 05/27/19  0606 05/28/19  0535   WBC 13.66* 12.69   HGB 8.2* 7.5*   HCT 24.8* 22.6*    285     CMP:   Recent Labs   Lab 05/27/19  0606 05/28/19  0535   * 129*   K 3.8 3.7   CL 95 96   CO2 27 27   * 141*   BUN 8 8   CREATININE 0.7  0.6   CALCIUM 8.7 8.5*   ANIONGAP 7* 6*   EGFRNONAA >60 >60       Significant Imaging: I have reviewed and interpreted all pertinent imaging results/findings within the past 24 hours.    Assessment/Plan:      * Status post lumbar laminectomy  Laminectomy and vertebroplasty by Dr. Nicolas 25 May.  Modest post-op blood loss.  Transfused 1 unit PRBC.    Abscess in epidural space of lumbar spine  Laminectomy and drainage per Neurosurgery.  Cultures growing Streptococcus species broad sensitivity.    Streptococcal bacteremia  - ID consult  Adjust based on culture results.  Streptococcus - Continue Ceftriaxone.    Hyponatremia  - Na+ 128, monitor  - IVF 75 cc/hr  - Neuro checks      UTI (urinary tract infection)  - UA (+) nitrites. Micro few bacteria  - Continue Zosyn  - Urine culture pending      Osteomyelitis of lumbar spine  - CT abdomen/pelvis with contrast showed moderate inflammatory changes centered on the intervertebral disc space between the L2 and L3 vertebral bodies. There is bony destruction on both sides of the intervertebral disc space between the L2 and L3 vertebral bodies.  This is consistent with the patient's history and characteristic of discitis and osteomyelitis  - Neurosurgery, Dr. Nicolas, states no surgical intervention at this time  -Needs Needle bx L 2-3 ordered 5/24/19  - MRI thoracic and lumbar spine: osteomyelitis  - Inpatient consult to ID  - Blood cultures: GPC  - Neurovascular checks  - Analgesics PRN    Type 2 diabetes, controlled, with peripheral neuropathy  - HgbA1c on 04/23/19 -- 7.8  - Accuchecks and low dose SSI  - Pt takes Novolin 70/30 55 units SQ TID. Glucose in ED 57 mg/dL. Will hold Novolin for now, use low dose SSI, and monitor glucose      Hypothyroidism  - Continue Levothyroxine      Essential hypertension  - Continue home medications including Metoprolol and Losartan      CAD (coronary artery disease)  - Continue ASA, Statin, and Imdur      PD (Parkinson's disease)  - Continue  Pramipexole and Sinemet        VTE Risk Mitigation (From admission, onward)        Ordered     IP VTE HIGH RISK PATIENT  Once      05/26/19 0627     Place QUIQUE hose  Until discontinued      05/26/19 0627     Place sequential compression device  Until discontinued      05/26/19 0627              Teddy Bryant MD  Department of Hospital Medicine   [facility]

## 2019-05-28 NOTE — PLAN OF CARE
Problem: Adult Inpatient Plan of Care  Goal: Rounds/Family Conference  Outcome: Ongoing (interventions implemented as appropriate)  Plan of care reviewed with patient and family    Comments: Patient A&Ox4. Neuro checks q4h. Patient worked with PT/OT today up in the chair with 2 person assist. SLO brace when out of bed. NSR on tele. Currently on 2L of oxygen. Diatbetic diet. Purwick in place. Wound nurse addressed wounds this shift. NS infusing through PICC line. IV antibiotics given. Norco given x2 PRN. Morphine given PRN for break through pain. All questions answered. Will continue to monitor.    Danii Conde RN

## 2019-05-28 NOTE — ASSESSMENT & PLAN NOTE
Laminectomy and vertebroplasty by Dr. Nicolas 25 May.  Modest post-op blood loss.  Transfused 1 unit PRBC.

## 2019-05-28 NOTE — PROGRESS NOTES
[facility] Hospital Medicine  Progress Note    Patient Name: Ross Isbell  MRN: [id number]  Patient Class: IP- Inpatient   Admission Date: 5/23/2019  Length of Stay: 4 days  Attending Physician: Teddy Bryant MD  Primary Care Provider: Anna Penn MD        Subjective:     Principal Problem:Status post lumbar laminectomy    HPI:  Ross Isbell is a 73 year old female with a PMHx of DM II, Parkinson's, CORINE, MI, CAD, and Glaucoma who presented to the Emergency Department with c/o fatigue. Associated symptoms include: generalized weakness and BLE pain x 2 weeks. Pt was informed by home health nurse to go to ED for increasing weakness, abdominal pain and leg pain. ED workup showed: WBC 13.08K, Hgb/Hct 11.0/33.5, Na+ 129, lipase 17. UA (+) nitrites, few bacteria. CXR with no acute findings. CT abdomen/pelvis with contrast showed moderate inflammatory changes centered on the intervertebral disc space between the L2 and L3 vertebral bodies. There is bony destruction on both sides of the intervertebral disc space between the L2 and L3 vertebral bodies.  This is consistent with the patient's history and characteristic of discitis and osteomyelitis. ED discussed case with Dr. Nicolas (neurosurgery) who reported pt did not need to be transferred out at this time. Obtain MRI thoracic/lumbar spine w wo contrast. Start pt on IV Vancomycin and Zosyn but consult Dr. Gomez (ID). Daughter: Uzma Godfrey [phone] is the surrogate decision maker. Admitted for Osteomyelitis of lumbar spine.    Hospital Course:  5/24/19 Dr. Nicolas, Neurosurgeon, doesn't recommend spinal surgery, But Needle biopsy of L 2-3 disk abscess with cultures. This am, patient c/o of severe abdominal pain 20/10, Norco 10 mg ordered PRN with IV 2 mg Morphine PRN. Blood culture is Positive for GPC. Echo pending. Patient had severe back pain and refused OT/PT. Daughter stated she has had diarrhea x 1 month. Stool for C.Diff  ordered. T. Max 101. IV Vanco and Zosyn. IR, Dr. Juárez, consulted for Needle biopsy. Daughter at bedside. ID consult pending. Cont Vanc and Zosyn. CT abdomen/pelvis positive for osteomyelitis.  To OR with Dr. Nicolas 25 May for laminectomy and vertebroplasty.  Post-procedure in ICU due to post-op hypotension.  Able to wean vasopressors.  Anemia due to blood loss related to the procedure.  Transfused 1 unit PRBC.Interval improvement in abdominal and back pain.    Interval History:  Hemoglobin remained stable and no signs of blood loss.  Improvement to back and abdominal pain.  Needs to work with physical therapy.    Review of Systems   Constitutional: Positive for activity change. Negative for chills and fever.   HENT: Negative for trouble swallowing.    Eyes: Negative for visual disturbance.   Respiratory: Negative for apnea, cough, choking, chest tightness, shortness of breath, wheezing and stridor.    Cardiovascular: Negative for chest pain, palpitations and leg swelling.   Gastrointestinal: Negative for abdominal pain, constipation, diarrhea, nausea and vomiting.   Genitourinary: Negative for difficulty urinating, dysuria, flank pain, frequency and urgency.   Musculoskeletal: Positive for back pain (lumbar). Negative for arthralgias, gait problem, joint swelling, myalgias, neck pain and neck stiffness.   Skin: Negative for color change.   Neurological: Positive for weakness. Negative for dizziness, tremors, seizures, syncope, facial asymmetry, speech difficulty, light-headedness, numbness and headaches.   Psychiatric/Behavioral: Negative for agitation, behavioral problems and dysphoric mood.     Objective:     Vital Signs (Most Recent):  Temp: 97.8 °F (36.6 °C) (05/27/19 1511)  Pulse: 88 (05/27/19 1511)  Resp: 18 (05/27/19 1511)  BP: (!) 93/54 (05/27/19 1511)  SpO2: (!) 94 % (05/27/19 1511) Vital Signs (24h Range):  Temp:  [96.6 °F (35.9 °C)-100.3 °F (37.9 °C)] 97.8 °F (36.6 °C)  Pulse:  [] 88  Resp:   [16-28] 18  SpO2:  [94 %-96 %] 94 %  BP: ()/(53-58) 93/54     Weight: 100.6 kg (221 lb 12.5 oz)  Body mass index is 35.8 kg/m².    Intake/Output Summary (Last 24 hours) at 5/27/2019 2018  Last data filed at 5/27/2019 1334  Gross per 24 hour   Intake 1072.5 ml   Output 525 ml   Net 547.5 ml      Physical Exam   Constitutional: She is oriented to person, place, and time. She appears well-developed. She is cooperative. She is easily aroused. She appears distressed (appears in pain from BLE cramping).   HENT:   Head: Normocephalic and atraumatic.   Eyes: EOM are normal.   Neck: Normal range of motion. Neck supple.   Cardiovascular: Normal rate, regular rhythm and intact distal pulses.   No murmur heard.  Pulmonary/Chest: Effort normal and breath sounds normal. No stridor. No respiratory distress. She has no wheezes. She has no rales. She exhibits no tenderness.   Abdominal: Soft. She exhibits no distension. There is tenderness (BL quadrant). There is no rebound and no guarding.   Diminished Bowel sounds   Genitourinary:   Genitourinary Comments: Deferred   Musculoskeletal: She exhibits tenderness (back and abdomen). She exhibits no edema or deformity.   Neurological: She is alert, oriented to person, place, and time and easily aroused. No sensory deficit.   BUE tremors, hx of parkinson's   Skin: Skin is warm and dry. Capillary refill takes less than 2 seconds.   Psychiatric: She has a normal mood and affect. Her behavior is normal. Thought content normal.   Nursing note and vitals reviewed.    Significant Labs:   Blood Culture:   No results for input(s): LABBLOO in the last 48 hours.  CBC:   Recent Labs   Lab 05/26/19  0324 05/26/19  1650 05/27/19  0606   WBC 11.55  --  13.66*   HGB 7.6* 8.0* 8.2*   HCT 22.8* 23.9* 24.8*   *  --  286     CMP:   Recent Labs   Lab 05/26/19  0324 05/27/19  0606   * 129*   K 4.3 3.8   CL 99 95   CO2 26 27   * 134*   BUN 6* 8   CREATININE 0.7 0.7   CALCIUM 8.5* 8.7    ALBUMIN 2.6*  --    ANIONGAP 7* 7*   EGFRNONAA >60 >60       Significant Imaging: I have reviewed and interpreted all pertinent imaging results/findings within the past 24 hours.    Assessment/Plan:      * Status post lumbar laminectomy  Laminectomy and vertebroplasty by Dr. Nicolas 25 May.  Modest post-op blood loss.  Transfusing 1 unit PRBC.    Abscess in epidural space of lumbar spine  Laminectomy and drainage per Neurosurgery.  Cultures growing Streptococcus species broad sensitivity.    Streptococcal bacteremia  - ID consult  Adjust based on culture results.  Streptococcus - Continue Ceftriaxone.    Hyponatremia  - Na+ 128, monitor  - IVF 75 cc/hr  - Neuro checks      UTI (urinary tract infection)  - UA (+) nitrites. Micro few bacteria  - Continue Zosyn  - Urine culture pending      Osteomyelitis of lumbar spine  - CT abdomen/pelvis with contrast showed moderate inflammatory changes centered on the intervertebral disc space between the L2 and L3 vertebral bodies. There is bony destruction on both sides of the intervertebral disc space between the L2 and L3 vertebral bodies.  This is consistent with the patient's history and characteristic of discitis and osteomyelitis  - Neurosurgery, Dr. Nicolas, states no surgical intervention at this time  -Needs Needle bx L 2-3 ordered 5/24/19  - MRI thoracic and lumbar spine: osteomyelitis  - Inpatient consult to ID  - Blood cultures: GPC  - Neurovascular checks  - Analgesics PRN    Type 2 diabetes, controlled, with peripheral neuropathy  - HgbA1c on 04/23/19 -- 7.8  - Accuchecks and low dose SSI  - Pt takes Novolin 70/30 55 units SQ TID. Glucose in ED 57 mg/dL. Will hold Novolin for now, use low dose SSI, and monitor glucose      Hypothyroidism  - Continue Levothyroxine      Essential hypertension  - Continue home medications including Metoprolol and Losartan      CAD (coronary artery disease)  - Continue ASA, Statin, and Imdur      PD (Parkinson's disease)  - Continue  Pramipexole and Sinemet        VTE Risk Mitigation (From admission, onward)        Ordered     IP VTE HIGH RISK PATIENT  Once      05/26/19 0627     Place QUIQUE hose  Until discontinued      05/26/19 0627     Place sequential compression device  Until discontinued      05/26/19 0627              Teddy Bryant MD  Department of Hospital Medicine   [facility]

## 2019-05-28 NOTE — NURSING
Contacted NIKA bradley, with hospital medicine, informing her that despite use of the max dosage of pain medicine at this time, the patient complains of 10/10 pain across the lower abdomen that extends into the top part of the legs bilaterally. Per Caroline, CT scan ordered.

## 2019-05-28 NOTE — PLAN OF CARE
Problem: Adult Inpatient Plan of Care  Goal: Plan of Care Review  Outcome: Ongoing (interventions implemented as appropriate)  Patient continues to be monitored and treated for acute and chronic conditions. Patient's bandage changed by nurse and PA. Vitals consistently monitored for acceptable parameters prior to administration of pain medicine. Patient is frequently (spontaneously) tearful and often complains of pain. Patient educated on the importance of physical therapy, her risk for further skin breakdown, and the importance of turning every couple hours. Patient verbalizes understanding. Will continue to monitor and treat.

## 2019-05-28 NOTE — SUBJECTIVE & OBJECTIVE
Interval History:  Hemoglobin remained stable and no signs of blood loss.  Improvement to back and abdominal pain.  Needs to work with physical therapy.    Review of Systems   Constitutional: Positive for activity change. Negative for chills and fever.   HENT: Negative for trouble swallowing.    Eyes: Negative for visual disturbance.   Respiratory: Negative for apnea, cough, choking, chest tightness, shortness of breath, wheezing and stridor.    Cardiovascular: Negative for chest pain, palpitations and leg swelling.   Gastrointestinal: Negative for abdominal pain, constipation, diarrhea, nausea and vomiting.   Genitourinary: Negative for difficulty urinating, dysuria, flank pain, frequency and urgency.   Musculoskeletal: Positive for back pain (lumbar). Negative for arthralgias, gait problem, joint swelling, myalgias, neck pain and neck stiffness.   Skin: Negative for color change.   Neurological: Positive for weakness. Negative for dizziness, tremors, seizures, syncope, facial asymmetry, speech difficulty, light-headedness, numbness and headaches.   Psychiatric/Behavioral: Negative for agitation, behavioral problems and dysphoric mood.     Objective:     Vital Signs (Most Recent):  Temp: 97.8 °F (36.6 °C) (05/27/19 1511)  Pulse: 88 (05/27/19 1511)  Resp: 18 (05/27/19 1511)  BP: (!) 93/54 (05/27/19 1511)  SpO2: (!) 94 % (05/27/19 1511) Vital Signs (24h Range):  Temp:  [96.6 °F (35.9 °C)-100.3 °F (37.9 °C)] 97.8 °F (36.6 °C)  Pulse:  [] 88  Resp:  [16-28] 18  SpO2:  [94 %-96 %] 94 %  BP: ()/(53-58) 93/54     Weight: 100.6 kg (221 lb 12.5 oz)  Body mass index is 35.8 kg/m².    Intake/Output Summary (Last 24 hours) at 5/27/2019 2018  Last data filed at 5/27/2019 1334  Gross per 24 hour   Intake 1072.5 ml   Output 525 ml   Net 547.5 ml      Physical Exam   Constitutional: She is oriented to person, place, and time. She appears well-developed. She is cooperative. She is easily aroused. She appears distressed  (appears in pain from BLE cramping).   HENT:   Head: Normocephalic and atraumatic.   Eyes: EOM are normal.   Neck: Normal range of motion. Neck supple.   Cardiovascular: Normal rate, regular rhythm and intact distal pulses.   No murmur heard.  Pulmonary/Chest: Effort normal and breath sounds normal. No stridor. No respiratory distress. She has no wheezes. She has no rales. She exhibits no tenderness.   Abdominal: Soft. She exhibits no distension. There is tenderness (BL quadrant). There is no rebound and no guarding.   Diminished Bowel sounds   Genitourinary:   Genitourinary Comments: Deferred   Musculoskeletal: She exhibits tenderness (back and abdomen). She exhibits no edema or deformity.   Neurological: She is alert, oriented to person, place, and time and easily aroused. No sensory deficit.   BUE tremors, hx of parkinson's   Skin: Skin is warm and dry. Capillary refill takes less than 2 seconds.   Psychiatric: She has a normal mood and affect. Her behavior is normal. Thought content normal.   Nursing note and vitals reviewed.    Significant Labs:   Blood Culture:   No results for input(s): LABBLOO in the last 48 hours.  CBC:   Recent Labs   Lab 05/26/19  0324 05/26/19  1650 05/27/19  0606   WBC 11.55  --  13.66*   HGB 7.6* 8.0* 8.2*   HCT 22.8* 23.9* 24.8*   *  --  286     CMP:   Recent Labs   Lab 05/26/19  0324 05/27/19  0606   * 129*   K 4.3 3.8   CL 99 95   CO2 26 27   * 134*   BUN 6* 8   CREATININE 0.7 0.7   CALCIUM 8.5* 8.7   ALBUMIN 2.6*  --    ANIONGAP 7* 7*   EGFRNONAA >60 >60       Significant Imaging: I have reviewed and interpreted all pertinent imaging results/findings within the past 24 hours.

## 2019-05-28 NOTE — ASSESSMENT & PLAN NOTE
05/24- will need up to 6 weeks of parenteral therapy , will continue Rocephin /vanco until final drug susceptibility of organisim is known   05/25- operative note reviewed and imaging test reviewed .  Neurosurgery input noted.  S/p L1-4 with L2-3 laminectomy for debridement and drainage of epidural abscess-will follow drug susceptibility of strep .  Will plan to adjust therapy soon. Will continue Rocephin /vanco  05/27- will continue Rocephin and will plan to complete 8 weeks of therapy , will need weekly ESR and CRP while on this regime.

## 2019-05-28 NOTE — PLAN OF CARE
Problem: Physical Therapy Goal  Goal: Physical Therapy Goal  LTG'S TO BE MET IN 7 DAYS (5-31-19)  1. PT WILL REQUIRE MODA FOR BED MOBILITY  2. PT WILL REQUIRE MODA FOR TF'S  3. PT WILL TOLERATE BLE THEREX X 20 REPS AROM     Outcome: Ongoing (interventions implemented as appropriate)  PT REQUIRES MAXA X 2 FOR BED MOBILITY AND TF FROM BED<>CHAIR

## 2019-05-28 NOTE — PLAN OF CARE
[facility] called in to [facility]. Faxed pasrr to [facility]. Will place 142 in chart once received. Referral to [facility] is currently under review.       05/28/19 1500   Post-Acute Status   Post-Acute Authorization Placement   Post-Acute Placement Status Pending State Direction

## 2019-05-28 NOTE — PROGRESS NOTES
[facility]  Infectious Disease  Progress Note    Patient Name: Ross Isbell  MRN: [id number]  Admission Date: 5/23/2019  Length of Stay: 5 days  Attending Physician: Teddy Bryant MD  Primary Care Provider: Anna Penn MD    Isolation Status: No active isolations  Assessment/Plan:      Streptococcal bacteremia  05/24- will follow final ID and drug susceptibility of organism.  Will continue Vanco/rocephin  05/25- isolate is strep -will continue close follow up of repeat blood culture.  Continue Rocephin.  05/26- will plan to complete up to 6 weeks of IV Rocephin.  Will follow repeat blood culture.    05.27- will repeat blood culture in AM, will continue Rocephin , LTAC consult     Osteomyelitis of lumbar spine  05/24- will need up to 6 weeks of parenteral therapy , will continue Rocephin /vanco until final drug susceptibility of organisim is known   05/25- operative note reviewed and imaging test reviewed .  Neurosurgery input noted.  S/p L1-4 with L2-3 laminectomy for debridement and drainage of epidural abscess-will follow drug susceptibility of strep .  Will plan to adjust therapy soon. Will continue Rocephin /vanco  05/27- will continue Rocephin and will plan to complete 8 weeks of therapy , will need weekly ESR and CRP while on this regime.    Type 2 diabetes, controlled, with peripheral neuropathy  05/24- insulin sliding scale, closely monitor glucose        Anticipated Disposition:     Thank you for your consult. I will follow-up with patient. Please contact us if you have any additional questions.    Dada Gomez MD  Infectious Disease  [facility]    Subjective:     Principal Problem:Status post lumbar laminectomy    HPI: 73 year old female with history of DM II, Parkinson's, CORINE, MI, CAD, and Glaucoma who presented to the Emergency Department with c/o fatigue. There is associated symptom of generalized weakness, bilateral lower extremity pain. Since admission, all  cultures were reviewed -   Blood cultures -  05/23- strep   04/30-  Streptococcus gallinaceus   She was seen in the ED on 04/30 and blood cultures at that time was also positive for strep gallinaceous .She was discharged from the ED as her symptoms were attributed to Parkinsonism .CT abdomen/pelvis with contrast showed moderate inflammatory changes centered on the intervertebral disc space between the L2 and L3 vertebral bodies. There is bony destruction on both sides of the intervertebral disc space between the L2 and L3 vertebral bodies.  This is consistent with the patient's history and characteristic of discitis and osteomyelitis.   MRI of the thoracic spine -Negative for evidence of discitis or osteomyelitis of the thoracic spine.    Low-grade scattered degenerative changes.  MRI of the lumbar spine-Findings compatible with discitis L2-3, with osteomyelitis of L2 and L3.  Additional evidence of epidural abscess within the anterior epidural space.  Severe paravertebral soft tissue edema, as detailed above.  3. Small amount of fluid within the L3-L4 disc space suspicious for early osteomyelitis    Interval History: 73 year old woman  with strep bacteremia/epidural abscess s/p L1-4 with L2-3 laminectomy for debridement and drainage of epidural abscess .  She is drowsy post operative procedure.  05/26- she has episodes of confusion, nurse is by bedside  Blood culture=pan sensitive strep   05/27- she remains afebrile, last blood culture-05/23- strep    at bedside  Review of Systems   Constitutional: Positive for activity change. Negative for chills and fever.   HENT: Negative for trouble swallowing.    Eyes: Negative for visual disturbance.   Respiratory: Negative for apnea, cough, choking, chest tightness, shortness of breath, wheezing and stridor.    Cardiovascular: Negative for chest pain, palpitations and leg swelling.   Gastrointestinal: Negative for abdominal pain, constipation, diarrhea, nausea and  vomiting.   Genitourinary: Negative for difficulty urinating, dysuria, flank pain, frequency and urgency.   Musculoskeletal: Positive for back pain (lumbar). Negative for arthralgias, gait problem, joint swelling, myalgias, neck pain and neck stiffness.   Skin: Negative for color change.   Neurological: Positive for weakness. Negative for dizziness, tremors, seizures, syncope, facial asymmetry, speech difficulty, light-headedness, numbness and headaches.   Psychiatric/Behavioral: Negative for agitation, behavioral problems and dysphoric mood.     Objective:     Vital Signs (Most Recent):  Temp: 98.3 °F (36.8 °C) (05/28/19 0317)  Pulse: 77 (05/28/19 0317)  Resp: 18 (05/28/19 0317)  BP: (!) 120/55 (05/28/19 0317)  SpO2: 96 % (05/28/19 0317) Vital Signs (24h Range):  Temp:  [97.8 °F (36.6 °C)-99.7 °F (37.6 °C)] 98.3 °F (36.8 °C)  Pulse:  [] 77  Resp:  [16-30] 18  SpO2:  [94 %-99 %] 96 %  BP: ()/(51-58) 120/55     Weight: 100.6 kg (221 lb 12.5 oz)  Body mass index is 35.8 kg/m².    Estimated Creatinine Clearance: 99.9 mL/min (based on SCr of 0.6 mg/dL).    Physical Exam   Constitutional: She appears well-developed. She is cooperative. She is easily aroused. No distress (-  ).   HENT:   Head: Normocephalic and atraumatic.   Eyes: EOM are normal.   Neck: Normal range of motion. Neck supple.   Cardiovascular: Normal rate, regular rhythm and intact distal pulses.   No murmur heard.  Pulmonary/Chest: Effort normal and breath sounds normal. No stridor. No respiratory distress. She has no wheezes. She has no rales. She exhibits no tenderness.   Abdominal: Soft. She exhibits no distension. There is tenderness (BL quadrant). There is no rebound and no guarding.   Diminished Bowel sounds   Genitourinary:   Genitourinary Comments: Deferred   Musculoskeletal: She exhibits tenderness (back and abdomen). She exhibits no edema or deformity.   Neurological: She is alert and easily aroused. No sensory deficit.   BUE tremors,  hx of parkinson's  Has episodes of confusion    Skin: Skin is warm and dry. Capillary refill takes less than 2 seconds.   Psychiatric: She has a normal mood and affect. Her behavior is normal. Thought content normal.   Nursing note and vitals reviewed.      Significant Labs:   Blood Culture:   Recent Labs   Lab 04/30/19  1610 04/30/19  1640 05/23/19  1328 05/23/19  1345   LABBLOO Gram stain aer bottle: Gram positive cocci in chains resembling Strep  Gram stain flaco bottle: Gram positive cocci in chains resembling Strep  Results called to and read back by: Adwoa Ramirez RN 05/01/2019  10:43  STREPTOCOCCUS SPECIES  Further identified as Streptococcus gallinaceus  For susceptibility see order #[id number]   Gram stain aer bottle: Gram positive cocci in chains resembling Strep  Gram stain flaco bottle: Gram positive cocci in chains resembling Strep  Results called to and read back by: Adwoa Ramirez RN 05/01/2019  10:42  STREPTOCOCCUS SPECIES  Further identified as Streptococcus gallinaceus   Gram stain aer bottle: Gram positive cocci   Results called to and read back by:Krista Abdi RN 05/24/2019  14:22  Gram stain flaco bottle: Gram positive cocci   Positive results previously called  STREPTOCOCCUS SPECIES  Further Identified as Streptococcus gallinaceus   Gram stain aer bottle: Gram positive cocci   Results called to and read back by:Krista Abdi RN 05/24/2019  14:23  Gram stain flaco bottle: Gram positive cocci   Positive results previously called  STREPTOCOCCUS SPECIES  Further Identified as Streptococcus gallinaceus  For susceptibility see order #[id number]       BMP:   Recent Labs   Lab 05/28/19  0535   *   *   K 3.7   CL 96   CO2 27   BUN 8   CREATININE 0.6   CALCIUM 8.5*     CBC:   Recent Labs   Lab 05/26/19  1650 05/27/19  0606 05/28/19  0535   WBC  --  13.66* 12.69   HGB 8.0* 8.2* 7.5*   HCT 23.9* 24.8* 22.6*   PLT  --  286 285     All pertinent labs within the past 24 hours have been  reviewed.    Significant Imaging: I have reviewed all pertinent imaging results/findings within the past 24 hours.

## 2019-05-28 NOTE — PHYSICIAN QUERY
"PT Name: Ross Isbell  MR #: [id number]    Physician Query Form - Consultant Diagnosis Clarification     CDS/: Isabelle Linton RN CDI             Contact information: [email or web address]  This form is a permanent document in the medical record.     Query Date: May 28, 2019      By submitting this query, we are merely seeking further clarification of documentation.  Please utilize your independent clinical judgment when addressing the question(s) below.      The Medical record contains the following:   Diagnosis Supporting Clinical Information Location in Medical Record   Pressure injury to right buttocks DTI (deep tissue injury)  POA (present on admit) Pressure Injury 05/24/19 Right Buttocks DTPI  Dressing - open to air  No drainage  Color - maroon   Cleansed with:;Soap and water;Applied:;Skin Barrier    Obtain foam wedge from materials management to assist with maintaining proper position changes at least q 2 hours and document actual position in EPIC q 2 hours  5. Limit the amount of linen/underpad between patient and mattress surface to ONE fitted sheet and ONE covidien incontinence pad - NO draw sheet/briefs Wound care note 5/24 231 pm         Do you agree with the Consultants diagnosis of "Pressure injury to right buttocks DTI (deep tissue injury)  POA (present on admit"?    [ X ] Yes   [  ] Yes, but it resolved prior to my assessment of the patient   [  ] No   [  ] Clinically insignificant   [  ] Other/Clarification of findings:   [  ] Clinically undetermined                      "

## 2019-05-28 NOTE — ASSESSMENT & PLAN NOTE
05/24- will follow final ID and drug susceptibility of organism.  Will continue Vanco/rocephin  05/25- isolate is strep -will continue close follow up of repeat blood culture.  Continue Rocephin.  05/26- will plan to complete up to 6 weeks of IV Rocephin.  Will follow repeat blood culture.    05.27- will repeat blood culture in AM, will continue Rocephin , LTAC consult
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
## 2019-05-28 NOTE — ASSESSMENT & PLAN NOTE
Laminectomy and drainage per Neurosurgery.  Cultures growing Streptococcus species broad sensitivity.

## 2019-05-28 NOTE — NURSING
Assisted Katherine Jacques to hold patient and redress patient's backside incision. Bactroban ointment applied, followed by abd pads and surgical tape. Patient tolerated procedure well.

## 2019-05-28 NOTE — PHYSICIAN QUERY
"PT Name: Ross Isbell  MR #: [id number]    Physician Query Form - Consultant Diagnosis Clarification     CDS/: Isabelle Linton RN CDI              Contact information: [facility]I [facility]  This form is a permanent document in the medical record.     Query Date: May 28, 2019      By submitting this query, we are merely seeking further clarification of documentation.  Please utilize your independent clinical judgment when addressing the question(s) below.      The Medical record contains the following:   Diagnosis Supporting Clinical Information Location in Medical Record   Pressure injury to left buttocks, DTI (deep tissue injury)  POA (present on admit)     Pressure Injury 05/23/19 1911 Left Buttocks DTPI  Dressing - open to sir  No drainage   Color purple  Care - Cleansed with:;Soap and water;Applied:;Skin Barrier  (critic aid paste)    Obtain foam wedge from materials management to assist with maintaining proper position changes at least q 2 hours and document actual position in EPIC q 2 hours  5. Limit the amount of linen/underpad between patient and mattress surface to ONE fitted sheet and ONE covidien incontinence pad - NO draw sheet/briefs Wound care notes 5/24 231 pm         Do you agree with the Consultants diagnosis of "Pressure injury to left buttocks, DTI (deep tissue injury)  POA (present on admit)"?    [ X ] Yes   [  ] Yes, but it resolved prior to my assessment of the patient   [  ] No   [  ] Clinically insignificant   [  ] Other/Clarification of findings:   [  ] Clinically undetermined                      "

## 2019-05-28 NOTE — PROGRESS NOTES
[facility] -   Neurosurgery  Progress Note    Subjective:       History of Present Illness: Patient is a 74 YO female with h/o osteomyelitis and Parkinson' disease presented with lower back pain and difficulty walking. Reports she had not walked for about a month with symptoms worsening over past 2-3 weeks. She underwent surgery on 5/25/19 for posterior lumbar fusion and abscess debridement.     Interval History:  Postop day #3  Patient appears more coherent today with  at bedside.   C/o nausea, throat pain, moderate to severe abdominal pain with coughing  Has complaints of pain as she did prior to surgery   Hgb/Hct decreased from yesterday        Post-Op Info:  Procedure(s) (LRB):  FUSION, SPINE, POSTERIOR SPINAL COLUMN, LUMBAR, USING COMPUTER-ASSISTED NAVIGATION  FUSION, SPINE, LUMBAR, TLIF, POSTERIOR APPROACH, USING PEDICLE SCREW (N/A)  LAMINECTOMY, SPINE, LUMBAR  DEBRIDEMENT, ABSCESS, PARASPINAL (N/A)   3 Days Post-Op      Medications:  Continuous Infusions:   sodium chloride 0.9% 50 mL/hr at 05/26/19 1642     Scheduled Meds:   carbidopa-levodopa  mg  1 tablet Oral QID    cefTRIAXone (ROCEPHIN) IVPB  2 g Intravenous Q24H    docusate sodium  100 mg Oral Daily    isosorbide mononitrate  120 mg Oral Daily    latanoprost  1 drop Both Eyes QHS    levothyroxine  125 mcg Oral Before breakfast    losartan  50 mg Oral Daily    metoprolol succinate  25 mg Oral Daily    miconazole NITRATE 2 %   Topical (Top) BID    multivitamin  1 tablet Oral Daily    mupirocin   Topical (Top) Daily    nozaseptin   Each Nare BID    pantoprazole  40 mg Oral Daily    pramipexole  1 mg Oral TID    pravastatin  40 mg Oral Daily     PRN Meds:sodium chloride, acetaminophen, aluminum-magnesium hydroxide-simethicone, dextrose 50%, dextrose 50%, glucagon (human recombinant), glucose, glucose, HYDROcodone-acetaminophen, HYDROcodone-acetaminophen, HYDROmorphone, insulin aspart U-100, methocarbamol, morphine,  morphine, ondansetron, ondansetron, oxyCODONE-acetaminophen, promethazine (PHENERGAN) IVPB     Review of Systems     Objective:     Weight: 100.6 kg (221 lb 12.5 oz)  Body mass index is 35.8 kg/m².  Vital Signs (Most Recent):  Temp: 98.2 °F (36.8 °C) (05/28/19 0916)  Pulse: 84 (05/28/19 0916)  Resp: 14 (05/28/19 0916)  BP: 130/60 (05/28/19 0916)  SpO2: 99 % (05/28/19 0916) Vital Signs (24h Range):  Temp:  [97.8 °F (36.6 °C)-99.7 °F (37.6 °C)] 98.2 °F (36.8 °C)  Pulse:  [] 84  Resp:  [14-30] 14  SpO2:  [94 %-99 %] 99 %  BP: ()/(51-60) 130/60                Oxygen Concentration (%):  [28] 28    Female External Urinary Catheter 05/27/19 1335 (Active)       Neurosurgery Physical Exam     Patient moves her legs in bed  Foot drop at baseline  No edema of B lower extremities      Significant Labs:  Recent Labs   Lab 05/27/19  0606 05/28/19  0535   * 141*   * 129*   K 3.8 3.7   CL 95 96   CO2 27 27   BUN 8 8   CREATININE 0.7 0.6   CALCIUM 8.7 8.5*     Recent Labs   Lab 05/26/19  1650 05/27/19  0606 05/28/19  0535   WBC  --  13.66* 12.69   HGB 8.0* 8.2* 7.5*   HCT 23.9* 24.8* 22.6*   PLT  --  286 285     No results for input(s): LABPT, INR, APTT in the last 48 hours.  Microbiology Results (last 7 days)     Procedure Component Value Units Date/Time    Aerobic culture [[id number]] Collected:  05/24/19 1540    Order Status:  Completed Specimen:  Aspirate from Back Updated:  05/28/19 0846     Aerobic Bacterial Culture No growth    Blood culture [[id number]]     Order Status:  No result Specimen:  Blood     Blood culture [[id number]]     Order Status:  No result Specimen:  Blood     Culture, Anaerobe [[id number]] Collected:  05/24/19 1540    Order Status:  Completed Specimen:  Aspirate from Back Updated:  05/27/19 0723     Anaerobic Culture Culture in progress    Blood culture #2 **CANNOT BE ORDERED STAT** [[id number]] Collected:  05/23/19 1345    Order Status:  Completed Specimen:  Blood from Peripheral,  Wrist, Left Updated:  05/26/19 1303     Blood Culture, Routine Gram stain aer bottle: Gram positive cocci      Blood Culture, Routine Results called to and read back by:Krista Abdi RN 05/24/2019  14:23     Blood Culture, Routine Gram stain flaco bottle: Gram positive cocci      Blood Culture, Routine Positive results previously called     Blood Culture, Routine --     STREPTOCOCCUS SPECIES  Further Identified as Streptococcus gallinaceus  For susceptibility see order #[id number]      Blood culture #1 **CANNOT BE ORDERED STAT** [[id number]]  (Susceptibility) Collected:  05/23/19 1328    Order Status:  Completed Specimen:  Blood from Peripheral, Antecubital, Left Updated:  05/26/19 1302     Blood Culture, Routine Gram stain aer bottle: Gram positive cocci      Blood Culture, Routine Results called to and read back by:Krista Abdi RN 05/24/2019  14:22     Blood Culture, Routine Gram stain flaco bottle: Gram positive cocci      Blood Culture, Routine Positive results previously called     Blood Culture, Routine --     STREPTOCOCCUS SPECIES  Further Identified as Streptococcus gallinaceus      Gram stain [[id number]] Collected:  05/24/19 1540    Order Status:  Completed Specimen:  Aspirate from Back Updated:  05/24/19 2307     Gram Stain Result Rare WBC's      No organisms seen    Clostridium difficile EIA [[id number]]     Order Status:  Canceled Specimen:  Stool         ABGs: No results for input(s): PH, PCO2, PO2, HCO3, POCSATURATED, BE in the last 48 hours.  CMP:   Recent Labs   Lab 05/27/19  0606 05/28/19  0535   * 141*   CALCIUM 8.7 8.5*   * 129*   K 3.8 3.7   CO2 27 27   CL 95 96   BUN 8 8   CREATININE 0.7 0.6     CRP: No results for input(s): CRP in the last 48 hours.  All pertinent labs from the last 24 hours have been reviewed.      Assessment/Plan:     Active Diagnoses:    Diagnosis Date Noted POA    PRINCIPAL PROBLEM:  Status post lumbar laminectomy [Z98.890] 05/25/2019 Not Applicable    Electrolyte  abnormality [E87.8] 05/26/2019 No    S/P lumbar laminectomy [Z98.890] 05/26/2019 Not Applicable    Chest pain [R07.9] 05/25/2019 Yes    Acute osteomyelitis of lumbar spine [M46.26] 05/25/2019 Yes    Postprocedural hypotension [I95.81] 05/25/2019 No    Streptococcal bacteremia [R78.81, B95.5] 05/24/2019 Yes    Abscess in epidural space of lumbar spine [G06.1] 05/24/2019 Yes    Pressure injury of back, stage 3 [L89.103] 05/24/2019 Yes    Intertrigo [L30.4] 05/24/2019 Yes    Deep tissue injury [T14.8XXA] 05/24/2019 Yes    Osteomyelitis of lumbar spine [M46.26] 05/23/2019 Yes    UTI (urinary tract infection) [N39.0] 05/23/2019 Yes    Hyponatremia [E87.1] 05/23/2019 Yes    Type 2 diabetes, controlled, with peripheral neuropathy [E11.42] 05/29/2014 Yes     Chronic    Hypothyroidism [E03.9] 11/19/2013 Yes     Chronic    Essential hypertension [I10]  Yes     Chronic    PD (Parkinson's disease) [G20]  Yes     Chronic    CAD (coronary artery disease) [I25.10]  Yes     Chronic      Problems Resolved During this Admission:     Consult wound care for daily dressing changes  Patient instructed to sit up in chair and wear LSO brace when sitting out of bed  Physical/Occupation therapy- activity as tolerated  Continue medical management per HM and ID  Boost with meals- Strawberry flavored  Will continue to monitor          Mayra Long PA-C  Neurosurgery  [facility]

## 2019-05-28 NOTE — PHYSICIAN QUERY
"PT Name: Ross Isbell  MR #: [id number]    Physician Query Form - Consultant Diagnosis Clarification     CDS/: Isabelle Linton RN CDI             Contact information: [email or web address]  This form is a permanent document in the medical record.     Query Date: May 28, 2019      By submitting this query, we are merely seeking further clarification of documentation.  Please utilize your independent clinical judgment when addressing the question(s) below.      The Medical record contains the following:   Diagnosis Supporting Clinical Information Location in Medical Record   Pressure injury  lower Thoracic spine Stage 3  POA (present on admit) Pressure injury  lower Thoracic spine Stage 3  POA (present on admit)  Small amount of serous drainage to dressing   Appearance res adipose, moist, yellow slough  Tissue description - full thickness  Cleansed with:;Sterile normal saline;Applied:;Skin Barrier  Hydrofiber;Silver;Foam;Applied    Obtain foam wedge from materials management to assist with maintaining proper position changes at least q 2 hours and document actual position in EPIC q 2 hours   Limit the amount of linen/underpad between patient and mattress surface to ONE fitted sheet and ONE covidien incontinence pad - NO draw sheet/briefs Wound care note 5/24 231 pm         Do you agree with the Consultants diagnosis of "Pressure injury  lower Thoracic spine Stage 3  POA (present on admit)"?    [ X ] Yes   [  ] Yes, but it resolved prior to my assessment of the patient   [  ] No   [  ] Clinically insignificant   [  ] Other/Clarification of findings:   [  ] Clinically undetermined                      "

## 2019-05-28 NOTE — SUBJECTIVE & OBJECTIVE
Interval History:  Interval improvement in abdominal and back pain.  Tolerated limited degree of physical therapy.  Pursuing skilled placement.    Review of Systems   Constitutional: Positive for activity change. Negative for chills and fever.   HENT: Negative for trouble swallowing.    Eyes: Negative for visual disturbance.   Respiratory: Negative for apnea, cough, choking, chest tightness, shortness of breath, wheezing and stridor.    Cardiovascular: Negative for chest pain, palpitations and leg swelling.   Gastrointestinal: Negative for abdominal pain, constipation, diarrhea, nausea and vomiting.   Genitourinary: Negative for difficulty urinating, dysuria, flank pain, frequency and urgency.   Musculoskeletal: Positive for back pain (lumbar). Negative for arthralgias, gait problem, joint swelling, myalgias, neck pain and neck stiffness.   Skin: Negative for color change.   Neurological: Positive for weakness. Negative for dizziness, tremors, seizures, syncope, facial asymmetry, speech difficulty, light-headedness, numbness and headaches.   Psychiatric/Behavioral: Negative for agitation, behavioral problems and dysphoric mood.     Objective:     Vital Signs (Most Recent):  Temp: 98.3 °F (36.8 °C) (05/28/19 1555)  Pulse: 90 (05/28/19 1555)  Resp: 16 (05/28/19 1555)  BP: (!) 111/53 (05/28/19 1555)  SpO2: 96 % (05/28/19 1555) Vital Signs (24h Range):  Temp:  [97.7 °F (36.5 °C)-98.8 °F (37.1 °C)] 98.3 °F (36.8 °C)  Pulse:  [77-96] 90  Resp:  [14-30] 16  SpO2:  [95 %-99 %] 96 %  BP: ()/(51-60) 111/53     Weight: 100.6 kg (221 lb 12.5 oz)  Body mass index is 35.8 kg/m².    Intake/Output Summary (Last 24 hours) at 5/28/2019 1716  Last data filed at 5/28/2019 1200  Gross per 24 hour   Intake 1449.17 ml   Output --   Net 1449.17 ml      Physical Exam   Constitutional: She is oriented to person, place, and time. She appears well-developed. She is cooperative. She is easily aroused. She appears distressed (appears in  pain from BLE cramping).   HENT:   Head: Normocephalic and atraumatic.   Eyes: EOM are normal.   Neck: Normal range of motion. Neck supple.   Cardiovascular: Normal rate, regular rhythm and intact distal pulses.   No murmur heard.  Pulmonary/Chest: Effort normal and breath sounds normal. No stridor. No respiratory distress. She has no wheezes. She has no rales. She exhibits no tenderness.   Abdominal: Soft. She exhibits no distension. There is tenderness (BL quadrant). There is no rebound and no guarding.   Diminished Bowel sounds   Genitourinary:   Genitourinary Comments: Deferred   Musculoskeletal: She exhibits tenderness (back and abdomen). She exhibits no edema or deformity.   Neurological: She is alert, oriented to person, place, and time and easily aroused. No sensory deficit.   BUE tremors, hx of parkinson's   Skin: Skin is warm and dry. Capillary refill takes less than 2 seconds.   Psychiatric: She has a normal mood and affect. Her behavior is normal. Thought content normal.   Nursing note and vitals reviewed.    Significant Labs:   Blood Culture:   No results for input(s): LABBLOO in the last 48 hours.  CBC:   Recent Labs   Lab 05/27/19  0606 05/28/19  0535   WBC 13.66* 12.69   HGB 8.2* 7.5*   HCT 24.8* 22.6*    285     CMP:   Recent Labs   Lab 05/27/19  0606 05/28/19  0535   * 129*   K 3.8 3.7   CL 95 96   CO2 27 27   * 141*   BUN 8 8   CREATININE 0.7 0.6   CALCIUM 8.7 8.5*   ANIONGAP 7* 6*   EGFRNONAA >60 >60       Significant Imaging: I have reviewed and interpreted all pertinent imaging results/findings within the past 24 hours.

## 2019-05-29 PROBLEM — R23.9 ALTERATION IN SKIN INTEGRITY RELATED TO SURGICAL INCISION: Status: ACTIVE | Noted: 2019-05-29

## 2019-05-29 LAB
ANION GAP SERPL CALC-SCNC: 10 MMOL/L (ref 8–16)
BASOPHILS # BLD AUTO: 0.02 K/UL (ref 0–0.2)
BASOPHILS NFR BLD: 0.2 % (ref 0–1.9)
BUN SERPL-MCNC: 9 MG/DL (ref 8–23)
CALCIUM SERPL-MCNC: 8.7 MG/DL (ref 8.7–10.5)
CHLORIDE SERPL-SCNC: 96 MMOL/L (ref 95–110)
CO2 SERPL-SCNC: 26 MMOL/L (ref 23–29)
CREAT SERPL-MCNC: 0.7 MG/DL (ref 0.5–1.4)
DIFFERENTIAL METHOD: ABNORMAL
EOSINOPHIL # BLD AUTO: 0.6 K/UL (ref 0–0.5)
EOSINOPHIL NFR BLD: 4.2 % (ref 0–8)
ERYTHROCYTE [DISTWIDTH] IN BLOOD BY AUTOMATED COUNT: 14.5 % (ref 11.5–14.5)
EST. GFR  (AFRICAN AMERICAN): >60 ML/MIN/1.73 M^2
EST. GFR  (NON AFRICAN AMERICAN): >60 ML/MIN/1.73 M^2
GLUCOSE SERPL-MCNC: 178 MG/DL (ref 70–110)
HCT VFR BLD AUTO: 22.7 % (ref 37–48.5)
HGB BLD-MCNC: 7.5 G/DL (ref 12–16)
LYMPHOCYTES # BLD AUTO: 1.5 K/UL (ref 1–4.8)
LYMPHOCYTES NFR BLD: 11.5 % (ref 18–48)
MCH RBC QN AUTO: 29 PG (ref 27–31)
MCHC RBC AUTO-ENTMCNC: 33 G/DL (ref 32–36)
MCV RBC AUTO: 88 FL (ref 82–98)
MONOCYTES # BLD AUTO: 1.1 K/UL (ref 0.3–1)
MONOCYTES NFR BLD: 8.3 % (ref 4–15)
NEUTROPHILS # BLD AUTO: 9.9 K/UL (ref 1.8–7.7)
NEUTROPHILS NFR BLD: 75.8 % (ref 38–73)
PLATELET # BLD AUTO: 308 K/UL (ref 150–350)
PMV BLD AUTO: 8.1 FL (ref 9.2–12.9)
POCT GLUCOSE: 240 MG/DL (ref 70–110)
POTASSIUM SERPL-SCNC: 4 MMOL/L (ref 3.5–5.1)
RBC # BLD AUTO: 2.59 M/UL (ref 4–5.4)
SODIUM SERPL-SCNC: 132 MMOL/L (ref 136–145)
WBC # BLD AUTO: 13.09 K/UL (ref 3.9–12.7)

## 2019-05-29 PROCEDURE — 96372 THER/PROPH/DIAG INJ SC/IM: CPT

## 2019-05-29 PROCEDURE — 25000003 PHARM REV CODE 250: Performed by: INTERNAL MEDICINE

## 2019-05-29 PROCEDURE — 63600175 PHARM REV CODE 636 W HCPCS: Performed by: INTERNAL MEDICINE

## 2019-05-29 PROCEDURE — 80048 BASIC METABOLIC PNL TOTAL CA: CPT

## 2019-05-29 PROCEDURE — 21400001 HC TELEMETRY ROOM

## 2019-05-29 PROCEDURE — 94761 N-INVAS EAR/PLS OXIMETRY MLT: CPT

## 2019-05-29 PROCEDURE — 97530 THERAPEUTIC ACTIVITIES: CPT

## 2019-05-29 PROCEDURE — 85025 COMPLETE CBC W/AUTO DIFF WBC: CPT

## 2019-05-29 PROCEDURE — 97110 THERAPEUTIC EXERCISES: CPT

## 2019-05-29 RX ORDER — ONDANSETRON 2 MG/ML
4 INJECTION INTRAMUSCULAR; INTRAVENOUS EVERY 8 HOURS PRN
Status: DISCONTINUED | OUTPATIENT
Start: 2019-05-29 | End: 2019-05-30 | Stop reason: HOSPADM

## 2019-05-29 RX ADMIN — Medication: at 09:05

## 2019-05-29 RX ADMIN — CARBIDOPA AND LEVODOPA 1 TABLET: 25; 250 TABLET ORAL at 05:05

## 2019-05-29 RX ADMIN — PRAVASTATIN SODIUM 40 MG: 20 TABLET ORAL at 08:05

## 2019-05-29 RX ADMIN — HYDROCODONE BITARTRATE AND ACETAMINOPHEN 1 TABLET: 10; 325 TABLET ORAL at 01:05

## 2019-05-29 RX ADMIN — PRAMIPEXOLE DIHYDROCHLORIDE 1 MG: 0.5 TABLET ORAL at 09:05

## 2019-05-29 RX ADMIN — METHOCARBAMOL TABLETS 750 MG: 750 TABLET, COATED ORAL at 02:05

## 2019-05-29 RX ADMIN — LOSARTAN POTASSIUM 50 MG: 50 TABLET, FILM COATED ORAL at 08:05

## 2019-05-29 RX ADMIN — CEFTRIAXONE 2 G: 2 INJECTION, SOLUTION INTRAVENOUS at 09:05

## 2019-05-29 RX ADMIN — PANTOPRAZOLE SODIUM 40 MG: 40 TABLET, DELAYED RELEASE ORAL at 08:05

## 2019-05-29 RX ADMIN — MORPHINE SULFATE 4 MG: 4 INJECTION INTRAVENOUS at 02:05

## 2019-05-29 RX ADMIN — MUPIROCIN: 20 OINTMENT TOPICAL at 08:05

## 2019-05-29 RX ADMIN — PRAMIPEXOLE DIHYDROCHLORIDE 1 MG: 0.5 TABLET ORAL at 08:05

## 2019-05-29 RX ADMIN — MORPHINE SULFATE 4 MG: 4 INJECTION INTRAVENOUS at 09:05

## 2019-05-29 RX ADMIN — THERA TABS 1 TABLET: TAB at 08:05

## 2019-05-29 RX ADMIN — HYDROCODONE BITARTRATE AND ACETAMINOPHEN 1 TABLET: 7.5; 325 TABLET ORAL at 04:05

## 2019-05-29 RX ADMIN — ONDANSETRON 8 MG: 8 TABLET, ORALLY DISINTEGRATING ORAL at 10:05

## 2019-05-29 RX ADMIN — INSULIN ASPART 4 UNITS: 100 INJECTION, SOLUTION INTRAVENOUS; SUBCUTANEOUS at 04:05

## 2019-05-29 RX ADMIN — HYDROCODONE BITARTRATE AND ACETAMINOPHEN 1 TABLET: 10; 325 TABLET ORAL at 08:05

## 2019-05-29 RX ADMIN — METOPROLOL SUCCINATE 25 MG: 25 TABLET, EXTENDED RELEASE ORAL at 08:05

## 2019-05-29 RX ADMIN — Medication: at 08:05

## 2019-05-29 RX ADMIN — LATANOPROST 1 DROP: 50 SOLUTION OPHTHALMIC at 09:05

## 2019-05-29 RX ADMIN — ONDANSETRON 4 MG: 2 INJECTION INTRAMUSCULAR; INTRAVENOUS at 04:05

## 2019-05-29 RX ADMIN — ISOSORBIDE MONONITRATE 120 MG: 60 TABLET, EXTENDED RELEASE ORAL at 08:05

## 2019-05-29 RX ADMIN — DOCUSATE SODIUM 100 MG: 100 CAPSULE, LIQUID FILLED ORAL at 08:05

## 2019-05-29 RX ADMIN — LEVOTHYROXINE SODIUM 125 MCG: 125 TABLET ORAL at 05:05

## 2019-05-29 RX ADMIN — CARBIDOPA AND LEVODOPA 1 TABLET: 25; 250 TABLET ORAL at 02:05

## 2019-05-29 RX ADMIN — CARBIDOPA AND LEVODOPA 1 TABLET: 25; 250 TABLET ORAL at 09:05

## 2019-05-29 RX ADMIN — PRAMIPEXOLE DIHYDROCHLORIDE 1 MG: 0.5 TABLET ORAL at 02:05

## 2019-05-29 RX ADMIN — CARBIDOPA AND LEVODOPA 1 TABLET: 25; 250 TABLET ORAL at 08:05

## 2019-05-29 NOTE — ASSESSMENT & PLAN NOTE
Laminectomy and drainage per Neurosurgery.  Cultures growing Streptococcus species broad sensitivity.  Ceftriaxone 2 g every 24 hours via PICC line until 18 July.

## 2019-05-29 NOTE — PLAN OF CARE
Call received from Aida Mohr, Admissions at [facility]. [facility] has received insurance authorization and will accept patient in the am. Discussed patient's incisional vac, therapy and iv abx needs. Met with patient, updated her, she asked if I would contact her daughter Jessenia. Spoke with Jessenia , update given. Also update given to Dr Bryant. IMM reviewed and signed.         05/29/19 2590   Discharge Reassessment   Assessment Type Discharge Planning Reassessment   Provided patient/caregiver education on the expected discharge date and the discharge plan Yes   Do you have any problems affording any of your prescribed medications? No   Discharge Plan A Skilled Nursing Facility   DME Needed Upon Discharge  other (see comments)  (discharging with a provena incisional vac)   Patient choice form signed by patient/caregiver N/A   Anticipated Discharge Disposition SNF   Can the patient answer the patient profile reliably? Yes, cognitively intact   How does the patient rate their overall health at the present time? Fair   Describe the patient's ability to walk at the present time. Major restrictions/daily assistance from another person   How often would a person be available to care for the patient? Often   Number of comorbid conditions (as recorded on the chart) Five or more   Post-Acute Status   Post-Acute Authorization Placement   Post-Acute Placement Status Authorization Obtained

## 2019-05-29 NOTE — ASSESSMENT & PLAN NOTE
05/24- will need up to 6 weeks of parenteral therapy , will continue Rocephin /vanco until final drug susceptibility of organisim is known   05/25- operative note reviewed and imaging test reviewed .  Neurosurgery input noted.  S/p L1-4 with L2-3 laminectomy for debridement and drainage of epidural abscess-will follow drug susceptibility of strep .  Will plan to adjust therapy soon. Will continue Rocephin /vanco  05/27- will continue Rocephin and will plan to complete 8 weeks of therapy , will need weekly ESR and CRP while on this regime.  05/28- will continue Rocephin, follow repeat blood cultures -day 1/56

## 2019-05-29 NOTE — PLAN OF CARE
Problem: Adult Inpatient Plan of Care  Goal: Plan of Care Review  Outcome: Ongoing (interventions implemented as appropriate)  Patient continues to be monitored and treated. Back dressing changed due to saturation of previous dressing. Patient consistently turned Q2H by patient care assistant. Purewick in place. Pain treated with PRN pain medication. Patient continues to be dysarthric and at times extremely difficult to understand. She has, however, remained fully oriented throughout the night. Will continue to monitor and treat.

## 2019-05-29 NOTE — PLAN OF CARE
Problem: Adult Inpatient Plan of Care  Goal: Plan of Care Review  Outcome: Ongoing (interventions implemented as appropriate)  AAO X3. VSS. NSR on monitor. NS @50mL/hr. Pain managed with PRN medication.   Activity as tolerated. Wound vac in place. To [facility] tomorrow.  Fall precautions in place, call bell in reach, bed in low and locked position.   POC discussed w/, verbalized understanding. Will continue to monitor.

## 2019-05-29 NOTE — PROGRESS NOTES
[facility]  Infectious Disease  Progress Note    Patient Name: Ross Isbell  MRN: [id number]  Admission Date: 5/23/2019  Length of Stay: 6 days  Attending Physician: Teddy Bryant MD  Primary Care Provider: Anna Penn MD    Isolation Status: No active isolations  Assessment/Plan:      * Status post lumbar laminectomy  05/28- Pt/Ot as tolerated.    Streptococcal bacteremia  05/24- will follow final ID and drug susceptibility of organism.  Will continue Vanco/rocephin  05/25- isolate is strep -will continue close follow up of repeat blood culture.  Continue Rocephin.  05/26- will plan to complete up to 6 weeks of IV Rocephin.  Will follow repeat blood culture.    05.27- will repeat blood culture in AM, will continue Rocephin , LTAC consult   05/28- day 1/56 of Rocephin, will follow final culture report .    Osteomyelitis of lumbar spine  05/24- will need up to 6 weeks of parenteral therapy , will continue Rocephin /vanco until final drug susceptibility of organisim is known   05/25- operative note reviewed and imaging test reviewed .  Neurosurgery input noted.  S/p L1-4 with L2-3 laminectomy for debridement and drainage of epidural abscess-will follow drug susceptibility of strep .  Will plan to adjust therapy soon. Will continue Rocephin /vanco  05/27- will continue Rocephin and will plan to complete 8 weeks of therapy , will need weekly ESR and CRP while on this regime.  05/28- will continue Rocephin, follow repeat blood cultures -day 1/56     Type 2 diabetes, controlled, with peripheral neuropathy  05/24- insulin sliding scale, closely monitor glucose    PD (Parkinson's disease)  05/24- out patient neurology follow up .        Anticipated Disposition:     Thank you for your consult. I will follow-up with patient. Please contact us if you have any additional questions.    Dada Gomez MD  Infectious Disease  [facility]    Subjective:     Principal Problem:Status post  lumbar laminectomy    HPI: 73 year old female with history of DM II, Parkinson's, CORINE, MI, CAD, and Glaucoma who presented to the Emergency Department with c/o fatigue. There is associated symptom of generalized weakness, bilateral lower extremity pain. Since admission, all cultures were reviewed -   Blood cultures -  05/23- strep   04/30-  Streptococcus gallinaceus   She was seen in the ED on 04/30 and blood cultures at that time was also positive for strep gallinaceous .She was discharged from the ED as her symptoms were attributed to Parkinsonism .CT abdomen/pelvis with contrast showed moderate inflammatory changes centered on the intervertebral disc space between the L2 and L3 vertebral bodies. There is bony destruction on both sides of the intervertebral disc space between the L2 and L3 vertebral bodies.  This is consistent with the patient's history and characteristic of discitis and osteomyelitis.   MRI of the thoracic spine -Negative for evidence of discitis or osteomyelitis of the thoracic spine.    Low-grade scattered degenerative changes.  MRI of the lumbar spine-Findings compatible with discitis L2-3, with osteomyelitis of L2 and L3.  Additional evidence of epidural abscess within the anterior epidural space.  Severe paravertebral soft tissue edema, as detailed above.  3. Small amount of fluid within the L3-L4 disc space suspicious for early osteomyelitis    Interval History: 73 year old woman  with strep bacteremia/epidural abscess s/p L1-4 with L2-3 laminectomy for debridement and drainage of epidural abscess .  She is drowsy post operative procedure.  05/26- she has episodes of confusion, nurse is by bedside  Blood culture=pan sensitive strep   05/27- she remains afebrile, last blood culture-05/23- strep    at bedside  05/28- she was seen with the daughter in the room .  She denies fever .  CT scan of the abdomen /pelvis done -05/27- there is a pattern of discitis/osteomyelitis at L2-3 with  endplate destruction and paravertebral soft tissue swelling  S/p Status post PLIF L1-L4   Review of Systems   Constitutional: Positive for activity change. Negative for chills and fever.   HENT: Negative for trouble swallowing.    Eyes: Negative for visual disturbance.   Respiratory: Negative for apnea, cough, choking, chest tightness, shortness of breath, wheezing and stridor.    Cardiovascular: Negative for chest pain, palpitations and leg swelling.   Gastrointestinal: Negative for abdominal pain, constipation, diarrhea, nausea and vomiting.   Genitourinary: Negative for difficulty urinating, dysuria, flank pain, frequency and urgency.   Musculoskeletal: Positive for back pain (lumbar). Negative for arthralgias, gait problem, joint swelling, myalgias, neck pain and neck stiffness.   Skin: Negative for color change.   Neurological: Positive for weakness. Negative for dizziness, tremors, seizures, syncope, facial asymmetry, speech difficulty, light-headedness, numbness and headaches.   Psychiatric/Behavioral: Negative for agitation, behavioral problems and dysphoric mood.     Objective:     Vital Signs (Most Recent):  Temp: 99.3 °F (37.4 °C) (05/29/19 0246)  Pulse: 84 (05/29/19 0246)  Resp: 20 (05/29/19 0246)  BP: (!) 117/57 (05/29/19 0246)  SpO2: 96 % (05/29/19 0246) Vital Signs (24h Range):  Temp:  [97.7 °F (36.5 °C)-99.3 °F (37.4 °C)] 99.3 °F (37.4 °C)  Pulse:  [78-96] 84  Resp:  [14-20] 20  SpO2:  [96 %-100 %] 96 %  BP: (103-130)/(52-60) 117/57     Weight: 100.6 kg (221 lb 12.5 oz)  Body mass index is 35.8 kg/m².    Estimated Creatinine Clearance: 85.7 mL/min (based on SCr of 0.7 mg/dL).    Physical Exam   Constitutional: She appears well-developed. She is cooperative. She is easily aroused. No distress (-  ).   HENT:   Head: Normocephalic and atraumatic.   Eyes: EOM are normal.   Neck: Normal range of motion. Neck supple.   Cardiovascular: Normal rate, regular rhythm and intact distal pulses.   No murmur  heard.  Pulmonary/Chest: Effort normal and breath sounds normal. No stridor. No respiratory distress. She has no wheezes. She has no rales. She exhibits no tenderness.   Abdominal: Soft. She exhibits no distension. There is tenderness (BL quadrant). There is no rebound and no guarding.   Diminished Bowel sounds   Genitourinary:   Genitourinary Comments: Deferred   Musculoskeletal: She exhibits tenderness (back and abdomen). She exhibits no edema or deformity.   Neurological: She is alert and easily aroused. No sensory deficit.   BUE tremors, hx of parkinson's  Has episodes of confusion    Skin: Skin is warm and dry. Capillary refill takes less than 2 seconds.   Psychiatric: She has a normal mood and affect. Her behavior is normal. Thought content normal.   Nursing note and vitals reviewed.      Significant Labs:   Blood Culture:   Recent Labs   Lab 04/30/19  1640 05/23/19  1328 05/23/19  1345 05/28/19  1125 05/28/19  1159   LABBLOO Gram stain aer bottle: Gram positive cocci in chains resembling Strep  Gram stain flaco bottle: Gram positive cocci in chains resembling Strep  Results called to and read back by: Adwoa Ramirez RN 05/01/2019  10:42  STREPTOCOCCUS SPECIES  Further identified as Streptococcus gallinaceus   Gram stain aer bottle: Gram positive cocci   Results called to and read back by:Krista Abdi RN 05/24/2019  14:22  Gram stain flaco bottle: Gram positive cocci   Positive results previously called  STREPTOCOCCUS SPECIES  Further Identified as Streptococcus gallinaceus   Gram stain aer bottle: Gram positive cocci   Results called to and read back by:Krista Abdi RN 05/24/2019  14:23  Gram stain flaco bottle: Gram positive cocci   Positive results previously called  STREPTOCOCCUS SPECIES  Further Identified as Streptococcus gallinaceus  For susceptibility see order #[id number]   No Growth to date No Growth to date     BMP:   Recent Labs   Lab 05/29/19  0436   *   *   K 4.0   CL 96   CO2 26    BUN 9   CREATININE 0.7   CALCIUM 8.7     CBC:   Recent Labs   Lab 05/27/19  0606 05/28/19  0535 05/29/19  0436   WBC 13.66* 12.69 13.09*   HGB 8.2* 7.5* 7.5*   HCT 24.8* 22.6* 22.7*    285 308     All pertinent labs within the past 24 hours have been reviewed.    Significant Imaging: I have reviewed all pertinent imaging results/findings within the past 24 hours.

## 2019-05-29 NOTE — PT/OT/SLP PROGRESS
Physical Therapy  Treatment    Ross Isbell   MRN: [id number]   Admitting Diagnosis: Status post lumbar laminectomy    PT Received On: 05/29/19  PT Start Time: 1105     PT Stop Time: 1130    PT Total Time (min): 25 min       Billable Minutes:  Therapeutic Activity 15 and Therapeutic Exercise 10    Treatment Type: Treatment  PT/PTA: PT      General Precautions: Standard, fall, respiratory  Orthopedic Precautions: spinal precautions   Braces: LSO    Subjective:  Communicated with NURSE RASHID prior to session.  Pain/Comfort  Pain Rating 1: 6/10  Location 1: back    Objective:   Patient found with: telemetry, peripheral IV, oxygen    Functional Mobility:  Therapeutic Activities and Exercises:  PT FOUND SUPINE IN BED UPON ARRIVAL, AGREEABLE TO TX., APPEARED IN BETTER SPIRITS TODAY, LESS PAIN, SUP>SIT WITH MAXA X 2 USING LOG ROLL, MAXA FOR SEATED SCOOT TO EOB, SLIGHT POSTERIOR LEAN BUT BETTER, TOTAL ASSIST FOR DONNING ROBE AND BACK BRACE, REVIEW TECHNIQUE WITH PT AND , TOTAL ASSIST FOR DOFFING SOCKS AND DONNING TENNIS SHOES, SIT>STAND WITH MODA X 2, PT TOOK SEVERAL SMALL STEPS TO TF FROM BED TO CHAIR WITH MODA X 2, LIMITED BY B PF CONTRACTURES, PT EDUCATED IN AND PERFORMED BLE THEREX X 20 REPS AROM/AAROM  WITH REST AS NEEDED    AM-PAC 6 CLICK MOBILITY  How much help from another person does this patient currently need?   1 = Unable, Total/Dependent Assistance  2 = A lot, Maximum/Moderate Assistance  3 = A little, Minimum/Contact Guard/Supervision  4 = None, Modified [place]/Independent    Turning over in bed (including adjusting bedclothes, sheets and blankets)?: 2  Sitting down on and standing up from a chair with arms (e.g., wheelchair, bedside commode, etc.): 2  Moving from lying on back to sitting on the side of the bed?: 2  Moving to and from a bed to a chair (including a wheelchair)?: 2  Need to walk in hospital room?: 1  Climbing 3-5 steps with a railing?: 1  Basic Mobility Total Score: 10    AM-PAC  Raw Score CMS G-Code Modifier Level of Impairment Assistance   6 % Total / Unable   7 - 9 CM 80 - 100% Maximal Assist   10 - 14 CL 60 - 80% Moderate Assist   15 - 19 CK 40 - 60% Moderate Assist   20 - 22 CJ 20 - 40% Minimal Assist   23 CI 1-20% SBA / CGA   24 CH 0% Independent/ Mod I     Patient left up in chair with all lines intact, call button in reach, chair alarm on, NURSE notified and  present.    Assessment:  Ross Isbell is a 73 y.o. female with a medical diagnosis of Status post lumbar laminectomy and presents with IMPAIRED FUNCTIONAL MOBILITY. PT WILL BENEFIT FROM CONT. SKILLED P.T. TO ADDRESS IMPAIRMENTS    Rehab identified problem list/impairments: Rehab identified problem list/impairments: weakness, impaired endurance, impaired functional mobilty, impaired balance, decreased coordination, decreased safety awareness, pain    Rehab potential is good.    Activity tolerance: Good    Discharge recommendations: Discharge Facility/Level of Care Needs: nursing facility, skilled     Barriers to discharge:      Equipment recommendations: Equipment Needed After Discharge: none     GOALS:   Multidisciplinary Problems     Physical Therapy Goals        Problem: Physical Therapy Goal    Goal Priority Disciplines Outcome Goal Variances Interventions   Physical Therapy Goal     PT, PT/OT Ongoing (interventions implemented as appropriate)     Description:  LTG'S TO BE MET IN 7 DAYS (5-31-19)  1. PT WILL REQUIRE MODA FOR BED MOBILITY  2. PT WILL REQUIRE MODA FOR TF'S  3. PT WILL TOLERATE BLE THEREX X 20 REPS AROM             Problem: Physical Therapy Goal    Goal Priority Disciplines Outcome Goal Variances Interventions   Physical Therapy Goal     PT, PT/OT      Description:  PT eval complete. Prior goals remain appropriate and should be met by 6/2/2019                    PLAN:    Patient to be seen 5 x/week  to address the above listed problems via therapeutic activities, therapeutic exercises  Plan of  Care expires: 06/02/19  Plan of Care reviewed with: patient     PT ENCOURAGED TO CALL FOR ASSISTANCE WITH ALL NEEDS DUE TO FALL RISK STATUS, PT AGREEABLE    Kirsten Dang, PT  05/29/2019

## 2019-05-29 NOTE — SUBJECTIVE & OBJECTIVE
Interval History: 73 year old woman  with strep bacteremia/epidural abscess s/p L1-4 with L2-3 laminectomy for debridement and drainage of epidural abscess .  She is drowsy post operative procedure.  05/26- she has episodes of confusion, nurse is by bedside  Blood culture=pan sensitive strep   05/27- she remains afebrile, last blood culture-05/23- strep    at bedside  05/28- she was seen with the daughter in the room .  She denies fever .  CT scan of the abdomen /pelvis done -05/27- there is a pattern of discitis/osteomyelitis at L2-3 with endplate destruction and paravertebral soft tissue swelling  S/p Status post PLIF L1-L4   Review of Systems   Constitutional: Positive for activity change. Negative for chills and fever.   HENT: Negative for trouble swallowing.    Eyes: Negative for visual disturbance.   Respiratory: Negative for apnea, cough, choking, chest tightness, shortness of breath, wheezing and stridor.    Cardiovascular: Negative for chest pain, palpitations and leg swelling.   Gastrointestinal: Negative for abdominal pain, constipation, diarrhea, nausea and vomiting.   Genitourinary: Negative for difficulty urinating, dysuria, flank pain, frequency and urgency.   Musculoskeletal: Positive for back pain (lumbar). Negative for arthralgias, gait problem, joint swelling, myalgias, neck pain and neck stiffness.   Skin: Negative for color change.   Neurological: Positive for weakness. Negative for dizziness, tremors, seizures, syncope, facial asymmetry, speech difficulty, light-headedness, numbness and headaches.   Psychiatric/Behavioral: Negative for agitation, behavioral problems and dysphoric mood.     Objective:     Vital Signs (Most Recent):  Temp: 99.3 °F (37.4 °C) (05/29/19 0246)  Pulse: 84 (05/29/19 0246)  Resp: 20 (05/29/19 0246)  BP: (!) 117/57 (05/29/19 0246)  SpO2: 96 % (05/29/19 0246) Vital Signs (24h Range):  Temp:  [97.7 °F (36.5 °C)-99.3 °F (37.4 °C)] 99.3 °F (37.4 °C)  Pulse:  [78-96]  84  Resp:  [14-20] 20  SpO2:  [96 %-100 %] 96 %  BP: (103-130)/(52-60) 117/57     Weight: 100.6 kg (221 lb 12.5 oz)  Body mass index is 35.8 kg/m².    Estimated Creatinine Clearance: 85.7 mL/min (based on SCr of 0.7 mg/dL).    Physical Exam   Constitutional: She appears well-developed. She is cooperative. She is easily aroused. No distress (-  ).   HENT:   Head: Normocephalic and atraumatic.   Eyes: EOM are normal.   Neck: Normal range of motion. Neck supple.   Cardiovascular: Normal rate, regular rhythm and intact distal pulses.   No murmur heard.  Pulmonary/Chest: Effort normal and breath sounds normal. No stridor. No respiratory distress. She has no wheezes. She has no rales. She exhibits no tenderness.   Abdominal: Soft. She exhibits no distension. There is tenderness (BL quadrant). There is no rebound and no guarding.   Diminished Bowel sounds   Genitourinary:   Genitourinary Comments: Deferred   Musculoskeletal: She exhibits tenderness (back and abdomen). She exhibits no edema or deformity.   Neurological: She is alert and easily aroused. No sensory deficit.   BUE tremors, hx of parkinson's  Has episodes of confusion    Skin: Skin is warm and dry. Capillary refill takes less than 2 seconds.   Psychiatric: She has a normal mood and affect. Her behavior is normal. Thought content normal.   Nursing note and vitals reviewed.      Significant Labs:   Blood Culture:   Recent Labs   Lab 04/30/19  1640 05/23/19  1328 05/23/19  1345 05/28/19  1125 05/28/19  1159   LABBLOO Gram stain aer bottle: Gram positive cocci in chains resembling Strep  Gram stain flaco bottle: Gram positive cocci in chains resembling Strep  Results called to and read back by: Adwoa Ramirez RN 05/01/2019  10:42  STREPTOCOCCUS SPECIES  Further identified as Streptococcus gallinaceus   Gram stain aer bottle: Gram positive cocci   Results called to and read back by:Krista Abdi RN 05/24/2019  14:22  Gram stain flaco bottle: Gram positive cocci    Positive results previously called  STREPTOCOCCUS SPECIES  Further Identified as Streptococcus gallinaceus   Gram stain aer bottle: Gram positive cocci   Results called to and read back by:Krista Abdi RN 05/24/2019  14:23  Gram stain flaco bottle: Gram positive cocci   Positive results previously called  STREPTOCOCCUS SPECIES  Further Identified as Streptococcus gallinaceus  For susceptibility see order #[id number]   No Growth to date No Growth to date     BMP:   Recent Labs   Lab 05/29/19  0436   *   *   K 4.0   CL 96   CO2 26   BUN 9   CREATININE 0.7   CALCIUM 8.7     CBC:   Recent Labs   Lab 05/27/19  0606 05/28/19  0535 05/29/19  0436   WBC 13.66* 12.69 13.09*   HGB 8.2* 7.5* 7.5*   HCT 24.8* 22.6* 22.7*    285 308     All pertinent labs within the past 24 hours have been reviewed.    Significant Imaging: I have reviewed all pertinent imaging results/findings within the past 24 hours.

## 2019-05-29 NOTE — PLAN OF CARE
05/29/19 1530   Medicare Message   Important Message from Medicare regarding Discharge Appeal Rights Given to patient/caregiver;Signed/date by patient/caregiver;Explained to patient/caregiver   Date IMM was signed 05/29/19   Time IMM was signed [id number]

## 2019-05-29 NOTE — ASSESSMENT & PLAN NOTE
05/24- will follow final ID and drug susceptibility of organism.  Will continue Vanco/rocephin  05/25- isolate is strep -will continue close follow up of repeat blood culture.  Continue Rocephin.  05/26- will plan to complete up to 6 weeks of IV Rocephin.  Will follow repeat blood culture.    05.27- will repeat blood culture in AM, will continue Rocephin , LTAC consult   05/28- day 1/56 of Rocephin, will follow final culture report .

## 2019-05-29 NOTE — PROGRESS NOTES
[facility] Hospital Medicine  Progress Note    Patient Name: Ross Isbell  MRN: [id number]  Patient Class: IP- Inpatient   Admission Date: 5/23/2019  Length of Stay: 6 days  Attending Physician: Teddy Bryant MD  Primary Care Provider: Anna Penn MD        Subjective:     Principal Problem:Status post lumbar laminectomy    HPI:  Ross Isbell is a 73 year old female with a PMHx of DM II, Parkinson's, CORINE, MI, CAD, and Glaucoma who presented to the Emergency Department with c/o fatigue. Associated symptoms include: generalized weakness and BLE pain x 2 weeks. Pt was informed by home health nurse to go to ED for increasing weakness, abdominal pain and leg pain. ED workup showed: WBC 13.08K, Hgb/Hct 11.0/33.5, Na+ 129, lipase 17. UA (+) nitrites, few bacteria. CXR with no acute findings. CT abdomen/pelvis with contrast showed moderate inflammatory changes centered on the intervertebral disc space between the L2 and L3 vertebral bodies. There is bony destruction on both sides of the intervertebral disc space between the L2 and L3 vertebral bodies.  This is consistent with the patient's history and characteristic of discitis and osteomyelitis. ED discussed case with Dr. Nicolas (neurosurgery) who reported pt did not need to be transferred out at this time. Obtain MRI thoracic/lumbar spine w wo contrast. Start pt on IV Vancomycin and Zosyn but consult Dr. Gomez (ID). Daughter: Uzma Godfrey [phone] is the surrogate decision maker. Admitted for Osteomyelitis of lumbar spine.    Hospital Course:  5/24/19 Dr. Nicolas, Neurosurgeon, doesn't recommend spinal surgery, But Needle biopsy of L 2-3 disk abscess with cultures. This am, patient c/o of severe abdominal pain 20/10, Norco 10 mg ordered PRN with IV 2 mg Morphine PRN. Blood culture is Positive for GPC. Echo pending. Patient had severe back pain and refused OT/PT. Daughter stated she has had diarrhea x 1 month. Stool for C.Diff  ordered. T. Max 101. IV Vanco and Zosyn. IR, Dr. Juárez, consulted for Needle biopsy. Daughter at bedside. ID consult pending. Cont Vanc and Zosyn. CT abdomen/pelvis positive for osteomyelitis.  To OR with Dr. Nicolas 25 May for laminectomy and vertebroplasty.  Post-procedure in ICU due to post-op hypotension.  Able to wean vasopressors.  Anemia due to blood loss related to the procedure.  Transfused 1 unit PRBC.  Interval improvement in abdominal and back pain.  Tolerated limited degree of physical therapy.  Pursuing skilled placement.    Interval History:  Interval improvement in abdominal and back pain.  Tolerated physical therapy.  Incisional wound VAC in place L-spine.    Review of Systems   Constitutional: Positive for activity change. Negative for chills and fever.   HENT: Negative for trouble swallowing.    Eyes: Negative for visual disturbance.   Respiratory: Negative for apnea, cough, choking, chest tightness, shortness of breath, wheezing and stridor.    Cardiovascular: Negative for chest pain, palpitations and leg swelling.   Gastrointestinal: Negative for abdominal pain, constipation, diarrhea, nausea and vomiting.   Genitourinary: Negative for difficulty urinating, dysuria, flank pain, frequency and urgency.   Musculoskeletal: Positive for back pain (lumbar). Negative for arthralgias, gait problem, joint swelling, myalgias, neck pain and neck stiffness.   Skin: Negative for color change.   Neurological: Positive for weakness. Negative for dizziness, tremors, seizures, syncope, facial asymmetry, speech difficulty, light-headedness, numbness and headaches.   Psychiatric/Behavioral: Negative for agitation, behavioral problems and dysphoric mood.     Objective:     Vital Signs (Most Recent):  Temp: 97.7 °F (36.5 °C) (05/29/19 0747)  Pulse: 85 (05/29/19 1618)  Resp: 18 (05/29/19 1618)  BP: 137/63 (05/29/19 1618)  SpO2: 95 % (05/29/19 1618) Vital Signs (24h Range):  Temp:  [97.7 °F (36.5 °C)-99.3 °F (37.4  °C)] 97.7 °F (36.5 °C)  Pulse:  [78-91] 85  Resp:  [16-20] 18  SpO2:  [95 %-100 %] 95 %  BP: (101-137)/(52-63) 137/63     Weight: 100.6 kg (221 lb 12.5 oz)  Body mass index is 35.8 kg/m².    Intake/Output Summary (Last 24 hours) at 5/29/2019 1711  Last data filed at 5/29/2019 1433  Gross per 24 hour   Intake --   Output 0 ml   Net 0 ml      Physical Exam   Constitutional: She is oriented to person, place, and time. She appears well-developed. She is cooperative. She is easily aroused. She appears distressed (appears in pain from BLE cramping).   HENT:   Head: Normocephalic and atraumatic.   Eyes: EOM are normal.   Neck: Normal range of motion. Neck supple.   Cardiovascular: Normal rate, regular rhythm and intact distal pulses.   No murmur heard.  Pulmonary/Chest: Effort normal and breath sounds normal. No stridor. No respiratory distress. She has no wheezes. She has no rales. She exhibits no tenderness.   Abdominal: Soft. She exhibits no distension. There is tenderness (BL quadrant). There is no rebound and no guarding.   Diminished Bowel sounds   Genitourinary:   Genitourinary Comments: Deferred   Musculoskeletal: She exhibits tenderness (back and abdomen). She exhibits no edema or deformity.   Neurological: She is alert, oriented to person, place, and time and easily aroused. No sensory deficit.   BUE tremors, hx of parkinson's   Skin: Skin is warm and dry. Capillary refill takes less than 2 seconds.   Psychiatric: She has a normal mood and affect. Her behavior is normal. Thought content normal.   Nursing note and vitals reviewed.    Significant Labs:   Blood Culture:   Recent Labs   Lab 05/28/19  1125 05/28/19  1159   LABBLOO No Growth to date No Growth to date     CBC:   Recent Labs   Lab 05/28/19  0535 05/29/19  0436   WBC 12.69 13.09*   HGB 7.5* 7.5*   HCT 22.6* 22.7*    308     CMP:   Recent Labs   Lab 05/28/19  0535 05/29/19  0436   * 132*   K 3.7 4.0   CL 96 96   CO2 27 26   * 178*   BUN  8 9   CREATININE 0.6 0.7   CALCIUM 8.5* 8.7   ANIONGAP 6* 10   EGFRNONAA >60 >60       Significant Imaging: I have reviewed and interpreted all pertinent imaging results/findings within the past 24 hours.    Assessment/Plan:      * Status post lumbar laminectomy  Laminectomy and vertebroplasty by Dr. Nicolas 25 May.  Modest post-op blood loss.  Transfused 1 unit PRBC.    Abscess in epidural space of lumbar spine  Laminectomy and drainage per Neurosurgery.  Cultures growing Streptococcus species broad sensitivity.  Ceftriaxone 2 g every 24 hours via PICC line until 18 July.    Streptococcal bacteremia  - ID consult  Adjust based on culture results.  Streptococcus - Continue Ceftriaxone.    Hyponatremia  - Na+ 128, monitor  - IVF 75 cc/hr  - Neuro checks      UTI (urinary tract infection)  - UA (+) nitrites. Micro few bacteria  - Continue Zosyn  - Urine culture pending      Osteomyelitis of lumbar spine  - CT abdomen/pelvis with contrast showed moderate inflammatory changes centered on the intervertebral disc space between the L2 and L3 vertebral bodies. There is bony destruction on both sides of the intervertebral disc space between the L2 and L3 vertebral bodies.  This is consistent with the patient's history and characteristic of discitis and osteomyelitis  - Neurosurgery, Dr. Nicolas, states no surgical intervention at this time  -Needs Needle bx L 2-3 ordered 5/24/19  - MRI thoracic and lumbar spine: osteomyelitis  - Inpatient consult to ID  - Blood cultures: GPC  - Neurovascular checks  - Analgesics PRN    Type 2 diabetes, controlled, with peripheral neuropathy  - HgbA1c on 04/23/19 -- 7.8  - Accuchecks and low dose SSI  - Pt takes Novolin 70/30 55 units SQ TID. Glucose in ED 57 mg/dL. Will hold Novolin for now, use low dose SSI, and monitor glucose      Hypothyroidism  - Continue Levothyroxine      Essential hypertension  - Continue home medications including Metoprolol and Losartan      CAD (coronary artery  disease)  - Continue ASA, Statin, and Imdur      PD (Parkinson's disease)  - Continue Pramipexole and Sinemet        VTE Risk Mitigation (From admission, onward)        Ordered     IP VTE HIGH RISK PATIENT  Once      05/26/19 0627     Place QUIQUE hose  Until discontinued      05/26/19 0627     Place sequential compression device  Until discontinued      05/26/19 0627              Teddy Bryant MD  Department of Hospital Medicine   [facility]

## 2019-05-29 NOTE — NURSING
Dressing saturated with blood by 2130. Dressing changed per wound care orders using AG mepilex. Patient tolerated procedure well.

## 2019-05-29 NOTE — PLAN OF CARE
Problem: Physical Therapy Goal  Goal: Physical Therapy Goal  LTG'S TO BE MET IN 7 DAYS (5-31-19)  1. PT WILL REQUIRE MODA FOR BED MOBILITY  2. PT WILL REQUIRE MODA FOR TF'S  3. PT WILL TOLERATE BLE THEREX X 20 REPS AROM     Outcome: Ongoing (interventions implemented as appropriate)  PT WITH IMPROVED TOLERANCE TO P.T. TODAY, MAXA X 2 FOR BED MOBILITY, BED TO CHAIR TF WITH MODA X 2

## 2019-05-29 NOTE — PROGRESS NOTES
[facility]   Neurosurgery  Progress Note    Subjective:     History of Present Illness:   Patient is a 72 YO female with h/o osteomyelitis and Parkinson' disease presented with lower back pain and difficulty walking. Reports she had not walked for about a month with symptoms worsening over past 2-3 weeks. She underwent surgery on 5/25/19 for posterior lumbar fusion and abscess debridement.    Interval History:   Postop day #4  Patient appears more coherent and comfortable today with  at bedside.   Pain better controlled today  Hgb/Hct stable from yesterday  Mild increase in WBC          Post-Op Info:  Procedure(s) (LRB):  FUSION, SPINE, POSTERIOR SPINAL COLUMN, LUMBAR, USING COMPUTER-ASSISTED NAVIGATION  FUSION, SPINE, LUMBAR, TLIF, POSTERIOR APPROACH, USING PEDICLE SCREW (N/A)  LAMINECTOMY, SPINE, LUMBAR  DEBRIDEMENT, ABSCESS, PARASPINAL (N/A)   4 Days Post-Op      Medications:  Continuous Infusions:   sodium chloride 0.9% 50 mL/hr at 05/26/19 1642     Scheduled Meds:   carbidopa-levodopa  mg  1 tablet Oral QID    cefTRIAXone (ROCEPHIN) IVPB  2 g Intravenous Q24H    docusate sodium  100 mg Oral Daily    isosorbide mononitrate  120 mg Oral Daily    latanoprost  1 drop Both Eyes QHS    levothyroxine  125 mcg Oral Before breakfast    losartan  50 mg Oral Daily    metoprolol succinate  25 mg Oral Daily    miconazole NITRATE 2 %   Topical (Top) BID    multivitamin  1 tablet Oral Daily    mupirocin   Topical (Top) Daily    nozaseptin   Each Nare BID    pantoprazole  40 mg Oral Daily    pramipexole  1 mg Oral TID    pravastatin  40 mg Oral Daily     PRN Meds:sodium chloride, acetaminophen, aluminum-magnesium hydroxide-simethicone, dextrose 50%, dextrose 50%, glucagon (human recombinant), glucose, glucose, HYDROcodone-acetaminophen, HYDROcodone-acetaminophen, HYDROmorphone, insulin aspart U-100, methocarbamol, morphine, morphine, ondansetron, ondansetron,  oxyCODONE-acetaminophen, promethazine (PHENERGAN) IVPB     Review of Systems   Constitutional: Negative for fever.   Neurological: Negative for syncope.   Psychiatric/Behavioral: Negative for agitation.          Objective:     Weight: 100.6 kg (221 lb 12.5 oz)  Body mass index is 35.8 kg/m².  Vital Signs (Most Recent):  Temp: 97.7 °F (36.5 °C) (05/29/19 0747)  Pulse: 82 (05/29/19 0747)  Resp: 18 (05/29/19 0747)  BP: 133/62 (05/29/19 0747)  SpO2: 99 % (05/29/19 0747) Vital Signs (24h Range):  Temp:  [97.7 °F (36.5 °C)-99.3 °F (37.4 °C)] 97.7 °F (36.5 °C)  Pulse:  [78-96] 82  Resp:  [16-20] 18  SpO2:  [96 %-100 %] 99 %  BP: (103-133)/(52-62) 133/62                Oxygen Concentration (%):  [28] 28    Female External Urinary Catheter 05/27/19 1335 (Active)   Skin no redness;no breakdown 5/28/2019  9:16 AM   Tolerance no signs/symptoms of discomfort 5/28/2019  9:16 AM   Suction Continuous suction at 70 mmHg 5/28/2019  9:16 AM       Neurosurgery Physical Exam     Patient moves her legs in bed  Foot drop at baseline  No edema of B lower extremities    Significant Labs:  Recent Labs   Lab 05/28/19  0535 05/29/19  0436   * 178*   * 132*   K 3.7 4.0   CL 96 96   CO2 27 26   BUN 8 9   CREATININE 0.6 0.7   CALCIUM 8.5* 8.7     Recent Labs   Lab 05/28/19  0535 05/29/19  0436   WBC 12.69 13.09*   HGB 7.5* 7.5*   HCT 22.6* 22.7*    308     No results for input(s): LABPT, INR, APTT in the last 48 hours.  Microbiology Results (last 7 days)     Procedure Component Value Units Date/Time    Blood culture [[id number]] Collected:  05/28/19 1125    Order Status:  Completed Specimen:  Blood from Line, PICC Left Basilic Updated:  05/29/19 0515     Blood Culture, Routine No Growth to date    Blood culture [[id number]] Collected:  05/28/19 1159    Order Status:  Completed Specimen:  Blood from Line, PICC Left Basilic Updated:  05/29/19 0515     Blood Culture, Routine No Growth to date    Culture, Anaerobe [[id number]]  Collected:  05/24/19 1540    Order Status:  Completed Specimen:  Aspirate from Back Updated:  05/28/19 1125     Anaerobic Culture Culture in progress    Aerobic culture [[id number]] Collected:  05/24/19 1540    Order Status:  Completed Specimen:  Aspirate from Back Updated:  05/28/19 0846     Aerobic Bacterial Culture No growth    Blood culture #2 **CANNOT BE ORDERED STAT** [[id number]] Collected:  05/23/19 1345    Order Status:  Completed Specimen:  Blood from Peripheral, Wrist, Left Updated:  05/26/19 1303     Blood Culture, Routine Gram stain aer bottle: Gram positive cocci      Blood Culture, Routine Results called to and read back by:Krista Abdi RN 05/24/2019  14:23     Blood Culture, Routine Gram stain flaco bottle: Gram positive cocci      Blood Culture, Routine Positive results previously called     Blood Culture, Routine --     STREPTOCOCCUS SPECIES  Further Identified as Streptococcus gallinaceus  For susceptibility see order #[id number]      Blood culture #1 **CANNOT BE ORDERED STAT** [[id number]]  (Susceptibility) Collected:  05/23/19 1328    Order Status:  Completed Specimen:  Blood from Peripheral, Antecubital, Left Updated:  05/26/19 1302     Blood Culture, Routine Gram stain aer bottle: Gram positive cocci      Blood Culture, Routine Results called to and read back by:Krista Abdi RN 05/24/2019  14:22     Blood Culture, Routine Gram stain flaco bottle: Gram positive cocci      Blood Culture, Routine Positive results previously called     Blood Culture, Routine --     STREPTOCOCCUS SPECIES  Further Identified as Streptococcus gallinaceus      Gram stain [[id number]] Collected:  05/24/19 1540    Order Status:  Completed Specimen:  Aspirate from Back Updated:  05/24/19 2307     Gram Stain Result Rare WBC's      No organisms seen    Clostridium difficile EIA [[id number]]     Order Status:  Canceled Specimen:  Stool         ABGs: No results for input(s): PH, PCO2, PO2, HCO3, POCSATURATED, BE in the last 48  hours.  CMP:   Recent Labs   Lab 05/28/19  0535 05/29/19  0436   * 178*   CALCIUM 8.5* 8.7   * 132*   K 3.7 4.0   CO2 27 26   CL 96 96   BUN 8 9   CREATININE 0.6 0.7     All pertinent labs from the last 24 hours have been reviewed.    Assessment/Plan:     Active Diagnoses:    Diagnosis Date Noted POA    PRINCIPAL PROBLEM:  Status post lumbar laminectomy [Z98.890] 05/25/2019 Not Applicable    Electrolyte abnormality [E87.8] 05/26/2019 No    S/P lumbar laminectomy [Z98.890] 05/26/2019 Not Applicable    Chest pain [R07.9] 05/25/2019 Yes    Acute osteomyelitis of lumbar spine [M46.26] 05/25/2019 Yes    Postprocedural hypotension [I95.81] 05/25/2019 No    Streptococcal bacteremia [R78.81, B95.5] 05/24/2019 Yes    Abscess in epidural space of lumbar spine [G06.1] 05/24/2019 Yes    Pressure injury of back, stage 3 [L89.103] 05/24/2019 Yes    Intertrigo [L30.4] 05/24/2019 Yes    Deep tissue injury [T14.8XXA] 05/24/2019 Yes    Osteomyelitis of lumbar spine [M46.26] 05/23/2019 Yes    UTI (urinary tract infection) [N39.0] 05/23/2019 Yes    Hyponatremia [E87.1] 05/23/2019 Yes    Type 2 diabetes, controlled, with peripheral neuropathy [E11.42] 05/29/2014 Yes     Chronic    Hypothyroidism [E03.9] 11/19/2013 Yes     Chronic    Essential hypertension [I10]  Yes     Chronic    PD (Parkinson's disease) [G20]  Yes     Chronic    CAD (coronary artery disease) [I25.10]  Yes     Chronic      Problems Resolved During this Admission:     Continue Wound care for dressing changes  Wound care recommends incisional wound vac, agree to this plan  Patient instructed to sit up in chair and wear LSO brace when sitting out of bed  Physical/Occupation therapy- activity as tolerated  Continue medical management per HM and ID  Boost with meals- Strawberry flavored  Will continue to monitor      Mayra Long PA-C  Neurosurgery  [facility]

## 2019-05-29 NOTE — SUBJECTIVE & OBJECTIVE
Interval History:  Interval improvement in abdominal and back pain.  Tolerated physical therapy.  Incisional wound VAC in place L-spine.    Review of Systems   Constitutional: Positive for activity change. Negative for chills and fever.   HENT: Negative for trouble swallowing.    Eyes: Negative for visual disturbance.   Respiratory: Negative for apnea, cough, choking, chest tightness, shortness of breath, wheezing and stridor.    Cardiovascular: Negative for chest pain, palpitations and leg swelling.   Gastrointestinal: Negative for abdominal pain, constipation, diarrhea, nausea and vomiting.   Genitourinary: Negative for difficulty urinating, dysuria, flank pain, frequency and urgency.   Musculoskeletal: Positive for back pain (lumbar). Negative for arthralgias, gait problem, joint swelling, myalgias, neck pain and neck stiffness.   Skin: Negative for color change.   Neurological: Positive for weakness. Negative for dizziness, tremors, seizures, syncope, facial asymmetry, speech difficulty, light-headedness, numbness and headaches.   Psychiatric/Behavioral: Negative for agitation, behavioral problems and dysphoric mood.     Objective:     Vital Signs (Most Recent):  Temp: 97.7 °F (36.5 °C) (05/29/19 0747)  Pulse: 85 (05/29/19 1618)  Resp: 18 (05/29/19 1618)  BP: 137/63 (05/29/19 1618)  SpO2: 95 % (05/29/19 1618) Vital Signs (24h Range):  Temp:  [97.7 °F (36.5 °C)-99.3 °F (37.4 °C)] 97.7 °F (36.5 °C)  Pulse:  [78-91] 85  Resp:  [16-20] 18  SpO2:  [95 %-100 %] 95 %  BP: (101-137)/(52-63) 137/63     Weight: 100.6 kg (221 lb 12.5 oz)  Body mass index is 35.8 kg/m².    Intake/Output Summary (Last 24 hours) at 5/29/2019 1711  Last data filed at 5/29/2019 1433  Gross per 24 hour   Intake --   Output 0 ml   Net 0 ml      Physical Exam   Constitutional: She is oriented to person, place, and time. She appears well-developed. She is cooperative. She is easily aroused. She appears distressed (appears in pain from BLE  cramping).   HENT:   Head: Normocephalic and atraumatic.   Eyes: EOM are normal.   Neck: Normal range of motion. Neck supple.   Cardiovascular: Normal rate, regular rhythm and intact distal pulses.   No murmur heard.  Pulmonary/Chest: Effort normal and breath sounds normal. No stridor. No respiratory distress. She has no wheezes. She has no rales. She exhibits no tenderness.   Abdominal: Soft. She exhibits no distension. There is tenderness (BL quadrant). There is no rebound and no guarding.   Diminished Bowel sounds   Genitourinary:   Genitourinary Comments: Deferred   Musculoskeletal: She exhibits tenderness (back and abdomen). She exhibits no edema or deformity.   Neurological: She is alert, oriented to person, place, and time and easily aroused. No sensory deficit.   BUE tremors, hx of parkinson's   Skin: Skin is warm and dry. Capillary refill takes less than 2 seconds.   Psychiatric: She has a normal mood and affect. Her behavior is normal. Thought content normal.   Nursing note and vitals reviewed.    Significant Labs:   Blood Culture:   Recent Labs   Lab 05/28/19  1125 05/28/19  1159   LABBLOO No Growth to date No Growth to date     CBC:   Recent Labs   Lab 05/28/19  0535 05/29/19  0436   WBC 12.69 13.09*   HGB 7.5* 7.5*   HCT 22.6* 22.7*    308     CMP:   Recent Labs   Lab 05/28/19  0535 05/29/19  0436   * 132*   K 3.7 4.0   CL 96 96   CO2 27 26   * 178*   BUN 8 9   CREATININE 0.6 0.7   CALCIUM 8.5* 8.7   ANIONGAP 6* 10   EGFRNONAA >60 >60       Significant Imaging: I have reviewed and interpreted all pertinent imaging results/findings within the past 24 hours.

## 2019-05-29 NOTE — PROGRESS NOTES
05/29/19 1433   Coping/Psychosocial   Plan of Care Reviewed With patient   Skin   Skin WDL ex   Skin Temperature warm   Skin Moisture dry   Skin Elasticity quick return to original state   Nathan Risk Assessment   Sensory Perception 3-->slightly limited   Moisture 3-->occasionally moist   Activity 2-->chairfast   Mobility 2-->very limited   Nutrition 3-->adequate   Friction and Shear 1-->problem   Nathan Score 14        Incision/Site 05/25/19 1128 Back   Date First Assessed/Time First Assessed: 05/25/19 1128   Location: Back   Wound Image    Incision WDL ex   Dressing Appearance Saturated   Drainage Amount Large   Drainage Characteristics/Odor Serosanguineous   Appearance Sutures intact   Wound Edges Approximated   Wound Length (cm) 19 cm   Wound Width (cm) 0.1 cm   Wound Depth (cm) 0.1 cm   Wound Volume (cm^3) 0.19 cm^3   Wound Surface Area (cm^2) 1.9 cm^2   Care Cleansed with:;Sterile normal saline;Applied:;Skin Barrier   Dressing Applied  (Prevena Incisional Wound Vac)   Dressing Change Due 06/05/19        Negative Pressure Wound Therapy    Placement Date/Time: 05/29/19 0945   Orientation: midline  Location: (c) Thoracic spine   NPWT Type Incision Management   Therapy Setting NPWT Continuous therapy   Pressure Setting NPWT 125 mmHg   Sponges Inserted NPWT Black;2  (purple - 1)   General Output (mL) 0        Pressure Injury 05/23/19 1911 lower Thoracic spine Stage 3   Date First Assessed/Time First Assessed: 05/23/19 1911   Pressure Injury Present on Admission: yes  Orientation: lower  Location: Thoracic spine  Staging: Stage 3   Wound Image   (See LDA for Incision)   Staging Stage 3   Dressing Appearance Saturated   Drainage Amount Small   Drainage Characteristics/Odor Serosanguineous   Appearance Red;Yellow;Moist;Slough   Tissue loss description Full thickness   Black (%), Wound Tissue Color 0 %   Red (%), Wound Tissue Color 90 %   Yellow (%), Wound Tissue Color 10 %   Periwound Area Redness;Macerated   Wound  Edges Open   Wound Length (cm) 3.5 cm   Wound Width (cm) 5 cm   Wound Depth (cm) 0.1 cm   Wound Volume (cm^3) 1.75 cm^3   Wound Surface Area (cm^2) 17.5 cm^2   Care Cleansed with:;Sterile normal saline;Applied:;Skin Barrier   Dressing Applied  (wound vac)   Dressing Change Due 06/05/19     Follow up visit with Ms. Isbell after chart review completed this am revealed multiple dressing changes to due to continued high volume drainage from back incision. I discussed with RAMIRO Barreto prior to seeing patient and decision made to apply incisional wound vac dressing to manage drainage. Patient and significant other at bedside educated on this and agree to proceed. Foam and hydrofiber dressing is again saturated with serosanguinous drainage and removed. Cleansed with saline. Incision well approximated with sutures, measures 19cm. Stage 3 pressure injury again noted in central portion of incision, good reduction in slough noted since yesterday's visit with hydrofiber dressing. Wound measures 3.5x5x0.1cm, 90% red granulating tissue, 10% yellow slough. Edges macerated. Incision and kina wound skin all painted with cavilon. 2 small pieces of black foam are cut to size and applied to stage 3 pressure injury wound bed, then Prevena plus dressing cut to size and applied to incision, covering black foam as well. Secured with drape and hydrocolloid strips, trac pad and tubing applied and attached to I wound vac ULTA at Prevena settings: -125 mmHg low intensity suction with good seal noted. Prevena Plus pump and extra dressing left in box at bedside. Patient, primary nurse Efe, and CATIA Leos, FREDDIE all notified NOT to discard PREVENA box. When patient is ready to discharge, OK to switch from wound vac ULTA to Prevena + machine and turn on. Leave this in place for 7 days, then remove dressing and discard pump. She is expecting to discharge to SNF for therapy.    Reportable conditions for Patients utilizing Negative Pressure Wound  Vac Therapy:  1. Loss of seal, raised foam, or wound drainage suddenly decreasing in amount or stopping.  2. Bright red blood or rapid filling of the cannister  3. Periwound erythema, heat, edema, pain, elevated temperature and white blood cell count, cloudy or foul-smelling wound drainage, excess bleeding, macerated periwound skin  4. Wound drainage becoming foul smelling and cloudy  5. Inability to trouble shoot alarm for greater than two (2) hours    Primary Nurse Assessment should include the following - Document VAC in the NPWT LDA  1. Document system patency  2. Amount and description of wound fluid  3. Pain level  4. Tolerance of NPWT  5. Level of suction   6. Color of foam  7. Any air leak noted

## 2019-05-30 ENCOUNTER — TELEPHONE (OUTPATIENT)
Dept: FAMILY MEDICINE | Facility: CLINIC | Age: 74
End: 2019-05-30

## 2019-05-30 VITALS
HEART RATE: 103 BPM | BODY MASS INDEX: 35.65 KG/M2 | SYSTOLIC BLOOD PRESSURE: 105 MMHG | OXYGEN SATURATION: 95 % | DIASTOLIC BLOOD PRESSURE: 53 MMHG | RESPIRATION RATE: 18 BRPM | TEMPERATURE: 98 F | WEIGHT: 221.81 LBS | HEIGHT: 66 IN

## 2019-05-30 LAB
ANION GAP SERPL CALC-SCNC: 7 MMOL/L (ref 8–16)
BASOPHILS # BLD AUTO: 0.02 K/UL (ref 0–0.2)
BASOPHILS NFR BLD: 0.2 % (ref 0–1.9)
BUN SERPL-MCNC: 8 MG/DL (ref 8–23)
CALCIUM SERPL-MCNC: 8.8 MG/DL (ref 8.7–10.5)
CHLORIDE SERPL-SCNC: 93 MMOL/L (ref 95–110)
CO2 SERPL-SCNC: 30 MMOL/L (ref 23–29)
CREAT SERPL-MCNC: 0.7 MG/DL (ref 0.5–1.4)
DIFFERENTIAL METHOD: ABNORMAL
EOSINOPHIL # BLD AUTO: 0.4 K/UL (ref 0–0.5)
EOSINOPHIL NFR BLD: 3.1 % (ref 0–8)
ERYTHROCYTE [DISTWIDTH] IN BLOOD BY AUTOMATED COUNT: 14.8 % (ref 11.5–14.5)
EST. GFR  (AFRICAN AMERICAN): >60 ML/MIN/1.73 M^2
EST. GFR  (NON AFRICAN AMERICAN): >60 ML/MIN/1.73 M^2
GLUCOSE SERPL-MCNC: 144 MG/DL (ref 70–110)
HCT VFR BLD AUTO: 24.6 % (ref 37–48.5)
HGB BLD-MCNC: 7.9 G/DL (ref 12–16)
LYMPHOCYTES # BLD AUTO: 2 K/UL (ref 1–4.8)
LYMPHOCYTES NFR BLD: 16.4 % (ref 18–48)
MCH RBC QN AUTO: 28.3 PG (ref 27–31)
MCHC RBC AUTO-ENTMCNC: 32.1 G/DL (ref 32–36)
MCV RBC AUTO: 88 FL (ref 82–98)
MONOCYTES # BLD AUTO: 1 K/UL (ref 0.3–1)
MONOCYTES NFR BLD: 8 % (ref 4–15)
NEUTROPHILS # BLD AUTO: 8.6 K/UL (ref 1.8–7.7)
NEUTROPHILS NFR BLD: 72.3 % (ref 38–73)
PLATELET # BLD AUTO: 332 K/UL (ref 150–350)
PMV BLD AUTO: 8.3 FL (ref 9.2–12.9)
POCT GLUCOSE: 281 MG/DL (ref 70–110)
POTASSIUM SERPL-SCNC: 4.2 MMOL/L (ref 3.5–5.1)
RBC # BLD AUTO: 2.79 M/UL (ref 4–5.4)
SODIUM SERPL-SCNC: 130 MMOL/L (ref 136–145)
WBC # BLD AUTO: 11.94 K/UL (ref 3.9–12.7)

## 2019-05-30 PROCEDURE — 25000003 PHARM REV CODE 250: Performed by: INTERNAL MEDICINE

## 2019-05-30 PROCEDURE — 94761 N-INVAS EAR/PLS OXIMETRY MLT: CPT

## 2019-05-30 PROCEDURE — 85025 COMPLETE CBC W/AUTO DIFF WBC: CPT

## 2019-05-30 PROCEDURE — 97110 THERAPEUTIC EXERCISES: CPT | Performed by: PHYSICAL THERAPIST

## 2019-05-30 PROCEDURE — 97530 THERAPEUTIC ACTIVITIES: CPT

## 2019-05-30 PROCEDURE — 63600175 PHARM REV CODE 636 W HCPCS: Performed by: INTERNAL MEDICINE

## 2019-05-30 PROCEDURE — 97530 THERAPEUTIC ACTIVITIES: CPT | Performed by: PHYSICAL THERAPIST

## 2019-05-30 PROCEDURE — 80048 BASIC METABOLIC PNL TOTAL CA: CPT

## 2019-05-30 RX ORDER — MICONAZOLE NITRATE 2 %
POWDER (GRAM) TOPICAL 2 TIMES DAILY
Refills: 0 | Status: ON HOLD | COMMUNITY
Start: 2019-05-30 | End: 2019-06-18 | Stop reason: HOSPADM

## 2019-05-30 RX ORDER — POLYETHYLENE GLYCOL 3350 17 G/17G
17 POWDER, FOR SOLUTION ORAL DAILY
Status: DISCONTINUED | OUTPATIENT
Start: 2019-05-30 | End: 2019-05-30 | Stop reason: HOSPADM

## 2019-05-30 RX ORDER — OXYCODONE AND ACETAMINOPHEN 5; 325 MG/1; MG/1
1 TABLET ORAL EVERY 4 HOURS PRN
Qty: 30 TABLET | Refills: 0 | Status: ON HOLD | OUTPATIENT
Start: 2019-05-30 | End: 2019-06-04 | Stop reason: HOSPADM

## 2019-05-30 RX ADMIN — PANTOPRAZOLE SODIUM 40 MG: 40 TABLET, DELAYED RELEASE ORAL at 09:05

## 2019-05-30 RX ADMIN — PRAVASTATIN SODIUM 40 MG: 20 TABLET ORAL at 09:05

## 2019-05-30 RX ADMIN — POLYETHYLENE GLYCOL 3350 17 G: 17 POWDER, FOR SOLUTION ORAL at 11:05

## 2019-05-30 RX ADMIN — LEVOTHYROXINE SODIUM 125 MCG: 125 TABLET ORAL at 07:05

## 2019-05-30 RX ADMIN — CEFTRIAXONE 2 G: 2 INJECTION, SOLUTION INTRAVENOUS at 07:05

## 2019-05-30 RX ADMIN — DOCUSATE SODIUM 100 MG: 100 CAPSULE, LIQUID FILLED ORAL at 09:05

## 2019-05-30 RX ADMIN — MUPIROCIN: 20 OINTMENT TOPICAL at 12:05

## 2019-05-30 RX ADMIN — HYDROCODONE BITARTRATE AND ACETAMINOPHEN 1 TABLET: 10; 325 TABLET ORAL at 07:05

## 2019-05-30 RX ADMIN — ISOSORBIDE MONONITRATE 120 MG: 60 TABLET, EXTENDED RELEASE ORAL at 09:05

## 2019-05-30 RX ADMIN — PRAMIPEXOLE DIHYDROCHLORIDE 1 MG: 0.5 TABLET ORAL at 09:05

## 2019-05-30 RX ADMIN — INSULIN ASPART 6 UNITS: 100 INJECTION, SOLUTION INTRAVENOUS; SUBCUTANEOUS at 12:05

## 2019-05-30 RX ADMIN — Medication: at 12:05

## 2019-05-30 RX ADMIN — METHOCARBAMOL TABLETS 750 MG: 750 TABLET, COATED ORAL at 11:05

## 2019-05-30 RX ADMIN — CARBIDOPA AND LEVODOPA 1 TABLET: 25; 250 TABLET ORAL at 01:05

## 2019-05-30 RX ADMIN — HYDROCODONE BITARTRATE AND ACETAMINOPHEN 1 TABLET: 10; 325 TABLET ORAL at 01:05

## 2019-05-30 RX ADMIN — METOPROLOL SUCCINATE 25 MG: 25 TABLET, EXTENDED RELEASE ORAL at 09:05

## 2019-05-30 RX ADMIN — THERA TABS 1 TABLET: TAB at 09:05

## 2019-05-30 RX ADMIN — CARBIDOPA AND LEVODOPA 1 TABLET: 25; 250 TABLET ORAL at 09:05

## 2019-05-30 RX ADMIN — LOSARTAN POTASSIUM 50 MG: 50 TABLET, FILM COATED ORAL at 09:05

## 2019-05-30 RX ADMIN — ONDANSETRON 4 MG: 2 INJECTION INTRAMUSCULAR; INTRAVENOUS at 09:05

## 2019-05-30 NOTE — ASSESSMENT & PLAN NOTE
05/24- will need up to 6 weeks of parenteral therapy , will continue Rocephin /vanco until final drug susceptibility of organisim is known   05/25- operative note reviewed and imaging test reviewed .  Neurosurgery input noted.  S/p L1-4 with L2-3 laminectomy for debridement and drainage of epidural abscess-will follow drug susceptibility of strep .  Will plan to adjust therapy soon. Will continue Rocephin /vanco  05/27- will continue Rocephin and will plan to complete 8 weeks of therapy , will need weekly ESR and CRP while on this regime.  05/28- will continue Rocephin, follow repeat blood cultures -day 1/56 05/29- day 2/56 of therapy with Rocephin, will need weekly ESR , CRP.

## 2019-05-30 NOTE — PLAN OF CARE
Problem: Physical Therapy Goal  Goal: Physical Therapy Goal  LTG'S TO BE MET IN 7 DAYS (5-31-19)  1. PT WILL REQUIRE MODA FOR BED MOBILITY  2. PT WILL REQUIRE MODA FOR TF'S  3. PT WILL TOLERATE BLE THEREX X 20 REPS AROM     Outcome: Ongoing (interventions implemented as appropriate)  PT WITH FAIR TOLERANCE TO P.T. TX., MAXA X 2 FOR MOBILITY

## 2019-05-30 NOTE — PROGRESS NOTES
[facility] -   Neurosurgery  Progress Note    Subjective:     History of Present Illness:   Patient is a 74 YO female with h/o osteomyelitis and Parkinson' disease presented with lower back pain and difficulty walking. Reports she had not walked for about a month with symptoms worsening over past 2-3 weeks. She underwent surgery on 5/25/19 for posterior lumbar fusion and abscess debridement.     Interval History:   Postop day #5  Patient appears more coherent and comfortable today with  at bedside  Pain better controlled today  Hgb/Hct slight improvement from yesterday  WBC improved from yesterday (13.09 down to 11.94)       Post-Op Info:  Procedure(s) (LRB):  FUSION, SPINE, POSTERIOR SPINAL COLUMN, LUMBAR, USING COMPUTER-ASSISTED NAVIGATION  FUSION, SPINE, LUMBAR, TLIF, POSTERIOR APPROACH, USING PEDICLE SCREW (N/A)  LAMINECTOMY, SPINE, LUMBAR  DEBRIDEMENT, ABSCESS, PARASPINAL (N/A)   5 Days Post-Op      Medications:  Continuous Infusions:   sodium chloride 0.9% 50 mL/hr at 05/26/19 1642     Scheduled Meds:   carbidopa-levodopa  mg  1 tablet Oral QID    cefTRIAXone (ROCEPHIN) IVPB  2 g Intravenous Q24H    docusate sodium  100 mg Oral Daily    isosorbide mononitrate  120 mg Oral Daily    latanoprost  1 drop Both Eyes QHS    levothyroxine  125 mcg Oral Before breakfast    losartan  50 mg Oral Daily    metoprolol succinate  25 mg Oral Daily    miconazole NITRATE 2 %   Topical (Top) BID    multivitamin  1 tablet Oral Daily    mupirocin   Topical (Top) Daily    nozaseptin   Each Nare BID    pantoprazole  40 mg Oral Daily    pramipexole  1 mg Oral TID    pravastatin  40 mg Oral Daily     PRN Meds:sodium chloride, acetaminophen, aluminum-magnesium hydroxide-simethicone, dextrose 50%, dextrose 50%, glucagon (human recombinant), glucose, glucose, HYDROcodone-acetaminophen, HYDROcodone-acetaminophen, HYDROmorphone, insulin aspart U-100, methocarbamol, morphine, morphine, ondansetron,  ondansetron, oxyCODONE-acetaminophen, promethazine (PHENERGAN) IVPB     Review of Systems     Review of Systems   Constitutional: Negative for fever.   Neurological: Negative for syncope.   Psychiatric/Behavioral: Negative for agitation.       Objective:     Weight: 100.6 kg (221 lb 12.5 oz)  Body mass index is 35.8 kg/m².  Vital Signs (Most Recent):  Temp: 98 °F (36.7 °C) (05/30/19 0847)  Pulse: 90 (05/30/19 0847)  Resp: 18 (05/30/19 0847)  BP: (!) 115/58 (05/30/19 0847)  SpO2: 99 % (05/30/19 0847) Vital Signs (24h Range):  Temp:  [97.7 °F (36.5 °C)-98.3 °F (36.8 °C)] 98 °F (36.7 °C)  Pulse:  [76-91] 90  Resp:  [18-20] 18  SpO2:  [95 %-99 %] 99 %  BP: (101-158)/(56-86) 115/58                Oxygen Concentration (%):  [21] 21    Female External Urinary Catheter 05/27/19 1335 (Active)   Skin no redness;no breakdown 5/29/2019  7:47 AM   Tolerance no signs/symptoms of discomfort 5/29/2019  7:47 AM   Suction Continuous suction at 70 mmHg 5/29/2019  7:47 AM   Date of last wick change 05/29/19 5/29/2019  7:47 AM   Time of last wick change 0800 5/29/2019  7:47 AM       Neurosurgery Physical Exam    Patient moves her legs in bed  Foot drop at baseline  Prevena Plus wound vac in place      Significant Labs:  Recent Labs   Lab 05/29/19  0436 05/30/19  0752   * 144*   * 130*   K 4.0 4.2   CL 96 93*   CO2 26 30*   BUN 9 8   CREATININE 0.7 0.7   CALCIUM 8.7 8.8     Recent Labs   Lab 05/29/19  0436 05/30/19  0752   WBC 13.09* 11.94   HGB 7.5* 7.9*   HCT 22.7* 24.6*    332     No results for input(s): LABPT, INR, APTT in the last 48 hours.  Microbiology Results (last 7 days)     Procedure Component Value Units Date/Time    Blood culture [[id number]] Collected:  05/28/19 1125    Order Status:  Completed Specimen:  Blood from Line, PICC Left Basilic Updated:  05/29/19 2312     Blood Culture, Routine No Growth to date     Blood Culture, Routine No Growth to date    Blood culture [[id number]] Collected:  05/28/19  1159    Order Status:  Completed Specimen:  Blood from Line, PICC Left Basilic Updated:  05/29/19 2312     Blood Culture, Routine No Growth to date     Blood Culture, Routine No Growth to date    Culture, Anaerobe [[id number]] Collected:  05/24/19 1540    Order Status:  Completed Specimen:  Aspirate from Back Updated:  05/29/19 0958     Anaerobic Culture Culture in progress    Aerobic culture [[id number]] Collected:  05/24/19 1540    Order Status:  Completed Specimen:  Aspirate from Back Updated:  05/28/19 0846     Aerobic Bacterial Culture No growth    Blood culture #2 **CANNOT BE ORDERED STAT** [[id number]] Collected:  05/23/19 1345    Order Status:  Completed Specimen:  Blood from Peripheral, Wrist, Left Updated:  05/26/19 1303     Blood Culture, Routine Gram stain aer bottle: Gram positive cocci      Blood Culture, Routine Results called to and read back by:Krista Abdi RN 05/24/2019  14:23     Blood Culture, Routine Gram stain flaco bottle: Gram positive cocci      Blood Culture, Routine Positive results previously called     Blood Culture, Routine --     STREPTOCOCCUS SPECIES  Further Identified as Streptococcus gallinaceus  For susceptibility see order #[id number]      Blood culture #1 **CANNOT BE ORDERED STAT** [[id number]]  (Susceptibility) Collected:  05/23/19 1328    Order Status:  Completed Specimen:  Blood from Peripheral, Antecubital, Left Updated:  05/26/19 1302     Blood Culture, Routine Gram stain aer bottle: Gram positive cocci      Blood Culture, Routine Results called to and read back by:Krista Abdi RN 05/24/2019  14:22     Blood Culture, Routine Gram stain flaco bottle: Gram positive cocci      Blood Culture, Routine Positive results previously called     Blood Culture, Routine --     STREPTOCOCCUS SPECIES  Further Identified as Streptococcus gallinaceus      Gram stain [[id number]] Collected:  05/24/19 1540    Order Status:  Completed Specimen:  Aspirate from Back Updated:  05/24/19 2307     Gram  Stain Result Rare WBC's      No organisms seen    Clostridium difficile EIA [[id number]]     Order Status:  Canceled Specimen:  Stool         CMP:   Recent Labs   Lab 05/29/19  0436 05/30/19  0752   * 144*   CALCIUM 8.7 8.8   * 130*   K 4.0 4.2   CO2 26 30*   CL 96 93*   BUN 9 8   CREATININE 0.7 0.7     CRP: No results for input(s): CRP in the last 48 hours.  All pertinent labs from the last 24 hours have been reviewed.      Assessment/Plan:     Active Diagnoses:    Diagnosis Date Noted POA    PRINCIPAL PROBLEM:  Status post lumbar laminectomy [Z98.890] 05/25/2019 Not Applicable    Alteration in skin integrity related to surgical incision [R23.9] 05/29/2019 Yes    Electrolyte abnormality [E87.8] 05/26/2019 No    S/P lumbar laminectomy [Z98.890] 05/26/2019 Not Applicable    Chest pain [R07.9] 05/25/2019 Yes    Acute osteomyelitis of lumbar spine [M46.26] 05/25/2019 Yes    Postprocedural hypotension [I95.81] 05/25/2019 No    Streptococcal bacteremia [R78.81, B95.5] 05/24/2019 Yes    Abscess in epidural space of lumbar spine [G06.1] 05/24/2019 Yes    Pressure injury of back, stage 3 [L89.103] 05/24/2019 Yes    Intertrigo [L30.4] 05/24/2019 Yes    Deep tissue injury [T14.8XXA] 05/24/2019 Yes    Osteomyelitis of lumbar spine [M46.26] 05/23/2019 Yes    UTI (urinary tract infection) [N39.0] 05/23/2019 Yes    Hyponatremia [E87.1] 05/23/2019 Yes    Type 2 diabetes, controlled, with peripheral neuropathy [E11.42] 05/29/2014 Yes     Chronic    Hypothyroidism [E03.9] 11/19/2013 Yes     Chronic    Essential hypertension [I10]  Yes     Chronic    PD (Parkinson's disease) [G20]  Yes     Chronic    CAD (coronary artery disease) [I25.10]  Yes     Chronic      Problems Resolved During this Admission:       Continue Wound care with incisional wound vac (Prevena Plus)  Patient instructed to sit up in chair and wear LSO brace when sitting out of bed  Physical/Occupation therapy- activity as  tolerated  Patient set up for transfer to SNF later today    Mayra Long PA-C  Neurosurgery  [facility] [place]

## 2019-05-30 NOTE — PROGRESS NOTES
[facility]  Infectious Disease  Progress Note    Patient Name: Ross Isbell  MRN: [id number]  Admission Date: 5/23/2019  Length of Stay: 7 days  Attending Physician: Teddy Bryant MD  Primary Care Provider: Anna Penn MD    Isolation Status: No active isolations  Assessment/Plan:      Streptococcal bacteremia  05/24- will follow final ID and drug susceptibility of organism.  Will continue Vanco/rocephin  05/25- isolate is strep -will continue close follow up of repeat blood culture.  Continue Rocephin.  05/26- will plan to complete up to 6 weeks of IV Rocephin.  Will follow repeat blood culture.    05.27- will repeat blood culture in AM, will continue Rocephin , LTAC consult   05/28- day 1/56 of Rocephin, will follow final culture report .  05/29- day 2 of therapy , will follow final blood cultures      Osteomyelitis of lumbar spine  05/24- will need up to 6 weeks of parenteral therapy , will continue Rocephin /vanco until final drug susceptibility of organisim is known   05/25- operative note reviewed and imaging test reviewed .  Neurosurgery input noted.  S/p L1-4 with L2-3 laminectomy for debridement and drainage of epidural abscess-will follow drug susceptibility of strep .  Will plan to adjust therapy soon. Will continue Rocephin /vanco  05/27- will continue Rocephin and will plan to complete 8 weeks of therapy , will need weekly ESR and CRP while on this regime.  05/28- will continue Rocephin, follow repeat blood cultures -day 1/56 05/29- day 2/56 of therapy with Rocephin, will need weekly ESR , CRP.      Type 2 diabetes, controlled, with peripheral neuropathy  05/24- insulin sliding scale, closely monitor glucose        Anticipated Disposition:     Thank you for your consult. I will follow-up with patient. Please contact us if you have any additional questions.    Dada Gomez MD  Infectious Disease  [facility]    Subjective:     Principal Problem:Status post  lumbar laminectomy    HPI: 73 year old female with history of DM II, Parkinson's, CORINE, MI, CAD, and Glaucoma who presented to the Emergency Department with c/o fatigue. There is associated symptom of generalized weakness, bilateral lower extremity pain. Since admission, all cultures were reviewed -   Blood cultures -  05/23- strep   04/30-  Streptococcus gallinaceus   She was seen in the ED on 04/30 and blood cultures at that time was also positive for strep gallinaceous .She was discharged from the ED as her symptoms were attributed to Parkinsonism .CT abdomen/pelvis with contrast showed moderate inflammatory changes centered on the intervertebral disc space between the L2 and L3 vertebral bodies. There is bony destruction on both sides of the intervertebral disc space between the L2 and L3 vertebral bodies.  This is consistent with the patient's history and characteristic of discitis and osteomyelitis.   MRI of the thoracic spine -Negative for evidence of discitis or osteomyelitis of the thoracic spine.    Low-grade scattered degenerative changes.  MRI of the lumbar spine-Findings compatible with discitis L2-3, with osteomyelitis of L2 and L3.  Additional evidence of epidural abscess within the anterior epidural space.  Severe paravertebral soft tissue edema, as detailed above.  3. Small amount of fluid within the L3-L4 disc space suspicious for early osteomyelitis    Interval History: 73 year old woman  with strep bacteremia/epidural abscess s/p L1-4 with L2-3 laminectomy for debridement and drainage of epidural abscess .  She is drowsy post operative procedure.  05/26- she has episodes of confusion, nurse is by bedside  Blood culture=pan sensitive strep   05/27- she remains afebrile, last blood culture-05/23- strep    at bedside  05/28- she was seen with the daughter in the room .  She denies fever .  CT scan of the abdomen /pelvis done -05/27- there is a pattern of discitis/osteomyelitis at L2-3 with  endplate destruction and paravertebral soft tissue swelling  S/p Status post PLIF L1-L4     05/29- She feels better , she denies fever .  Latest blood culture- 05/28- no growth   Review of Systems   Constitutional: Positive for activity change. Negative for chills and fever.   HENT: Negative for trouble swallowing.    Eyes: Negative for visual disturbance.   Respiratory: Negative for apnea, cough, choking, chest tightness, shortness of breath, wheezing and stridor.    Cardiovascular: Negative for chest pain, palpitations and leg swelling.   Gastrointestinal: Negative for abdominal pain, constipation, diarrhea, nausea and vomiting.   Genitourinary: Negative for difficulty urinating, dysuria, flank pain, frequency and urgency.   Musculoskeletal: Positive for back pain (lumbar). Negative for arthralgias, gait problem, joint swelling, myalgias, neck pain and neck stiffness.   Skin: Negative for color change.   Neurological: Positive for weakness. Negative for dizziness, tremors, seizures, syncope, facial asymmetry, speech difficulty, light-headedness, numbness and headaches.   Psychiatric/Behavioral: Negative for agitation, behavioral problems and dysphoric mood.     Objective:     Vital Signs (Most Recent):  Temp: 98.1 °F (36.7 °C) (05/30/19 0409)  Pulse: 82 (05/30/19 0409)  Resp: 18 (05/30/19 0409)  BP: (!) 158/86 (05/30/19 0409)  SpO2: 98 % (05/30/19 0409) Vital Signs (24h Range):  Temp:  [97.7 °F (36.5 °C)-98.3 °F (36.8 °C)] 98.1 °F (36.7 °C)  Pulse:  [76-91] 82  Resp:  [18-20] 18  SpO2:  [95 %-99 %] 98 %  BP: (101-158)/(56-86) 158/86     Weight: 100.6 kg (221 lb 12.5 oz)  Body mass index is 35.8 kg/m².    Estimated Creatinine Clearance: 85.7 mL/min (based on SCr of 0.7 mg/dL).    Physical Exam   Constitutional: She appears well-developed. She is cooperative. She is easily aroused. No distress (-  ).   HENT:   Head: Normocephalic and atraumatic.   Eyes: EOM are normal.   Neck: Normal range of motion. Neck supple.    Cardiovascular: Normal rate, regular rhythm and intact distal pulses.   No murmur heard.  Pulmonary/Chest: Effort normal and breath sounds normal. No stridor. No respiratory distress. She has no wheezes. She has no rales. She exhibits no tenderness.   Abdominal: Soft. She exhibits no distension. There is tenderness (BL quadrant). There is no rebound and no guarding.   Diminished Bowel sounds   Genitourinary:   Genitourinary Comments: Deferred   Musculoskeletal: She exhibits tenderness (back and abdomen). She exhibits no edema or deformity.   Neurological: She is alert and easily aroused. No sensory deficit.   BUE tremors, hx of parkinson's  Has episodes of confusion    Skin: Skin is warm and dry. Capillary refill takes less than 2 seconds.   Psychiatric: She has a normal mood and affect. Her behavior is normal. Thought content normal.   Nursing note and vitals reviewed.      Significant Labs:   Blood Culture:   Recent Labs   Lab 04/30/19  1640 05/23/19  1328 05/23/19  1345 05/28/19  1125 05/28/19  1159   LABBLOO Gram stain aer bottle: Gram positive cocci in chains resembling Strep  Gram stain flaco bottle: Gram positive cocci in chains resembling Strep  Results called to and read back by: Adwoa Ramirez RN 05/01/2019  10:42  STREPTOCOCCUS SPECIES  Further identified as Streptococcus gallinaceus   Gram stain aer bottle: Gram positive cocci   Results called to and read back by:Krista Abdi RN 05/24/2019  14:22  Gram stain flaco bottle: Gram positive cocci   Positive results previously called  STREPTOCOCCUS SPECIES  Further Identified as Streptococcus gallinaceus   Gram stain aer bottle: Gram positive cocci   Results called to and read back by:Krista Abdi RN 05/24/2019  14:23  Gram stain flaco bottle: Gram positive cocci   Positive results previously called  STREPTOCOCCUS SPECIES  Further Identified as Streptococcus gallinaceus  For susceptibility see order #[id number]   No Growth to date  No Growth to date No  Growth to date  No Growth to date     BMP:   Recent Labs   Lab 05/29/19  0436   *   *   K 4.0   CL 96   CO2 26   BUN 9   CREATININE 0.7   CALCIUM 8.7     CBC:   Recent Labs   Lab 05/29/19 0436   WBC 13.09*   HGB 7.5*   HCT 22.7*        All pertinent labs within the past 24 hours have been reviewed.    Significant Imaging: I have reviewed all pertinent imaging results/findings within the past 24 hours.

## 2019-05-30 NOTE — PLAN OF CARE
Problem: Adult Inpatient Plan of Care  Goal: Plan of Care Review  Outcome: Ongoing (interventions implemented as appropriate)  Patient continues to be monitored and treated. Wound vac in place. Purewick in place. Pain treated with PRN pain medication. Patient continues to be dysarthric. Fall precautions in place. Will continue to monitor and treat.

## 2019-05-30 NOTE — PT/OT/SLP PROGRESS
"Occupational Therapy  Treatment    Ross Isbell   MRN: [id number]   Admitting Diagnosis: Status post lumbar laminectomy    OT Date of Treatment: 05/30/19   OT Start Time: 1030  OT Stop Time: 1053  OT Total Time (min): 23 min    Billable Minutes:  Therapeutic Activity 15 min and Therapeutic Exercise 10 min    General Precautions: Standard, fall, respiratory  Orthopedic Precautions: spinal precautions  Braces: LSO         Subjective:  Communicated with Nurse Santiago and epic chart review prior to session.  Pt found supinein bed and agreeable to tx at this time.   Pain/Comfort  Pain Rating 1: 8/10  Location - Orientation 1: upper  Location 1: back  Pain Addressed 1: Pre-medicate for activity, Reposition, Cessation of Activity    Objective:  Patient found with: peripheral IV, telemetry, oxygen, wound vac     Functional Mobility:  Therapeutic Activities and Exercises:  Pt performed (L) Rolling with max A, supine>sit with Max A x 2 and verbal cues for safety 2/2 pt throwing herself posteriorly. Pt scooted to EOB with Max A, donned shoes with Total A and gown as a robe with Mod A, donned LSO brace with Total A, sit<>stand pivot t/f to chair with Max A x2. Pt performed (B) UE ROM exercises sitting in chair 2 x 10 reps: shoulder flex, chest press, bicep curls, finger flex/ ext.      AM-PAC 6 CLICK ADL   How much help from another person does this patient currently need?   1 = Unable, Total/Dependent Assistance  2 = A lot, Maximum/Moderate Assistance  3 = A little, Minimum/Contact Guard/Supervision  4 = None, Modified [place]/Independent    Putting on and taking off regular lower body clothing? : 2  Bathing (including washing, rinsing, drying)?: 2  Toileting, which includes using toilet, bedpan, or urinal? : 2  Putting on and taking off regular upper body clothing?: 2  Taking care of personal grooming such as brushing teeth?: 3  Eating meals?: 3  Daily Activity Total Score: 14     AM-PAC Raw Score CMS "G-Code Modifier " Level of Impairment Assistance   6 % Total / Unable   7 - 8 CM 80 - 100% Maximal Assist   9-13 CL 60 - 80% Moderate Assist   14 - 19 CK 40 - 60% Moderate Assist   20 - 22 CJ 20 - 40% Minimal Assist   23 CI 1-20% SBA / CGA   24 CH 0% Independent/ Mod I       Patient left up in chair with all lines intact, call button in reach, chair alarm on, Nurse Pamela notified and  present    ASSESSMENT:  Ross Isbell is a 73 y.o. female with a medical diagnosis of Status post lumbar laminectomy and presents with impaired functional mobility and ADLs. Pt will benefit from continued skilled OT in order to address impairments. .    Rehab identified problem list/impairments: Rehab identified problem list/impairments: weakness, impaired endurance, impaired self care skills, impaired functional mobilty, decreased coordination, pain, decreased safety awareness, decreased lower extremity function, decreased upper extremity function, gait instability, impaired balance, decreased ROM, orthopedic precautions    Rehab potential is fair.    Activity tolerance: Fair    Discharge recommendations: Discharge Facility/Level of Care Needs: nursing facility, skilled     Barriers to discharge: Barriers to Discharge: Decreased caregiver support    Equipment recommendations:       GOALS:   Multidisciplinary Problems     Occupational Therapy Goals        Problem: Occupational Therapy Goal    Goal Priority Disciplines Outcome Interventions   Occupational Therapy Goal     OT, PT/OT Ongoing (interventions implemented as appropriate)    Description:  ot goals to be met 5-31-19  s with ue dressing  Pt will tolerate 1 set x 15 reps b ue rom exercise  Min a with bed supine<>sit  min a with bsc t/f's                    Plan:  Patient to be seen 3 x/week to address the above listed problems via self-care/home management, therapeutic activities, therapeutic exercises  Plan of Care expires: 06/03/19  Plan of Care reviewed with: patient    OT  G-codes  Functional Assessment Tool Used: [facility]  Score: 14    Angelica Capps, PT/OT  05/30/2019

## 2019-05-30 NOTE — TELEPHONE ENCOUNTER
Received fax from [facility] stating patient will be admitted to [facility] today for Skilled Nursing for Encounter Orthoped Aftercare Flw Scoliosis surgery.

## 2019-05-30 NOTE — SUBJECTIVE & OBJECTIVE
Interval History: 73 year old woman  with strep bacteremia/epidural abscess s/p L1-4 with L2-3 laminectomy for debridement and drainage of epidural abscess .  She is drowsy post operative procedure.  05/26- she has episodes of confusion, nurse is by bedside  Blood culture=pan sensitive strep   05/27- she remains afebrile, last blood culture-05/23- strep    at bedside  05/28- she was seen with the daughter in the room .  She denies fever .  CT scan of the abdomen /pelvis done -05/27- there is a pattern of discitis/osteomyelitis at L2-3 with endplate destruction and paravertebral soft tissue swelling  S/p Status post PLIF L1-L4     05/29- She feels better , she denies fever .  Latest blood culture- 05/28- no growth   Review of Systems   Constitutional: Positive for activity change. Negative for chills and fever.   HENT: Negative for trouble swallowing.    Eyes: Negative for visual disturbance.   Respiratory: Negative for apnea, cough, choking, chest tightness, shortness of breath, wheezing and stridor.    Cardiovascular: Negative for chest pain, palpitations and leg swelling.   Gastrointestinal: Negative for abdominal pain, constipation, diarrhea, nausea and vomiting.   Genitourinary: Negative for difficulty urinating, dysuria, flank pain, frequency and urgency.   Musculoskeletal: Positive for back pain (lumbar). Negative for arthralgias, gait problem, joint swelling, myalgias, neck pain and neck stiffness.   Skin: Negative for color change.   Neurological: Positive for weakness. Negative for dizziness, tremors, seizures, syncope, facial asymmetry, speech difficulty, light-headedness, numbness and headaches.   Psychiatric/Behavioral: Negative for agitation, behavioral problems and dysphoric mood.     Objective:     Vital Signs (Most Recent):  Temp: 98.1 °F (36.7 °C) (05/30/19 0409)  Pulse: 82 (05/30/19 0409)  Resp: 18 (05/30/19 0409)  BP: (!) 158/86 (05/30/19 0409)  SpO2: 98 % (05/30/19 0409) Vital Signs (24h  Range):  Temp:  [97.7 °F (36.5 °C)-98.3 °F (36.8 °C)] 98.1 °F (36.7 °C)  Pulse:  [76-91] 82  Resp:  [18-20] 18  SpO2:  [95 %-99 %] 98 %  BP: (101-158)/(56-86) 158/86     Weight: 100.6 kg (221 lb 12.5 oz)  Body mass index is 35.8 kg/m².    Estimated Creatinine Clearance: 85.7 mL/min (based on SCr of 0.7 mg/dL).    Physical Exam   Constitutional: She appears well-developed. She is cooperative. She is easily aroused. No distress (-  ).   HENT:   Head: Normocephalic and atraumatic.   Eyes: EOM are normal.   Neck: Normal range of motion. Neck supple.   Cardiovascular: Normal rate, regular rhythm and intact distal pulses.   No murmur heard.  Pulmonary/Chest: Effort normal and breath sounds normal. No stridor. No respiratory distress. She has no wheezes. She has no rales. She exhibits no tenderness.   Abdominal: Soft. She exhibits no distension. There is tenderness (BL quadrant). There is no rebound and no guarding.   Diminished Bowel sounds   Genitourinary:   Genitourinary Comments: Deferred   Musculoskeletal: She exhibits tenderness (back and abdomen). She exhibits no edema or deformity.   Neurological: She is alert and easily aroused. No sensory deficit.   BUE tremors, hx of parkinson's  Has episodes of confusion    Skin: Skin is warm and dry. Capillary refill takes less than 2 seconds.   Psychiatric: She has a normal mood and affect. Her behavior is normal. Thought content normal.   Nursing note and vitals reviewed.      Significant Labs:   Blood Culture:   Recent Labs   Lab 04/30/19  1640 05/23/19  1328 05/23/19  1345 05/28/19  1125 05/28/19  1159   LABBLOO Gram stain aer bottle: Gram positive cocci in chains resembling Strep  Gram stain flaco bottle: Gram positive cocci in chains resembling Strep  Results called to and read back by: Adwoa Ramirez RN 05/01/2019  10:42  STREPTOCOCCUS SPECIES  Further identified as Streptococcus gallinaceus   Gram stain aer bottle: Gram positive cocci   Results called to and read back  by:Krista Abdi RN 05/24/2019  14:22  Gram stain flaco bottle: Gram positive cocci   Positive results previously called  STREPTOCOCCUS SPECIES  Further Identified as Streptococcus gallinaceus   Gram stain aer bottle: Gram positive cocci   Results called to and read back by:Krista Abdi RN 05/24/2019  14:23  Gram stain flaco bottle: Gram positive cocci   Positive results previously called  STREPTOCOCCUS SPECIES  Further Identified as Streptococcus gallinaceus  For susceptibility see order #[id number]   No Growth to date  No Growth to date No Growth to date  No Growth to date     BMP:   Recent Labs   Lab 05/29/19  0436   *   *   K 4.0   CL 96   CO2 26   BUN 9   CREATININE 0.7   CALCIUM 8.7     CBC:   Recent Labs   Lab 05/29/19 0436   WBC 13.09*   HGB 7.5*   HCT 22.7*        All pertinent labs within the past 24 hours have been reviewed.    Significant Imaging: I have reviewed all pertinent imaging results/findings within the past 24 hours.

## 2019-05-30 NOTE — PT/OT/SLP PROGRESS
Physical Therapy  Treatment    Ross Isbell   MRN: [id number]   Admitting Diagnosis: Status post lumbar laminectomy    PT Received On: 05/30/19  PT Start Time: 1000     PT Stop Time: 1025    PT Total Time (min): 25 min       Billable Minutes:  Therapeutic Activity 25    Treatment Type: Treatment  PT/PTA: PT       General Precautions: Standard, fall, respiratory  Orthopedic Precautions: spinal precautions   Braces: LSO    Subjective:  Communicated with NURSE MARCELO prior to session  Pain/Comfort  Pain Rating 1: 8/10  Location 1: abdomen    Objective:   Patient found with: peripheral IV, oxygen, wound vac, telemetry    Functional Mobility:  Therapeutic Activities and Exercises:  PT FOUND SUPINE IN BED UPON ARRIVAL, AGREEABLE TO TX WITH ENCOURAGEMENT., PT REPORTS ABOUT TO D/C,  PRESENT, SUP>SIT WITH MAXA X 2, LIMITED BY STRONG POSTERIOR PUSH, MAX ENCOURAGEMENT TO PARTICIPATE AND SIT UPRIGHT, PT WITH POOR- STATIC SITTING BALANCE, PT AND  EDUCATED IN DONNING/DOFFING LSO BRACE, TOTAL ASSIST FOR DONNING ROBE, TENNIS SHOES, AND BRACE, SEATED SCOOT TO EOB WITH MAXA, MAXA X 2 FOR SIT<>STAND, MAXA X 2 FOR STAND PIVOT TF TO BEDSIDE CHAIR, UNSTEADY DUE TO PT CROSSING FEET DURING TF, POOR SAFETY DUE TO B PF CONTRACTURES, REVIEW BLE THEREX TO PERFORM WHILE SEATED IN CHAIR    AM-PAC 6 CLICK MOBILITY  How much help from another person does this patient currently need?   1 = Unable, Total/Dependent Assistance  2 = A lot, Maximum/Moderate Assistance  3 = A little, Minimum/Contact Guard/Supervision  4 = None, Modified [place]/Independent    Turning over in bed (including adjusting bedclothes, sheets and blankets)?: 2  Sitting down on and standing up from a chair with arms (e.g., wheelchair, bedside commode, etc.): 2  Moving from lying on back to sitting on the side of the bed?: 2  Moving to and from a bed to a chair (including a wheelchair)?: 2  Need to walk in hospital room?: 1  Climbing 3-5 steps with a railing?:  1  Basic Mobility Total Score: 10    AM-PAC Raw Score CMS G-Code Modifier Level of Impairment Assistance   6 % Total / Unable   7 - 9 CM 80 - 100% Maximal Assist   10 - 14 CL 60 - 80% Moderate Assist   15 - 19 CK 40 - 60% Moderate Assist   20 - 22 CJ 20 - 40% Minimal Assist   23 CI 1-20% SBA / CGA   24 CH 0% Independent/ Mod I     Patient left up in chair with all lines intact, call button in reach, chair alarm on, NURSE notified and  present.    Assessment:  Ross Isbell is a 73 y.o. female with a medical diagnosis of Status post lumbar laminectomy and presents with IMPAIRED FUNCTIONAL MOBILITY. PT WILL BENEFIT FROM CONT. SKILLED P.T. TO ADDRESS IMPAIRMENTS    Rehab identified problem list/impairments: Rehab identified problem list/impairments: weakness, impaired endurance, impaired functional mobilty, impaired balance, decreased coordination, decreased safety awareness    Rehab potential is good.    Activity tolerance: Good    Discharge recommendations: Discharge Facility/Level of Care Needs: nursing facility, skilled     Barriers to discharge:      Equipment recommendations: Equipment Needed After Discharge: none     GOALS:   Multidisciplinary Problems     Physical Therapy Goals        Problem: Physical Therapy Goal    Goal Priority Disciplines Outcome Goal Variances Interventions   Physical Therapy Goal     PT, PT/OT Ongoing (interventions implemented as appropriate)     Description:  LTG'S TO BE MET IN 7 DAYS (5-31-19)  1. PT WILL REQUIRE MODA FOR BED MOBILITY  2. PT WILL REQUIRE MODA FOR TF'S  3. PT WILL TOLERATE BLE THEREX X 20 REPS AROM             Problem: Physical Therapy Goal    Goal Priority Disciplines Outcome Goal Variances Interventions   Physical Therapy Goal     PT, PT/OT      Description:  PT eval complete. Prior goals remain appropriate and should be met by 6/2/2019                    PLAN:    Patient to be seen 5 x/week  to address the above listed problems via therapeutic  activities, therapeutic exercises  Plan of Care expires: 06/02/19  Plan of Care reviewed with: patient, spouse    PT ENCOURAGED TO CALL FOR ASSISTANCE WITH ALL NEEDS DUE TO FALL RISK STATUS, PT TRANG Dang, PT  05/30/2019

## 2019-05-30 NOTE — ASSESSMENT & PLAN NOTE
05/24- will follow final ID and drug susceptibility of organism.  Will continue Vanco/rocephin  05/25- isolate is strep -will continue close follow up of repeat blood culture.  Continue Rocephin.  05/26- will plan to complete up to 6 weeks of IV Rocephin.  Will follow repeat blood culture.    05.27- will repeat blood culture in AM, will continue Rocephin , LTAC consult   05/28- day 1/56 of Rocephin, will follow final culture report .  05/29- day 2 of therapy , will follow final blood cultures

## 2019-05-30 NOTE — PROGRESS NOTES
picc line dressing changed using sterile technique. Both ports flushed well. Pt dressed back brace in place. Portable wound vac in place. To suction.  Pt dress.  at bedside. Pt down via wheelchair to transport to [facility].

## 2019-05-30 NOTE — PLAN OF CARE
Problem: Occupational Therapy Goal  Goal: Occupational Therapy Goal  ot goals to be met 5-31-19  s with ue dressing  Pt will tolerate 1 set x 15 reps b ue rom exercise  Min a with bed supine<>sit  min a with bsc t/f's   Outcome: Ongoing (interventions implemented as appropriate)  Bed mobility Max A x 2; t/f with Max A x 2

## 2019-05-30 NOTE — PLAN OF CARE
Discharge orders noted. Faxed paperwork to [facility] via [facility]. # for report is [phone]. Transportation to be arranged by [facility].    Discharge packet given to Pamela primary nurse.         05/30/19 4264   Post-Acute Status   Post-Acute Authorization Placement   Post-Acute Placement Status Set-up Complete

## 2019-05-31 ENCOUNTER — HOSPITAL ENCOUNTER (INPATIENT)
Facility: HOSPITAL | Age: 74
LOS: 4 days | Discharge: NURSING, REHAB OR LONG-TERM CARE | DRG: 637 | End: 2019-06-04
Attending: EMERGENCY MEDICINE | Admitting: HOSPITALIST
Payer: MEDICARE

## 2019-05-31 DIAGNOSIS — E11.42 TYPE 2 DIABETES, CONTROLLED, WITH PERIPHERAL NEUROPATHY: Chronic | ICD-10-CM

## 2019-05-31 DIAGNOSIS — R79.89 ELEVATED TROPONIN: ICD-10-CM

## 2019-05-31 DIAGNOSIS — I21.4 NSTEMI (NON-ST ELEVATED MYOCARDIAL INFARCTION): ICD-10-CM

## 2019-05-31 DIAGNOSIS — K59.00 CONSTIPATION: ICD-10-CM

## 2019-05-31 DIAGNOSIS — M46.26 OSTEOMYELITIS OF LUMBAR SPINE: ICD-10-CM

## 2019-05-31 DIAGNOSIS — R06.02 SHORTNESS OF BREATH: ICD-10-CM

## 2019-05-31 DIAGNOSIS — E16.2 HYPOGLYCEMIA: Primary | ICD-10-CM

## 2019-05-31 DIAGNOSIS — D72.829 LEUKOCYTOSIS, UNSPECIFIED TYPE: ICD-10-CM

## 2019-05-31 LAB
ALBUMIN SERPL BCP-MCNC: 2.2 G/DL (ref 3.5–5.2)
ALP SERPL-CCNC: 71 U/L (ref 55–135)
ALT SERPL W/O P-5'-P-CCNC: 13 U/L (ref 10–44)
ANION GAP SERPL CALC-SCNC: 11 MMOL/L (ref 8–16)
AST SERPL-CCNC: 27 U/L (ref 10–40)
BACTERIA #/AREA URNS HPF: ABNORMAL /HPF
BASOPHILS # BLD AUTO: 0.03 K/UL (ref 0–0.2)
BASOPHILS NFR BLD: 0.1 % (ref 0–1.9)
BILIRUB SERPL-MCNC: 0.3 MG/DL (ref 0.1–1)
BILIRUB UR QL STRIP: NEGATIVE
BNP SERPL-MCNC: 300 PG/ML (ref 0–99)
BUN SERPL-MCNC: 9 MG/DL (ref 8–23)
CALCIUM SERPL-MCNC: 8.9 MG/DL (ref 8.7–10.5)
CHLORIDE SERPL-SCNC: 93 MMOL/L (ref 95–110)
CLARITY UR: CLEAR
CO2 SERPL-SCNC: 27 MMOL/L (ref 23–29)
COLOR UR: YELLOW
CREAT SERPL-MCNC: 0.7 MG/DL (ref 0.5–1.4)
DIFFERENTIAL METHOD: ABNORMAL
EOSINOPHIL # BLD AUTO: 0.3 K/UL (ref 0–0.5)
EOSINOPHIL NFR BLD: 1.1 % (ref 0–8)
ERYTHROCYTE [DISTWIDTH] IN BLOOD BY AUTOMATED COUNT: 15.2 % (ref 11.5–14.5)
EST. GFR  (AFRICAN AMERICAN): >60 ML/MIN/1.73 M^2
EST. GFR  (NON AFRICAN AMERICAN): >60 ML/MIN/1.73 M^2
GLUCOSE SERPL-MCNC: 106 MG/DL (ref 70–110)
GLUCOSE UR QL STRIP: NEGATIVE
HCT VFR BLD AUTO: 24.3 % (ref 37–48.5)
HGB BLD-MCNC: 7.6 G/DL (ref 12–16)
HGB UR QL STRIP: NEGATIVE
KETONES UR QL STRIP: NEGATIVE
LEUKOCYTE ESTERASE UR QL STRIP: ABNORMAL
LYMPHOCYTES # BLD AUTO: 1.7 K/UL (ref 1–4.8)
LYMPHOCYTES NFR BLD: 7.4 % (ref 18–48)
MCH RBC QN AUTO: 28.1 PG (ref 27–31)
MCHC RBC AUTO-ENTMCNC: 31.3 G/DL (ref 32–36)
MCV RBC AUTO: 90 FL (ref 82–98)
MICROSCOPIC COMMENT: ABNORMAL
MONOCYTES # BLD AUTO: 1.3 K/UL (ref 0.3–1)
MONOCYTES NFR BLD: 5.6 % (ref 4–15)
NEUTROPHILS # BLD AUTO: 19.1 K/UL (ref 1.8–7.7)
NEUTROPHILS NFR BLD: 87.7 % (ref 38–73)
NITRITE UR QL STRIP: NEGATIVE
PH UR STRIP: 8 [PH] (ref 5–8)
PLATELET # BLD AUTO: 341 K/UL (ref 150–350)
PMV BLD AUTO: 8.4 FL (ref 9.2–12.9)
POCT GLUCOSE: 101 MG/DL (ref 70–110)
POCT GLUCOSE: 135 MG/DL (ref 70–110)
POCT GLUCOSE: 147 MG/DL (ref 70–110)
POCT GLUCOSE: 51 MG/DL (ref 70–110)
POCT GLUCOSE: 83 MG/DL (ref 70–110)
POTASSIUM SERPL-SCNC: 3.5 MMOL/L (ref 3.5–5.1)
PROT SERPL-MCNC: 5.6 G/DL (ref 6–8.4)
PROT UR QL STRIP: NEGATIVE
RBC # BLD AUTO: 2.7 M/UL (ref 4–5.4)
RBC #/AREA URNS HPF: 1 /HPF (ref 0–4)
SODIUM SERPL-SCNC: 131 MMOL/L (ref 136–145)
SP GR UR STRIP: 1.01 (ref 1–1.03)
SQUAMOUS #/AREA URNS HPF: 4 /HPF
TROPONIN I SERPL DL<=0.01 NG/ML-MCNC: 1.16 NG/ML (ref 0–0.03)
URN SPEC COLLECT METH UR: ABNORMAL
UROBILINOGEN UR STRIP-ACNC: NEGATIVE EU/DL
WBC # BLD AUTO: 22.29 K/UL (ref 3.9–12.7)
WBC #/AREA URNS HPF: 9 /HPF (ref 0–5)
WBC CLUMPS URNS QL MICRO: ABNORMAL
YEAST URNS QL MICRO: ABNORMAL

## 2019-05-31 PROCEDURE — 11000001 HC ACUTE MED/SURG PRIVATE ROOM

## 2019-05-31 PROCEDURE — 85025 COMPLETE CBC W/AUTO DIFF WBC: CPT

## 2019-05-31 PROCEDURE — 93010 ELECTROCARDIOGRAM REPORT: CPT | Mod: ,,, | Performed by: INTERNAL MEDICINE

## 2019-05-31 PROCEDURE — 93005 ELECTROCARDIOGRAM TRACING: CPT

## 2019-05-31 PROCEDURE — 83880 ASSAY OF NATRIURETIC PEPTIDE: CPT

## 2019-05-31 PROCEDURE — 25000003 PHARM REV CODE 250: Performed by: EMERGENCY MEDICINE

## 2019-05-31 PROCEDURE — 84484 ASSAY OF TROPONIN QUANT: CPT

## 2019-05-31 PROCEDURE — 96375 TX/PRO/DX INJ NEW DRUG ADDON: CPT

## 2019-05-31 PROCEDURE — 96374 THER/PROPH/DIAG INJ IV PUSH: CPT

## 2019-05-31 PROCEDURE — 93010 EKG 12-LEAD: ICD-10-PCS | Mod: ,,, | Performed by: INTERNAL MEDICINE

## 2019-05-31 PROCEDURE — 25500020 PHARM REV CODE 255: Performed by: EMERGENCY MEDICINE

## 2019-05-31 PROCEDURE — 80053 COMPREHEN METABOLIC PANEL: CPT

## 2019-05-31 PROCEDURE — 81000 URINALYSIS NONAUTO W/SCOPE: CPT

## 2019-05-31 PROCEDURE — 82962 GLUCOSE BLOOD TEST: CPT

## 2019-05-31 PROCEDURE — 99291 CRITICAL CARE FIRST HOUR: CPT | Mod: 25

## 2019-05-31 RX ORDER — DEXTROSE MONOHYDRATE 25 G/50ML
25 INJECTION, SOLUTION INTRAVENOUS
Status: DISCONTINUED | OUTPATIENT
Start: 2019-05-31 | End: 2019-05-31

## 2019-05-31 RX ORDER — DEXTROSE MONOHYDRATE 25 G/50ML
25 INJECTION, SOLUTION INTRAVENOUS
Status: COMPLETED | OUTPATIENT
Start: 2019-05-31 | End: 2019-05-31

## 2019-05-31 RX ORDER — FUROSEMIDE 10 MG/ML
40 INJECTION INTRAMUSCULAR; INTRAVENOUS
Status: COMPLETED | OUTPATIENT
Start: 2019-05-31 | End: 2019-06-01

## 2019-05-31 RX ORDER — ASPIRIN 325 MG
325 TABLET ORAL
Status: COMPLETED | OUTPATIENT
Start: 2019-05-31 | End: 2019-06-01

## 2019-05-31 RX ADMIN — DEXTROSE MONOHYDRATE 25 G: 25 INJECTION, SOLUTION INTRAVENOUS at 10:05

## 2019-05-31 RX ADMIN — IOHEXOL 100 ML: 350 INJECTION, SOLUTION INTRAVENOUS at 10:05

## 2019-05-31 NOTE — PLAN OF CARE
05/31/19 0934   Final Note   Assessment Type Final Discharge Note   Anticipated Discharge Disposition SNF   Right Care Referral Info   Post Acute Recommendation SNF / Sub-Acute Rehab   Facility Name [facility]

## 2019-05-31 NOTE — DISCHARGE SUMMARY
[facility] Hospital Medicine  Discharge Summary      Patient Name: Ross Isbell  MRN: [id number]  Admission Date: 5/23/2019  Hospital Length of Stay: 7 days  Discharge Date and Time: 5/30/2019  3:28 PM  Attending Physician:  Teddy Bryant MD  Discharging Provider: Teddy Bryant MD  Primary Care Provider: Anna Penn MD      HPI:   Ross Isbell is a 73 year old female with a PMHx of DM II, Parkinson's, CORINE, MI, CAD, and Glaucoma who presented to the Emergency Department with c/o fatigue. Associated symptoms include: generalized weakness and BLE pain x 2 weeks. Pt was informed by home health nurse to go to ED for increasing weakness, abdominal pain and leg pain. ED workup showed: WBC 13.08K, Hgb/Hct 11.0/33.5, Na+ 129, lipase 17. UA (+) nitrites, few bacteria. CXR with no acute findings. CT abdomen/pelvis with contrast showed moderate inflammatory changes centered on the intervertebral disc space between the L2 and L3 vertebral bodies. There is bony destruction on both sides of the intervertebral disc space between the L2 and L3 vertebral bodies.  This is consistent with the patient's history and characteristic of discitis and osteomyelitis. ED discussed case with Dr. Nicolas (neurosurgery) who reported pt did not need to be transferred out at this time. Obtain MRI thoracic/lumbar spine w wo contrast. Start pt on IV Vancomycin and Zosyn but consult Dr. Gomez (ID). Daughter: Uzma Godfrey [phone] is the surrogate decision maker. Admitted for Osteomyelitis of lumbar spine.    Procedure(s) (LRB):  FUSION, SPINE, POSTERIOR SPINAL COLUMN, LUMBAR, USING COMPUTER-ASSISTED NAVIGATION  FUSION, SPINE, LUMBAR, TLIF, POSTERIOR APPROACH, USING PEDICLE SCREW (N/A)  LAMINECTOMY, SPINE, LUMBAR  DEBRIDEMENT, ABSCESS, PARASPINAL (N/A)      Hospital Course:   5/24/19 Dr. Nicolas, Neurosurgeon, doesn't recommend spinal surgery, But Needle biopsy of L 2-3 disk abscess with cultures. This am,  patient c/o of severe abdominal pain 20/10, Norco 10 mg ordered PRN with IV 2 mg Morphine PRN. Blood culture is Positive for GPC. Echo pending. Patient had severe back pain and refused OT/PT. Daughter stated she has had diarrhea x 1 month. Stool for C.Diff ordered. T. Max 101. IV Vanco and Zosyn. IR, Dr. Juárez, consulted for Needle biopsy. Daughter at bedside. ID consult pending. Cont Vanc and Zosyn. CT abdomen/pelvis positive for osteomyelitis.  To OR with Dr. Nicolas 25 May for laminectomy and vertebroplasty.  Post-procedure in ICU due to post-op hypotension.  Able to wean vasopressors.  Anemia due to blood loss related to the procedure.  Transfused 1 unit PRBC.  Interval improvement in abdominal and back pain.  Tolerated limited degree of physical therapy.  Pursuing skilled placement.  Discharge plan to transfer to [facility].  Complete 8 week Ceftriaxone 2 g daily IV stop date 23 July.  Weekly ESR and CRP.  PT and OT.  Prevena incisional wound VAC.     Consults:   Consults (From admission, onward)        Status Ordering Provider     Inpatient consult to Neurosurgery  Once     Provider:  Karen Nicolas MD    Completed EDWARD ROSAS     Inpatient consult to Social Work  Once     Provider:  (Not yet assigned)    Completed EDWARD ROSAS     Inpatient consult to Social Work  Once     Provider:  (Not yet assigned)    Completed KAREN NICOLAS     Inpatient consult to Social Work  Once     Provider:  (Not yet assigned)    Completed WOODROW DECKER     Inpatient consult to Social Work/Case Management  Once     Provider:  (Not yet assigned)    Completed LEON RICHARD          No new Assessment & Plan notes have been filed under this hospital service since the last note was generated.  Service: Hospital Medicine    Final Active Diagnoses:    Diagnosis Date Noted POA    PRINCIPAL PROBLEM:  Status post lumbar laminectomy [Z98.890] 05/25/2019 Not Applicable    Abscess in  epidural space of lumbar spine [G06.1] 05/24/2019 Yes    Streptococcal bacteremia [R78.81, B95.5] 05/24/2019 Yes    Alteration in skin integrity related to surgical incision [R23.9] 05/29/2019 Yes    Electrolyte abnormality [E87.8] 05/26/2019 No    S/P lumbar laminectomy [Z98.890] 05/26/2019 Not Applicable    Chest pain [R07.9] 05/25/2019 Yes    Acute osteomyelitis of lumbar spine [M46.26] 05/25/2019 Yes    Postprocedural hypotension [I95.81] 05/25/2019 No    Pressure injury of back, stage 3 [L89.103] 05/24/2019 Yes    Intertrigo [L30.4] 05/24/2019 Yes    Deep tissue injury [T14.8XXA] 05/24/2019 Yes    Osteomyelitis of lumbar spine [M46.26] 05/23/2019 Yes    UTI (urinary tract infection) [N39.0] 05/23/2019 Yes    Hyponatremia [E87.1] 05/23/2019 Yes    Type 2 diabetes, controlled, with peripheral neuropathy [E11.42] 05/29/2014 Yes     Chronic    Hypothyroidism [E03.9] 11/19/2013 Yes     Chronic    Essential hypertension [I10]  Yes     Chronic    PD (Parkinson's disease) [G20]  Yes     Chronic    CAD (coronary artery disease) [I25.10]  Yes     Chronic      Problems Resolved During this Admission:       Discharged Condition: good    Disposition: Skilled Nursing Facility    Follow Up:  Follow-up Information     Dada Gomez MD In 2 weeks.    Specialty:  Infectious Diseases  Why:  Hospital follow up S.gallinaceus Lumbar osteomyelitis and bacteremia.  Contact information:  [address] [facility] [address] [phone]                 Patient Instructions:      Diet Cardiac     Change dressing (specify)   Order Comments: Prevena + machine place and turn on when ready for discharge. Leave this in place for 7 days, then remove dressing and discard pump. She is expecting to discharge to SNF for therapy.     Reportable conditions for Patients utilizing Negative Pressure Wound Vac Therapy:  1. Loss of seal, raised foam, or wound drainage suddenly decreasing in amount or stopping.  2. Bright  red blood or rapid filling of the cannister  3. Periwound erythema, heat, edema, pain, elevated temperature and white blood cell count, cloudy or foul-smelling wound drainage, excess bleeding, macerated periwound skin  4. Wound drainage becoming foul smelling and cloudy  5. Inability to trouble shoot alarm for greater than two (2) hours     Primary Nurse Assessment should include the following - Document VAC in the NPWT LDA  1. Document system patency  2. Amount and description of wound fluid  3. Pain level  4. Tolerance of NPWT  5. Level of suction   6. Color of foam  7. Any air leak noted     Activity as tolerated       Significant Diagnostic Studies: Labs: All labs within the past 24 hours have been reviewed    Pending Diagnostic Studies:     None         Medications:  Reconciled Home Medications:      Medication List      START taking these medications    cefTRIAXone 2 g in dextrose 5 % 50 mL 2 g/50 mL Pgbk IVPB  Commonly known as:  ROCEPHIN  Inject 50 mLs (2 g total) into the vein once daily.     miconazole NITRATE 2 % 2 % top powder  Commonly known as:  MICOTIN  Apply topically 2 (two) times daily.     oxyCODONE-acetaminophen 5-325 mg per tablet  Commonly known as:  PERCOCET  Take 1 tablet by mouth every 4 (four) hours as needed (pain 4-5/10).        CONTINUE taking these medications    ACCU-CHEK SOFTCLIX LANCETS Misc  Generic drug:  lancets  TEST THREE TIMES DAILY     acetaminophen 325 MG tablet  Commonly known as:  TYLENOL  Take 2 tablets (650 mg total) by mouth every 6 (six) hours as needed.     aspirin 81 MG EC tablet  Commonly known as:  ECOTRIN  Take 1 tablet (81 mg total) by mouth once daily.     blood sugar diagnostic Strp  1 strip by Misc.(Non-Drug; Combo Route) route 3 (three) times daily. Accuchek Felicia Plus Test Strips     blood-glucose meter kit  Accu-chek Felicia Plus Meter     carbidopa-levodopa  mg  mg per tablet  Commonly known as:  SINEMET  TAKE 1 TABLET BY MOUTH 5 TIMES DAILY    "  CENTRUM SILVER 0.4-300-250 mg-mcg-mcg Tab  Generic drug:  multivit-min-FA-lycopen-lutein  Take 1 tablet by mouth once daily.     insulin syringe-needle U-100 0.5 mL 31 gauge x 5/16" Syrg  USE THREE TIMES DAILY     isosorbide mononitrate 120 MG 24 hr tablet  Commonly known as:  IMDUR  Take 1 tablet (120 mg total) by mouth once daily.     latanoprost 0.005 % ophthalmic solution  INSTILL 1 DROP INTO EACH EYE IN THE EVENING     levothyroxine 125 MCG tablet  Commonly known as:  SYNTHROID  TAKE 1 TABLET BY MOUTH IN THE MORNING BEFORE BREAKFAST     losartan 50 MG tablet  Commonly known as:  COZAAR  TAKE 1 TABLET BY MOUTH ONCE DAILY     metFORMIN 500 MG tablet  Commonly known as:  GLUCOPHAGE  TAKE ONE TABLET BY MOUTH THREE TIMES DAILY     metoprolol succinate 25 MG 24 hr tablet  Commonly known as:  TOPROL-XL  TAKE 1 TABLET BY MOUTH ONCE DAILY     NovoLIN 70/30 U-100 Insulin 100 unit/mL (70-30) injection  Generic drug:  insulin NPH-insulin regular (70/30)  Inject 55 Units into the skin 3 (three) times daily.     pantoprazole 40 MG tablet  Commonly known as:  PROTONIX  Take 1 tablet (40 mg total) by mouth once daily.     pramipexole 1 MG tablet  Commonly known as:  MIRAPEX  TAKE ONE TABLET BY MOUTH THREE TIMES DAILY     pravastatin 40 MG tablet  Commonly known as:  PRAVACHOL  TAKE ONE TABLET BY MOUTH ONCE DAILY            Indwelling Lines/Drains at time of discharge:   Lines/Drains/Airways     Peripherally Inserted Central Catheter Line                 PICC Double Lumen 05/25/19 2040 left basilic 5 days          Drain            Female External Urinary Catheter 05/27/19 1335 3 days          Pressure Ulcer                 Pressure Injury 05/23/19 1911 Left Buttocks DTPI 7 days         Pressure Injury 05/23/19 1911 lower Thoracic spine Stage 3 7 days         Pressure Injury 05/24/19 Right Buttocks DTPI 6 days         Negative Pressure Wound Therapy  1 day                Time spent on the discharge of patient: 30 " minutes  Patient was seen and examined on the date of discharge and determined to be suitable for discharge.         Teddy Bryant MD  Department of Hospital Medicine  [facility]

## 2019-06-01 PROBLEM — I21.4 NSTEMI (NON-ST ELEVATED MYOCARDIAL INFARCTION): Status: ACTIVE | Noted: 2019-06-01

## 2019-06-01 PROBLEM — R79.89 ELEVATED TROPONIN: Status: ACTIVE | Noted: 2019-06-01

## 2019-06-01 PROBLEM — E16.2 HYPOGLYCEMIA: Status: ACTIVE | Noted: 2019-06-01

## 2019-06-01 PROBLEM — D64.9 ANEMIA: Status: ACTIVE | Noted: 2019-06-01

## 2019-06-01 PROBLEM — I25.10 CAD, MULTIPLE VESSEL: Status: ACTIVE | Noted: 2019-06-01

## 2019-06-01 PROBLEM — Z98.61 HISTORY OF PTCA: Status: ACTIVE | Noted: 2019-06-01

## 2019-06-01 LAB
ABO + RH BLD: NORMAL
ANION GAP SERPL CALC-SCNC: 8 MMOL/L (ref 8–16)
APTT BLDCRRT: 32.5 SEC (ref 21–32)
APTT BLDCRRT: 35.6 SEC (ref 21–32)
APTT BLDCRRT: 43.2 SEC (ref 21–32)
BASOPHILS # BLD AUTO: 0.01 K/UL (ref 0–0.2)
BASOPHILS NFR BLD: 0.1 % (ref 0–1.9)
BLD GP AB SCN CELLS X3 SERPL QL: NORMAL
BUN SERPL-MCNC: 9 MG/DL (ref 8–23)
CALCIUM SERPL-MCNC: 8.7 MG/DL (ref 8.7–10.5)
CHLORIDE SERPL-SCNC: 94 MMOL/L (ref 95–110)
CHOLEST SERPL-MCNC: 76 MG/DL (ref 120–199)
CHOLEST/HDLC SERPL: 3.5 {RATIO} (ref 2–5)
CO2 SERPL-SCNC: 30 MMOL/L (ref 23–29)
CREAT SERPL-MCNC: 0.7 MG/DL (ref 0.5–1.4)
DIASTOLIC DYSFUNCTION: NO
DIFFERENTIAL METHOD: ABNORMAL
EOSINOPHIL # BLD AUTO: 0.2 K/UL (ref 0–0.5)
EOSINOPHIL NFR BLD: 1.8 % (ref 0–8)
ERYTHROCYTE [DISTWIDTH] IN BLOOD BY AUTOMATED COUNT: 15.3 % (ref 11.5–14.5)
EST. GFR  (AFRICAN AMERICAN): >60 ML/MIN/1.73 M^2
EST. GFR  (NON AFRICAN AMERICAN): >60 ML/MIN/1.73 M^2
ESTIMATED AVG GLUCOSE: 126 MG/DL (ref 68–131)
GLUCOSE SERPL-MCNC: 96 MG/DL (ref 70–110)
HBA1C MFR BLD HPLC: 6 % (ref 4–5.6)
HCT VFR BLD AUTO: 24.2 % (ref 37–48.5)
HCT VFR BLD AUTO: 24.2 % (ref 37–48.5)
HCT VFR BLD AUTO: 24.7 % (ref 37–48.5)
HCT VFR BLD AUTO: 24.8 % (ref 37–48.5)
HCT VFR BLD AUTO: 26 % (ref 37–48.5)
HDLC SERPL-MCNC: 22 MG/DL (ref 40–75)
HDLC SERPL: 28.9 % (ref 20–50)
HGB BLD-MCNC: 7.5 G/DL (ref 12–16)
HGB BLD-MCNC: 7.5 G/DL (ref 12–16)
HGB BLD-MCNC: 7.9 G/DL (ref 12–16)
HGB BLD-MCNC: 8 G/DL (ref 12–16)
HGB BLD-MCNC: 8.2 G/DL (ref 12–16)
INR PPP: 1 (ref 0.8–1.2)
INR PPP: 1 (ref 0.8–1.2)
LDLC SERPL CALC-MCNC: 39.4 MG/DL (ref 63–159)
LYMPHOCYTES # BLD AUTO: 1.7 K/UL (ref 1–4.8)
LYMPHOCYTES NFR BLD: 14.5 % (ref 18–48)
MAGNESIUM SERPL-MCNC: 1.4 MG/DL (ref 1.6–2.6)
MCH RBC QN AUTO: 27.6 PG (ref 27–31)
MCHC RBC AUTO-ENTMCNC: 31 G/DL (ref 32–36)
MCV RBC AUTO: 89 FL (ref 82–98)
MONOCYTES # BLD AUTO: 1 K/UL (ref 0.3–1)
MONOCYTES NFR BLD: 8 % (ref 4–15)
NEUTROPHILS # BLD AUTO: 9 K/UL (ref 1.8–7.7)
NEUTROPHILS NFR BLD: 75.6 % (ref 38–73)
NONHDLC SERPL-MCNC: 54 MG/DL
PHOSPHATE SERPL-MCNC: 4 MG/DL (ref 2.7–4.5)
PLATELET # BLD AUTO: 303 K/UL (ref 150–350)
PMV BLD AUTO: 8 FL (ref 9.2–12.9)
POCT GLUCOSE: 101 MG/DL (ref 70–110)
POCT GLUCOSE: 109 MG/DL (ref 70–110)
POCT GLUCOSE: 110 MG/DL (ref 70–110)
POCT GLUCOSE: 115 MG/DL (ref 70–110)
POCT GLUCOSE: 81 MG/DL (ref 70–110)
POCT GLUCOSE: 82 MG/DL (ref 70–110)
POCT GLUCOSE: 98 MG/DL (ref 70–110)
POTASSIUM SERPL-SCNC: 3.8 MMOL/L (ref 3.5–5.1)
PROTHROMBIN TIME: 10.7 SEC (ref 9–12.5)
PROTHROMBIN TIME: 10.8 SEC (ref 9–12.5)
RBC # BLD AUTO: 2.72 M/UL (ref 4–5.4)
RETIRED EF AND QEF - SEE NOTES: 55 (ref 55–65)
SODIUM SERPL-SCNC: 132 MMOL/L (ref 136–145)
TRIGL SERPL-MCNC: 73 MG/DL (ref 30–150)
TROPONIN I SERPL DL<=0.01 NG/ML-MCNC: 1.28 NG/ML (ref 0–0.03)
TSH SERPL DL<=0.005 MIU/L-ACNC: 1.71 UIU/ML (ref 0.4–4)
WBC # BLD AUTO: 11.96 K/UL (ref 3.9–12.7)

## 2019-06-01 PROCEDURE — 80061 LIPID PANEL: CPT

## 2019-06-01 PROCEDURE — 85014 HEMATOCRIT: CPT | Mod: 91

## 2019-06-01 PROCEDURE — 93306 TTE W/DOPPLER COMPLETE: CPT | Mod: 26,,, | Performed by: INTERNAL MEDICINE

## 2019-06-01 PROCEDURE — 25000003 PHARM REV CODE 250: Performed by: EMERGENCY MEDICINE

## 2019-06-01 PROCEDURE — 21400001 HC TELEMETRY ROOM

## 2019-06-01 PROCEDURE — 84100 ASSAY OF PHOSPHORUS: CPT

## 2019-06-01 PROCEDURE — 94799 UNLISTED PULMONARY SVC/PX: CPT

## 2019-06-01 PROCEDURE — 93306 2D ECHO WITH COLOR FLOW DOPPLER: ICD-10-PCS | Mod: 26,,, | Performed by: INTERNAL MEDICINE

## 2019-06-01 PROCEDURE — 80048 BASIC METABOLIC PNL TOTAL CA: CPT

## 2019-06-01 PROCEDURE — 85018 HEMOGLOBIN: CPT | Mod: 91

## 2019-06-01 PROCEDURE — 85610 PROTHROMBIN TIME: CPT | Mod: 91

## 2019-06-01 PROCEDURE — 85730 THROMBOPLASTIN TIME PARTIAL: CPT | Mod: 91

## 2019-06-01 PROCEDURE — 86901 BLOOD TYPING SEROLOGIC RH(D): CPT

## 2019-06-01 PROCEDURE — 85018 HEMOGLOBIN: CPT

## 2019-06-01 PROCEDURE — 87040 BLOOD CULTURE FOR BACTERIA: CPT

## 2019-06-01 PROCEDURE — 36415 COLL VENOUS BLD VENIPUNCTURE: CPT

## 2019-06-01 PROCEDURE — 85610 PROTHROMBIN TIME: CPT

## 2019-06-01 PROCEDURE — 83735 ASSAY OF MAGNESIUM: CPT

## 2019-06-01 PROCEDURE — 83036 HEMOGLOBIN GLYCOSYLATED A1C: CPT

## 2019-06-01 PROCEDURE — 25000003 PHARM REV CODE 250: Performed by: HOSPITALIST

## 2019-06-01 PROCEDURE — 93306 TTE W/DOPPLER COMPLETE: CPT

## 2019-06-01 PROCEDURE — 85730 THROMBOPLASTIN TIME PARTIAL: CPT

## 2019-06-01 PROCEDURE — 99223 1ST HOSP IP/OBS HIGH 75: CPT | Mod: ,,, | Performed by: INTERNAL MEDICINE

## 2019-06-01 PROCEDURE — S5010 5% DEXTROSE AND 0.45% SALINE: HCPCS | Performed by: HOSPITALIST

## 2019-06-01 PROCEDURE — 99900038 HC OT GENERIC THERAPY SCREENING (STAT)

## 2019-06-01 PROCEDURE — 97530 THERAPEUTIC ACTIVITIES: CPT

## 2019-06-01 PROCEDURE — 84484 ASSAY OF TROPONIN QUANT: CPT

## 2019-06-01 PROCEDURE — 97162 PT EVAL MOD COMPLEX 30 MIN: CPT

## 2019-06-01 PROCEDURE — 84443 ASSAY THYROID STIM HORMONE: CPT

## 2019-06-01 PROCEDURE — 25000003 PHARM REV CODE 250: Performed by: NURSE PRACTITIONER

## 2019-06-01 PROCEDURE — 86920 COMPATIBILITY TEST SPIN: CPT

## 2019-06-01 PROCEDURE — 63600175 PHARM REV CODE 636 W HCPCS: Performed by: EMERGENCY MEDICINE

## 2019-06-01 PROCEDURE — 99223 PR INITIAL HOSPITAL CARE,LEVL III: ICD-10-PCS | Mod: ,,, | Performed by: INTERNAL MEDICINE

## 2019-06-01 PROCEDURE — 85025 COMPLETE CBC W/AUTO DIFF WBC: CPT

## 2019-06-01 PROCEDURE — 63600175 PHARM REV CODE 636 W HCPCS: Performed by: NURSE PRACTITIONER

## 2019-06-01 RX ORDER — DEXTROSE MONOHYDRATE AND SODIUM CHLORIDE 5; .45 G/100ML; G/100ML
1000 INJECTION, SOLUTION INTRAVENOUS CONTINUOUS
Status: DISCONTINUED | OUTPATIENT
Start: 2019-06-01 | End: 2019-06-01

## 2019-06-01 RX ORDER — CARBIDOPA AND LEVODOPA 25; 250 MG/1; MG/1
1 TABLET ORAL
Status: DISCONTINUED | OUTPATIENT
Start: 2019-06-01 | End: 2019-06-04 | Stop reason: HOSPADM

## 2019-06-01 RX ORDER — ISOSORBIDE MONONITRATE 60 MG/1
120 TABLET, EXTENDED RELEASE ORAL DAILY
Status: DISCONTINUED | OUTPATIENT
Start: 2019-06-01 | End: 2019-06-04 | Stop reason: HOSPADM

## 2019-06-01 RX ORDER — METOPROLOL SUCCINATE 25 MG/1
25 TABLET, EXTENDED RELEASE ORAL DAILY
Status: DISCONTINUED | OUTPATIENT
Start: 2019-06-01 | End: 2019-06-04 | Stop reason: HOSPADM

## 2019-06-01 RX ORDER — DEXTROSE MONOHYDRATE AND SODIUM CHLORIDE 5; .9 G/100ML; G/100ML
INJECTION, SOLUTION INTRAVENOUS CONTINUOUS
Status: DISCONTINUED | OUTPATIENT
Start: 2019-06-01 | End: 2019-06-02

## 2019-06-01 RX ORDER — IBUPROFEN 200 MG
16 TABLET ORAL
Status: DISCONTINUED | OUTPATIENT
Start: 2019-06-01 | End: 2019-06-04 | Stop reason: HOSPADM

## 2019-06-01 RX ORDER — HEPARIN SODIUM,PORCINE/D5W 25000/250
12 INTRAVENOUS SOLUTION INTRAVENOUS CONTINUOUS
Status: DISCONTINUED | OUTPATIENT
Start: 2019-06-01 | End: 2019-06-01

## 2019-06-01 RX ORDER — PRAVASTATIN SODIUM 20 MG/1
40 TABLET ORAL DAILY
Status: DISCONTINUED | OUTPATIENT
Start: 2019-06-01 | End: 2019-06-04 | Stop reason: HOSPADM

## 2019-06-01 RX ORDER — ASPIRIN 81 MG/1
81 TABLET ORAL DAILY
Status: DISCONTINUED | OUTPATIENT
Start: 2019-06-01 | End: 2019-06-04 | Stop reason: HOSPADM

## 2019-06-01 RX ORDER — LOSARTAN POTASSIUM 50 MG/1
50 TABLET ORAL DAILY
Status: DISCONTINUED | OUTPATIENT
Start: 2019-06-01 | End: 2019-06-04 | Stop reason: HOSPADM

## 2019-06-01 RX ORDER — MICONAZOLE NITRATE 2 %
POWDER (GRAM) TOPICAL 2 TIMES DAILY
Status: DISCONTINUED | OUTPATIENT
Start: 2019-06-01 | End: 2019-06-04 | Stop reason: HOSPADM

## 2019-06-01 RX ORDER — MAGNESIUM SULFATE HEPTAHYDRATE 40 MG/ML
2 INJECTION, SOLUTION INTRAVENOUS ONCE
Status: COMPLETED | OUTPATIENT
Start: 2019-06-01 | End: 2019-06-01

## 2019-06-01 RX ORDER — OXYCODONE AND ACETAMINOPHEN 5; 325 MG/1; MG/1
1 TABLET ORAL EVERY 4 HOURS PRN
Status: DISCONTINUED | OUTPATIENT
Start: 2019-06-01 | End: 2019-06-04 | Stop reason: HOSPADM

## 2019-06-01 RX ORDER — GLUCAGON 1 MG
1 KIT INJECTION
Status: DISCONTINUED | OUTPATIENT
Start: 2019-06-01 | End: 2019-06-04 | Stop reason: HOSPADM

## 2019-06-01 RX ORDER — IBUPROFEN 200 MG
24 TABLET ORAL
Status: DISCONTINUED | OUTPATIENT
Start: 2019-06-01 | End: 2019-06-04 | Stop reason: HOSPADM

## 2019-06-01 RX ORDER — ONDANSETRON 2 MG/ML
4 INJECTION INTRAMUSCULAR; INTRAVENOUS EVERY 8 HOURS PRN
Status: DISCONTINUED | OUTPATIENT
Start: 2019-06-01 | End: 2019-06-04 | Stop reason: HOSPADM

## 2019-06-01 RX ORDER — PRAMIPEXOLE DIHYDROCHLORIDE 0.5 MG/1
1 TABLET ORAL 3 TIMES DAILY
Status: DISCONTINUED | OUTPATIENT
Start: 2019-06-01 | End: 2019-06-04 | Stop reason: HOSPADM

## 2019-06-01 RX ORDER — INSULIN ASPART 100 [IU]/ML
0-5 INJECTION, SOLUTION INTRAVENOUS; SUBCUTANEOUS
Status: DISCONTINUED | OUTPATIENT
Start: 2019-06-01 | End: 2019-06-04 | Stop reason: HOSPADM

## 2019-06-01 RX ORDER — LEVOTHYROXINE SODIUM 125 UG/1
125 TABLET ORAL
Status: DISCONTINUED | OUTPATIENT
Start: 2019-06-01 | End: 2019-06-04 | Stop reason: HOSPADM

## 2019-06-01 RX ORDER — PANTOPRAZOLE SODIUM 40 MG/1
40 TABLET, DELAYED RELEASE ORAL DAILY
Status: DISCONTINUED | OUTPATIENT
Start: 2019-06-01 | End: 2019-06-04 | Stop reason: HOSPADM

## 2019-06-01 RX ADMIN — PANTOPRAZOLE SODIUM 40 MG: 40 TABLET, DELAYED RELEASE ORAL at 08:06

## 2019-06-01 RX ADMIN — METOPROLOL SUCCINATE 25 MG: 25 TABLET, EXTENDED RELEASE ORAL at 08:06

## 2019-06-01 RX ADMIN — ASPIRIN 81 MG: 81 TABLET, COATED ORAL at 08:06

## 2019-06-01 RX ADMIN — PRAMIPEXOLE DIHYDROCHLORIDE 1 MG: 0.5 TABLET ORAL at 02:06

## 2019-06-01 RX ADMIN — LEVOTHYROXINE SODIUM 125 MCG: 125 TABLET ORAL at 06:06

## 2019-06-01 RX ADMIN — OXYCODONE AND ACETAMINOPHEN 1 TABLET: 5; 325 TABLET ORAL at 11:06

## 2019-06-01 RX ADMIN — Medication: at 09:06

## 2019-06-01 RX ADMIN — DEXTROSE AND SODIUM CHLORIDE: 5; .9 INJECTION, SOLUTION INTRAVENOUS at 09:06

## 2019-06-01 RX ADMIN — CEFTRIAXONE 2 G: 2 INJECTION, SOLUTION INTRAVENOUS at 08:06

## 2019-06-01 RX ADMIN — DEXTROSE AND SODIUM CHLORIDE 1000 ML: 5; .45 INJECTION, SOLUTION INTRAVENOUS at 01:06

## 2019-06-01 RX ADMIN — MAGNESIUM SULFATE IN WATER 2 G: 40 INJECTION, SOLUTION INTRAVENOUS at 08:06

## 2019-06-01 RX ADMIN — FUROSEMIDE 40 MG: 10 INJECTION, SOLUTION INTRAMUSCULAR; INTRAVENOUS at 12:06

## 2019-06-01 RX ADMIN — LOSARTAN POTASSIUM 50 MG: 50 TABLET, FILM COATED ORAL at 08:06

## 2019-06-01 RX ADMIN — CARBIDOPA AND LEVODOPA 1 TABLET: 25; 250 TABLET ORAL at 02:06

## 2019-06-01 RX ADMIN — CARBIDOPA AND LEVODOPA 1 TABLET: 25; 250 TABLET ORAL at 06:06

## 2019-06-01 RX ADMIN — OXYCODONE AND ACETAMINOPHEN 1 TABLET: 5; 325 TABLET ORAL at 07:06

## 2019-06-01 RX ADMIN — PRAMIPEXOLE DIHYDROCHLORIDE 1 MG: 0.5 TABLET ORAL at 09:06

## 2019-06-01 RX ADMIN — ISOSORBIDE MONONITRATE 120 MG: 60 TABLET, EXTENDED RELEASE ORAL at 08:06

## 2019-06-01 RX ADMIN — PRAMIPEXOLE DIHYDROCHLORIDE 1 MG: 0.5 TABLET ORAL at 08:06

## 2019-06-01 RX ADMIN — CARBIDOPA AND LEVODOPA 1 TABLET: 25; 250 TABLET ORAL at 09:06

## 2019-06-01 RX ADMIN — PRAVASTATIN SODIUM 40 MG: 20 TABLET ORAL at 08:06

## 2019-06-01 RX ADMIN — Medication: at 08:06

## 2019-06-01 RX ADMIN — ONDANSETRON 4 MG: 2 INJECTION INTRAMUSCULAR; INTRAVENOUS at 08:06

## 2019-06-01 RX ADMIN — CARBIDOPA AND LEVODOPA 1 TABLET: 25; 250 TABLET ORAL at 05:06

## 2019-06-01 RX ADMIN — ASPIRIN 325 MG ORAL TABLET 325 MG: 325 PILL ORAL at 12:06

## 2019-06-01 RX ADMIN — HEPARIN SODIUM AND DEXTROSE 12 UNITS/KG/HR: 10000; 5 INJECTION INTRAVENOUS at 01:06

## 2019-06-01 NOTE — PROGRESS NOTES
Therapy with Vancomycin complete and/or consult discontinued by provider /Rashad Byrnes .  Pharmacy will sign off, please re-consult as needed.  Zehra Dorantes RPh. 6/1/2019 10:18 AM

## 2019-06-01 NOTE — HOSPITAL COURSE
Ross Isbell is a 73 y.o. female patient with a PMHx of Parkinson's disease, CAD, DM, and MI, NH resident- admitted for severe Hypoglycemia. Pt had a spinal fusion, lumbar laminectomy, and debridement of a paraspinal abscess done by Dr. Nicolas (Neurosurgery) on 5/25 and was discharged 05/30/2019 to SNF with wound VAC and IV antibiotic (Rocephin) therapy. She is doing much better, AAOx3, speech clear, c/o pain in her back at the surgery site. BS around 80- 100 mostly. Will change to D5NS- as Na is down to 132.     6/2- doing much better. AAOx3, speech clear, BS improved, no hypoglycemia since admit. Cardiology following and thinks that elevated Trop as Demand ischemia and not NSTEMI, normal EF on Echo. IV rocephin continued for back abscess/ Osteomyelitis. H/H low to 7.5/22-- getting 2 units of blood.     6/3  Patient improving with mobility. C/O LBP on movement. States she will work well with PT / OT.  at bedside and encouraging patient. Plan for SNF for continued recovery.    Patient 74 yo female admitted to hospital with Hypoglycemia, ANemia, s/p Laminectomy. Patient transfused PRBC and continued therapy post procedure. Patient seen and examined today Patient returns to [facility] for continued SNF, IV Abx and recovery from recent surgery

## 2019-06-01 NOTE — ASSESSMENT & PLAN NOTE
Patient with PICC line and continue course of Rocephin the patient is on outpatient.  Repeat cultures pending  Further evaluation/diagnostics/interventions/consults pending course

## 2019-06-01 NOTE — PLAN OF CARE
Problem: Adult Inpatient Plan of Care  Goal: Plan of Care Review  Outcome: Ongoing (interventions implemented as appropriate)  Patient in bed resting quietly. Easily aroused. No acute distress noted. AAOX4. Baseline speech garbled soft spoken. Spouse at bedside. Turn q2. Incontinent x 2. Blood glucose monitoring.  Cardiac monitoring HR 60s NSR. Plan of care reviewed, verbalized understanding. Safety measures in place, bed in lowest position, wheels locked, call light within reach, non-skid socks in use. Will continue to monitor.

## 2019-06-01 NOTE — PLAN OF CARE
Problem: Adult Inpatient Plan of Care  Goal: Patient-Specific Goal (Individualization)  Outcome: Ongoing (interventions implemented as appropriate)  Patient awake and alert, in NAD. Patient had uneventful shift. VS stable. Patient denies pain or SOB. Patient on telemetry, NSR. Fall precautions in place. Bed locked and in lowest position. Yellow fall risk band and non-skid socks on patient. Patient free from fall/injury. Plan of care reviewed with patient. All questions answered. Will continue to monitor.

## 2019-06-01 NOTE — CONSULTS
[facility] -   Cardiology  Consult Note    Patient Name: Ross Isbell  MRN: [id number]  Admission Date: 5/31/2019  Hospital Length of Stay: 0 days  Code Status: Prior   Attending Provider: Swetha Biswas MD   Consulting Provider: Enrrique Huffman MD  Primary Care Physician: Anna Penn MD  Principal Problem:Hypoglycemia    Patient information was obtained from patient, relative(s), past medical records and ER records.     Inpatient consult to Cardiology  Consult performed by: Enrrique Huffman MD  Consult ordered by: Rashad Byrnes NP  Reason for consult: ELEVATED TROPONIN        Subjective:     Chief Complaint:  LOW BLOOD SUGAR     HPI:   Pt admitted with hypoglycemia.  She has h/o multivessel CAD with multivessel PCI 2015, AS, DM, HTN.  She is s/p recent back surgery, with infection, and on abs and also received PRBC recently for severe anemia.  She went home recently then had low blood sugar, symptomatic with sweats, and sxs resolved with elevation of blood sugar.  She denies recent angina or acute dyspnea.  ecg on admit is normal.    Troponin elevation noted 1.2    Severe anemia noted with Hg 7.5  Was put on IV Heparin gtt by admitting service overnight.  Echo last week showed normal EF, mod AS.  - stress MPI 2018.    Past Medical History:   Diagnosis Date    Abnormal nuclear stress test 11/1/2015    Anemia 6/1/2019    Background diabetic retinopathy 12/28/2015    CAD (coronary artery disease) 2002    stents    Cataract     Gait disorder 10/12/2012    Glaucoma     History of PTCA 6/1/2019    MI (myocardial infarction)     Obesity     CORINE (obstructive sleep apnea)     Other and unspecified hyperlipidemia     PD (Parkinson's disease) 2002    tremor-predominant (per patient)    Type II or unspecified type diabetes mellitus without mention of complication, not stated as uncontrolled 2002      am    Unspecified essential hypertension        Past Surgical History:   Procedure  Laterality Date    BREAST BIOPSY Left     benign - about 3 yrs ago    CORONARY ANGIOPLASTY  2002    CORONARY STENT PLACEMENT  2002    DEBRIDEMENT, ABSCESS, PARASPINAL N/A 5/25/2019    Performed by [facility]isl[facility] at [facility] OR    DILATION AND CURETTAGE OF UTERUS      FOREIGN BODY REMOVAL      FUSION, SPINE, LUMBAR, TLIF, POSTERIOR APPROACH, USING PEDICLE SCREW N/A 5/25/2019    Performed by [facility] at [facility] OR    FUSION, SPINE, POSTERIOR SPINAL COLUMN, LUMBAR, USING COMPUTER-ASSISTED NAVIGATION  5/25/2019    Performed by [facility]islav [facility] MD at [facility] OR    HEART CATH-LEFT Left 11/5/2015    Performed by Constance Pace MD at [facility] CATH LAB    HEART CATH-LEFT Left 11/2/2015    Performed by Constance [facility]ace MD at [facility] CATH LAB    HEART CATH-LEFT/pci stent lad Left 11/10/2015    Performed by Con[facility]ace MD at [facility] CATH LAB    INNER EAR SURGERY      LAMINECTOMY, SPINE, LUMBAR  5/25/2019    Performed by [facility]isl[facility] MD at [facility] OR    TONSILLECTOMY, ADENOIDECTOMY         Review of patient's allergies indicates:   Allergen Reactions    Codeine      Other reaction(s): Nausea.  Patient tolerated morphine 5/2019 with no reported problems.     Codeine sulfate      Unknown^    Diphenhydramine hcl      Unknown^  Other reaction(s): Rash    Sulfa (sulfonamide antibiotics) Hives and Nausea And Vomiting    Penicillins Rash     Rash only as a child  Tolerated ceftriaxone 4/30/19  Tolerated piperacillin-tazobactam 5/23/19       No current facility-administered medications on file prior to encounter.      Current Outpatient Medications on File Prior to Encounter   Medication Sig    ACCU-CHEK SOFTCLIX LANCETS Misc TEST THREE TIMES DAILY    acetaminophen (TYLENOL) 325 MG tablet Take 2 tablets (650 mg total) by mouth every 6 (six) hours as needed.    aspirin (ECOTRIN) 81 MG EC tablet Take 1 tablet (81 mg total) by mouth once daily.    blood sugar diagnostic Strp 1 strip by Misc.(Non-Drug; Combo  Route) route 3 (three) times daily. Accuchek Felicia Plus Test Strips    blood-glucose meter kit Accu-chek Felicia Plus Meter    carbidopa-levodopa  mg (SINEMET)  mg per tablet TAKE 1 TABLET BY MOUTH 5 TIMES DAILY    cefTRIAXone 2 g in dextrose 5 % 50 mL (ROCEPHIN) 2 g/50 mL PgBk IVPB Inject 50 mLs (2 g total) into the vein once daily.    insulin syringe-needle U-100 0.5 mL 31 gauge x 5/16 Syrg USE THREE TIMES DAILY    isosorbide mononitrate (IMDUR) 120 MG 24 hr tablet Take 1 tablet (120 mg total) by mouth once daily.    latanoprost 0.005 % ophthalmic solution INSTILL 1 DROP INTO EACH EYE IN THE EVENING    levothyroxine (SYNTHROID) 125 MCG tablet TAKE 1 TABLET BY MOUTH IN THE MORNING BEFORE BREAKFAST    losartan (COZAAR) 50 MG tablet TAKE 1 TABLET BY MOUTH ONCE DAILY    metFORMIN (GLUCOPHAGE) 500 MG tablet TAKE ONE TABLET BY MOUTH THREE TIMES DAILY    metoprolol succinate (TOPROL-XL) 25 MG 24 hr tablet TAKE 1 TABLET BY MOUTH ONCE DAILY    multivit-min-FA-lycopen-lutein (CENTRUM SILVER) 0.4-300-250 mg-mcg-mcg Tab Take 1 tablet by mouth once daily.    NOVOLIN 70/30 U-100 INSULIN 100 unit/mL (70-30) injection Inject 55 Units into the skin 3 (three) times daily.    oxyCODONE-acetaminophen (PERCOCET) 5-325 mg per tablet Take 1 tablet by mouth every 4 (four) hours as needed (pain 4-5/10).    pantoprazole (PROTONIX) 40 MG tablet Take 1 tablet (40 mg total) by mouth once daily.    pramipexole (MIRAPEX) 1 MG tablet TAKE ONE TABLET BY MOUTH THREE TIMES DAILY    pravastatin (PRAVACHOL) 40 MG tablet TAKE ONE TABLET BY MOUTH ONCE DAILY    miconazole NITRATE 2 % (MICOTIN) 2 % top powder Apply topically 2 (two) times daily.     Family History     Problem Relation (Age of Onset)    Breast cancer Paternal Grandmother    Diabetes Maternal Grandmother    Glaucoma Mother, Maternal Grandmother    Heart attack Father (65), Mother (65)    Hypertension Father, Mother, Maternal Grandmother    Ovarian cancer Mother     Parkinsonism Other, Paternal Aunt    Tremor Father        Tobacco Use    Smoking status: Former Smoker     Years: 40.00     Last attempt to quit: 10/12/2010     Years since quittin.6    Smokeless tobacco: Never Used    Tobacco comment: On & off for the past few years   Substance and Sexual Activity    Alcohol use: No     Alcohol/week: 0.0 oz    Drug use: No    Sexual activity: Not Currently     Review of Systems   Constitution: Positive for diaphoresis and malaise/fatigue.   HENT: Negative.    Eyes: Negative.    Cardiovascular: Positive for dyspnea on exertion.   Respiratory: Positive for shortness of breath.    Endocrine: Negative.    Hematologic/Lymphatic: Negative.    Skin: Negative.    Musculoskeletal: Positive for arthritis, back pain and joint pain.   Gastrointestinal: Negative.    Genitourinary: Negative.    Neurological: Positive for weakness.   Psychiatric/Behavioral: Negative.    Allergic/Immunologic: Negative.      Objective:     Vital Signs (Most Recent):  Temp: 98 °F (36.7 °C) (19)  Pulse: 85 (19 1113)  Resp: 18 (19)  BP: (!) 169/75 (19 07)  SpO2: 97 % (19) Vital Signs (24h Range):  Temp:  [97.7 °F (36.5 °C)-98.2 °F (36.8 °C)] 98 °F (36.7 °C)  Pulse:  [67-90] 85  Resp:  [18-20] 18  SpO2:  [94 %-100 %] 97 %  BP: ()/(52-75) 169/75     Weight: 97.3 kg (214 lb 8.1 oz)  Body mass index is 34.62 kg/m².    SpO2: 97 %  O2 Device (Oxygen Therapy): nasal cannula    No intake or output data in the 24 hours ending 19 1122    Lines/Drains/Airways     Peripherally Inserted Central Catheter Line                 PICC Double Lumen 190 left basilic 6 days          Peripheral Intravenous Line                 Peripheral IV - Single Lumen 19 18 G Right Hand 1 day                Physical Exam   Constitutional: She is oriented to person, place, and time. Vital signs are normal. She appears well-developed and well-nourished. She is active  and cooperative. She does not have a sickly appearance. She does not appear ill. No distress.   HENT:   Head: Normocephalic.   Neck: Neck supple. Normal carotid pulses, no hepatojugular reflux and no JVD present. Carotid bruit is not present. No thyromegaly present.   Cardiovascular: Normal rate, regular rhythm, S1 normal, S2 normal and normal pulses. PMI is not displaced. Exam reveals no gallop and no friction rub.   Murmur heard.   Harsh midsystolic murmur is present with a grade of 2/6 at the upper left sternal border.  Pulses:       Radial pulses are 2+ on the right side, and 2+ on the left side.   Pulmonary/Chest: Effort normal and breath sounds normal. She has no wheezes. She has no rales.   Abdominal: Soft. Normal appearance, normal aorta and bowel sounds are normal. She exhibits no pulsatile liver, no abdominal bruit, no ascites and no mass. There is no splenomegaly or hepatomegaly. There is no tenderness.   Musculoskeletal: She exhibits no edema.   Lymphadenopathy:     She has no cervical adenopathy.   Neurological: She is alert and oriented to person, place, and time.   Skin: Skin is warm. She is not diaphoretic.   Psychiatric: She has a normal mood and affect. Her behavior is normal.   Nursing note and vitals reviewed.      Significant Labs:   CMP   Recent Labs   Lab 05/31/19 1940 06/01/19  0545   * 132*   K 3.5 3.8   CL 93* 94*   CO2 27 30*    96   BUN 9 9   CREATININE 0.7 0.7   CALCIUM 8.9 8.7   PROT 5.6*  --    ALBUMIN 2.2*  --    BILITOT 0.3  --    ALKPHOS 71  --    AST 27  --    ALT 13  --    ANIONGAP 11 8   ESTGFRAFRICA >60 >60   EGFRNONAA >60 >60   , CBC   Recent Labs   Lab 05/31/19 1940 06/01/19  0100 06/01/19  0545   WBC 22.29*  --  11.96   HGB 7.6* 8.2* 7.5*  7.5*   HCT 24.3* 26.0* 24.2*  24.2*     --  303   , INR   Recent Labs   Lab 06/01/19  0023 06/01/19  0545   INR 1.0 1.0    and Troponin   Recent Labs   Lab 05/31/19  1940 06/01/19  0545   TROPONINI 1.164* 1.278*        Significant Imaging: Echocardiogram:   2D echo with color flow doppler:   Results for orders placed or performed during the hospital encounter of 05/23/19   2D echo with color flow doppler   Result Value Ref Range    QEF 60 55 - 65    Aortic Valve Stenosis MODERATE (A)     Est. PA Systolic Pressure 36.24     Mitral Valve Mobility NORMAL     Tricuspid Valve Regurgitation MILD      Assessment and Plan:       ELEVATED TROPONIN IS DUE TO SUPPLY/DEMAND ISCHEMIA IN THIS PT WITH UNDERLYING MULTIVESSEL CAD AND SEVERE ANEMIA, INFECTION, S/P SURGICAL STATE.  RECOMMEND TRANSFUSING PRBC TO ACHIEVE HEMOGLOBIN NEAR 10.0 FOR CAD PROTECTION.  STOP IV HEPARIN GTT DUE TO SEVERE ANEMIA.  ECHO TODAY TO REASSESS LVEF AND VALVULAR CONDITIONS IN LIGHT OF RECENT BACTEREMIA.  MEDICAL MGT FOR CAD ADVISED.  CONTINUE CURRENT HOME CV MEDS.  CONTINUE POST OP MGT.  CONTINUE  ANTIBIOTICS.  BLOOD SUGAR CONTROL.          * Hypoglycemia  See management plan detailed above.     Elevated troponin  See management plan detailed above.     Anemia  See management plan detailed above.     History of PTCA  See management plan detailed above.     CAD, multiple vessel  See management plan detailed above.     Nonrheumatic aortic valve stenosis  See management plan detailed above.     CAD: S/P coronary artery stent placement  See management plan detailed above.     Essential hypertension  See management plan detailed above.     CAD (coronary artery disease)  See management plan detailed above.         VTE Risk Mitigation (From admission, onward)        Ordered     Place sequential compression device  Until discontinued      06/01/19 0020     Place QUIQUE hose  Until discontinued      06/01/19 0020          Thank you for your consult.      Enrrique Huffman MD  Cardiology   [facility] -

## 2019-06-01 NOTE — SUBJECTIVE & OBJECTIVE
Past Medical History:   Diagnosis Date    Abnormal nuclear stress test 11/1/2015    Background diabetic retinopathy 12/28/2015    CAD (coronary artery disease) 2002    stents    Cataract     Gait disorder 10/12/2012    Glaucoma     MI (myocardial infarction)     Obesity     CORINE (obstructive sleep apnea)     Other and unspecified hyperlipidemia     PD (Parkinson's disease) 2002    tremor-predominant (per patient)    Type II or unspecified type diabetes mellitus without mention of complication, not stated as uncontrolled 2002      am    Unspecified essential hypertension        Past Surgical History:   Procedure Laterality Date    BREAST BIOPSY Left     benign - about 3 yrs ago    CORONARY ANGIOPLASTY  2002    CORONARY STENT PLACEMENT  2002    DEBRIDEMENT, ABSCESS, PARASPINAL N/A 5/25/2019    Performed by [facility]islav Nicolas [facility] at [facility] OR    DILATION AND CURETTAGE OF UTERUS      FOREIGN BODY REMOVAL      FUSION, SPINE, LUMBAR, TLIF, POSTERIOR APPROACH, USING PEDICLE SCREW N/A 5/25/2019    Performed by Stanislav [facility] MD at [facility] OR    FUSION, SPINE, POSTERIOR SPINAL COLUMN, LUMBAR, USING COMPUTER-ASSISTED NAVIGATION  5/25/2019    Performed by [facility]islav Nicolas MD at [facility] OR    HEART CATH-LEFT Left 11/5/2015    Performed by Constance Pace MD at [facility] CATH LAB    HEART CATH-LEFT Left 11/2/2015    Performed by Constance Pace MD at [facility] CATH LAB    HEART CATH-LEFT/pci stent lad Left 11/10/2015    Performed by Constance Pace MD at [facility] CATH LAB    INNER EAR SURGERY      LAMINECTOMY, SPINE, LUMBAR  5/25/2019    Performed by [facility]islav [facility] MD at [facility] OR    TONSILLECTOMY, ADENOIDECTOMY         Review of patient's allergies indicates:   Allergen Reactions    Codeine      Other reaction(s): Nausea.  Patient tolerated morphine 5/2019 with no reported problems.     Codeine sulfate      Unknown^    Diphenhydramine hcl      Unknown^  Other reaction(s): Rash    Sulfa (sulfonamide antibiotics)  Hives and Nausea And Vomiting    Penicillins Rash     Rash only as a child  Tolerated ceftriaxone 4/30/19  Tolerated piperacillin-tazobactam 5/23/19       No current facility-administered medications on file prior to encounter.      Current Outpatient Medications on File Prior to Encounter   Medication Sig    ACCU-CHEK SOFTCLIX LANCETS Misc TEST THREE TIMES DAILY    acetaminophen (TYLENOL) 325 MG tablet Take 2 tablets (650 mg total) by mouth every 6 (six) hours as needed.    aspirin (ECOTRIN) 81 MG EC tablet Take 1 tablet (81 mg total) by mouth once daily.    blood sugar diagnostic Strp 1 strip by Misc.(Non-Drug; Combo Route) route 3 (three) times daily. Accuchek Felicia Plus Test Strips    blood-glucose meter kit Accu-chek Felicia Plus Meter    carbidopa-levodopa  mg (SINEMET)  mg per tablet TAKE 1 TABLET BY MOUTH 5 TIMES DAILY    cefTRIAXone 2 g in dextrose 5 % 50 mL (ROCEPHIN) 2 g/50 mL PgBk IVPB Inject 50 mLs (2 g total) into the vein once daily.    insulin syringe-needle U-100 0.5 mL 31 gauge x 5/16 Syrg USE THREE TIMES DAILY    isosorbide mononitrate (IMDUR) 120 MG 24 hr tablet Take 1 tablet (120 mg total) by mouth once daily.    latanoprost 0.005 % ophthalmic solution INSTILL 1 DROP INTO EACH EYE IN THE EVENING    levothyroxine (SYNTHROID) 125 MCG tablet TAKE 1 TABLET BY MOUTH IN THE MORNING BEFORE BREAKFAST    losartan (COZAAR) 50 MG tablet TAKE 1 TABLET BY MOUTH ONCE DAILY    metFORMIN (GLUCOPHAGE) 500 MG tablet TAKE ONE TABLET BY MOUTH THREE TIMES DAILY    metoprolol succinate (TOPROL-XL) 25 MG 24 hr tablet TAKE 1 TABLET BY MOUTH ONCE DAILY    multivit-min-FA-lycopen-lutein (CENTRUM SILVER) 0.4-300-250 mg-mcg-mcg Tab Take 1 tablet by mouth once daily.    NOVOLIN 70/30 U-100 INSULIN 100 unit/mL (70-30) injection Inject 55 Units into the skin 3 (three) times daily.    oxyCODONE-acetaminophen (PERCOCET) 5-325 mg per tablet Take 1 tablet by mouth every 4 (four) hours as needed (pain  4-5/10).    pantoprazole (PROTONIX) 40 MG tablet Take 1 tablet (40 mg total) by mouth once daily.    pramipexole (MIRAPEX) 1 MG tablet TAKE ONE TABLET BY MOUTH THREE TIMES DAILY    pravastatin (PRAVACHOL) 40 MG tablet TAKE ONE TABLET BY MOUTH ONCE DAILY    miconazole NITRATE 2 % (MICOTIN) 2 % top powder Apply topically 2 (two) times daily.     Family History     Problem Relation (Age of Onset)    Breast cancer Paternal Grandmother    Diabetes Maternal Grandmother    Glaucoma Mother, Maternal Grandmother    Heart attack Father (65), Mother (65)    Hypertension Father, Mother, Maternal Grandmother    Ovarian cancer Mother    Parkinsonism Other, Paternal Aunt    Tremor Father        Tobacco Use    Smoking status: Former Smoker     Years: 40.00     Last attempt to quit: 10/12/2010     Years since quittin.6    Smokeless tobacco: Never Used    Tobacco comment: On & off for the past few years   Substance and Sexual Activity    Alcohol use: No     Alcohol/week: 0.0 oz    Drug use: No    Sexual activity: Not Currently     Review of Systems   Constitutional: Positive for fatigue ( Grogginess). Negative for chills, diaphoresis and fever.   HENT: Negative for congestion, sore throat and voice change.    Eyes: Negative for photophobia and visual disturbance.   Respiratory: Negative for cough, shortness of breath, wheezing and stridor.    Cardiovascular: Negative for chest pain and leg swelling.   Gastrointestinal: Negative for abdominal distention, abdominal pain, constipation, diarrhea, nausea and vomiting.   Endocrine: Negative for polydipsia, polyphagia and polyuria.        Positive Hypoglycemia   Genitourinary: Negative for difficulty urinating, dysuria, flank pain, pelvic pain, urgency and vaginal discharge.   Musculoskeletal: Negative for back pain, joint swelling, neck pain and neck stiffness.   Skin: Positive for wound (With wound VAC). Negative for color change and rash.   Allergic/Immunologic: Negative  for immunocompromised state.   Neurological: Negative for dizziness, syncope, weakness, numbness and headaches.   Hematological: Does not bruise/bleed easily.   Psychiatric/Behavioral: Negative for agitation, behavioral problems and confusion.     Objective:     Vital Signs (Most Recent):  Temp: 98 °F (36.7 °C) (06/01/19 0223)  Pulse: 83 (06/01/19 0223)  Resp: 19 (06/01/19 0223)  BP: (!) 151/69 (06/01/19 0223)  SpO2: 98 % (06/01/19 0223) Vital Signs (24h Range):  Temp:  [97.7 °F (36.5 °C)-98.2 °F (36.8 °C)] 98 °F (36.7 °C)  Pulse:  [67-89] 83  Resp:  [18-20] 19  SpO2:  [94 %-100 %] 98 %  BP: ()/(52-70) 151/69     Weight: 110.8 kg (244 lb 3 oz)  Body mass index is 39.41 kg/m².    Physical Exam   Constitutional: She is oriented to person, place, and time. She appears well-developed. No distress.   Elderly.  Unkept chronically ill-appearing.   HENT:   Head: Normocephalic and atraumatic.   Nose: Nose normal.   Eyes: Pupils are equal, round, and reactive to light. Conjunctivae and EOM are normal. No scleral icterus.   Neck: Normal range of motion. Neck supple. No tracheal deviation present.   Cardiovascular: Normal rate, regular rhythm, normal heart sounds and intact distal pulses.   No murmur heard.  Pulmonary/Chest: Effort normal and breath sounds normal. No stridor. No respiratory distress. She has no wheezes. She has no rales.   Abdominal: Soft. Bowel sounds are normal. She exhibits no distension. There is no tenderness. There is no guarding.   Obese   Genitourinary:   Genitourinary Comments: Check UA   Musculoskeletal: Normal range of motion. She exhibits no edema or deformity.   Neurological: She is alert and oriented to person, place, and time. No cranial nerve deficit.   Skin: Skin is warm and dry. Capillary refill takes 2 to 3 seconds. No rash noted. She is not diaphoretic.    Surgical incision lumbar approximated.    Psychiatric: She has a normal mood and affect. Her behavior is normal. Judgment and  thought content normal.   Nursing note and vitals reviewed.        CRANIAL NERVES     CN III, IV, VI   Pupils are equal, round, and reactive to light.  Extraocular motions are normal.        Significant Labs: All pertinent labs within the past 24 hours have been reviewed.  Results for orders placed or performed during the hospital encounter of 05/31/19   CBC auto differential   Result Value Ref Range    WBC 22.29 (H) 3.90 - 12.70 K/uL    RBC 2.70 (L) 4.00 - 5.40 M/uL    Hemoglobin 7.6 (L) 12.0 - 16.0 g/dL    Hematocrit 24.3 (L) 37.0 - 48.5 %    Mean Corpuscular Volume 90 82 - 98 fL    Mean Corpuscular Hemoglobin 28.1 27.0 - 31.0 pg    Mean Corpuscular Hemoglobin Conc 31.3 (L) 32.0 - 36.0 g/dL    RDW 15.2 (H) 11.5 - 14.5 %    Platelets 341 150 - 350 K/uL    MPV 8.4 (L) 9.2 - 12.9 fL    Gran # (ANC) 19.1 (H) 1.8 - 7.7 K/uL    Lymph # 1.7 1.0 - 4.8 K/uL    Mono # 1.3 (H) 0.3 - 1.0 K/uL    Eos # 0.3 0.0 - 0.5 K/uL    Baso # 0.03 0.00 - 0.20 K/uL    Gran% 87.7 (H) 38.0 - 73.0 %    Lymph% 7.4 (L) 18.0 - 48.0 %    Mono% 5.6 4.0 - 15.0 %    Eosinophil% 1.1 0.0 - 8.0 %    Basophil% 0.1 0.0 - 1.9 %    Differential Method Automated    Comprehensive metabolic panel   Result Value Ref Range    Sodium 131 (L) 136 - 145 mmol/L    Potassium 3.5 3.5 - 5.1 mmol/L    Chloride 93 (L) 95 - 110 mmol/L    CO2 27 23 - 29 mmol/L    Glucose 106 70 - 110 mg/dL    BUN, Bld 9 8 - 23 mg/dL    Creatinine 0.7 0.5 - 1.4 mg/dL    Calcium 8.9 8.7 - 10.5 mg/dL    Total Protein 5.6 (L) 6.0 - 8.4 g/dL    Albumin 2.2 (L) 3.5 - 5.2 g/dL    Total Bilirubin 0.3 0.1 - 1.0 mg/dL    Alkaline Phosphatase 71 55 - 135 U/L    AST 27 10 - 40 U/L    ALT 13 10 - 44 U/L    Anion Gap 11 8 - 16 mmol/L    eGFR if African American >60 >60 mL/min/1.73 m^2    eGFR if non African American >60 >60 mL/min/1.73 m^2   Brain natriuretic peptide   Result Value Ref Range     (H) 0 - 99 pg/mL   Troponin I   Result Value Ref Range    Troponin I 1.164 (H) 0.000 - 0.026 ng/mL    Urinalysis, Reflex to Urine Culture Urine, Catheterized   Result Value Ref Range    Specimen UA Urine, Catheterized     Color, UA Yellow Yellow, Straw, Radha    Appearance, UA Clear Clear    pH, UA 8.0 5.0 - 8.0    Specific Gravity, UA 1.010 1.005 - 1.030    Protein, UA Negative Negative    Glucose, UA Negative Negative    Ketones, UA Negative Negative    Bilirubin (UA) Negative Negative    Occult Blood UA Negative Negative    Nitrite, UA Negative Negative    Urobilinogen, UA Negative <2.0 EU/dL    Leukocytes, UA 2+ (A) Negative   Urinalysis Microscopic   Result Value Ref Range    RBC, UA 1 0 - 4 /hpf    WBC, UA 9 (H) 0 - 5 /hpf    WBC Clumps, UA Rare None-Rare    Bacteria Rare None-Occ /hpf    Yeast, UA Few (A) None    Squam Epithel, UA 4 /hpf    Microscopic Comment SEE COMMENT    APTT   Result Value Ref Range    aPTT 32.5 (H) 21.0 - 32.0 sec   Protime-INR   Result Value Ref Range    Prothrombin Time 10.7 9.0 - 12.5 sec    INR 1.0 0.8 - 1.2   Hemoglobin   Result Value Ref Range    Hemoglobin 8.2 (L) 12.0 - 16.0 g/dL   Hematocrit   Result Value Ref Range    Hematocrit 26.0 (L) 37.0 - 48.5 %   Type & Screen   Result Value Ref Range    Group & Rh A POS     Indirect Corey NEG    POCT glucose   Result Value Ref Range    POCT Glucose 135 (H) 70 - 110 mg/dL   POCT glucose   Result Value Ref Range    POCT Glucose 147 (H) 70 - 110 mg/dL   POCT glucose   Result Value Ref Range    POCT Glucose 83 70 - 110 mg/dL   POCT glucose   Result Value Ref Range    POCT Glucose 51 (L) 70 - 110 mg/dL   POCT glucose   Result Value Ref Range    POCT Glucose 101 70 - 110 mg/dL   POCT glucose   Result Value Ref Range    POCT Glucose 82 70 - 110 mg/dL   POCT glucose   Result Value Ref Range    POCT Glucose 110 70 - 110 mg/dL   POCT glucose   Result Value Ref Range    POCT Glucose 101 70 - 110 mg/dL       Significant Imaging: I have reviewed all pertinent imaging results/findings within the past 24 hours.   Imaging Results          CTA Chest  Non-Coronary (PE Study) (Final result)  Result time 05/31/19 22:38:55    Final result by Dell Nicole MD (05/31/19 22:38:55)                 Impression:      1. Negative CTA.  No pulmonary embolism.  2. Normal size heart.  Pulmonary vascular congestion with septal thickening and patchy ground-glass opacities suggestive of pulmonary edema.  Suspect trace pleural effusions.  Minimal dependent subpleural atelectasis bilateral lower lobes.  Extensive coronary artery calcifications the imaging pattern is suggestive of CHF sequela with mild pulmonary edema.  Follow-up.  All CT scans at this facility are performed  using dose modulation techniques as appropriate to performed exam including the following:  automated exposure control; adjustment of mA and/or kV according to the patients size (this includes techniques or standardized protocols for targeted exams where dose is matched to indication/reason for exam: i.e. extremities or head);  iterative reconstruction technique.      Electronically signed by: Dell Nicole MD  Date:    05/31/2019  Time:    22:38             Narrative:    EXAMINATION:  CTA CHEST NON CORONARY    CLINICAL HISTORY:  Chest pain, acute, PE suspected, high pretest prob;    TECHNIQUE:  CT angiogram chest performed with pulmonary embolism protocol.  100 cc Omnipaque 350.  Soft tissue and lung algorithms.  Multiplanar maximum intensity projections.    COMPARISON:  Chest x-ray 05/31/2019.    FINDINGS:  There is excellent opacification of the pulmonary arteries without intraluminal filling defect to suggest pulmonary embolism.  Normal caliber pulmonary arteries.    Normal caliber aorta without dissection.  Normal size heart.  Extensive coronary artery calcification.  No pericardial effusion.    No mediastinal or hilar lymphadenopathy.  Trachea and central bronchi are patent.    Bilateral dependent minor subpleural atelectasis.  Suspect trace bilateral pleural effusions.  The pulmonary vasculature is  congested.  There is bibasilar septal thickening.  There are patchy ground-glass opacities along the bronchovascular bundles and perihilar/upper lobe distribution along with additional septal thickening.  This pattern suggestive of CHF sequela with mild pulmonary edema.  No dense airspace consolidation.    Visualized portion of the upper abdominal viscera is unremarkable.  The bones are unremarkable.                               X-Ray Abdomen AP 1 View (KUB) (Final result)  Result time 05/31/19 20:33:19    Final result by Rashad Shanks MD (05/31/19 20:33:19)                 Impression:      Contrast can be seen diffusely throughout the colon extending to the level of the rectum.  Recent contrast study dated 05/27/2019.  No obstruction or free air.      Electronically signed by: Rashad Shanks MD  Date:    05/31/2019  Time:    20:33             Narrative:    EXAMINATION:  XR ABDOMEN AP 1 VIEW    CLINICAL HISTORY:  Constipation, unspecified    TECHNIQUE:  Single AP View of the abdomen was performed.    COMPARISON:  None    FINDINGS:  Contrast can be seen with in the: Likely secondary to prior CT scan dated 05/27/2019.  Remote lumbar fusion.  No evidence of obstruction or free air.                               X-Ray Chest AP Portable (Final result)  Result time 05/31/19 20:32:07    Final result by Rashad Shanks MD (05/31/19 20:32:07)                 Impression:      PICC line in good position.  No pneumothorax.      Electronically signed by: Rashad Shanks MD  Date:    05/31/2019  Time:    20:32             Narrative:    EXAMINATION:  XR CHEST AP PORTABLE    CLINICAL HISTORY:  shortness of breath;    TECHNIQUE:  Single frontal view of the chest was performed.    COMPARISON:  05/23/2019    FINDINGS:  PICC line present with the tip overlying the superior vena cava.  No pneumothorax.  No pleural effusion or pneumothorax.    The cardiac silhouette is normal in size. The hilar and mediastinal contours are unremarkable.

## 2019-06-01 NOTE — ASSESSMENT & PLAN NOTE
Continue Rocephin.  Patient followed by neuro surgery consider outpatient follow-up versus consult inpatient pending course  Further evaluation/diagnostics/interventions/consults pending course

## 2019-06-01 NOTE — H&P
"[facility] Hospital Medicine  History & Physical    Patient Name: Ross Isbell  MRN: [id number]  Admission Date: 5/31/2019  Attending Physician: Juan Jose Cobos MD   Primary Care Provider: Anna Penn MD         Patient information was obtained from patient, past medical records and ER records.     Subjective:     Principal Problem:NSTEMI (non-ST elevated myocardial infarction)    Chief Complaint:   Chief Complaint   Patient presents with    Hypoglycemia        HPI:  Ross Isbell is a 73 y.o. female patient with a PMHx of Parkinson's disease, CAD, DM, and MI who presented for further evaluation of hypoglycemia.  It was reported patient's blood sugar prior to arrival at intermediate 38.  No modifying factors.  Associated symptoms include grogginess.  Prior treatment patient was giving IV glucose in route.  Initially improved on arrival to ER.  Patient's glucose then dropped again and was given additional glucose.  Hospital Medicine consulted.  Patient admitted.  To note:  " Pt had a spinal fusion, lumbar laminectomy, and debridement of a paraspinal abscess done by Dr. Nicolas (Neurosurgery) on 5/25 and was discharged 05/30/2019, with wound VAC and IV antibiotic (Rocephin) therapy.     Discharge Summary 5/30/19:     HPI:   Ross Isbell is a 73 year old female with a PMHx of DM II, Parkinson's, CORINE, MI, CAD, and Glaucoma who presented to the Emergency Department with c/o fatigue. Associated symptoms include: generalized weakness and BLE pain x 2 weeks. Pt was informed by home health nurse to go to ED for increasing weakness, abdominal pain and leg pain. ED workup showed: WBC 13.08K, Hgb/Hct 11.0/33.5, Na+ 129, lipase 17. UA (+) nitrites, few bacteria. CXR with no acute findings. CT abdomen/pelvis with contrast showed moderate inflammatory changes centered on the intervertebral disc space between the L2 and L3 vertebral bodies. There is bony destruction on both sides of the intervertebral " disc space between the L2 and L3 vertebral bodies.  This is consistent with the patient's history and characteristic of discitis and osteomyelitis. ED discussed case with Dr. Nicolas (neurosurgery) who reported pt did not need to be transferred out at this time. Obtain MRI thoracic/lumbar spine w wo contrast. Start pt on IV Vancomycin and Zosyn but consult Dr. Gomez (ID). Daughter: Uzma Godfrey [phone] is the surrogate decision maker. Admitted for Osteomyelitis of lumbar spine.     Procedure(s) (LRB):  FUSION, SPINE, POSTERIOR SPINAL COLUMN, LUMBAR, USING COMPUTER-ASSISTED NAVIGATION  FUSION, SPINE, LUMBAR, TLIF, POSTERIOR APPROACH, USING PEDICLE SCREW (N/A)  LAMINECTOMY, SPINE, LUMBAR  DEBRIDEMENT, ABSCESS, PARASPINAL (N/A)       Hospital Course:   5/24/19 Dr. Nicolas, Neurosurgeon, doesn't recommend spinal surgery, But Needle biopsy of L 2-3 disk abscess with cultures. This am, patient c/o of severe abdominal pain 20/10, Norco 10 mg ordered PRN with IV 2 mg Morphine PRN. Blood culture is Positive for GPC. Echo pending. Patient had severe back pain and refused OT/PT. Daughter stated she has had diarrhea x 1 month. Stool for C.Diff ordered. T. Max 101. IV Vanco and Zosyn. IR, Dr. Juárez, consulted for Needle biopsy. Daughter at bedside. ID consult pending. Cont Vanc and Zosyn. CT abdomen/pelvis positive for osteomyelitis.  To OR with Dr. Nicolas 25 May for laminectomy and vertebroplasty.  Post-procedure in ICU due to post-op hypotension.  Able to wean vasopressors.  Anemia due to blood loss related to the procedure.  Transfused 1 unit PRBC.  Interval improvement in abdominal and back pain.  Tolerated limited degree of physical therapy.  Pursuing skilled placement.  Discharge plan to transfer to [facility] skilled nursing [facility].  Complete 8 week Ceftriaxone 2 g daily IV stop date 23 July.  Weekly ESR and CRP.  PT and OT.  Prevena incisional wound VAC.      Past Medical History:   Diagnosis Date     Abnormal nuclear stress test 11/1/2015    Background diabetic retinopathy 12/28/2015    CAD (coronary artery disease) 2002    stents    Cataract     Gait disorder 10/12/2012    Glaucoma     MI (myocardial infarction)     Obesity     CORINE (obstructive sleep apnea)     Other and unspecified hyperlipidemia     PD (Parkinson's disease) 2002    tremor-predominant (per patient)    Type II or unspecified type diabetes mellitus without mention of complication, not stated as uncontrolled 2002      am    Unspecified essential hypertension        Past Surgical History:   Procedure Laterality Date    BREAST BIOPSY Left     benign - about 3 yrs ago    CORONARY ANGIOPLASTY  2002    CORONARY STENT PLACEMENT  2002    DEBRIDEMENT, ABSCESS, PARASPINAL N/A 5/25/2019    Performed by [facility]islav Nicolas [facility] at [facility] OR    DILATION AND CURETTAGE OF UTERUS      FOREIGN BODY REMOVAL      FUSION, SPINE, LUMBAR, TLIF, POSTERIOR APPROACH, USING PEDICLE SCREW N/A 5/25/2019    Performed by [facility] MD at [facility] OR    FUSION, SPINE, POSTERIOR SPINAL COLUMN, LUMBAR, USING COMPUTER-ASSISTED NAVIGATION  5/25/2019    Performed by [facility]islav Nicolas MD at [facility] OR    HEART CATH-LEFT Left 11/5/2015    Performed by Constance Pace MD at [facility] CATH LAB    HEART CATH-LEFT Left 11/2/2015    Performed by Constance Pace MD at [facility] CATH LAB    HEART CATH-LEFT/pci stent lad Left 11/10/2015    Performed by Constance Pace MD at [facility] CATH LAB    INNER EAR SURGERY      LAMINECTOMY, SPINE, LUMBAR  5/25/2019    Performed by [facility]av [facility] MD at [facility] OR    TONSILLECTOMY, ADENOIDECTOMY         Review of patient's allergies indicates:   Allergen Reactions    Codeine      Other reaction(s): Nausea.  Patient tolerated morphine 5/2019 with no reported problems.     Codeine sulfate      Unknown^    Diphenhydramine hcl      Unknown^  Other reaction(s): Rash    Sulfa (sulfonamide antibiotics) Hives and Nausea And Vomiting     Penicillins Rash     Rash only as a child  Tolerated ceftriaxone 4/30/19  Tolerated piperacillin-tazobactam 5/23/19       No current facility-administered medications on file prior to encounter.      Current Outpatient Medications on File Prior to Encounter   Medication Sig    ACCU-CHEK SOFTCLIX LANCETS Misc TEST THREE TIMES DAILY    acetaminophen (TYLENOL) 325 MG tablet Take 2 tablets (650 mg total) by mouth every 6 (six) hours as needed.    aspirin (ECOTRIN) 81 MG EC tablet Take 1 tablet (81 mg total) by mouth once daily.    blood sugar diagnostic Strp 1 strip by Misc.(Non-Drug; Combo Route) route 3 (three) times daily. Accuchek Felicia Plus Test Strips    blood-glucose meter kit Accu-chek Felicia Plus Meter    carbidopa-levodopa  mg (SINEMET)  mg per tablet TAKE 1 TABLET BY MOUTH 5 TIMES DAILY    cefTRIAXone 2 g in dextrose 5 % 50 mL (ROCEPHIN) 2 g/50 mL PgBk IVPB Inject 50 mLs (2 g total) into the vein once daily.    insulin syringe-needle U-100 0.5 mL 31 gauge x 5/16 Syrg USE THREE TIMES DAILY    isosorbide mononitrate (IMDUR) 120 MG 24 hr tablet Take 1 tablet (120 mg total) by mouth once daily.    latanoprost 0.005 % ophthalmic solution INSTILL 1 DROP INTO EACH EYE IN THE EVENING    levothyroxine (SYNTHROID) 125 MCG tablet TAKE 1 TABLET BY MOUTH IN THE MORNING BEFORE BREAKFAST    losartan (COZAAR) 50 MG tablet TAKE 1 TABLET BY MOUTH ONCE DAILY    metFORMIN (GLUCOPHAGE) 500 MG tablet TAKE ONE TABLET BY MOUTH THREE TIMES DAILY    metoprolol succinate (TOPROL-XL) 25 MG 24 hr tablet TAKE 1 TABLET BY MOUTH ONCE DAILY    multivit-min-FA-lycopen-lutein (CENTRUM SILVER) 0.4-300-250 mg-mcg-mcg Tab Take 1 tablet by mouth once daily.    NOVOLIN 70/30 U-100 INSULIN 100 unit/mL (70-30) injection Inject 55 Units into the skin 3 (three) times daily.    oxyCODONE-acetaminophen (PERCOCET) 5-325 mg per tablet Take 1 tablet by mouth every 4 (four) hours as needed (pain 4-5/10).    pantoprazole  (PROTONIX) 40 MG tablet Take 1 tablet (40 mg total) by mouth once daily.    pramipexole (MIRAPEX) 1 MG tablet TAKE ONE TABLET BY MOUTH THREE TIMES DAILY    pravastatin (PRAVACHOL) 40 MG tablet TAKE ONE TABLET BY MOUTH ONCE DAILY    miconazole NITRATE 2 % (MICOTIN) 2 % top powder Apply topically 2 (two) times daily.     Family History     Problem Relation (Age of Onset)    Breast cancer Paternal Grandmother    Diabetes Maternal Grandmother    Glaucoma Mother, Maternal Grandmother    Heart attack Father (65), Mother (65)    Hypertension Father, Mother, Maternal Grandmother    Ovarian cancer Mother    Parkinsonism Other, Paternal Aunt    Tremor Father        Tobacco Use    Smoking status: Former Smoker     Years: 40.00     Last attempt to quit: 10/12/2010     Years since quittin.6    Smokeless tobacco: Never Used    Tobacco comment: On & off for the past few years   Substance and Sexual Activity    Alcohol use: No     Alcohol/week: 0.0 oz    Drug use: No    Sexual activity: Not Currently     Review of Systems   Constitutional: Positive for fatigue ( Grogginess). Negative for chills, diaphoresis and fever.   HENT: Negative for congestion, sore throat and voice change.    Eyes: Negative for photophobia and visual disturbance.   Respiratory: Negative for cough, shortness of breath, wheezing and stridor.    Cardiovascular: Negative for chest pain and leg swelling.   Gastrointestinal: Negative for abdominal distention, abdominal pain, constipation, diarrhea, nausea and vomiting.   Endocrine: Negative for polydipsia, polyphagia and polyuria.        Positive Hypoglycemia   Genitourinary: Negative for difficulty urinating, dysuria, flank pain, pelvic pain, urgency and vaginal discharge.   Musculoskeletal: Negative for back pain, joint swelling, neck pain and neck stiffness.   Skin: Positive for wound (With wound VAC). Negative for color change and rash.   Allergic/Immunologic: Negative for immunocompromised state.    Neurological: Negative for dizziness, syncope, weakness, numbness and headaches.   Hematological: Does not bruise/bleed easily.   Psychiatric/Behavioral: Negative for agitation, behavioral problems and confusion.     Objective:     Vital Signs (Most Recent):  Temp: 98 °F (36.7 °C) (06/01/19 0223)  Pulse: 83 (06/01/19 0223)  Resp: 19 (06/01/19 0223)  BP: (!) 151/69 (06/01/19 0223)  SpO2: 98 % (06/01/19 0223) Vital Signs (24h Range):  Temp:  [97.7 °F (36.5 °C)-98.2 °F (36.8 °C)] 98 °F (36.7 °C)  Pulse:  [67-89] 83  Resp:  [18-20] 19  SpO2:  [94 %-100 %] 98 %  BP: ()/(52-70) 151/69     Weight: 110.8 kg (244 lb 3 oz)  Body mass index is 39.41 kg/m².    Physical Exam   Constitutional: She is oriented to person, place, and time. She appears well-developed. No distress.   Elderly.  Unkept chronically ill-appearing.   HENT:   Head: Normocephalic and atraumatic.   Nose: Nose normal.   Eyes: Pupils are equal, round, and reactive to light. Conjunctivae and EOM are normal. No scleral icterus.   Neck: Normal range of motion. Neck supple. No tracheal deviation present.   Cardiovascular: Normal rate, regular rhythm, normal heart sounds and intact distal pulses.   No murmur heard.  Pulmonary/Chest: Effort normal and breath sounds normal. No stridor. No respiratory distress. She has no wheezes. She has no rales.   Abdominal: Soft. Bowel sounds are normal. She exhibits no distension. There is no tenderness. There is no guarding.   Obese   Genitourinary:   Genitourinary Comments: Check UA   Musculoskeletal: Normal range of motion. She exhibits no edema or deformity.   Neurological: She is alert and oriented to person, place, and time. No cranial nerve deficit.   Skin: Skin is warm and dry. Capillary refill takes 2 to 3 seconds. No rash noted. She is not diaphoretic.    Surgical incision lumbar approximated.    Psychiatric: She has a normal mood and affect. Her behavior is normal. Judgment and thought content normal.   Nursing  note and vitals reviewed.        CRANIAL NERVES     CN III, IV, VI   Pupils are equal, round, and reactive to light.  Extraocular motions are normal.        Significant Labs: All pertinent labs within the past 24 hours have been reviewed.  Results for orders placed or performed during the hospital encounter of 05/31/19   CBC auto differential   Result Value Ref Range    WBC 22.29 (H) 3.90 - 12.70 K/uL    RBC 2.70 (L) 4.00 - 5.40 M/uL    Hemoglobin 7.6 (L) 12.0 - 16.0 g/dL    Hematocrit 24.3 (L) 37.0 - 48.5 %    Mean Corpuscular Volume 90 82 - 98 fL    Mean Corpuscular Hemoglobin 28.1 27.0 - 31.0 pg    Mean Corpuscular Hemoglobin Conc 31.3 (L) 32.0 - 36.0 g/dL    RDW 15.2 (H) 11.5 - 14.5 %    Platelets 341 150 - 350 K/uL    MPV 8.4 (L) 9.2 - 12.9 fL    Gran # (ANC) 19.1 (H) 1.8 - 7.7 K/uL    Lymph # 1.7 1.0 - 4.8 K/uL    Mono # 1.3 (H) 0.3 - 1.0 K/uL    Eos # 0.3 0.0 - 0.5 K/uL    Baso # 0.03 0.00 - 0.20 K/uL    Gran% 87.7 (H) 38.0 - 73.0 %    Lymph% 7.4 (L) 18.0 - 48.0 %    Mono% 5.6 4.0 - 15.0 %    Eosinophil% 1.1 0.0 - 8.0 %    Basophil% 0.1 0.0 - 1.9 %    Differential Method Automated    Comprehensive metabolic panel   Result Value Ref Range    Sodium 131 (L) 136 - 145 mmol/L    Potassium 3.5 3.5 - 5.1 mmol/L    Chloride 93 (L) 95 - 110 mmol/L    CO2 27 23 - 29 mmol/L    Glucose 106 70 - 110 mg/dL    BUN, Bld 9 8 - 23 mg/dL    Creatinine 0.7 0.5 - 1.4 mg/dL    Calcium 8.9 8.7 - 10.5 mg/dL    Total Protein 5.6 (L) 6.0 - 8.4 g/dL    Albumin 2.2 (L) 3.5 - 5.2 g/dL    Total Bilirubin 0.3 0.1 - 1.0 mg/dL    Alkaline Phosphatase 71 55 - 135 U/L    AST 27 10 - 40 U/L    ALT 13 10 - 44 U/L    Anion Gap 11 8 - 16 mmol/L    eGFR if African American >60 >60 mL/min/1.73 m^2    eGFR if non African American >60 >60 mL/min/1.73 m^2   Brain natriuretic peptide   Result Value Ref Range     (H) 0 - 99 pg/mL   Troponin I   Result Value Ref Range    Troponin I 1.164 (H) 0.000 - 0.026 ng/mL   Urinalysis, Reflex to Urine  Culture Urine, Catheterized   Result Value Ref Range    Specimen UA Urine, Catheterized     Color, UA Yellow Yellow, Straw, Radha    Appearance, UA Clear Clear    pH, UA 8.0 5.0 - 8.0    Specific Gravity, UA 1.010 1.005 - 1.030    Protein, UA Negative Negative    Glucose, UA Negative Negative    Ketones, UA Negative Negative    Bilirubin (UA) Negative Negative    Occult Blood UA Negative Negative    Nitrite, UA Negative Negative    Urobilinogen, UA Negative <2.0 EU/dL    Leukocytes, UA 2+ (A) Negative   Urinalysis Microscopic   Result Value Ref Range    RBC, UA 1 0 - 4 /hpf    WBC, UA 9 (H) 0 - 5 /hpf    WBC Clumps, UA Rare None-Rare    Bacteria Rare None-Occ /hpf    Yeast, UA Few (A) None    Squam Epithel, UA 4 /hpf    Microscopic Comment SEE COMMENT    APTT   Result Value Ref Range    aPTT 32.5 (H) 21.0 - 32.0 sec   Protime-INR   Result Value Ref Range    Prothrombin Time 10.7 9.0 - 12.5 sec    INR 1.0 0.8 - 1.2   Hemoglobin   Result Value Ref Range    Hemoglobin 8.2 (L) 12.0 - 16.0 g/dL   Hematocrit   Result Value Ref Range    Hematocrit 26.0 (L) 37.0 - 48.5 %   Type & Screen   Result Value Ref Range    Group & Rh A POS     Indirect Corey NEG    POCT glucose   Result Value Ref Range    POCT Glucose 135 (H) 70 - 110 mg/dL   POCT glucose   Result Value Ref Range    POCT Glucose 147 (H) 70 - 110 mg/dL   POCT glucose   Result Value Ref Range    POCT Glucose 83 70 - 110 mg/dL   POCT glucose   Result Value Ref Range    POCT Glucose 51 (L) 70 - 110 mg/dL   POCT glucose   Result Value Ref Range    POCT Glucose 101 70 - 110 mg/dL   POCT glucose   Result Value Ref Range    POCT Glucose 82 70 - 110 mg/dL   POCT glucose   Result Value Ref Range    POCT Glucose 110 70 - 110 mg/dL   POCT glucose   Result Value Ref Range    POCT Glucose 101 70 - 110 mg/dL       Significant Imaging: I have reviewed all pertinent imaging results/findings within the past 24 hours.   Imaging Results          CTA Chest Non-Coronary (PE Study)  (Final result)  Result time 05/31/19 22:38:55    Final result by Dell Nicole MD (05/31/19 22:38:55)                 Impression:      1. Negative CTA.  No pulmonary embolism.  2. Normal size heart.  Pulmonary vascular congestion with septal thickening and patchy ground-glass opacities suggestive of pulmonary edema.  Suspect trace pleural effusions.  Minimal dependent subpleural atelectasis bilateral lower lobes.  Extensive coronary artery calcifications the imaging pattern is suggestive of CHF sequela with mild pulmonary edema.  Follow-up.  All CT scans at this facility are performed  using dose modulation techniques as appropriate to performed exam including the following:  automated exposure control; adjustment of mA and/or kV according to the patients size (this includes techniques or standardized protocols for targeted exams where dose is matched to indication/reason for exam: i.e. extremities or head);  iterative reconstruction technique.      Electronically signed by: Dell Nicole MD  Date:    05/31/2019  Time:    22:38             Narrative:    EXAMINATION:  CTA CHEST NON CORONARY    CLINICAL HISTORY:  Chest pain, acute, PE suspected, high pretest prob;    TECHNIQUE:  CT angiogram chest performed with pulmonary embolism protocol.  100 cc Omnipaque 350.  Soft tissue and lung algorithms.  Multiplanar maximum intensity projections.    COMPARISON:  Chest x-ray 05/31/2019.    FINDINGS:  There is excellent opacification of the pulmonary arteries without intraluminal filling defect to suggest pulmonary embolism.  Normal caliber pulmonary arteries.    Normal caliber aorta without dissection.  Normal size heart.  Extensive coronary artery calcification.  No pericardial effusion.    No mediastinal or hilar lymphadenopathy.  Trachea and central bronchi are patent.    Bilateral dependent minor subpleural atelectasis.  Suspect trace bilateral pleural effusions.  The pulmonary vasculature is congested.  There is  bibasilar septal thickening.  There are patchy ground-glass opacities along the bronchovascular bundles and perihilar/upper lobe distribution along with additional septal thickening.  This pattern suggestive of CHF sequela with mild pulmonary edema.  No dense airspace consolidation.    Visualized portion of the upper abdominal viscera is unremarkable.  The bones are unremarkable.                               X-Ray Abdomen AP 1 View (KUB) (Final result)  Result time 05/31/19 20:33:19    Final result by Rashad Shanks MD (05/31/19 20:33:19)                 Impression:      Contrast can be seen diffusely throughout the colon extending to the level of the rectum.  Recent contrast study dated 05/27/2019.  No obstruction or free air.      Electronically signed by: Rashad Shanks MD  Date:    05/31/2019  Time:    20:33             Narrative:    EXAMINATION:  XR ABDOMEN AP 1 VIEW    CLINICAL HISTORY:  Constipation, unspecified    TECHNIQUE:  Single AP View of the abdomen was performed.    COMPARISON:  None    FINDINGS:  Contrast can be seen with in the: Likely secondary to prior CT scan dated 05/27/2019.  Remote lumbar fusion.  No evidence of obstruction or free air.                               X-Ray Chest AP Portable (Final result)  Result time 05/31/19 20:32:07    Final result by Rashad Shanks MD (05/31/19 20:32:07)                 Impression:      PICC line in good position.  No pneumothorax.      Electronically signed by: Rashad Shanks MD  Date:    05/31/2019  Time:    20:32             Narrative:    EXAMINATION:  XR CHEST AP PORTABLE    CLINICAL HISTORY:  shortness of breath;    TECHNIQUE:  Single frontal view of the chest was performed.    COMPARISON:  05/23/2019    FINDINGS:  PICC line present with the tip overlying the superior vena cava.  No pneumothorax.  No pleural effusion or pneumothorax.    The cardiac silhouette is normal in size. The hilar and mediastinal contours are unremarkable.                                   Assessment/Plan:     * NSTEMI (non-ST elevated myocardial infarction)  Admitted with heparin infusion.  Cardiology was consulted.  Neurosurgery was consulted and okay to give heparin as patient is status post recent spinal fusion/abscess drainage.  Trend troponins  Further evaluation/diagnostics/interventions/consults pending course       Alteration in skin integrity related to surgical incision  Wound care consult  Further evaluation/diagnostics/interventions/consults pending course         Streptococcal bacteremia  Patient with PICC line and continue course of Rocephin the patient is on outpatient.  Repeat cultures pending  Further evaluation/diagnostics/interventions/consults pending course         Osteomyelitis of lumbar spine  Continue Rocephin.  Patient followed by neuro surgery consider outpatient follow-up versus consult inpatient pending course  Further evaluation/diagnostics/interventions/consults pending course         Essential hypertension  Continue home meds.  Further evaluation/diagnostics/interventions/consults pending course         CAD (coronary artery disease)  Continue statin, asa, bb  Cardiology consult.   Further evaluation/diagnostics/interventions/consults pending course       Hypoglycemia  Improved with treatment. Patient has DM on insulin. Monitor trends and optimization of DM therapies pending course.     Hypothyroidism  Continue home med  Check TSH. Last trends elevated. Optimization/adjustments pending course.   Consider in cardiology etiology     VTE Risk Mitigation (From admission, onward)        Ordered     heparin 25,000 units in dextrose 5% 250 mL (100 units/mL) infusion LOW INTENSITY nomogram - [facility]  Continuous      06/01/19 0016     heparin 25,000 units in dextrose 5% (100 units/ml) IV bolus from bag - ADDITIONAL PRN BOLUS - 60 units/kg (max bolus 4000 units)  As needed (PRN)      06/01/19 0016     heparin 25,000 units in dextrose 5% (100 units/ml) IV bolus from bag -  ADDITIONAL PRN BOLUS - 30 units/kg (max bolus 4000 units)  As needed (PRN)      06/01/19 0016     Place sequential compression device  Until discontinued      06/01/19 0020     Place QUIQUE hose  Until discontinued      06/01/19 0020         **Portions of this note has been dictated using speech recognition software, M*Modal Fluency Direct; although, time has been taken to proof read and revise it may still contain misspellings, grammatical and or other errors.**      Rashad Byrnes NP  Department of Hospital Medicine   [facility]

## 2019-06-01 NOTE — ED PROVIDER NOTES
"SCRIBE #1 NOTE: I, Laurie De Paz, am scribing for, and in the presence of, Rashad Briggs MD. I have scribed the entire note.       History     Chief Complaint   Patient presents with    Hypoglycemia     Review of patient's allergies indicates:   Allergen Reactions    Codeine      Other reaction(s): Nausea.  Patient tolerated morphine 5/2019 with no reported problems.     Codeine sulfate      Unknown^    Diphenhydramine hcl      Unknown^  Other reaction(s): Rash    Sulfa (sulfonamide antibiotics) Hives and Nausea And Vomiting    Penicillins Rash     Rash only as a child  Tolerated ceftriaxone 4/30/19  Tolerated piperacillin-tazobactam 5/23/19         History of Present Illness     HPI    5/31/2019, 6:50 PM  History obtained from the daughter and patient      History of Present Illness: Ross Isbell is a 73 y.o. female patient with a PMHx of Parkinson's disease, CAD, DM, and MI who presents to the Emergency Department for evaluation of low blood sugar which onset gradually this afternoon. Pt states she was told her blood sugar was 38. She is a resident of [facility] where she was sent to the ED for further evaluation of sxs. She reports she has not skipped any meals. She states "I feel groggy." Pt had a spinal fusion, lumbar laminectomy, and debridement of a paraspinal abscess done by Dr. Nicolas (Neurosurgery) on 5/25. Symptoms are constant and moderate in severity. Pt reports worsening SOB upon exertion, no mitigating factors reported. Associated sxs include SOB. Patient denies any fever, chills, cough, wheezing, CP, leg swelling, palpitations, abd pain, n/v/d, dizziness, extremity weakness/numbness, and all other sxs at this time. Prior Tx includes Insulin. Pt reports her last BM was 4 days ago. Pt is on oxygen at the nursing home. No further complaints or concerns at this time.  She states that her wound VAC fell off.      Arrival mode: AASI    PCP: Anna Penn MD        Past Medical History:  Past " Medical History:   Diagnosis Date    Abnormal nuclear stress test 11/1/2015    Background diabetic retinopathy 12/28/2015    CAD (coronary artery disease) 2002    stents    Cataract     Gait disorder 10/12/2012    Glaucoma     MI (myocardial infarction)     Obesity     CORINE (obstructive sleep apnea)     Other and unspecified hyperlipidemia     PD (Parkinson's disease) 2002    tremor-predominant (per patient)    Type II or unspecified type diabetes mellitus without mention of complication, not stated as uncontrolled 2002      am    Unspecified essential hypertension        Past Surgical History:  Past Surgical History:   Procedure Laterality Date    BREAST BIOPSY Left     benign - about 3 yrs ago    CORONARY ANGIOPLASTY  2002    CORONARY STENT PLACEMENT  2002    DEBRIDEMENT, ABSCESS, PARASPINAL N/A 5/25/2019    Performed by Stanislav Nicolas MD at [facility] OR    DILATION AND CURETTAGE OF UTERUS      FOREIGN BODY REMOVAL      FUSION, SPINE, LUMBAR, TLIF, POSTERIOR APPROACH, USING PEDICLE SCREW N/A 5/25/2019    Performed by Stanislav Nicolas MD at [facility] OR    FUSION, SPINE, POSTERIOR SPINAL COLUMN, LUMBAR, USING COMPUTER-ASSISTED NAVIGATION  5/25/2019    Performed by Stanislav Nicolas MD at [facility] OR    HEART CATH-LEFT Left 11/5/2015    Performed by Constance Pace MD at [facility] CATH LAB    HEART CATH-LEFT Left 11/2/2015    Performed by Constance Pace MD at [facility] CATH LAB    HEART CATH-LEFT/pci stent lad Left 11/10/2015    Performed by Constance Pace MD at [facility] CATH LAB    INNER EAR SURGERY      LAMINECTOMY, SPINE, LUMBAR  5/25/2019    Performed by Stanislav Nicolas MD at [facility] OR    TONSILLECTOMY, ADENOIDECTOMY           Family History:  Family History   Problem Relation Age of Onset    Parkinsonism Other     Tremor Father     Hypertension Father     Heart attack Father 65        MI    Parkinsonism Paternal Aunt     Glaucoma Mother     Hypertension Mother     Heart attack Mother 65         MI    Ovarian cancer Mother     Glaucoma Maternal Grandmother     Hypertension Maternal Grandmother     Diabetes Maternal Grandmother     Breast cancer Paternal Grandmother        Social History:  Social History     Tobacco Use    Smoking status: Former Smoker     Years: 40.00     Last attempt to quit: 10/12/2010     Years since quittin.6    Smokeless tobacco: Never Used    Tobacco comment: On & off for the past few years   Substance and Sexual Activity    Alcohol use: No     Alcohol/week: 0.0 oz    Drug use: No    Sexual activity: Not Currently        Review of Systems     Review of Systems   Constitutional: Negative for chills and fever.        Feels groggy   HENT: Negative for rhinorrhea and sore throat.    Respiratory: Positive for shortness of breath. Negative for cough and wheezing.    Cardiovascular: Negative for chest pain, palpitations and leg swelling.   Gastrointestinal: Positive for constipation. Negative for abdominal pain, diarrhea, nausea and vomiting.   Endocrine:        Hypoglycemia   Genitourinary: Negative for dysuria, frequency and urgency.   Musculoskeletal: Negative for back pain and neck pain.   Skin: Positive for wound (back).   Neurological: Negative for dizziness, weakness and numbness.   All other systems reviewed and are negative.       Physical Exam     Initial Vitals   BP Pulse Resp Temp SpO2   19 1809 19 1809 19 1809 19 1822 19 1809   (!) 98/52 80 18 97.7 °F (36.5 °C) 98 %      MAP       --                 Physical Exam  Nursing Notes and Vital Signs Reviewed.  Constitutional: Patient is in no acute distress. Well-developed. Obese.  Head: Atraumatic. Normocephalic.  Eyes: PERRL. EOM intact. Conjunctivae are not pale. No scleral icterus.  ENT: Mucous membranes are moist. Oropharynx is clear and symmetric.    Neck: Supple. Full ROM. No lymphadenopathy.  Cardiovascular: Regular rate. Regular rhythm. No murmurs, rubs, or gallops. Distal pulses  are 2+ and symmetric.  Pulmonary/Chest: No respiratory distress at rest. Clear to auscultation bilaterally. No wheezing or rales. Nasal cannula. Pt becomes hypoxic with minimal exertion.  Abdominal: Soft and non-distended.  There is no tenderness.  No rebound, guarding, or rigidity.   Musculoskeletal: Moves all extremities. No obvious deformities. No edema. No calf tenderness.  Back: Lumbar incision with wound VAC bandage without vac attached. No signs or erythema or purulent drainage. No midline bony tenderness, deformities, or step-offs of the T-spine or L-spine.  Skin: Warm and dry.  Neurological:  Alert, awake, and appropriate.  Normal speech.  No acute focal neurological deficits are appreciated.  Psychiatric: Normal affect. Good eye contact. Appropriate in content.     ED Course   Critical Care  Date/Time: 5/31/2019 11:34 PM  Performed by: Rashad Briggs MD  Authorized by: Rashad Briggs MD   Direct patient critical care time: 15 minutes  Additional history critical care time: 5 minutes  Ordering / reviewing critical care time: 5 minutes  Documentation critical care time: 5 minutes  Consulting other physicians critical care time: 5 minutes  Total critical care time (exclusive of procedural time) : 35 minutes  Critical care time was exclusive of separately billable procedures and treating other patients and teaching time.  Critical care was necessary to treat or prevent imminent or life-threatening deterioration of the following conditions: Repeated hypoglycemia and elevated troponin.  Critical care was time spent personally by me on the following activities: blood draw for specimens, development of treatment plan with patient or surrogate, discussions with consultants, interpretation of cardiac output measurements, evaluation of patient's response to treatment, examination of patient, obtaining history from patient or surrogate, ordering and review of laboratory studies, ordering and review of radiographic  "studies, ordering and performing treatments and interventions, pulse oximetry, re-evaluation of patient's condition and review of old charts.        ED Vital Signs:  Vitals:    05/31/19 1809 05/31/19 1822 05/31/19 1846 05/31/19 1854   BP: (!) 98/52  (!) 116/55    Pulse: 80 78 75    Resp: 18  20    Temp:  97.7 °F (36.5 °C)     TempSrc:  Oral     SpO2: 98%  (!) 94%    Weight:    110.8 kg (244 lb 3 oz)   Height:        05/31/19 1915 05/31/19 1931 05/31/19 2031 05/31/19 2131   BP:  (!) 107/55 (!) 111/53 125/60   Pulse:  72 67 70   Resp:  19 18 20   Temp:    98 °F (36.7 °C)   TempSrc:    Oral   SpO2:  98% 100% 97%   Weight:       Height: 5' 6" (1.676 m)       05/31/19 2231 06/01/19 0004   BP: (!) 109/56 (!) 144/67   Pulse: 72 73   Resp: 18 20   Temp:  98.2 °F (36.8 °C)   TempSrc:  Oral   SpO2: 97% 100%   Weight:     Height:         Abnormal Lab Results:  Labs Reviewed   CBC W/ AUTO DIFFERENTIAL - Abnormal; Notable for the following components:       Result Value    WBC 22.29 (*)     RBC 2.70 (*)     Hemoglobin 7.6 (*)     Hematocrit 24.3 (*)     Mean Corpuscular Hemoglobin Conc 31.3 (*)     RDW 15.2 (*)     MPV 8.4 (*)     Gran # (ANC) 19.1 (*)     Mono # 1.3 (*)     Gran% 87.7 (*)     Lymph% 7.4 (*)     All other components within normal limits   COMPREHENSIVE METABOLIC PANEL - Abnormal; Notable for the following components:    Sodium 131 (*)     Chloride 93 (*)     Total Protein 5.6 (*)     Albumin 2.2 (*)     All other components within normal limits   B-TYPE NATRIURETIC PEPTIDE - Abnormal; Notable for the following components:     (*)     All other components within normal limits   TROPONIN I - Abnormal; Notable for the following components:    Troponin I 1.164 (*)     All other components within normal limits   URINALYSIS, REFLEX TO URINE CULTURE - Abnormal; Notable for the following components:    Leukocytes, UA 2+ (*)     All other components within normal limits    Narrative:     Preferred Collection " Type->Urine, Catheterized   URINALYSIS MICROSCOPIC - Abnormal; Notable for the following components:    WBC, UA 9 (*)     Yeast, UA Few (*)     All other components within normal limits    Narrative:     Preferred Collection Type->Urine, Catheterized   APTT - Abnormal; Notable for the following components:    aPTT 32.5 (*)     All other components within normal limits    Narrative:     (if patient is on warfarin prior to heparin therapy)   HEMOGLOBIN - Abnormal; Notable for the following components:    Hemoglobin 8.2 (*)     All other components within normal limits   HEMATOCRIT - Abnormal; Notable for the following components:    Hematocrit 26.0 (*)     All other components within normal limits   POCT GLUCOSE - Abnormal; Notable for the following components:    POCT Glucose 135 (*)     All other components within normal limits   POCT GLUCOSE - Abnormal; Notable for the following components:    POCT Glucose 147 (*)     All other components within normal limits   POCT GLUCOSE - Abnormal; Notable for the following components:    POCT Glucose 51 (*)     All other components within normal limits   CULTURE, BLOOD   CULTURE, BLOOD   PROTIME-INR    Narrative:     (if patient is on warfarin prior to heparin therapy)   CBC W/ AUTO DIFFERENTIAL   BASIC METABOLIC PANEL   MAGNESIUM   PHOSPHORUS   HEMOGLOBIN A1C   LIPID PANEL   TSH   APTT   PROTIME-INR   HEMOGLOBIN   HEMATOCRIT   TYPE & SCREEN   POCT GLUCOSE   POCT GLUCOSE   POCT GLUCOSE   POCT GLUCOSE MONITORING CONTINUOUS   POCT GLUCOSE MONITORING CONTINUOUS        All Lab Results:  Results for orders placed or performed during the hospital encounter of 05/31/19   CBC auto differential   Result Value Ref Range    WBC 22.29 (H) 3.90 - 12.70 K/uL    RBC 2.70 (L) 4.00 - 5.40 M/uL    Hemoglobin 7.6 (L) 12.0 - 16.0 g/dL    Hematocrit 24.3 (L) 37.0 - 48.5 %    Mean Corpuscular Volume 90 82 - 98 fL    Mean Corpuscular Hemoglobin 28.1 27.0 - 31.0 pg    Mean Corpuscular Hemoglobin Conc  31.3 (L) 32.0 - 36.0 g/dL    RDW 15.2 (H) 11.5 - 14.5 %    Platelets 341 150 - 350 K/uL    MPV 8.4 (L) 9.2 - 12.9 fL    Gran # (ANC) 19.1 (H) 1.8 - 7.7 K/uL    Lymph # 1.7 1.0 - 4.8 K/uL    Mono # 1.3 (H) 0.3 - 1.0 K/uL    Eos # 0.3 0.0 - 0.5 K/uL    Baso # 0.03 0.00 - 0.20 K/uL    Gran% 87.7 (H) 38.0 - 73.0 %    Lymph% 7.4 (L) 18.0 - 48.0 %    Mono% 5.6 4.0 - 15.0 %    Eosinophil% 1.1 0.0 - 8.0 %    Basophil% 0.1 0.0 - 1.9 %    Differential Method Automated    Comprehensive metabolic panel   Result Value Ref Range    Sodium 131 (L) 136 - 145 mmol/L    Potassium 3.5 3.5 - 5.1 mmol/L    Chloride 93 (L) 95 - 110 mmol/L    CO2 27 23 - 29 mmol/L    Glucose 106 70 - 110 mg/dL    BUN, Bld 9 8 - 23 mg/dL    Creatinine 0.7 0.5 - 1.4 mg/dL    Calcium 8.9 8.7 - 10.5 mg/dL    Total Protein 5.6 (L) 6.0 - 8.4 g/dL    Albumin 2.2 (L) 3.5 - 5.2 g/dL    Total Bilirubin 0.3 0.1 - 1.0 mg/dL    Alkaline Phosphatase 71 55 - 135 U/L    AST 27 10 - 40 U/L    ALT 13 10 - 44 U/L    Anion Gap 11 8 - 16 mmol/L    eGFR if African American >60 >60 mL/min/1.73 m^2    eGFR if non African American >60 >60 mL/min/1.73 m^2   Brain natriuretic peptide   Result Value Ref Range     (H) 0 - 99 pg/mL   Troponin I   Result Value Ref Range    Troponin I 1.164 (H) 0.000 - 0.026 ng/mL   Urinalysis, Reflex to Urine Culture Urine, Catheterized   Result Value Ref Range    Specimen UA Urine, Catheterized     Color, UA Yellow Yellow, Straw, Radha    Appearance, UA Clear Clear    pH, UA 8.0 5.0 - 8.0    Specific Gravity, UA 1.010 1.005 - 1.030    Protein, UA Negative Negative    Glucose, UA Negative Negative    Ketones, UA Negative Negative    Bilirubin (UA) Negative Negative    Occult Blood UA Negative Negative    Nitrite, UA Negative Negative    Urobilinogen, UA Negative <2.0 EU/dL    Leukocytes, UA 2+ (A) Negative   Urinalysis Microscopic   Result Value Ref Range    RBC, UA 1 0 - 4 /hpf    WBC, UA 9 (H) 0 - 5 /hpf    WBC Clumps, UA Rare None-Rare     Bacteria Rare None-Occ /hpf    Yeast, UA Few (A) None    Squam Epithel, UA 4 /hpf    Microscopic Comment SEE COMMENT    APTT   Result Value Ref Range    aPTT 32.5 (H) 21.0 - 32.0 sec   Protime-INR   Result Value Ref Range    Prothrombin Time 10.7 9.0 - 12.5 sec    INR 1.0 0.8 - 1.2   Hemoglobin   Result Value Ref Range    Hemoglobin 8.2 (L) 12.0 - 16.0 g/dL   Hematocrit   Result Value Ref Range    Hematocrit 26.0 (L) 37.0 - 48.5 %   POCT glucose   Result Value Ref Range    POCT Glucose 135 (H) 70 - 110 mg/dL   POCT glucose   Result Value Ref Range    POCT Glucose 147 (H) 70 - 110 mg/dL   POCT glucose   Result Value Ref Range    POCT Glucose 83 70 - 110 mg/dL   POCT glucose   Result Value Ref Range    POCT Glucose 51 (L) 70 - 110 mg/dL   POCT glucose   Result Value Ref Range    POCT Glucose 101 70 - 110 mg/dL   POCT glucose   Result Value Ref Range    POCT Glucose 82 70 - 110 mg/dL       Imaging Results:  Imaging Results          CTA Chest Non-Coronary (PE Study) (Final result)  Result time 05/31/19 22:38:55    Final result by Dell Nicole MD (05/31/19 22:38:55)                 Impression:      1. Negative CTA.  No pulmonary embolism.  2. Normal size heart.  Pulmonary vascular congestion with septal thickening and patchy ground-glass opacities suggestive of pulmonary edema.  Suspect trace pleural effusions.  Minimal dependent subpleural atelectasis bilateral lower lobes.  Extensive coronary artery calcifications the imaging pattern is suggestive of CHF sequela with mild pulmonary edema.  Follow-up.  All CT scans at this facility are performed  using dose modulation techniques as appropriate to performed exam including the following:  automated exposure control; adjustment of mA and/or kV according to the patients size (this includes techniques or standardized protocols for targeted exams where dose is matched to indication/reason for exam: i.e. extremities or head);  iterative reconstruction  technique.      Electronically signed by: Dell Nicole MD  Date:    05/31/2019  Time:    22:38             Narrative:    EXAMINATION:  CTA CHEST NON CORONARY    CLINICAL HISTORY:  Chest pain, acute, PE suspected, high pretest prob;    TECHNIQUE:  CT angiogram chest performed with pulmonary embolism protocol.  100 cc Omnipaque 350.  Soft tissue and lung algorithms.  Multiplanar maximum intensity projections.    COMPARISON:  Chest x-ray 05/31/2019.    FINDINGS:  There is excellent opacification of the pulmonary arteries without intraluminal filling defect to suggest pulmonary embolism.  Normal caliber pulmonary arteries.    Normal caliber aorta without dissection.  Normal size heart.  Extensive coronary artery calcification.  No pericardial effusion.    No mediastinal or hilar lymphadenopathy.  Trachea and central bronchi are patent.    Bilateral dependent minor subpleural atelectasis.  Suspect trace bilateral pleural effusions.  The pulmonary vasculature is congested.  There is bibasilar septal thickening.  There are patchy ground-glass opacities along the bronchovascular bundles and perihilar/upper lobe distribution along with additional septal thickening.  This pattern suggestive of CHF sequela with mild pulmonary edema.  No dense airspace consolidation.    Visualized portion of the upper abdominal viscera is unremarkable.  The bones are unremarkable.                               X-Ray Abdomen AP 1 View (KUB) (Final result)  Result time 05/31/19 20:33:19    Final result by Rashad Shanks MD (05/31/19 20:33:19)                 Impression:      Contrast can be seen diffusely throughout the colon extending to the level of the rectum.  Recent contrast study dated 05/27/2019.  No obstruction or free air.      Electronically signed by: Rashad Shanks MD  Date:    05/31/2019  Time:    20:33             Narrative:    EXAMINATION:  XR ABDOMEN AP 1 VIEW    CLINICAL HISTORY:  Constipation, unspecified    TECHNIQUE:  Single AP  View of the abdomen was performed.    COMPARISON:  None    FINDINGS:  Contrast can be seen with in the: Likely secondary to prior CT scan dated 05/27/2019.  Remote lumbar fusion.  No evidence of obstruction or free air.                               X-Ray Chest AP Portable (Final result)  Result time 05/31/19 20:32:07    Final result by Rashad Shanks MD (05/31/19 20:32:07)                 Impression:      PICC line in good position.  No pneumothorax.      Electronically signed by: Rashad Shanks MD  Date:    05/31/2019  Time:    20:32             Narrative:    EXAMINATION:  XR CHEST AP PORTABLE    CLINICAL HISTORY:  shortness of breath;    TECHNIQUE:  Single frontal view of the chest was performed.    COMPARISON:  05/23/2019    FINDINGS:  PICC line present with the tip overlying the superior vena cava.  No pneumothorax.  No pleural effusion or pneumothorax.    The cardiac silhouette is normal in size. The hilar and mediastinal contours are unremarkable.                                 The EKG was ordered, reviewed, and independently interpreted by the ED provider.  Interpretation time: 1943  Rate: 72 BPM  Rhythm: normal sinus rhythm  Interpretation: Normal intervals. No T wave inversions. No significant ST depression or elevation. No STEMI.            The Emergency Provider reviewed the vital signs and test results, which are outlined above.     ED Discussion     10:16 PM: Pt's blood sugar is now 51. Hypoglycemic for the 2nd time. Will order dextrose 50% (D50W) solution 25 g.    11:41 PM: Pt received 2 g of Rocephin this morning.    11:53 PM: Discussed pt's case with Dr. Huffman (Cardiology) who states he will see the pt in the morning. He states pt can be put on heparin drip if Dr. Nicolas gives the okay    11:58 PM: Discussed pt's case with Dr. Nicolas (Neurosurgery) who is okay with starting the heparin drip.    12:09 AM: Discussed case with Dr. Cobos (Hospital Medicine) who is requesting a heparin drip. She states  there is no need to give Vancomycin. Dr. Cobos agrees with current care and management of pt and accepts admission.   Admitting Service: Hospital Medicine  Admitting Physician: Dr. Cobos  Admit to: Telemetry    12:16 AM: Re-evaluated pt. I have discussed test results, shared treatment plan, and the need for admission with patient and family at bedside. Pt and family express understanding at this time and agree with all information. All questions answered. Pt and family have no further questions or concerns at this time. Pt is ready for admit.      ED Medication(s):  Medications   heparin 25,000 units in dextrose 5% 250 mL (100 units/mL) infusion LOW INTENSITY nomogram - [facility] (12 Units/kg/hr × 79.9 kg (Adjusted) Intravenous New Bag 6/1/19 0108)   heparin 25,000 units in dextrose 5% (100 units/ml) IV bolus from bag - ADDITIONAL PRN BOLUS - 60 units/kg (max bolus 4000 units) (has no administration in time range)   heparin 25,000 units in dextrose 5% (100 units/ml) IV bolus from bag - ADDITIONAL PRN BOLUS - 30 units/kg (max bolus 4000 units) (has no administration in time range)   pantoprazole EC tablet 40 mg (has no administration in time range)   dextrose 50% injection 12.5 g (has no administration in time range)   glucagon (human recombinant) injection 1 mg (has no administration in time range)   dextrose 5 % and 0.45 % NaCl infusion (1,000 mLs Intravenous New Bag 6/1/19 0104)   iohexol (OMNIPAQUE 350) injection 100 mL (100 mLs Intravenous Given 5/31/19 2218)   dextrose 50% (D50W) solution 25 g (0 g Intravenous Stopped 5/31/19 2229)   aspirin tablet 325 mg (325 mg Oral Given 6/1/19 0011)   furosemide injection 40 mg (40 mg Intravenous Given 6/1/19 0011)   heparin 25,000 units in dextrose 5% (100 units/ml) IV bolus from bag INITIAL BOLUS (max bolus 4000 units) (4,000 Units Intravenous Bolus from Bag 6/1/19 0109)       New Prescriptions    No medications on file                 Medical Decision Making:   Clinical Tests:    Lab Tests: Ordered and Reviewed  Radiological Study: Ordered and Reviewed  Medical Tests: Ordered and Reviewed             Scribe Attestation:   Scribe #1: I performed the above scribed service and the documentation accurately describes the services I performed. I attest to the accuracy of the note.     Attending:   Physician Attestation Statement for Scribe #1: I, Rashad Briggs MD, personally performed the services described in this documentation, as scribed by Laurie De Paz, in my presence, and it is both accurate and complete.           Clinical Impression       ICD-10-CM ICD-9-CM   1. Hypoglycemia E16.2 251.2   2. Shortness of breath R06.02 786.05   3. Constipation K59.00 564.00   4. Elevated troponin R74.8 790.6   5. Leukocytosis, unspecified type D72.829 288.60       Disposition:   Disposition: Admitted  Condition: Stable         Rashad Briggs MD  06/01/19 0125

## 2019-06-01 NOTE — PT/OT/SLP PROGRESS
"Occupational Therapy      Patient Name:  Ross Isbell   MRN:  [id number]    Patient not seen today secondary to DR. NOLASCO STATING "PT IS NOT CANDIDATE FOR THERAPY SERVICES AT THIS TIME." AND TO TRY AGAIN TOMORROW ONCE HEART IS MORE STABLE  . Will follow-up NEXT VISIT. OT EVALUATION INITIATED VIA CHART REVIEW AND HISTORY.     Sharda Ramos OT  6/1/2019  "

## 2019-06-01 NOTE — PROGRESS NOTES
Wound vac dressing to lumbar spine with drainage. Wound vac not attached. Blanchable redness to bilateral buttocks. Moisture and redness noted throughout skin folds

## 2019-06-01 NOTE — ED NOTES
Notified hospital medicine, Papito, that glucose continues to go down after intervention. Dr Cobos instructs to check glucose qhr, encourage pt to continue eating and start D5 1/2NS at 100cc/hr.

## 2019-06-01 NOTE — ASSESSMENT & PLAN NOTE
Admitted with heparin infusion.  Cardiology was consulted.  Neurosurgery was consulted and okay to give heparin as patient is status post recent spinal fusion/abscess drainage.  Trend troponins  Further evaluation/diagnostics/interventions/consults pending course

## 2019-06-01 NOTE — HPI
" Ross Isbell is a 73 y.o. female patient with a PMHx of Parkinson's disease, CAD, DM, and MI who presented for further evaluation of hypoglycemia.  It was reported [facility]'s blood [facility].  No modifying factors.  Associated symptoms include grogginess.  Prior treatment patient was giving IV glucose in route.  Initially improved on arrival to ER.  Patient's glucose then dropped again and was given additional glucose.  Hospital Medicine consulted.  Patient admitted.  To note:  " Pt had a spinal fusion, lumbar laminectomy, and debridement of a paraspinal abscess done by Dr. Nicolas (Neurosurgery) on 5/25 and was discharged 05/30/2019, with wound VAC and IV antibiotic (Rocephin) therapy.     Discharge Summary 5/30/19:     HPI:   Ross Isbell is a 73 year old female with a PMHx of DM II, Parkinson's, CORINE, MI, CAD, and Glaucoma who presented to the Emergency Department with c/o fatigue. Associated symptoms include: generalized weakness and BLE pain x 2 weeks. Pt was informed by home health nurse to go to ED for increasing weakness, abdominal pain and leg pain. ED workup showed: WBC 13.08K, Hgb/Hct 11.0/33.5, Na+ 129, lipase 17. UA (+) nitrites, few bacteria. CXR with no acute findings. CT abdomen/pelvis with contrast showed moderate inflammatory changes centered on the intervertebral disc space between the L2 and L3 vertebral bodies. There is bony destruction on both sides of the intervertebral disc space between the L2 and L3 vertebral bodies.  This is consistent with the patient's history and characteristic of discitis and osteomyelitis. ED discussed case with Dr. Nicolas (neurosurgery) who reported pt did not need to be transferred out at this time. Obtain MRI thoracic/lumbar spine w wo contrast. Start pt on IV Vancomycin and Zosyn but consult Dr. Gomez (ID). Daughter: Uzma Godfrey [phone] is the surrogate decision maker. Admitted for Osteomyelitis of lumbar " spine.     Procedure(s) (LRB):  FUSION, SPINE, POSTERIOR SPINAL COLUMN, LUMBAR, USING COMPUTER-ASSISTED NAVIGATION  FUSION, SPINE, LUMBAR, TLIF, POSTERIOR APPROACH, USING PEDICLE SCREW (N/A)  LAMINECTOMY, SPINE, LUMBAR  DEBRIDEMENT, ABSCESS, PARASPINAL (N/A)       Hospital Course:   5/24/19 Dr. Nicolas, Neurosurgeon, doesn't recommend spinal surgery, But Needle biopsy of L 2-3 disk abscess with cultures. This am, patient c/o of severe abdominal pain 20/10, Norco 10 mg ordered PRN with IV 2 mg Morphine PRN. Blood culture is Positive for GPC. Echo pending. Patient had severe back pain and refused OT/PT. Daughter stated she has had diarrhea x 1 month. Stool for C.Diff ordered. T. Max 101. IV Vanco and Zosyn. IR, Dr. Juárez, consulted for Needle biopsy. Daughter at bedside. ID consult pending. Cont Vanc and Zosyn. CT abdomen/pelvis positive for osteomyelitis.  To OR with Dr. Nicolas 25 May for laminectomy and vertebroplasty.  Post-procedure in ICU due to post-op hypotension.  Able to wean vasopressors.  Anemia due to blood loss related to the procedure.  Transfused 1 unit PRBC.  Interval improvement in abdominal and back pain.  Tolerated limited degree of physical therapy.  Pursuing skilled placement.  Discharge plan to transfer to [facility] skilled nursing facility.  Complete 8 week Ceftriaxone 2 g daily IV stop date 23 July.  Weekly ESR and CRP.  PT and OT.  Prevena incisional wound VAC.

## 2019-06-01 NOTE — PT/OT/SLP EVAL
Physical Therapy Evaluation    Patient Name:  Ross Isbell   MRN:  [id number]    Recommendations:     Discharge Recommendations:  nursing facility, skilled, LTACH (long-term acute care hospital)   Discharge Equipment Recommendations: (tbd based on progress made at snf)   Barriers to discharge: None    Assessment:     Ross Isbell is a 73 y.o. female admitted with a medical diagnosis of Hypoglycemia.  She presents with the following impairments/functional limitations:  weakness, impaired endurance, impaired self care skills, impaired functional mobilty, gait instability, impaired balance, decreased coordination, decreased lower extremity function, decreased safety awareness, pain, other (comment), decreased ROM.    Rehab Prognosis: Good; patient would benefit from acute skilled PT services to address these deficits and reach maximum level of function.    Recent Surgery: * No surgery found *      Plan:     During this hospitalization, patient to be seen 5 x/week to address the identified rehab impairments via gait training, therapeutic exercises, therapeutic activities and progress toward the following goals:    · Plan of Care Expires:  06/08/19    Subjective     Chief Complaint: weakness; anxiety/min sob; soreness  Patient/Family Comments/goals: to get stronger and move more  Pain/Comfort:  · Pain Rating 1: 5/10  · Location - Side 1: Bilateral  · Location - Orientation 1: generalized  · Location 1: back  · Pain Addressed 1: Distraction, Cessation of Activity, Reposition  · Pain Rating Post-Intervention 1: 0/10(at rest)    Patients cultural, spiritual, Christianity conflicts given the current situation:      Living Environment:  Lives with  but had surgery 1 week ago and t/f'd to nh 5/30 but returned to [facility] 5/31 for low BS  Prior to admission, patients level of function was A from  prn with mobility/walked short distances.  Equipment used at home:  .  DME owned (not currently used): none.  Upon  discharge, patient will have assistance from  after snf.    Objective:     Communicated with FREDDIE Fair prior to session.  Patient found HOB elevated with peripheral IV, telemetry, oxygen  upon PT entry to room.    General Precautions: Standard, fall, respiratory   Orthopedic Precautions:spinal precautions   Braces: N/A     Exams:  · B LE strength 2/5 and rom wfl except B ankle df, L worst than R    Functional Mobility:  · Bed Mobility - mod/max A rolling and sup to/from sit  · Transfers - per patient report max A      Therapeutic Activities and Exercises:   PT educated patient/cg on poc, role of PT, rom to do in bed, d/c recs and safety/fall precautions with mobility.     AM-PAC 6 CLICK MOBILITY  Total Score:9     Patient left HOB elevated with all lines intact, call button in reach, RN notified and  present.    GOALS:   Multidisciplinary Problems     Physical Therapy Goals        Problem: Physical Therapy Goal    Goal Priority Disciplines Outcome Goal Variances Interventions   Physical Therapy Goal     PT, PT/OT      Description:  1. Patient will perform bed mobility min A  2. Patient will perform transfers min A with appropriate AD  3. Patient will perform B LE TE x 20 reps to inc strength/act aundrea                    History:     Past Medical History:   Diagnosis Date    Abnormal nuclear stress test 11/1/2015    Anemia 6/1/2019    Background diabetic retinopathy 12/28/2015    CAD (coronary artery disease) 2002    stents    Cataract     Gait disorder 10/12/2012    Glaucoma     History of PTCA 6/1/2019    MI (myocardial infarction)     Obesity     CORINE (obstructive sleep apnea)     Other and unspecified hyperlipidemia     PD (Parkinson's disease) 2002    tremor-predominant (per patient)    Type II or unspecified type diabetes mellitus without mention of complication, not stated as uncontrolled 2002      am    Unspecified essential hypertension        Past Surgical History:    Procedure Laterality Date    BREAST BIOPSY Left     benign - about 3 yrs ago    CORONARY ANGIOPLASTY  2002    CORONARY STENT PLACEMENT  2002    DEBRIDEMENT, ABSCESS, PARASPINAL N/A 5/25/2019    Performed by [facility]islav [facility] at [facility] OR    DILATION AND CURETTAGE OF UTERUS      FOREIGN BODY REMOVAL      FUSION, SPINE, LUMBAR, TLIF, POSTERIOR APPROACH, USING PEDICLE SCREW N/A 5/25/2019    Performed by [facility]av [facility] at [facility] OR    FUSION, SPINE, POSTERIOR SPINAL COLUMN, LUMBAR, USING COMPUTER-ASSISTED NAVIGATION  5/25/2019    Performed by [facility]islav Nicolas MD at [facility] OR    HEART CATH-LEFT Left 11/5/2015    Performed by Constance Pace MD at [facility] CATH LAB    HEART CATH-LEFT Left 11/2/2015    Performed by Constance Pace MD at [facility] CATH LAB    HEART CATH-LEFT/pci stent lad Left 11/10/2015    Performed by Constance Pace MD at [facility] CATH LAB    INNER EAR SURGERY      LAMINECTOMY, SPINE, LUMBAR  5/25/2019    Performed by [facility]av [facility] MD at [facility] OR    TONSILLECTOMY, ADENOIDECTOMY         Time Tracking:     PT Received On: 05/31/19  PT Start Time: 0830     PT Stop Time: 0855  PT Total Time (min): 25 min     Billable Minutes: Evaluation 15 and Therapeutic Exercise 10      Dell Keen, PT  06/01/2019

## 2019-06-01 NOTE — ED NOTES
Dr Briggs aware wound vac is not attached and that there is drainage noted to dressing. Dr Briggs instructs to leave dressing as it is and wound care will be consulted in the morning.

## 2019-06-01 NOTE — NURSING
Patient transported to the floor by FREDDIE Coelho via stretcher. On fluids, heparin, O2.  Accompanied by her spouse.

## 2019-06-01 NOTE — ED NOTES
Pt currently aaox4, in no acute distress, with c/o back pain. Daughter states pt is at a nursing home after wound vac placement to surgical wound of back yesterday. Daughter states pts glucose dropped into the 30s at the nursing home and her wound vac came off sometime today. pts glucose currently 147. It is unknown if she was given glucose at the nursing home or in the ambulance. Dr Briggs evaluated pt. Awaiting further orders.

## 2019-06-01 NOTE — PROGRESS NOTES
[facility] -   Neurosurgery  Progress Note    Subjective:     History of Present Illness:   Patient is a 74 YO female with h/o osteomyelitis and Parkinson' disease presented with lower back pain and difficulty walking. Reports she had not walked for about a month with symptoms worsening over past 2-3 weeks. She underwent surgery on 5/25/19 for posterior lumbar fusion and abscess debridement.  Patient was admitted last night due to elevated troponin and issue with wound vac seal.      Interval History:   Postop day #7  NAD  Pain controlled  Troponin1 1.278  Hgb/Hct 7.5/24.2  Afebrile    Post-Op Info:  * No surgery found *          Medications:  Continuous Infusions:   dextrose 5 % and 0.45 % NaCl 1,000 mL (06/01/19 0104)    heparin (porcine) in D5W 12 Units/kg/hr (06/01/19 0108)     Scheduled Meds:   aspirin  81 mg Oral Daily    carbidopa-levodopa  mg  1 tablet Oral 5x Daily    cefTRIAXone 2 g in dextrose 5 % 50 mL  2 g Intravenous Daily    isosorbide mononitrate  120 mg Oral Daily    levothyroxine  125 mcg Oral Before breakfast    losartan  50 mg Oral Daily    magnesium sulfate IVPB  2 g Intravenous Once    metoprolol succinate  25 mg Oral Daily    miconazole NITRATE 2 %   Topical (Top) BID    pantoprazole  40 mg Oral Daily    pramipexole  1 mg Oral TID    pravastatin  40 mg Oral Daily     PRN Meds:dextrose 50%, glucagon (human recombinant), heparin (PORCINE), heparin (PORCINE)     Review of Systems   Constitutional: Negative for chills and fever.   Respiratory: Negative for cough.    Gastrointestinal: Negative for vomiting.   Musculoskeletal: Positive for back pain.   Skin: Negative for color change and pallor.   Neurological: Negative for tremors and seizures.   Psychiatric/Behavioral: Negative for agitation and behavioral problems.     Objective:     Weight: 97.3 kg (214 lb 8.1 oz)  Body mass index is 34.62 kg/m².  Vital Signs (Most Recent):  Temp: 98 °F (36.7 °C) (06/01/19  0733)  Pulse: 90 (06/01/19 0733)  Resp: 18 (06/01/19 0733)  BP: (!) 169/75 (06/01/19 0733)  SpO2: 97 % (06/01/19 0733) Vital Signs (24h Range):  Temp:  [97.7 °F (36.5 °C)-98.2 °F (36.8 °C)] 98 °F (36.7 °C)  Pulse:  [67-90] 90  Resp:  [18-20] 18  SpO2:  [94 %-100 %] 97 %  BP: ()/(52-75) 169/75                          Neurosurgery Physical Exam   Patient moves her legs in bed  Foot drop at baseline  Lateral bedsores as before  Midline incision intact with no drainage    Significant Labs:  Recent Labs   Lab 05/31/19 1940 06/01/19  0545    96   * 132*   K 3.5 3.8   CL 93* 94*   CO2 27 30*   BUN 9 9   CREATININE 0.7 0.7   CALCIUM 8.9 8.7   MG  --  1.4*     Recent Labs   Lab 05/31/19 1940 06/01/19 0100 06/01/19  0545   WBC 22.29*  --  11.96   HGB 7.6* 8.2* 7.5*  7.5*   HCT 24.3* 26.0* 24.2*  24.2*     --  303     Recent Labs   Lab 06/01/19  0023 06/01/19  0545   INR 1.0 1.0   APTT 32.5* 43.2*     Microbiology Results (last 7 days)     Procedure Component Value Units Date/Time    Blood Culture #2 **CANNOT BE ORDERED STAT** [[id number]] Collected:  06/01/19 0010    Order Status:  Sent Specimen:  Blood from Peripheral, Antecubital, Left Updated:  06/01/19 0010    Blood Culture #1 **CANNOT BE ORDERED STAT** [[id number]] Collected:  06/01/19 0003    Order Status:  Sent Specimen:  Blood from Peripheral, Antecubital, Right Updated:  06/01/19 0004        Cardiac markers:   Recent Labs   Lab 05/31/19 1940 06/01/19  0545   TROPONINI 1.164* 1.278*     CMP:   Recent Labs   Lab 05/31/19 1940 06/01/19  0545    96   CALCIUM 8.9 8.7   ALBUMIN 2.2*  --    PROT 5.6*  --    * 132*   K 3.5 3.8   CO2 27 30*   CL 93* 94*   BUN 9 9   CREATININE 0.7 0.7   ALKPHOS 71  --    ALT 13  --    AST 27  --    BILITOT 0.3  --      CRP: No results for input(s): CRP in the last 48 hours.  All pertinent labs from the last 24 hours have been reviewed.  Significant Diagnostics:  I have reviewed all pertinent imaging  results/findings within the past 24 hours.    Assessment/Plan:     Active Diagnoses:    Diagnosis Date Noted POA    PRINCIPAL PROBLEM:  NSTEMI (non-ST elevated myocardial infarction) [I21.4] 06/01/2019 Yes    Hypoglycemia [E16.2] 06/01/2019 Yes    Alteration in skin integrity related to surgical incision [R23.9] 05/29/2019 Yes    Streptococcal bacteremia [R78.81, B95.5] 05/24/2019 Yes    Osteomyelitis of lumbar spine [M46.26] 05/23/2019 Yes    Hypothyroidism [E03.9] 11/19/2013 Yes     Chronic    Essential hypertension [I10]  Yes     Chronic    CAD (coronary artery disease) [I25.10]  Yes     Chronic      Problems Resolved During this Admission:       Continue Wound care   Patient instructed to sit up in chair and wear LSO brace when sitting out of bed  Physical/Occupation therapy- activity as tolerated  Will continue to monitor    Mayra Long PA-C  Neurosurgery  [facility]

## 2019-06-01 NOTE — HPI
Pt admitted with hypoglycemia.  She has h/o multivessel CAD with multivessel PCI 2015, AS, DM, HTN.  She is s/p recent back surgery, with infection, and on abs and also received PRBC recently for severe anemia.  She went home recently then had low blood sugar, symptomatic with sweats, and sxs resolved with elevation of blood sugar.  She denies recent angina or acute dyspnea.  ecg on admit is normal.    Troponin elevation noted 1.2    Severe anemia noted with Hg 7.5  Was put on IV Heparin gtt by admitting service overnight.  Echo last week showed normal EF, mod AS.  - stress MPI 2018.

## 2019-06-01 NOTE — PLAN OF CARE
Sw met with pt at bedside to discuss d/c planning. Sw left Transitional Care Folder with patient which included Discharge Planning Begins on Admission pamphlet, [facility], Advance Directive information. Sw placed contact information on white board. Pt stated that she currently lives with her  spouse. Pt stated that her family will provide d/c transport. Pt will likely d/c to [facility] for SNF- pending acceptance. Sw updated whiteboard with [facility] contact information. Pt is current PCP Anna Penn MD. Pt's current pharmacy is   [id number] - [facility] [address] [address] [address]  [address] [address] [address]  Phone: [phone] Fax: [phone]       06/01/19 1130   Discharge Assessment   Assessment Type Discharge Planning Assessment   Confirmed/corrected address and phone number on facesheet? Yes   Assessment information obtained from? Patient   Communicated expected length of stay with patient/caregiver yes   Prior to hospitilization cognitive status: Alert/Oriented   Prior to hospitalization functional status: Independent   Current cognitive status: Alert/Oriented   Current Functional Status: Independent   Lives With spouse   Able to Return to Prior Arrangements yes   Is patient able to care for self after discharge? Yes   Patient's perception of discharge disposition skilled nursing facility   Readmission Within the Last 30 Days no previous admission in last 30 days   Patient currently being followed by outpatient case management? No   Patient currently receives home health services? No   Patient currently receives any other outside agency services? No   Equipment Currently Used at Home walker, standard   Do you have any problems affording any of your prescribed medications? No   Is the patient taking medications as prescribed? yes   Does the patient have transportation home? Yes   Transportation Anticipated family or friend will provide   Does the  patient receive services at the [facility]? No   Discharge Plan A Skilled Nursing Facility   Discharge Plan B Skilled Nursing Facility   DME Needed Upon Discharge  none   Patient/Family in Agreement with Plan yes

## 2019-06-01 NOTE — ASSESSMENT & PLAN NOTE
Continue home med  Check TSH. Last trends elevated. Optimization/adjustments pending course.   Consider in cardiology etiology     TSH normal

## 2019-06-01 NOTE — ASSESSMENT & PLAN NOTE
Improved with treatment. Patient has DM on insulin. Monitor trends and optimization of DM therapies pending course.     Improved, IVF changed to D5NS

## 2019-06-01 NOTE — ASSESSMENT & PLAN NOTE
Continue statin, asa, bb  Cardiology consult.   Further evaluation/diagnostics/interventions/consults pending course

## 2019-06-01 NOTE — NURSING
Wound vac dressing removed, wet to dry 4x4,  and non adherent dressing applied per NIKA Solorzano.

## 2019-06-01 NOTE — SUBJECTIVE & OBJECTIVE
Past Medical History:   Diagnosis Date    Abnormal nuclear stress test 11/1/2015    Anemia 6/1/2019    Background diabetic retinopathy 12/28/2015    CAD (coronary artery disease) 2002    stents    Cataract     Gait disorder 10/12/2012    Glaucoma     History of PTCA 6/1/2019    MI (myocardial infarction)     Obesity     CORINE (obstructive sleep apnea)     Other and unspecified hyperlipidemia     PD (Parkinson's disease) 2002    tremor-predominant (per patient)    Type II or unspecified type diabetes mellitus without mention of complication, not stated as uncontrolled 2002      am    Unspecified essential hypertension        Past Surgical History:   Procedure Laterality Date    BREAST BIOPSY Left     benign - about 3 yrs ago    CORONARY ANGIOPLASTY  2002    CORONARY STENT PLACEMENT  2002    DEBRIDEMENT, ABSCESS, PARASPINAL N/A 5/25/2019    Performed by Stanislav Nicolas MD at [facility] OR    DILATION AND CURETTAGE OF UTERUS      FOREIGN BODY REMOVAL      FUSION, SPINE, LUMBAR, TLIF, POSTERIOR APPROACH, USING PEDICLE SCREW N/A 5/25/2019    Performed by [facility]islav [facility] MD at [facility] OR    FUSION, SPINE, POSTERIOR SPINAL COLUMN, LUMBAR, USING COMPUTER-ASSISTED NAVIGATION  5/25/2019    Performed by Stanislav Nicolas MD at [facility] OR    HEART CATH-LEFT Left 11/5/2015    Performed by Constance Pace MD at [facility] CATH LAB    HEART CATH-LEFT Left 11/2/2015    Performed by Constance Pace MD at [facility] CATH LAB    HEART CATH-LEFT/pci stent lad Left 11/10/2015    Performed by Constance Pace MD at [facility] CATH LAB    INNER EAR SURGERY      LAMINECTOMY, SPINE, LUMBAR  5/25/2019    Performed by Stanislav Nicolas MD at [facility] OR    TONSILLECTOMY, ADENOIDECTOMY         Review of patient's allergies indicates:   Allergen Reactions    Codeine      Other reaction(s): Nausea.  Patient tolerated morphine 5/2019 with no reported problems.     Codeine sulfate      Unknown^    Diphenhydramine hcl      Unknown^  Other  reaction(s): Rash    Sulfa (sulfonamide antibiotics) Hives and Nausea And Vomiting    Penicillins Rash     Rash only as a child  Tolerated ceftriaxone 4/30/19  Tolerated piperacillin-tazobactam 5/23/19       No current facility-administered medications on file prior to encounter.      Current Outpatient Medications on File Prior to Encounter   Medication Sig    ACCU-CHEK SOFTCLIX LANCETS Misc TEST THREE TIMES DAILY    acetaminophen (TYLENOL) 325 MG tablet Take 2 tablets (650 mg total) by mouth every 6 (six) hours as needed.    aspirin (ECOTRIN) 81 MG EC tablet Take 1 tablet (81 mg total) by mouth once daily.    blood sugar diagnostic Strp 1 strip by Misc.(Non-Drug; Combo Route) route 3 (three) times daily. Accuchek Felicia Plus Test Strips    blood-glucose meter kit Accu-chek Felicia Plus Meter    carbidopa-levodopa  mg (SINEMET)  mg per tablet TAKE 1 TABLET BY MOUTH 5 TIMES DAILY    cefTRIAXone 2 g in dextrose 5 % 50 mL (ROCEPHIN) 2 g/50 mL PgBk IVPB Inject 50 mLs (2 g total) into the vein once daily.    insulin syringe-needle U-100 0.5 mL 31 gauge x 5/16 Syrg USE THREE TIMES DAILY    isosorbide mononitrate (IMDUR) 120 MG 24 hr tablet Take 1 tablet (120 mg total) by mouth once daily.    latanoprost 0.005 % ophthalmic solution INSTILL 1 DROP INTO EACH EYE IN THE EVENING    levothyroxine (SYNTHROID) 125 MCG tablet TAKE 1 TABLET BY MOUTH IN THE MORNING BEFORE BREAKFAST    losartan (COZAAR) 50 MG tablet TAKE 1 TABLET BY MOUTH ONCE DAILY    metFORMIN (GLUCOPHAGE) 500 MG tablet TAKE ONE TABLET BY MOUTH THREE TIMES DAILY    metoprolol succinate (TOPROL-XL) 25 MG 24 hr tablet TAKE 1 TABLET BY MOUTH ONCE DAILY    multivit-min-FA-lycopen-lutein (CENTRUM SILVER) 0.4-300-250 mg-mcg-mcg Tab Take 1 tablet by mouth once daily.    NOVOLIN 70/30 U-100 INSULIN 100 unit/mL (70-30) injection Inject 55 Units into the skin 3 (three) times daily.    oxyCODONE-acetaminophen (PERCOCET) 5-325 mg per tablet Take 1  tablet by mouth every 4 (four) hours as needed (pain 4-5/10).    pantoprazole (PROTONIX) 40 MG tablet Take 1 tablet (40 mg total) by mouth once daily.    pramipexole (MIRAPEX) 1 MG tablet TAKE ONE TABLET BY MOUTH THREE TIMES DAILY    pravastatin (PRAVACHOL) 40 MG tablet TAKE ONE TABLET BY MOUTH ONCE DAILY    miconazole NITRATE 2 % (MICOTIN) 2 % top powder Apply topically 2 (two) times daily.     Family History     Problem Relation (Age of Onset)    Breast cancer Paternal Grandmother    Diabetes Maternal Grandmother    Glaucoma Mother, Maternal Grandmother    Heart attack Father (65), Mother (65)    Hypertension Father, Mother, Maternal Grandmother    Ovarian cancer Mother    Parkinsonism Other, Paternal Aunt    Tremor Father        Tobacco Use    Smoking status: Former Smoker     Years: 40.00     Last attempt to quit: 10/12/2010     Years since quittin.6    Smokeless tobacco: Never Used    Tobacco comment: On & off for the past few years   Substance and Sexual Activity    Alcohol use: No     Alcohol/week: 0.0 oz    Drug use: No    Sexual activity: Not Currently     Review of Systems   Constitution: Positive for diaphoresis and malaise/fatigue.   HENT: Negative.    Eyes: Negative.    Cardiovascular: Positive for dyspnea on exertion.   Respiratory: Positive for shortness of breath.    Endocrine: Negative.    Hematologic/Lymphatic: Negative.    Skin: Negative.    Musculoskeletal: Positive for arthritis, back pain and joint pain.   Gastrointestinal: Negative.    Genitourinary: Negative.    Neurological: Positive for weakness.   Psychiatric/Behavioral: Negative.    Allergic/Immunologic: Negative.      Objective:     Vital Signs (Most Recent):  Temp: 98 °F (36.7 °C) (19)  Pulse: 85 (19 1113)  Resp: 18 (19)  BP: (!) 169/75 (19 0733)  SpO2: 97 % (19) Vital Signs (24h Range):  Temp:  [97.7 °F (36.5 °C)-98.2 °F (36.8 °C)] 98 °F (36.7 °C)  Pulse:  [67-90] 85  Resp:   [18-20] 18  SpO2:  [94 %-100 %] 97 %  BP: ()/(52-75) 169/75     Weight: 97.3 kg (214 lb 8.1 oz)  Body mass index is 34.62 kg/m².    SpO2: 97 %  O2 Device (Oxygen Therapy): nasal cannula    No intake or output data in the 24 hours ending 06/01/19 1122    Lines/Drains/Airways     Peripherally Inserted Central Catheter Line                 PICC Double Lumen 05/25/19 2040 left basilic 6 days          Peripheral Intravenous Line                 Peripheral IV - Single Lumen 05/31/19 18 G Right Hand 1 day                Physical Exam   Constitutional: She is oriented to person, place, and time. Vital signs are normal. She appears well-developed and well-nourished. She is active and cooperative. She does not have a sickly appearance. She does not appear ill. No distress.   HENT:   Head: Normocephalic.   Neck: Neck supple. Normal carotid pulses, no hepatojugular reflux and no JVD present. Carotid bruit is not present. No thyromegaly present.   Cardiovascular: Normal rate, regular rhythm, S1 normal, S2 normal and normal pulses. PMI is not displaced. Exam reveals no gallop and no friction rub.   Murmur heard.   Harsh midsystolic murmur is present with a grade of 2/6 at the upper left sternal border.  Pulses:       Radial pulses are 2+ on the right side, and 2+ on the left side.   Pulmonary/Chest: Effort normal and breath sounds normal. She has no wheezes. She has no rales.   Abdominal: Soft. Normal appearance, normal aorta and bowel sounds are normal. She exhibits no pulsatile liver, no abdominal bruit, no ascites and no mass. There is no splenomegaly or hepatomegaly. There is no tenderness.   Musculoskeletal: She exhibits no edema.   Lymphadenopathy:     She has no cervical adenopathy.   Neurological: She is alert and oriented to person, place, and time.   Skin: Skin is warm. She is not diaphoretic.   Psychiatric: She has a normal mood and affect. Her behavior is normal.   Nursing note and vitals  reviewed.      Significant Labs:   CMP   Recent Labs   Lab 05/31/19 1940 06/01/19  0545   * 132*   K 3.5 3.8   CL 93* 94*   CO2 27 30*    96   BUN 9 9   CREATININE 0.7 0.7   CALCIUM 8.9 8.7   PROT 5.6*  --    ALBUMIN 2.2*  --    BILITOT 0.3  --    ALKPHOS 71  --    AST 27  --    ALT 13  --    ANIONGAP 11 8   ESTGFRAFRICA >60 >60   EGFRNONAA >60 >60   , CBC   Recent Labs   Lab 05/31/19 1940 06/01/19 0100 06/01/19  0545   WBC 22.29*  --  11.96   HGB 7.6* 8.2* 7.5*  7.5*   HCT 24.3* 26.0* 24.2*  24.2*     --  303   , INR   Recent Labs   Lab 06/01/19  0023 06/01/19  0545   INR 1.0 1.0    and Troponin   Recent Labs   Lab 05/31/19 1940 06/01/19  0545   TROPONINI 1.164* 1.278*       Significant Imaging: Echocardiogram:   2D echo with color flow doppler:   Results for orders placed or performed during the hospital encounter of 05/23/19   2D echo with color flow doppler   Result Value Ref Range    QEF 60 55 - 65    Aortic Valve Stenosis MODERATE (A)     Est. PA Systolic Pressure 36.24     Mitral Valve Mobility NORMAL     Tricuspid Valve Regurgitation MILD

## 2019-06-02 LAB
ANION GAP SERPL CALC-SCNC: 8 MMOL/L (ref 8–16)
BACTERIA BLD CULT: NORMAL
BACTERIA BLD CULT: NORMAL
BASOPHILS # BLD AUTO: 0.01 K/UL (ref 0–0.2)
BASOPHILS NFR BLD: 0.1 % (ref 0–1.9)
BLD PROD TYP BPU: NORMAL
BLD PROD TYP BPU: NORMAL
BLOOD UNIT EXPIRATION DATE: NORMAL
BLOOD UNIT EXPIRATION DATE: NORMAL
BLOOD UNIT TYPE CODE: 6200
BLOOD UNIT TYPE CODE: 6200
BLOOD UNIT TYPE: NORMAL
BLOOD UNIT TYPE: NORMAL
BUN SERPL-MCNC: 8 MG/DL (ref 8–23)
CALCIUM SERPL-MCNC: 8.7 MG/DL (ref 8.7–10.5)
CHLORIDE SERPL-SCNC: 93 MMOL/L (ref 95–110)
CO2 SERPL-SCNC: 30 MMOL/L (ref 23–29)
CODING SYSTEM: NORMAL
CODING SYSTEM: NORMAL
CREAT SERPL-MCNC: 0.7 MG/DL (ref 0.5–1.4)
DACRYOCYTES BLD QL SMEAR: ABNORMAL
DIFFERENTIAL METHOD: ABNORMAL
DISPENSE STATUS: NORMAL
DISPENSE STATUS: NORMAL
EOSINOPHIL # BLD AUTO: 0.2 K/UL (ref 0–0.5)
EOSINOPHIL NFR BLD: 2.7 % (ref 0–8)
ERYTHROCYTE [DISTWIDTH] IN BLOOD BY AUTOMATED COUNT: 15.6 % (ref 11.5–14.5)
EST. GFR  (AFRICAN AMERICAN): >60 ML/MIN/1.73 M^2
EST. GFR  (NON AFRICAN AMERICAN): >60 ML/MIN/1.73 M^2
GLUCOSE SERPL-MCNC: 118 MG/DL (ref 70–110)
HCT VFR BLD AUTO: 23.6 % (ref 37–48.5)
HGB BLD-MCNC: 7.6 G/DL (ref 12–16)
LYMPHOCYTES # BLD AUTO: 1.9 K/UL (ref 1–4.8)
LYMPHOCYTES NFR BLD: 21 % (ref 18–48)
MCH RBC QN AUTO: 28.6 PG (ref 27–31)
MCHC RBC AUTO-ENTMCNC: 32.2 G/DL (ref 32–36)
MCV RBC AUTO: 89 FL (ref 82–98)
MONOCYTES # BLD AUTO: 0.9 K/UL (ref 0.3–1)
MONOCYTES NFR BLD: 9.6 % (ref 4–15)
NEUTROPHILS # BLD AUTO: 6 K/UL (ref 1.8–7.7)
NEUTROPHILS NFR BLD: 68.4 % (ref 38–73)
NUM UNITS TRANS PACKED RBC: NORMAL
NUM UNITS TRANS PACKED RBC: NORMAL
OVALOCYTES BLD QL SMEAR: ABNORMAL
PLATELET # BLD AUTO: 333 K/UL (ref 150–350)
PLATELET BLD QL SMEAR: ABNORMAL
PMV BLD AUTO: 8 FL (ref 9.2–12.9)
POCT GLUCOSE: 127 MG/DL (ref 70–110)
POCT GLUCOSE: 169 MG/DL (ref 70–110)
POCT GLUCOSE: 223 MG/DL (ref 70–110)
POCT GLUCOSE: 275 MG/DL (ref 70–110)
POIKILOCYTOSIS BLD QL SMEAR: SLIGHT
POLYCHROMASIA BLD QL SMEAR: ABNORMAL
POTASSIUM SERPL-SCNC: 4.3 MMOL/L (ref 3.5–5.1)
RBC # BLD AUTO: 2.66 M/UL (ref 4–5.4)
SODIUM SERPL-SCNC: 131 MMOL/L (ref 136–145)
WBC # BLD AUTO: 9.04 K/UL (ref 3.9–12.7)

## 2019-06-02 PROCEDURE — 94799 UNLISTED PULMONARY SVC/PX: CPT

## 2019-06-02 PROCEDURE — 63600175 PHARM REV CODE 636 W HCPCS: Performed by: NURSE PRACTITIONER

## 2019-06-02 PROCEDURE — 99232 PR SUBSEQUENT HOSPITAL CARE,LEVL II: ICD-10-PCS | Mod: ,,, | Performed by: INTERNAL MEDICINE

## 2019-06-02 PROCEDURE — 21400001 HC TELEMETRY ROOM

## 2019-06-02 PROCEDURE — 97530 THERAPEUTIC ACTIVITIES: CPT

## 2019-06-02 PROCEDURE — 27000221 HC OXYGEN, UP TO 24 HOURS

## 2019-06-02 PROCEDURE — 25000003 PHARM REV CODE 250: Performed by: EMERGENCY MEDICINE

## 2019-06-02 PROCEDURE — 99232 SBSQ HOSP IP/OBS MODERATE 35: CPT | Mod: ,,, | Performed by: INTERNAL MEDICINE

## 2019-06-02 PROCEDURE — 25000003 PHARM REV CODE 250: Performed by: NURSE PRACTITIONER

## 2019-06-02 PROCEDURE — 97110 THERAPEUTIC EXERCISES: CPT

## 2019-06-02 PROCEDURE — 63600175 PHARM REV CODE 636 W HCPCS: Performed by: EMERGENCY MEDICINE

## 2019-06-02 PROCEDURE — 85025 COMPLETE CBC W/AUTO DIFF WBC: CPT

## 2019-06-02 PROCEDURE — 36430 TRANSFUSION BLD/BLD COMPNT: CPT

## 2019-06-02 PROCEDURE — 94761 N-INVAS EAR/PLS OXIMETRY MLT: CPT

## 2019-06-02 PROCEDURE — P9016 RBC LEUKOCYTES REDUCED: HCPCS

## 2019-06-02 PROCEDURE — 80048 BASIC METABOLIC PNL TOTAL CA: CPT

## 2019-06-02 PROCEDURE — 97167 OT EVAL HIGH COMPLEX 60 MIN: CPT

## 2019-06-02 RX ORDER — ALPRAZOLAM 0.5 MG/1
0.5 TABLET ORAL 3 TIMES DAILY PRN
Status: DISCONTINUED | OUTPATIENT
Start: 2019-06-02 | End: 2019-06-04 | Stop reason: HOSPADM

## 2019-06-02 RX ORDER — CYANOCOBALAMIN 1000 UG/ML
1000 INJECTION, SOLUTION INTRAMUSCULAR; SUBCUTANEOUS ONCE
Status: COMPLETED | OUTPATIENT
Start: 2019-06-02 | End: 2019-06-02

## 2019-06-02 RX ORDER — HYDROCODONE BITARTRATE AND ACETAMINOPHEN 500; 5 MG/1; MG/1
TABLET ORAL
Status: DISCONTINUED | OUTPATIENT
Start: 2019-06-02 | End: 2019-06-04 | Stop reason: HOSPADM

## 2019-06-02 RX ADMIN — INSULIN ASPART 2 UNITS: 100 INJECTION, SOLUTION INTRAVENOUS; SUBCUTANEOUS at 11:06

## 2019-06-02 RX ADMIN — ASPIRIN 81 MG: 81 TABLET, COATED ORAL at 08:06

## 2019-06-02 RX ADMIN — LEVOTHYROXINE SODIUM 125 MCG: 125 TABLET ORAL at 05:06

## 2019-06-02 RX ADMIN — PRAMIPEXOLE DIHYDROCHLORIDE 1 MG: 0.5 TABLET ORAL at 08:06

## 2019-06-02 RX ADMIN — CARBIDOPA AND LEVODOPA 1 TABLET: 25; 250 TABLET ORAL at 10:06

## 2019-06-02 RX ADMIN — OXYCODONE AND ACETAMINOPHEN 1 TABLET: 5; 325 TABLET ORAL at 01:06

## 2019-06-02 RX ADMIN — ISOSORBIDE MONONITRATE 120 MG: 60 TABLET, EXTENDED RELEASE ORAL at 08:06

## 2019-06-02 RX ADMIN — PRAMIPEXOLE DIHYDROCHLORIDE 1 MG: 0.5 TABLET ORAL at 02:06

## 2019-06-02 RX ADMIN — ONDANSETRON 4 MG: 2 INJECTION INTRAMUSCULAR; INTRAVENOUS at 03:06

## 2019-06-02 RX ADMIN — Medication: at 08:06

## 2019-06-02 RX ADMIN — ONDANSETRON 4 MG: 2 INJECTION INTRAMUSCULAR; INTRAVENOUS at 04:06

## 2019-06-02 RX ADMIN — CYANOCOBALAMIN 1000 MCG: 1000 INJECTION, SOLUTION INTRAMUSCULAR; SUBCUTANEOUS at 02:06

## 2019-06-02 RX ADMIN — OXYCODONE AND ACETAMINOPHEN 1 TABLET: 5; 325 TABLET ORAL at 04:06

## 2019-06-02 RX ADMIN — PRAVASTATIN SODIUM 40 MG: 20 TABLET ORAL at 08:06

## 2019-06-02 RX ADMIN — OXYCODONE AND ACETAMINOPHEN 1 TABLET: 5; 325 TABLET ORAL at 07:06

## 2019-06-02 RX ADMIN — CARBIDOPA AND LEVODOPA 1 TABLET: 25; 250 TABLET ORAL at 07:06

## 2019-06-02 RX ADMIN — CARBIDOPA AND LEVODOPA 1 TABLET: 25; 250 TABLET ORAL at 09:06

## 2019-06-02 RX ADMIN — LOSARTAN POTASSIUM 50 MG: 50 TABLET, FILM COATED ORAL at 08:06

## 2019-06-02 RX ADMIN — CARBIDOPA AND LEVODOPA 1 TABLET: 25; 250 TABLET ORAL at 05:06

## 2019-06-02 RX ADMIN — CARBIDOPA AND LEVODOPA 1 TABLET: 25; 250 TABLET ORAL at 02:06

## 2019-06-02 RX ADMIN — IRON SUCROSE 200 MG: 20 INJECTION, SOLUTION INTRAVENOUS at 02:06

## 2019-06-02 RX ADMIN — INSULIN ASPART 3 UNITS: 100 INJECTION, SOLUTION INTRAVENOUS; SUBCUTANEOUS at 03:06

## 2019-06-02 RX ADMIN — CEFTRIAXONE SODIUM 2 G: 2 INJECTION, POWDER, FOR SOLUTION INTRAMUSCULAR; INTRAVENOUS at 08:06

## 2019-06-02 RX ADMIN — ALPRAZOLAM 0.5 MG: 0.5 TABLET ORAL at 08:06

## 2019-06-02 RX ADMIN — OXYCODONE AND ACETAMINOPHEN 1 TABLET: 5; 325 TABLET ORAL at 09:06

## 2019-06-02 RX ADMIN — PANTOPRAZOLE SODIUM 40 MG: 40 TABLET, DELAYED RELEASE ORAL at 09:06

## 2019-06-02 RX ADMIN — METOPROLOL SUCCINATE 25 MG: 25 TABLET, EXTENDED RELEASE ORAL at 08:06

## 2019-06-02 NOTE — SUBJECTIVE & OBJECTIVE
Interval History: doing much better. AAOx3, speech clear, BS improved, no hypoglycemia since admit. Cardiology following and thinks that elevated Trop as Demand ischemia and not NSTEMI, normal EF on Echo. IV rocephin continued for back abscess/ Osteomyelitis. H/H low to 7.5/22-- getting 2 units of blood.     Review of Systems   Constitutional: Positive for fatigue ( Grogginess). Negative for chills, diaphoresis and fever.   HENT: Negative for congestion, sore throat and voice change.    Eyes: Negative for photophobia and visual disturbance.   Respiratory: Negative for cough, shortness of breath, wheezing and stridor.    Cardiovascular: Negative for chest pain and leg swelling.   Gastrointestinal: Negative for abdominal distention, abdominal pain, constipation, diarrhea, nausea and vomiting.   Endocrine: Negative for polydipsia, polyphagia and polyuria.        Positive Hypoglycemia   Genitourinary: Negative for difficulty urinating, dysuria, flank pain, pelvic pain, urgency and vaginal discharge.   Musculoskeletal: Negative for back pain, joint swelling, neck pain and neck stiffness.   Skin: Positive for wound (With wound VAC). Negative for color change and rash.   Allergic/Immunologic: Negative for immunocompromised state.   Neurological: Positive for weakness. Negative for dizziness, syncope, numbness and headaches.   Hematological: Does not bruise/bleed easily.   Psychiatric/Behavioral: Negative for agitation, behavioral problems and confusion.     Objective:     Vital Signs (Most Recent):  Temp: 98.2 °F (36.8 °C) (06/02/19 1754)  Pulse: 104 (06/02/19 1754)  Resp: 18 (06/02/19 1754)  BP: (!) 148/88 (06/02/19 1754)  SpO2: 97 % (06/02/19 1159) Vital Signs (24h Range):  Temp:  [97.3 °F (36.3 °C)-99 °F (37.2 °C)] 98.2 °F (36.8 °C)  Pulse:  [] 104  Resp:  [18-22] 18  SpO2:  [94 %-99 %] 97 %  BP: (108-158)/(52-88) 148/88     Weight: 98.9 kg (218 lb 0.6 oz)  Body mass index is 35.19 kg/m².  No intake or output data  in the 24 hours ending 06/02/19 1813   Physical Exam   Constitutional: She is oriented to person, place, and time. She appears well-developed and well-nourished. No distress.   Elderly.  Unkept chronically ill-appearing.   HENT:   Head: Normocephalic and atraumatic.   Nose: Nose normal.   Mouth/Throat: Oropharynx is clear and moist.   Eyes: Pupils are equal, round, and reactive to light. Conjunctivae and EOM are normal. No scleral icterus.   Neck: Normal range of motion. Neck supple. No tracheal deviation present.   Cardiovascular: Normal rate, regular rhythm, normal heart sounds and intact distal pulses.   No murmur heard.  Pulmonary/Chest: Effort normal and breath sounds normal. No stridor. No respiratory distress. She has no wheezes. She has no rales.   Abdominal: Soft. Bowel sounds are normal. She exhibits no distension. There is no tenderness. There is no guarding.   Obese   Genitourinary:   Genitourinary Comments: deferred   Musculoskeletal: Normal range of motion. She exhibits no edema or deformity.   Neurological: She is alert and oriented to person, place, and time. No cranial nerve deficit.   Skin: Skin is warm and dry. Capillary refill takes 2 to 3 seconds. No rash noted. She is not diaphoretic.    Surgical incision lumbar approximated.    Psychiatric: She has a normal mood and affect. Her behavior is normal. Judgment and thought content normal.   Nursing note and vitals reviewed.      Significant Labs:   BMP:   Recent Labs   Lab 06/01/19  0545 06/02/19  0452   GLU 96 118*   * 131*   K 3.8 4.3   CL 94* 93*   CO2 30* 30*   BUN 9 8   CREATININE 0.7 0.7   CALCIUM 8.7 8.7   MG 1.4*  --      CBC:   Recent Labs   Lab 05/31/19  1940  06/01/19  0545 06/01/19  1203 06/01/19  1815 06/02/19  0452   WBC 22.29*  --  11.96  --   --  9.04   HGB 7.6*   < > 7.5*  7.5* 7.9* 8.0* 7.6*   HCT 24.3*   < > 24.2*  24.2* 24.8* 24.7* 23.6*     --  303  --   --  333    < > = values in this interval not displayed.      CMP:   Recent Labs   Lab 05/31/19 1940 06/01/19  0545 06/02/19  0452   * 132* 131*   K 3.5 3.8 4.3   CL 93* 94* 93*   CO2 27 30* 30*    96 118*   BUN 9 9 8   CREATININE 0.7 0.7 0.7   CALCIUM 8.9 8.7 8.7   PROT 5.6*  --   --    ALBUMIN 2.2*  --   --    BILITOT 0.3  --   --    ALKPHOS 71  --   --    AST 27  --   --    ALT 13  --   --    ANIONGAP 11 8 8   EGFRNONAA >60 >60 >60     Coagulation:   Recent Labs   Lab 06/01/19 0545 06/01/19  1129   INR 1.0  --    APTT 43.2* 35.6*     Magnesium:   Recent Labs   Lab 06/01/19  0545   MG 1.4*     POCT Glucose:   Recent Labs   Lab 06/02/19  0500 06/02/19  1127 06/02/19  1528   POCTGLUCOSE 127* 223* 275*     Troponin:   Recent Labs   Lab 05/31/19 1940 06/01/19  0545   TROPONINI 1.164* 1.278*     All pertinent labs within the past 24 hours have been reviewed.    Significant Imaging: I have reviewed all pertinent imaging results/findings within the past 24 hours.

## 2019-06-02 NOTE — PLAN OF CARE
Problem: Physical Therapy Goal  Goal: Physical Therapy Goal  1. Patient will perform bed mobility min A  2. Patient will perform transfers min A with appropriate AD  3. Patient will perform B LE TE x 20 reps to inc strength/act aundrea   Outcome: Ongoing (interventions implemented as appropriate)  Patient able to come supine to sit max A and sit to supine mod A; eob balance mod A r/t pain

## 2019-06-02 NOTE — PLAN OF CARE
Problem: Adult Inpatient Plan of Care  Goal: Plan of Care Review  Outcome: Ongoing (interventions implemented as appropriate)  Patient in bed resting quietly. Easily aroused. No acute distress noted at this time. AAOX4. C/O pain and nausea due to pain meds. PRN pain med and zofran administered with complete relief. Blood glucose monitoring. Cardiac monitoring HR 70s sinus rhythm. Plan of care reviewed, verbalized understanding. Safety measures in place, bed in lowest position,wheels locked, call light within reach, non-skid socks in use. Will continue to monitor.

## 2019-06-02 NOTE — ASSESSMENT & PLAN NOTE
Continue statin, asa, bb  Cardiology consult.   Further evaluation/diagnostics/interventions/consults pending course     Appears stable, Cards following

## 2019-06-02 NOTE — ASSESSMENT & PLAN NOTE
ch blood loss s/p back surgery sec to ABL Anemia    Transfuse 2 units of blood  Give Inj Venofer and B12

## 2019-06-02 NOTE — ASSESSMENT & PLAN NOTE
Continue Rocephin.  Patient followed by neuro surgery consider outpatient follow-up versus consult inpatient pending course  Further evaluation/diagnostics/interventions/consults pending course     Continue same tx here, check CRP/ ESR/ Procalc

## 2019-06-02 NOTE — PROGRESS NOTES
"[facility] Hospital Medicine  Progress Note    Patient Name: Ross Isbell  MRN: [id number]  Patient Class: IP- Inpatient   Admission Date: 5/31/2019  Length of Stay: 0 days  Attending Physician: Swetha Biswas MD  Primary Care Provider: Anna Penn MD        Subjective:     Principal Problem:Hypoglycemia    HPI:   Ross Isbell is a 73 y.o. female patient with a PMHx of Parkinson's disease, CAD, DM, and MI who presented for further evaluation of hypoglycemia.  It was reported patient's blood sugar prior to arrival at [facility] 38.  No modifying factors.  Associated symptoms include grogginess.  Prior treatment patient was giving IV glucose in route.  Initially improved on arrival to ER.  Patient's glucose then dropped again and was given additional glucose.  Hospital Medicine consulted.  Patient admitted.  To note:  " Pt had a spinal fusion, lumbar laminectomy, and debridement of a paraspinal abscess done by Dr. Nicolas (Neurosurgery) on 5/25 and was discharged 05/30/2019, with wound VAC and IV antibiotic (Rocephin) therapy.     Discharge Summary 5/30/19:     HPI:   Ross Isbell is a 73 year old female with a PMHx of DM II, Parkinson's, CORINE, MI, CAD, and Glaucoma who presented to the Emergency Department with c/o fatigue. Associated symptoms include: generalized weakness and BLE pain x 2 weeks. Pt was informed by home health nurse to go to ED for increasing weakness, abdominal pain and leg pain. ED workup showed: WBC 13.08K, Hgb/Hct 11.0/33.5, Na+ 129, lipase 17. UA (+) nitrites, few bacteria. CXR with no acute findings. CT abdomen/pelvis with contrast showed moderate inflammatory changes centered on the intervertebral disc space between the L2 and L3 vertebral bodies. There is bony destruction on both sides of the intervertebral disc space between the L2 and L3 vertebral bodies.  This is consistent with the patient's history and characteristic of discitis and osteomyelitis. ED " discussed case with Dr. Nicolas (neurosurgery) who reported pt did not need to be transferred out at this time. Obtain MRI thoracic/lumbar spine w wo contrast. Start pt on IV Vancomycin and Zosyn but consult Dr. Gomez (ID). Daughter: Uzma Godfrey [phone] is the surrogate decision maker. Admitted for Osteomyelitis of lumbar spine.     Procedure(s) (LRB):  FUSION, SPINE, POSTERIOR SPINAL COLUMN, LUMBAR, USING COMPUTER-ASSISTED NAVIGATION  FUSION, SPINE, LUMBAR, TLIF, POSTERIOR APPROACH, USING PEDICLE SCREW (N/A)  LAMINECTOMY, SPINE, LUMBAR  DEBRIDEMENT, ABSCESS, PARASPINAL (N/A)       Hospital Course:   5/24/19 Dr. Nicolas, Neurosurgeon, doesn't recommend spinal surgery, But Needle biopsy of L 2-3 disk abscess with cultures. This am, patient c/o of severe abdominal pain 20/10, Norco 10 mg ordered PRN with IV 2 mg Morphine PRN. Blood culture is Positive for GPC. Echo pending. Patient had severe back pain and refused OT/PT. Daughter stated she has had diarrhea x 1 month. Stool for C.Diff ordered. T. Max 101. IV Vanco and Zosyn. IR, Dr. Juárez, consulted for Needle biopsy. Daughter at bedside. ID consult pending. Cont Vanc and Zosyn. CT abdomen/pelvis positive for osteomyelitis.  To OR with Dr. Nicolas 25 May for laminectomy and vertebroplasty.  Post-procedure in ICU due to post-op hypotension.  Able to wean vasopressors.  Anemia due to blood loss related to the procedure.  Transfused 1 unit PRBC.  Interval improvement in abdominal and back pain.  Tolerated limited degree of physical therapy.  Pursuing skilled placement.  Discharge plan to transfer to [facility] skilled nursing [facility].  Complete 8 week Ceftriaxone 2 g daily IV stop date 23 July.  Weekly ESR and CRP.  PT and OT.  Prevena incisional wound VAC.      Hospital Course:  Ross Isbell is a 73 y.o. female patient with a PMHx of Parkinson's disease, CAD, DM, and MI, NH resident- admitted for severe Hypoglycemia. Pt had a spinal fusion, lumbar  laminectomy, and debridement of a paraspinal abscess done by Dr. Nicolas (Neurosurgery) on 5/25 and was discharged 05/30/2019 to SNF with wound VAC and IV antibiotic (Rocephin) therapy. She is doing much better, AAOx3, speech clear, c/o pain in her back at the surgery site. BS around 80- 100 mostly. Will change to D5NS- as Na is down to 132.       Interval History: She is doing much better, AAOx3, speech clear, c/o pain in her back at the surgery site. BS around 80- 100 mostly. Will change to D5NS- as Na is down to 132.       Review of Systems   Constitutional: Positive for fatigue ( Grogginess). Negative for chills, diaphoresis and fever.   HENT: Negative for congestion, sore throat and voice change.    Eyes: Negative for photophobia and visual disturbance.   Respiratory: Negative for cough, shortness of breath, wheezing and stridor.    Cardiovascular: Negative for chest pain and leg swelling.   Gastrointestinal: Negative for abdominal distention, abdominal pain, constipation, diarrhea, nausea and vomiting.   Endocrine: Negative for polydipsia, polyphagia and polyuria.        Positive Hypoglycemia   Genitourinary: Negative for difficulty urinating, dysuria, flank pain, pelvic pain, urgency and vaginal discharge.   Musculoskeletal: Negative for back pain, joint swelling, neck pain and neck stiffness.   Skin: Positive for wound (With wound VAC). Negative for color change and rash.   Allergic/Immunologic: Negative for immunocompromised state.   Neurological: Positive for weakness. Negative for dizziness, syncope, numbness and headaches.   Hematological: Does not bruise/bleed easily.   Psychiatric/Behavioral: Negative for agitation, behavioral problems and confusion.     Objective:     Vital Signs (Most Recent):  Temp: 98.5 °F (36.9 °C) (06/01/19 1537)  Pulse: 88 (06/01/19 1721)  Resp: 18 (06/01/19 0733)  BP: (!) 126/58 (06/01/19 1537)  SpO2: 95 % (06/01/19 1537) Vital Signs (24h Range):  Temp:  [97.8 °F (36.6 °C)-98.5  °F (36.9 °C)] 98.5 °F (36.9 °C)  Pulse:  [67-92] 88  Resp:  [18-20] 18  SpO2:  [95 %-100 %] 95 %  BP: (107-169)/(53-75) 126/58     Weight: 97.3 kg (214 lb 8.1 oz)  Body mass index is 34.62 kg/m².    Intake/Output Summary (Last 24 hours) at 6/1/2019 1901  Last data filed at 6/1/2019 1700  Gross per 24 hour   Intake 460 ml   Output --   Net 460 ml      Physical Exam   Constitutional: She is oriented to person, place, and time. She appears well-developed and well-nourished. No distress.   Elderly.  Unkept chronically ill-appearing.   HENT:   Head: Normocephalic and atraumatic.   Nose: Nose normal.   Mouth/Throat: Oropharynx is clear and moist.   Eyes: Pupils are equal, round, and reactive to light. Conjunctivae and EOM are normal. No scleral icterus.   Neck: Normal range of motion. Neck supple. No tracheal deviation present.   Cardiovascular: Normal rate, regular rhythm, normal heart sounds and intact distal pulses.   No murmur heard.  Pulmonary/Chest: Effort normal and breath sounds normal. No stridor. No respiratory distress. She has no wheezes. She has no rales.   Abdominal: Soft. Bowel sounds are normal. She exhibits no distension. There is no tenderness. There is no guarding.   Obese   Genitourinary:   Genitourinary Comments: deferred   Musculoskeletal: Normal range of motion. She exhibits no edema or deformity.   Neurological: She is alert and oriented to person, place, and time. No cranial nerve deficit.   Skin: Skin is warm and dry. Capillary refill takes 2 to 3 seconds. No rash noted. She is not diaphoretic.    Surgical incision lumbar approximated.    Psychiatric: She has a normal mood and affect. Her behavior is normal. Judgment and thought content normal.   Nursing note and vitals reviewed.      Significant Labs:   Blood Culture:   Recent Labs   Lab 06/01/19  0003 06/01/19  0010   LABBLOO No Growth to date No Growth to date     BMP:   Recent Labs   Lab 06/01/19  0545   GLU 96   *   K 3.8   CL 94*    CO2 30*   BUN 9   CREATININE 0.7   CALCIUM 8.7   MG 1.4*     CBC:   Recent Labs   Lab 05/31/19  1940 06/01/19  0545 06/01/19  1203 06/01/19  1815   WBC 22.29*  --  11.96  --   --    HGB 7.6*   < > 7.5*  7.5* 7.9* 8.0*   HCT 24.3*   < > 24.2*  24.2* 24.8* 24.7*     --  303  --   --     < > = values in this interval not displayed.     CMP:   Recent Labs   Lab 05/31/19 1940 06/01/19  0545   * 132*   K 3.5 3.8   CL 93* 94*   CO2 27 30*    96   BUN 9 9   CREATININE 0.7 0.7   CALCIUM 8.9 8.7   PROT 5.6*  --    ALBUMIN 2.2*  --    BILITOT 0.3  --    ALKPHOS 71  --    AST 27  --    ALT 13  --    ANIONGAP 11 8   EGFRNONAA >60 >60     Lactic Acid:   POCT Glucose:   Recent Labs   Lab 06/01/19  0627 06/01/19  1037 06/01/19  1618   POCTGLUCOSE 98 81 109     Respiratory Culture:   Troponin:   Recent Labs   Lab 05/31/19 1940 06/01/19  0545   TROPONINI 1.164* 1.278*     All pertinent labs within the past 24 hours have been reviewed.    Significant Imaging: I have reviewed all pertinent imaging results/findings within the past 24 hours.    Assessment/Plan:      * AMS sec to Hypoglycemia/ Metabolic Encephalopathy    Improved with treatment. Patient has DM on insulin. Monitor trends and optimization of DM therapies pending course.     Improved, IVF changed to D5NS    Alteration in skin integrity related to surgical incision  Wound care consult  Further evaluation/diagnostics/interventions/consults pending course         Streptococcal bacteremia  Patient with PICC line and continue course of Rocephin the patient is on outpatient.  Repeat cultures pending  Further evaluation/diagnostics/interventions/consults pending course         Osteomyelitis of lumbar spine  Continue Rocephin.  Patient followed by neuro surgery consider outpatient follow-up versus consult inpatient pending course  Further evaluation/diagnostics/interventions/consults pending course         Hypothyroidism    Continue home med  Check TSH.  Last trends elevated. Optimization/adjustments pending course.   Consider in cardiology etiology     TSH normal    Essential hypertension  Continue home meds.  Further evaluation/diagnostics/interventions/consults pending course         CAD (coronary artery disease)  Continue statin, asa, bb  Cardiology consult.   Further evaluation/diagnostics/interventions/consults pending course           VTE Risk Mitigation (From admission, onward)        Ordered     Place sequential compression device  Until discontinued      06/01/19 0020     Place QUIQUE hose  Until discontinued      06/01/19 0020              Swetha Biswas MD  Department of Hospital Medicine   [facility]

## 2019-06-02 NOTE — PT/OT/SLP PROGRESS
Physical Therapy  Treatment    Ross Isbell   MRN: [id number]   Admitting Diagnosis: Hypoglycemia    PT Received On: 05/31/19  PT Start Time: 0805     PT Stop Time: 0830    PT Total Time (min): 25 min       Billable Minutes:  Therapeutic Activity 15 and Therapeutic Exercise 10    Treatment Type: Treatment  PT/PTA: PT             General Precautions: Standard, fall, respiratory  Orthopedic Precautions: spinal precautions   Braces: N/A         Subjective:  Communicated with Sugar FRAZIER prior to session.      Pain/Comfort  Pain Rating 1: 5/10  Location - Side 1: Bilateral  Location 1: groin  Pain Addressed 1: Reposition, Cessation of Activity  Pain Rating Post-Intervention 1: 0/10(at rest)    Objective:   Patient found with: bed alarm, peripheral IV, telemetry, oxygen    Functional Mobility:  Bed Mobility: max A sup to sit and mod A sit to sup       Transfers: dep of 2 - le weakness       Gait: unable       Stairs: nt      Balance:   Static Sit: mod a  Dynamic Sit: mod/max A  Static Stand: n/a  Dynamic stand: n/a     Therapeutic Activities and Exercises:  PT reviewed patient on POC, le rom to do in bed, safety/fall precautions with mobility, d/c recs and role of PT.     AM-PAC 6 CLICK MOBILITY  How much help from another person does this patient currently need?   1 = Unable, Total/Dependent Assistance  2 = A lot, Maximum/Moderate Assistance  3 = A little, Minimum/Contact Guard/Supervision  4 = None, Modified [place]/Independent    Turning over in bed (including adjusting bedclothes, sheets and blankets)?: 2  Sitting down on and standing up from a chair with arms (e.g., wheelchair, bedside commode, etc.): 1  Moving from lying on back to sitting on the side of the bed?: 2  Moving to and from a bed to a chair (including a wheelchair)?: 1  Need to walk in hospital room?: 1  Climbing 3-5 steps with a railing?: 1  Basic Mobility Total Score: 8    AM-PAC Raw Score CMS G-Code Modifier Level of Impairment Assistance   6 CN  100% Total / Unable   7 - 9 CM 80 - 100% Maximal Assist   10 - 14 CL 60 - 80% Moderate Assist   15 - 19 CK 40 - 60% Moderate Assist   20 - 22 CJ 20 - 40% Minimal Assist   23 CI 1-20% SBA / CGA   24 CH 0% Independent/ Mod I     Patient left HOB elevated with all lines intact, call button in reach, bed alarm on and RN notified.    Assessment:  Ross Isbell is a 73 y.o. female with a medical diagnosis of Hypoglycemia and presents with impaired mobility.    Rehab identified problem list/impairments: Rehab identified problem list/impairments: weakness, impaired functional mobilty, decreased safety awareness, impaired endurance, gait instability, impaired balance, impaired self care skills, decreased lower extremity function    Rehab potential is fair.    Activity tolerance: Fair    Discharge recommendations: Discharge Facility/Level of Care Needs: nursing facility, skilled     Barriers to discharge:      Equipment recommendations: Equipment Needed After Discharge: (tbd)     GOALS:   Multidisciplinary Problems     Physical Therapy Goals        Problem: Physical Therapy Goal    Goal Priority Disciplines Outcome Goal Variances Interventions   Physical Therapy Goal     PT, PT/OT Ongoing (interventions implemented as appropriate)     Description:  1. Patient will perform bed mobility min A  2. Patient will perform transfers min A with appropriate AD  3. Patient will perform B LE TE x 20 reps to inc strength/act aundrea                    PLAN:    Patient to be seen 5 x/week  to address the above listed problems via gait training, therapeutic activities, therapeutic exercises  Plan of Care expires: 06/08/19  Plan of Care reviewed with: patient    PT G-Codes  Functional Assessment Tool Used: [facility]  Score: 9    Dell Keen, PT  06/02/2019

## 2019-06-02 NOTE — SUBJECTIVE & OBJECTIVE
Interval History: She is doing much better, AAOx3, speech clear, c/o pain in her back at the surgery site. BS around 80- 100 mostly. Will change to D5NS- as Na is down to 132.       Review of Systems   Constitutional: Positive for fatigue ( Grogginess). Negative for chills, diaphoresis and fever.   HENT: Negative for congestion, sore throat and voice change.    Eyes: Negative for photophobia and visual disturbance.   Respiratory: Negative for cough, shortness of breath, wheezing and stridor.    Cardiovascular: Negative for chest pain and leg swelling.   Gastrointestinal: Negative for abdominal distention, abdominal pain, constipation, diarrhea, nausea and vomiting.   Endocrine: Negative for polydipsia, polyphagia and polyuria.        Positive Hypoglycemia   Genitourinary: Negative for difficulty urinating, dysuria, flank pain, pelvic pain, urgency and vaginal discharge.   Musculoskeletal: Negative for back pain, joint swelling, neck pain and neck stiffness.   Skin: Positive for wound (With wound VAC). Negative for color change and rash.   Allergic/Immunologic: Negative for immunocompromised state.   Neurological: Positive for weakness. Negative for dizziness, syncope, numbness and headaches.   Hematological: Does not bruise/bleed easily.   Psychiatric/Behavioral: Negative for agitation, behavioral problems and confusion.     Objective:     Vital Signs (Most Recent):  Temp: 98.5 °F (36.9 °C) (06/01/19 1537)  Pulse: 88 (06/01/19 1721)  Resp: 18 (06/01/19 0733)  BP: (!) 126/58 (06/01/19 1537)  SpO2: 95 % (06/01/19 1537) Vital Signs (24h Range):  Temp:  [97.8 °F (36.6 °C)-98.5 °F (36.9 °C)] 98.5 °F (36.9 °C)  Pulse:  [67-92] 88  Resp:  [18-20] 18  SpO2:  [95 %-100 %] 95 %  BP: (107-169)/(53-75) 126/58     Weight: 97.3 kg (214 lb 8.1 oz)  Body mass index is 34.62 kg/m².    Intake/Output Summary (Last 24 hours) at 6/1/2019 1901  Last data filed at 6/1/2019 1700  Gross per 24 hour   Intake 460 ml   Output --   Net 460 ml       Physical Exam   Constitutional: She is oriented to person, place, and time. She appears well-developed and well-nourished. No distress.   Elderly.  Unkept chronically ill-appearing.   HENT:   Head: Normocephalic and atraumatic.   Nose: Nose normal.   Mouth/Throat: Oropharynx is clear and moist.   Eyes: Pupils are equal, round, and reactive to light. Conjunctivae and EOM are normal. No scleral icterus.   Neck: Normal range of motion. Neck supple. No tracheal deviation present.   Cardiovascular: Normal rate, regular rhythm, normal heart sounds and intact distal pulses.   No murmur heard.  Pulmonary/Chest: Effort normal and breath sounds normal. No stridor. No respiratory distress. She has no wheezes. She has no rales.   Abdominal: Soft. Bowel sounds are normal. She exhibits no distension. There is no tenderness. There is no guarding.   Obese   Genitourinary:   Genitourinary Comments: deferred   Musculoskeletal: Normal range of motion. She exhibits no edema or deformity.   Neurological: She is alert and oriented to person, place, and time. No cranial nerve deficit.   Skin: Skin is warm and dry. Capillary refill takes 2 to 3 seconds. No rash noted. She is not diaphoretic.    Surgical incision lumbar approximated.    Psychiatric: She has a normal mood and affect. Her behavior is normal. Judgment and thought content normal.   Nursing note and vitals reviewed.      Significant Labs:   Blood Culture:   Recent Labs   Lab 06/01/19  0003 06/01/19  0010   LABBLOO No Growth to date No Growth to date     BMP:   Recent Labs   Lab 06/01/19  0545   GLU 96   *   K 3.8   CL 94*   CO2 30*   BUN 9   CREATININE 0.7   CALCIUM 8.7   MG 1.4*     CBC:   Recent Labs   Lab 05/31/19  1940  06/01/19  0545 06/01/19  1203 06/01/19  1815   WBC 22.29*  --  11.96  --   --    HGB 7.6*   < > 7.5*  7.5* 7.9* 8.0*   HCT 24.3*   < > 24.2*  24.2* 24.8* 24.7*     --  303  --   --     < > = values in this interval not displayed.     CMP:    Recent Labs   Lab 05/31/19 1940 06/01/19  0545   * 132*   K 3.5 3.8   CL 93* 94*   CO2 27 30*    96   BUN 9 9   CREATININE 0.7 0.7   CALCIUM 8.9 8.7   PROT 5.6*  --    ALBUMIN 2.2*  --    BILITOT 0.3  --    ALKPHOS 71  --    AST 27  --    ALT 13  --    ANIONGAP 11 8   EGFRNONAA >60 >60     Lactic Acid:   POCT Glucose:   Recent Labs   Lab 06/01/19  0627 06/01/19  1037 06/01/19  1618   POCTGLUCOSE 98 81 109     Respiratory Culture:   Troponin:   Recent Labs   Lab 05/31/19 1940 06/01/19  0545   TROPONINI 1.164* 1.278*     All pertinent labs within the past 24 hours have been reviewed.    Significant Imaging: I have reviewed all pertinent imaging results/findings within the past 24 hours.

## 2019-06-02 NOTE — ASSESSMENT & PLAN NOTE
Improved with treatment. Patient has DM on insulin. Monitor trends and optimization of DM therapies pending course.     Improved, IVF changed to D5NS    Resolved, pt switched to regular diet and Insulin/ Metformin held to build back her glycogen reserves

## 2019-06-02 NOTE — ASSESSMENT & PLAN NOTE
Patient with PICC line and continue course of Rocephin the patient is on outpatient.  Repeat cultures pending  Further evaluation/diagnostics/interventions/consults pending course     All Cx NGTD

## 2019-06-02 NOTE — PROGRESS NOTES
[facility] -   Cardiology  Progress Note    Patient Name: Ross Isbell  MRN: [id number]  Admission Date: 5/31/2019  Hospital Length of Stay: 1 days  Code Status: Prior   Attending Physician: Swetha Biswas MD   Primary Care Physician: Anna Penn MD  Expected Discharge Date:   Principal Problem:Hypoglycemia    Subjective:     HPI:  Pt admitted with hypoglycemia.  She has h/o multivessel CAD with multivessel PCI 2015, AS, DM, HTN.  She is s/p recent back surgery, with infection, and on abs and also received PRBC recently for severe anemia.  She went home recently then had low blood sugar, symptomatic with sweats, and sxs resolved with elevation of blood sugar.  She denies recent angina or acute dyspnea.  ecg on admit is normal.    Troponin elevation noted 1.2    Severe anemia noted with Hg 7.5  Was put on IV Heparin gtt by admitting service overnight.  Echo last week showed normal EF, mod AS.  - stress MPI 2018.    Hospital Course:   6/2/19:  NO CP OR DYSPNEA.  NO ACUTE CV ISSUES NOTED.          Review of Systems   Constitution: Positive for malaise/fatigue.   HENT: Negative.    Eyes: Negative.    Cardiovascular: Negative.    Respiratory: Negative.    Endocrine: Negative.    Hematologic/Lymphatic: Negative.    Skin: Negative.    Musculoskeletal: Positive for arthritis, back pain and joint pain.   Gastrointestinal: Negative.    Genitourinary: Negative.    Neurological: Positive for weakness.   Psychiatric/Behavioral: Negative.    Allergic/Immunologic: Negative.      Objective:     Vital Signs (Most Recent):  Temp: 97.3 °F (36.3 °C) (06/02/19 0730)  Pulse: 85 (06/02/19 0900)  Resp: 18 (06/02/19 0730)  BP: (!) 141/64 (06/02/19 0730)  SpO2: 96 % (06/02/19 0730) Vital Signs (24h Range):  Temp:  [97.3 °F (36.3 °C)-98.9 °F (37.2 °C)] 97.3 °F (36.3 °C)  Pulse:  [73-92] 85  Resp:  [18-19] 18  SpO2:  [94 %-99 %] 96 %  BP: (108-158)/(52-70) 141/64     Weight: 98.9 kg (218 lb 0.6 oz)  Body mass index is  35.19 kg/m².     SpO2: 96 %  O2 Device (Oxygen Therapy): nasal cannula      Intake/Output Summary (Last 24 hours) at 6/2/2019 1152  Last data filed at 6/1/2019 1700  Gross per 24 hour   Intake 340 ml   Output --   Net 340 ml       Lines/Drains/Airways     Peripherally Inserted Central Catheter Line                 PICC Double Lumen 05/25/19 2040 left basilic 7 days          Peripheral Intravenous Line                 Peripheral IV - Single Lumen 05/31/19 18 G Right Hand 2 days                Physical Exam   Constitutional: She is oriented to person, place, and time. Vital signs are normal. She appears well-developed and well-nourished. She is active and cooperative. She does not have a sickly appearance. She does not appear ill. No distress.   HENT:   Head: Normocephalic.   Neck: Neck supple. Normal carotid pulses, no hepatojugular reflux and no JVD present. Carotid bruit is not present. No thyromegaly present.   Cardiovascular: Normal rate, regular rhythm, S1 normal, S2 normal and normal pulses. PMI is not displaced. Exam reveals no gallop and no friction rub.   Murmur heard.   Harsh midsystolic murmur is present with a grade of 2/6 at the upper right sternal border.  Pulses:       Radial pulses are 2+ on the right side, and 2+ on the left side.   Pulmonary/Chest: Effort normal and breath sounds normal. She has no wheezes. She has no rales.   Abdominal: Soft. Normal appearance and bowel sounds are normal. She exhibits no pulsatile liver, no abdominal bruit, no ascites and no mass. There is no tenderness.   OBESE   Musculoskeletal: She exhibits no edema.   Lymphadenopathy:     She has no cervical adenopathy.   Neurological: She is alert and oriented to person, place, and time.   Skin: Skin is warm. She is not diaphoretic.   Psychiatric: She has a normal mood and affect. Her behavior is normal.   Nursing note and vitals reviewed.      Significant Labs:   BMP:   Recent Labs   Lab 05/31/19 1940 06/01/19  7171  06/02/19  0452    96 118*   * 132* 131*   K 3.5 3.8 4.3   CL 93* 94* 93*   CO2 27 30* 30*   BUN 9 9 8   CREATININE 0.7 0.7 0.7   CALCIUM 8.9 8.7 8.7   MG  --  1.4*  --    , CMP   Recent Labs   Lab 05/31/19 1940 06/01/19  0545 06/02/19  0452   * 132* 131*   K 3.5 3.8 4.3   CL 93* 94* 93*   CO2 27 30* 30*    96 118*   BUN 9 9 8   CREATININE 0.7 0.7 0.7   CALCIUM 8.9 8.7 8.7   PROT 5.6*  --   --    ALBUMIN 2.2*  --   --    BILITOT 0.3  --   --    ALKPHOS 71  --   --    AST 27  --   --    ALT 13  --   --    ANIONGAP 11 8 8   ESTGFRAFRICA >60 >60 >60   EGFRNONAA >60 >60 >60   , CBC   Recent Labs   Lab 05/31/19 1940 06/01/19  0545 06/01/19  1203 06/01/19  1815 06/02/19  0452   WBC 22.29*  --  11.96  --   --  9.04   HGB 7.6*   < > 7.5*  7.5* 7.9* 8.0* 7.6*   HCT 24.3*   < > 24.2*  24.2* 24.8* 24.7* 23.6*     --  303  --   --  333    < > = values in this interval not displayed.   , INR   Recent Labs   Lab 06/01/19  0023 06/01/19 0545   INR 1.0 1.0    and Troponin   Recent Labs   Lab 05/31/19 1940 06/01/19 0545   TROPONINI 1.164* 1.278*       Significant Imaging: Echocardiogram:   2D echo with color flow doppler:   Results for orders placed or performed during the hospital encounter of 05/31/19   2D echo with color flow doppler   Result Value Ref Range    QEF 55 55 - 65    Diastolic Dysfunction No      Assessment and Plan:     STABLE CAD/CV STATUS.  ELEVATED TROPONIN DUE TO SUPPLY/DEMAND ISCHEMIA WITH INFECTION, SEVERE ANEMIA, UNDERLYING CAD, ETC.  ECHO SHOWS NORMAL EF, NO WMA, CHRONIC AORTIC STENOSIS.  [facility] SERVICE ADVISED TO CONSIDER TRANSFUSING PRBC TO ACHIEVE HIGHER HEMOGLOBIN STATUS.  CONTINUE MEDICAL TX FOR CAD.  F/U ECHO IN FUTURE FOR AS.  F/U WITH DR. FERNANDEZ, [facility], [facility] AFTER DISCHARGE.      * AMS sec to Hypoglycemia/ Metabolic Encephalopathy  See management plan detailed above.     Elevated troponin  See management plan detailed above.     Anemia  See  management plan detailed above.     History of PTCA  See management plan detailed above.     CAD, multiple vessel  See management plan detailed above.     Nonrheumatic aortic valve stenosis  See management plan detailed above.     CAD: S/P coronary artery stent placement  See management plan detailed above.     Essential hypertension  See management plan detailed above.     CAD (coronary artery disease)  See management plan detailed above.         VTE Risk Mitigation (From admission, onward)        Ordered     Place sequential compression device  Until discontinued      06/01/19 0020     Place QUIQUE hose  Until discontinued      06/01/19 0020          Enrrique Huffman MD  Cardiology  [facility]

## 2019-06-02 NOTE — HOSPITAL COURSE
Pt admitted with hypoglycemia.  She has h/o multivessel CAD with multivessel PCI 2015, AS, DM, HTN.  She is s/p recent back surgery, with infection, and on abs and also received PRBC recently for severe anemia.  She went home recently then had low blood sugar, symptomatic with sweats, and sxs resolved with elevation of blood sugar.  She denies recent angina or acute dyspnea.  ecg on admit is normal.    Troponin elevation noted 1.2    Severe anemia noted with Hg 7.5  Was put on IV Heparin gtt by admitting service overnight.  Echo last week showed normal EF, mod AS.  - stress MPI 2018.    6/2/19:  NO CP OR DYSPNEA.  NO ACUTE CV ISSUES NOTED.      06/03 no chest pain. HGB up to 9.7 after PRBC transfusion. C/o severe back pain

## 2019-06-02 NOTE — SUBJECTIVE & OBJECTIVE
Review of Systems   Constitution: Positive for malaise/fatigue.   HENT: Negative.    Eyes: Negative.    Cardiovascular: Negative.    Respiratory: Negative.    Endocrine: Negative.    Hematologic/Lymphatic: Negative.    Skin: Negative.    Musculoskeletal: Positive for arthritis, back pain and joint pain.   Gastrointestinal: Negative.    Genitourinary: Negative.    Neurological: Positive for weakness.   Psychiatric/Behavioral: Negative.    Allergic/Immunologic: Negative.      Objective:     Vital Signs (Most Recent):  Temp: 97.3 °F (36.3 °C) (06/02/19 0730)  Pulse: 85 (06/02/19 0900)  Resp: 18 (06/02/19 0730)  BP: (!) 141/64 (06/02/19 0730)  SpO2: 96 % (06/02/19 0730) Vital Signs (24h Range):  Temp:  [97.3 °F (36.3 °C)-98.9 °F (37.2 °C)] 97.3 °F (36.3 °C)  Pulse:  [73-92] 85  Resp:  [18-19] 18  SpO2:  [94 %-99 %] 96 %  BP: (108-158)/(52-70) 141/64     Weight: 98.9 kg (218 lb 0.6 oz)  Body mass index is 35.19 kg/m².     SpO2: 96 %  O2 Device (Oxygen Therapy): nasal cannula      Intake/Output Summary (Last 24 hours) at 6/2/2019 1152  Last data filed at 6/1/2019 1700  Gross per 24 hour   Intake 340 ml   Output --   Net 340 ml       Lines/Drains/Airways     Peripherally Inserted Central Catheter Line                 PICC Double Lumen 05/25/19 2040 left basilic 7 days          Peripheral Intravenous Line                 Peripheral IV - Single Lumen 05/31/19 18 G Right Hand 2 days                Physical Exam   Constitutional: She is oriented to person, place, and time. Vital signs are normal. She appears well-developed and well-nourished. She is active and cooperative. She does not have a sickly appearance. She does not appear ill. No distress.   HENT:   Head: Normocephalic.   Neck: Neck supple. Normal carotid pulses, no hepatojugular reflux and no JVD present. Carotid bruit is not present. No thyromegaly present.   Cardiovascular: Normal rate, regular rhythm, S1 normal, S2 normal and normal pulses. PMI is not  displaced. Exam reveals no gallop and no friction rub.   Murmur heard.   Harsh midsystolic murmur is present with a grade of 2/6 at the upper right sternal border.  Pulses:       Radial pulses are 2+ on the right side, and 2+ on the left side.   Pulmonary/Chest: Effort normal and breath sounds normal. She has no wheezes. She has no rales.   Abdominal: Soft. Normal appearance and bowel sounds are normal. She exhibits no pulsatile liver, no abdominal bruit, no ascites and no mass. There is no tenderness.   OBESE   Musculoskeletal: She exhibits no edema.   Lymphadenopathy:     She has no cervical adenopathy.   Neurological: She is alert and oriented to person, place, and time.   Skin: Skin is warm. She is not diaphoretic.   Psychiatric: She has a normal mood and affect. Her behavior is normal.   Nursing note and vitals reviewed.      Significant Labs:   BMP:   Recent Labs   Lab 05/31/19 1940 06/01/19 0545 06/02/19  0452    96 118*   * 132* 131*   K 3.5 3.8 4.3   CL 93* 94* 93*   CO2 27 30* 30*   BUN 9 9 8   CREATININE 0.7 0.7 0.7   CALCIUM 8.9 8.7 8.7   MG  --  1.4*  --    , CMP   Recent Labs   Lab 05/31/19 1940 06/01/19 0545 06/02/19  0452   * 132* 131*   K 3.5 3.8 4.3   CL 93* 94* 93*   CO2 27 30* 30*    96 118*   BUN 9 9 8   CREATININE 0.7 0.7 0.7   CALCIUM 8.9 8.7 8.7   PROT 5.6*  --   --    ALBUMIN 2.2*  --   --    BILITOT 0.3  --   --    ALKPHOS 71  --   --    AST 27  --   --    ALT 13  --   --    ANIONGAP 11 8 8   ESTGFRAFRICA >60 >60 >60   EGFRNONAA >60 >60 >60   , CBC   Recent Labs   Lab 05/31/19 1940 06/01/19 0545 06/01/19  1203 06/01/19  1815 06/02/19  0452   WBC 22.29*  --  11.96  --   --  9.04   HGB 7.6*   < > 7.5*  7.5* 7.9* 8.0* 7.6*   HCT 24.3*   < > 24.2*  24.2* 24.8* 24.7* 23.6*     --  303  --   --  333    < > = values in this interval not displayed.   , INR   Recent Labs   Lab 06/01/19  0023 06/01/19  0545   INR 1.0 1.0    and Troponin   Recent Labs   Lab  05/31/19 1940 06/01/19  0545   TROPONINI 1.164* 1.278*       Significant Imaging: Echocardiogram:   2D echo with color flow doppler:   Results for orders placed or performed during the hospital encounter of 05/31/19   2D echo with color flow doppler   Result Value Ref Range    QEF 55 55 - 65    Diastolic Dysfunction No

## 2019-06-02 NOTE — PROGRESS NOTES
"[facility] Hospital Medicine  Progress Note    Patient Name: Ross Isbell  MRN: [id number]  Patient Class: IP- Inpatient   Admission Date: 5/31/2019  Length of Stay: 1 days  Attending Physician: Swetha Biswas MD  Primary Care Provider: Anna Penn MD        Subjective:     Principal Problem:Hypoglycemia    HPI:   Ross Isbell is a 73 y.o. female patient with a PMHx of Parkinson's disease, CAD, DM, and MI who presented for further evaluation of hypoglycemia.  It was reported patient's blood sugar prior to arrival at [facility].  No modifying factors.  Associated symptoms include grogginess.  Prior treatment patient was giving IV glucose in route.  Initially improved on arrival to ER.  Patient's glucose then dropped again and was given additional glucose.  Hospital Medicine consulted.  Patient admitted.  To note:  " Pt had a spinal fusion, lumbar laminectomy, and debridement of a paraspinal abscess done by Dr. Nicolas (Neurosurgery) on 5/25 and was discharged 05/30/2019, with wound VAC and IV antibiotic (Rocephin) therapy.     Discharge Summary 5/30/19:     HPI:   Ross Isbell is a 73 year old female with a PMHx of DM II, Parkinson's, CORINE, MI, CAD, and Glaucoma who presented to the Emergency Department with c/o fatigue. Associated symptoms include: generalized weakness and BLE pain x 2 weeks. Pt was informed by home health nurse to go to ED for increasing weakness, abdominal pain and leg pain. ED workup showed: WBC 13.08K, Hgb/Hct 11.0/33.5, Na+ 129, lipase 17. UA (+) nitrites, few bacteria. CXR with no acute findings. CT abdomen/pelvis with contrast showed moderate inflammatory changes centered on the intervertebral disc space between the L2 and L3 vertebral bodies. There is bony destruction on both sides of the intervertebral disc space between the L2 and L3 vertebral bodies.  This is consistent with the patient's history and characteristic of discitis and osteomyelitis. ED " discussed case with Dr. Nicolas (neurosurgery) who reported pt did not need to be transferred out at this time. Obtain MRI thoracic/lumbar spine w wo contrast. Start pt on IV Vancomycin and Zosyn but consult Dr. Gomez (ID). Daughter: Uzma Godfrey [phone] is the surrogate decision maker. Admitted for Osteomyelitis of lumbar spine.     Procedure(s) (LRB):  FUSION, SPINE, POSTERIOR SPINAL COLUMN, LUMBAR, USING COMPUTER-ASSISTED NAVIGATION  FUSION, SPINE, LUMBAR, TLIF, POSTERIOR APPROACH, USING PEDICLE SCREW (N/A)  LAMINECTOMY, SPINE, LUMBAR  DEBRIDEMENT, ABSCESS, PARASPINAL (N/A)       Hospital Course:   5/24/19 Dr. Nicolas, Neurosurgeon, doesn't recommend spinal surgery, But Needle biopsy of L 2-3 disk abscess with cultures. This am, patient c/o of severe abdominal pain 20/10, Norco 10 mg ordered PRN with IV 2 mg Morphine PRN. Blood culture is Positive for GPC. Echo pending. Patient had severe back pain and refused OT/PT. Daughter stated she has had diarrhea x 1 month. Stool for C.Diff ordered. T. Max 101. IV Vanco and Zosyn. IR, Dr. Juárez, consulted for Needle biopsy. Daughter at bedside. ID consult pending. Cont Vanc and Zosyn. CT abdomen/pelvis positive for osteomyelitis.  To OR with Dr. Nicolas 25 May for laminectomy and vertebroplasty.  Post-procedure in ICU due to post-op hypotension.  Able to wean vasopressors.  Anemia due to blood loss related to the procedure.  Transfused 1 unit PRBC.  Interval improvement in abdominal and back pain.  Tolerated limited degree of physical therapy.  Pursuing skilled placement.  Discharge plan to transfer to [facility] skilled nursing [facility].  Complete 8 week Ceftriaxone 2 g daily IV stop date 23 July.  Weekly ESR and CRP.  PT and OT.  Prevena incisional wound VAC.      Hospital Course:  Ross Isbell is a 73 y.o. female patient with a PMHx of Parkinson's disease, CAD, DM, and MI, NH resident- admitted for severe Hypoglycemia. Pt had a spinal fusion, lumbar  laminectomy, and debridement of a paraspinal abscess done by Dr. Nicolas (Neurosurgery) on 5/25 and was discharged 05/30/2019 to SNF with wound VAC and IV antibiotic (Rocephin) therapy. She is doing much better, AAOx3, speech clear, c/o pain in her back at the surgery site. BS around 80- 100 mostly. Will change to D5NS- as Na is down to 132.     6/2- doing much better. AAOx3, speech clear, BS improved, no hypoglycemia since admit. Cardiology following and thinks that elevated Trop as Demand ischemia and not NSTEMI, normal EF on Echo. IV rocephin continued for back abscess/ Osteomyelitis. H/H low to 7.5/22-- getting 2 units of blood.     Interval History: doing much better. AAOx3, speech clear, BS improved, no hypoglycemia since admit. Cardiology following and thinks that elevated Trop as Demand ischemia and not NSTEMI, normal EF on Echo. IV rocephin continued for back abscess/ Osteomyelitis. H/H low to 7.5/22-- getting 2 units of blood.     Review of Systems   Constitutional: Positive for fatigue ( Grogginess). Negative for chills, diaphoresis and fever.   HENT: Negative for congestion, sore throat and voice change.    Eyes: Negative for photophobia and visual disturbance.   Respiratory: Negative for cough, shortness of breath, wheezing and stridor.    Cardiovascular: Negative for chest pain and leg swelling.   Gastrointestinal: Negative for abdominal distention, abdominal pain, constipation, diarrhea, nausea and vomiting.   Endocrine: Negative for polydipsia, polyphagia and polyuria.        Positive Hypoglycemia   Genitourinary: Negative for difficulty urinating, dysuria, flank pain, pelvic pain, urgency and vaginal discharge.   Musculoskeletal: Negative for back pain, joint swelling, neck pain and neck stiffness.   Skin: Positive for wound (With wound VAC). Negative for color change and rash.   Allergic/Immunologic: Negative for immunocompromised state.   Neurological: Positive for weakness. Negative for dizziness,  syncope, numbness and headaches.   Hematological: Does not bruise/bleed easily.   Psychiatric/Behavioral: Negative for agitation, behavioral problems and confusion.     Objective:     Vital Signs (Most Recent):  Temp: 98.2 °F (36.8 °C) (06/02/19 1754)  Pulse: 104 (06/02/19 1754)  Resp: 18 (06/02/19 1754)  BP: (!) 148/88 (06/02/19 1754)  SpO2: 97 % (06/02/19 1159) Vital Signs (24h Range):  Temp:  [97.3 °F (36.3 °C)-99 °F (37.2 °C)] 98.2 °F (36.8 °C)  Pulse:  [] 104  Resp:  [18-22] 18  SpO2:  [94 %-99 %] 97 %  BP: (108-158)/(52-88) 148/88     Weight: 98.9 kg (218 lb 0.6 oz)  Body mass index is 35.19 kg/m².  No intake or output data in the 24 hours ending 06/02/19 1813   Physical Exam   Constitutional: She is oriented to person, place, and time. She appears well-developed and well-nourished. No distress.   Elderly.  Unkept chronically ill-appearing.   HENT:   Head: Normocephalic and atraumatic.   Nose: Nose normal.   Mouth/Throat: Oropharynx is clear and moist.   Eyes: Pupils are equal, round, and reactive to light. Conjunctivae and EOM are normal. No scleral icterus.   Neck: Normal range of motion. Neck supple. No tracheal deviation present.   Cardiovascular: Normal rate, regular rhythm, normal heart sounds and intact distal pulses.   No murmur heard.  Pulmonary/Chest: Effort normal and breath sounds normal. No stridor. No respiratory distress. She has no wheezes. She has no rales.   Abdominal: Soft. Bowel sounds are normal. She exhibits no distension. There is no tenderness. There is no guarding.   Obese   Genitourinary:   Genitourinary Comments: deferred   Musculoskeletal: Normal range of motion. She exhibits no edema or deformity.   Neurological: She is alert and oriented to person, place, and time. No cranial nerve deficit.   Skin: Skin is warm and dry. Capillary refill takes 2 to 3 seconds. No rash noted. She is not diaphoretic.    Surgical incision lumbar approximated.    Psychiatric: She has a normal mood  and affect. Her behavior is normal. Judgment and thought content normal.   Nursing note and vitals reviewed.      Significant Labs:   BMP:   Recent Labs   Lab 06/01/19 0545 06/02/19  0452   GLU 96 118*   * 131*   K 3.8 4.3   CL 94* 93*   CO2 30* 30*   BUN 9 8   CREATININE 0.7 0.7   CALCIUM 8.7 8.7   MG 1.4*  --      CBC:   Recent Labs   Lab 05/31/19 1940 06/01/19 0545 06/01/19  1203 06/01/19  1815 06/02/19  0452   WBC 22.29*  --  11.96  --   --  9.04   HGB 7.6*   < > 7.5*  7.5* 7.9* 8.0* 7.6*   HCT 24.3*   < > 24.2*  24.2* 24.8* 24.7* 23.6*     --  303  --   --  333    < > = values in this interval not displayed.     CMP:   Recent Labs   Lab 05/31/19 1940 06/01/19 0545 06/02/19  0452   * 132* 131*   K 3.5 3.8 4.3   CL 93* 94* 93*   CO2 27 30* 30*    96 118*   BUN 9 9 8   CREATININE 0.7 0.7 0.7   CALCIUM 8.9 8.7 8.7   PROT 5.6*  --   --    ALBUMIN 2.2*  --   --    BILITOT 0.3  --   --    ALKPHOS 71  --   --    AST 27  --   --    ALT 13  --   --    ANIONGAP 11 8 8   EGFRNONAA >60 >60 >60     Coagulation:   Recent Labs   Lab 06/01/19 0545 06/01/19  1129   INR 1.0  --    APTT 43.2* 35.6*     Magnesium:   Recent Labs   Lab 06/01/19  0545   MG 1.4*     POCT Glucose:   Recent Labs   Lab 06/02/19  0500 06/02/19  1127 06/02/19  1528   POCTGLUCOSE 127* 223* 275*     Troponin:   Recent Labs   Lab 05/31/19 1940 06/01/19  0545   TROPONINI 1.164* 1.278*     All pertinent labs within the past 24 hours have been reviewed.    Significant Imaging: I have reviewed all pertinent imaging results/findings within the past 24 hours.    Assessment/Plan:      * AMS sec to Hypoglycemia/ Metabolic Encephalopathy    Improved with treatment. Patient has DM on insulin. Monitor trends and optimization of DM therapies pending course.     Improved, IVF changed to D5NS    Resolved, pt switched to regular diet and Insulin/ Metformin held to build back her glycogen reserves    Elevated troponin  Cards following--  likely Demand ischemia       CAD (coronary artery disease)  Continue statin, asa, bb  Cardiology consult.   Further evaluation/diagnostics/interventions/consults pending course     Appears stable, Cards following        Essential hypertension  Continue home meds.  Further evaluation/diagnostics/interventions/consults pending course         Hypothyroidism    Continue home med  Check TSH. Last trends elevated. Optimization/adjustments pending course.   Consider in cardiology etiology     TSH normal    Osteomyelitis of lumbar spine  Continue Rocephin.  Patient followed by neuro surgery consider outpatient follow-up versus consult inpatient pending course  Further evaluation/diagnostics/interventions/consults pending course     Continue same tx here, check CRP/ ESR/ Procalc        Anemia  ch blood loss s/p back surgery sec to ABL Anemia    Transfuse 2 units of blood  Give Inj Venofer and B12      Alteration in skin integrity related to surgical incision  Wound care consult  Further evaluation/diagnostics/interventions/consults pending course         Streptococcal bacteremia  Patient with PICC line and continue course of Rocephin the patient is on outpatient.  Repeat cultures pending  Further evaluation/diagnostics/interventions/consults pending course     All Cx NGTD        VTE Risk Mitigation (From admission, onward)        Ordered     Place sequential compression device  Until discontinued      06/01/19 0020     Place QUIQUE hose  Until discontinued      06/01/19 0020              Swetha Biswas MD  Department of Hospital Medicine   [facility]

## 2019-06-02 NOTE — PT/OT/SLP EVAL
Occupational Therapy   Evaluation    Name: Ross Isbell  MRN: [id number]  Admitting Diagnosis:  Hypoglycemia      Recommendations:     Discharge Recommendations: nursing facility, skilled  Discharge Equipment Recommendations:  (TBD)  Barriers to discharge:  Decreased caregiver support    Assessment:     Ross Isbell is a 73 y.o. female with a medical diagnosis of Hypoglycemia.  She presents with DEBILITY, PAIN, IMPAIRED ADLS, FUNCTIONAL MOBILITY AND SAFETY AWARENESS. Performance deficits affecting function: weakness, impaired endurance, gait instability, impaired functional mobilty, impaired self care skills, impaired balance, decreased coordination, decreased safety awareness, pain, impaired coordination, orthopedic precautions.      Rehab Prognosis: Fair; patient would benefit from acute skilled OT services to address these deficits and reach maximum level of function.       Plan:     Patient to be seen 3 x/week to address the above listed problems via self-care/home management, therapeutic activities, therapeutic exercises  · Plan of Care Expires: 06/09/19  · Plan of Care Reviewed with: patient    Subjective     Chief Complaint: PAIN IN GROIN WITH SITTING UP  Patient/Family Comments/goals: RETURN TO [facility]    Occupational Profile:  Living Environment: LIVES WITH SPOUSE IN ONE STORY HOME, NO STEPS TO ENTER  Previous level of function: PRIOR TO HOSPITAL ADMIT, PT PRIMARILY W/C BOUND, SPONGE BATHS, ASSISTANCE FROM ; RECENTLY TRANSFERRED TO NURSING HOME FOR THERAPY 5/30/19, RETURNED TO [facility] [place]  Roles and Routines: MINIMAL  Equipment Used at Home:  wheelchair, grab bar, bath bench, bedside commode, hospital bed  Assistance upon Discharge: LIMITED PHYSICAL ASSISTANCE FROM     Pain/Comfort:  · Pain Rating 1: 9/10(SITTING EOB)  · Location - Side 1: Bilateral  · Location - Orientation 1: generalized  · Location 1: groin  · Pain Addressed 1: Cessation of Activity, Reposition, Nurse notified,  Distraction  · Pain Rating Post-Intervention 1: 3/10(SUPINE FOLLOWING BED MOBILITY)    Patients cultural, spiritual, [employer]aritan conflicts given the current situation:      Objective:     Communicated with: NURSE CONNELLY prior to session.  Patient found HOB elevated with bed alarm, peripheral IV, telemetry, oxygen upon OT entry to room.    General Precautions: Standard, fall, respiratory   Orthopedic Precautions:spinal precautions   Braces: N/A     Occupational Performance:    Bed Mobility:    · Patient completed Rolling/Turning to Right with maximal assistance  · Patient completed Scooting/Bridging with maximal assistance  · Patient completed Supine to Sit with maximal assistance  · Patient completed Sit to Supine with moderate assistance      Activities of Daily Living:  · Toileting: dependence INCONTINENT    Cognitive/Visual Perceptual:  Cognitive/Psychosocial Skills:     -       Oriented to: Person, Place, Time and Situation   -       Follows Commands/attention:Follows two-step commands  -       Memory: No Deficits noted  -       Safety awareness/insight to disability: impaired     Physical Exam:  Balance:    -       POOR  Upper Extremity Range of Motion:     -       Right Upper Extremity: WFL  -       Left Upper Extremity: WFL  Upper Extremity Strength:    -       Right Upper Extremity: WFL  -       Left Upper Extremity: WFL    AMPAC 6 Click ADL:  AMPAC Total Score: 12    Treatment & Education:  PT LIMITED TO PARTICIPATION IN OT EVALUATION DUE TO INCREASED GROIN SPASMS PER REPORT ONCE SITTING EOB; PT WAS FOUND EATING WITH HOB ELEVATED; ATTEMPTED TO SIT PT SIDE OF BED TO FINISH REMAINING BREAKFAST, HOWEVER SHE HAS INCREASED GROIN SPASMS/PAIN, AS WELL AS SATURATED LINEN AND BRIEF DUE TO URINARY INCONTINENCE, AS WELL AS ACTIVE DRAINAGE FROM INCISION SITE WITH BANDAGE SATURATED. NURSING NOTIFIED AS WELL AS PCA. PT RETURNED TO SUPINE WITH HOB ELEVATED.    PT WILL BENEFIT FROM SKILLED OT SERVICES TO INCREASE FUNCTIONAL  INDEPENDENCE AND SAFETY AWARENESS.   Education:    Patient left HOB elevated with all lines intact, call button in reach, bed alarm on and NURSING STAFF notified    GOALS:   Multidisciplinary Problems     Occupational Therapy Goals        Problem: Occupational Therapy Goal    Goal Priority Disciplines Outcome Interventions   Occupational Therapy Goal     OT, PT/OT     Description:  Goals to be met by: 6/9/19     Patient will increase functional independence with ADLs by performing:    UE Dressing with Moderate Assistance.  Grooming while EOB with Contact Guard Assistance.  Toileting from bedside commode with Maximum Assistance for hygiene and clothing management.   Toilet transfer to bedside commode with Maximum Assistance.  Upper extremity exercise program x10 reps per handout, with assistance as needed.                      History:     Past Medical History:   Diagnosis Date    Abnormal nuclear stress test 11/1/2015    Anemia 6/1/2019    Background diabetic retinopathy 12/28/2015    CAD (coronary artery disease) 2002    stents    Cataract     Gait disorder 10/12/2012    Glaucoma     History of PTCA 6/1/2019    MI (myocardial infarction)     Obesity     CORINE (obstructive sleep apnea)     Other and unspecified hyperlipidemia     PD (Parkinson's disease) 2002    tremor-predominant (per patient)    Type II or unspecified type diabetes mellitus without mention of complication, not stated as uncontrolled 2002      am    Unspecified essential hypertension        Past Surgical History:   Procedure Laterality Date    BREAST BIOPSY Left     benign - about 3 yrs ago    CORONARY ANGIOPLASTY  2002    CORONARY STENT PLACEMENT  2002    DEBRIDEMENT, ABSCESS, PARASPINAL N/A 5/25/2019    Performed by [facility] at [facility] OR    DILATION AND CURETTAGE OF UTERUS      FOREIGN BODY REMOVAL      FUSION, SPINE, LUMBAR, TLIF, POSTERIOR APPROACH, USING PEDICLE SCREW N/A 5/25/2019    Performed by Stanislav MONTAGUE  MD Maria Isabel at [facility] OR    FUSION, SPINE, POSTERIOR SPINAL COLUMN, LUMBAR, USING COMPUTER-ASSISTED NAVIGATION  5/25/2019    Performed by [facility]islav Nicolas MD at [facility] OR    HEART CATH-LEFT Left 11/5/2015    Performed by Constance Pace MD at [facility] CATH LAB    HEART CATH-LEFT Left 11/2/2015    Performed by Constance [facility]ace MD at [facility] CATH LAB    HEART CATH-LEFT/pci stent lad Left 11/10/2015    Performed by Constance [facility]ace MD at [facility] CATH LAB    INNER EAR SURGERY      LAMINECTOMY, SPINE, LUMBAR  5/25/2019    Performed by [facility]av [facility] MD at [facility] OR    TONSILLECTOMY, ADENOIDECTOMY         Time Tracking:     OT Date of Treatment: 06/02/19  OT Start Time: 0840  OT Stop Time: 0855  OT Total Time (min): 15 min    Billable Minutes:Evaluation 15    Sharda Ramos OT  6/2/2019

## 2019-06-02 NOTE — PLAN OF CARE
Problem: Adult Inpatient Plan of Care  Goal: Plan of Care Review  Outcome: Ongoing (interventions implemented as appropriate)  Pt receiving 2 units PRBCs. IV antibiotics continue in use. Dressing to back has been changed. PO pain meds given as needed. Will continue to monitor.

## 2019-06-03 LAB
ANION GAP SERPL CALC-SCNC: 5 MMOL/L (ref 8–16)
ANION GAP SERPL CALC-SCNC: 5 MMOL/L (ref 8–16)
ANISOCYTOSIS BLD QL SMEAR: SLIGHT
ANISOCYTOSIS BLD QL SMEAR: SLIGHT
BACTERIA SPEC ANAEROBE CULT: NORMAL
BASO STIPL BLD QL SMEAR: ABNORMAL
BASO STIPL BLD QL SMEAR: ABNORMAL
BASOPHILS # BLD AUTO: 0.02 K/UL (ref 0–0.2)
BASOPHILS # BLD AUTO: 0.02 K/UL (ref 0–0.2)
BASOPHILS NFR BLD: 0.2 % (ref 0–1.9)
BASOPHILS NFR BLD: 0.2 % (ref 0–1.9)
BUN SERPL-MCNC: 7 MG/DL (ref 8–23)
BUN SERPL-MCNC: 7 MG/DL (ref 8–23)
CALCIUM SERPL-MCNC: 8.8 MG/DL (ref 8.7–10.5)
CALCIUM SERPL-MCNC: 8.8 MG/DL (ref 8.7–10.5)
CHLORIDE SERPL-SCNC: 94 MMOL/L (ref 95–110)
CHLORIDE SERPL-SCNC: 94 MMOL/L (ref 95–110)
CO2 SERPL-SCNC: 31 MMOL/L (ref 23–29)
CO2 SERPL-SCNC: 31 MMOL/L (ref 23–29)
CREAT SERPL-MCNC: 0.7 MG/DL (ref 0.5–1.4)
CREAT SERPL-MCNC: 0.7 MG/DL (ref 0.5–1.4)
CRP SERPL-MCNC: 44.2 MG/L (ref 0–8.2)
DACRYOCYTES BLD QL SMEAR: ABNORMAL
DACRYOCYTES BLD QL SMEAR: ABNORMAL
DIFFERENTIAL METHOD: ABNORMAL
DIFFERENTIAL METHOD: ABNORMAL
EOSINOPHIL # BLD AUTO: 0.2 K/UL (ref 0–0.5)
EOSINOPHIL # BLD AUTO: 0.2 K/UL (ref 0–0.5)
EOSINOPHIL NFR BLD: 1.8 % (ref 0–8)
EOSINOPHIL NFR BLD: 1.8 % (ref 0–8)
ERYTHROCYTE [DISTWIDTH] IN BLOOD BY AUTOMATED COUNT: 15.4 % (ref 11.5–14.5)
ERYTHROCYTE [DISTWIDTH] IN BLOOD BY AUTOMATED COUNT: 15.4 % (ref 11.5–14.5)
ERYTHROCYTE [SEDIMENTATION RATE] IN BLOOD BY WESTERGREN METHOD: 90 MM/HR (ref 0–20)
EST. GFR  (AFRICAN AMERICAN): >60 ML/MIN/1.73 M^2
EST. GFR  (AFRICAN AMERICAN): >60 ML/MIN/1.73 M^2
EST. GFR  (NON AFRICAN AMERICAN): >60 ML/MIN/1.73 M^2
EST. GFR  (NON AFRICAN AMERICAN): >60 ML/MIN/1.73 M^2
GLUCOSE SERPL-MCNC: 121 MG/DL (ref 70–110)
GLUCOSE SERPL-MCNC: 121 MG/DL (ref 70–110)
HCT VFR BLD AUTO: 30.4 % (ref 37–48.5)
HCT VFR BLD AUTO: 30.4 % (ref 37–48.5)
HGB BLD-MCNC: 9.7 G/DL (ref 12–16)
HGB BLD-MCNC: 9.7 G/DL (ref 12–16)
LYMPHOCYTES # BLD AUTO: 1.5 K/UL (ref 1–4.8)
LYMPHOCYTES # BLD AUTO: 1.5 K/UL (ref 1–4.8)
LYMPHOCYTES NFR BLD: 16.8 % (ref 18–48)
LYMPHOCYTES NFR BLD: 16.8 % (ref 18–48)
MCH RBC QN AUTO: 28.4 PG (ref 27–31)
MCH RBC QN AUTO: 28.4 PG (ref 27–31)
MCHC RBC AUTO-ENTMCNC: 31.9 G/DL (ref 32–36)
MCHC RBC AUTO-ENTMCNC: 31.9 G/DL (ref 32–36)
MCV RBC AUTO: 89 FL (ref 82–98)
MCV RBC AUTO: 89 FL (ref 82–98)
MONOCYTES # BLD AUTO: 0.9 K/UL (ref 0.3–1)
MONOCYTES # BLD AUTO: 0.9 K/UL (ref 0.3–1)
MONOCYTES NFR BLD: 10.3 % (ref 4–15)
MONOCYTES NFR BLD: 10.3 % (ref 4–15)
NEUTROPHILS # BLD AUTO: 6.2 K/UL (ref 1.8–7.7)
NEUTROPHILS # BLD AUTO: 6.2 K/UL (ref 1.8–7.7)
NEUTROPHILS NFR BLD: 70.9 % (ref 38–73)
NEUTROPHILS NFR BLD: 70.9 % (ref 38–73)
OVALOCYTES BLD QL SMEAR: ABNORMAL
OVALOCYTES BLD QL SMEAR: ABNORMAL
PLATELET # BLD AUTO: 312 K/UL (ref 150–350)
PLATELET # BLD AUTO: 312 K/UL (ref 150–350)
PLATELET BLD QL SMEAR: ABNORMAL
PLATELET BLD QL SMEAR: ABNORMAL
PMV BLD AUTO: 8 FL (ref 9.2–12.9)
PMV BLD AUTO: 8 FL (ref 9.2–12.9)
POCT GLUCOSE: 136 MG/DL (ref 70–110)
POCT GLUCOSE: 169 MG/DL (ref 70–110)
POCT GLUCOSE: 294 MG/DL (ref 70–110)
POCT GLUCOSE: 306 MG/DL (ref 70–110)
POIKILOCYTOSIS BLD QL SMEAR: SLIGHT
POIKILOCYTOSIS BLD QL SMEAR: SLIGHT
POLYCHROMASIA BLD QL SMEAR: ABNORMAL
POLYCHROMASIA BLD QL SMEAR: ABNORMAL
POTASSIUM SERPL-SCNC: 4.3 MMOL/L (ref 3.5–5.1)
POTASSIUM SERPL-SCNC: 4.3 MMOL/L (ref 3.5–5.1)
PROCALCITONIN SERPL IA-MCNC: 0.55 NG/ML
RBC # BLD AUTO: 3.41 M/UL (ref 4–5.4)
RBC # BLD AUTO: 3.41 M/UL (ref 4–5.4)
SODIUM SERPL-SCNC: 130 MMOL/L (ref 136–145)
SODIUM SERPL-SCNC: 130 MMOL/L (ref 136–145)
WBC # BLD AUTO: 8.74 K/UL (ref 3.9–12.7)
WBC # BLD AUTO: 8.74 K/UL (ref 3.9–12.7)

## 2019-06-03 PROCEDURE — 94761 N-INVAS EAR/PLS OXIMETRY MLT: CPT

## 2019-06-03 PROCEDURE — 99231 PR SUBSEQUENT HOSPITAL CARE,LEVL I: ICD-10-PCS | Mod: ,,, | Performed by: INTERNAL MEDICINE

## 2019-06-03 PROCEDURE — 25000003 PHARM REV CODE 250: Performed by: NURSE PRACTITIONER

## 2019-06-03 PROCEDURE — 99900035 HC TECH TIME PER 15 MIN (STAT)

## 2019-06-03 PROCEDURE — 25000003 PHARM REV CODE 250: Performed by: EMERGENCY MEDICINE

## 2019-06-03 PROCEDURE — 97530 THERAPEUTIC ACTIVITIES: CPT

## 2019-06-03 PROCEDURE — 21400001 HC TELEMETRY ROOM

## 2019-06-03 PROCEDURE — 97802 MEDICAL NUTRITION INDIV IN: CPT

## 2019-06-03 PROCEDURE — 63600175 PHARM REV CODE 636 W HCPCS: Performed by: EMERGENCY MEDICINE

## 2019-06-03 PROCEDURE — 80048 BASIC METABOLIC PNL TOTAL CA: CPT

## 2019-06-03 PROCEDURE — 99231 SBSQ HOSP IP/OBS SF/LOW 25: CPT | Mod: ,,, | Performed by: INTERNAL MEDICINE

## 2019-06-03 PROCEDURE — 84145 PROCALCITONIN (PCT): CPT

## 2019-06-03 PROCEDURE — 63600175 PHARM REV CODE 636 W HCPCS: Performed by: NURSE PRACTITIONER

## 2019-06-03 PROCEDURE — 94799 UNLISTED PULMONARY SVC/PX: CPT

## 2019-06-03 PROCEDURE — 85025 COMPLETE CBC W/AUTO DIFF WBC: CPT

## 2019-06-03 PROCEDURE — 27000221 HC OXYGEN, UP TO 24 HOURS

## 2019-06-03 PROCEDURE — 86140 C-REACTIVE PROTEIN: CPT

## 2019-06-03 PROCEDURE — 85651 RBC SED RATE NONAUTOMATED: CPT

## 2019-06-03 RX ADMIN — ONDANSETRON 4 MG: 2 INJECTION INTRAMUSCULAR; INTRAVENOUS at 01:06

## 2019-06-03 RX ADMIN — PRAMIPEXOLE DIHYDROCHLORIDE 1 MG: 0.5 TABLET ORAL at 09:06

## 2019-06-03 RX ADMIN — Medication: at 10:06

## 2019-06-03 RX ADMIN — ASPIRIN 81 MG: 81 TABLET, COATED ORAL at 10:06

## 2019-06-03 RX ADMIN — PRAVASTATIN SODIUM 40 MG: 20 TABLET ORAL at 10:06

## 2019-06-03 RX ADMIN — CARBIDOPA AND LEVODOPA 1 TABLET: 25; 250 TABLET ORAL at 10:06

## 2019-06-03 RX ADMIN — LEVOTHYROXINE SODIUM 125 MCG: 125 TABLET ORAL at 05:06

## 2019-06-03 RX ADMIN — CARBIDOPA AND LEVODOPA 1 TABLET: 25; 250 TABLET ORAL at 01:06

## 2019-06-03 RX ADMIN — OXYCODONE AND ACETAMINOPHEN 1 TABLET: 5; 325 TABLET ORAL at 09:06

## 2019-06-03 RX ADMIN — PANTOPRAZOLE SODIUM 40 MG: 40 TABLET, DELAYED RELEASE ORAL at 10:06

## 2019-06-03 RX ADMIN — PRAMIPEXOLE DIHYDROCHLORIDE 1 MG: 0.5 TABLET ORAL at 10:06

## 2019-06-03 RX ADMIN — CARBIDOPA AND LEVODOPA 1 TABLET: 25; 250 TABLET ORAL at 05:06

## 2019-06-03 RX ADMIN — OXYCODONE AND ACETAMINOPHEN 1 TABLET: 5; 325 TABLET ORAL at 01:06

## 2019-06-03 RX ADMIN — PRAMIPEXOLE DIHYDROCHLORIDE 1 MG: 0.5 TABLET ORAL at 03:06

## 2019-06-03 RX ADMIN — METOPROLOL SUCCINATE 25 MG: 25 TABLET, EXTENDED RELEASE ORAL at 10:06

## 2019-06-03 RX ADMIN — CARBIDOPA AND LEVODOPA 1 TABLET: 25; 250 TABLET ORAL at 09:06

## 2019-06-03 RX ADMIN — ONDANSETRON 4 MG: 2 INJECTION INTRAMUSCULAR; INTRAVENOUS at 12:06

## 2019-06-03 RX ADMIN — Medication: at 09:06

## 2019-06-03 RX ADMIN — OXYCODONE AND ACETAMINOPHEN 1 TABLET: 5; 325 TABLET ORAL at 12:06

## 2019-06-03 RX ADMIN — INSULIN ASPART 1 UNITS: 100 INJECTION, SOLUTION INTRAVENOUS; SUBCUTANEOUS at 09:06

## 2019-06-03 RX ADMIN — OXYCODONE AND ACETAMINOPHEN 1 TABLET: 5; 325 TABLET ORAL at 05:06

## 2019-06-03 RX ADMIN — ISOSORBIDE MONONITRATE 120 MG: 60 TABLET, EXTENDED RELEASE ORAL at 10:06

## 2019-06-03 RX ADMIN — LOSARTAN POTASSIUM 50 MG: 50 TABLET, FILM COATED ORAL at 10:06

## 2019-06-03 RX ADMIN — CEFTRIAXONE SODIUM 2 G: 2 INJECTION, POWDER, FOR SOLUTION INTRAMUSCULAR; INTRAVENOUS at 10:06
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
## 2019-06-03 NOTE — PLAN OF CARE
Sw met with patient and family at bedside to introduce herself. Sw placed contact information on white board. Pt reports she was placed at [facility] for SNF. Jian will send clinicals to [facility] via [facility].

## 2019-06-03 NOTE — ASSESSMENT & PLAN NOTE
Patient with PICC line and continue course of Rocephin the patient is on outpatient.  Repeat cultures pending  Further evaluation/diagnostics/interventions/consults pending course     All Cx NGTD    6/3  ABX for 8 weeks  SNF placement

## 2019-06-03 NOTE — PLAN OF CARE
06/03/19 0927   Medicare Message   Important Message from Medicare regarding Discharge Appeal Rights Given to patient/caregiver;Explained to patient/caregiver;Signed/date by patient/caregiver   Date IMM was signed 06/03/19   Time IMM was signed 0927

## 2019-06-03 NOTE — PLAN OF CARE
Problem: Adult Inpatient Plan of Care  Goal: Plan of Care Review  Outcome: Ongoing (interventions implemented as appropriate)  Patient in bed resting quietly. Easily aroused. No acute distress noted. AAOX4. C/o of pain and nausea, PRN oxycodone and Zofran administered, moderate relief obtained.   Cardiac monitoring HR, 70s normal sinus.  at bedside.  2 units of blood transfused, tolerated well. No adverse reactions noted. Plan of care reviewed, verbalized understanding. Turn q2. Safety measures in place, bed in lowest position, wheels locked, call light within reach, non-skid socks in use, bed alarm armed. Will continue to monitor.

## 2019-06-03 NOTE — PROGRESS NOTES
[facility] -   Neurosurgery  Progress Note    Subjective:     History of Present Illness:   Patient is a 72 YO female with h/o osteomyelitis and Parkinson' disease presented with lower back pain and difficulty walking. Reports she had not walked for about a month with symptoms worsening over past 2-3 weeks. She underwent surgery on 5/25/19 for posterior lumbar fusion and abscess debridement.  Patient was admitted last night due to elevated troponin and issue with wound vac seal.      Interval History:   Postop day #8  NAD  Pain controlled  Troponin1 1.278  Hgb/Hct trending up 9.7/30.4  Afebrile  Dressing changed at bedside.   Wound care consult placed for wound vac    Post-Op Info:  * No surgery found *          Medications:  Continuous Infusions:    Scheduled Meds:   aspirin  81 mg Oral Daily    carbidopa-levodopa  mg  1 tablet Oral 5x Daily    cefTRIAXone (ROCEPHIN) 2 g in dextrose 5 % 50 mL IVPB (ready to mix system)  2 g Intravenous Daily    isosorbide mononitrate  120 mg Oral Daily    levothyroxine  125 mcg Oral Before breakfast    losartan  50 mg Oral Daily    metoprolol succinate  25 mg Oral Daily    miconazole NITRATE 2 %   Topical (Top) BID    pantoprazole  40 mg Oral Daily    pramipexole  1 mg Oral TID    pravastatin  40 mg Oral Daily     PRN Meds:sodium chloride, ALPRAZolam, dextrose 50%, dextrose 50%, glucagon (human recombinant), glucose, glucose, insulin aspart U-100, ondansetron, oxyCODONE-acetaminophen     Review of Systems   Constitutional: Negative for chills and fever.   Respiratory: Negative for cough.    Gastrointestinal: Negative for vomiting.   Musculoskeletal: Positive for back pain.   Skin: Negative for color change and pallor.   Neurological: Negative for tremors and seizures.   Psychiatric/Behavioral: Negative for agitation and behavioral problems.     Objective:     Weight: 99.8 kg (220 lb 0.3 oz)  Body mass index is 35.51 kg/m².  Vital Signs (Most  Recent):  Temp: 97.5 °F (36.4 °C) (06/03/19 0821)  Pulse: 83 (06/03/19 0821)  Resp: 18 (06/03/19 0304)  BP: (!) 170/77 (06/03/19 0821)  SpO2: 95 % (06/03/19 0749) Vital Signs (24h Range):  Temp:  [97.5 °F (36.4 °C)-99.5 °F (37.5 °C)] 97.5 °F (36.4 °C)  Pulse:  [] 83  Resp:  [18-22] 18  SpO2:  [95 %-98 %] 95 %  BP: (116-170)/(56-88) 170/77                Oxygen Concentration (%):  [28] 28         Neurosurgery Physical Exam   Patient moves her legs in bed  Foot drop at baseline  Lateral bedsores as before  Midline incision intact with no drainage    Significant Labs:  Recent Labs   Lab 06/02/19  0452 06/03/19  0420   * 121*  121*   * 130*  130*   K 4.3 4.3  4.3   CL 93* 94*  94*   CO2 30* 31*  31*   BUN 8 7*  7*   CREATININE 0.7 0.7  0.7   CALCIUM 8.7 8.8  8.8     Recent Labs   Lab 06/01/19  1815 06/02/19  0452 06/03/19  0420   WBC  --  9.04 8.74  8.74   HGB 8.0* 7.6* 9.7*  9.7*   HCT 24.7* 23.6* 30.4*  30.4*   PLT  --  333 312  312     Recent Labs   Lab 06/01/19  1129   APTT 35.6*     Microbiology Results (last 7 days)     Procedure Component Value Units Date/Time    Blood Culture #1 **CANNOT BE ORDERED STAT** [[id number]] Collected:  06/01/19 0003    Order Status:  Completed Specimen:  Blood from Peripheral, Antecubital, Right Updated:  06/02/19 1012     Blood Culture, Routine No Growth to date     Blood Culture, Routine No Growth to date    Blood Culture #2 **CANNOT BE ORDERED STAT** [[id number]] Collected:  06/01/19 0010    Order Status:  Completed Specimen:  Blood from Peripheral, Antecubital, Left Updated:  06/02/19 1012     Blood Culture, Routine No Growth to date     Blood Culture, Routine No Growth to date        Cardiac markers:   No results for input(s): CKMB, CPKMB, TROPONINT, TROPONINI, MYOGLOBIN in the last 48 hours.  CMP:   Recent Labs   Lab 06/02/19  0452 06/03/19  0420   * 121*  121*   CALCIUM 8.7 8.8  8.8   * 130*  130*   K 4.3 4.3  4.3   CO2 30* 31*   31*   CL 93* 94*  94*   BUN 8 7*  7*   CREATININE 0.7 0.7  0.7     CRP:   Recent Labs   Lab 06/03/19  0420   CRP 44.2*     All pertinent labs from the last 24 hours have been reviewed.  Significant Diagnostics:  I have reviewed all pertinent imaging results/findings within the past 24 hours.    Assessment/Plan:     Active Diagnoses:    Diagnosis Date Noted POA    PRINCIPAL PROBLEM:  AMS sec to Hypoglycemia/ Metabolic Encephalopathy [E16.2] 06/01/2019 Yes    CAD, multiple vessel [I25.10] 06/01/2019 Yes    History of PTCA [Z98.61] 06/01/2019 Not Applicable    Anemia [D64.9] 06/01/2019 Yes    Elevated troponin [R74.8] 06/01/2019 Yes    Alteration in skin integrity related to surgical incision [R23.9] 05/29/2019 Yes    Streptococcal bacteremia [R78.81, B95.5] 05/24/2019 Yes    Osteomyelitis of lumbar spine [M46.26] 05/23/2019 Yes    Nonrheumatic aortic valve stenosis [I35.0] 03/23/2018 Yes    CAD: S/P coronary artery stent placement [Z95.5] 10/06/2015 Not Applicable     Chronic    Hypothyroidism [E03.9] 11/19/2013 Yes     Chronic    Essential hypertension [I10]  Yes     Chronic    CAD (coronary artery disease) [I25.10]  Yes     Chronic      Problems Resolved During this Admission:       Continue Wound care - Wound care consulted  Patient instructed to sit up in chair and wear LSO brace when sitting out of bed - LSO brace at nursing home.  Physical/Occupation therapy- activity as tolerated. Ok to do PT without brace today.   Will continue to monitor    Katherine Jacques PA-C  Neurosurgery  [facility]

## 2019-06-03 NOTE — SUBJECTIVE & OBJECTIVE
Interval History: Improving. Agrees to work with PT/OT    Review of Systems   Constitutional: Positive for activity change and fatigue ( Grogginess). Negative for chills, diaphoresis and fever.   HENT: Negative for congestion, sore throat and voice change.    Eyes: Negative for photophobia and visual disturbance.   Respiratory: Negative for cough, shortness of breath, wheezing and stridor.    Cardiovascular: Negative for chest pain and leg swelling.   Gastrointestinal: Negative for abdominal distention, abdominal pain, constipation, diarrhea, nausea and vomiting.   Endocrine: Negative for polydipsia, polyphagia and polyuria.        Positive Hypoglycemia   Genitourinary: Negative for difficulty urinating, dysuria, flank pain, pelvic pain, urgency and vaginal discharge.   Musculoskeletal: Negative for back pain, joint swelling, neck pain and neck stiffness.   Skin: Positive for wound (With wound VAC). Negative for color change and rash.   Allergic/Immunologic: Negative for immunocompromised state.   Neurological: Positive for weakness. Negative for dizziness, syncope, numbness and headaches.   Hematological: Does not bruise/bleed easily.   Psychiatric/Behavioral: Negative for agitation, behavioral problems and confusion.     Objective:     Vital Signs (Most Recent):  Temp: 98.4 °F (36.9 °C) (06/03/19 1521)  Pulse: 85 (06/03/19 1521)  Resp: 19 (06/03/19 1521)  BP: 136/65 (06/03/19 1521)  SpO2: 96 % (06/03/19 1521) Vital Signs (24h Range):  Temp:  [97.5 °F (36.4 °C)-99.5 °F (37.5 °C)] 98.4 °F (36.9 °C)  Pulse:  [69-93] 85  Resp:  [18-19] 19  SpO2:  [95 %-98 %] 96 %  BP: (117-170)/(58-77) 136/65     Weight: 97.3 kg (214 lb 8.1 oz)  Body mass index is 34.62 kg/m².    Intake/Output Summary (Last 24 hours) at 6/3/2019 1804  Last data filed at 6/3/2019 0524  Gross per 24 hour   Intake 431.25 ml   Output 520 ml   Net -88.75 ml      Physical Exam   Constitutional: She is oriented to person, place, and time. She appears  well-developed and well-nourished. She appears ill. No distress.   Elderly.  Unkept chronically ill-appearing.   HENT:   Head: Normocephalic and atraumatic.   Nose: Nose normal.   Mouth/Throat: Oropharynx is clear and moist.   Eyes: Pupils are equal, round, and reactive to light. Conjunctivae and EOM are normal. No scleral icterus.   Neck: Normal range of motion. Neck supple. No tracheal deviation present.   Cardiovascular: Normal rate, regular rhythm and intact distal pulses.   Murmur heard.   Systolic murmur is present with a grade of 3/6.  Pulmonary/Chest: Effort normal. No stridor. No respiratory distress. She has no wheezes. She has rhonchi in the right middle field. She has no rales.   Abdominal: Soft. Bowel sounds are normal. She exhibits no distension. There is no tenderness. There is no guarding.   Obese   Genitourinary:   Genitourinary Comments: deferred   Musculoskeletal: Normal range of motion. She exhibits no edema or deformity.   Neurological: She is alert and oriented to person, place, and time. No cranial nerve deficit.   Skin: Skin is warm and dry. Capillary refill takes 2 to 3 seconds. No rash noted. She is not diaphoretic.    Surgical incision lumbar approximated.    Psychiatric: She has a normal mood and affect. Her behavior is normal. Judgment and thought content normal.   Nursing note and vitals reviewed.      Significant Labs:   BMP:   Recent Labs   Lab 06/03/19  0420   *  121*   *  130*   K 4.3  4.3   CL 94*  94*   CO2 31*  31*   BUN 7*  7*   CREATININE 0.7  0.7   CALCIUM 8.8  8.8     CBC:   Recent Labs   Lab 06/01/19  1815 06/02/19  0452 06/03/19  0420   WBC  --  9.04 8.74  8.74   HGB 8.0* 7.6* 9.7*  9.7*   HCT 24.7* 23.6* 30.4*  30.4*   PLT  --  333 312  312     CMP:   Recent Labs   Lab 06/02/19  0452 06/03/19  0420   * 130*  130*   K 4.3 4.3  4.3   CL 93* 94*  94*   CO2 30* 31*  31*   * 121*  121*   BUN 8 7*  7*   CREATININE 0.7 0.7  0.7    CALCIUM 8.7 8.8  8.8   ANIONGAP 8 5*  5*   EGFRNONAA >60 >60  >60     POCT Glucose:   Recent Labs   Lab 06/02/19  2117 06/03/19  0516 06/03/19  1120   POCTGLUCOSE 169* 136* 169*     Troponin:   No results for input(s): TROPONINI in the last 48 hours.  All pertinent labs within the past 24 hours have been reviewed.    Significant Imaging: I have reviewed all pertinent imaging results/findings within the past 24 hours.

## 2019-06-03 NOTE — PT/OT/SLP PROGRESS
Physical Therapy      Patient Name:  Ross Isbell   MRN:  [id number]    Patient not seen today secondary to  PATIENT ADAMANTLY DECLINED AT PRESENT TIME. Will follow-up WHEN APPROPRIATE.  TIME: 14:00P.MLeonor Ballard PTA

## 2019-06-03 NOTE — ASSESSMENT & PLAN NOTE
ch blood loss s/p back surgery sec to ABL Anemia    Transfuse 2 units of blood  Give Inj Venofer and B12    6/3  Hgb 9.7 today  Monitor

## 2019-06-03 NOTE — PT/OT/SLP PROGRESS
Occupational Therapy      Patient Name:  Ross Isbell   MRN:  [id number]    S: pt reported cardiologist reported not particpate with  therapy session  O:  pt refused tx session this date x 2 attempts. pt educated on the importance of tx session spoke with Jasson Dyer and he encourage tx session. Pt informed of MD request and reported will participate with therapy on tomorrow  A: pt with no functional gains this date due to lack of participation  P: continue with POC    Cyndie Hernandez OT  6/3/2019   [id number]  1 ta

## 2019-06-03 NOTE — ASSESSMENT & PLAN NOTE
Continue Rocephin.  Patient followed by neuro surgery consider outpatient follow-up versus consult inpatient pending course  Further evaluation/diagnostics/interventions/consults pending course     Continue same tx here, check CRP/ ESR/ Procalc    6/3  ABX - Ceftriaxone x 8 weeks

## 2019-06-03 NOTE — PROGRESS NOTES
"[facility] Hospital Medicine  Progress Note    Patient Name: Ross Isbell  MRN: [id number]  Patient Class: IP- Inpatient   Admission Date: 5/31/2019  Length of Stay: 2 days  Attending Physician: Hemanth العراقي MD  Primary Care Provider: Anna Penn MD        Subjective:     Principal Problem:Hypoglycemia    HPI:   Ross Isbell is a 73 y.o. female patient with a PMHx of Parkinson's disease, CAD, DM, and MI who presented for further evaluation of hypoglycemia.  It was reported patient's blood sugar prior to arrival at [facility] 38.  No modifying factors.  Associated symptoms include grogginess.  Prior treatment patient was giving IV glucose in route.  Initially improved on arrival to ER.  Patient's glucose then dropped again and was given additional glucose.  Hospital Medicine consulted.  Patient admitted.  To note:  " Pt had a spinal fusion, lumbar laminectomy, and debridement of a paraspinal abscess done by Dr. Nicolas (Neurosurgery) on 5/25 and was discharged 05/30/2019, with wound VAC and IV antibiotic (Rocephin) therapy.     Discharge Summary 5/30/19:     HPI:   Ross Isbell is a 73 year old female with a PMHx of DM II, Parkinson's, CORINE, MI, CAD, and Glaucoma who presented to the Emergency Department with c/o fatigue. Associated symptoms include: generalized weakness and BLE pain x 2 weeks. Pt was informed by home health nurse to go to ED for increasing weakness, abdominal pain and leg pain. ED workup showed: WBC 13.08K, Hgb/Hct 11.0/33.5, Na+ 129, lipase 17. UA (+) nitrites, few bacteria. CXR with no acute findings. CT abdomen/pelvis with contrast showed moderate inflammatory changes centered on the intervertebral disc space between the L2 and L3 vertebral bodies. There is bony destruction on both sides of the intervertebral disc space between the L2 and L3 vertebral bodies.  This is consistent with the patient's history and characteristic of discitis and osteomyelitis. ED " discussed case with Dr. Nicolas (neurosurgery) who reported pt did not need to be transferred out at this time. Obtain MRI thoracic/lumbar spine w wo contrast. Start pt on IV Vancomycin and Zosyn but consult Dr. Gomez (ID). Daughter: Uzma Godfrey [phone] is the surrogate decision maker. Admitted for Osteomyelitis of lumbar spine.     Procedure(s) (LRB):  FUSION, SPINE, POSTERIOR SPINAL COLUMN, LUMBAR, USING COMPUTER-ASSISTED NAVIGATION  FUSION, SPINE, LUMBAR, TLIF, POSTERIOR APPROACH, USING PEDICLE SCREW (N/A)  LAMINECTOMY, SPINE, LUMBAR  DEBRIDEMENT, ABSCESS, PARASPINAL (N/A)       Hospital Course:   5/24/19 Dr. Nicolas, Neurosurgeon, doesn't recommend spinal surgery, But Needle biopsy of L 2-3 disk abscess with cultures. This am, patient c/o of severe abdominal pain 20/10, Norco 10 mg ordered PRN with IV 2 mg Morphine PRN. Blood culture is Positive for GPC. Echo pending. Patient had severe back pain and refused OT/PT. Daughter stated she has had diarrhea x 1 month. Stool for C.Diff ordered. T. Max 101. IV Vanco and Zosyn. IR, Dr. Juárez, consulted for Needle biopsy. Daughter at bedside. ID consult pending. Cont Vanc and Zosyn. CT abdomen/pelvis positive for osteomyelitis.  To OR with Dr. Nicolas 25 May for laminectomy and vertebroplasty.  Post-procedure in ICU due to post-op hypotension.  Able to wean vasopressors.  Anemia due to blood loss related to the procedure.  Transfused 1 unit PRBC.  Interval improvement in abdominal and back pain.  Tolerated limited degree of physical therapy.  Pursuing skilled placement.  Discharge plan to transfer to [facility] skilled nursing [facility].  Complete 8 week Ceftriaxone 2 g daily IV stop date 23 July.  Weekly ESR and CRP.  PT and OT.  Prevena incisional wound VAC.      Hospital Course:  Ross Isbell is a 73 y.o. female patient with a PMHx of Parkinson's disease, CAD, DM, and MI, NH resident- admitted for severe Hypoglycemia. Pt had a spinal fusion, lumbar  laminectomy, and debridement of a paraspinal abscess done by Dr. Nicolas (Neurosurgery) on 5/25 and was discharged 05/30/2019 to SNF with wound VAC and IV antibiotic (Rocephin) therapy. She is doing much better, AAOx3, speech clear, c/o pain in her back at the surgery site. BS around 80- 100 mostly. Will change to D5NS- as Na is down to 132.     6/2- doing much better. AAOx3, speech clear, BS improved, no hypoglycemia since admit. Cardiology following and thinks that elevated Trop as Demand ischemia and not NSTEMI, normal EF on Echo. IV rocephin continued for back abscess/ Osteomyelitis. H/H low to 7.5/22-- getting 2 units of blood.     6/3  Patient improving with mobility. C/O LBP on movement. States she will work well with PT / OT.  at bedside and encouraging patient. Plan for SNF for continued recovery.    Interval History: Improving. Agrees to work with PT/OT    Review of Systems   Constitutional: Positive for activity change and fatigue ( Grogginess). Negative for chills, diaphoresis and fever.   HENT: Negative for congestion, sore throat and voice change.    Eyes: Negative for photophobia and visual disturbance.   Respiratory: Negative for cough, shortness of breath, wheezing and stridor.    Cardiovascular: Negative for chest pain and leg swelling.   Gastrointestinal: Negative for abdominal distention, abdominal pain, constipation, diarrhea, nausea and vomiting.   Endocrine: Negative for polydipsia, polyphagia and polyuria.        Positive Hypoglycemia   Genitourinary: Negative for difficulty urinating, dysuria, flank pain, pelvic pain, urgency and vaginal discharge.   Musculoskeletal: Negative for back pain, joint swelling, neck pain and neck stiffness.   Skin: Positive for wound (With wound VAC). Negative for color change and rash.   Allergic/Immunologic: Negative for immunocompromised state.   Neurological: Positive for weakness. Negative for dizziness, syncope, numbness and headaches.    Hematological: Does not bruise/bleed easily.   Psychiatric/Behavioral: Negative for agitation, behavioral problems and confusion.     Objective:     Vital Signs (Most Recent):  Temp: 98.4 °F (36.9 °C) (06/03/19 1521)  Pulse: 85 (06/03/19 1521)  Resp: 19 (06/03/19 1521)  BP: 136/65 (06/03/19 1521)  SpO2: 96 % (06/03/19 1521) Vital Signs (24h Range):  Temp:  [97.5 °F (36.4 °C)-99.5 °F (37.5 °C)] 98.4 °F (36.9 °C)  Pulse:  [69-93] 85  Resp:  [18-19] 19  SpO2:  [95 %-98 %] 96 %  BP: (117-170)/(58-77) 136/65     Weight: 97.3 kg (214 lb 8.1 oz)  Body mass index is 34.62 kg/m².    Intake/Output Summary (Last 24 hours) at 6/3/2019 1804  Last data filed at 6/3/2019 0524  Gross per 24 hour   Intake 431.25 ml   Output 520 ml   Net -88.75 ml      Physical Exam   Constitutional: She is oriented to person, place, and time. She appears well-developed and well-nourished. She appears ill. No distress.   Elderly.  Unkept chronically ill-appearing.   HENT:   Head: Normocephalic and atraumatic.   Nose: Nose normal.   Mouth/Throat: Oropharynx is clear and moist.   Eyes: Pupils are equal, round, and reactive to light. Conjunctivae and EOM are normal. No scleral icterus.   Neck: Normal range of motion. Neck supple. No tracheal deviation present.   Cardiovascular: Normal rate, regular rhythm and intact distal pulses.   Murmur heard.   Systolic murmur is present with a grade of 3/6.  Pulmonary/Chest: Effort normal. No stridor. No respiratory distress. She has no wheezes. She has rhonchi in the right middle field. She has no rales.   Abdominal: Soft. Bowel sounds are normal. She exhibits no distension. There is no tenderness. There is no guarding.   Obese   Genitourinary:   Genitourinary Comments: deferred   Musculoskeletal: Normal range of motion. She exhibits no edema or deformity.   Neurological: She is alert and oriented to person, place, and time. No cranial nerve deficit.   Skin: Skin is warm and dry. Capillary refill takes 2 to 3  seconds. No rash noted. She is not diaphoretic.    Surgical incision lumbar approximated.    Psychiatric: She has a normal mood and affect. Her behavior is normal. Judgment and thought content normal.   Nursing note and vitals reviewed.      Significant Labs:   BMP:   Recent Labs   Lab 06/03/19  0420   *  121*   *  130*   K 4.3  4.3   CL 94*  94*   CO2 31*  31*   BUN 7*  7*   CREATININE 0.7  0.7   CALCIUM 8.8  8.8     CBC:   Recent Labs   Lab 06/01/19  1815 06/02/19  0452 06/03/19  0420   WBC  --  9.04 8.74  8.74   HGB 8.0* 7.6* 9.7*  9.7*   HCT 24.7* 23.6* 30.4*  30.4*   PLT  --  333 312  312     CMP:   Recent Labs   Lab 06/02/19  0452 06/03/19  0420   * 130*  130*   K 4.3 4.3  4.3   CL 93* 94*  94*   CO2 30* 31*  31*   * 121*  121*   BUN 8 7*  7*   CREATININE 0.7 0.7  0.7   CALCIUM 8.7 8.8  8.8   ANIONGAP 8 5*  5*   EGFRNONAA >60 >60  >60     POCT Glucose:   Recent Labs   Lab 06/02/19  2117 06/03/19  0516 06/03/19  1120   POCTGLUCOSE 169* 136* 169*     Troponin:   No results for input(s): TROPONINI in the last 48 hours.  All pertinent labs within the past 24 hours have been reviewed.    Significant Imaging: I have reviewed all pertinent imaging results/findings within the past 24 hours.    Assessment/Plan:      * AMS sec to Hypoglycemia/ Metabolic Encephalopathy    Improved with treatment. Patient has DM on insulin. Monitor trends and optimization of DM therapies pending course.     Improved, IVF changed to D5NS    Resolved, pt switched to regular diet and Insulin/ Metformin held to build back her glycogen reserves    Elevated troponin  Cards following-- likely Demand ischemia       Anemia  ch blood loss s/p back surgery sec to ABL Anemia    Transfuse 2 units of blood  Give Inj Venofer and B12    6/3  Hgb 9.7 today  Monitor      Alteration in skin integrity related to surgical incision  Wound care consult  Further evaluation/diagnostics/interventions/consults pending  course         Streptococcal bacteremia  Patient with PICC line and continue course of Rocephin the patient is on outpatient.  Repeat cultures pending  Further evaluation/diagnostics/interventions/consults pending course     All Cx NGTD    6/3  ABX for 8 weeks  SNF placement      Osteomyelitis of lumbar spine  Continue Rocephin.  Patient followed by neuro surgery consider outpatient follow-up versus consult inpatient pending course  Further evaluation/diagnostics/interventions/consults pending course     Continue same tx here, check CRP/ ESR/ Procalc    6/3  ABX - Ceftriaxone x 8 weeks        Nonrheumatic aortic valve stenosis  Fluid Control  Monitor  Cards Follow Up    CAD: S/P coronary artery stent placement  6/3  ASA  Statin  ARB  BB      Hypothyroidism    Continue home med  Check TSH. Last trends elevated. Optimization/adjustments pending course.   Consider in cardiology etiology     TSH normal    Essential hypertension  Continue home meds.  Further evaluation/diagnostics/interventions/consults pending course         CAD (coronary artery disease)  Continue statin, asa, bb  Cardiology consult.   Further evaluation/diagnostics/interventions/consults pending course     Appears stable, Cards following          VTE Risk Mitigation (From admission, onward)        Ordered     Place sequential compression device  Until discontinued      06/01/19 0020     Place QUIQUE hose  Until discontinued      06/01/19 0020              Hemanth العراقي MD  Department of Hospital Medicine   [facility]

## 2019-06-03 NOTE — CONSULTS
"  [facility] -   Adult Nutrition  Consult Note    SUMMARY     Recommendations    Recommendation/Intervention: 1. Rec add Diabetic Restriction to current diet 2.Rec Add Beneprotein TID (d/t several pressure injuries/wounds) 3. Will continue to monitor  Intervention: Carbohydrate-modified diet  Goals: Meet >85% EEN/EPN while admitted  Nutrition Goal Status: new  Communication of RD Recs: POC, sticky note    Reason for Assessment    Reason For Assessment: consult  Diagnosis:   1. Hypoglycemia    2. Shortness of breath    3. Constipation    4. Elevated troponin    5. Leukocytosis, unspecified type    6. NSTEMI (non-ST elevated myocardial infarction)        Relevant Medical History: CAD, DM2, MI, Obesity  General Information Comments: RD consulted to assess needs. Pt w/ good appetite/intake PTA. Pt reported a 20lb wt loss over the past 2 months (8% wt loss), d/t portion control. Pt continues to have a good appetite since admission, eating 100% of tray. NFPE performed 6.3.19: well nourished.  Nutrition Discharge Planning: Regular diet    Nutrition Risk Screen    Nutrition Risk Screen: no indicators present    Nutrition/Diet History    Spiritual, Cultural Beliefs, Adventism Practices, Values that Affect Care: no    Anthropometrics    Temp: 98.4 °F (36.9 °C)  Height Method: Stated  Height: 5' 6" (167.6 cm)  Height (inches): 66 in  Weight Method: Bed Scale  Weight: 96.6 kg (212 lb 15.4 oz)  Weight (lb): 212.97 lb  Ideal Body Weight (IBW), Female: 130 lb  % Ideal Body Weight, Female (lb): 163.82 lb  BMI (Calculated): 34.4  BMI Grade: 30 - 34.9- obesity - grade I  Weight Loss: intentional  Usual Body Weight (UBW), k kg  % Usual Body Weight: 92.19  % Weight Change From Usual Weight: -8 %       Lab/Procedures/Meds    Pertinent Labs Reviewed: reviewed  BMP  Lab Results   Component Value Date     (L) 2019     (L) 2019    K 4.3 2019    K 4.3 2019    CL 94 (L) 2019    CL " 94 (L) 06/03/2019    CO2 31 (H) 06/03/2019    CO2 31 (H) 06/03/2019    BUN 7 (L) 06/03/2019    BUN 7 (L) 06/03/2019    CREATININE 0.7 06/03/2019    CREATININE 0.7 06/03/2019    CALCIUM 8.8 06/03/2019    CALCIUM 8.8 06/03/2019    ANIONGAP 5 (L) 06/03/2019    ANIONGAP 5 (L) 06/03/2019    ESTGFRAFRICA >60 06/03/2019    ESTGFRAFRICA >60 06/03/2019    EGFRNONAA >60 06/03/2019    EGFRNONAA >60 06/03/2019     Lab Results   Component Value Date    CALCIUM 8.8 06/03/2019    CALCIUM 8.8 06/03/2019    PHOS 4.0 06/01/2019     Lab Results   Component Value Date    HGBA1C 6.0 (H) 06/01/2019     Recent Labs   Lab 06/03/19  1120   POCTGLUCOSE 169*     Lab Results   Component Value Date    ALBUMIN 2.2 (L) 05/31/2019         Pertinent Medications Reviewed: reviewed    Physical Findings/Assessment  Wounds X5  Pressure Injury: right buttocks  Pressure Injury: lower thoracic spine stage 3  Pressure Injury: left buttocks  Estimated/Assessed Needs    Weight Used For Calorie Calculations: 104.8 kg (231 lb 0.7 oz)  Energy Calorie Requirements (kcal): 1569(Pt obese, no AF)     Protein Requirements: 115-125g  Weight Used For Protein Calculations: 96.6 kg (212 lb 15.4 oz)     Estimated Fluid Requirement Method: RDA Method(or per MD)  RDA Method (mL): 1569  CHO Requirement: 50% EEN      Nutrition Prescription Ordered    Current Diet Order: Regular Diet    Evaluation of Received Nutrient/Fluid Intake    Intake/Output Summary (Last 24 hours) at 6/3/2019 1443  Last data filed at 6/3/2019 0524  Gross per 24 hour   Intake 431.25 ml   Output 520 ml   Net -88.75 ml          % Intake of Estimated Energy Needs: 75 - 100 %  % Meal Intake: 75 - 100 %    Nutrition Risk  [id number]    Assessment and Plan    Nutrition Problem  Excessive carbohydrate intake     Related to (etiology):   Undesirable food choices     Signs and Symptoms (as evidenced by):   Lab Results   Component Value Date    HGBA1C 6.0 (H) 06/01/2019     Recent Labs   Lab 06/03/19  1120    POCTGLUCOSE 169*       Interventions/Recommendations (treatment strategy):  See above     Nutrition Diagnosis Status:  New    Monitor and Evaluation    Food and Nutrient Intake: energy intake  Food and Nutrient Adminstration: diet order  Anthropometric Measurements: weight, weight change  Biochemical Data, Medical Tests and Procedures: electrolyte and renal panel, inflammatory profile, lipid profile, gastrointestinal profile, glucose/endocrine profile  Nutrition-Focused Physical Findings: overall appearance     Malnutrition Assessment                 Orbital Region (Subcutaneous Fat Loss): well nourished  Upper Arm Region (Subcutaneous Fat Loss): well nourished  Thoracic and Lumbar Region: well nourished   Rastafarian Region (Muscle Loss): well nourished  Clavicle Bone Region (Muscle Loss): well nourished  Clavicle and Acromion Bone Region (Muscle Loss): well nourished  Scapular Bone Region (Muscle Loss): well nourished  Anterior Thigh Region (Muscle Loss): well nourished  Posterior Calf Region (Muscle Loss): well nourished       Subcutaneous Fat Loss (Final Summary): well nourished  Muscle Loss Evaluation (Final Summary): well nourished         Nutrition Follow-Up    RD Follow-up?: Yes

## 2019-06-03 NOTE — PLAN OF CARE
Problem: Adult Inpatient Plan of Care  Goal: Plan of Care Review  Outcome: Ongoing (interventions implemented as appropriate)  Recommendations     Recommendation/Intervention: 1. Rec add Diabetic Restriction to current diet 2.Rec Add Beneprotein TID (d/t several pressure injuries/wounds) 3. Will continue to monitor  Intervention: Carbohydrate-modified diet  Goals: Meet >85% EEN/EPN while admitted  Nutrition Goal Status: new  Communication of RD Recs: POC, sticky note

## 2019-06-03 NOTE — PLAN OF CARE
Problem: Adult Inpatient Plan of Care  Goal: Plan of Care Review  Outcome: Ongoing (interventions implemented as appropriate)  Patient had uneventful shift. Patient awake, alert, oriented x4, garbled/slurred speech (pt's baseline per family). VS stable. Patient complains of back pain, controlled with prn pain medications. Patient on telemetry, NSR on the monitor. Fall precautions in place. Bed alarm active and audible. Patient free from fall/injury. Plan of care reviewed with patient. Patient has no questions at this time. Will continue to monitor.

## 2019-06-04 VITALS
HEIGHT: 66 IN | HEART RATE: 76 BPM | WEIGHT: 214.5 LBS | BODY MASS INDEX: 34.47 KG/M2 | DIASTOLIC BLOOD PRESSURE: 53 MMHG | RESPIRATION RATE: 17 BRPM | TEMPERATURE: 97 F | SYSTOLIC BLOOD PRESSURE: 123 MMHG | OXYGEN SATURATION: 96 %

## 2019-06-04 PROBLEM — R23.9 ALTERATION IN SKIN INTEGRITY RELATED TO SURGICAL INCISION: Status: RESOLVED | Noted: 2019-05-29 | Resolved: 2019-06-04

## 2019-06-04 PROBLEM — Z98.61 HISTORY OF PTCA: Status: RESOLVED | Noted: 2019-06-01 | Resolved: 2019-06-04

## 2019-06-04 PROBLEM — E16.2 HYPOGLYCEMIA: Status: RESOLVED | Noted: 2019-06-01 | Resolved: 2019-06-04

## 2019-06-04 LAB
ANION GAP SERPL CALC-SCNC: 8 MMOL/L (ref 8–16)
BASOPHILS # BLD AUTO: 0.02 K/UL (ref 0–0.2)
BASOPHILS NFR BLD: 0.2 % (ref 0–1.9)
BUN SERPL-MCNC: 9 MG/DL (ref 8–23)
CALCIUM SERPL-MCNC: 9.2 MG/DL (ref 8.7–10.5)
CHLORIDE SERPL-SCNC: 92 MMOL/L (ref 95–110)
CO2 SERPL-SCNC: 31 MMOL/L (ref 23–29)
CREAT SERPL-MCNC: 0.7 MG/DL (ref 0.5–1.4)
DIFFERENTIAL METHOD: ABNORMAL
EOSINOPHIL # BLD AUTO: 0.2 K/UL (ref 0–0.5)
EOSINOPHIL NFR BLD: 2.2 % (ref 0–8)
ERYTHROCYTE [DISTWIDTH] IN BLOOD BY AUTOMATED COUNT: 15.6 % (ref 11.5–14.5)
EST. GFR  (AFRICAN AMERICAN): >60 ML/MIN/1.73 M^2
EST. GFR  (NON AFRICAN AMERICAN): >60 ML/MIN/1.73 M^2
GLUCOSE SERPL-MCNC: 168 MG/DL (ref 70–110)
HCT VFR BLD AUTO: 33.4 % (ref 37–48.5)
HGB BLD-MCNC: 10.5 G/DL (ref 12–16)
LYMPHOCYTES # BLD AUTO: 1.6 K/UL (ref 1–4.8)
LYMPHOCYTES NFR BLD: 18.8 % (ref 18–48)
MCH RBC QN AUTO: 27.8 PG (ref 27–31)
MCHC RBC AUTO-ENTMCNC: 31.4 G/DL (ref 32–36)
MCV RBC AUTO: 88 FL (ref 82–98)
MONOCYTES # BLD AUTO: 0.9 K/UL (ref 0.3–1)
MONOCYTES NFR BLD: 10.7 % (ref 4–15)
NEUTROPHILS # BLD AUTO: 5.6 K/UL (ref 1.8–7.7)
NEUTROPHILS NFR BLD: 68.1 % (ref 38–73)
PLATELET # BLD AUTO: 268 K/UL (ref 150–350)
PMV BLD AUTO: 8.2 FL (ref 9.2–12.9)
POCT GLUCOSE: 206 MG/DL (ref 70–110)
POCT GLUCOSE: 320 MG/DL (ref 70–110)
POTASSIUM SERPL-SCNC: 4.6 MMOL/L (ref 3.5–5.1)
RBC # BLD AUTO: 3.78 M/UL (ref 4–5.4)
SODIUM SERPL-SCNC: 131 MMOL/L (ref 136–145)
WBC # BLD AUTO: 8.24 K/UL (ref 3.9–12.7)

## 2019-06-04 PROCEDURE — 97530 THERAPEUTIC ACTIVITIES: CPT

## 2019-06-04 PROCEDURE — 94799 UNLISTED PULMONARY SVC/PX: CPT

## 2019-06-04 PROCEDURE — 27000221 HC OXYGEN, UP TO 24 HOURS

## 2019-06-04 PROCEDURE — 63600175 PHARM REV CODE 636 W HCPCS: Performed by: NURSE PRACTITIONER

## 2019-06-04 PROCEDURE — 97110 THERAPEUTIC EXERCISES: CPT

## 2019-06-04 PROCEDURE — 25000003 PHARM REV CODE 250: Performed by: NURSE PRACTITIONER

## 2019-06-04 PROCEDURE — 94761 N-INVAS EAR/PLS OXIMETRY MLT: CPT

## 2019-06-04 PROCEDURE — 80048 BASIC METABOLIC PNL TOTAL CA: CPT

## 2019-06-04 PROCEDURE — 85025 COMPLETE CBC W/AUTO DIFF WBC: CPT

## 2019-06-04 PROCEDURE — 63600175 PHARM REV CODE 636 W HCPCS: Performed by: EMERGENCY MEDICINE

## 2019-06-04 PROCEDURE — 25000003 PHARM REV CODE 250: Performed by: EMERGENCY MEDICINE

## 2019-06-04 PROCEDURE — 96372 THER/PROPH/DIAG INJ SC/IM: CPT

## 2019-06-04 RX ORDER — OXYCODONE AND ACETAMINOPHEN 5; 325 MG/1; MG/1
1 TABLET ORAL EVERY 4 HOURS PRN
Refills: 0 | Status: ON HOLD
Start: 2019-06-04 | End: 2019-06-18 | Stop reason: HOSPADM

## 2019-06-04 RX ADMIN — LOSARTAN POTASSIUM 50 MG: 50 TABLET, FILM COATED ORAL at 08:06

## 2019-06-04 RX ADMIN — CARBIDOPA AND LEVODOPA 1 TABLET: 25; 250 TABLET ORAL at 09:06

## 2019-06-04 RX ADMIN — PANTOPRAZOLE SODIUM 40 MG: 40 TABLET, DELAYED RELEASE ORAL at 08:06

## 2019-06-04 RX ADMIN — PRAMIPEXOLE DIHYDROCHLORIDE 1 MG: 0.5 TABLET ORAL at 08:06

## 2019-06-04 RX ADMIN — LEVOTHYROXINE SODIUM 125 MCG: 125 TABLET ORAL at 05:06

## 2019-06-04 RX ADMIN — INSULIN ASPART 4 UNITS: 100 INJECTION, SOLUTION INTRAVENOUS; SUBCUTANEOUS at 12:06

## 2019-06-04 RX ADMIN — METOPROLOL SUCCINATE 25 MG: 25 TABLET, EXTENDED RELEASE ORAL at 09:06

## 2019-06-04 RX ADMIN — ISOSORBIDE MONONITRATE 120 MG: 60 TABLET, EXTENDED RELEASE ORAL at 08:06

## 2019-06-04 RX ADMIN — ONDANSETRON 4 MG: 2 INJECTION INTRAMUSCULAR; INTRAVENOUS at 08:06

## 2019-06-04 RX ADMIN — OXYCODONE AND ACETAMINOPHEN 1 TABLET: 5; 325 TABLET ORAL at 09:06

## 2019-06-04 RX ADMIN — ALPRAZOLAM 0.5 MG: 0.5 TABLET ORAL at 02:06

## 2019-06-04 RX ADMIN — Medication: at 09:06

## 2019-06-04 RX ADMIN — PRAVASTATIN SODIUM 40 MG: 20 TABLET ORAL at 08:06

## 2019-06-04 RX ADMIN — CARBIDOPA AND LEVODOPA 1 TABLET: 25; 250 TABLET ORAL at 05:06

## 2019-06-04 RX ADMIN — OXYCODONE AND ACETAMINOPHEN 1 TABLET: 5; 325 TABLET ORAL at 02:06

## 2019-06-04 RX ADMIN — OXYCODONE AND ACETAMINOPHEN 1 TABLET: 5; 325 TABLET ORAL at 12:06

## 2019-06-04 RX ADMIN — ASPIRIN 81 MG: 81 TABLET, COATED ORAL at 09:06

## 2019-06-04 RX ADMIN — CEFTRIAXONE SODIUM 2 G: 2 INJECTION, POWDER, FOR SOLUTION INTRAMUSCULAR; INTRAVENOUS at 09:06

## 2019-06-04 NOTE — PLAN OF CARE
Problem: Physical Therapy Goal  Goal: Physical Therapy Goal  1. Patient will perform bed mobility min A  2. Patient will perform transfers min A with appropriate AD  3. Patient will perform B LE TE x 20 reps to inc strength/act aundrea   Outcome: Ongoing (interventions implemented as appropriate)  PATIENT PERFORM SHORTSEATED BALANCE ACTIVITY, SIT TO STAND , STEPS ATTEMPTED AT MAX ASSIST X2 ,   ANNETTA LE EXERCISES MAX CUES , TACTILE TOUCH , VERBAL CUES FOR FOLLOW THROUGH.

## 2019-06-04 NOTE — PT/OT/SLP PROGRESS
Physical Therapy  Treatment    Ross Isbell   MRN: [id number]   Admitting Diagnosis: Hypoglycemia    PT Received On: 06/04/19  PT Start Time: 1015     PT Stop Time: 1040    PT Total Time (min): 25 min       Billable Minutes:  Therapeutic Activity 15 and Therapeutic Exercise 10    Treatment Type: Treatment  PT/PTA: PTA     PTA Visit Number: 1       General Precautions: Standard, respiratory, fall  Orthopedic Precautions: spinal precautions   Braces: N/A    Spiritual, Cultural Beliefs, Presybeterian Practices, Values that Affect Care: no    Subjective:  Communicated with NURSERICK AND [facility] CHART REVIEW  prior to session.  PATIENT AGREE TO TX NOW.     Pain/Comfort  Pain Rating 1: 5/10  Location - Side 1: Bilateral  Location - Orientation 1: lower  Location 1: groin  Pain Addressed 1: Pre-medicate for activity, Reposition, Distraction, Cessation of Activity  Pain Rating Post-Intervention 1: 5/10    Objective:   Patient found with: bed alarm, peripheral IV, telemetry, oxygen, SUPINE IN BED .   ASSISTED PATIENT TO SHORTSEATED BALANCE ACTIVITY, SIT TO STAND,   SCOOTING IN BED, ANNETTA LE EXERCISES, SUPINE WITH TACTILE , VERBAL AND VISUAL CUES FOR FOLLOW THROUGH.    Functional Mobility:  Bed Mobility:    SUPINE TO SIT, SIT TO SUPINE  AT MAX ASSIST X2, LOG ROLL FOR T/FS.    Transfers:   SIT TO STAND ,STAND TO SIT AT MAX ASSIST X2, 3RD ASSIST TO BLOCK PATIENT ANNETTA KNEES   DUE TO BOTH BUCKLING DURING STANDING BALANCE , STEP ACTIVITY.  Gait:    SEVERAL SIDE  STEPS AT B/S , UNABLE TO ADVANCE FORWARD/BACKWARDS STEPS.     Stairs:  N/A    Balance:   Static Sit: GOOD-: Takes MODERATE challenges from all directions but inconsistently  Dynamic Sit: POOR: N/A  Static Stand: 0: Needs MAXIMAL assist to maintain   Dynamic stand: 0: N/A     Therapeutic Activities and Exercises:  ANNETTA LE EXERCISES, T/FS TO SHORTSEATED , STATIC STANDING, STEP ACTIVITY, BED MOBILITY INCLUDING SCOOTING, ROLLING ACTIVITY.    AM-PAC 6 CLICK MOBILITY  How much help  from another person does this patient currently need?   1 = Unable, Total/Dependent Assistance  2 = A lot, Maximum/Moderate Assistance  3 = A little, Minimum/Contact Guard/Supervision  4 = None, Modified [place]/Independent    Turning over in bed (including adjusting bedclothes, sheets and blankets)?: 2  Sitting down on and standing up from a chair with arms (e.g., wheelchair, bedside commode, etc.): 2  Moving from lying on back to sitting on the side of the bed?: 2  Moving to and from a bed to a chair (including a wheelchair)?: 2  Need to walk in hospital room?: 2  Climbing 3-5 steps with a railing?: 1  Basic Mobility Total Score: 11    AM-PAC Raw Score CMS G-Code Modifier Level of Impairment Assistance   6 % Total / Unable   7 - 9 CM 80 - 100% Maximal Assist   10 - 14 CL 60 - 80% Moderate Assist   15 - 19 CK 40 - 60% Moderate Assist   20 - 22 CJ 20 - 40% Minimal Assist   23 CI 1-20% SBA / CGA   24 CH 0% Independent/ Mod I     Patient left supine with all lines intact, call button in reach and bed alarm on.    Assessment:  Ross Isbell is a 73 y.o. female with a medical diagnosis of Hypoglycemia . PATIENT COOPERATIVE WITH ALL THERAPEUTIC ACTIVITIES TODAY. PATIENT REQUIRE MUCH ASSIST FOR T/FS , BALANCE ACTIVITIES ,  PRESENT , SUPPORTIVE WITH PATIENT CARE.    Rehab identified problem list/impairments: Rehab identified problem list/impairments: weakness, gait instability, decreased upper extremity function, impaired endurance, impaired balance, decreased lower extremity function, impaired sensation, decreased safety awareness, pain, impaired self care skills, impaired functional mobilty, decreased coordination    Rehab potential is good.    Activity tolerance: FAIR    Discharge recommendations: Discharge Facility/Level of Care Needs: nursing facility, skilled     Barriers to discharge:      Equipment recommendations:       GOALS:   Multidisciplinary Problems     Physical Therapy Goals         Problem: Physical Therapy Goal    Goal Priority Disciplines Outcome Goal Variances Interventions   Physical Therapy Goal     PT, PT/OT Ongoing (interventions implemented as appropriate)     Description:  1. Patient will perform bed mobility min A  2. Patient will perform transfers min A with appropriate AD  3. Patient will perform B LE TE x 20 reps to inc strength/act aundrea                    PLAN:    Patient to be seen 5 x/week  to address the above listed problems via gait training, therapeutic activities, therapeutic exercises  Plan of Care expires: 06/08/19  Plan of Care reviewed with: patient         Lo Ballard, PTA  06/04/2019

## 2019-06-04 NOTE — PLAN OF CARE
Sw sent dc orders and AVS to [facility] via [facility]. Awaiting  time and number for report.       06/04/19 5993   Post-Acute Status   Post-Acute Authorization Placement   Post-Acute Placement Status Set-up Complete

## 2019-06-04 NOTE — PROGRESS NOTES
[facility] -   Cardiology  Progress Note    Patient Name: Ross Isbell  MRN: [id number]  Admission Date: 5/31/2019  Hospital Length of Stay: 2 days  Code Status: Prior   Attending Physician: Hemanth العراقي MD   Primary Care Physician: Anna Penn MD  Expected Discharge Date: 6/4/2019  Principal Problem:Hypoglycemia    Subjective:     Hospital Course:   Pt admitted with hypoglycemia.  She has h/o multivessel CAD with multivessel PCI 2015, AS, DM, HTN.  She is s/p recent back surgery, with infection, and on abs and also received PRBC recently for severe anemia.  She went home recently then had low blood sugar, symptomatic with sweats, and sxs resolved with elevation of blood sugar.  She denies recent angina or acute dyspnea.  ecg on admit is normal.    Troponin elevation noted 1.2    Severe anemia noted with Hg 7.5  Was put on IV Heparin gtt by admitting service overnight.  Echo last week showed normal EF, mod AS.  - stress MPI 2018.    6/2/19:  NO CP OR DYSPNEA.  NO ACUTE CV ISSUES NOTED.      06/03 no chest pain. HGB up to 9.7 after PRBC transfusion. C/o severe back pain      Review of Systems   Constitution: Positive for malaise/fatigue.   HENT: Negative.    Eyes: Negative.    Cardiovascular: Negative.    Respiratory: Negative.    Endocrine: Negative.    Hematologic/Lymphatic: Negative.    Skin: Negative.    Musculoskeletal: Positive for arthritis, back pain and joint pain.   Gastrointestinal: Negative.    Genitourinary: Negative.    Neurological: Positive for weakness.   Psychiatric/Behavioral: Negative.    Allergic/Immunologic: Negative.      Objective:     Vital Signs (Most Recent):  Temp: 97.6 °F (36.4 °C) (06/03/19 2011)  Pulse: 85 (06/03/19 2011)  Resp: 18 (06/03/19 2011)  BP: (!) 165/68 (06/03/19 2011)  SpO2: 97 % (06/03/19 2025) Vital Signs (24h Range):  Temp:  [97.5 °F (36.4 °C)-98.8 °F (37.1 °C)] 97.6 °F (36.4 °C)  Pulse:  [69-85] 85  Resp:  [18-19] 18  SpO2:  [95 %-97 %] 97  %  BP: (126-170)/(61-77) 165/68     Weight: 97.3 kg (214 lb 8.1 oz)  Body mass index is 34.62 kg/m².     SpO2: 97 %  O2 Device (Oxygen Therapy): nasal cannula      Intake/Output Summary (Last 24 hours) at 6/3/2019 2231  Last data filed at 6/3/2019 1800  Gross per 24 hour   Intake 60 ml   Output 1570 ml   Net -1510 ml       Lines/Drains/Airways     Peripherally Inserted Central Catheter Line                 PICC Double Lumen 05/25/19 2040 left basilic 9 days          Peripheral Intravenous Line                 Peripheral IV - Single Lumen 05/31/19 18 G Right Hand 3 days                Physical Exam   Constitutional: She is oriented to person, place, and time. Vital signs are normal. She appears well-developed and well-nourished. She is active and cooperative. She does not have a sickly appearance. She does not appear ill. No distress.   HENT:   Head: Normocephalic.   Neck: Neck supple. Normal carotid pulses, no hepatojugular reflux and no JVD present. Carotid bruit is not present. No thyromegaly present.   Cardiovascular: Normal rate, regular rhythm, S1 normal, S2 normal and normal pulses. PMI is not displaced. Exam reveals no gallop and no friction rub.   Murmur heard.   Harsh midsystolic murmur is present with a grade of 2/6 at the upper right sternal border.  Pulses:       Radial pulses are 2+ on the right side, and 2+ on the left side.   Pulmonary/Chest: Effort normal and breath sounds normal. She has no wheezes. She has no rales.   Abdominal: Soft. Normal appearance and bowel sounds are normal. She exhibits no pulsatile liver, no abdominal bruit, no ascites and no mass. There is no tenderness.   OBESE   Musculoskeletal: She exhibits no edema.   Lymphadenopathy:     She has no cervical adenopathy.   Neurological: She is alert and oriented to person, place, and time.   Skin: Skin is warm. She is not diaphoretic.   Psychiatric: She has a normal mood and affect. Her behavior is normal.   Nursing note and vitals  reviewed.      Significant Labs:   ABG: No results for input(s): PH, PCO2, HCO3, POCSATURATED, BE in the last 48 hours., Blood Culture: No results for input(s): LABBLOO in the last 48 hours., BMP:   Recent Labs   Lab 06/02/19  0452 06/03/19  0420   * 121*  121*   * 130*  130*   K 4.3 4.3  4.3   CL 93* 94*  94*   CO2 30* 31*  31*   BUN 8 7*  7*   CREATININE 0.7 0.7  0.7   CALCIUM 8.7 8.8  8.8   , CMP   Recent Labs   Lab 06/02/19  0452 06/03/19  0420   * 130*  130*   K 4.3 4.3  4.3   CL 93* 94*  94*   CO2 30* 31*  31*   * 121*  121*   BUN 8 7*  7*   CREATININE 0.7 0.7  0.7   CALCIUM 8.7 8.8  8.8   ANIONGAP 8 5*  5*   ESTGFRAFRICA >60 >60  >60   EGFRNONAA >60 >60  >60   , CBC   Recent Labs   Lab 06/02/19  0452 06/03/19  0420   WBC 9.04 8.74  8.74   HGB 7.6* 9.7*  9.7*   HCT 23.6* 30.4*  30.4*    312  312   , INR No results for input(s): INR, PROTIME in the last 48 hours., Lipid Panel No results for input(s): CHOL, HDL, LDLCALC, TRIG, CHOLHDL in the last 48 hours. and Troponin No results for input(s): TROPONINI in the last 48 hours.    Significant Imaging: EKG:      Assessment and Plan:   * AMS sec to Hypoglycemia/ Metabolic Encephalopathy  See management plan detailed above.     Elevated troponin  Demand ischemia due to hypoglycemia and severe anemia  Chest pain free  Continue asa, statin, BB and ARB  Keep HGB > 9    Anemia  See management plan detailed above.     History of PTCA  See management plan detailed above.     CAD, multiple vessel  See management plan detailed above.     Nonrheumatic aortic valve stenosis  See management plan detailed above.     CAD: S/P coronary artery stent placement  See management plan detailed above.     Essential hypertension  See management plan detailed above.     CAD (coronary artery disease)  See management plan detailed above.         VTE Risk Mitigation (From admission, onward)        Ordered     Place sequential compression  device  Until discontinued      06/01/19 0020     Place QUIQUE hose  Until discontinued      06/01/19 0020          Donnell Reddy MD  Cardiology  [facility]

## 2019-06-04 NOTE — DISCHARGE SUMMARY
"[facility] Hospital Medicine  Discharge Summary      Patient Name: Ross Isbell  MRN: [id number]  Admission Date: 5/31/2019  Hospital Length of Stay: 3 days  Discharge Date and Time: 6/4/2019  3:00 PM  Attending Physician: Bobbi att. providers found   Discharging Provider: Hemanth العراقي MD  Primary Care Provider: Anna Penn MD      HPI:    Ross Isbell is a 73 y.o. female patient with a PMHx of Parkinson's disease, CAD, DM, and MI who presented for further evaluation of hypoglycemia.  It was reported patient's blood sugar prior to arrival at [employer] [employer] [facility] 38.  No modifying factors.  Associated symptoms include grogginess.  Prior treatment patient was giving IV glucose in route.  Initially improved on arrival to ER.  Patient's glucose then dropped again and was given additional glucose.  Hospital Medicine consulted.  Patient admitted.  To note:  " Pt had a spinal fusion, lumbar laminectomy, and debridement of a paraspinal abscess done by Dr. Nicolas (Neurosurgery) on 5/25 and was discharged 05/30/2019, with wound VAC and IV antibiotic (Rocephin) therapy.     Discharge Summary 5/30/19:     HPI:   Ross Isbell is a 73 year old female with a PMHx of DM II, Parkinson's, CORINE, MI, CAD, and Glaucoma who presented to the Emergency Department with c/o fatigue. Associated symptoms include: generalized weakness and BLE pain x 2 weeks. Pt was informed by home health nurse to go to ED for increasing weakness, abdominal pain and leg pain. ED workup showed: WBC 13.08K, Hgb/Hct 11.0/33.5, Na+ 129, lipase 17. UA (+) nitrites, few bacteria. CXR with no acute findings. CT abdomen/pelvis with contrast showed moderate inflammatory changes centered on the intervertebral disc space between the L2 and L3 vertebral bodies. There is bony destruction on both sides of the intervertebral disc space between the L2 and L3 vertebral bodies.  This is consistent with the patient's history and characteristic of discitis and " osteomyelitis. ED discussed case with Dr. Nicolas (neurosurgery) who reported pt did not need to be transferred out at this time. Obtain MRI thoracic/lumbar spine w wo contrast. Start pt on IV Vancomycin and Zosyn but consult Dr. Gomez (ID). Daughter: Uzma Godfrey [phone] is the surrogate decision maker. Admitted for Osteomyelitis of lumbar spine.     Procedure(s) (LRB):  FUSION, SPINE, POSTERIOR SPINAL COLUMN, LUMBAR, USING COMPUTER-ASSISTED NAVIGATION  FUSION, SPINE, LUMBAR, TLIF, POSTERIOR APPROACH, USING PEDICLE SCREW (N/A)  LAMINECTOMY, SPINE, LUMBAR  DEBRIDEMENT, ABSCESS, PARASPINAL (N/A)       Hospital Course:   5/24/19 Dr. Nicolas, Neurosurgeon, doesn't recommend spinal surgery, But Needle biopsy of L 2-3 disk abscess with cultures. This am, patient c/o of severe abdominal pain 20/10, Norco 10 mg ordered PRN with IV 2 mg Morphine PRN. Blood culture is Positive for GPC. Echo pending. Patient had severe back pain and refused OT/PT. Daughter stated she has had diarrhea x 1 month. Stool for C.Diff ordered. T. Max 101. IV Vanco and Zosyn. IR, Dr. Juárez, consulted for Needle biopsy. Daughter at bedside. ID consult pending. Cont Vanc and Zosyn. CT abdomen/pelvis positive for osteomyelitis.  To OR with Dr. Nicolas 25 May for laminectomy and vertebroplasty.  Post-procedure in ICU due to post-op hypotension.  Able to wean vasopressors.  Anemia due to blood loss related to the procedure.  Transfused 1 unit PRBC.  Interval improvement in abdominal and back pain.  Tolerated limited degree of physical therapy.  Pursuing skilled placement.  Discharge plan to transfer to [facility] skilled nursing [facility].  Complete 8 week Ceftriaxone 2 g daily IV stop date 23 July.  Weekly ESR and CRP.  PT and OT.  Prevena incisional wound VAC.      * No surgery found *      Hospital Course:   Ross Isbell is a 73 y.o. female patient with a PMHx of Parkinson's disease, CAD, DM, and MI, NH resident- admitted for severe  Hypoglycemia. Pt had a spinal fusion, lumbar laminectomy, and debridement of a paraspinal abscess done by Dr. Nicolas (Neurosurgery) on 5/25 and was discharged 05/30/2019 to SNF with wound VAC and IV antibiotic (Rocephin) therapy. She is doing much better, AAOx3, speech clear, c/o pain in her back at the surgery site. BS around 80- 100 mostly. Will change to D5NS- as Na is down to 132.     6/2- doing much better. AAOx3, speech clear, BS improved, no hypoglycemia since admit. Cardiology following and thinks that elevated Trop as Demand ischemia and not NSTEMI, normal EF on Echo. IV rocephin continued for back abscess/ Osteomyelitis. H/H low to 7.5/22-- getting 2 units of blood.     6/3  Patient improving with mobility. C/O LBP on movement. States she will work well with PT / OT.  at bedside and encouraging patient. Plan for SNF for continued recovery.    Patient 72 yo female admitted to hospital with Hypoglycemia, ANemia, s/p Laminectomy. Patient transfused PRBC and continued therapy post procedure. Patient seen and examined today Patient returns to [facility] for continued SNF, IV Abx and recovery from recent surgery     Consults:   Consults (From admission, onward)        Status Ordering Provider     Inpatient consult to Cardiology  Once     Provider:  Enrrique Huffman MD    Completed JOVITA YIN     Inpatient consult to Registered Dietitian/Nutritionist  Once     Provider:  (Not yet assigned)    Completed JOVITA YIN     Inpatient consult to Social Work/Case Management  Once     Provider:  (Not yet assigned)    Completed JOVITA YIN          No new Assessment & Plan notes have been filed under this hospital service since the last note was generated.  Service: Hospital Medicine    Final Active Diagnoses:    Diagnosis Date Noted POA    CAD, multiple vessel [I25.10] 06/01/2019 Yes    Anemia [D64.9] 06/01/2019 Yes    Elevated troponin [R74.8] 06/01/2019 Yes    Streptococcal bacteremia [R78.81,  B95.5] 05/24/2019 Yes    Pressure injury of back, stage 3 [L89.103] 05/24/2019 Yes    Osteomyelitis of lumbar spine [M46.26] 05/23/2019 Yes    Nonrheumatic aortic valve stenosis [I35.0] 03/23/2018 Yes    CAD: S/P coronary artery stent placement [Z95.5] 10/06/2015 Not Applicable     Chronic    Hypothyroidism [E03.9] 11/19/2013 Yes     Chronic    Essential hypertension [I10]  Yes     Chronic    CAD (coronary artery disease) [I25.10]  Yes     Chronic      Problems Resolved During this Admission:    Diagnosis Date Noted Date Resolved POA    PRINCIPAL PROBLEM:  AMS sec to Hypoglycemia/ Metabolic Encephalopathy [E16.2] 06/01/2019 06/04/2019 Yes    History of PTCA [Z98.61] 06/01/2019 06/04/2019 Not Applicable    Alteration in skin integrity related to surgical incision [R23.9] 05/29/2019 06/04/2019 Yes       Discharged Condition: stable    Disposition: Skilled Nursing Facility    Follow Up:  Follow-up Information     Anna Penn MD In 3 days.    Specialty:  Family Medicine  Why:  following d/c from SNF  Contact information:  [address]  [facility] [address]  [phone]             [facility]. Go today.    Specialties:  Nursing Home Agency, SNF Agency  Why:  SNF  Contact information:  [id number]  [place] [address]  [phone]                 Patient Instructions:      Diet diabetic     Activity as tolerated       Significant Diagnostic Studies: Labs:   BMP:   Recent Labs   Lab 06/03/19 0420 06/04/19  0615   *  121* 168*   *  130* 131*   K 4.3  4.3 4.6   CL 94*  94* 92*   CO2 31*  31* 31*   BUN 7*  7* 9   CREATININE 0.7  0.7 0.7   CALCIUM 8.8  8.8 9.2   , CMP   Recent Labs   Lab 06/03/19  0420 06/04/19  0615   *  130* 131*   K 4.3  4.3 4.6   CL 94*  94* 92*   CO2 31*  31* 31*   *  121* 168*   BUN 7*  7* 9   CREATININE 0.7  0.7 0.7   CALCIUM 8.8  8.8 9.2   ANIONGAP 5*  5* 8   ESTGFRAFRICA >60  >60 >60   EGFRNONAA >60  >60  ">60   , CBC   Recent Labs   Lab 06/03/19  0420 06/04/19  0615   WBC 8.74  8.74 8.24   HGB 9.7*  9.7* 10.5*   HCT 30.4*  30.4* 33.4*     312 268   , Troponin   Recent Labs   Lab 06/01/19  0545   TROPONINI 1.278*    and All labs within the past 24 hours have been reviewed    Pending Diagnostic Studies:     None         Medications:  Reconciled Home Medications:      Medication List      CHANGE how you take these medications    oxyCODONE-acetaminophen 5-325 mg per tablet  Commonly known as:  PERCOCET  Take 1 tablet by mouth every 4 (four) hours as needed for Pain (pain 4-5/10).  What changed:  reasons to take this        CONTINUE taking these medications    ACCU-CHEK SOFTCLIX LANCETS Misc  Generic drug:  lancets  TEST THREE TIMES DAILY     acetaminophen 325 MG tablet  Commonly known as:  TYLENOL  Take 2 tablets (650 mg total) by mouth every 6 (six) hours as needed.     aspirin 81 MG EC tablet  Commonly known as:  ECOTRIN  Take 1 tablet (81 mg total) by mouth once daily.     blood sugar diagnostic Strp  1 strip by Misc.(Non-Drug; Combo Route) route 3 (three) times daily. Accuchek Felicia Plus Test Strips     blood-glucose meter kit  Accu-chek Felicia Plus Meter     carbidopa-levodopa  mg  mg per tablet  Commonly known as:  SINEMET  TAKE 1 TABLET BY MOUTH 5 TIMES DAILY     cefTRIAXone 2 g in dextrose 5 % 50 mL 2 g/50 mL Pgbk IVPB  Commonly known as:  ROCEPHIN  Inject 50 mLs (2 g total) into the vein once daily.     CENTRUM SILVER 0.4-300-250 mg-mcg-mcg Tab  Generic drug:  multivit-min-FA-lycopen-lutein  Take 1 tablet by mouth once daily.     insulin syringe-needle U-100 0.5 mL 31 gauge x 5/16" Syrg  USE THREE TIMES DAILY     isosorbide mononitrate 120 MG 24 hr tablet  Commonly known as:  IMDUR  Take 1 tablet (120 mg total) by mouth once daily.     latanoprost 0.005 % ophthalmic solution  INSTILL 1 DROP INTO EACH EYE IN THE EVENING     levothyroxine 125 MCG tablet  Commonly known as:  SYNTHROID  TAKE 1 " TABLET BY MOUTH IN THE MORNING BEFORE BREAKFAST     losartan 50 MG tablet  Commonly known as:  COZAAR  TAKE 1 TABLET BY MOUTH ONCE DAILY     metFORMIN 500 MG tablet  Commonly known as:  GLUCOPHAGE  TAKE ONE TABLET BY MOUTH THREE TIMES DAILY     metoprolol succinate 25 MG 24 hr tablet  Commonly known as:  TOPROL-XL  TAKE 1 TABLET BY MOUTH ONCE DAILY     miconazole NITRATE 2 % 2 % top powder  Commonly known as:  MICOTIN  Apply topically 2 (two) times daily.     NovoLIN 70/30 U-100 Insulin 100 unit/mL (70-30) injection  Generic drug:  insulin NPH-insulin regular (70/30)  Inject 55 Units into the skin 3 (three) times daily.     pantoprazole 40 MG tablet  Commonly known as:  PROTONIX  Take 1 tablet (40 mg total) by mouth once daily.     pramipexole 1 MG tablet  Commonly known as:  MIRAPEX  TAKE ONE TABLET BY MOUTH THREE TIMES DAILY     pravastatin 40 MG tablet  Commonly known as:  PRAVACHOL  TAKE ONE TABLET BY MOUTH ONCE DAILY            Indwelling Lines/Drains at time of discharge:   Lines/Drains/Airways     Peripherally Inserted Central Catheter Line                 PICC Double Lumen 05/25/19 2040 left basilic 9 days          Drain            Female External Urinary Catheter 06/04/19 1017 less than 1 day          Pressure Ulcer                 Pressure Injury 05/23/19 1911 Left Buttocks DTPI 11 days         Pressure Injury 05/23/19 1911 lower Thoracic spine Stage 3 11 days         Pressure Injury 05/24/19 Right Buttocks DTPI 11 days                Time spent on the discharge of patient: 42 minutes  Patient was seen and examined on the date of discharge and determined to be suitable for discharge.         Hemanth العراقي MD  Department of Hospital Medicine  [facility]

## 2019-06-04 NOTE — PT/OT/SLP PROGRESS
"Occupational Therapy  Treatment    Ross Isbell   MRN: [id number]   Admitting Diagnosis: Hypoglycemia    OT Date of Treatment: 06/04/19   OT Start Time: 1100  OT Stop Time: 1130  OT Total Time (min): 30 min    Billable Minutes:  Therapeutic Activity 25 minutes    General Precautions: Standard, respiratory, fall  Orthopedic Precautions: N/A  Braces: N/A         Subjective:  Communicated with nurse and epic chart review prior to session.    Pain/Comfort  Pain Rating 1: 5/10  Location - Side 1: Bilateral  Location - Orientation 1: lower  Location 1: groin    Objective:  Patient found with: telemetry, oxygen, peripheral IV, bed alarm     Functional Mobility:  Bed Mobility:  Max a     Transfers:   max a     Functional Ambulation: na    Activities of Daily Living:     Feeding adaptive equipment: na     UE adaptive equipment: na     LE adaptive equipment: na                     Bathing adaptive equipment: na    Balance:   Static Sit: poor  Dynamic Sit: POOR: N/A  Static Stand: poor-  Dynamic stand: poor-  Therapeutic Activities and Exercises:  Pt educated on b ue/le therapeutic exercise. Pt performed 1 set x 10 reps b ue rom exercise seated with bed in chair position.   AM-PAC 6 CLICK ADL   How much help from another person does this patient currently need?   1 = Unable, Total/Dependent Assistance  2 = A lot, Maximum/Moderate Assistance  3 = A little, Minimum/Contact Guard/Supervision  4 = None, Modified [place]/Independent    Putting on and taking off regular lower body clothing? : 2  Bathing (including washing, rinsing, drying)?: 2  Toileting, which includes using toilet, bedpan, or urinal? : 2  Putting on and taking off regular upper body clothing?: 2  Taking care of personal grooming such as brushing teeth?: 2  Eating meals?: 3  Daily Activity Total Score: 13     AM-PAC Raw Score CMS "G-Code Modifier Level of Impairment Assistance   6 % Total / Unable   7 - 8 CM 80 - 100% Maximal Assist   9-13 CL 60 - 80% " Moderate Assist   14 - 19 CK 40 - 60% Moderate Assist   20 - 22 CJ 20 - 40% Minimal Assist   23 CI 1-20% SBA / CGA   24 CH 0% Independent/ Mod I       Patient left with bed in chair position with all lines intact, call button in reach, bed alarm on, nurse notified and spouse present    ASSESSMENT:  Ross Isbell is a 73 y.o. female with a medical diagnosis of Hypoglycemia and presents with debility and generalized weakness. Pt will continue to benefit from skilled ot.    Rehab identified problem list/impairments: Rehab identified problem list/impairments: weakness, impaired self care skills, impaired balance, decreased safety awareness, impaired endurance, impaired functional mobilty, decreased upper extremity function, gait instability    Rehab potential is good.    Activity tolerance: Good    Discharge recommendations: Discharge Facility/Level of Care Needs: nursing facility, skilled     Barriers to discharge: Barriers to Discharge: Decreased caregiver support    Equipment recommendations: none     GOALS:   Multidisciplinary Problems     Occupational Therapy Goals        Problem: Occupational Therapy Goal    Goal Priority Disciplines Outcome Interventions   Occupational Therapy Goal     OT, PT/OT Ongoing (interventions implemented as appropriate)    Description:  Goals to be met by: 6/9/19     Patient will increase functional independence with ADLs by performing:    UE Dressing with Moderate Assistance.  Grooming while EOB with Contact Guard Assistance.  Toileting from bedside commode with Maximum Assistance for hygiene and clothing management.   Toilet transfer to bedside commode with Maximum Assistance.  Upper extremity exercise program x10 reps per handout, with assistance as needed.                      Plan:  Patient to be seen 3 x/week to address the above listed problems via self-care/home management, therapeutic exercises, therapeutic activities  Plan of Care expires: 06/09/19  Plan of Care reviewed  with: patient         Cyndie Sunshinezier, OT  06/04/2019

## 2019-06-04 NOTE — PLAN OF CARE
Problem: Adult Inpatient Plan of Care  Goal: Plan of Care Review  Outcome: Outcome(s) achieved Date Met: 06/04/19  POC discussed with patient, verbalized understanding.   NSR on monitor.   VSS. AAO X 4.   Voids per pure wick.   Turns independently in bed.   Fall precautions in place.   Side rails up X2.    Call bell on, working and within reach.   Bed locked in low position.   Remains free from falls/injuries.   Denies needs at this time.   Will continue to monitor.

## 2019-06-04 NOTE — PROGRESS NOTES
Follow up visit with Ms. Isbell for continued wound management. Discussed with primary nurse Cookie: patient's dressing was noted to be saturated with serosanguinous drainge this morning. Dressing removed. No change in appearance of incision and healing stage 3 pressure injuries since last assessment yesterday. Cleansed with saline and patted dry. aquacel ag rope applied to length of incision and to cover pressure injuries. One piece aquacel extra then folded in half and applied of section of incision that has most drainage. Topped all with ABD pads and secured with medipore tape. Patient tolerated well.

## 2019-06-04 NOTE — SUBJECTIVE & OBJECTIVE
Review of Systems   Constitution: Positive for malaise/fatigue.   HENT: Negative.    Eyes: Negative.    Cardiovascular: Negative.    Respiratory: Negative.    Endocrine: Negative.    Hematologic/Lymphatic: Negative.    Skin: Negative.    Musculoskeletal: Positive for arthritis, back pain and joint pain.   Gastrointestinal: Negative.    Genitourinary: Negative.    Neurological: Positive for weakness.   Psychiatric/Behavioral: Negative.    Allergic/Immunologic: Negative.      Objective:     Vital Signs (Most Recent):  Temp: 97.6 °F (36.4 °C) (06/03/19 2011)  Pulse: 85 (06/03/19 2011)  Resp: 18 (06/03/19 2011)  BP: (!) 165/68 (06/03/19 2011)  SpO2: 97 % (06/03/19 2025) Vital Signs (24h Range):  Temp:  [97.5 °F (36.4 °C)-98.8 °F (37.1 °C)] 97.6 °F (36.4 °C)  Pulse:  [69-85] 85  Resp:  [18-19] 18  SpO2:  [95 %-97 %] 97 %  BP: (126-170)/(61-77) 165/68     Weight: 97.3 kg (214 lb 8.1 oz)  Body mass index is 34.62 kg/m².     SpO2: 97 %  O2 Device (Oxygen Therapy): nasal cannula      Intake/Output Summary (Last 24 hours) at 6/3/2019 2231  Last data filed at 6/3/2019 1800  Gross per 24 hour   Intake 60 ml   Output 1570 ml   Net -1510 ml       Lines/Drains/Airways     Peripherally Inserted Central Catheter Line                 PICC Double Lumen 05/25/19 2040 left basilic 9 days          Peripheral Intravenous Line                 Peripheral IV - Single Lumen 05/31/19 18 G Right Hand 3 days                Physical Exam   Constitutional: She is oriented to person, place, and time. Vital signs are normal. She appears well-developed and well-nourished. She is active and cooperative. She does not have a sickly appearance. She does not appear ill. No distress.   HENT:   Head: Normocephalic.   Neck: Neck supple. Normal carotid pulses, no hepatojugular reflux and no JVD present. Carotid bruit is not present. No thyromegaly present.   Cardiovascular: Normal rate, regular rhythm, S1 normal, S2 normal and normal pulses. PMI is not  displaced. Exam reveals no gallop and no friction rub.   Murmur heard.   Harsh midsystolic murmur is present with a grade of 2/6 at the upper right sternal border.  Pulses:       Radial pulses are 2+ on the right side, and 2+ on the left side.   Pulmonary/Chest: Effort normal and breath sounds normal. She has no wheezes. She has no rales.   Abdominal: Soft. Normal appearance and bowel sounds are normal. She exhibits no pulsatile liver, no abdominal bruit, no ascites and no mass. There is no tenderness.   OBESE   Musculoskeletal: She exhibits no edema.   Lymphadenopathy:     She has no cervical adenopathy.   Neurological: She is alert and oriented to person, place, and time.   Skin: Skin is warm. She is not diaphoretic.   Psychiatric: She has a normal mood and affect. Her behavior is normal.   Nursing note and vitals reviewed.      Significant Labs:   ABG: No results for input(s): PH, PCO2, HCO3, POCSATURATED, BE in the last 48 hours., Blood Culture: No results for input(s): LABBLOO in the last 48 hours., BMP:   Recent Labs   Lab 06/02/19  0452 06/03/19  0420   * 121*  121*   * 130*  130*   K 4.3 4.3  4.3   CL 93* 94*  94*   CO2 30* 31*  31*   BUN 8 7*  7*   CREATININE 0.7 0.7  0.7   CALCIUM 8.7 8.8  8.8   , CMP   Recent Labs   Lab 06/02/19  0452 06/03/19  0420   * 130*  130*   K 4.3 4.3  4.3   CL 93* 94*  94*   CO2 30* 31*  31*   * 121*  121*   BUN 8 7*  7*   CREATININE 0.7 0.7  0.7   CALCIUM 8.7 8.8  8.8   ANIONGAP 8 5*  5*   ESTGFRAFRICA >60 >60  >60   EGFRNONAA >60 >60  >60   , CBC   Recent Labs   Lab 06/02/19  0452 06/03/19  0420   WBC 9.04 8.74  8.74   HGB 7.6* 9.7*  9.7*   HCT 23.6* 30.4*  30.4*    312  312   , INR No results for input(s): INR, PROTIME in the last 48 hours., Lipid Panel No results for input(s): CHOL, HDL, LDLCALC, TRIG, CHOLHDL in the last 48 hours. and Troponin No results for input(s): TROPONINI in the last 48 hours.    Significant  Imaging: EKG:

## 2019-06-06 LAB
BACTERIA BLD CULT: NORMAL
BACTERIA BLD CULT: NORMAL

## 2019-06-10 ENCOUNTER — HOSPITAL ENCOUNTER (INPATIENT)
Facility: HOSPITAL | Age: 74
LOS: 8 days | Discharge: NURSING, REHAB OR LONG-TERM CARE | DRG: 907 | End: 2019-06-18
Attending: EMERGENCY MEDICINE | Admitting: FAMILY MEDICINE
Payer: MEDICARE

## 2019-06-10 ENCOUNTER — OFFICE VISIT (OUTPATIENT)
Dept: NEUROSURGERY | Facility: CLINIC | Age: 74
End: 2019-06-10
Payer: MEDICARE

## 2019-06-10 VITALS
WEIGHT: 211 LBS | HEIGHT: 66 IN | DIASTOLIC BLOOD PRESSURE: 74 MMHG | SYSTOLIC BLOOD PRESSURE: 131 MMHG | BODY MASS INDEX: 33.91 KG/M2 | HEART RATE: 119 BPM

## 2019-06-10 DIAGNOSIS — T14.8XXA WOUND DRAINAGE: ICD-10-CM

## 2019-06-10 DIAGNOSIS — G06.1 ABSCESS IN EPIDURAL SPACE OF LUMBAR SPINE: ICD-10-CM

## 2019-06-10 DIAGNOSIS — L89.103 PRESSURE INJURY OF BACK, STAGE 3: ICD-10-CM

## 2019-06-10 DIAGNOSIS — M46.46 DISCITIS OF LUMBAR REGION: ICD-10-CM

## 2019-06-10 DIAGNOSIS — M86.9 OSTEOMYELITIS, UNSPECIFIED SITE, UNSPECIFIED TYPE: Primary | ICD-10-CM

## 2019-06-10 DIAGNOSIS — Z98.1 STATUS POST LUMBAR SPINAL FUSION: ICD-10-CM

## 2019-06-10 DIAGNOSIS — E66.01 MORBID OBESITY WITH BMI OF 40.0-44.9, ADULT: Chronic | ICD-10-CM

## 2019-06-10 DIAGNOSIS — M46.26 OSTEOMYELITIS OF LUMBAR SPINE: Primary | ICD-10-CM

## 2019-06-10 DIAGNOSIS — M86.9 OSTEOMYELITIS: Chronic | ICD-10-CM

## 2019-06-10 LAB
ALBUMIN SERPL BCP-MCNC: 3 G/DL (ref 3.5–5.2)
ALP SERPL-CCNC: 100 U/L (ref 55–135)
ALT SERPL W/O P-5'-P-CCNC: 6 U/L (ref 10–44)
ANION GAP SERPL CALC-SCNC: 16 MMOL/L (ref 8–16)
AST SERPL-CCNC: 33 U/L (ref 10–40)
BASOPHILS # BLD AUTO: 0.01 K/UL (ref 0–0.2)
BASOPHILS NFR BLD: 0.1 % (ref 0–1.9)
BILIRUB SERPL-MCNC: 0.4 MG/DL (ref 0.1–1)
BUN SERPL-MCNC: 13 MG/DL (ref 8–23)
CALCIUM SERPL-MCNC: 10.1 MG/DL (ref 8.7–10.5)
CHLORIDE SERPL-SCNC: 92 MMOL/L (ref 95–110)
CO2 SERPL-SCNC: 20 MMOL/L (ref 23–29)
CREAT SERPL-MCNC: 0.9 MG/DL (ref 0.5–1.4)
CRP SERPL-MCNC: 53.2 MG/L (ref 0–8.2)
DIFFERENTIAL METHOD: ABNORMAL
EOSINOPHIL # BLD AUTO: 0.1 K/UL (ref 0–0.5)
EOSINOPHIL NFR BLD: 1.1 % (ref 0–8)
ERYTHROCYTE [DISTWIDTH] IN BLOOD BY AUTOMATED COUNT: 15.1 % (ref 11.5–14.5)
ERYTHROCYTE [SEDIMENTATION RATE] IN BLOOD BY WESTERGREN METHOD: 101 MM/HR (ref 0–20)
EST. GFR  (AFRICAN AMERICAN): >60 ML/MIN/1.73 M^2
EST. GFR  (NON AFRICAN AMERICAN): >60 ML/MIN/1.73 M^2
GLUCOSE SERPL-MCNC: 213 MG/DL (ref 70–110)
HCT VFR BLD AUTO: 34.1 % (ref 37–48.5)
HGB BLD-MCNC: 11.1 G/DL (ref 12–16)
LACTATE SERPL-SCNC: 3.6 MMOL/L (ref 0.5–2.2)
LYMPHOCYTES # BLD AUTO: 1.4 K/UL (ref 1–4.8)
LYMPHOCYTES NFR BLD: 12.8 % (ref 18–48)
MCH RBC QN AUTO: 28.5 PG (ref 27–31)
MCHC RBC AUTO-ENTMCNC: 32.6 G/DL (ref 32–36)
MCV RBC AUTO: 87 FL (ref 82–98)
MONOCYTES # BLD AUTO: 0.9 K/UL (ref 0.3–1)
MONOCYTES NFR BLD: 8.3 % (ref 4–15)
NEUTROPHILS # BLD AUTO: 8.3 K/UL (ref 1.8–7.7)
NEUTROPHILS NFR BLD: 77.7 % (ref 38–73)
PLATELET # BLD AUTO: 397 K/UL (ref 150–350)
PMV BLD AUTO: 8.4 FL (ref 9.2–12.9)
POCT GLUCOSE: 210 MG/DL (ref 70–110)
POTASSIUM SERPL-SCNC: 4.7 MMOL/L (ref 3.5–5.1)
PROT SERPL-MCNC: 7.2 G/DL (ref 6–8.4)
RBC # BLD AUTO: 3.9 M/UL (ref 4–5.4)
SODIUM SERPL-SCNC: 128 MMOL/L (ref 136–145)
WBC # BLD AUTO: 10.63 K/UL (ref 3.9–12.7)

## 2019-06-10 PROCEDURE — 85025 COMPLETE CBC W/AUTO DIFF WBC: CPT

## 2019-06-10 PROCEDURE — 87040 BLOOD CULTURE FOR BACTERIA: CPT

## 2019-06-10 PROCEDURE — 85651 RBC SED RATE NONAUTOMATED: CPT

## 2019-06-10 PROCEDURE — 96374 THER/PROPH/DIAG INJ IV PUSH: CPT

## 2019-06-10 PROCEDURE — 99024 PR POST-OP FOLLOW-UP VISIT: ICD-10-PCS | Mod: S$GLB,,, | Performed by: PHYSICIAN ASSISTANT

## 2019-06-10 PROCEDURE — 63600175 PHARM REV CODE 636 W HCPCS: Performed by: FAMILY MEDICINE

## 2019-06-10 PROCEDURE — 99999 PR PBB SHADOW E&M-EST. PATIENT-LVL IV: ICD-10-PCS | Mod: PBBFAC,,, | Performed by: PHYSICIAN ASSISTANT

## 2019-06-10 PROCEDURE — 63600175 PHARM REV CODE 636 W HCPCS: Performed by: EMERGENCY MEDICINE

## 2019-06-10 PROCEDURE — 80053 COMPREHEN METABOLIC PANEL: CPT

## 2019-06-10 PROCEDURE — G0378 HOSPITAL OBSERVATION PER HR: HCPCS

## 2019-06-10 PROCEDURE — 25000003 PHARM REV CODE 250: Performed by: EMERGENCY MEDICINE

## 2019-06-10 PROCEDURE — 25000003 PHARM REV CODE 250: Performed by: FAMILY MEDICINE

## 2019-06-10 PROCEDURE — 99024 POSTOP FOLLOW-UP VISIT: CPT | Mod: S$GLB,,, | Performed by: PHYSICIAN ASSISTANT

## 2019-06-10 PROCEDURE — 86140 C-REACTIVE PROTEIN: CPT

## 2019-06-10 PROCEDURE — 96372 THER/PROPH/DIAG INJ SC/IM: CPT | Mod: 59 | Performed by: EMERGENCY MEDICINE

## 2019-06-10 PROCEDURE — 21400001 HC TELEMETRY ROOM

## 2019-06-10 PROCEDURE — 83605 ASSAY OF LACTIC ACID: CPT

## 2019-06-10 PROCEDURE — 99999 PR PBB SHADOW E&M-EST. PATIENT-LVL IV: CPT | Mod: PBBFAC,,, | Performed by: PHYSICIAN ASSISTANT

## 2019-06-10 PROCEDURE — 99285 EMERGENCY DEPT VISIT HI MDM: CPT | Mod: 25

## 2019-06-10 PROCEDURE — 96375 TX/PRO/DX INJ NEW DRUG ADDON: CPT | Performed by: EMERGENCY MEDICINE

## 2019-06-10 PROCEDURE — 36415 COLL VENOUS BLD VENIPUNCTURE: CPT

## 2019-06-10 RX ORDER — CARBIDOPA AND LEVODOPA 25; 250 MG/1; MG/1
1 TABLET ORAL
Status: DISCONTINUED | OUTPATIENT
Start: 2019-06-10 | End: 2019-06-18 | Stop reason: HOSPADM

## 2019-06-10 RX ORDER — GLUCAGON 1 MG
1 KIT INJECTION
Status: DISCONTINUED | OUTPATIENT
Start: 2019-06-10 | End: 2019-06-18 | Stop reason: HOSPADM

## 2019-06-10 RX ORDER — PRAVASTATIN SODIUM 20 MG/1
40 TABLET ORAL DAILY
Status: DISCONTINUED | OUTPATIENT
Start: 2019-06-11 | End: 2019-06-18 | Stop reason: HOSPADM

## 2019-06-10 RX ORDER — OXYCODONE AND ACETAMINOPHEN 5; 325 MG/1; MG/1
1 TABLET ORAL EVERY 6 HOURS PRN
Status: DISCONTINUED | OUTPATIENT
Start: 2019-06-10 | End: 2019-06-13

## 2019-06-10 RX ORDER — SODIUM CHLORIDE 0.9 % (FLUSH) 0.9 %
10 SYRINGE (ML) INJECTION
Status: DISCONTINUED | OUTPATIENT
Start: 2019-06-10 | End: 2019-06-18 | Stop reason: HOSPADM

## 2019-06-10 RX ORDER — ISOSORBIDE MONONITRATE 60 MG/1
120 TABLET, EXTENDED RELEASE ORAL DAILY
Status: DISCONTINUED | OUTPATIENT
Start: 2019-06-11 | End: 2019-06-11

## 2019-06-10 RX ORDER — MEROPENEM AND SODIUM CHLORIDE 500 MG/50ML
500 INJECTION, SOLUTION INTRAVENOUS
Status: DISCONTINUED | OUTPATIENT
Start: 2019-06-10 | End: 2019-06-10

## 2019-06-10 RX ORDER — IBUPROFEN 200 MG
16 TABLET ORAL
Status: DISCONTINUED | OUTPATIENT
Start: 2019-06-10 | End: 2019-06-18 | Stop reason: HOSPADM

## 2019-06-10 RX ORDER — ACETAMINOPHEN 325 MG/1
650 TABLET ORAL EVERY 8 HOURS PRN
Status: DISCONTINUED | OUTPATIENT
Start: 2019-06-10 | End: 2019-06-18 | Stop reason: HOSPADM

## 2019-06-10 RX ORDER — LEVOTHYROXINE SODIUM 125 UG/1
125 TABLET ORAL
Status: DISCONTINUED | OUTPATIENT
Start: 2019-06-11 | End: 2019-06-18 | Stop reason: HOSPADM

## 2019-06-10 RX ORDER — LATANOPROST 50 UG/ML
1 SOLUTION/ DROPS OPHTHALMIC NIGHTLY
Status: DISCONTINUED | OUTPATIENT
Start: 2019-06-10 | End: 2019-06-18 | Stop reason: HOSPADM

## 2019-06-10 RX ORDER — PRAMIPEXOLE DIHYDROCHLORIDE 0.5 MG/1
1 TABLET ORAL 3 TIMES DAILY
Status: DISCONTINUED | OUTPATIENT
Start: 2019-06-10 | End: 2019-06-18 | Stop reason: HOSPADM

## 2019-06-10 RX ORDER — PANTOPRAZOLE SODIUM 40 MG/1
40 TABLET, DELAYED RELEASE ORAL DAILY
Status: DISCONTINUED | OUTPATIENT
Start: 2019-06-11 | End: 2019-06-18 | Stop reason: HOSPADM

## 2019-06-10 RX ORDER — INSULIN ASPART 100 [IU]/ML
0-5 INJECTION, SOLUTION INTRAVENOUS; SUBCUTANEOUS
Status: DISCONTINUED | OUTPATIENT
Start: 2019-06-10 | End: 2019-06-18 | Stop reason: HOSPADM

## 2019-06-10 RX ORDER — ACETAMINOPHEN 325 MG/1
325 TABLET ORAL
Status: COMPLETED | OUTPATIENT
Start: 2019-06-10 | End: 2019-06-10

## 2019-06-10 RX ORDER — METOPROLOL SUCCINATE 25 MG/1
25 TABLET, EXTENDED RELEASE ORAL DAILY
Status: DISCONTINUED | OUTPATIENT
Start: 2019-06-11 | End: 2019-06-11

## 2019-06-10 RX ORDER — IPRATROPIUM BROMIDE AND ALBUTEROL SULFATE 2.5; .5 MG/3ML; MG/3ML
3 SOLUTION RESPIRATORY (INHALATION) EVERY 4 HOURS PRN
Status: DISCONTINUED | OUTPATIENT
Start: 2019-06-10 | End: 2019-06-18 | Stop reason: HOSPADM

## 2019-06-10 RX ORDER — LOSARTAN POTASSIUM 50 MG/1
50 TABLET ORAL DAILY
Status: DISCONTINUED | OUTPATIENT
Start: 2019-06-11 | End: 2019-06-18 | Stop reason: HOSPADM

## 2019-06-10 RX ADMIN — CARBIDOPA AND LEVODOPA 1 TABLET: 25; 250 TABLET ORAL at 10:06

## 2019-06-10 RX ADMIN — LATANOPROST 1 DROP: 50 SOLUTION OPHTHALMIC at 09:06

## 2019-06-10 RX ADMIN — PIPERACILLIN SODIUM AND TAZOBACTAM SODIUM 4.5 G: 4; .5 INJECTION, POWDER, LYOPHILIZED, FOR SOLUTION INTRAVENOUS at 05:06

## 2019-06-10 RX ADMIN — INSULIN ASPART 1 UNITS: 100 INJECTION, SOLUTION INTRAVENOUS; SUBCUTANEOUS at 09:06

## 2019-06-10 RX ADMIN — VANCOMYCIN HYDROCHLORIDE 2500 MG: 1 INJECTION, POWDER, FOR SOLUTION INTRAVENOUS at 06:06

## 2019-06-10 RX ADMIN — ACETAMINOPHEN 325 MG: 325 TABLET ORAL at 06:06

## 2019-06-10 RX ADMIN — PRAMIPEXOLE DIHYDROCHLORIDE 1 MG: 0.5 TABLET ORAL at 09:06

## 2019-06-10 NOTE — PROGRESS NOTES
Subjective:      Patient ID: Ross Isbell is a 73 y.o. female.    Chief Complaint: Follow-up; Lumbar Spine Pain (L-Spine); and Post-op Evaluation    HPI  Patient is here today for postop wound check. She is a 72 YO female with h/o osteomyelitis and Parkinson' disease presented with lower back pain and difficulty walking a few weeks ago. Reports she had not walked for about a month with symptoms worsening over past 2-3 weeks. She underwent surgery on 5/25/19 for posterior lumbar fusion and abscess debridement. Since this time, patient has had several medical issues related to pressure sore and wound drainage from lumbar fusion, hyponatremia and hypoglycemia.  Patient has been afebrile. She reports muscle spasms across her lower back. She denies any increased weakness of her lower extremities, numbness or tingling.  She rates her current pain a 10/10.       Pre-Operative Diagnosis: Osteomyelitis of lumbar spine [M46.26]  Gait disorder [R26.9]     Post-Operative Diagnosis: Post-Op Diagnosis Codes:     * Osteomyelitis of lumbar spine [M46.26]     * Gait disorder [R26.9]     Anesthesia: General     Technical Procedures Used:   1.  Placement of pedicle screws bilaterally L1-L4  2.  Medial facetectomy laminectomy bilaterally at L2 and L3 for epidural abscess  3.  Removal of disc material at L2-3 for debridement of epidural abscess and diskitis  4.  Posterior lateral arthrodesis from L1-L4  5.  Use of allograft bone  6.  Use of autograft bone  7.  Use of intraoperative sterotactic navigation        Description of the Findings of the Procedure:  Osteomyelitis and diskitis at L2-3       Review of Systems   Constitution: Negative for fever.   HENT: Negative for congestion.    Respiratory: Negative for cough and wheezing.    Skin: Positive for poor wound healing.   Musculoskeletal: Positive for back pain, falls and muscle weakness.   Gastrointestinal: Negative for abdominal pain, bowel incontinence, nausea and vomiting.    Neurological: Positive for numbness. Negative for seizures.   Psychiatric/Behavioral: Negative for altered mental status.         Objective:            General    Constitutional: She appears well-developed and well-nourished.   Neck: Normal range of motion.   Cardiovascular: Normal rate and regular rhythm.    Pulmonary/Chest: Effort normal.   Abdominal: Soft.   Neurological: She is alert.   Psychiatric: She has a normal mood and affect. Her behavior is normal.         Right Ankle/Foot Exam     Tests   Heel Walk: unable to perform  Tiptoe Walk: unable to perform    Left Ankle/Foot Exam     Tests   Heel Walk: unable to perform  Tiptoe Walk: unable to perform  Back (L-Spine & T-Spine) / Neck (C-Spine) Exam     Tenderness Posterior midline palpation reveals tenderness of the Lower L-Spine and Upper L-Spine. Right paramedian tenderness of the Upper L-Spine and Lower L-Spine. Left paramedian tenderness of the Upper L-Spine and Lower L-Spine.     Back (L-Spine & T-Spine) Range of Motion   Extension: abnormal   Flexion: abnormal     Comments:  Incision of lower back- active serosanguineous drainage noted.   Sutures were removed with added difficulty   + swelling and erythema    Patient has foot drop at baseline.  She is able to raise her legs but not without pain and weakness.                        Assessment:       Encounter Diagnoses   Name Primary?    Osteomyelitis of lumbar spine Yes    Wound drainage     Status post lumbar spinal fusion           Plan:       Ross was seen today for follow-up, lumbar spine pain (l-spine) and post-op evaluation.    Diagnoses and all orders for this visit:    Osteomyelitis of lumbar spine  -     Cancel: CT Lumbar Spine Without Contrast; Future  -     Cancel: Comprehensive metabolic panel; Future  -     Cancel: Sedimentation rate; Future  -     CT Lumbar Spine Without Contrast; Future  -     Sedimentation rate; Future  -     Comprehensive metabolic panel; Future    Wound  drainage    Status post lumbar spinal fusion          Patient had sutures removed today with added difficulty due to skin overlying the sutures. Her surgical wound was draining serosanguineous fluid during removal. Wound was reinforced with steri-strips, non-adherent dressing, gauze and microfoam tape.    After speaking with Dr. Nicolas, it was recommended that she go to the ER to be admitted with hospital medicine. I would like for her to do labs and CT while inpatient. She will most likely need irrigation and debridement with wound vac placement.               Mayra Long PA-C

## 2019-06-10 NOTE — ED PROVIDER NOTES
73 year old female sent to ER for admission per Dr. Shipman for surgical wash out of wound to lower back.   Pt had recent lumbar surgery for treatment of osteomyelitis.  Denies fever or chills.         SCRIBE #1 NOTE: I, Padmini Parks, am scribing for, and in the presence of, Leonel Arias Jr., MD. I have scribed the entire note.      History      Chief Complaint   Patient presents with    Post-op Problem     sent by jerardo for admit for wound vac/wash       Review of patient's allergies indicates:   Allergen Reactions    Codeine      Other reaction(s): Nausea.  Patient tolerated morphine 5/2019 with no reported problems.     Codeine sulfate      Unknown^    Diphenhydramine hcl      Unknown^  Other reaction(s): Rash    Sulfa (sulfonamide antibiotics) Hives and Nausea And Vomiting    Penicillins Rash     Rash only as a child  Tolerated ceftriaxone 4/30/19  Tolerated piperacillin-tazobactam 5/23/19        HPI   HPI    6/10/2019, 2:48 PM   History obtained from the patient      History of Present Illness: Ross Isbell is a 73 y.o. female patient with PMHx CAD, MI, PD, and DM who presents to the Emergency Department for admission after being referred by Dr. Nicolas (spine). Patient had recent lumbar surgery for osteomyelitis tx and needs surgical washout of wound to lower back. Symptoms are constant and moderate in severity. No mitigating or exacerbating factors reported. No associated sxs. Patient denies any fever, chills, CP, SOB, extremity weakness/numbness, HA, neck pain, and all other sxs at this time. No prior Tx. No further complaints or concerns at this time.           Arrival mode: Personal vehicle    PCP: Anna Penn MD       Past Medical History:  Past Medical History:   Diagnosis Date    Abnormal nuclear stress test 11/1/2015    Anemia 6/1/2019    Background diabetic retinopathy 12/28/2015    CAD (coronary artery disease) 2002    stents    Cataract     Gait disorder 10/12/2012    Glaucoma      History of PTCA 6/1/2019    MI (myocardial infarction)     Obesity     CORINE (obstructive sleep apnea)     Other and unspecified hyperlipidemia     PD (Parkinson's disease) 2002    tremor-predominant (per patient)    Type II or unspecified type diabetes mellitus without mention of complication, not stated as uncontrolled 2002      am    Unspecified essential hypertension        Past Surgical History:  Past Surgical History:   Procedure Laterality Date    BACK SURGERY  05/25/2019    BREAST BIOPSY Left     benign - about 3 yrs ago    CORONARY ANGIOPLASTY  2002    CORONARY STENT PLACEMENT  2002    DEBRIDEMENT, ABSCESS, PARASPINAL N/A 5/25/2019    [facility] at [facility]    DILATION AND CURETTAGE OF UTERUS      FOREIGN BODY REMOVAL      FUSION, SPINE, LUMBAR, TLIF, POSTERIOR APPROACH, USING PEDICLE SCREW N/A 5/25/2019    Per[facility] by [facility] at [facility] OR    FUSION, SPINE, POSTERIOR SPINAL COLUMN, LUMBAR, USING COMPUTER-ASSISTED NAVIGATION  5/25/2019    Per[facility] by [facility] at [facility] OR    HEART CATH-LEFT Left 11/5/2015    Performed by Constance Pace [facility] at [facility] CATH LAB    HEART CATH-LEFT Left 11/2/2015    Performed by [facility] at [facility] CATH LAB    HEART CATH-LEFT/pci stent lad Left 11/10/2015    Performed by [facility] at [facility] CATH LAB    INNER EAR SURGERY      LAMINECTOMY, SPINE, LUMBAR  5/25/2019    [facility] by [facility] at [facility] OR    TONSILLECTOMY, ADENOIDECTOMY           Family History:  Family History   Problem Relation Age of Onset    Parkinsonism Other     Tremor Father     Hypertension Father     Heart attack Father 65        MI    Parkinsonism Paternal Aunt     Glaucoma Mother     Hypertension Mother     Heart attack Mother 65        MI    Ovarian cancer Mother     Glaucoma Maternal Grandmother     Hypertension Maternal Grandmother     Diabetes Maternal Grandmother     Breast cancer Paternal  Grandmother        Social History:  Social History     Tobacco Use    Smoking status: Former Smoker     Years: 40.00     Last attempt to quit: 10/12/2010     Years since quittin.6    Smokeless tobacco: Never Used    Tobacco comment: On & off for the past few years   Substance and Sexual Activity    Alcohol use: No     Alcohol/week: 0.0 oz    Drug use: No    Sexual activity: Not Currently       ROS   Review of Systems   Constitutional: Negative for activity change, appetite change, chills, diaphoresis, fatigue and fever.   HENT: Negative for congestion, ear pain, nosebleeds, rhinorrhea, sinus pain, sore throat and trouble swallowing.    Eyes: Negative for pain and discharge.   Respiratory: Negative for cough, chest tightness, shortness of breath, wheezing and stridor.    Cardiovascular: Negative for chest pain, palpitations and leg swelling.   Gastrointestinal: Negative for abdominal distention, abdominal pain, blood in stool, constipation, diarrhea, nausea and vomiting.   Genitourinary: Negative for difficulty urinating, dysuria, flank pain, frequency, hematuria and urgency.   Musculoskeletal: Negative for arthralgias, back pain, myalgias and neck pain.   Skin: Positive for wound (lower back). Negative for pallor and rash.   Neurological: Negative for dizziness, syncope, weakness, light-headedness, numbness and headaches.   Hematological: Does not bruise/bleed easily.   Psychiatric/Behavioral: Negative for confusion and self-injury.   All other systems reviewed and are negative.    Physical Exam      Initial Vitals [06/10/19 1248]   BP Pulse Resp Temp SpO2   122/65 109 18 98.6 °F (37 °C) 98 %      MAP       --          Physical Exam  Nursing Notes and Vital Signs Reviewed.  Constitutional: Patient is in no acute distress. Well-developed and well-nourished.  Head: Atraumatic. Normocephalic.  Eyes: PERRL. EOM intact. Conjunctivae are not pale. No scleral icterus.  ENT: Mucous membranes are moist. Oropharynx  "is clear and symmetric.    Neck: Supple. Full ROM. No lymphadenopathy.  Cardiovascular: Regular rate. Regular rhythm. No murmurs, rubs, or gallops. Distal pulses are 2+ and symmetric.  Pulmonary/Chest: No respiratory distress. Clear to auscultation bilaterally. No wheezing or rales.  Abdominal: Soft and non-distended. There is no tenderness.  No rebound, guarding, or rigidity. Good bowel sounds.  Genitourinary: No CVA tenderness  Musculoskeletal: Lower back pain at midline. IV in LUE. Moves all extremities. No obvious deformities. No edema. No calf tenderness.  Skin: Warm and dry.  Neurological:  Alert, awake, and appropriate.  Normal speech.  No acute focal neurological deficits are appreciated.  Psychiatric: Normal affect. Good eye contact. Appropriate in content.    ED Course    Procedures  ED Vital Signs:  Vitals:    06/10/19 1248 06/10/19 1654   BP: 122/65    Pulse: 109    Resp: 18    Temp: 98.6 °F (37 °C)    TempSrc: Oral    SpO2: 98%    Weight:  96.8 kg (213 lb 7 oz)   Height: 5' 6" (1.676 m)        Abnormal Lab Results:  Labs Reviewed   CBC W/ AUTO DIFFERENTIAL - Abnormal; Notable for the following components:       Result Value    RBC 3.90 (*)     Hemoglobin 11.1 (*)     Hematocrit 34.1 (*)     RDW 15.1 (*)     Platelets 397 (*)     MPV 8.4 (*)     Gran # (ANC) 8.3 (*)     Gran% 77.7 (*)     Lymph% 12.8 (*)     All other components within normal limits   COMPREHENSIVE METABOLIC PANEL - Abnormal; Notable for the following components:    Sodium 128 (*)     Chloride 92 (*)     CO2 20 (*)     Glucose 213 (*)     Albumin 3.0 (*)     ALT 6 (*)     All other components within normal limits   SEDIMENTATION RATE - Abnormal; Notable for the following components:    Sed Rate 101 (*)     All other components within normal limits   C-REACTIVE PROTEIN - Abnormal; Notable for the following components:    CRP 53.2 (*)     All other components within normal limits   LACTIC ACID, PLASMA - Abnormal; Notable for the following " components:    Lactate (Lactic Acid) 3.6 (*)     All other components within normal limits    Narrative:        Lactic critical result(s) called and verbal readback obtained from   Yamile Feliciano RN, 06/10/2019 16:03   CULTURE, BLOOD   CULTURE, BLOOD        All Lab Results:  Results for orders placed or performed during the hospital encounter of 06/10/19   CBC auto differential   Result Value Ref Range    WBC 10.63 3.90 - 12.70 K/uL    RBC 3.90 (L) 4.00 - 5.40 M/uL    Hemoglobin 11.1 (L) 12.0 - 16.0 g/dL    Hematocrit 34.1 (L) 37.0 - 48.5 %    Mean Corpuscular Volume 87 82 - 98 fL    Mean Corpuscular Hemoglobin 28.5 27.0 - 31.0 pg    Mean Corpuscular Hemoglobin Conc 32.6 32.0 - 36.0 g/dL    RDW 15.1 (H) 11.5 - 14.5 %    Platelets 397 (H) 150 - 350 K/uL    MPV 8.4 (L) 9.2 - 12.9 fL    Gran # (ANC) 8.3 (H) 1.8 - 7.7 K/uL    Lymph # 1.4 1.0 - 4.8 K/uL    Mono # 0.9 0.3 - 1.0 K/uL    Eos # 0.1 0.0 - 0.5 K/uL    Baso # 0.01 0.00 - 0.20 K/uL    Gran% 77.7 (H) 38.0 - 73.0 %    Lymph% 12.8 (L) 18.0 - 48.0 %    Mono% 8.3 4.0 - 15.0 %    Eosinophil% 1.1 0.0 - 8.0 %    Basophil% 0.1 0.0 - 1.9 %    Differential Method Automated    Comprehensive metabolic panel   Result Value Ref Range    Sodium 128 (L) 136 - 145 mmol/L    Potassium 4.7 3.5 - 5.1 mmol/L    Chloride 92 (L) 95 - 110 mmol/L    CO2 20 (L) 23 - 29 mmol/L    Glucose 213 (H) 70 - 110 mg/dL    BUN, Bld 13 8 - 23 mg/dL    Creatinine 0.9 0.5 - 1.4 mg/dL    Calcium 10.1 8.7 - 10.5 mg/dL    Total Protein 7.2 6.0 - 8.4 g/dL    Albumin 3.0 (L) 3.5 - 5.2 g/dL    Total Bilirubin 0.4 0.1 - 1.0 mg/dL    Alkaline Phosphatase 100 55 - 135 U/L    AST 33 10 - 40 U/L    ALT 6 (L) 10 - 44 U/L    Anion Gap 16 8 - 16 mmol/L    eGFR if African American >60 >60 mL/min/1.73 m^2    eGFR if non African American >60 >60 mL/min/1.73 m^2   Sedimentation rate   Result Value Ref Range    Sed Rate 101 (H) 0 - 20 mm/Hr   C-reactive protein   Result Value Ref Range    CRP 53.2 (H) 0.0 - 8.2 mg/L    Lactic acid, plasma   Result Value Ref Range    Lactate (Lactic Acid) 3.6 (HH) 0.5 - 2.2 mmol/L         Imaging Results:  Imaging Results          CT Lumbar Spine Without Contrast (Final result)  Result time 06/10/19 16:02:17    Final result by Rashad Alcaraz III, MD (06/10/19 16:02:17)                 Impression:      1.  The findings at L2-L3 are consistent with discitis/osteomyelitis.  Detail is grossly limited due to streak artifact but I strongly suspect at least some bony destruction involving the inferior margin of the L2 vertebral body with soft tissue density within the L2-L3 interspace region.  Cannot exclude some involvement of the superior aspect of L3.    2.  L3-4 shows neural foraminal encroachment greater on the left.    3.  At L4-5 there is mild anterolisthesis of L4 related L5 with disc bulging causing severe bilateral neural foraminal stenosis.  There is at least mild to moderate central canal stenosis.    4.  L5-S1 shows less severe central canal stenosis.    All CT scans at this facility use dose modulation, iterative reconstruction, and/or weight based dosing when appropriate to reduce radiation dose to as low as reasonably achievable.      Electronically signed by: Rashad Alcaraz  Date:    06/10/2019  Time:    16:02             Narrative:    EXAMINATION:  CT LUMBAR SPINE WITHOUT CONTRAST    CLINICAL HISTORY:  Presurgical evaluation, lumbar spine;    FINDINGS:  Multiplanar images submitted.  The exam is degraded by extensive metallic artifacts caused by patient's multilevel fusion.  This can be gets at T11.  T11-12 and T12-L1 show no significant findings.  There is atheromatous change along the aorta.    Beginning at L1, screws cause the streak artifact previously noted.  L1-L2 shows no gross bony abnormality within the visualized segments.  There is severe artifact limiting detail but there is evidence of what appears to be bony destruction involving the inferior portion of the L2  vertebral body greatest anteriorly and to the right.  Is a great deal of soft tissue density between the L2 and L3 vertebral body.  Cannot exclude minimal involvement of the superior aspect of L3 as well.    L3-L4 shows interspace narrowing with arthritic lipping.  Neural foraminal encroachment, greater on the left.  L4-L5 shows moderate bulging of the disc extending back and causing severe bilateral neural foraminal stenosis.  There is central canal stenosis from facet hypertrophy and ligamentous thickening.  At L5-S1 there is bulging of the disc with bilateral facet arthropathy.    The hardware appears grossly intact.                                        The Emergency Provider reviewed the vital signs and test results, which are outlined above.    ED Discussion     6:07 PM: Re-evaluated pt. I have discussed test results, shared treatment plan, and the need for admission with patient and family at bedside. Pt and family express understanding at this time and agree with all information. All questions answered. Pt and family have no further questions or concerns at this time. Pt is ready for admit.    6:07 PM: Discussed case with Melody Oliver NP ([facility]). Dr. Moore agrees with current care and management of pt and accepts admission.   Admitting Service: Hospital medicine   Admitting Physician: Oscar  Admit to: med [facility]        ED Medication(s):  Medications   vancomycin (VANCOCIN) 2,500 mg in dextrose 5 % 500 mL IVPB (has no administration in time range)   piperacillin-tazobactam 4.5 g in dextrose 5 % 100 mL IVPB (ready to mix system) (4.5 g Intravenous New Bag 6/10/19 1336)             Medical Decision Making    Medical Decision Making:   Clinical Tests:   Lab Tests: Ordered and Reviewed  Radiological Study: Ordered and Reviewed           Scribe Attestation:   Scribe #1: I performed the above scribed service and the documentation accurately describes the services I performed. I attest to the accuracy of  the note.    Attending:   Physician Attestation Statement for Scribe #1: I, Leonel Arias Jr., MD, personally performed the services described in this documentation, as scribed by Padmini Parks, in my presence, and it is both accurate and complete.          Clinical Impression       ICD-10-CM ICD-9-CM   1. Osteomyelitis, unspecified site, unspecified type M86.9 730.20   2. Discitis of lumbar region M46.46 722.93       Disposition:   Disposition: Admitted  Condition: Fair         Leonel Arias Jr., MD  06/10/19 2028

## 2019-06-11 ENCOUNTER — ANESTHESIA EVENT (OUTPATIENT)
Dept: SURGERY | Facility: HOSPITAL | Age: 74
DRG: 907 | End: 2019-06-11
Payer: MEDICARE

## 2019-06-11 ENCOUNTER — ANESTHESIA (OUTPATIENT)
Dept: SURGERY | Facility: HOSPITAL | Age: 74
DRG: 907 | End: 2019-06-11
Payer: MEDICARE

## 2019-06-11 PROBLEM — M86.9 OSTEOMYELITIS: Chronic | Status: ACTIVE | Noted: 2019-06-10

## 2019-06-11 LAB
ABO + RH BLD: NORMAL
ALBUMIN SERPL BCP-MCNC: 2.8 G/DL (ref 3.5–5.2)
ALP SERPL-CCNC: 98 U/L (ref 55–135)
ALT SERPL W/O P-5'-P-CCNC: 5 U/L (ref 10–44)
ANION GAP SERPL CALC-SCNC: 11 MMOL/L (ref 8–16)
AST SERPL-CCNC: 20 U/L (ref 10–40)
BACTERIA #/AREA URNS HPF: ABNORMAL /HPF
BASOPHILS # BLD AUTO: 0.02 K/UL (ref 0–0.2)
BASOPHILS NFR BLD: 0.3 % (ref 0–1.9)
BILIRUB SERPL-MCNC: 0.6 MG/DL (ref 0.1–1)
BILIRUB UR QL STRIP: NEGATIVE
BLD GP AB SCN CELLS X3 SERPL QL: NORMAL
BUN SERPL-MCNC: 12 MG/DL (ref 8–23)
CALCIUM SERPL-MCNC: 9.6 MG/DL (ref 8.7–10.5)
CHLORIDE SERPL-SCNC: 93 MMOL/L (ref 95–110)
CLARITY UR: CLEAR
CO2 SERPL-SCNC: 23 MMOL/L (ref 23–29)
COLOR UR: YELLOW
CREAT SERPL-MCNC: 0.7 MG/DL (ref 0.5–1.4)
DIFFERENTIAL METHOD: ABNORMAL
EOSINOPHIL # BLD AUTO: 0.3 K/UL (ref 0–0.5)
EOSINOPHIL NFR BLD: 3.3 % (ref 0–8)
ERYTHROCYTE [DISTWIDTH] IN BLOOD BY AUTOMATED COUNT: 15 % (ref 11.5–14.5)
EST. GFR  (AFRICAN AMERICAN): >60 ML/MIN/1.73 M^2
EST. GFR  (NON AFRICAN AMERICAN): >60 ML/MIN/1.73 M^2
GLUCOSE SERPL-MCNC: 122 MG/DL (ref 70–110)
GLUCOSE UR QL STRIP: NEGATIVE
GRAM STN SPEC: NORMAL
GRAM STN SPEC: NORMAL
HCT VFR BLD AUTO: 32 % (ref 37–48.5)
HGB BLD-MCNC: 10.1 G/DL (ref 12–16)
HGB UR QL STRIP: NEGATIVE
KETONES UR QL STRIP: NEGATIVE
LEUKOCYTE ESTERASE UR QL STRIP: ABNORMAL
LYMPHOCYTES # BLD AUTO: 2 K/UL (ref 1–4.8)
LYMPHOCYTES NFR BLD: 26 % (ref 18–48)
MCH RBC QN AUTO: 27.7 PG (ref 27–31)
MCHC RBC AUTO-ENTMCNC: 31.6 G/DL (ref 32–36)
MCV RBC AUTO: 88 FL (ref 82–98)
MICROSCOPIC COMMENT: ABNORMAL
MONOCYTES # BLD AUTO: 0.9 K/UL (ref 0.3–1)
MONOCYTES NFR BLD: 11.6 % (ref 4–15)
NEUTROPHILS # BLD AUTO: 4.4 K/UL (ref 1.8–7.7)
NEUTROPHILS NFR BLD: 58.8 % (ref 38–73)
NITRITE UR QL STRIP: NEGATIVE
PH UR STRIP: 7 [PH] (ref 5–8)
PLATELET # BLD AUTO: 411 K/UL (ref 150–350)
PMV BLD AUTO: 8.4 FL (ref 9.2–12.9)
POCT GLUCOSE: 120 MG/DL (ref 70–110)
POCT GLUCOSE: 121 MG/DL (ref 70–110)
POCT GLUCOSE: 156 MG/DL (ref 70–110)
POCT GLUCOSE: 160 MG/DL (ref 70–110)
POCT GLUCOSE: 275 MG/DL (ref 70–110)
POTASSIUM SERPL-SCNC: 4.5 MMOL/L (ref 3.5–5.1)
PROT SERPL-MCNC: 6.6 G/DL (ref 6–8.4)
PROT UR QL STRIP: NEGATIVE
RBC # BLD AUTO: 3.64 M/UL (ref 4–5.4)
SODIUM SERPL-SCNC: 127 MMOL/L (ref 136–145)
SP GR UR STRIP: <=1.005 (ref 1–1.03)
SQUAMOUS #/AREA URNS HPF: 1 /HPF
URN SPEC COLLECT METH UR: ABNORMAL
UROBILINOGEN UR STRIP-ACNC: NEGATIVE EU/DL
WBC # BLD AUTO: 7.51 K/UL (ref 3.9–12.7)
WBC #/AREA URNS HPF: 3 /HPF (ref 0–5)
WBC CLUMPS URNS QL MICRO: ABNORMAL
YEAST URNS QL MICRO: ABNORMAL

## 2019-06-11 PROCEDURE — 37000008 HC ANESTHESIA 1ST 15 MINUTES: Performed by: NEUROLOGICAL SURGERY

## 2019-06-11 PROCEDURE — 63600175 PHARM REV CODE 636 W HCPCS: Performed by: NEUROLOGICAL SURGERY

## 2019-06-11 PROCEDURE — 63600175 PHARM REV CODE 636 W HCPCS: Performed by: NURSE ANESTHETIST, CERTIFIED REGISTERED

## 2019-06-11 PROCEDURE — 25000003 PHARM REV CODE 250: Performed by: NURSE ANESTHETIST, CERTIFIED REGISTERED

## 2019-06-11 PROCEDURE — 87075 CULTR BACTERIA EXCEPT BLOOD: CPT

## 2019-06-11 PROCEDURE — 25000003 PHARM REV CODE 250: Performed by: PHYSICIAN ASSISTANT

## 2019-06-11 PROCEDURE — 87116 MYCOBACTERIA CULTURE: CPT

## 2019-06-11 PROCEDURE — 27201423 OPTIME MED/SURG SUP & DEVICES STERILE SUPPLY: Performed by: NEUROLOGICAL SURGERY

## 2019-06-11 PROCEDURE — 63600175 PHARM REV CODE 636 W HCPCS: Performed by: ANESTHESIOLOGY

## 2019-06-11 PROCEDURE — 87102 FUNGUS ISOLATION CULTURE: CPT

## 2019-06-11 PROCEDURE — 21400001 HC TELEMETRY ROOM

## 2019-06-11 PROCEDURE — 87106 FUNGI IDENTIFICATION YEAST: CPT

## 2019-06-11 PROCEDURE — 97605 PR NEG PRESS WOUND THERAPY (NPWT) W/NON-DISPOSABLE WOUND VAC DEVICE (DME), <=50 CM: ICD-10-PCS | Mod: 59,,, | Performed by: NEUROLOGICAL SURGERY

## 2019-06-11 PROCEDURE — 71000033 HC RECOVERY, INTIAL HOUR: Performed by: NEUROLOGICAL SURGERY

## 2019-06-11 PROCEDURE — 86850 RBC ANTIBODY SCREEN: CPT

## 2019-06-11 PROCEDURE — 22015 PR I&D, POST SPINE, LUMB/SACR/LUMBOSAC: ICD-10-PCS | Mod: ,,, | Performed by: NEUROLOGICAL SURGERY

## 2019-06-11 PROCEDURE — 87205 SMEAR GRAM STAIN: CPT

## 2019-06-11 PROCEDURE — 37000009 HC ANESTHESIA EA ADD 15 MINS: Performed by: NEUROLOGICAL SURGERY

## 2019-06-11 PROCEDURE — 36000706: Performed by: NEUROLOGICAL SURGERY

## 2019-06-11 PROCEDURE — 71000039 HC RECOVERY, EACH ADD'L HOUR: Performed by: NEUROLOGICAL SURGERY

## 2019-06-11 PROCEDURE — C1729 CATH, DRAINAGE: HCPCS | Performed by: NEUROLOGICAL SURGERY

## 2019-06-11 PROCEDURE — 97605 NEG PRS WND THER DME<=50SQCM: CPT | Mod: 59,,, | Performed by: NEUROLOGICAL SURGERY

## 2019-06-11 PROCEDURE — 87206 SMEAR FLUORESCENT/ACID STAI: CPT

## 2019-06-11 PROCEDURE — 85025 COMPLETE CBC W/AUTO DIFF WBC: CPT

## 2019-06-11 PROCEDURE — 80053 COMPREHEN METABOLIC PANEL: CPT

## 2019-06-11 PROCEDURE — 25000003 PHARM REV CODE 250: Performed by: FAMILY MEDICINE

## 2019-06-11 PROCEDURE — 25500020 PHARM REV CODE 255: Performed by: EMERGENCY MEDICINE

## 2019-06-11 PROCEDURE — 25000003 PHARM REV CODE 250: Performed by: NURSE PRACTITIONER

## 2019-06-11 PROCEDURE — 22015 I&D ABSCESS P-SPINE L/S/LS: CPT | Mod: ,,, | Performed by: NEUROLOGICAL SURGERY

## 2019-06-11 PROCEDURE — 81000 URINALYSIS NONAUTO W/SCOPE: CPT

## 2019-06-11 PROCEDURE — 36000707: Performed by: NEUROLOGICAL SURGERY

## 2019-06-11 PROCEDURE — 87070 CULTURE OTHR SPECIMN AEROBIC: CPT

## 2019-06-11 PROCEDURE — 36415 COLL VENOUS BLD VENIPUNCTURE: CPT

## 2019-06-11 PROCEDURE — 63600175 PHARM REV CODE 636 W HCPCS: Performed by: PHYSICIAN ASSISTANT

## 2019-06-11 RX ORDER — VANCOMYCIN HYDROCHLORIDE 1 G/20ML
INJECTION, POWDER, LYOPHILIZED, FOR SOLUTION INTRAVENOUS
Status: DISCONTINUED | OUTPATIENT
Start: 2019-06-11 | End: 2019-06-11 | Stop reason: HOSPADM

## 2019-06-11 RX ORDER — FENTANYL CITRATE 50 UG/ML
25 INJECTION, SOLUTION INTRAMUSCULAR; INTRAVENOUS EVERY 5 MIN PRN
Status: DISCONTINUED | OUTPATIENT
Start: 2019-06-11 | End: 2019-06-11 | Stop reason: HOSPADM

## 2019-06-11 RX ORDER — GLYCOPYRROLATE 0.2 MG/ML
INJECTION INTRAMUSCULAR; INTRAVENOUS
Status: DISCONTINUED | OUTPATIENT
Start: 2019-06-11 | End: 2019-06-11

## 2019-06-11 RX ORDER — LIDOCAINE HYDROCHLORIDE 20 MG/ML
INJECTION, SOLUTION EPIDURAL; INFILTRATION; INTRACAUDAL; PERINEURAL
Status: DISCONTINUED | OUTPATIENT
Start: 2019-06-11 | End: 2019-06-11

## 2019-06-11 RX ORDER — HYDROMORPHONE HYDROCHLORIDE 2 MG/ML
0.2 INJECTION, SOLUTION INTRAMUSCULAR; INTRAVENOUS; SUBCUTANEOUS EVERY 5 MIN PRN
Status: DISCONTINUED | OUTPATIENT
Start: 2019-06-11 | End: 2019-06-11 | Stop reason: HOSPADM

## 2019-06-11 RX ORDER — CYCLOBENZAPRINE HCL 10 MG
10 TABLET ORAL 3 TIMES DAILY PRN
Status: DISCONTINUED | OUTPATIENT
Start: 2019-06-11 | End: 2019-06-18 | Stop reason: HOSPADM

## 2019-06-11 RX ORDER — ROCURONIUM BROMIDE 10 MG/ML
INJECTION, SOLUTION INTRAVENOUS
Status: DISCONTINUED | OUTPATIENT
Start: 2019-06-11 | End: 2019-06-11

## 2019-06-11 RX ORDER — MIDAZOLAM HYDROCHLORIDE 1 MG/ML
INJECTION, SOLUTION INTRAMUSCULAR; INTRAVENOUS
Status: DISCONTINUED | OUTPATIENT
Start: 2019-06-11 | End: 2019-06-11

## 2019-06-11 RX ORDER — SUCCINYLCHOLINE CHLORIDE 20 MG/ML
INJECTION INTRAMUSCULAR; INTRAVENOUS
Status: DISCONTINUED | OUTPATIENT
Start: 2019-06-11 | End: 2019-06-11

## 2019-06-11 RX ORDER — EPHEDRINE SULFATE 50 MG/ML
INJECTION, SOLUTION INTRAVENOUS
Status: DISCONTINUED | OUTPATIENT
Start: 2019-06-11 | End: 2019-06-11

## 2019-06-11 RX ORDER — NEOSTIGMINE METHYLSULFATE 1 MG/ML
INJECTION, SOLUTION INTRAVENOUS
Status: DISCONTINUED | OUTPATIENT
Start: 2019-06-11 | End: 2019-06-11

## 2019-06-11 RX ORDER — MEPERIDINE HYDROCHLORIDE 50 MG/ML
12.5 INJECTION INTRAMUSCULAR; INTRAVENOUS; SUBCUTANEOUS 2 TIMES DAILY PRN
Status: DISCONTINUED | OUTPATIENT
Start: 2019-06-11 | End: 2019-06-11 | Stop reason: HOSPADM

## 2019-06-11 RX ORDER — PROPOFOL 10 MG/ML
VIAL (ML) INTRAVENOUS
Status: DISCONTINUED | OUTPATIENT
Start: 2019-06-11 | End: 2019-06-11

## 2019-06-11 RX ORDER — ONDANSETRON 2 MG/ML
4 INJECTION INTRAMUSCULAR; INTRAVENOUS EVERY 6 HOURS PRN
Status: DISCONTINUED | OUTPATIENT
Start: 2019-06-11 | End: 2019-06-18 | Stop reason: HOSPADM

## 2019-06-11 RX ORDER — SODIUM CHLORIDE, SODIUM LACTATE, POTASSIUM CHLORIDE, CALCIUM CHLORIDE 600; 310; 30; 20 MG/100ML; MG/100ML; MG/100ML; MG/100ML
INJECTION, SOLUTION INTRAVENOUS CONTINUOUS PRN
Status: DISCONTINUED | OUTPATIENT
Start: 2019-06-11 | End: 2019-06-11

## 2019-06-11 RX ADMIN — EPHEDRINE SULFATE 10 MG: 50 INJECTION INTRAMUSCULAR; INTRAVENOUS; SUBCUTANEOUS at 02:06

## 2019-06-11 RX ADMIN — ROCURONIUM BROMIDE 20 MG: 10 INJECTION, SOLUTION INTRAVENOUS at 02:06

## 2019-06-11 RX ADMIN — HYDROMORPHONE HYDROCHLORIDE 0.2 MG: 2 INJECTION INTRAMUSCULAR; INTRAVENOUS; SUBCUTANEOUS at 04:06

## 2019-06-11 RX ADMIN — LOSARTAN POTASSIUM 50 MG: 50 TABLET, FILM COATED ORAL at 08:06

## 2019-06-11 RX ADMIN — ROBINUL 0.6 MG: 0.2 INJECTION INTRAMUSCULAR; INTRAVENOUS at 03:06

## 2019-06-11 RX ADMIN — CARBIDOPA AND LEVODOPA 1 TABLET: 25; 250 TABLET ORAL at 05:06

## 2019-06-11 RX ADMIN — OXYCODONE AND ACETAMINOPHEN 1 TABLET: 5; 325 TABLET ORAL at 09:06

## 2019-06-11 RX ADMIN — LEVOTHYROXINE SODIUM 125 MCG: 125 TABLET ORAL at 05:06

## 2019-06-11 RX ADMIN — HYDROMORPHONE HYDROCHLORIDE 0.2 MG: 2 INJECTION INTRAMUSCULAR; INTRAVENOUS; SUBCUTANEOUS at 03:06

## 2019-06-11 RX ADMIN — SODIUM CHLORIDE, SODIUM LACTATE, POTASSIUM CHLORIDE, AND CALCIUM CHLORIDE: 600; 310; 30; 20 INJECTION, SOLUTION INTRAVENOUS at 03:06

## 2019-06-11 RX ADMIN — CYCLOBENZAPRINE HYDROCHLORIDE 10 MG: 10 TABLET, FILM COATED ORAL at 04:06

## 2019-06-11 RX ADMIN — ACETAMINOPHEN 650 MG: 325 TABLET ORAL at 04:06

## 2019-06-11 RX ADMIN — VANCOMYCIN HYDROCHLORIDE 2500 MG: 1 INJECTION, POWDER, FOR SOLUTION INTRAVENOUS at 05:06

## 2019-06-11 RX ADMIN — OXYCODONE AND ACETAMINOPHEN 1 TABLET: 5; 325 TABLET ORAL at 08:06

## 2019-06-11 RX ADMIN — ONDANSETRON 4 MG: 2 INJECTION INTRAMUSCULAR; INTRAVENOUS at 09:06

## 2019-06-11 RX ADMIN — EPHEDRINE SULFATE 5 MG: 50 INJECTION INTRAMUSCULAR; INTRAVENOUS; SUBCUTANEOUS at 03:06

## 2019-06-11 RX ADMIN — EPHEDRINE SULFATE 5 MG: 50 INJECTION INTRAMUSCULAR; INTRAVENOUS; SUBCUTANEOUS at 02:06

## 2019-06-11 RX ADMIN — SODIUM CHLORIDE, SODIUM LACTATE, POTASSIUM CHLORIDE, AND CALCIUM CHLORIDE: 600; 310; 30; 20 INJECTION, SOLUTION INTRAVENOUS at 01:06

## 2019-06-11 RX ADMIN — PRAVASTATIN SODIUM 40 MG: 20 TABLET ORAL at 08:06

## 2019-06-11 RX ADMIN — MIDAZOLAM 2 MG: 1 INJECTION INTRAMUSCULAR; INTRAVENOUS at 01:06

## 2019-06-11 RX ADMIN — CARBIDOPA AND LEVODOPA 1 TABLET: 25; 250 TABLET ORAL at 09:06

## 2019-06-11 RX ADMIN — PRAMIPEXOLE DIHYDROCHLORIDE 1 MG: 0.5 TABLET ORAL at 08:06

## 2019-06-11 RX ADMIN — PROPOFOL 100 MG: 10 INJECTION, EMULSION INTRAVENOUS at 02:06

## 2019-06-11 RX ADMIN — SUCCINYLCHOLINE CHLORIDE 100 MG: 20 INJECTION, SOLUTION INTRAMUSCULAR; INTRAVENOUS at 02:06

## 2019-06-11 RX ADMIN — LIDOCAINE HYDROCHLORIDE 40 MG: 20 INJECTION, SOLUTION EPIDURAL; INFILTRATION; INTRACAUDAL; PERINEURAL at 02:06

## 2019-06-11 RX ADMIN — PRAMIPEXOLE DIHYDROCHLORIDE 1 MG: 0.5 TABLET ORAL at 09:06

## 2019-06-11 RX ADMIN — ROCURONIUM BROMIDE 5 MG: 10 INJECTION, SOLUTION INTRAVENOUS at 02:06

## 2019-06-11 RX ADMIN — OXYCODONE AND ACETAMINOPHEN 1 TABLET: 5; 325 TABLET ORAL at 12:06

## 2019-06-11 RX ADMIN — PANTOPRAZOLE SODIUM 40 MG: 40 TABLET, DELAYED RELEASE ORAL at 08:06

## 2019-06-11 RX ADMIN — ISOSORBIDE MONONITRATE 120 MG: 60 TABLET, EXTENDED RELEASE ORAL at 08:06

## 2019-06-11 RX ADMIN — IOHEXOL 100 ML: 350 INJECTION, SOLUTION INTRAVENOUS at 11:06

## 2019-06-11 RX ADMIN — NEOSTIGMINE METHYLSULFATE 4 MG: 1 INJECTION INTRAVENOUS at 03:06

## 2019-06-11 RX ADMIN — METOPROLOL SUCCINATE 25 MG: 25 TABLET, EXTENDED RELEASE ORAL at 08:06

## 2019-06-11 NOTE — CONSULTS
Consulted on this 72 y/o F patient due to present on admission skin breakdown. Patient is awake and alert. She states she was sent back to hospital from MD office yesterday for wound washout. Skin assessment completed. bilateral heels intact with no redness or breakdown noted. Patient turned to right side independently. abd pad dressing removed from lumbar/thoracic spine. Incision to lumbar/thoracic spine is well approximated, steri strips intact to proximal portion. Serosanguinous/brown drainage is noted from distal portion of incision with associated edema to surrounding tissue.  Stage 3 pressure injury to lower thoracic spine again noted, healing with moist red wound bed, no slough noted, epithelialization to edges. Estela-incisional skin with bright deep red discoloration, moist shiny and satellite lesions noted consistent with MASD candidosis. Cleansed with bath wipes and abd pad applied for drainage control as patient is returning to OR today.  Gluteal fold and perineal region also assessed with MASD candidosis noted, deep bright red shiny skin discoloration, satellite lesions. Will recommend AF moisture barrier to apply to affected areas BID, discussed with Dr. Biswas. Will follow post-op per surgery request.

## 2019-06-11 NOTE — SUBJECTIVE & OBJECTIVE
Interval History: resting well with obvious resting tremor. Underwent washout out of her wound by Dr. Nicolas today, post op doing well, wound Vac in place. Pain under control.    Review of Systems   Constitutional: Positive for activity change and fatigue.   HENT: Negative.    Eyes: Negative.    Respiratory: Negative.    Cardiovascular: Negative.    Gastrointestinal: Negative.    Endocrine: Negative.    Genitourinary: Negative.    Musculoskeletal: Positive for back pain.   Skin: Negative.    Allergic/Immunologic: Negative.    Neurological: Positive for tremors and weakness.   Hematological: Negative.    Psychiatric/Behavioral: Negative.      Objective:     Vital Signs (Most Recent):  Temp: 98.2 °F (36.8 °C) (06/11/19 1702)  Pulse: 74 (06/11/19 1706)  Resp: 18 (06/11/19 1702)  BP: (!) 107/56 (06/11/19 1702)  SpO2: (!) 93 % (06/11/19 1702) Vital Signs (24h Range):  Temp:  [97.7 °F (36.5 °C)-98.7 °F (37.1 °C)] 98.2 °F (36.8 °C)  Pulse:  [72-85] 74  Resp:  [12-23] 18  SpO2:  [92 %-100 %] 93 %  BP: ()/(39-81) 107/56     Weight: 96.4 kg (212 lb 8.4 oz)  Body mass index is 34.3 kg/m².    Intake/Output Summary (Last 24 hours) at 6/11/2019 1844  Last data filed at 6/11/2019 1800  Gross per 24 hour   Intake 1120 ml   Output 1082 ml   Net 38 ml      Physical Exam   Constitutional: She is oriented to person, place, and time. She appears well-developed and well-nourished. She is cooperative. She has a sickly appearance.   HENT:   Mouth/Throat: Oropharynx is clear and moist.   Eyes: Pupils are equal, round, and reactive to light. Conjunctivae and EOM are normal.   Neck: Normal range of motion. Neck supple.   Cardiovascular: Normal rate, regular rhythm, normal heart sounds and intact distal pulses.   Pulmonary/Chest: Effort normal and breath sounds normal.   Abdominal: Soft. Bowel sounds are normal.   Genitourinary: Vagina normal.   Musculoskeletal: She exhibits no edema.   Neurological: She is alert and oriented to person,  place, and time. She displays tremor. GCS eye subscore is 4. GCS verbal subscore is 5.   Skin: Skin is warm and dry. Capillary refill takes less than 2 seconds.   Psychiatric: She has a normal mood and affect.   Nursing note and vitals reviewed.      Significant Labs:   Blood Culture:   Recent Labs   Lab 06/10/19  1440 06/10/19  1526   LABBLOO No Growth to date No Growth to date     BMP:   Recent Labs   Lab 06/11/19  0515   *   *   K 4.5   CL 93*   CO2 23   BUN 12   CREATININE 0.7   CALCIUM 9.6     CBC:   Recent Labs   Lab 06/10/19  1440 06/11/19  0515   WBC 10.63 7.51   HGB 11.1* 10.1*   HCT 34.1* 32.0*   * 411*     Lactic Acid:   Recent Labs   Lab 06/10/19  1526   LACTATE 3.6*     Magnesium:   Troponin:   All pertinent labs within the past 24 hours have been reviewed.    Significant Imaging: I have reviewed all pertinent imaging results/findings within the past 24 hours.

## 2019-06-11 NOTE — TRANSFER OF CARE
"Anesthesia Transfer of Care Note    Patient: Ross Isbell    Procedure(s) Performed: Procedure(s) (LRB):  DEBRIDEMENT, WOUND (Bilateral)  APPLICATION, WOUND VAC (N/A)    Patient location: PACU    Anesthesia Type: general    Transport from OR: Transported from OR on room air with adequate spontaneous ventilation    Post pain: adequate analgesia    Post assessment: no apparent anesthetic complications    Post vital signs: stable    Level of consciousness: awake, alert and oriented    Nausea/Vomiting: no nausea/vomiting    Complications: none    Transfer of care protocol was followed      Last vitals:   Visit Vitals  BP (!) 121/57   Pulse 85   Temp 36.5 °C (97.7 °F) (Temporal)   Resp 15   Ht 5' 6" (1.676 m)   Wt 96.4 kg (212 lb 8.4 oz)   SpO2 100%   Breastfeeding? No   BMI 34.30 kg/m²     "

## 2019-06-11 NOTE — HPI
Mrs. Isbell sent from Dr. Griffith office for admissions and antibiotics after she went for follow up and per the daughter a significant amount of drainage from her back wound. She recently had surgery for osteo of lumbar spine. She states she still has some pain but otherwise is doing okay.

## 2019-06-11 NOTE — H&P
[facility] Hospital Medicine  History & Physical    Patient Name: Ross Isbell  MRN: [id number]  Admission Date: 6/10/2019  Attending Physician: Leonel Arias Jr., MD   Primary Care Provider: Anna Penn MD         Patient information was obtained from patient, past medical records and ER records.     Subjective:     Principal Problem:Osteomyelitis    Chief Complaint:   Chief Complaint   Patient presents with    Post-op Problem     sent by jerardo for admit for wound vac/wash        HPI: Mrs. Isbell sent from Dr. Griffith office for admissions and antibiotics after she went for follow up and per the daughter a significant amount of drainage from her back wound. She recently had surgery for osteo of lumbar spine. She states she still has some pain but otherwise is doing okay.    Past Medical History:   Diagnosis Date    Abnormal nuclear stress test 11/1/2015    Anemia 6/1/2019    Background diabetic retinopathy 12/28/2015    CAD (coronary artery disease) 2002    stents    Cataract     Gait disorder 10/12/2012    Glaucoma     History of PTCA 6/1/2019    MI (myocardial infarction)     Obesity     CORINE (obstructive sleep apnea)     Other and unspecified hyperlipidemia     PD (Parkinson's disease) 2002    tremor-predominant (per patient)    Type II or unspecified type diabetes mellitus without mention of complication, not stated as uncontrolled 2002      am    Unspecified essential hypertension        Past Surgical History:   Procedure Laterality Date    BACK SURGERY  05/25/2019    BREAST BIOPSY Left     benign - about 3 yrs ago    CORONARY ANGIOPLASTY  2002    CORONARY STENT PLACEMENT  2002    DEBRIDEMENT, ABSCESS, PARASPINAL N/A 5/25/2019    Performed by [facility]islav [facility] at [facility] OR    DILATION AND CURETTAGE OF UTERUS      FOREIGN BODY REMOVAL      FUSION, SPINE, LUMBAR, TLIF, POSTERIOR APPROACH, USING PEDICLE SCREW N/A 5/25/2019    Performed by [facility]islav Nicolas MD at  [facility] OR    FUSION, SPINE, POSTERIOR SPINAL COLUMN, LUMBAR, USING COMPUTER-ASSISTED NAVIGATION  5/25/2019    Performed by [facility] MD at [facility] OR    HEART CATH-LEFT Left 11/5/2015    Performed by Constance Pace MD at [facility] CATH LAB    HEART CATH-LEFT Left 11/2/2015    Performed by Constance Pace MD at [facility] CATH LAB    HEART CATH-LEFT/pci stent lad Left 11/10/2015    Performed by Constance Pace MD at [facility] CATH LAB    INNER EAR SURGERY      LAMINECTOMY, SPINE, LUMBAR  5/25/2019    Performed by [facility]isl[facility] MD at [facility] OR    TONSILLECTOMY, ADENOIDECTOMY         Review of patient's allergies indicates:   Allergen Reactions    Codeine      Other reaction(s): Nausea.  Patient tolerated morphine 5/2019 with no reported problems.     Codeine sulfate      Unknown^    Diphenhydramine hcl      Unknown^  Other reaction(s): Rash    Sulfa (sulfonamide antibiotics) Hives and Nausea And Vomiting    Penicillins Rash     Rash only as a child  Tolerated ceftriaxone 4/30/19  Tolerated piperacillin-tazobactam 5/23/19       No current facility-administered medications on file prior to encounter.      Current Outpatient Medications on File Prior to Encounter   Medication Sig    acetaminophen (TYLENOL) 325 MG tablet Take 2 tablets (650 mg total) by mouth every 6 (six) hours as needed.    aspirin (ECOTRIN) 81 MG EC tablet Take 1 tablet (81 mg total) by mouth once daily.    carbidopa-levodopa  mg (SINEMET)  mg per tablet TAKE 1 TABLET BY MOUTH 5 TIMES DAILY    cefTRIAXone 2 g in dextrose 5 % 50 mL (ROCEPHIN) 2 g/50 mL PgBk IVPB Inject 50 mLs (2 g total) into the vein once daily.    levothyroxine (SYNTHROID) 125 MCG tablet TAKE 1 TABLET BY MOUTH IN THE MORNING BEFORE BREAKFAST    losartan (COZAAR) 50 MG tablet TAKE 1 TABLET BY MOUTH ONCE DAILY    metFORMIN (GLUCOPHAGE) 500 MG tablet TAKE ONE TABLET BY MOUTH THREE TIMES DAILY    multivit-min-FA-lycopen-lutein (CENTRUM SILVER) 0.4-300-250 mg-mcg-mcg  Tab Take 1 tablet by mouth once daily.    NOVOLIN 70/30 U-100 INSULIN 100 unit/mL (70-30) injection Inject 55 Units into the skin 3 (three) times daily.    oxyCODONE-acetaminophen (PERCOCET) 5-325 mg per tablet Take 1 tablet by mouth every 4 (four) hours as needed for Pain (pain 4-5/10).    pantoprazole (PROTONIX) 40 MG tablet Take 1 tablet (40 mg total) by mouth once daily.    pramipexole (MIRAPEX) 1 MG tablet TAKE ONE TABLET BY MOUTH THREE TIMES DAILY    pravastatin (PRAVACHOL) 40 MG tablet TAKE ONE TABLET BY MOUTH ONCE DAILY    ACCU-CHEK SOFTCLIX LANCETS Misc TEST THREE TIMES DAILY    blood sugar diagnostic Strp 1 strip by Misc.(Non-Drug; Combo Route) route 3 (three) times daily. Accuchek Felicia Plus Test Strips    blood-glucose meter kit Accu-chek Felicia Plus Meter    insulin syringe-needle U-100 0.5 mL 31 gauge x 5/16 Syrg USE THREE TIMES DAILY    isosorbide mononitrate (IMDUR) 120 MG 24 hr tablet Take 1 tablet (120 mg total) by mouth once daily.    latanoprost 0.005 % ophthalmic solution INSTILL 1 DROP INTO EACH EYE IN THE EVENING    metoprolol succinate (TOPROL-XL) 25 MG 24 hr tablet TAKE 1 TABLET BY MOUTH ONCE DAILY    miconazole NITRATE 2 % (MICOTIN) 2 % top powder Apply topically 2 (two) times daily.     Family History     Problem Relation (Age of Onset)    Breast cancer Paternal Grandmother    Diabetes Maternal Grandmother    Glaucoma Mother, Maternal Grandmother    Heart attack Father (65), Mother (65)    Hypertension Father, Mother, Maternal Grandmother    Ovarian cancer Mother    Parkinsonism Other, Paternal Aunt    Tremor Father        Tobacco Use    Smoking status: Former Smoker     Years: 40.00     Last attempt to quit: 10/12/2010     Years since quittin.6    Smokeless tobacco: Never Used    Tobacco comment: On & off for the past few years   Substance and Sexual Activity    Alcohol use: No     Alcohol/week: 0.0 oz    Drug use: No    Sexual activity: Not Currently     Review of  Systems   Constitutional: Positive for activity change and fatigue.   HENT: Negative.    Eyes: Negative.    Respiratory: Negative.    Cardiovascular: Negative.    Gastrointestinal: Negative.    Endocrine: Negative.    Genitourinary: Negative.    Musculoskeletal: Positive for back pain.   Skin: Negative.    Allergic/Immunologic: Negative.    Neurological: Positive for tremors and weakness.   Hematological: Negative.    Psychiatric/Behavioral: Negative.      Objective:     Vital Signs (Most Recent):  Temp: 98.6 °F (37 °C) (06/10/19 1248)  Pulse: 109 (06/10/19 1248)  Resp: 18 (06/10/19 1248)  BP: 122/65 (06/10/19 1248)  SpO2: 98 % (06/10/19 1248) Vital Signs (24h Range):  Temp:  [98.6 °F (37 °C)] 98.6 °F (37 °C)  Pulse:  [109-119] 109  Resp:  [18] 18  SpO2:  [98 %] 98 %  BP: (122-131)/(65-74) 122/65     Weight: 96.8 kg (213 lb 7 oz)  Body mass index is 34.45 kg/m².    Physical Exam   Constitutional: She is oriented to person, place, and time. She appears well-developed. She is cooperative. She has a sickly appearance.   Eyes: Pupils are equal, round, and reactive to light. Conjunctivae and EOM are normal.   Neck: Normal range of motion. Neck supple.   Cardiovascular: Normal rate, regular rhythm, normal heart sounds and intact distal pulses.   Pulmonary/Chest: Effort normal and breath sounds normal.   Abdominal: Soft. Bowel sounds are normal.   Genitourinary: Vagina normal.   Musculoskeletal: She exhibits no edema.   Neurological: She is alert and oriented to person, place, and time. She displays tremor. GCS eye subscore is 4. GCS verbal subscore is 5.   Skin: Skin is warm and dry. Capillary refill takes less than 2 seconds.   Psychiatric: She has a normal mood and affect.   Nursing note and vitals reviewed.        CRANIAL NERVES     CN III, IV, VI   Pupils are equal, round, and reactive to light.  Extraocular motions are normal.        Significant Labs:   Recent Lab Results       06/10/19  1526   06/10/19  1440         Albumin   3.0     Alkaline Phosphatase   100     ALT   6     Anion Gap   16     AST   33     Baso #   0.01     Basophil%   0.1     BILIRUBIN TOTAL   0.4  Comment:  For infants and newborns, interpretation of results should be based  on gestational age, weight and in agreement with clinical  observations.  Premature Infant recommended reference ranges:  Up to 24 hours.............<8.0 mg/dL  Up to 48 hours............<12.0 mg/dL  3-5 days..................<15.0 mg/dL  6-29 days.................<15.0 mg/dL       BUN, Bld   13     Calcium   10.1     Chloride   92     CO2   20     Creatinine   0.9     CRP   53.2     Differential Method   Automated     eGFR if    >60     eGFR if non    >60  Comment:  Calculation used to obtain the estimated glomerular filtration  rate (eGFR) is the CKD-EPI equation.        Eos #   0.1     Eosinophil%   1.1     Glucose   213     Gran # (ANC)   8.3     Gran%   77.7     Hematocrit   34.1     Hemoglobin   11.1     Lactate, Nikko 3.6  Comment:  Falsely low lactic acid results can be found in samples   containing >=13.0 mg/dL total bilirubin and/or >=3.5 mg/dL   direct bilirubin.  Lactic critical result(s) called and verbal readback obtained from   Yamile Feliciano RN, 06/10/2019 16:03         Lymph #   1.4     Lymph%   12.8     MCH   28.5     MCHC   32.6     MCV   87     Mono #   0.9     Mono%   8.3     MPV   8.4     Platelets   397     Potassium   4.7     PROTEIN TOTAL   7.2     RBC   3.90     RDW   15.1     Sed Rate   101     Sodium   128     WBC   10.63           Significant Imaging:   Imaging Results          CT Lumbar Spine Without Contrast (Final result)  Result time 06/10/19 16:02:17    Final result by Rashad Alcaraz III, MD (06/10/19 16:02:17)                 Impression:      1.  The findings at L2-L3 are consistent with discitis/osteomyelitis.  Detail is grossly limited due to streak artifact but I strongly suspect at least some bony destruction  involving the inferior margin of the L2 vertebral body with soft tissue density within the L2-L3 interspace region.  Cannot exclude some involvement of the superior aspect of L3.    2.  L3-4 shows neural foraminal encroachment greater on the left.    3.  At L4-5 there is mild anterolisthesis of L4 related L5 with disc bulging causing severe bilateral neural foraminal stenosis.  There is at least mild to moderate central canal stenosis.    4.  L5-S1 shows less severe central canal stenosis.    All CT scans at this facility use dose modulation, iterative reconstruction, and/or weight based dosing when appropriate to reduce radiation dose to as low as reasonably achievable.      Electronically signed by: Rashad Alcaraz  Date:    06/10/2019  Time:    16:02             Narrative:    EXAMINATION:  CT LUMBAR SPINE WITHOUT CONTRAST    CLINICAL HISTORY:  Presurgical evaluation, lumbar spine;    FINDINGS:  Multiplanar images submitted.  The exam is degraded by extensive metallic artifacts caused by patient's multilevel fusion.  This can be gets at T11.  T11-12 and T12-L1 show no significant findings.  There is atheromatous change along the aorta.    Beginning at L1, screws cause the streak artifact previously noted.  L1-L2 shows no gross bony abnormality within the visualized segments.  There is severe artifact limiting detail but there is evidence of what appears to be bony destruction involving the inferior portion of the L2 vertebral body greatest anteriorly and to the right.  Is a great deal of soft tissue density between the L2 and L3 vertebral body.  Cannot exclude minimal involvement of the superior aspect of L3 as well.    L3-L4 shows interspace narrowing with arthritic lipping.  Neural foraminal encroachment, greater on the left.  L4-L5 shows moderate bulging of the disc extending back and causing severe bilateral neural foraminal stenosis.  There is central canal stenosis from facet hypertrophy and ligamentous  thickening.  At L5-S1 there is bulging of the disc with bilateral facet arthropathy.    The hardware appears grossly intact.                                  Assessment/Plan:     * Osteomyelitis  Plan is to go to the OR for a washout  Will continue with current abx started in the ED      PD (Parkinson's disease)  Resume her home medications      Hypothyroidism  Continue with levothyroxine        VTE Risk Mitigation (From admission, onward)        Ordered     IP VTE HIGH RISK PATIENT  Once      06/10/19 1932     Place sequential compression device  Until discontinued      06/10/19 1932             Vee Moore MD  Department of Hospital Medicine   [facility]

## 2019-06-11 NOTE — SUBJECTIVE & OBJECTIVE
Past Medical History:   Diagnosis Date    Abnormal nuclear stress test 11/1/2015    Anemia 6/1/2019    Background diabetic retinopathy 12/28/2015    CAD (coronary artery disease) 2002    stents    Cataract     Gait disorder 10/12/2012    Glaucoma     History of PTCA 6/1/2019    MI (myocardial infarction)     Obesity     CORINE (obstructive sleep apnea)     Other and unspecified hyperlipidemia     PD (Parkinson's disease) 2002    tremor-predominant (per patient)    Type II or unspecified type diabetes mellitus without mention of complication, not stated as uncontrolled 2002      am    Unspecified essential hypertension        Past Surgical History:   Procedure Laterality Date    BACK SURGERY  05/25/2019    BREAST BIOPSY Left     benign - about 3 yrs ago    CORONARY ANGIOPLASTY  2002    CORONARY STENT PLACEMENT  2002    DEBRIDEMENT, ABSCESS, PARASPINAL N/A 5/25/2019    [facility] at [facility] OR    DILATION AND CURETTAGE OF UTERUS      FOREIGN BODY REMOVAL      FUSION, SPINE, LUMBAR, TLIF, POSTERIOR APPROACH, USING PEDICLE SCREW N/A 5/25/2019    [facility] OR    FUSION, SPINE, POSTERIOR SPINAL COLUMN, LUMBAR, USING COMPUTER-ASSISTED NAVIGATION  5/25/2019    [facility] OR    HEART CATH-LEFT Left 11/5/2015    [facility]formed [facility] CATH LAB    HEART CATH-LEFT Left 11/2/2015    [facility] CATH LAB    HEART CATH-LEFT/pci stent lad Left 11/10/2015    Performed [facility] CATH LAB    INNER EAR SURGERY      LAMINECTOMY, SPINE, LUMBAR  5/25/2019    [facility] OR    TONSILLECTOMY, ADENOIDECTOMY         Review of patient's allergies indicates:   Allergen Reactions    Codeine      Other reaction(s): Nausea.  Patient tolerated morphine 5/2019 with no reported problems.     Codeine sulfate      Unknown^    Diphenhydramine  hcl      Unknown^  Other reaction(s): Rash    Sulfa (sulfonamide antibiotics) Hives and Nausea And Vomiting    Penicillins Rash     Rash only as a child  Tolerated ceftriaxone 4/30/19  Tolerated piperacillin-tazobactam 5/23/19       No current facility-administered medications on file prior to encounter.      Current Outpatient Medications on File Prior to Encounter   Medication Sig    acetaminophen (TYLENOL) 325 MG tablet Take 2 tablets (650 mg total) by mouth every 6 (six) hours as needed.    aspirin (ECOTRIN) 81 MG EC tablet Take 1 tablet (81 mg total) by mouth once daily.    carbidopa-levodopa  mg (SINEMET)  mg per tablet TAKE 1 TABLET BY MOUTH 5 TIMES DAILY    cefTRIAXone 2 g in dextrose 5 % 50 mL (ROCEPHIN) 2 g/50 mL PgBk IVPB Inject 50 mLs (2 g total) into the vein once daily.    levothyroxine (SYNTHROID) 125 MCG tablet TAKE 1 TABLET BY MOUTH IN THE MORNING BEFORE BREAKFAST    losartan (COZAAR) 50 MG tablet TAKE 1 TABLET BY MOUTH ONCE DAILY    metFORMIN (GLUCOPHAGE) 500 MG tablet TAKE ONE TABLET BY MOUTH THREE TIMES DAILY    multivit-min-FA-lycopen-lutein (CENTRUM SILVER) 0.4-300-250 mg-mcg-mcg Tab Take 1 tablet by mouth once daily.    NOVOLIN 70/30 U-100 INSULIN 100 unit/mL (70-30) injection Inject 55 Units into the skin 3 (three) times daily.    oxyCODONE-acetaminophen (PERCOCET) 5-325 mg per tablet Take 1 tablet by mouth every 4 (four) hours as needed for Pain (pain 4-5/10).    pantoprazole (PROTONIX) 40 MG tablet Take 1 tablet (40 mg total) by mouth once daily.    pramipexole (MIRAPEX) 1 MG tablet TAKE ONE TABLET BY MOUTH THREE TIMES DAILY    pravastatin (PRAVACHOL) 40 MG tablet TAKE ONE TABLET BY MOUTH ONCE DAILY    ACCU-CHEK SOFTCLIX LANCETS Misc TEST THREE TIMES DAILY    blood sugar diagnostic Strp 1 strip by Misc.(Non-Drug; Combo Route) route 3 (three) times daily. Accuchek Felicia Plus Test Strips    blood-glucose meter kit Accu-chek Felicia Plus Meter    insulin  syringe-needle U-100 0.5 mL 31 gauge x 5/16 Syrg USE THREE TIMES DAILY    isosorbide mononitrate (IMDUR) 120 MG 24 hr tablet Take 1 tablet (120 mg total) by mouth once daily.    latanoprost 0.005 % ophthalmic solution INSTILL 1 DROP INTO EACH EYE IN THE EVENING    metoprolol succinate (TOPROL-XL) 25 MG 24 hr tablet TAKE 1 TABLET BY MOUTH ONCE DAILY    miconazole NITRATE 2 % (MICOTIN) 2 % top powder Apply topically 2 (two) times daily.     Family History     Problem Relation (Age of Onset)    Breast cancer Paternal Grandmother    Diabetes Maternal Grandmother    Glaucoma Mother, Maternal Grandmother    Heart attack Father (65), Mother (65)    Hypertension Father, Mother, Maternal Grandmother    Ovarian cancer Mother    Parkinsonism Other, Paternal Aunt    Tremor Father        Tobacco Use    Smoking status: Former Smoker     Years: 40.00     Last attempt to quit: 10/12/2010     Years since quittin.6    Smokeless tobacco: Never Used    Tobacco comment: On & off for the past few years   Substance and Sexual Activity    Alcohol use: No     Alcohol/week: 0.0 oz    Drug use: No    Sexual activity: Not Currently     Review of Systems   Constitutional: Positive for activity change and fatigue.   HENT: Negative.    Eyes: Negative.    Respiratory: Negative.    Cardiovascular: Negative.    Gastrointestinal: Negative.    Endocrine: Negative.    Genitourinary: Negative.    Musculoskeletal: Positive for back pain.   Skin: Negative.    Allergic/Immunologic: Negative.    Neurological: Positive for tremors and weakness.   Hematological: Negative.    Psychiatric/Behavioral: Negative.      Objective:     Vital Signs (Most Recent):  Temp: 98.6 °F (37 °C) (06/10/19 1248)  Pulse: 109 (06/10/19 1248)  Resp: 18 (06/10/19 1248)  BP: 122/65 (06/10/19 1248)  SpO2: 98 % (06/10/19 1248) Vital Signs (24h Range):  Temp:  [98.6 °F (37 °C)] 98.6 °F (37 °C)  Pulse:  [109-119] 109  Resp:  [18] 18  SpO2:  [98 %] 98 %  BP:  (122-131)/(65-74) 122/65     Weight: 96.8 kg (213 lb 7 oz)  Body mass index is 34.45 kg/m².    Physical Exam   Constitutional: She is oriented to person, place, and time. She appears well-developed. She is cooperative. She has a sickly appearance.   Eyes: Pupils are equal, round, and reactive to light. Conjunctivae and EOM are normal.   Neck: Normal range of motion. Neck supple.   Cardiovascular: Normal rate, regular rhythm, normal heart sounds and intact distal pulses.   Pulmonary/Chest: Effort normal and breath sounds normal.   Abdominal: Soft. Bowel sounds are normal.   Genitourinary: Vagina normal.   Musculoskeletal: She exhibits no edema.   Neurological: She is alert and oriented to person, place, and time. She displays tremor. GCS eye subscore is 4. GCS verbal subscore is 5.   Skin: Skin is warm and dry. Capillary refill takes less than 2 seconds.   Psychiatric: She has a normal mood and affect.   Nursing note and vitals reviewed.        CRANIAL NERVES     CN III, IV, VI   Pupils are equal, round, and reactive to light.  Extraocular motions are normal.        Significant Labs:   Recent Lab Results       06/10/19  1526   06/10/19  1440        Albumin   3.0     Alkaline Phosphatase   100     ALT   6     Anion Gap   16     AST   33     Baso #   0.01     Basophil%   0.1     BILIRUBIN TOTAL   0.4  Comment:  For infants and newborns, interpretation of results should be based  on gestational age, weight and in agreement with clinical  observations.  Premature Infant recommended reference ranges:  Up to 24 hours.............<8.0 mg/dL  Up to 48 hours............<12.0 mg/dL  3-5 days..................<15.0 mg/dL  6-29 days.................<15.0 mg/dL       BUN, Bld   13     Calcium   10.1     Chloride   92     CO2   20     Creatinine   0.9     CRP   53.2     Differential Method   Automated     eGFR if    >60     eGFR if non    >60  Comment:  Calculation used to obtain the estimated  glomerular filtration  rate (eGFR) is the CKD-EPI equation.        Eos #   0.1     Eosinophil%   1.1     Glucose   213     Gran # (ANC)   8.3     Gran%   77.7     Hematocrit   34.1     Hemoglobin   11.1     Lactate, Nikko 3.6  Comment:  Falsely low lactic acid results can be found in samples   containing >=13.0 mg/dL total bilirubin and/or >=3.5 mg/dL   direct bilirubin.  Lactic critical result(s) called and verbal readback obtained from   Yamile Feliciano RN, 06/10/2019 16:03         Lymph #   1.4     Lymph%   12.8     MCH   28.5     MCHC   32.6     MCV   87     Mono #   0.9     Mono%   8.3     MPV   8.4     Platelets   397     Potassium   4.7     PROTEIN TOTAL   7.2     RBC   3.90     RDW   15.1     Sed Rate   101     Sodium   128     WBC   10.63           Significant Imaging:   Imaging Results          CT Lumbar Spine Without Contrast (Final result)  Result time 06/10/19 16:02:17    Final result by Rashad Alcaraz III, MD (06/10/19 16:02:17)                 Impression:      1.  The findings at L2-L3 are consistent with discitis/osteomyelitis.  Detail is grossly limited due to streak artifact but I strongly suspect at least some bony destruction involving the inferior margin of the L2 vertebral body with soft tissue density within the L2-L3 interspace region.  Cannot exclude some involvement of the superior aspect of L3.    2.  L3-4 shows neural foraminal encroachment greater on the left.    3.  At L4-5 there is mild anterolisthesis of L4 related L5 with disc bulging causing severe bilateral neural foraminal stenosis.  There is at least mild to moderate central canal stenosis.    4.  L5-S1 shows less severe central canal stenosis.    All CT scans at this facility use dose modulation, iterative reconstruction, and/or weight based dosing when appropriate to reduce radiation dose to as low as reasonably achievable.      Electronically signed by: Rashad Alcaraz  Date:    06/10/2019  Time:    16:02              Narrative:    EXAMINATION:  CT LUMBAR SPINE WITHOUT CONTRAST    CLINICAL HISTORY:  Presurgical evaluation, lumbar spine;    FINDINGS:  Multiplanar images submitted.  The exam is degraded by extensive metallic artifacts caused by patient's multilevel fusion.  This can be gets at T11.  T11-12 and T12-L1 show no significant findings.  There is atheromatous change along the aorta.    Beginning at L1, screws cause the streak artifact previously noted.  L1-L2 shows no gross bony abnormality within the visualized segments.  There is severe artifact limiting detail but there is evidence of what appears to be bony destruction involving the inferior portion of the L2 vertebral body greatest anteriorly and to the right.  Is a great deal of soft tissue density between the L2 and L3 vertebral body.  Cannot exclude minimal involvement of the superior aspect of L3 as well.    L3-L4 shows interspace narrowing with arthritic lipping.  Neural foraminal encroachment, greater on the left.  L4-L5 shows moderate bulging of the disc extending back and causing severe bilateral neural foraminal stenosis.  There is central canal stenosis from facet hypertrophy and ligamentous thickening.  At L5-S1 there is bulging of the disc with bilateral facet arthropathy.    The hardware appears grossly intact.

## 2019-06-11 NOTE — CONSULTS
[facility] -   Neurosurgery  Consult Note    Consults  Subjective:     Chief Complaint/Reason for Admission:  Wound breakdown    History of Present Illness:  Patient is a history of L1-L4 decompression and fusion for osteomyelitis diskitis.  She has been on antibiotics at a nursing home IV.  Patient presented to clinic yesterday for wound check and suture removal.  She had noted erythema to the posterior incision with drainage to the inferior portion of the incision. Clinically she has been doing okay.  She continues to have back pain however had no weakness down the lower extremities.  No fevers.  Her white count is not elevated.  Her ESR and CRP are elevated however.      PTA Medications   Medication Sig    acetaminophen (TYLENOL) 325 MG tablet Take 2 tablets (650 mg total) by mouth every 6 (six) hours as needed.    aspirin (ECOTRIN) 81 MG EC tablet Take 1 tablet (81 mg total) by mouth once daily.    carbidopa-levodopa  mg (SINEMET)  mg per tablet TAKE 1 TABLET BY MOUTH 5 TIMES DAILY    cefTRIAXone 2 g in dextrose 5 % 50 mL (ROCEPHIN) 2 g/50 mL PgBk IVPB Inject 50 mLs (2 g total) into the vein once daily.    levothyroxine (SYNTHROID) 125 MCG tablet TAKE 1 TABLET BY MOUTH IN THE MORNING BEFORE BREAKFAST    losartan (COZAAR) 50 MG tablet TAKE 1 TABLET BY MOUTH ONCE DAILY    metFORMIN (GLUCOPHAGE) 500 MG tablet TAKE ONE TABLET BY MOUTH THREE TIMES DAILY    multivit-min-FA-lycopen-lutein (CENTRUM SILVER) 0.4-300-250 mg-mcg-mcg Tab Take 1 tablet by mouth once daily.    NOVOLIN 70/30 U-100 INSULIN 100 unit/mL (70-30) injection Inject 55 Units into the skin 3 (three) times daily.    oxyCODONE-acetaminophen (PERCOCET) 5-325 mg per tablet Take 1 tablet by mouth every 4 (four) hours as needed for Pain (pain 4-5/10).    pantoprazole (PROTONIX) 40 MG tablet Take 1 tablet (40 mg total) by mouth once daily.    pramipexole (MIRAPEX) 1 MG tablet TAKE ONE TABLET BY MOUTH THREE TIMES DAILY     pravastatin (PRAVACHOL) 40 MG tablet TAKE ONE TABLET BY MOUTH ONCE DAILY    ACCU-CHEK SOFTCLIX LANCETS Misc TEST THREE TIMES DAILY    blood sugar diagnostic Strp 1 strip by Misc.(Non-Drug; Combo Route) route 3 (three) times daily. Accuchek Felicia Plus Test Strips    blood-glucose meter kit Accu-chek Felicia Plus Meter    insulin syringe-needle U-100 0.5 mL 31 gauge x 5/16 Syrg USE THREE TIMES DAILY    isosorbide mononitrate (IMDUR) 120 MG 24 hr tablet Take 1 tablet (120 mg total) by mouth once daily.    latanoprost 0.005 % ophthalmic solution INSTILL 1 DROP INTO EACH EYE IN THE EVENING    metoprolol succinate (TOPROL-XL) 25 MG 24 hr tablet TAKE 1 TABLET BY MOUTH ONCE DAILY    miconazole NITRATE 2 % (MICOTIN) 2 % top powder Apply topically 2 (two) times daily.       Review of patient's allergies indicates:   Allergen Reactions    Codeine      Other reaction(s): Nausea.  Patient tolerated morphine 5/2019 with no reported problems.     Codeine sulfate      Unknown^    Diphenhydramine hcl      Unknown^  Other reaction(s): Rash    Sulfa (sulfonamide antibiotics) Hives and Nausea And Vomiting    Penicillins Rash     Rash only as a child  Tolerated ceftriaxone 4/30/19  Tolerated piperacillin-tazobactam 5/23/19       Past Medical History:   Diagnosis Date    Abnormal nuclear stress test 11/1/2015    Anemia 6/1/2019    Background diabetic retinopathy 12/28/2015    CAD (coronary artery disease) 2002    stents    Cataract     Gait disorder 10/12/2012    Glaucoma     History of PTCA 6/1/2019    MI (myocardial infarction)     Obesity     CORINE (obstructive sleep apnea)     Other and unspecified hyperlipidemia     PD (Parkinson's disease) 2002    tremor-predominant (per patient)    Type II or unspecified type diabetes mellitus without mention of complication, not stated as uncontrolled 2002      am    Unspecified essential hypertension      Past Surgical History:   Procedure Laterality Date    BACK  SURGERY  2019    BREAST BIOPSY Left     benign - about 3 yrs ago    CORONARY ANGIOPLASTY      CORONARY STENT PLACEMENT  2002    DEBRIDEMENT, ABSCESS, PARASPINAL N/A 2019    [facility] OR    DILATION AND CURETTAGE OF UTERUS      FOREIGN BODY REMOVAL      FUSION, SPINE, LUMBAR, TLIF, POSTERIOR APPROACH, USING PEDICLE SCREW N/A 2019    Performed by [facility] at [facility] OR    FUSION, SPINE, POSTERIOR SPINAL COLUMN, LUMBAR, USING COMPUTER-ASSISTED NAVIGATION  2019    Performed by [facility] at [facility] OR    HEART CATH-LEFT Left 2015    Performed by [facility] at [facility] CATH LAB    HEART CATH-LEFT Left 2015    Performed by [facility] at [facility] CATH LAB    HEART CATH-LEFT/pci stent lad Left 11/10/2015    Performed by [facility] at [facility] CATH LAB    INNER EAR SURGERY      LAMINECTOMY, SPINE, LUMBAR  2019    [facility]formed by [facility] at [facility] OR    TONSILLECTOMY, ADENOIDECTOMY       Family History     Problem Relation (Age of Onset)    Breast cancer Paternal Grandmother    Diabetes Maternal Grandmother    Glaucoma Mother, Maternal Grandmother    Heart attack Father (65), Mother (65)    Hypertension Father, Mother, Maternal Grandmother    Ovarian cancer Mother    Parkinsonism Other, Paternal Aunt    Tremor Father        Tobacco Use    Smoking status: Former Smoker     Years: 40.00     Last attempt to quit: 10/12/2010     Years since quittin.6    Smokeless tobacco: Never Used    Tobacco comment: On & off for the past few years   Substance and Sexual Activity    Alcohol use: No     Alcohol/week: 0.0 oz    Drug use: No    Sexual activity: Not Currently     Review of Systems   Constitutional: Negative for activity change, appetite change and chills.   HENT: Negative for hearing loss, sore throat and tinnitus.    Eyes: Negative for pain, discharge and itching.   Cardiovascular: Negative for chest  pain.   Gastrointestinal: Negative for abdominal pain.   Endocrine: Negative for cold intolerance and heat intolerance.   Genitourinary: Negative for difficulty urinating and dysuria.   Musculoskeletal: Positive for back pain and gait problem.   Allergic/Immunologic: Negative for environmental allergies.   Neurological: Positive for weakness. Negative for dizziness, tremors, light-headedness and headaches.   Hematological: Negative for adenopathy.   Psychiatric/Behavioral: Negative for agitation, behavioral problems and confusion.     Objective:     Weight: 96.4 kg (212 lb 8.4 oz)  Body mass index is 34.3 kg/m².  Vital Signs (Most Recent):  Temp: 97.9 °F (36.6 °C) (06/11/19 0701)  Pulse: 79 (06/11/19 0908)  Resp: 20 (06/11/19 0701)  BP: (!) 169/69 (06/11/19 0701)  SpO2: 99 % (06/11/19 0647) Vital Signs (24h Range):  Temp:  [97.8 °F (36.6 °C)-98.7 °F (37.1 °C)] 97.9 °F (36.6 °C)  Pulse:  [] 79  Resp:  [17-20] 20  SpO2:  [96 %-99 %] 99 %  BP: ()/(51-81) 169/69       Nursing note and vitals reviewed  Gen:Oriented to person, place, and time.             Appears stated age   Skin: no rashes or lesions identified   Head:Normocephalic and atraumatic.  Nose: Nose normal.    Eyes: EOM are normal. Pupils are equal, round, and reactive to light.   Neck: Neck supple. No masses or lesions palpated  Cardiovascular: Intact distal pulses.    Abdominal: Soft.   Neurological: Alert and oriented to person, place, and time.  No cranial nerve deficit.  Coordination normal. Normal muscle tone  Psychiatric: Normal mood and affect. Behavior is normal.      Back:        Tenderness bilaterally Paraspinal muscle spasms     Pain with flexion and extention    Did not test Range of motion    Neg Negative Straight leg raise     Motor   Right Right Left Left  Level Group   5  5  L2 Hip flexor (Psoas)   5  5  L3 Leg extension (Quads)   5  5  L4 Dorsiflexion & foot inversion (Tibialis Anterior)   5  5  L5 Great toe extension ( EHL)   5   5  S1 Foot eversion (Gastroc, PL & PB)     Sensation  NL Decreased (R/L/BL) Level Sensation    X  L2 Anterio-medial thigh   X  L3 Medial thigh around knee   X  L4 Medial foot   X  L5 Dorsum foot   X  S1 Lateral foot     Reflex  2+  Patellar tendon (L4)   2+  Achilles tendon (S1)     Inferior incision has serous sanguinous drainage to the inferior portion  The incision is erythematous  And there is drainage inferiorly             Neurosurgery Physical Exam    Significant Labs:  Recent Labs   Lab 06/10/19  1440 06/11/19  0515   * 122*   * 127*   K 4.7 4.5   CL 92* 93*   CO2 20* 23   BUN 13 12   CREATININE 0.9 0.7   CALCIUM 10.1 9.6     Recent Labs   Lab 06/10/19  1440 06/11/19  0515   WBC 10.63 7.51   HGB 11.1* 10.1*   HCT 34.1* 32.0*   * 411*     No results for input(s): LABPT, INR, APTT in the last 48 hours.  Microbiology Results (last 7 days)     Procedure Component Value Units Date/Time    Blood Culture #2 **CANNOT BE ORDERED STAT** [[id number]] Collected:  06/10/19 1440    Order Status:  Completed Specimen:  Blood from Peripheral, Antecubital, Right Updated:  06/11/19 0345     Blood Culture, Routine No Growth to date    Blood Culture #1 **CANNOT BE ORDERED STAT** [[id number]] Collected:  06/10/19 1526    Order Status:  Completed Specimen:  Blood from Peripheral, Forearm, Right Updated:  06/11/19 0345     Blood Culture, Routine No Growth to date        All pertinent labs from the last 24 hours have been reviewed.  Significant Diagnostics:  I have reviewed all pertinent imaging results/findings within the past 24 hours.  I have reviewed and interpreted all pertinent imaging results/findings within the past 24 hours.    Assessment/Plan:     Active Diagnoses:    Diagnosis Date Noted POA    PRINCIPAL PROBLEM:  Osteomyelitis [M86.9] 06/10/2019 Yes    Hypothyroidism [E03.9] 11/19/2013 Yes     Chronic    PD (Parkinson's disease) [G20]  Yes     Chronic      Problems Resolved During this Admission:      Will order CT scan of the lumbar spine with and without contrast  Will take back for posterior wound washout this afternoon  Discussed with infectious disease.  If the infection extends underneath the fascia or recommend hardware removal versus simple wound washout with VAC placement if the superficial fascia remains intact.      Stanislav Nicolas MD  Neurosurgery  [facility] [place]

## 2019-06-11 NOTE — PLAN OF CARE
Problem: Adult Inpatient Plan of Care  Goal: Plan of Care Review  Outcome: Ongoing (interventions implemented as appropriate)  Patient AAOX3. No c/o pain at this time. Patient NSR during shift on Tele monitor. Patient went to surgery during shift for washout of spine. Patient tolerated well. Wound vac in place at 125 per MD orders.  Patient's vitals wnl during shift. Family present at bedside. Will continue to monitor patient

## 2019-06-11 NOTE — ANESTHESIA PREPROCEDURE EVALUATION
06/11/2019  Ross Isbell is a 73 y.o., female.    Anesthesia Evaluation    I have reviewed the Patient Summary Reports.    I have reviewed the Nursing Notes.   I have reviewed the Medications.     Review of Systems  Anesthesia Hx:  No problems with previous Anesthesia    Cardiovascular:   Hypertension Past MI CAD      Pulmonary:   Sleep Apnea    Neurological:   Neuromuscular Disease,    Endocrine:   Diabetes, well controlled Hypothyroidism        Physical Exam  General:  Well nourished, Obesity    Airway/Jaw/Neck:  Airway Findings: Mouth Opening: Normal Tongue: Normal  General Airway Assessment: Adult  Mallampati: III  TM Distance: Normal, at least 6 cm        Eyes/Ears/Nose:  EYES/EARS/NOSE FINDINGS: Normal    Chest/Lungs:  Chest/Lungs Findings: Clear to auscultation, Normal Respiratory Rate     Heart/Vascular:  Heart Findings: Rate: Normal  Rhythm: Regular Rhythm  Sounds: Normal        Mental Status:  Mental Status Findings: Normal        Anesthesia Plan  Type of Anesthesia, risks & benefits discussed:  Anesthesia Type:  general  Patient's Preference:   Intra-op Monitoring Plan: standard ASA monitors  Intra-op Monitoring Plan Comments:   Post Op Pain Control Plan: multimodal analgesia  Post Op Pain Control Plan Comments:   Induction:   IV  Beta Blocker:  Patient is not currently on a Beta-Blocker (No further documentation required).       Informed Consent: Patient understands risks and agrees with Anesthesia plan.  Questions answered. Anesthesia consent signed with patient.  ASA Score: 3     Day of Surgery Review of History & Physical: I have interviewed and examined the patient. I have reviewed the patient's H&P dated:  There are no significant changes.          Ready For Surgery From Anesthesia Perspective.

## 2019-06-11 NOTE — ASSESSMENT & PLAN NOTE
Plan is to go to the OR for a washout  Will continue with current abx started in the ED    S/p spinal wash out, on wound vac

## 2019-06-11 NOTE — PLAN OF CARE
Pt resting on bed s/p back wound debridement and wound vac placement performed under general anesthesia. Respirations even and unlabored on room air and tolerating well with O2 sats of 94%. Wound vac to back remains intact. VSS. Will cont to monitor. See flow sheet for detailed   assessment.

## 2019-06-11 NOTE — PROGRESS NOTES
Pharmacokinetic Initial Assessment: IV Vancomycin    Assessment/Plan:    Initiate intravenous vancomycin with loading dose of 2500 mg once followed by a maintenance dose of vancomycin 2500 mg IV every 24 hours  Desired empiric serum trough concentration is 10 to 20 mcg/mL.  Draw vancomycin trough level 30 min prior to third dose on 06/12 at approximately 1800   Pharmacy will continue to follow and monitor vancomycin.      Please contact pharmacy at extension [phone] with any questions regarding this assessment.     Thank you for the consult,   Kirsty Azevedo     Patient brief summary:  Ross Isbell is a 73 y.o. female initiated on antimicrobial therapy with IV Vancomycin for treatment of suspected bone/joint    Drug Allergies:   Review of patient's allergies indicates:   Allergen Reactions    Codeine      Other reaction(s): Nausea.  Patient tolerated morphine 5/2019 with no reported problems.     Codeine sulfate      Unknown^    Diphenhydramine hcl      Unknown^  Other reaction(s): Rash    Sulfa (sulfonamide antibiotics) Hives and Nausea And Vomiting    Penicillins Rash     Rash only as a child  Tolerated ceftriaxone 4/30/19  Tolerated piperacillin-tazobactam 5/23/19       Actual Body Weight:   98.8 kg    Renal Function:   Estimated Creatinine Clearance: 65.3 mL/min (based on SCr of 0.9 mg/dL).,     Dialysis Method (if applicable):  N/A     CBC (last 72 hours):  Recent Labs   Lab Result Units 06/09/19  1001 06/10/19  1440   WBC K/uL 8.55 10.63   Hemoglobin g/dL 10.7* 11.1*   Hematocrit % 33.1* 34.1*   Platelets K/uL 356* 397*   Gran% % 62.9 77.7*   Lymph% % 22.1 12.8*   Mono% % 11.6 8.3   Eosinophil% % 3.2 1.1   Basophil% % 0.2 0.1   Differential Method  Automated Automated       Metabolic Panel (last 72 hours):  Recent Labs   Lab Result Units 06/09/19  1001 06/09/19  1205 06/10/19  1440   Sodium mmol/L 128*  --  128*   Potassium mmol/L 4.8  --  4.7   Chloride mmol/L 92*  --  92*   CO2 mmol/L 24  --  20*    Glucose mg/dL 80  --  213*   Glucose, UA   --  Negative  --    BUN, Bld mg/dL 12  --  13   Creatinine mg/dL 0.8  --  0.9   Albumin g/dL 2.9*  --  3.0*   Total Bilirubin mg/dL 0.5  --  0.4   Alkaline Phosphatase U/L 98  --  100   AST U/L 27  --  33   ALT U/L 6*  --  6*       Drug levels (last 3 results):  No results for input(s): VANCOMYCINRA, VANCOMYCINPE, VANCOMYCINTR in the last 72 hours.    Microbiologic Results:  Microbiology Results (last 7 days)       Procedure Component Value Units Date/Time    Blood Culture #1 **CANNOT BE ORDERED STAT** [[id number]] Collected:  06/10/19 1526    Order Status:  Sent Specimen:  Blood from Peripheral, Forearm, Right Updated:  06/10/19 1529    Blood Culture #2 **CANNOT BE ORDERED STAT** [[id number]] Collected:  06/10/19 1440    Order Status:  Sent Specimen:  Blood from Peripheral, Antecubital, Right Updated:  06/10/19 1528

## 2019-06-11 NOTE — PROGRESS NOTES
[facility] Hospital Medicine  Progress Note    Patient Name: Ross Isbell  MRN: [id number]  Patient Class: IP- Inpatient   Admission Date: 6/10/2019  Length of Stay: 0 days  Attending Physician: Swetha Biswas MD  Primary Care Provider: Anna Penn MD        Subjective:     Principal Problem:Osteomyelitis    HPI:  Mrs. Isbell sent from Dr. Griffith office for admissions and antibiotics after she went for follow up and per the daughter a significant amount of drainage from her back wound. She recently had surgery for osteo of lumbar spine. She states she still has some pain but otherwise is doing okay.        Hospital Course:  Patient is a 73-year-old female with type 2 diabetes, hypertension and parkinsonism, Ch Hyponatremia, recent h/o osteomyelitis of spine, s/p surgery on 5/25/19 for posterior lumbar fusion and abscess debridement.  She was sent from Dr. Nicolas's office as she had significant amount of drainage from her back wound spine. She states she still has some pain but otherwise is doing okay.    6/11- resting well with obvious resting tremor. Underwent washout out of her wound by Dr. Nicolas today, post op doing well, wound Vac in place. Pain under control.    Interval History: resting well with obvious resting tremor. Underwent washout out of her wound by Dr. Nicolas today, post op doing well, wound Vac in place. Pain under control.    Review of Systems   Constitutional: Positive for activity change and fatigue.   HENT: Negative.    Eyes: Negative.    Respiratory: Negative.    Cardiovascular: Negative.    Gastrointestinal: Negative.    Endocrine: Negative.    Genitourinary: Negative.    Musculoskeletal: Positive for back pain.   Skin: Negative.    Allergic/Immunologic: Negative.    Neurological: Positive for tremors and weakness.   Hematological: Negative.    Psychiatric/Behavioral: Negative.      Objective:     Vital Signs (Most Recent):  Temp: 98.2 °F (36.8 °C) (06/11/19 1702)  Pulse:  74 (06/11/19 1706)  Resp: 18 (06/11/19 1702)  BP: (!) 107/56 (06/11/19 1702)  SpO2: (!) 93 % (06/11/19 1702) Vital Signs (24h Range):  Temp:  [97.7 °F (36.5 °C)-98.7 °F (37.1 °C)] 98.2 °F (36.8 °C)  Pulse:  [72-85] 74  Resp:  [12-23] 18  SpO2:  [92 %-100 %] 93 %  BP: ()/(39-81) 107/56     Weight: 96.4 kg (212 lb 8.4 oz)  Body mass index is 34.3 kg/m².    Intake/Output Summary (Last 24 hours) at 6/11/2019 1844  Last data filed at 6/11/2019 1800  Gross per 24 hour   Intake 1120 ml   Output 1082 ml   Net 38 ml      Physical Exam   Constitutional: She is oriented to person, place, and time. She appears well-developed and well-nourished. She is cooperative. She has a sickly appearance.   HENT:   Mouth/Throat: Oropharynx is clear and moist.   Eyes: Pupils are equal, round, and reactive to light. Conjunctivae and EOM are normal.   Neck: Normal range of motion. Neck supple.   Cardiovascular: Normal rate, regular rhythm, normal heart sounds and intact distal pulses.   Pulmonary/Chest: Effort normal and breath sounds normal.   Abdominal: Soft. Bowel sounds are normal.   Genitourinary: Vagina normal.   Musculoskeletal: She exhibits no edema.   Neurological: She is alert and oriented to person, place, and time. She displays tremor. GCS eye subscore is 4. GCS verbal subscore is 5.   Skin: Skin is warm and dry. Capillary refill takes less than 2 seconds.   Psychiatric: She has a normal mood and affect.   Nursing note and vitals reviewed.      Significant Labs:   Blood Culture:   Recent Labs   Lab 06/10/19  1440 06/10/19  1526   LABBLOO No Growth to date No Growth to date     BMP:   Recent Labs   Lab 06/11/19  0515   *   *   K 4.5   CL 93*   CO2 23   BUN 12   CREATININE 0.7   CALCIUM 9.6     CBC:   Recent Labs   Lab 06/10/19  1440 06/11/19  0515   WBC 10.63 7.51   HGB 11.1* 10.1*   HCT 34.1* 32.0*   * 411*     Lactic Acid:   Recent Labs   Lab 06/10/19  1526   LACTATE 3.6*     Magnesium:   Troponin:   All  pertinent labs within the past 24 hours have been reviewed.    Significant Imaging: I have reviewed all pertinent imaging results/findings within the past 24 hours.    Assessment/Plan:      * Osteomyelitis  Plan is to go to the OR for a washout  Will continue with current abx started in the ED    S/p spinal wash out, on wound vac      Hypothyroidism  Continue with levothyroxine      PD (Parkinson's disease)  Resume her home medications        VTE Risk Mitigation (From admission, onward)        Ordered     IP VTE HIGH RISK PATIENT  Once      06/11/19 1704     Place sequential compression device  Until discontinued      06/11/19 1704     Place sequential compression device  Until discontinued      06/10/19 1932              Swetha Biswas MD  Department of Hospital Medicine   [facility]

## 2019-06-11 NOTE — HOSPITAL COURSE
Patient is a 73-year-old female with type 2 diabetes, hypertension and parkinsonism, Ch Hyponatremia, recent h/o osteomyelitis of spine, s/p surgery on 5/25/19 for posterior lumbar fusion and abscess debridement. She was sent from Dr. Nicolas's office as she had significant amount of drainage from her back wound spine. She states she still has some pain but otherwise is doing okay.    6/11- resting well with obvious resting tremor. Underwent washout out of her wound by Dr. Nicolas today, post op doing well, wound Vac in place. Pain under control.  6/12- looks and feels much better, s/p Day 1 post op for l/S I&D, pain much better, wound vac in place. Radiology does not think that the psoas abscess needs any drainage at present and can be watched. Wound Cx remain NGTD-- on IV Vanc.  6/13 - Patient continues to experience discomfort with mobility. PT / OT consulted and recommending post acute care. CM consulted for return to post acute care. Discussed patient with Neuro Sx regarding POC and ongoing needs. D/C planning in progress.   06/14 -- No acute distress overnight. POD # 3. Pt reports pain much improved today vs yesterday. Pt up with PT today, tolerated. First wound vac change scheduled for today then every 3 days. Pt will return to [facility] on Monday, 06/17/19  06/15 POD # 4. No acute events overnight. Afebrile. WBC count stable. PT/OT following. Wound vac with good seal, replaced yesterday 06/14. IV Rocephin and Diflucan started by ID today. Pt was switched from Vancomycin to Rocephin and candida albicans in aerobic culture dated 06/11.   06/16 POD #5. No acute events overnight. Afebrile overnight. Working with PT. OOB in chair with LSO brace on. Currently on day 6/42 with IV Rocephin and Diflucan. Pt has no complaints at this time. Wound vac change scheduled for tomorrow, 06/17. Will likely be discharged back to [facility] afterward with F/U with Dr. Nicolas.   06/17 POD #6. Wound vac changed today,  06/17. Unable to discharge to [facility] today due SNF not having wound vac available. CM reported that SNF should have by tomorrow morning, 06/18. Continue current regimen.    06/18 POD# 7 [facility] ready to accept today -- wound vac available at facility. Pt will continue IV Rocephin/Diflucan for total of 6 weeks. Wound vac to be changed on MWF per wound care recommendations. No leukocytosis/leukopenia. Remains afebrile. WBC count stable at 9.78K. BC NGTD. Hemodynamically stable. Pt will follow up with RAMIRO Arreaga (neurosurgery) prior to 06/24/19 for wound recheck, ambulatory referral placed for appt. This patient was seen and examined on the date of discharge and determined suitable for discharge.

## 2019-06-11 NOTE — NURSING
Patiernt;s lumbar dressing was saturated with tan serosangious drainage. Nurse applied clean gauze and foam tape to the area. To continue to monitor.patoent tolerated fine

## 2019-06-11 NOTE — NURSING
Patient was unable to void via clean catch last night once admitted to floor. Urinary incontinent and unable to tell nurse when she has to void. Nurse spoke to Caroline MAHER this am who oked nurse to in and out cath patient to obtain urine. To continue to monitor.lab to collect blood this am due to patient's PICCcame out during the transfer from ER to the floor. Nurse put 2 18 g piv in patient's left arm per md orders.

## 2019-06-12 LAB
ALBUMIN SERPL BCP-MCNC: 3 G/DL (ref 3.5–5.2)
ALP SERPL-CCNC: 102 U/L (ref 55–135)
ALT SERPL W/O P-5'-P-CCNC: 9 U/L (ref 10–44)
ANION GAP SERPL CALC-SCNC: 11 MMOL/L (ref 8–16)
AST SERPL-CCNC: 21 U/L (ref 10–40)
BASOPHILS # BLD AUTO: 0 K/UL (ref 0–0.2)
BASOPHILS NFR BLD: 0 % (ref 0–1.9)
BILIRUB SERPL-MCNC: 0.4 MG/DL (ref 0.1–1)
BUN SERPL-MCNC: 10 MG/DL (ref 8–23)
CALCIUM SERPL-MCNC: 10 MG/DL (ref 8.7–10.5)
CHLORIDE SERPL-SCNC: 94 MMOL/L (ref 95–110)
CO2 SERPL-SCNC: 26 MMOL/L (ref 23–29)
CREAT SERPL-MCNC: 0.8 MG/DL (ref 0.5–1.4)
DIFFERENTIAL METHOD: ABNORMAL
EOSINOPHIL # BLD AUTO: 0 K/UL (ref 0–0.5)
EOSINOPHIL NFR BLD: 0 % (ref 0–8)
ERYTHROCYTE [DISTWIDTH] IN BLOOD BY AUTOMATED COUNT: 14.8 % (ref 11.5–14.5)
EST. GFR  (AFRICAN AMERICAN): >60 ML/MIN/1.73 M^2
EST. GFR  (NON AFRICAN AMERICAN): >60 ML/MIN/1.73 M^2
GLUCOSE SERPL-MCNC: 208 MG/DL (ref 70–110)
HCT VFR BLD AUTO: 32.9 % (ref 37–48.5)
HGB BLD-MCNC: 10.4 G/DL (ref 12–16)
LYMPHOCYTES # BLD AUTO: 0.8 K/UL (ref 1–4.8)
LYMPHOCYTES NFR BLD: 14.8 % (ref 18–48)
MCH RBC QN AUTO: 27.9 PG (ref 27–31)
MCHC RBC AUTO-ENTMCNC: 31.6 G/DL (ref 32–36)
MCV RBC AUTO: 88 FL (ref 82–98)
MONOCYTES # BLD AUTO: 0.3 K/UL (ref 0.3–1)
MONOCYTES NFR BLD: 4.9 % (ref 4–15)
NEUTROPHILS # BLD AUTO: 4.5 K/UL (ref 1.8–7.7)
NEUTROPHILS NFR BLD: 80.3 % (ref 38–73)
PLATELET # BLD AUTO: 422 K/UL (ref 150–350)
PMV BLD AUTO: 8.4 FL (ref 9.2–12.9)
POCT GLUCOSE: 227 MG/DL (ref 70–110)
POCT GLUCOSE: 250 MG/DL (ref 70–110)
POCT GLUCOSE: 284 MG/DL (ref 70–110)
POCT GLUCOSE: 291 MG/DL (ref 70–110)
POTASSIUM SERPL-SCNC: 4.8 MMOL/L (ref 3.5–5.1)
PROT SERPL-MCNC: 6.9 G/DL (ref 6–8.4)
RBC # BLD AUTO: 3.73 M/UL (ref 4–5.4)
SODIUM SERPL-SCNC: 131 MMOL/L (ref 136–145)
VANCOMYCIN TROUGH SERPL-MCNC: 11.4 UG/ML (ref 10–22)
WBC # BLD AUTO: 5.66 K/UL (ref 3.9–12.7)

## 2019-06-12 PROCEDURE — 21400001 HC TELEMETRY ROOM

## 2019-06-12 PROCEDURE — 80202 ASSAY OF VANCOMYCIN: CPT

## 2019-06-12 PROCEDURE — 85025 COMPLETE CBC W/AUTO DIFF WBC: CPT

## 2019-06-12 PROCEDURE — 94761 N-INVAS EAR/PLS OXIMETRY MLT: CPT

## 2019-06-12 PROCEDURE — 25000003 PHARM REV CODE 250: Performed by: PHYSICIAN ASSISTANT

## 2019-06-12 PROCEDURE — 63600175 PHARM REV CODE 636 W HCPCS: Performed by: PHYSICIAN ASSISTANT

## 2019-06-12 PROCEDURE — 36415 COLL VENOUS BLD VENIPUNCTURE: CPT

## 2019-06-12 PROCEDURE — 80053 COMPREHEN METABOLIC PANEL: CPT

## 2019-06-12 RX ORDER — ENOXAPARIN SODIUM 100 MG/ML
40 INJECTION SUBCUTANEOUS EVERY 24 HOURS
Status: DISCONTINUED | OUTPATIENT
Start: 2019-06-12 | End: 2019-06-18 | Stop reason: HOSPADM

## 2019-06-12 RX ADMIN — CARBIDOPA AND LEVODOPA 1 TABLET: 25; 250 TABLET ORAL at 05:06

## 2019-06-12 RX ADMIN — OXYCODONE AND ACETAMINOPHEN 1 TABLET: 5; 325 TABLET ORAL at 12:06

## 2019-06-12 RX ADMIN — LATANOPROST 1 DROP: 50 SOLUTION OPHTHALMIC at 09:06

## 2019-06-12 RX ADMIN — MICONAZOLE NITRATE: 2 OINTMENT TOPICAL at 09:06

## 2019-06-12 RX ADMIN — CARBIDOPA AND LEVODOPA 1 TABLET: 25; 250 TABLET ORAL at 02:06

## 2019-06-12 RX ADMIN — INSULIN ASPART 2 UNITS: 100 INJECTION, SOLUTION INTRAVENOUS; SUBCUTANEOUS at 12:06

## 2019-06-12 RX ADMIN — OXYCODONE AND ACETAMINOPHEN 1 TABLET: 5; 325 TABLET ORAL at 04:06

## 2019-06-12 RX ADMIN — INSULIN ASPART 3 UNITS: 100 INJECTION, SOLUTION INTRAVENOUS; SUBCUTANEOUS at 06:06

## 2019-06-12 RX ADMIN — CYCLOBENZAPRINE HYDROCHLORIDE 10 MG: 10 TABLET, FILM COATED ORAL at 01:06

## 2019-06-12 RX ADMIN — PANTOPRAZOLE SODIUM 40 MG: 40 TABLET, DELAYED RELEASE ORAL at 10:06

## 2019-06-12 RX ADMIN — MICONAZOLE NITRATE: 2 OINTMENT TOPICAL at 10:06

## 2019-06-12 RX ADMIN — VANCOMYCIN HYDROCHLORIDE 2500 MG: 1 INJECTION, POWDER, FOR SOLUTION INTRAVENOUS at 07:06

## 2019-06-12 RX ADMIN — PRAMIPEXOLE DIHYDROCHLORIDE 1 MG: 0.5 TABLET ORAL at 09:06

## 2019-06-12 RX ADMIN — ENOXAPARIN SODIUM 40 MG: 100 INJECTION SUBCUTANEOUS at 05:06

## 2019-06-12 RX ADMIN — LEVOTHYROXINE SODIUM 125 MCG: 125 TABLET ORAL at 05:06

## 2019-06-12 RX ADMIN — CARBIDOPA AND LEVODOPA 1 TABLET: 25; 250 TABLET ORAL at 10:06

## 2019-06-12 RX ADMIN — PRAMIPEXOLE DIHYDROCHLORIDE 1 MG: 0.5 TABLET ORAL at 10:06

## 2019-06-12 RX ADMIN — PRAVASTATIN SODIUM 40 MG: 20 TABLET ORAL at 10:06

## 2019-06-12 RX ADMIN — CYCLOBENZAPRINE HYDROCHLORIDE 10 MG: 10 TABLET, FILM COATED ORAL at 05:06

## 2019-06-12 RX ADMIN — PRAMIPEXOLE DIHYDROCHLORIDE 1 MG: 0.5 TABLET ORAL at 02:06

## 2019-06-12 RX ADMIN — CYCLOBENZAPRINE HYDROCHLORIDE 10 MG: 10 TABLET, FILM COATED ORAL at 10:06

## 2019-06-12 RX ADMIN — CARBIDOPA AND LEVODOPA 1 TABLET: 25; 250 TABLET ORAL at 09:06

## 2019-06-12 RX ADMIN — ACETAMINOPHEN 650 MG: 325 TABLET ORAL at 02:06

## 2019-06-12 RX ADMIN — ONDANSETRON 4 MG: 2 INJECTION INTRAMUSCULAR; INTRAVENOUS at 04:06

## 2019-06-12 RX ADMIN — CYCLOBENZAPRINE HYDROCHLORIDE 10 MG: 10 TABLET, FILM COATED ORAL at 11:06

## 2019-06-12 RX ADMIN — INSULIN ASPART 2 UNITS: 100 INJECTION, SOLUTION INTRAVENOUS; SUBCUTANEOUS at 09:06

## 2019-06-12 RX ADMIN — LOSARTAN POTASSIUM 50 MG: 50 TABLET, FILM COATED ORAL at 10:06

## 2019-06-12 RX ADMIN — OXYCODONE AND ACETAMINOPHEN 1 TABLET: 5; 325 TABLET ORAL at 07:06

## 2019-06-12 RX ADMIN — INSULIN ASPART 3 UNITS: 100 INJECTION, SOLUTION INTRAVENOUS; SUBCUTANEOUS at 05:06

## 2019-06-12 NOTE — NURSING
FYI: NOTIFIED CHARGE NURSE THAT PATIENT WOUND VAC DID NOT HAVE A CORD AND WAS UNSURE IF THIS MACHINE REQUIRED BATTERIES. CHARGE NURSE ASSESSED THE MACHINE AND SAW THAT THE BATTERIES WERE DUE FOR CHANGE IN YEAR OF 2020. RESET THE MACHINE AND SUCTION STARTED.TO CONTINUE TO MONITOR.

## 2019-06-12 NOTE — OP NOTE
[facility]   Neurosurgery  Operative Note    SUMMARY      Date of Procedure: 6/11/2019     Procedure: Procedure(s) (LRB):  DEBRIDEMENT, WOUND (Bilateral)  APPLICATION, WOUND VAC (N/A)     Surgeon(s) and Role:     * Stanislav Nicolas MD - Primary      Pre-Operative Diagnosis: Osteomyelitis, unspecified site, unspecified type [M86.9]  Discitis of lumbar region [M46.46]  Abscess in epidural space of lumbar spine [G06.1]    Post-Operative Diagnosis: Post-Op Diagnosis Codes:     * Osteomyelitis, unspecified site, unspecified type [M86.9]     * Discitis of lumbar region [M46.46]     * Abscess in epidural space of lumbar spine [G06.1]    Anesthesia: General    Technical Procedures Used:   1.  Subfascial and suprafascial wound debridement  2.  Placement of wound VAC    Description of the Findings of the Procedure:  Superficial and subfascial wound dehiscence    Complications: No    Estimated Blood Loss (EBL): * No values recorded between 6/11/2019  2:29 PM and 6/11/2019  3:27 PM *           Specimens:   Specimen (12h ago, onward)    None           Implants: * No implants in log *           Condition: Good    Disposition: PACU - hemodynamically stable.    Attestation: I was present and scrubbed for the entire procedure.     Patient is a 73-year-old female who previously had osteomyelitis and diskitis at L2-3 status post L1- L4 stabilization.  Patient presented with inferior drainage at her incision site.  Labs demonstrated increasing CRP and ESR.  She has a number of medical issues including diabetes and low albumin levels. CT scan showed new abscess to the psoas muscle on the right side at at L2-3.  Patient was consented for I and D and debridement of wound with placement of wound VAC.    The patient was informed of all benefits and potential risk of the operation including but not limited to:  Pain, infection, bleeding, coma, paralysis, death.  Cerebrospinal fluid leak, failure of any instrumentation, the  need for additional procedures in the future. No guarantee was given that this procedure would alleviate all of the symptoms.    Patient was taken back with anesthesia and intubated in a smooth fashion.  Her eyes were taped shut.  She was then rolled on to the Ace table in a prone position.  All contact points were carefully padded.  Patient was on scheduled antibiotics prior to the operation.    The lower lumbar area was prepped and draped in the usual sterile fashion.  The prior incision was opened with a #10 Blade.  Superficial and deep cultures were sent for aerobic anaerobic and fungal cultures.  The fascial layer was opened up as well.  Retractors were brought into the field.  The wound was copiously irrigated with a Pulsavac irrigation solution with vancomycin powder.  The subfascial layer did not appear to be grossly infected.  The hardware remained intact.  Vancomycin powder was left to the subfascial cavity.  The fascial layer was then closed with 0 Vicryl in an inverted fashion.  The super fascial layer was again irrigated with the Pulsavac irrigation solution.  The tissue was debrided.  Next a wound VAC was placed superficially to continuous low suction with good seal obtained.    Following this the patient was rolled supine onto the preoperative stretcher.  Extubated with anesthesia.  And taken to the recovery room in a stable fashion.  All sponge counts and needle counts were correct at the end of the case x2.  Patient tolerated the procedure well.  Patient remained stable during transport to the recovery room.

## 2019-06-12 NOTE — NURSING
NURSE APPLIED THE CREAM AT THE BEDSIDE TO PATIENT'S BUTTOCKS THAT WAS DUE FOR 2100 DUE TO TURNED PATIENT AND PATIENT NEEDED SOME APPLIED.

## 2019-06-12 NOTE — PROGRESS NOTES
[facility]   Neurosurgery  Progress Note    Subjective:     Interval History:  P/O day #1  Patient appears more comfortable today  C/o tenderness to lower back  Pain controlled  Wound vac in place with good seal  Carlson catheter in place  Afebrile overnight  Wound vac power had stopped overnight, supplies were found to reboot it  WBC trending down    History of Present Illness: Patient is a 73-year-old female who previously had osteomyelitis and diskitis at L2-3 status post L1- L4 stabilization.  Patient presented with inferior drainage at her incision site.  Labs demonstrated increasing CRP and ESR.  She has a number of medical issues including diabetes and low albumin levels. CT scan showed new abscess to the psoas muscle on the right side at at L2-3.  Patient was consented for I and D and debridement of wound with placement of wound VAC.    Date of Procedure: 6/11/2019    Procedure: Procedure(s) (LRB):  DEBRIDEMENT, WOUND (Bilateral)  APPLICATION, WOUND VAC (N/A)    Surgeon(s) and Role:     * Stanislav Nicolas MD - Primary   Pre-Operative Diagnosis: Osteomyelitis, unspecified site, unspecified type [M86.9]  Discitis of lumbar region [M46.46]  Abscess in epidural space of lumbar spine [G06.1]   Post-Operative Diagnosis: Post-Op Diagnosis Codes:     * Osteomyelitis, unspecified site, unspecified type [M86.9]     * Discitis of lumbar region [M46.46]     * Abscess in epidural space of lumbar spine [G06.1]   Anesthesia: General   Technical Procedures Used:   1.  Subfascial and suprafascial wound debridement  2.  Placement of wound VAC   Description of the Findings of the Procedure:  Superficial and subfascial wound dehiscence       Post-Op Info:  Procedure(s) (LRB):  DEBRIDEMENT, WOUND (Bilateral)  APPLICATION, WOUND VAC (N/A)   1 Day Post-Op      Medications:  Continuous Infusions:  Scheduled Meds:   carbidopa-levodopa  mg  1 tablet Oral 5x Daily    latanoprost  1 drop Both Eyes QHS     levothyroxine  125 mcg Oral Before breakfast    losartan  50 mg Oral Daily    miconazole nitrate 2%   Topical (Top) BID    pantoprazole  40 mg Oral Daily    pramipexole  1 mg Oral TID    pravastatin  40 mg Oral Daily    vancomycin (VANCOCIN) IVPB  2,500 mg Intravenous Q24H     PRN Meds:acetaminophen, albuterol-ipratropium, cyclobenzaprine, dextrose 50%, glucagon (human recombinant), glucose, insulin aspart U-100, ondansetron, oxyCODONE-acetaminophen, promethazine (PHENERGAN) IVPB, sodium chloride 0.9%       Review of Systems   Constitutional: Negative for fever.   Gastrointestinal: Negative for nausea and vomiting.   Genitourinary: Negative for decreased urine volume.   Musculoskeletal: Positive for back pain and gait problem.   Skin: Positive for wound (wound vac in place over lower back).   Psychiatric/Behavioral: Negative for behavioral problems.     Objective:     Weight: 96.4 kg (212 lb 8.4 oz)  Body mass index is 34.3 kg/m².  Vital Signs (Most Recent):  Temp: 98.5 °F (36.9 °C) (06/12/19 0421)  Pulse: 73 (06/12/19 0500)  Resp: 18 (06/12/19 0421)  BP: (!) 116/56 (06/12/19 0421)  SpO2: 95 % (06/12/19 0800) Vital Signs (24h Range):  Temp:  [96.9 °F (36.1 °C)-98.6 °F (37 °C)] 98.5 °F (36.9 °C)  Pulse:  [72-93] 73  Resp:  [12-23] 18  SpO2:  [92 %-100 %] 95 %  BP: ()/(39-60) 116/56                Oxygen Concentration (%):  [21-98] 32         Urethral Catheter 06/11/19 1415 Latex;Straight-tip 16 Fr. (Active)   Site Assessment Clean;Intact 6/11/2019  3:30 PM   Collection Container Urimeter 6/11/2019  3:30 PM   Securement Method secured to top of thigh w/ adhesive device 6/11/2019  3:30 PM   Output (mL) 140 mL 6/11/2019  6:00 PM       Neurosurgery Physical Exam   Patient is awake and alert  NAD  Wound vac in place with good seal  Tolerating diet well  Mild tenderness across norma. Lumbar spine      Significant Labs:  Recent Labs   Lab 06/10/19  1440 06/11/19  0515 06/12/19  0617   * 122* 208*   *  127* 131*   K 4.7 4.5 4.8   CL 92* 93* 94*   CO2 20* 23 26   BUN 13 12 10   CREATININE 0.9 0.7 0.8   CALCIUM 10.1 9.6 10.0     Recent Labs   Lab 06/10/19  1440 06/11/19  0515 06/12/19  0617   WBC 10.63 7.51 5.66   HGB 11.1* 10.1* 10.4*   HCT 34.1* 32.0* 32.9*   * 411* 422*     No results for input(s): LABPT, INR, APTT in the last 48 hours.  Microbiology Results (last 7 days)     Procedure Component Value Units Date/Time    Gram stain [[id number]] Collected:  06/11/19 1434    Order Status:  Completed Specimen:  Abscess from Back Updated:  06/11/19 2329     Gram Stain Result Few WBC's      No organisms seen    Culture, Anaerobe [[id number]] Collected:  06/11/19 1434    Order Status:  Sent Specimen:  Abscess from Back Updated:  06/11/19 2215    AFB Culture & Smear [[id number]] Collected:  06/11/19 1434    Order Status:  Sent Specimen:  Abscess from Back Updated:  06/11/19 2215    Fungus culture [[id number]] Collected:  06/11/19 1434    Order Status:  Sent Specimen:  Abscess from Back Updated:  06/11/19 2215    Aerobic culture [[id number]] Collected:  06/11/19 1434    Order Status:  Sent Specimen:  Abscess from Back Updated:  06/11/19 2215    Blood Culture #2 **CANNOT BE ORDERED STAT** [[id number]] Collected:  06/10/19 1440    Order Status:  Completed Specimen:  Blood from Peripheral, Antecubital, Right Updated:  06/11/19 2212     Blood Culture, Routine No Growth to date     Blood Culture, Routine No Growth to date    Blood Culture #1 **CANNOT BE ORDERED STAT** [[id number]] Collected:  06/10/19 1526    Order Status:  Completed Specimen:  Blood from Peripheral, Forearm, Right Updated:  06/11/19 2212     Blood Culture, Routine No Growth to date     Blood Culture, Routine No Growth to date        ABGs: No results for input(s): PH, PCO2, PO2, HCO3, POCSATURATED, BE in the last 48 hours.  CMP:   Recent Labs   Lab 06/10/19  1440 06/11/19  0515 06/12/19  0617   * 122* 208*   CALCIUM 10.1 9.6 10.0   ALBUMIN 3.0* 2.8*  3.0*   PROT 7.2 6.6 6.9   * 127* 131*   K 4.7 4.5 4.8   CO2 20* 23 26   CL 92* 93* 94*   BUN 13 12 10   CREATININE 0.9 0.7 0.8   ALKPHOS 100 98 102   ALT 6* 5* 9*   AST 33 20 21   BILITOT 0.4 0.6 0.4     All pertinent labs from the last 24 hours have been reviewed.  Significant Diagnostics:  CT: No results found in the last 24 hours.  I have reviewed all pertinent imaging results/findings within the past 24 hours.    Assessment/Plan:     Active Diagnoses:    Diagnosis Date Noted POA    PRINCIPAL PROBLEM:  Osteomyelitis [M86.9] 06/10/2019 Yes     Chronic    Hypothyroidism [E03.9] 11/19/2013 Yes     Chronic    PD (Parkinson's disease) [G20]  Yes     Chronic      Problems Resolved During this Admission:     Osteomyelitis    Continue wound care with wound vac in place. Continuous suction at 125 mm hg. Change out sponge every 3 days.  Continue treatment regimen including Abx per HM  ID consult needed  Consult IR for possible drainage for right psoas abscess  Start OT  Start Lovenox 40 mg Q24 today   Will continue to monitor    Mayra Long PA-C  Neurosurgery  [facility]

## 2019-06-12 NOTE — ASSESSMENT & PLAN NOTE
Plan is to go to the OR for a washout  Will continue with current abx started in the ED    S/p spinal wash out, on wound vac    POD 1 s/p I&D of wound dehiscence of lumbar surgery   looks and feels much better  On IV Vanc  Continue present care  D/c back to [facility] when cleared by Dr. Nicolas

## 2019-06-12 NOTE — PROGRESS NOTES
"Follow up visit with Ms. Isbell. She is s/p I&D of lumbar spine and placement of wound vac in OR per Dr. Nicolas. She is awake and alert, primary nurse Tyler, Rn at bedside. KCI wound vac ULTA intact to ML lumbar spine incision with good seal noted, no air leak. Kina incisional skin under drape again with erythema and satellite lesions noted. Aloevesta AF barrier in use to perineum/gluteal skin per MAR. Likely plan to "crust" kina-incisional skin with miconazole powder and skin barrier film with next wound vac change which is planned for Friday, 6/14.  "

## 2019-06-12 NOTE — NURSING
NURSE NOTES THAT THE WOUND VAC POWER HAS STOPPED. NOTIFIED CHARGE NURSE WHO CALLED [facility] AND CORD WAS FOUND IN SUPPLIES DOWN STAIRS. GOT MACHINE GOING PRIOR TO MACHINE TIMEBEING INTERRUPTED OVER 2 HOURS . TO CONTINUE TO MONITOR

## 2019-06-12 NOTE — PROGRESS NOTES
[facility] Hospital Medicine  Progress Note    Patient Name: Ross Isbell  MRN: [id number]  Patient Class: IP- Inpatient   Admission Date: 6/10/2019  Length of Stay: 1 days  Attending Physician: Swetha Biswas MD  Primary Care Provider: Anna Penn MD        Subjective:     Principal Problem:Osteomyelitis    HPI:  Mrs. Isbell sent from Dr. Griffith office for admissions and antibiotics after she went for follow up and per the daughter a significant amount of drainage from her back wound. She recently had surgery for osteo of lumbar spine. She states she still has some pain but otherwise is doing okay.        Hospital Course:  Patient is a 73-year-old female with type 2 diabetes, hypertension and parkinsonism, Ch Hyponatremia, recent h/o osteomyelitis of spine, s/p surgery on 5/25/19 for posterior lumbar fusion and abscess debridement.  She was sent from Dr. Nicolas's office as she had significant amount of drainage from her back wound spine. She states she still has some pain but otherwise is doing okay.    6/11- resting well with obvious resting tremor. Underwent washout out of her wound by Dr. Nicolas today, post op doing well, wound Vac in place. Pain under control.  6/12- looks and feels much better, s/p Day 1 post op for l/S I&D, pain much better, wound vac in place. Radiology does not think that the psoas abscess needs any drainage at present and can be watched. Wound Cx remain NGTD-- on IV Vanc.    Interval History: looks and feels much better, s/p Day 1 post op for l/S I&D, pain much better, wound vac in place. Radiology does not think that the psoas abscess needs any drainage at present and can be watched. Wound Cx remain NGTD-- on IV Vanc.      Review of Systems   Constitutional: Positive for activity change and fatigue.   HENT: Negative.    Eyes: Negative.    Respiratory: Negative.    Cardiovascular: Negative.    Gastrointestinal: Negative.    Endocrine: Negative.    Genitourinary:  Negative.    Musculoskeletal: Positive for back pain.   Skin: Negative.    Allergic/Immunologic: Negative.    Neurological: Positive for tremors and weakness.   Hematological: Negative.    Psychiatric/Behavioral: Negative.      Objective:     Vital Signs (Most Recent):  Temp: 97.7 °F (36.5 °C) (06/12/19 1520)  Pulse: 99 (06/12/19 1520)  Resp: 19 (06/12/19 1520)  BP: 127/68 (06/12/19 1520)  SpO2: 99 % (06/12/19 1520) Vital Signs (24h Range):  Temp:  [96.9 °F (36.1 °C)-98.6 °F (37 °C)] 97.7 °F (36.5 °C)  Pulse:  [] 99  Resp:  [18-19] 19  SpO2:  [93 %-100 %] 99 %  BP: (116-131)/(56-74) 127/68     Weight: 99.4 kg (219 lb 2.2 oz)  Body mass index is 35.37 kg/m².    Intake/Output Summary (Last 24 hours) at 6/12/2019 1852  Last data filed at 6/12/2019 1503  Gross per 24 hour   Intake --   Output 5345 ml   Net -5345 ml      Physical Exam   Constitutional: She is oriented to person, place, and time. She appears well-developed and well-nourished. She is cooperative. She has a sickly appearance.   HENT:   Mouth/Throat: Oropharynx is clear and moist.   Eyes: Pupils are equal, round, and reactive to light. Conjunctivae and EOM are normal.   Neck: Normal range of motion. Neck supple.   Cardiovascular: Normal rate, regular rhythm, normal heart sounds and intact distal pulses.   Pulmonary/Chest: Effort normal and breath sounds normal.   Abdominal: Soft. Bowel sounds are normal.   Genitourinary: Vagina normal.   Musculoskeletal: She exhibits no edema.   L/S wound vac in place   Neurological: She is alert and oriented to person, place, and time. She displays tremor. GCS eye subscore is 4. GCS verbal subscore is 5.   Skin: Skin is warm and dry. Capillary refill takes less than 2 seconds.   Psychiatric: She has a normal mood and affect.   Nursing note and vitals reviewed.      Significant Labs:   BMP:   Recent Labs   Lab 06/12/19  0617   *   *   K 4.8   CL 94*   CO2 26   BUN 10   CREATININE 0.8   CALCIUM 10.0      CBC:   Recent Labs   Lab 06/11/19  0515 06/12/19  0617   WBC 7.51 5.66   HGB 10.1* 10.4*   HCT 32.0* 32.9*   * 422*     All pertinent labs within the past 24 hours have been reviewed.    Significant Imaging: I have reviewed all pertinent imaging results/findings within the past 24 hours.    Assessment/Plan:      * Osteomyelitis  Plan is to go to the OR for a washout  Will continue with current abx started in the ED    S/p spinal wash out, on wound vac    POD 1 s/p I&D of wound dehiscence of lumbar surgery   looks and feels much better  On IV Vanc  Continue present care  D/c back to [facility] when cleared by Dr. Nicolas       Hypothyroidism  Continue with levothyroxine      PD (Parkinson's disease)  Resume her home medications  Doing well        VTE Risk Mitigation (From admission, onward)        Ordered     enoxaparin injection 40 mg  Daily      06/12/19 1112     IP VTE HIGH RISK PATIENT  Once      06/11/19 1704     Place sequential compression device  Until discontinued      06/11/19 1704     Place sequential compression device  Until discontinued      06/10/19 1932              Swetha Biswas MD  Department of Hospital Medicine   [facility]

## 2019-06-12 NOTE — CONSULTS
Images reviewed by Dr. Clark and he recommends following the right psoas abscess.  It's currently too small to place a drain in.  Thank for the consult.

## 2019-06-12 NOTE — SUBJECTIVE & OBJECTIVE
Interval History: looks and feels much better, s/p Day 1 post op for l/S I&D, pain much better, wound vac in place. Radiology does not think that the psoas abscess needs any drainage at present and can be watched. Wound Cx remain NGTD-- on IV Vanc.      Review of Systems   Constitutional: Positive for activity change and fatigue.   HENT: Negative.    Eyes: Negative.    Respiratory: Negative.    Cardiovascular: Negative.    Gastrointestinal: Negative.    Endocrine: Negative.    Genitourinary: Negative.    Musculoskeletal: Positive for back pain.   Skin: Negative.    Allergic/Immunologic: Negative.    Neurological: Positive for tremors and weakness.   Hematological: Negative.    Psychiatric/Behavioral: Negative.      Objective:     Vital Signs (Most Recent):  Temp: 97.7 °F (36.5 °C) (06/12/19 1520)  Pulse: 99 (06/12/19 1520)  Resp: 19 (06/12/19 1520)  BP: 127/68 (06/12/19 1520)  SpO2: 99 % (06/12/19 1520) Vital Signs (24h Range):  Temp:  [96.9 °F (36.1 °C)-98.6 °F (37 °C)] 97.7 °F (36.5 °C)  Pulse:  [] 99  Resp:  [18-19] 19  SpO2:  [93 %-100 %] 99 %  BP: (116-131)/(56-74) 127/68     Weight: 99.4 kg (219 lb 2.2 oz)  Body mass index is 35.37 kg/m².    Intake/Output Summary (Last 24 hours) at 6/12/2019 1852  Last data filed at 6/12/2019 1503  Gross per 24 hour   Intake --   Output 5345 ml   Net -5345 ml      Physical Exam   Constitutional: She is oriented to person, place, and time. She appears well-developed and well-nourished. She is cooperative. She has a sickly appearance.   HENT:   Mouth/Throat: Oropharynx is clear and moist.   Eyes: Pupils are equal, round, and reactive to light. Conjunctivae and EOM are normal.   Neck: Normal range of motion. Neck supple.   Cardiovascular: Normal rate, regular rhythm, normal heart sounds and intact distal pulses.   Pulmonary/Chest: Effort normal and breath sounds normal.   Abdominal: Soft. Bowel sounds are normal.   Genitourinary: Vagina normal.   Musculoskeletal: She  exhibits no edema.   L/S wound vac in place   Neurological: She is alert and oriented to person, place, and time. She displays tremor. GCS eye subscore is 4. GCS verbal subscore is 5.   Skin: Skin is warm and dry. Capillary refill takes less than 2 seconds.   Psychiatric: She has a normal mood and affect.   Nursing note and vitals reviewed.      Significant Labs:   BMP:   Recent Labs   Lab 06/12/19  0617   *   *   K 4.8   CL 94*   CO2 26   BUN 10   CREATININE 0.8   CALCIUM 10.0     CBC:   Recent Labs   Lab 06/11/19  0515 06/12/19  0617   WBC 7.51 5.66   HGB 10.1* 10.4*   HCT 32.0* 32.9*   * 422*     All pertinent labs within the past 24 hours have been reviewed.    Significant Imaging: I have reviewed all pertinent imaging results/findings within the past 24 hours.

## 2019-06-12 NOTE — NURSING
NOTIFIED CHARGE NURSE WENDY AGAIN THAT WOUND VAC HAS STARTED BACK ALERTING LOW BATTERY. SUCTION IS STILL ON AT 125MMHG. TO CONTINUE TO MONITOR.

## 2019-06-13 LAB
ALBUMIN SERPL BCP-MCNC: 2.9 G/DL (ref 3.5–5.2)
ALP SERPL-CCNC: 93 U/L (ref 55–135)
ALT SERPL W/O P-5'-P-CCNC: <5 U/L (ref 10–44)
ANION GAP SERPL CALC-SCNC: 9 MMOL/L (ref 8–16)
AST SERPL-CCNC: 19 U/L (ref 10–40)
BASOPHILS # BLD AUTO: 0.02 K/UL (ref 0–0.2)
BASOPHILS NFR BLD: 0.2 % (ref 0–1.9)
BILIRUB SERPL-MCNC: 0.4 MG/DL (ref 0.1–1)
BUN SERPL-MCNC: 12 MG/DL (ref 8–23)
CALCIUM SERPL-MCNC: 9.7 MG/DL (ref 8.7–10.5)
CHLORIDE SERPL-SCNC: 97 MMOL/L (ref 95–110)
CO2 SERPL-SCNC: 27 MMOL/L (ref 23–29)
CREAT SERPL-MCNC: 0.8 MG/DL (ref 0.5–1.4)
DIFFERENTIAL METHOD: ABNORMAL
EOSINOPHIL # BLD AUTO: 0.1 K/UL (ref 0–0.5)
EOSINOPHIL NFR BLD: 1 % (ref 0–8)
ERYTHROCYTE [DISTWIDTH] IN BLOOD BY AUTOMATED COUNT: 15.1 % (ref 11.5–14.5)
EST. GFR  (AFRICAN AMERICAN): >60 ML/MIN/1.73 M^2
EST. GFR  (NON AFRICAN AMERICAN): >60 ML/MIN/1.73 M^2
GLUCOSE SERPL-MCNC: 135 MG/DL (ref 70–110)
HCT VFR BLD AUTO: 33.6 % (ref 37–48.5)
HGB BLD-MCNC: 10.4 G/DL (ref 12–16)
LYMPHOCYTES # BLD AUTO: 2.2 K/UL (ref 1–4.8)
LYMPHOCYTES NFR BLD: 26.8 % (ref 18–48)
MCH RBC QN AUTO: 27.7 PG (ref 27–31)
MCHC RBC AUTO-ENTMCNC: 31 G/DL (ref 32–36)
MCV RBC AUTO: 90 FL (ref 82–98)
MONOCYTES # BLD AUTO: 0.8 K/UL (ref 0.3–1)
MONOCYTES NFR BLD: 10 % (ref 4–15)
NEUTROPHILS # BLD AUTO: 5 K/UL (ref 1.8–7.7)
NEUTROPHILS NFR BLD: 62 % (ref 38–73)
PLATELET # BLD AUTO: 428 K/UL (ref 150–350)
PMV BLD AUTO: 8.4 FL (ref 9.2–12.9)
POCT GLUCOSE: 138 MG/DL (ref 70–110)
POCT GLUCOSE: 139 MG/DL (ref 70–110)
POCT GLUCOSE: 146 MG/DL (ref 70–110)
POCT GLUCOSE: 163 MG/DL (ref 70–110)
POTASSIUM SERPL-SCNC: 4.1 MMOL/L (ref 3.5–5.1)
PROT SERPL-MCNC: 6.6 G/DL (ref 6–8.4)
RBC # BLD AUTO: 3.75 M/UL (ref 4–5.4)
SODIUM SERPL-SCNC: 133 MMOL/L (ref 136–145)
WBC # BLD AUTO: 8.09 K/UL (ref 3.9–12.7)

## 2019-06-13 PROCEDURE — 97163 PT EVAL HIGH COMPLEX 45 MIN: CPT

## 2019-06-13 PROCEDURE — 97530 THERAPEUTIC ACTIVITIES: CPT

## 2019-06-13 PROCEDURE — 80053 COMPREHEN METABOLIC PANEL: CPT

## 2019-06-13 PROCEDURE — 63600175 PHARM REV CODE 636 W HCPCS: Performed by: INTERNAL MEDICINE

## 2019-06-13 PROCEDURE — 97167 OT EVAL HIGH COMPLEX 60 MIN: CPT

## 2019-06-13 PROCEDURE — 94761 N-INVAS EAR/PLS OXIMETRY MLT: CPT

## 2019-06-13 PROCEDURE — 27000221 HC OXYGEN, UP TO 24 HOURS

## 2019-06-13 PROCEDURE — 36415 COLL VENOUS BLD VENIPUNCTURE: CPT

## 2019-06-13 PROCEDURE — 25000003 PHARM REV CODE 250: Performed by: PHYSICIAN ASSISTANT

## 2019-06-13 PROCEDURE — 63600175 PHARM REV CODE 636 W HCPCS: Performed by: PHYSICIAN ASSISTANT

## 2019-06-13 PROCEDURE — 21400001 HC TELEMETRY ROOM

## 2019-06-13 PROCEDURE — 25000003 PHARM REV CODE 250: Performed by: INTERNAL MEDICINE

## 2019-06-13 PROCEDURE — 85025 COMPLETE CBC W/AUTO DIFF WBC: CPT

## 2019-06-13 RX ORDER — OXYCODONE AND ACETAMINOPHEN 10; 325 MG/1; MG/1
1 TABLET ORAL EVERY 6 HOURS PRN
Status: DISCONTINUED | OUTPATIENT
Start: 2019-06-13 | End: 2019-06-16

## 2019-06-13 RX ADMIN — PRAVASTATIN SODIUM 40 MG: 20 TABLET ORAL at 08:06

## 2019-06-13 RX ADMIN — MICONAZOLE NITRATE: 2 OINTMENT TOPICAL at 09:06

## 2019-06-13 RX ADMIN — OXYCODONE AND ACETAMINOPHEN 1 TABLET: 5; 325 TABLET ORAL at 01:06

## 2019-06-13 RX ADMIN — OXYCODONE AND ACETAMINOPHEN 1 TABLET: 5; 325 TABLET ORAL at 08:06

## 2019-06-13 RX ADMIN — PRAMIPEXOLE DIHYDROCHLORIDE 1 MG: 0.5 TABLET ORAL at 09:06

## 2019-06-13 RX ADMIN — Medication 100 MG: at 12:06

## 2019-06-13 RX ADMIN — CARBIDOPA AND LEVODOPA 1 TABLET: 25; 250 TABLET ORAL at 09:06

## 2019-06-13 RX ADMIN — ENOXAPARIN SODIUM 40 MG: 100 INJECTION SUBCUTANEOUS at 05:06

## 2019-06-13 RX ADMIN — LEVOTHYROXINE SODIUM 125 MCG: 125 TABLET ORAL at 05:06

## 2019-06-13 RX ADMIN — LOSARTAN POTASSIUM 50 MG: 50 TABLET, FILM COATED ORAL at 08:06

## 2019-06-13 RX ADMIN — PRAMIPEXOLE DIHYDROCHLORIDE 1 MG: 0.5 TABLET ORAL at 02:06

## 2019-06-13 RX ADMIN — VANCOMYCIN HYDROCHLORIDE 1250 MG: 100 INJECTION, POWDER, LYOPHILIZED, FOR SOLUTION INTRAVENOUS at 01:06

## 2019-06-13 RX ADMIN — CARBIDOPA AND LEVODOPA 1 TABLET: 25; 250 TABLET ORAL at 05:06

## 2019-06-13 RX ADMIN — PRAMIPEXOLE DIHYDROCHLORIDE 1 MG: 0.5 TABLET ORAL at 08:06

## 2019-06-13 RX ADMIN — CYCLOBENZAPRINE HYDROCHLORIDE 10 MG: 10 TABLET, FILM COATED ORAL at 05:06

## 2019-06-13 RX ADMIN — OXYCODONE AND ACETAMINOPHEN 1 TABLET: 10; 325 TABLET ORAL at 12:06

## 2019-06-13 RX ADMIN — PANTOPRAZOLE SODIUM 40 MG: 40 TABLET, DELAYED RELEASE ORAL at 08:06

## 2019-06-13 RX ADMIN — MICONAZOLE NITRATE: 2 OINTMENT TOPICAL at 12:06

## 2019-06-13 RX ADMIN — CYCLOBENZAPRINE HYDROCHLORIDE 10 MG: 10 TABLET, FILM COATED ORAL at 09:06

## 2019-06-13 RX ADMIN — OXYCODONE AND ACETAMINOPHEN 1 TABLET: 10; 325 TABLET ORAL at 06:06

## 2019-06-13 RX ADMIN — LATANOPROST 1 DROP: 50 SOLUTION OPHTHALMIC at 09:06

## 2019-06-13 RX ADMIN — CARBIDOPA AND LEVODOPA 1 TABLET: 25; 250 TABLET ORAL at 01:06

## 2019-06-13 NOTE — PLAN OF CARE
Problem: Adult Inpatient Plan of Care  Goal: Plan of Care Review  Outcome: Ongoing (interventions implemented as appropriate)  POC discussed w/patient, verbalized understanding.    NSR on monitor. VSS. AAO X4 .   Assistance X1 .   IV intact. Medications administered as prescribed.   Weight shift assistance provided.   Fall precautions in place, call bell in reach, bed in low and locked position.   Patient remains free from falls/injuries.   Patient denies needs at this time.   Will continue to monitor.

## 2019-06-13 NOTE — ASSESSMENT & PLAN NOTE
06/11- all cultures reviewed.  )6/11- yeast   04/30- strep    She is on Vancomycin , will follow new cultures and will plan to switch to Rocephin depending ob cultures.  Will add Micafungin until ID of yeast is known

## 2019-06-13 NOTE — SUBJECTIVE & OBJECTIVE
Interval History: Remains weakened / debilitated    Review of Systems   Constitutional: Positive for activity change and fatigue.   HENT: Negative.    Eyes: Negative.    Respiratory: Negative.    Cardiovascular: Negative.    Gastrointestinal: Negative.    Endocrine: Negative.    Genitourinary: Negative.    Musculoskeletal: Positive for back pain.   Skin: Negative.    Allergic/Immunologic: Negative.    Neurological: Positive for tremors and weakness.   Hematological: Negative.    Psychiatric/Behavioral: Negative.      Objective:     Vital Signs (Most Recent):  Temp: 98 °F (36.7 °C) (06/13/19 1623)  Pulse: 92 (06/13/19 1623)  Resp: (!) 22 (06/13/19 1623)  BP: 128/60 (06/13/19 1623)  SpO2: 95 % (06/13/19 1623) Vital Signs (24h Range):  Temp:  [96.2 °F (35.7 °C)-98 °F (36.7 °C)] 98 °F (36.7 °C)  Pulse:  [] 92  Resp:  [18-22] 22  SpO2:  [94 %-100 %] 95 %  BP: (115-167)/(56-74) 128/60     Weight: 98.8 kg (217 lb 13 oz)  Body mass index is 35.16 kg/m².    Intake/Output Summary (Last 24 hours) at 6/13/2019 1654  Last data filed at 6/13/2019 1600  Gross per 24 hour   Intake 960 ml   Output 3390 ml   Net -2430 ml      Physical Exam   Constitutional: She is oriented to person, place, and time. She appears well-developed and well-nourished. She is cooperative. She has a sickly appearance.   HENT:   Mouth/Throat: Oropharynx is clear and moist.   Eyes: Pupils are equal, round, and reactive to light. Conjunctivae and EOM are normal.   Neck: Normal range of motion. Neck supple.   Cardiovascular: Normal rate, regular rhythm and intact distal pulses.   Murmur heard.   Systolic murmur is present with a grade of 3/6.  Pulmonary/Chest: Effort normal. She has rhonchi in the right middle field.   Abdominal: Soft. Bowel sounds are normal.   Genitourinary: Vagina normal.   Musculoskeletal: She exhibits no edema.   L/S wound vac in place   Neurological: She is alert and oriented to person, place, and time. She displays tremor. GCS eye  subscore is 4. GCS verbal subscore is 5.   Skin: Skin is warm and dry. Capillary refill takes less than 2 seconds.   Psychiatric: She has a normal mood and affect.   Nursing note and vitals reviewed.      Significant Labs:   BMP:   Recent Labs   Lab 06/13/19  0436   *   *   K 4.1   CL 97   CO2 27   BUN 12   CREATININE 0.8   CALCIUM 9.7     CBC:   Recent Labs   Lab 06/12/19  0617 06/13/19  0436   WBC 5.66 8.09   HGB 10.4* 10.4*   HCT 32.9* 33.6*   * 428*     All pertinent labs within the past 24 hours have been reviewed.    Significant Imaging: I have reviewed all pertinent imaging results/findings within the past 24 hours.

## 2019-06-13 NOTE — PT/OT/SLP EVAL
Physical Therapy Evaluation    Patient Name:  Ross Isbell   MRN:  [id number]    Recommendations:     Discharge Recommendations:  nursing facility, skilled   Discharge Equipment Recommendations: none   Barriers to discharge: Decreased caregiver support    Assessment:     Ross Isbell is a 73 y.o. female admitted with a medical diagnosis of Osteomyelitis.  She presents with the following impairments/functional limitations:  weakness, gait instability, decreased upper extremity function, decreased lower extremity function, impaired balance, impaired endurance, impaired self care skills, impaired functional mobilty, decreased coordination, pain, impaired skin, orthopedic precautions, decreased ROM, decreased safety awareness .    Rehab Prognosis: Fair; patient would benefit from acute skilled PT services to address these deficits and reach maximum level of function.    Recent Surgery: Procedure(s) (LRB):  DEBRIDEMENT, WOUND (Bilateral)  APPLICATION, WOUND VAC (N/A) 2 Days Post-Op    Plan:     During this hospitalization, patient to be seen   to address the identified rehab impairments via gait training, therapeutic activities, therapeutic exercises and progress toward the following goals:    · Plan of Care Expires:  06/20/19    Subjective     Chief Complaint: BACK PAIN  Patient/Family Comments/goals: INC MOBILITY   Pain/Comfort:  · Pain Rating 1: 10/10  · Location 1: back  · Pain Rating Post-Intervention 1: 10/10    Patients cultural, spiritual, Voodoo conflicts given the current situation:      Living Environment:  PT LIVES AT HOME WITH  WITH RAMP TO ENTER ONE STORY HOME   Prior to admission, patients level of function was MOD I WITH T/F TO WC. PT REPORTED SHE HASN'T AMBULATED X APPROX 8 YEARS.  Equipment used at home: wheelchair, walker, rolling, hospital bed.  DME owned (not currently used): none.  Upon discharge, patient will have assistance from NH STAFF.    Objective:     Communicated with NURSE  MARCELO AND [facility] CHART REVIEW prior to session.  Patient found right sidelying with peripheral IV, wound vac, telemetry, oxygen, bed alarm  upon PT entry to room.    General Precautions: Standard, fall   Orthopedic Precautions:N/A   Braces: LSO     Exams:  · RLE ROM: LIMITED  · RLE Strength: SEVERE LIMITED  · LLE ROM: LIMITED  · LLE Strength: SEVERE LIMITED    Functional Mobility:  PT MET IN RM IN RIGHT SIDE LYING. PT HISTORY TAKEN HOWEVER PT DIFFICULT TO UNDERSTAND AT TIMES WITH LOW TONE. P.T. SECURE CHATTED WITH DR. COSTA TO DETERMINE IN LSO SHOULD BE WORN WITH WOUND VAC ON. DR. DU CLARIFIED YES LSO SHOULD BE WORN WITH WOUND VAC WHEN OOB. PT SEATED EOB WITH MAX A X 2 AND INC PAIN NOTED. PT WITH INC TREMORS AND CRYING IN PAIN. PT ROM OF B LE ASSESSED AND LIMITED. PT UNABLE TO TOLERATE SITTING EOB AND NURSE ENTERED REPORTING PT HR ELEVATED. PT REPORTED PAIN @ 20/10. PT RETURNED SUP AND INTO RIGHT SIDE SLYING. PT EDUCATED ON ROLE OF P.T. AND LEFT WITH ALL NEEDS MET AND NURSE PRESENT.    AM-PAC 6 CLICK MOBILITY  Total Score:8     Patient left right sidelying with all lines intact, call button in reach, bed alarm on and NURSE  present.    GOALS:   Multidisciplinary Problems     Physical Therapy Goals        Problem: Physical Therapy Goal    Goal Priority Disciplines Outcome Goal Variances Interventions   Physical Therapy Goal     PT, PT/OT      Description:  PT WILL BE SEEN FOR P.T. FOR A MIN OF 5 OUT OF 7 DAYS A WEEK  LT19  1. PT WILL COMPLETE BED MOBILITY WITH MOD A  2. PT WILL SCOOT IN BED WITH MOD A  3. PT WILL T/F TO CHAIR WITH MAX A X 1 AND LSO ON  4. PT WILL COMPLETE B LE TE X 10 REPS FOR ROM AND STRENGTHENING                    History:     Past Medical History:   Diagnosis Date    Abnormal nuclear stress test 2015    Anemia 2019    Background diabetic retinopathy 2015    CAD (coronary artery disease) 2002    stents    Cataract     Gait disorder 10/12/2012    Glaucoma      History of PTCA 6/1/2019    MI (myocardial infarction)     Obesity     CORINE (obstructive sleep apnea)     Other and unspecified hyperlipidemia     PD (Parkinson's disease) 2002    tremor-predominant (per patient)    Type II or unspecified type diabetes mellitus without mention of complication, not stated as uncontrolled 2002      am    Unspecified essential hypertension        Past Surgical History:   Procedure Laterality Date    APPLICATION, WOUND VAC N/A 6/11/2019    [facility]    BACK SURGERY  05/25/2019    BREAST BIOPSY Left     benign - about 3 yrs ago    CORONARY ANGIOPLASTY  2002    CORONARY STENT PLACEMENT  2002    DEBRIDEMENT, ABSCESS, PARASPINAL N/A 5/25/2019    [facility]    DEBRIDEMENT, WOUND Bilateral 6/11/2019    [facility]    DILATION AND CURETTAGE OF UTERUS      FOREIGN BODY REMOVAL      FUSION, SPINE, LUMBAR, TLIF, POSTERIOR APPROACH, USING PEDICLE SCREW N/A 5/25/2019    [facility]    FUSION, SPINE, POSTERIOR SPINAL COLUMN, LUMBAR, USING COMPUTER-ASSISTED NAVIGATION  5/25/2019    [facility]    HEART CATH-LEFT Left 11/5/2015    [facility] CATH LAB    HEART CATH-LEFT Left 11/2/2015    [facility] CATH [facility]    HEART CATH-LEFT/pci stent lad Left 11/10/2015    [facility] CATH LAB    INNER EAR SURGERY      LAMINECTOMY, SPINE, LUMBAR  5/25/2019    [facility]    TONSILLECTOMY, ADENOIDECTOMY         Time Tracking:     PT Received On: 06/13/19  PT Start Time: 1106     PT Stop Time: 1130  PT Total Time (min): 24 min     Billable Minutes: Evaluation 14 and Therapeutic Activity 10      Kristen Lala, PT  06/13/2019

## 2019-06-13 NOTE — PLAN OF CARE
Met with patient in room to complete initial assessment and readmission questionnaire. Presented to hospital from [facility] and Dr. Nicolas's office.  Pt with c/o back pain with hx of L1-L4 decompression and fusion for osteomyelitis diskitis, s/p I&D of spine.  Prior to [facility], lives with  in one floor home which she can access by ramp.  Her understanding of discharge plan is to return to [facility].    Transitional Care Folder, Discharge Planning Begins on Admission pamphlet, [facility] Bedside Delivery pamphlet, Advance Directive information given to patient.    No pharmacies listed  PCP: Anna Penn MD  Payor: [employer]      06/13/19 0721   Readmission Questionnaire   At the time of your discharge, did someone talk to you about what your health problems were? Yes   At the time of discharge, did someone talk to you about what to watch out for regarding worsening of your health problem? Yes   At the time of discharge, did someone talk to you about what to do if you experienced worsening of your health problem? Yes   At the time of discharge, did someone talk to you about which medication to take when you left the hospital and which ones to stop taking? Yes   At the time of discharge, did someone talk to you about when and where to follow up with a doctor after you left the hospital? Yes   What do you believe caused you to be sick enough to be re-admitted?   (Drainage from operative site)   How often do you need to have someone help you when you read instructions, pamphlets, or other written material from your doctor or pharmacy? Rarely   Do you have problems taking your medications as prescribed? No   Do you have any problems affording any of  your prescribed medications? No   Do you have problems obtaining/receiving your medications? No   Does the patient have transportation to healthcare appointments? Yes   Lives With spouse   Living Arrangements house  (one story home)   Does  the patient have family/friends to help with healtcare needs after discharge? other (comments)  (Has limited caregiver support)   Who are your caregiver(s) and their phone number(s)? Jean-Pierre Isbell [phone]   Does your caregiver provide all the help you need? No   If no, what kind of help do you need at home?   (limited caregiver support)   Are you currently feeling confused? No   Are you currently having problems thinking? No   Are you currently having memory problems? No   Have you felt down, depressed, or hopeless? 0   Have you felt little interest or pleasure in doing things? 0   In the last 7 days, my sleep quality was: poor

## 2019-06-13 NOTE — SUBJECTIVE & OBJECTIVE
Past Medical History:   Diagnosis Date    Abnormal nuclear stress test 11/1/2015    Anemia 6/1/2019    Background diabetic retinopathy 12/28/2015    CAD (coronary artery disease) 2002    stents    Cataract     Gait disorder 10/12/2012    Glaucoma     History of PTCA 6/1/2019    MI (myocardial infarction)     Obesity     CORINE (obstructive sleep apnea)     Other and unspecified hyperlipidemia     PD (Parkinson's disease) 2002    tremor-predominant (per patient)    Type II or unspecified type diabetes mellitus without mention of complication, not stated as uncontrolled 2002      am    Unspecified essential hypertension        Past Surgical History:   Procedure Laterality Date    APPLICATION, WOUND VAC N/A 6/11/2019    [facility] at [facility]    BACK SURGERY  05/25/2019    BREAST BIOPSY Left     benign - about 3 yrs ago    CORONARY ANGIOPLASTY  2002    CORONARY STENT PLACEMENT  2002    DEBRIDEMENT, ABSCESS, PARASPINAL N/A 5/25/2019    [facility] at [facility]    DEBRIDEMENT, WOUND Bilateral 6/11/2019    Performed by [facility] at [facility]    DILATION AND CURETTAGE OF UTERUS      FOREIGN BODY REMOVAL      FUSION, SPINE, LUMBAR, TLIF, POSTERIOR APPROACH, USING PEDICLE SCREW N/A 5/25/2019    Performed by [facility] at [facility] OR    FUSION, SPINE, POSTERIOR SPINAL COLUMN, LUMBAR, USING COMPUTER-ASSISTED NAVIGATION  5/25/2019    Performed by [facility] at [facility] OR    HEART CATH-LEFT Left 11/5/2015    Performed by Constance Pace MD at [facility] CATH LAB    HEART CATH-LEFT Left 11/2/2015    Performed by [facility] at [facility] CATH LAB    HEART CATH-LEFT/pci stent lad Left 11/10/2015    Performed by Con[facility] MD at [facility] CATH LAB    INNER EAR SURGERY      LAMINECTOMY, SPINE, LUMBAR  5/25/2019    Performed by [facility] at [facility] OR    TONSILLECTOMY, ADENOIDECTOMY         Review of patient's allergies indicates:    Allergen Reactions    Codeine      Other reaction(s): Nausea.  Patient tolerated morphine 5/2019 with no reported problems.     Codeine sulfate      Unknown^    Diphenhydramine hcl      Unknown^  Other reaction(s): Rash    Sulfa (sulfonamide antibiotics) Hives and Nausea And Vomiting    Penicillins Rash     Rash only as a child  Tolerated ceftriaxone 4/30/19  Tolerated piperacillin-tazobactam 5/23/19       Medications:  Medications Prior to Admission   Medication Sig    acetaminophen (TYLENOL) 325 MG tablet Take 2 tablets (650 mg total) by mouth every 6 (six) hours as needed.    aspirin (ECOTRIN) 81 MG EC tablet Take 1 tablet (81 mg total) by mouth once daily.    carbidopa-levodopa  mg (SINEMET)  mg per tablet TAKE 1 TABLET BY MOUTH 5 TIMES DAILY    cefTRIAXone 2 g in dextrose 5 % 50 mL (ROCEPHIN) 2 g/50 mL PgBk IVPB Inject 50 mLs (2 g total) into the vein once daily.    isosorbide mononitrate (IMDUR) 120 MG 24 hr tablet Take 1 tablet (120 mg total) by mouth once daily.    levothyroxine (SYNTHROID) 125 MCG tablet TAKE 1 TABLET BY MOUTH IN THE MORNING BEFORE BREAKFAST    losartan (COZAAR) 50 MG tablet TAKE 1 TABLET BY MOUTH ONCE DAILY    metFORMIN (GLUCOPHAGE) 500 MG tablet TAKE ONE TABLET BY MOUTH THREE TIMES DAILY    metoprolol succinate (TOPROL-XL) 25 MG 24 hr tablet TAKE 1 TABLET BY MOUTH ONCE DAILY    multivit-min-FA-lycopen-lutein (CENTRUM SILVER) 0.4-300-250 mg-mcg-mcg Tab Take 1 tablet by mouth once daily.    NOVOLIN 70/30 U-100 INSULIN 100 unit/mL (70-30) injection Inject 55 Units into the skin 3 (three) times daily.    oxyCODONE-acetaminophen (PERCOCET) 5-325 mg per tablet Take 1 tablet by mouth every 4 (four) hours as needed for Pain (pain 4-5/10).    pantoprazole (PROTONIX) 40 MG tablet Take 1 tablet (40 mg total) by mouth once daily.    pramipexole (MIRAPEX) 1 MG tablet TAKE ONE TABLET BY MOUTH THREE TIMES DAILY    pravastatin (PRAVACHOL) 40 MG tablet TAKE ONE TABLET BY  MOUTH ONCE DAILY    ACCU-CHEK SOFTCLIX LANCETS Misc TEST THREE TIMES DAILY    blood sugar diagnostic Strp 1 strip by Misc.(Non-Drug; Combo Route) route 3 (three) times daily. Accuchek Felicia Plus Test Strips    blood-glucose meter kit Accu-chek Felicia Plus Meter    insulin syringe-needle U-100 0.5 mL 31 gauge x 5/16 Syrg USE THREE TIMES DAILY    latanoprost 0.005 % ophthalmic solution INSTILL 1 DROP INTO EACH EYE IN THE EVENING    miconazole NITRATE 2 % (MICOTIN) 2 % top powder Apply topically 2 (two) times daily.     Antibiotics (From admission, onward)    Start     Stop Route Frequency Ordered    06/13/19 1730  vancomycin (VANCOCIN) 2,750 mg in dextrose 5 % 500 mL IVPB      -- IV Every 24 hours (non-standard times) 06/12/19 2200        Antifungals (From admission, onward)    Start     Stop Route Frequency Ordered    06/13/19 1200  micafungin 100 mg in sodium chloride 0.9 % 100 mL IVPB (ready to mix system)      -- IV Every 24 hours (non-standard times) 06/13/19 1059    06/11/19 1200  miconazole nitrate 2% ointment      -- Top 2 times daily 06/11/19 1048        Antivirals (From admission, onward)    None           Immunization History   Administered Date(s) Administered    Influenza 10/30/2007, 10/29/2009, 12/02/2010    Influenza - High Dose 01/05/2012, 10/31/2012, 10/23/2014, 10/19/2016, 11/22/2017, 10/05/2018    Pneumococcal Conjugate - 13 Valent 11/19/2015    Pneumococcal Polysaccharide - 23 Valent 12/02/2010    Tdap 11/19/2015       Family History     Problem Relation (Age of Onset)    Breast cancer Paternal Grandmother    Diabetes Maternal Grandmother    Glaucoma Mother, Maternal Grandmother    Heart attack Father (65), Mother (65)    Hypertension Father, Mother, Maternal Grandmother    Ovarian cancer Mother    Parkinsonism Other, Paternal Aunt    Tremor Father        Social History     Socioeconomic History    Marital status:      Spouse name: Not on file    Number of children: Not on file     Years of education: Not on file    Highest education level: Not on file   Occupational History    Not on file   Social Needs    Financial resource strain: Not on file    Food insecurity:     Worry: Not on file     Inability: Not on file    Transportation needs:     Medical: Not on file     Non-medical: Not on file   Tobacco Use    Smoking status: Former Smoker     Years: 40.00     Last attempt to quit: 10/12/2010     Years since quittin.6    Smokeless tobacco: Never Used    Tobacco comment: On & off for the past few years   Substance and Sexual Activity    Alcohol use: No     Alcohol/week: 0.0 oz    Drug use: No    Sexual activity: Not Currently   Lifestyle    Physical activity:     Days per week: Not on file     Minutes per session: Not on file    Stress: Not on file   Relationships    Social connections:     Talks on phone: Not on file     Gets together: Not on file     Attends Mandaeism service: Not on file     Active member of club or organization: Not on file     Attends meetings of clubs or organizations: Not on file     Relationship status: Not on file   Other Topics Concern    Not on file   Social History Narrative     . Lives with spouse. Has 2 children. 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     Review of Systems   Constitutional: Positive for activity change, appetite change, fatigue and unexpected weight change. Negative for chills, diaphoresis and fever.   HENT: Negative for congestion, dental problem, drooling, postnasal drip, rhinorrhea and voice change.    Eyes: Negative for discharge.   Respiratory: Negative for apnea, cough, choking, chest tightness, shortness of breath, wheezing and stridor.    Cardiovascular: Negative for chest pain, palpitations and leg swelling.   Gastrointestinal: Negative for abdominal distention, blood in stool, constipation, diarrhea, nausea, rectal pain and vomiting.   Endocrine: Negative for cold intolerance, heat intolerance,  polydipsia and polyuria.   Genitourinary: Negative for decreased urine volume, difficulty urinating, dysuria, enuresis, flank pain, frequency, hematuria and urgency.   Musculoskeletal: Negative for arthralgias, back pain, gait problem and joint swelling.   Skin: Negative for rash.   Allergic/Immunologic: Negative for food allergies and immunocompromised state.   Neurological: Negative for dizziness, tremors, syncope, numbness and headaches.   Hematological: Does not bruise/bleed easily.   Psychiatric/Behavioral: Negative for agitation, behavioral problems and self-injury. The patient is not nervous/anxious and is not hyperactive.    All other systems reviewed and are negative.    Objective:     Vital Signs (Most Recent):  Temp: 96.2 °F (35.7 °C) (06/13/19 0757)  Pulse: (!) 115 (06/13/19 0757)  Resp: 18 (06/13/19 0757)  BP: (!) 167/74 (06/13/19 0757)  SpO2: (!) 94 % (06/13/19 0757) Vital Signs (24h Range):  Temp:  [96.2 °F (35.7 °C)-97.7 °F (36.5 °C)] 96.2 °F (35.7 °C)  Pulse:  [] 115  Resp:  [18-19] 18  SpO2:  [94 %-100 %] 94 %  BP: (115-167)/(56-74) 167/74     Weight: 98.8 kg (217 lb 13 oz)  Body mass index is 35.16 kg/m².    Estimated Creatinine Clearance: 74.3 mL/min (based on SCr of 0.8 mg/dL).    Physical Exam   Constitutional: She is oriented to person, place, and time. She appears well-developed and well-nourished. She is cooperative. She has a sickly appearance.   HENT:   Mouth/Throat: Oropharynx is clear and moist.   Eyes: Pupils are equal, round, and reactive to light. Conjunctivae and EOM are normal.   Neck: Normal range of motion. Neck supple.   Cardiovascular: Normal rate, regular rhythm, normal heart sounds and intact distal pulses.   Pulmonary/Chest: Effort normal and breath sounds normal.   Abdominal: Soft. Bowel sounds are normal.   Genitourinary: Vagina normal.   Musculoskeletal: She exhibits no edema.   L/S wound vac in place   Neurological: She is alert and oriented to person, place, and  time. She displays tremor. GCS eye subscore is 4. GCS verbal subscore is 5.   Skin: Skin is warm and dry. Capillary refill takes less than 2 seconds.   Psychiatric: She has a normal mood and affect.   Nursing note and vitals reviewed.      Significant Labs:   Wound Culture:   Recent Labs   Lab 05/24/19  1540 06/11/19  1434   LABAERO No growth YEAST   Few  Identification pending       All pertinent labs within the past 24 hours have been reviewed.    Significant Imaging: I have reviewed all pertinent imaging results/findings within the past 24 hours.

## 2019-06-13 NOTE — CONSULTS
[facility]  Infectious Disease  Consult Note    Patient Name: Ross Isbell  MRN: [id number]  Admission Date: 6/10/2019  Hospital Length of Stay: 2 days  Attending Physician: Hemanth العراقي MD  Primary Care Provider: Anna Penn MD     Isolation Status: No active isolations    Patient information was obtained from patient, past medical records and ER records.      Consults  Assessment/Plan:     * Osteomyelitis  06/11- all cultures reviewed.  )6/11- yeast   04/30- strep    She is on Vancomycin , will follow new cultures and will plan to switch to Rocephin depending ob cultures.  Will add Micafungin until ID of yeast is known    PD (Parkinson's disease)  06/11- will continue supportive care         Thank you for your consult. I will follow-up with patient. Please contact us if you have any additional questions.  -late entry note  Dada Gomez MD  Infectious Disease  [facility]    Subjective:     Principal Problem: Osteomyelitis    HPI: 73 year old woman with history of lumbar osteomyelitis with strep.  She was started on therapy with Rocephin but has noticed worsening drainage over the lumbar area. She was seen by Neurosurgery and I and D was done.  Since presentation, CT scan of the lumbar region showed- A pattern of erosive destructive change of the endplates at L2-3 with relative sparing of the disc space suggest changes of discitis in this postoperative patient.  Additionally a rim enhancing fluid collection in the right psoas muscle adjacent to the disc may represent a focal area of abscess.  It measures 2.8 cm in by 1.4 x 1.7 cm.  She was seen by neurosurgery and on 06/12 ,had I and D done .      Past Medical History:   Diagnosis Date    Abnormal nuclear stress test 11/1/2015    Anemia 6/1/2019    Background diabetic retinopathy 12/28/2015    CAD (coronary artery disease) 2002    stents    Cataract     Gait disorder 10/12/2012    Glaucoma     History of PTCA  6/1/2019    MI (myocardial infarction)     Obesity     CORINE (obstructive sleep apnea)     Other and unspecified hyperlipidemia     PD (Parkinson's disease) 2002    tremor-predominant (per patient)    Type II or unspecified type diabetes mellitus without mention of complication, not stated as uncontrolled 2002      am    Unspecified essential hypertension        Past Surgical History:   Procedure Laterality Date    APPLICATION, WOUND VAC N/A 6/11/2019    [facility]    BACK SURGERY  05/25/2019    BREAST BIOPSY Left     benign - about 3 yrs ago    CORONARY ANGIOPLASTY  2002    CORONARY STENT PLACEMENT  2002    DEBRIDEMENT, ABSCESS, PARASPINAL N/A 5/25/2019    Performed by [facility] at [facility] OR    DEBRIDEMENT, WOUND Bilateral 6/11/2019    Performed by [facility] at [facility] OR    DILATION AND CURETTAGE OF UTERUS      FOREIGN BODY REMOVAL      FUSION, SPINE, LUMBAR, TLIF, POSTERIOR APPROACH, USING PEDICLE SCREW N/A 5/25/2019    Performed by [facility] MD at [facility] OR    FUSION, SPINE, POSTERIOR SPINAL COLUMN, LUMBAR, USING COMPUTER-ASSISTED NAVIGATION  5/25/2019    Performed by [facility] MD at [facility] OR    HEART CATH-LEFT Left 11/5/2015    Performed by Constance Pace MD at [facility] CATH LAB    HEART CATH-LEFT Left 11/2/2015    Performed by Con[facility] Pace MD at [facility] CATH LAB    HEART CATH-LEFT/pci stent lad Left 11/10/2015    Performed by Con[facility]ace MD at [facility] CATH LAB    INNER EAR SURGERY      LAMINECTOMY, SPINE, LUMBAR  5/25/2019    Performed by [facility] MD at [facility] OR    TONSILLECTOMY, ADENOIDECTOMY         Review of patient's allergies indicates:   Allergen Reactions    Codeine      Other reaction(s): Nausea.  Patient tolerated morphine 5/2019 with no reported problems.     Codeine sulfate      Unknown^    Diphenhydramine hcl      Unknown^  Other reaction(s): Rash    Sulfa (sulfonamide antibiotics) Hives and Nausea  And Vomiting    Penicillins Rash     Rash only as a child  Tolerated ceftriaxone 4/30/19  Tolerated piperacillin-tazobactam 5/23/19       Medications:  Medications Prior to Admission   Medication Sig    acetaminophen (TYLENOL) 325 MG tablet Take 2 tablets (650 mg total) by mouth every 6 (six) hours as needed.    aspirin (ECOTRIN) 81 MG EC tablet Take 1 tablet (81 mg total) by mouth once daily.    carbidopa-levodopa  mg (SINEMET)  mg per tablet TAKE 1 TABLET BY MOUTH 5 TIMES DAILY    cefTRIAXone 2 g in dextrose 5 % 50 mL (ROCEPHIN) 2 g/50 mL PgBk IVPB Inject 50 mLs (2 g total) into the vein once daily.    isosorbide mononitrate (IMDUR) 120 MG 24 hr tablet Take 1 tablet (120 mg total) by mouth once daily.    levothyroxine (SYNTHROID) 125 MCG tablet TAKE 1 TABLET BY MOUTH IN THE MORNING BEFORE BREAKFAST    losartan (COZAAR) 50 MG tablet TAKE 1 TABLET BY MOUTH ONCE DAILY    metFORMIN (GLUCOPHAGE) 500 MG tablet TAKE ONE TABLET BY MOUTH THREE TIMES DAILY    metoprolol succinate (TOPROL-XL) 25 MG 24 hr tablet TAKE 1 TABLET BY MOUTH ONCE DAILY    multivit-min-FA-lycopen-lutein (CENTRUM SILVER) 0.4-300-250 mg-mcg-mcg Tab Take 1 tablet by mouth once daily.    NOVOLIN 70/30 U-100 INSULIN 100 unit/mL (70-30) injection Inject 55 Units into the skin 3 (three) times daily.    oxyCODONE-acetaminophen (PERCOCET) 5-325 mg per tablet Take 1 tablet by mouth every 4 (four) hours as needed for Pain (pain 4-5/10).    pantoprazole (PROTONIX) 40 MG tablet Take 1 tablet (40 mg total) by mouth once daily.    pramipexole (MIRAPEX) 1 MG tablet TAKE ONE TABLET BY MOUTH THREE TIMES DAILY    pravastatin (PRAVACHOL) 40 MG tablet TAKE ONE TABLET BY MOUTH ONCE DAILY    ACCU-CHEK SOFTCLIX LANCETS Misc TEST THREE TIMES DAILY    blood sugar diagnostic Strp 1 strip by Misc.(Non-Drug; Combo Route) route 3 (three) times daily. Accuchek Felicia Plus Test Strips    blood-glucose meter kit Accu-chek Felicia Plus Meter    insulin  syringe-needle U-100 0.5 mL 31 gauge x 5/16 Syrg USE THREE TIMES DAILY    latanoprost 0.005 % ophthalmic solution INSTILL 1 DROP INTO EACH EYE IN THE EVENING    miconazole NITRATE 2 % (MICOTIN) 2 % top powder Apply topically 2 (two) times daily.     Antibiotics (From admission, onward)    Start     Stop Route Frequency Ordered    06/13/19 1730  vancomycin (VANCOCIN) 2,750 mg in dextrose 5 % 500 mL IVPB      -- IV Every 24 hours (non-standard times) 06/12/19 2200        Antifungals (From admission, onward)    Start     Stop Route Frequency Ordered    06/13/19 1200  micafungin 100 mg in sodium chloride 0.9 % 100 mL IVPB (ready to mix system)      -- IV Every 24 hours (non-standard times) 06/13/19 1059    06/11/19 1200  miconazole nitrate 2% ointment      -- Top 2 times daily 06/11/19 1048        Antivirals (From admission, onward)    None           Immunization History   Administered Date(s) Administered    Influenza 10/30/2007, 10/29/2009, 12/02/2010    Influenza - High Dose 01/05/2012, 10/31/2012, 10/23/2014, 10/19/2016, 11/22/2017, 10/05/2018    Pneumococcal Conjugate - 13 Valent 11/19/2015    Pneumococcal Polysaccharide - 23 Valent 12/02/2010    Tdap 11/19/2015       Family History     Problem Relation (Age of Onset)    Breast cancer Paternal Grandmother    Diabetes Maternal Grandmother    Glaucoma Mother, Maternal Grandmother    Heart attack Father (65), Mother (65)    Hypertension Father, Mother, Maternal Grandmother    Ovarian cancer Mother    Parkinsonism Other, Paternal Aunt    Tremor Father        Social History     Socioeconomic History    Marital status:      Spouse name: Not on file    Number of children: Not on file    Years of education: Not on file    Highest education level: Not on file   Occupational History    Not on file   Social Needs    Financial resource strain: Not on file    Food insecurity:     Worry: Not on file     Inability: Not on file    Transportation needs:      Medical: Not on file     Non-medical: Not on file   Tobacco Use    Smoking status: Former Smoker     Years: 40.00     Last attempt to quit: 10/12/2010     Years since quittin.6    Smokeless tobacco: Never Used    Tobacco comment: On & off for the past few years   Substance and Sexual Activity    Alcohol use: No     Alcohol/week: 0.0 oz    Drug use: No    Sexual activity: Not Currently   Lifestyle    Physical activity:     Days per week: Not on file     Minutes per session: Not on file    Stress: Not on file   Relationships    Social connections:     Talks on phone: Not on file     Gets together: Not on file     Attends Denominational service: Not on file     Active member of club or organization: Not on file     Attends meetings of clubs or organizations: Not on file     Relationship status: Not on file   Other Topics Concern    Not on file   Social History Narrative     . Lives with spouse. Has 2 children. 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     Review of Systems   Constitutional: Positive for activity change, appetite change, fatigue and unexpected weight change. Negative for chills, diaphoresis and fever.   HENT: Negative for congestion, dental problem, drooling, postnasal drip, rhinorrhea and voice change.    Eyes: Negative for discharge.   Respiratory: Negative for apnea, cough, choking, chest tightness, shortness of breath, wheezing and stridor.    Cardiovascular: Negative for chest pain, palpitations and leg swelling.   Gastrointestinal: Negative for abdominal distention, blood in stool, constipation, diarrhea, nausea, rectal pain and vomiting.   Endocrine: Negative for cold intolerance, heat intolerance, polydipsia and polyuria.   Genitourinary: Negative for decreased urine volume, difficulty urinating, dysuria, enuresis, flank pain, frequency, hematuria and urgency.   Musculoskeletal: Negative for arthralgias, back pain, gait problem and joint swelling.   Skin: Negative  for rash.   Allergic/Immunologic: Negative for food allergies and immunocompromised state.   Neurological: Negative for dizziness, tremors, syncope, numbness and headaches.   Hematological: Does not bruise/bleed easily.   Psychiatric/Behavioral: Negative for agitation, behavioral problems and self-injury. The patient is not nervous/anxious and is not hyperactive.    All other systems reviewed and are negative.    Objective:     Vital Signs (Most Recent):  Temp: 96.2 °F (35.7 °C) (06/13/19 0757)  Pulse: (!) 115 (06/13/19 0757)  Resp: 18 (06/13/19 0757)  BP: (!) 167/74 (06/13/19 0757)  SpO2: (!) 94 % (06/13/19 0757) Vital Signs (24h Range):  Temp:  [96.2 °F (35.7 °C)-97.7 °F (36.5 °C)] 96.2 °F (35.7 °C)  Pulse:  [] 115  Resp:  [18-19] 18  SpO2:  [94 %-100 %] 94 %  BP: (115-167)/(56-74) 167/74     Weight: 98.8 kg (217 lb 13 oz)  Body mass index is 35.16 kg/m².    Estimated Creatinine Clearance: 74.3 mL/min (based on SCr of 0.8 mg/dL).    Physical Exam   Constitutional: She is oriented to person, place, and time. She appears well-developed and well-nourished. She is cooperative. She has a sickly appearance.   HENT:   Mouth/Throat: Oropharynx is clear and moist.   Eyes: Pupils are equal, round, and reactive to light. Conjunctivae and EOM are normal.   Neck: Normal range of motion. Neck supple.   Cardiovascular: Normal rate, regular rhythm, normal heart sounds and intact distal pulses.   Pulmonary/Chest: Effort normal and breath sounds normal.   Abdominal: Soft. Bowel sounds are normal.   Genitourinary: Vagina normal.   Musculoskeletal: She exhibits no edema.   L/S wound vac in place   Neurological: She is alert and oriented to person, place, and time. She displays tremor. GCS eye subscore is 4. GCS verbal subscore is 5.   Skin: Skin is warm and dry. Capillary refill takes less than 2 seconds.   Psychiatric: She has a normal mood and affect.   Nursing note and vitals reviewed.      Significant Labs:   Wound Culture:    Recent Labs   Lab 05/24/19  1540 06/11/19  1434   LABAERO No growth YEAST   Few  Identification pending       All pertinent labs within the past 24 hours have been reviewed.    Significant Imaging: I have reviewed all pertinent imaging results/findings within the past 24 hours.

## 2019-06-13 NOTE — PLAN OF CARE
Advised patient of rights to appeal , patient received written notification of discharge appeal . Patient signed and dated .

## 2019-06-13 NOTE — PROGRESS NOTES
Pharmacokinetic Follow Up Assessment: IV Vancomycin    Assessment/Plan:    Change Vancomycin dosing to 1,250 mg every 12 hours (beginning 18 hours after 2,500 mg dose)   Desired empiric serum trough concentration is 15 to 20 mcg/mL.  Draw vancomycin trough level 30 min prior to third dose on 6/14 at approximately 1300  Pharmacy will continue to follow and monitor vancomycin.      Please contact pharmacy at extension [phone] with any questions regarding this assessment.     Thank you for the consult,   Danii Mrati     Patient brief summary:  Ross Isbell is a 73 y.o. female initiated on antimicrobial therapy with IV Vancomycin for treatment of suspected bone/joint    Drug Allergies:   Review of patient's allergies indicates:   Allergen Reactions    Codeine      Other reaction(s): Nausea.  Patient tolerated morphine 5/2019 with no reported problems.     Codeine sulfate      Unknown^    Diphenhydramine hcl      Unknown^  Other reaction(s): Rash    Sulfa (sulfonamide antibiotics) Hives and Nausea And Vomiting    Penicillins Rash     Rash only as a child  Tolerated ceftriaxone 4/30/19  Tolerated piperacillin-tazobactam 5/23/19       Actual Body Weight:   98.8 kg    Renal Function:   Estimated Creatinine Clearance: 74.3 mL/min (based on SCr of 0.8 mg/dL).,       CBC (last 72 hours):  Recent Labs   Lab Result Units 06/10/19  1440 06/11/19  0515 06/12/19  0617 06/13/19  0436   WBC K/uL 10.63 7.51 5.66 8.09   Hemoglobin g/dL 11.1* 10.1* 10.4* 10.4*   Hematocrit % 34.1* 32.0* 32.9* 33.6*   Platelets K/uL 397* 411* 422* 428*   Gran% % 77.7* 58.8 80.3* 62.0   Lymph% % 12.8* 26.0 14.8* 26.8   Mono% % 8.3 11.6 4.9 10.0   Eosinophil% % 1.1 3.3 0.0 1.0   Basophil% % 0.1 0.3 0.0 0.2   Differential Method  Automated Automated Automated Automated       Metabolic Panel (last 72 hours):  Recent Labs   Lab Result Units 06/10/19  1440 06/11/19  0455 06/11/19  0515 06/12/19  0617 06/13/19  0436   Sodium mmol/L 128*  --  127*  131* 133*   Potassium mmol/L 4.7  --  4.5 4.8 4.1   Chloride mmol/L 92*  --  93* 94* 97   CO2 mmol/L 20*  --  23 26 27   Glucose mg/dL 213*  --  122* 208* 135*   Glucose, UA   --  Negative  --   --   --    BUN, Bld mg/dL 13  --  12 10 12   Creatinine mg/dL 0.9  --  0.7 0.8 0.8   Albumin g/dL 3.0*  --  2.8* 3.0* 2.9*   Total Bilirubin mg/dL 0.4  --  0.6 0.4 0.4   Alkaline Phosphatase U/L 100  --  98 102 93   AST U/L 33  --  20 21 19   ALT U/L 6*  --  5* 9* <5*       Drug levels (last 3 results):  Recent Labs   Lab Result Units 06/12/19  1900   Vancomycin-Trough ug/mL 11.4       Microbiologic Results:  Microbiology Results (last 7 days)     Procedure Component Value Units Date/Time    Aerobic culture [[id number]] Collected:  06/11/19 1434    Order Status:  Completed Specimen:  Abscess from Back Updated:  06/13/19 1229     Aerobic Bacterial Culture --     CANDIDA ALBICANS  Few      AFB Culture & Smear [[id number]] Collected:  06/11/19 1434    Order Status:  Completed Specimen:  Abscess from Back Updated:  06/13/19 0927     AFB Culture & Smear Culture in progress     AFB CULTURE STAIN No acid fast bacilli seen.    Culture, Anaerobe [[id number]] Collected:  06/11/19 1434    Order Status:  Completed Specimen:  Abscess from Back Updated:  06/13/19 0756     Anaerobic Culture Culture in progress    Blood Culture #2 **CANNOT BE ORDERED STAT** [[id number]] Collected:  06/10/19 1440    Order Status:  Completed Specimen:  Blood from Peripheral, Antecubital, Right Updated:  06/12/19 2212     Blood Culture, Routine No Growth to date     Blood Culture, Routine No Growth to date     Blood Culture, Routine No Growth to date    Blood Culture #1 **CANNOT BE ORDERED STAT** [[id number]] Collected:  06/10/19 1526    Order Status:  Completed Specimen:  Blood from Peripheral, Forearm, Right Updated:  06/12/19 2212     Blood Culture, Routine No Growth to date     Blood Culture, Routine No Growth to date     Blood Culture, Routine No Growth to  date    Gram stain [[id number]] Collected:  06/11/19 1434    Order Status:  Completed Specimen:  Abscess from Back Updated:  06/11/19 2329     Gram Stain Result Few WBC's      No organisms seen    Fungus culture [[id number]] Collected:  06/11/19 1434    Order Status:  Sent Specimen:  Abscess from Back Updated:  06/11/19 1609

## 2019-06-13 NOTE — PLAN OF CARE
06/13/19 1621   Discharge Assessment   Assessment Type Discharge Planning Assessment   Confirmed/corrected address and phone number on facesheet? Yes   Assessment information obtained from? Patient   Expected Length of Stay (days)   (To be determined)   Communicated expected length of stay with patient/caregiver yes   Prior to hospitilization cognitive status: Alert/Oriented   Prior to hospitalization functional status: Assistive Equipment;Needs Assistance;Partially Dependent   Current cognitive status: Alert/Oriented   Current Functional Status: Assistive Equipment;Needs Assistance;Partially Dependent   Facility Arrived From:   (Dr. Nicolas's office)   Lives With spouse   Able to Return to Prior Arrangements no   Is patient able to care for self after discharge? No   Who are your caregiver(s) and their phone number(s)? Jean-Pierre Isbell, spouse [phone]   Uzma Toth, daughter  [phone]   Patient's perception of discharge disposition   (Return to [facility])   Readmission Within the Last 30 Days other (see comments)  (Drainage from operative incision with Osteomyelitis)   Patient currently being followed by outpatient case management? No   Patient currently receives any other outside agency services? No   Do you have any problems affording any of your prescribed medications? No   Is the patient taking medications as prescribed? yes   Does the patient have transportation home? Yes   Transportation Anticipated family or friend will provide   Dialysis Name and Scheduled days   (N/A)   Does the patient receive services at the [facility]? No   Discharge Plan A Skilled Nursing Facility   DME Needed Upon Discharge  none;rollator;shower chair;wheelchair;grab bar;bedside commode;oxygen;lift device   Patient/Family in Agreement with Plan yes   Readmission Questionnaire   Living Arrangements house  (One story home accessible by ramp)   At the time of discharge, did someone talk to you about what to watch out for  regarding worsening of your health problem? Yes   Have you felt down, depressed, or hopeless? 0   Have you felt little interest or pleasure in doing things? 0   At the time of your discharge, did someone talk to you about what your health problems were? Yes   At the time of discharge, did someone talk to you about what to do if you experienced worsening of your health problem? Yes   At the time of discharge, did someone talk to you about which medication to take when you left the hospital and which ones to stop taking? Yes   At the time of discharge, did someone talk to you about when and where to follow up with a doctor after you left the hospital? Yes   What do you believe caused you to be sick enough to be re-admitted? drainage from operative site   How often do you need to have someone help you when you read instructions, pamphlets, or other written material from your doctor or pharmacy? Rarely   Do you have problems taking your medications as prescribed? No   Do you have problems obtaining/receiving your medications? No   Does your caregiver provide all the help you need? No  (limited caregiver support)   Are you currently feeling confused? No   Are you currently having problems thinking? No   Are you currently having memory problems? No   In the last 7 days, my sleep quality was: poor   Do you have any problems affording any of  your prescribed medications? No

## 2019-06-13 NOTE — PT/OT/SLP EVAL
Occupational Therapy   Evaluation    Name: Ross Isbell  MRN: [id number]  Admitting Diagnosis:  Osteomyelitis 2 Days Post-Op    Recommendations:     Discharge Recommendations: nursing facility, skilled  Discharge Equipment Recommendations:  commode, tub bench(tbd)  Barriers to discharge:  Decreased caregiver support    Assessment:     Ross Isbell is a 73 y.o. female with a medical diagnosis of Osteomyelitis.  She presents with debility  And generalized weakness. Performance deficits affecting function: weakness, impaired functional mobilty, decreased safety awareness, gait instability, impaired endurance, impaired balance, decreased upper extremity function, impaired self care skills, decreased lower extremity function.      Rehab Prognosis: Fair; patient would benefit from acute skilled OT services to address these deficits and reach maximum level of function.       Plan:     Patient to be seen 3 x/week to address the above listed problems via self-care/home management, therapeutic activities, therapeutic exercises  · Plan of Care Expires: 06/20/19  · Plan of Care Reviewed with: patient    Subjective     Chief Complaint: *Patient/Family Comments/goals: debility and generalized weakness    Occupational Profile:  Living Environment: lives home with spouse Previous level of function: assistance with adl's and mod (I) with functional t/f's  Roles and Routines: occupational therapy  Equipment Used at Home:  wheelchair, walker, rolling, hospital bed  Assistance upon Discharge:   Pain/Comfort:  · Pain Rating 1: 10/10  · Location - Side 1: Right  · Location - Orientation 1: lower  · Location 1: abdomen    Patients cultural, spiritual, [employer]ism conflicts given the current situation:      Objective:     Communicated with: nurse fritz and [facility] chart review prior to session.  Patient found right sidelying with telemetry, peripheral IV, wound vac, oxygen, bed alarm upon OT entry to room.    General Precautions:  Standard, fall   Orthopedic Precautions:N/A   Braces: LSO     Occupational Performance:    Bed Mobility:    · Patient completed Rolling/Turning to Left with  maximal assistance and with side rail  · Patient completed Rolling/Turning to Right with maximal assistance and with side rail  · Patient completed Scooting/Bridging with maximal assistance  · Patient completed Supine to Sit with maximal assistance and with side rail  · Patient completed Sit to Supine with maximal assistance and with side rail      Activities of Daily Living:  · Upper Body Dressing: maximal assistance .  · Lower Body Dressing: total assistance .  · Toileting: total assistance . valentine placement    Cognitive/Visual Perceptual:  Cognitive/Psychosocial Skills:     -       Oriented to: Person, Place, Time and Situation   -       Follows Commands/attention:Follows two-step commands  -       Communication: slow and slurred hard to understand  -       Memory: No Deficits noted  -       Safety awareness/insight to disability: impaired   Visual/Perceptual:  -. .    Physical Exam:  Upper Extremity Range of Motion:     -       Right Upper Extremity: wfl aarom  -       Left Upper Extremity: wfl aarom  Upper Extremity Strength:    -       Right Upper Extremity: mmt: 2+/5 grossly  -       Left Upper Extremity: mmt: 2+/5 grossly   Strength:    -       Right Upper Extremity: mmt: 3/5 grossly  -       Left Upper Extremity: mmt: 3/5 grossly    AMPAC 6 Click ADL:  AMPAC Total Score: 12    Treatment & Education:    Education:    Patient left right sidelying with all lines intact, call button in reach, bed alarm on and nurse  notified    GOALS:   Multidisciplinary Problems     Occupational Therapy Goals        Problem: Occupational Therapy Goal    Goal Priority Disciplines Outcome Interventions   Occupational Therapy Goal     OT, PT/OT     Description:  ot goals to be met by 6-20-19  sba with rolling l<>r  Pt will tolerate sitting eob with fair+ sitting balance  to prep for g/h task  Mod a with [facility] t/f's                      History:     Past Medical History:   Diagnosis Date    Abnormal nuclear stress test 11/1/2015    Anemia 6/1/2019    Background diabetic retinopathy 12/28/2015    CAD (coronary artery disease) 2002    stents    Cataract     Gait disorder 10/12/2012    Glaucoma     History of PTCA 6/1/2019    MI (myocardial infarction)     Obesity     CORINE (obstructive sleep apnea)     Other and unspecified hyperlipidemia     PD (Parkinson's disease) 2002    tremor-predominant (per patient)    Type II or unspecified type diabetes mellitus without mention of complication, not stated as uncontrolled 2002      am    Unspecified essential hypertension        Past Surgical History:   Procedure Laterality Date    APPLICATION, WOUND VAC N/A 6/11/2019    [facility]    BACK SURGERY  05/25/2019    BREAST BIOPSY Left     benign - about 3 yrs ago    CORONARY ANGIOPLASTY  2002    CORONARY STENT PLACEMENT  2002    DEBRIDEMENT, ABSCESS, PARASPINAL N/A 5/25/2019    [facility] at [facility] OR    DEBRIDEMENT, WOUND Bilateral 6/11/2019    [facility] at [facility]    DILATION AND CURETTAGE OF UTERUS      FOREIGN BODY REMOVAL      FUSION, SPINE, LUMBAR, TLIF, POSTERIOR APPROACH, USING PEDICLE SCREW N/A 5/25/2019    Performed by [facility] at [facility] OR    FUSION, SPINE, POSTERIOR SPINAL COLUMN, LUMBAR, USING COMPUTER-ASSISTED NAVIGATION  5/25/2019    Performed by [facility] at [facility] OR    HEART CATH-LEFT Left 11/5/2015    Performed by Constance Pace MD at [facility] CATH LAB    HEART CATH-LEFT Left 11/2/2015    Performed by [facility]ace [facility] at [facility] CATH LAB    HEART CATH-LEFT/pci stent lad Left 11/10/2015    Performed by [facility] at [facility] CATH LAB    INNER EAR SURGERY      LAMINECTOMY, SPINE, LUMBAR  5/25/2019    Per[facility] by [facility] at [facility] OR     TONSILLECTOMY, ADENOIDECTOMY         Time Tracking:     OT Date of Treatment: 06/13/19  OT Start Time: 1450  OT Stop Time: 1528  OT Total Time (min): 38 min    Billable Minutes:Evaluation 10 minutes  Therapeutic Activity 28 minutes    Cyndie Hernandez OT  6/13/2019

## 2019-06-13 NOTE — PROGRESS NOTES
[facility] -   Neurosurgery  Progress Note    Subjective:     Interval History:   P/O day #2  Patient sitting up in bed eating her breakfast   C/o tenderness to lower back, also spasms  Wound vac in place with good seal  Carlson catheter in place  Afebrile overnight  Patient is tolerating diet however soft foods are better   (Patient reports that over the past few weeks her teeth have loosened and fell out)      History of Present Illness:  Patient is a 73-year-old female who previously had osteomyelitis and diskitis at L2-3 status post L1- L4 stabilization.  Patient presented with inferior drainage at her incision site.  Labs demonstrated increasing CRP and ESR.  She has a number of medical issues including diabetes and low albumin levels. CT scan showed new abscess to the psoas muscle on the right side at at L2-3.  Patient was consented for I and D and debridement of wound with placement of wound VAC.    Date of Procedure: 6/11/2019    Procedure: Procedure(s) (LRB):  DEBRIDEMENT, WOUND (Bilateral)  APPLICATION, WOUND VAC (N/A)    Surgeon(s) and Role:     * Stanislav Nicolas MD - Primary   Pre-Operative Diagnosis: Osteomyelitis, unspecified site, unspecified type [M86.9]  Discitis of lumbar region [M46.46]  Abscess in epidural space of lumbar spine [G06.1]   Post-Operative Diagnosis: Post-Op Diagnosis Codes:     * Osteomyelitis, unspecified site, unspecified type [M86.9]     * Discitis of lumbar region [M46.46]     * Abscess in epidural space of lumbar spine [G06.1]   Anesthesia: General   Technical Procedures Used:   1.  Subfascial and suprafascial wound debridement  2.  Placement of wound VAC   Description of the Findings of the Procedure:  Superficial and subfascial wound dehiscence       Post-Op Info:  Procedure(s) (LRB):  DEBRIDEMENT, WOUND (Bilateral)  APPLICATION, WOUND VAC (N/A)   2 Days Post-Op      Medications:  Continuous Infusions:  Scheduled Meds:   carbidopa-levodopa  mg  1 tablet  "Oral 5x Daily    enoxaparin  40 mg Subcutaneous Daily    latanoprost  1 drop Both Eyes QHS    levothyroxine  125 mcg Oral Before breakfast    losartan  50 mg Oral Daily    miconazole nitrate 2%   Topical (Top) BID    pantoprazole  40 mg Oral Daily    pramipexole  1 mg Oral TID    pravastatin  40 mg Oral Daily    vancomycin (VANCOCIN) IVPB  2,750 mg Intravenous Q24H     PRN Meds:acetaminophen, albuterol-ipratropium, cyclobenzaprine, dextrose 50%, glucagon (human recombinant), glucose, insulin aspart U-100, ondansetron, oxyCODONE-acetaminophen, promethazine (PHENERGAN) IVPB, sodium chloride 0.9%     Review of Systems     Review of Systems   Constitutional: Negative for fever.   Gastrointestinal: Negative for nausea and vomiting.   Genitourinary: Negative for decreased urine volume.   Musculoskeletal: Positive for back pain, spasms and gait problem.   Skin: Positive for wound (wound vac in place over lower back).   Psychiatric/Behavioral: Negative for behavioral problems.       Objective:     Weight: 98.8 kg (217 lb 13 oz)  Body mass index is 35.16 kg/m².  Vital Signs (Most Recent):  Temp: 96.2 °F (35.7 °C) (06/13/19 0757)  Pulse: (!) 115 (06/13/19 0757)  Resp: 18 (06/13/19 0757)  BP: (!) 167/74 (06/13/19 0757)  SpO2: (!) 94 % (06/13/19 0757) Vital Signs (24h Range):  Temp:  [96.2 °F (35.7 °C)-98.1 °F (36.7 °C)] 96.2 °F (35.7 °C)  Pulse:  [] 115  Resp:  [18-19] 18  SpO2:  [94 %-100 %] 94 %  BP: (115-167)/(56-74) 167/74                Oxygen Concentration (%):  [28] 28         Urethral Catheter 06/11/19 1415 Latex;Straight-tip 16 Fr. (Active)   Site Assessment Clean;Intact 6/12/2019  8:00 PM   Collection Container Urimeter 6/12/2019  8:00 PM   Securement Method secured to top of thigh w/ adhesive device 6/12/2019  8:00 PM   Catheter Care Performed yes 6/12/2019  8:00 PM   Reason for Continuing Urinary Catheterization Post operative 6/12/2019  8:00 PM   CAUTI Prevention Bundle StatLock in place w 1" " slack;Intact seal between catheter & drainage tubing;Drainage bag off the floor;Green sheeting clip in use;No dependent loops or kinks;Drainage bag not overfilled (<2/3 full);Drainage bag below bladder 6/12/2019  8:00 PM   Output (mL) 1425 mL 6/13/2019  1:56 AM       Neurosurgery Physical Exam     Patient is awake and alert  NAD  Wound vac in place with good seal        Nursing note and vitals reviewed  Gen:Oriented to person, place. Appears stated age.   Head:Normocephalic and atraumatic.  Nose: Nose normal.    Eyes: EOM are normal. Pupils are equal, round, and reactive to light.   Neck: Neck supple. No masses or lesions palpated  Cardiovascular: Intact distal pulses.    Abdominal: Tenderness of RLQ and LLQ  Neurological: Alert and oriented to person and place.   Psychiatric: Normal mood and affect. Behavior is normal.          Back:   Mild tenderness to palpation across upper and lower l-spine (marcellus.) Paraspinal tenderness    Present Paraspinal muscle spasms    Unable to assess, patient in bed Pain with flexion and extention    Unable to assess, patient in bed Range of motion     + marcellus Straight leg raise     Motor:   Right Right Left Left  Level Group   5 4 5 4 L2 Hip flexor (Psoas)   5 4 5 4 L3 Leg extension (Quads)   5 3 5 3 L4 Dorsiflexion & foot inversion (Tibialis Anterior)   5 4 5 4 L5 Great toe extension ( EHL)   5 3 5 3 S1 Foot eversion (Gastroc, PL & PB)     Sensation:  NL Decreased (R/L/BL) Level Sensation    X  L2 Anterio-medial thigh   X  L3 Medial thigh around knee    Decreased Marcellus L4 Medial foot    Decreased Marcellus L5 Dorsum foot    Decreased Marcellus S1 Lateral foot         Significant Labs:  Recent Labs   Lab 06/12/19  0617 06/13/19  0436   * 135*   * 133*   K 4.8 4.1   CL 94* 97   CO2 26 27   BUN 10 12   CREATININE 0.8 0.8   CALCIUM 10.0 9.7     Recent Labs   Lab 06/12/19 0617 06/13/19  0436   WBC 5.66 8.09   HGB 10.4* 10.4*   HCT 32.9* 33.6*   * 428*     No results for input(s):  LABPT, INR, APTT in the last 48 hours.  Microbiology Results (last 7 days)     Procedure Component Value Units Date/Time    Culture, Anaerobe [[id number]] Collected:  06/11/19 1434    Order Status:  Completed Specimen:  Abscess from Back Updated:  06/13/19 0756     Anaerobic Culture Culture in progress    Blood Culture #2 **CANNOT BE ORDERED STAT** [[id number]] Collected:  06/10/19 1440    Order Status:  Completed Specimen:  Blood from Peripheral, Antecubital, Right Updated:  06/12/19 2212     Blood Culture, Routine No Growth to date     Blood Culture, Routine No Growth to date     Blood Culture, Routine No Growth to date    Blood Culture #1 **CANNOT BE ORDERED STAT** [[id number]] Collected:  06/10/19 1526    Order Status:  Completed Specimen:  Blood from Peripheral, Forearm, Right Updated:  06/12/19 2212     Blood Culture, Routine No Growth to date     Blood Culture, Routine No Growth to date     Blood Culture, Routine No Growth to date    AFB Culture & Smear [[id number]] Collected:  06/11/19 1434    Order Status:  Completed Specimen:  Abscess from Back Updated:  06/12/19 1355     AFB CULTURE STAIN No acid fast bacilli seen.    Gram stain [[id number]] Collected:  06/11/19 1434    Order Status:  Completed Specimen:  Abscess from Back Updated:  06/11/19 2329     Gram Stain Result Few WBC's      No organisms seen    Fungus culture [[id number]] Collected:  06/11/19 1434    Order Status:  Sent Specimen:  Abscess from Back Updated:  06/11/19 2215    Aerobic culture [[id number]] Collected:  06/11/19 1434    Order Status:  Sent Specimen:  Abscess from Back Updated:  06/11/19 2215        ABGs: No results for input(s): PH, PCO2, PO2, HCO3, POCSATURATED, BE in the last 48 hours.  Cardiac markers: No results for input(s): CKMB, CPKMB, TROPONINT, TROPONINI, MYOGLOBIN in the last 48 hours.  CMP:   Recent Labs   Lab 06/12/19  0617 06/13/19  0436   * 135*   CALCIUM 10.0 9.7   ALBUMIN 3.0* 2.9*   PROT 6.9 6.6   * 133*   K 4.8  4.1   CO2 26 27   CL 94* 97   BUN 10 12   CREATININE 0.8 0.8   ALKPHOS 102 93   ALT 9* <5*   AST 21 19   BILITOT 0.4 0.4     CRP: No results for input(s): CRP in the last 48 hours.  All pertinent labs from the last 24 hours have been reviewed.      Assessment/Plan:     Active Diagnoses:    Diagnosis Date Noted POA    PRINCIPAL PROBLEM:  Osteomyelitis [M86.9] 06/10/2019 Yes     Chronic    Hypothyroidism [E03.9] 11/19/2013 Yes     Chronic    PD (Parkinson's disease) [G20]  Yes     Chronic      Problems Resolved During this Admission:         Continue wound care with wound vac in place. Continuous suction at 125 mm hg. Change out sponge every 3 days. First change is scheduled for Friday, 6/14  Continue treatment regimen including Abx per   ID consult needed  Continue to monitor new abscess of R psoas - patient continues to c/o pain of lower abdominal area  Start OT   Lovenox 40 mg Q24 today   Muscle relaxant PRN  Will continue to monitor    Mayra Long PA-C  Neurosurgery  [facility]

## 2019-06-13 NOTE — PROGRESS NOTES
Pt still with c/o pain. After getting flexeril and percocet. PT was working with pt. Will notify dr. vergara.

## 2019-06-13 NOTE — PROGRESS NOTES
[facility] Hospital Medicine  Progress Note    Patient Name: Ross Isbell  MRN: [id number]  Patient Class: IP- Inpatient   Admission Date: 6/10/2019  Length of Stay: 2 days  Attending Physician: Hemanth العراقي MD  Primary Care Provider: Anna Penn MD        Subjective:     Principal Problem:Osteomyelitis    Overview/Hospital Course:Patient is a 73-year-old female with type 2 diabetes, hypertension and parkinsonism, Ch Hyponatremia, recent h/o osteomyelitis of spine, s/p surgery on 5/25/19 for posterior lumbar fusion and abscess debridement.  She was sent from Dr. Nicolas's office as she had significant amount of drainage from her back wound spine. She states she still has some pain but otherwise is doing okay.    6/11- resting well with obvious resting tremor. Underwent washout out of her wound by Dr. Nicolas today, post op doing well, wound Vac in place. Pain under control.  6/12- looks and feels much better, s/p Day 1 post op for l/S I&D, pain much better, wound vac in place. Radiology does not think that the psoas abscess needs any drainage at present and can be watched. Wound Cx remain NGTD-- on IV Vanc.  6/13 - Patient continues to experience discomfort with mobility. PT / OT consulted and recommending post acute care. CM consulted for return to post acute care. Discussed patient with Neuro Sx regarding POC and ongoing needs. D/C planning in progress.     Interval History: Remains weakened / debilitated    Review of Systems   Constitutional: Positive for activity change and fatigue.   HENT: Negative.    Eyes: Negative.    Respiratory: Negative.    Cardiovascular: Negative.    Gastrointestinal: Negative.    Endocrine: Negative.    Genitourinary: Negative.    Musculoskeletal: Positive for back pain.   Skin: Negative.    Allergic/Immunologic: Negative.    Neurological: Positive for tremors and weakness.   Hematological: Negative.    Psychiatric/Behavioral: Negative.      Objective:     Vital  Signs (Most Recent):  Temp: 98 °F (36.7 °C) (06/13/19 1623)  Pulse: 92 (06/13/19 1623)  Resp: (!) 22 (06/13/19 1623)  BP: 128/60 (06/13/19 1623)  SpO2: 95 % (06/13/19 1623) Vital Signs (24h Range):  Temp:  [96.2 °F (35.7 °C)-98 °F (36.7 °C)] 98 °F (36.7 °C)  Pulse:  [] 92  Resp:  [18-22] 22  SpO2:  [94 %-100 %] 95 %  BP: (115-167)/(56-74) 128/60     Weight: 98.8 kg (217 lb 13 oz)  Body mass index is 35.16 kg/m².    Intake/Output Summary (Last 24 hours) at 6/13/2019 1654  Last data filed at 6/13/2019 1600  Gross per 24 hour   Intake 960 ml   Output 3390 ml   Net -2430 ml      Physical Exam   Constitutional: She is oriented to person, place, and time. She appears well-developed and well-nourished. She is cooperative. She has a sickly appearance.   HENT:   Mouth/Throat: Oropharynx is clear and moist.   Eyes: Pupils are equal, round, and reactive to light. Conjunctivae and EOM are normal.   Neck: Normal range of motion. Neck supple.   Cardiovascular: Normal rate, regular rhythm and intact distal pulses.   Murmur heard.   Systolic murmur is present with a grade of 3/6.  Pulmonary/Chest: Effort normal. She has rhonchi in the right middle field.   Abdominal: Soft. Bowel sounds are normal.   Genitourinary: Vagina normal.   Musculoskeletal: She exhibits no edema.   L/S wound vac in place   Neurological: She is alert and oriented to person, place, and time. She displays tremor. GCS eye subscore is 4. GCS verbal subscore is 5.   Skin: Skin is warm and dry. Capillary refill takes less than 2 seconds.   Psychiatric: She has a normal mood and affect.   Nursing note and vitals reviewed.      Significant Labs:   BMP:   Recent Labs   Lab 06/13/19  0436   *   *   K 4.1   CL 97   CO2 27   BUN 12   CREATININE 0.8   CALCIUM 9.7     CBC:   Recent Labs   Lab 06/12/19  0617 06/13/19  0436   WBC 5.66 8.09   HGB 10.4* 10.4*   HCT 32.9* 33.6*   * 428*     All pertinent labs within the past 24 hours have been  reviewed.    Significant Imaging: I have reviewed all pertinent imaging results/findings within the past 24 hours.        Assessment/Plan:      * Osteomyelitis  Plan is to go to the OR for a washout  Will continue with current abx started in the ED    S/p spinal wash out, on wound vac    POD 1 s/p I&D of wound dehiscence of lumbar surgery   looks and feels much better  On IV Vanc  Continue present care  D/c back to [facility] when cleared by Dr. Nicolas     6/13  Plan for LTAC  PT / OT  ABX      Hypothyroidism  Continue with levothyroxine      PD (Parkinson's disease)  Resume her home medications  Doing well    6/13  Carbidopa  PT / OT  Monitor      VTE Risk Mitigation (From admission, onward)        Ordered     enoxaparin injection 40 mg  Daily      06/12/19 1112     IP VTE HIGH RISK PATIENT  Once      06/11/19 1704     Place sequential compression device  Until discontinued      06/11/19 1704     Place sequential compression device  Until discontinued      06/10/19 1932                Hemanth العراقي MD  Department of Hospital Medicine   [facility]

## 2019-06-13 NOTE — PLAN OF CARE
Problem: Adult Inpatient Plan of Care  Goal: Plan of Care Review  Outcome: Ongoing (interventions implemented as appropriate)  POC reviewed, verbalized understanding. SR on tele monitor, O2 at 3L, VSS, AAOx4. S/p I&D of lumbar wound with wound vac in place at 125mmHg continuous therapy, sanguinous output noted. C/o lower back pain when moving, mildly controlled with prn po meds. Carlson care done, I/Os monitored. PIV c/d/i x3, IV abx given per order. Turned/repositioned q2h with wedge. Bed locked in low position with bed alarm set, nonskid socks on, call light and personal items within reach. Remains free from injury/falls this shift. Instructed to call for assistance.

## 2019-06-13 NOTE — PROGRESS NOTES
Pharmacokinetic Assessment Follow Up: IV Vancomycin    Vancomycin serum concentration assessment(s):    The measurement is below the desired definitive target range of 15 to 20 mcg/mL.    Vancomycin Regimen Plan:    Change regimen to Vancomycin 2750 mg IV every 24hour with next serum trough concentration measured at 1700 prior to 2nd dose on the new regimen of 2750 mg. Collect on 6/14/19.  The first dose of Vancomycin 2,750 mg will be started 2 hrs early due to the current trough level of 11.4 mcg/mL tonight and the 2,500 mg dose start time of 1930.     Pharmacy will continue to follow and monitor vancomycin.    Please contact pharmacy at extension [phone] for questions regarding this assessment.    Thank you for the consult,   Rashad Bishop     Patient brief summary:  Ross Isbell is a 73 y.o. female initiated on antimicrobial therapy with IV Vancomycin for treatment of suspected bone/joint    The patient received a loading dose, followed by the current treatment regimen: vancomycin 2500 mg IV every 24 hours, now changed to 2750 mg every 24 hrs.    Drug Allergies:   Review of patient's allergies indicates:   Allergen Reactions    Codeine      Other reaction(s): Nausea.  Patient tolerated morphine 5/2019 with no reported problems.     Codeine sulfate      Unknown^    Diphenhydramine hcl      Unknown^  Other reaction(s): Rash    Sulfa (sulfonamide antibiotics) Hives and Nausea And Vomiting    Penicillins Rash     Rash only as a child  Tolerated ceftriaxone 4/30/19  Tolerated piperacillin-tazobactam 5/23/19       Actual Body Weight:   99.4 kg    Renal Function:   Estimated Creatinine Clearance: 74.5 mL/min (based on SCr of 0.8 mg/dL).,     Dialysis Method (if applicable):  N/A     CBC (last 72 hours):  Recent Labs   Lab Result Units 06/10/19  1440 06/11/19  0515 06/12/19  0617   WBC K/uL 10.63 7.51 5.66   Hemoglobin g/dL 11.1* 10.1* 10.4*   Hematocrit % 34.1* 32.0* 32.9*   Platelets K/uL 397* 411* 422*   Gran% %  77.7* 58.8 80.3*   Lymph% % 12.8* 26.0 14.8*   Mono% % 8.3 11.6 4.9   Eosinophil% % 1.1 3.3 0.0   Basophil% % 0.1 0.3 0.0   Differential Method  Automated Automated Automated       Metabolic Panel (last 72 hours):  Recent Labs   Lab Result Units 06/10/19  1440 06/11/19  0455 06/11/19  0515 06/12/19  0617   Sodium mmol/L 128*  --  127* 131*   Potassium mmol/L 4.7  --  4.5 4.8   Chloride mmol/L 92*  --  93* 94*   CO2 mmol/L 20*  --  23 26   Glucose mg/dL 213*  --  122* 208*   Glucose, UA   --  Negative  --   --    BUN, Bld mg/dL 13  --  12 10   Creatinine mg/dL 0.9  --  0.7 0.8   Albumin g/dL 3.0*  --  2.8* 3.0*   Total Bilirubin mg/dL 0.4  --  0.6 0.4   Alkaline Phosphatase U/L 100  --  98 102   AST U/L 33  --  20 21   ALT U/L 6*  --  5* 9*       Vancomycin Administrations:  vancomycin given in the last 96 hours                     vancomycin (VANCOCIN) 2,500 mg in dextrose 5 % 500 mL IVPB (mg) 2,500 mg New Bag 06/12/19 1928     2,500 mg New Bag 06/11/19 1755    vancomycin injection (g) 4 g Given 06/11/19 1447                      Drug levels (last 3 results):  Recent Labs   Lab Result Units 06/12/19  1900   Vancomycin-Trough ug/mL 11.4       Microbiologic Results:  Microbiology Results (last 7 days)       Procedure Component Value Units Date/Time    AFB Culture & Smear [[id number]] Collected:  06/11/19 1434    Order Status:  Completed Specimen:  Abscess from Back Updated:  06/12/19 1355     AFB CULTURE STAIN No acid fast bacilli seen.    Gram stain [[id number]] Collected:  06/11/19 1434    Order Status:  Completed Specimen:  Abscess from Back Updated:  06/11/19 2329     Gram Stain Result Few WBC's      No organisms seen    Culture, Anaerobe [[id number]] Collected:  06/11/19 1434    Order Status:  Sent Specimen:  Abscess from Back Updated:  06/11/19 2215    Fungus culture [[id number]] Collected:  06/11/19 1434    Order Status:  Sent Specimen:  Abscess from Back Updated:  06/11/19 2215    Aerobic culture [[id number]]  Collected:  06/11/19 1434    Order Status:  Sent Specimen:  Abscess from Back Updated:  06/11/19 2215    Blood Culture #2 **CANNOT BE ORDERED STAT** [[id number]] Collected:  06/10/19 1440    Order Status:  Completed Specimen:  Blood from Peripheral, Antecubital, Right Updated:  06/11/19 2212     Blood Culture, Routine No Growth to date     Blood Culture, Routine No Growth to date    Blood Culture #1 **CANNOT BE ORDERED STAT** [[id number]] Collected:  06/10/19 1526    Order Status:  Completed Specimen:  Blood from Peripheral, Forearm, Right Updated:  06/11/19 2212     Blood Culture, Routine No Growth to date     Blood Culture, Routine No Growth to date

## 2019-06-13 NOTE — PLAN OF CARE
Problem: Adult Inpatient Plan of Care  Goal: Plan of Care Review  Outcome: Ongoing (interventions implemented as appropriate)  Dx om.   poc instructed on dbc 10 x q1h wa. Instructed on turning q2h. Instructed on pain management, scale , and meds. Instructed on fall risk and precautions. Pain meds increased due to pain . New iv antibiotics. No falls. Blood sugar ac and hs.

## 2019-06-13 NOTE — HPI
73 year old woman with history of lumbar osteomyelitis with strep.  She was started on therapy with Rocephin but has noticed worsening drainage over the lumbar area. She was seen by Neurosurgery and I and D was done.  Since presentation, CT scan of the lumbar region showed- A pattern of erosive destructive change of the endplates at L2-3 with relative sparing of the disc space suggest changes of discitis in this postoperative patient.  Additionally a rim enhancing fluid collection in the right psoas muscle adjacent to the disc may represent a focal area of abscess.  It measures 2.8 cm in by 1.4 x 1.7 cm.  She was seen by neurosurgery and on 06/12 ,had I and D done .

## 2019-06-14 LAB
ALBUMIN SERPL BCP-MCNC: 3 G/DL (ref 3.5–5.2)
ALP SERPL-CCNC: 97 U/L (ref 55–135)
ALT SERPL W/O P-5'-P-CCNC: <5 U/L (ref 10–44)
ANION GAP SERPL CALC-SCNC: 11 MMOL/L (ref 8–16)
AST SERPL-CCNC: 18 U/L (ref 10–40)
BACTERIA SPEC AEROBE CULT: NORMAL
BASOPHILS # BLD AUTO: 0.02 K/UL (ref 0–0.2)
BASOPHILS NFR BLD: 0.3 % (ref 0–1.9)
BILIRUB SERPL-MCNC: 0.5 MG/DL (ref 0.1–1)
BUN SERPL-MCNC: 13 MG/DL (ref 8–23)
CALCIUM SERPL-MCNC: 9.6 MG/DL (ref 8.7–10.5)
CHLORIDE SERPL-SCNC: 96 MMOL/L (ref 95–110)
CO2 SERPL-SCNC: 24 MMOL/L (ref 23–29)
CREAT SERPL-MCNC: 0.8 MG/DL (ref 0.5–1.4)
DIFFERENTIAL METHOD: ABNORMAL
EOSINOPHIL # BLD AUTO: 0.3 K/UL (ref 0–0.5)
EOSINOPHIL NFR BLD: 3.5 % (ref 0–8)
ERYTHROCYTE [DISTWIDTH] IN BLOOD BY AUTOMATED COUNT: 15 % (ref 11.5–14.5)
EST. GFR  (AFRICAN AMERICAN): >60 ML/MIN/1.73 M^2
EST. GFR  (NON AFRICAN AMERICAN): >60 ML/MIN/1.73 M^2
GLUCOSE SERPL-MCNC: 129 MG/DL (ref 70–110)
HCT VFR BLD AUTO: 35.3 % (ref 37–48.5)
HGB BLD-MCNC: 10.8 G/DL (ref 12–16)
LYMPHOCYTES # BLD AUTO: 2.3 K/UL (ref 1–4.8)
LYMPHOCYTES NFR BLD: 30.5 % (ref 18–48)
MCH RBC QN AUTO: 27.5 PG (ref 27–31)
MCHC RBC AUTO-ENTMCNC: 30.6 G/DL (ref 32–36)
MCV RBC AUTO: 90 FL (ref 82–98)
MONOCYTES # BLD AUTO: 0.8 K/UL (ref 0.3–1)
MONOCYTES NFR BLD: 10.8 % (ref 4–15)
NEUTROPHILS # BLD AUTO: 4.1 K/UL (ref 1.8–7.7)
NEUTROPHILS NFR BLD: 54.9 % (ref 38–73)
PLATELET # BLD AUTO: 413 K/UL (ref 150–350)
PMV BLD AUTO: 8.3 FL (ref 9.2–12.9)
POCT GLUCOSE: 124 MG/DL (ref 70–110)
POCT GLUCOSE: 126 MG/DL (ref 70–110)
POCT GLUCOSE: 155 MG/DL (ref 70–110)
POCT GLUCOSE: 192 MG/DL (ref 70–110)
POTASSIUM SERPL-SCNC: 4.3 MMOL/L (ref 3.5–5.1)
PROT SERPL-MCNC: 6.5 G/DL (ref 6–8.4)
RBC # BLD AUTO: 3.93 M/UL (ref 4–5.4)
SODIUM SERPL-SCNC: 131 MMOL/L (ref 136–145)
VANCOMYCIN TROUGH SERPL-MCNC: 20.2 UG/ML (ref 10–22)
WBC # BLD AUTO: 7.44 K/UL (ref 3.9–12.7)

## 2019-06-14 PROCEDURE — 63600175 PHARM REV CODE 636 W HCPCS: Performed by: PHYSICIAN ASSISTANT

## 2019-06-14 PROCEDURE — 97606 NEG PRS WND THER DME>50 SQCM: CPT

## 2019-06-14 PROCEDURE — 97110 THERAPEUTIC EXERCISES: CPT

## 2019-06-14 PROCEDURE — 63600175 PHARM REV CODE 636 W HCPCS: Performed by: INTERNAL MEDICINE

## 2019-06-14 PROCEDURE — 25000003 PHARM REV CODE 250: Performed by: INTERNAL MEDICINE

## 2019-06-14 PROCEDURE — 27000221 HC OXYGEN, UP TO 24 HOURS

## 2019-06-14 PROCEDURE — 94761 N-INVAS EAR/PLS OXIMETRY MLT: CPT

## 2019-06-14 PROCEDURE — 27000325 HC DRESSING INFOVAC LARGE BLK

## 2019-06-14 PROCEDURE — 21400001 HC TELEMETRY ROOM

## 2019-06-14 PROCEDURE — 80202 ASSAY OF VANCOMYCIN: CPT

## 2019-06-14 PROCEDURE — 25000003 PHARM REV CODE 250: Performed by: PHYSICIAN ASSISTANT

## 2019-06-14 PROCEDURE — 85025 COMPLETE CBC W/AUTO DIFF WBC: CPT

## 2019-06-14 PROCEDURE — 80053 COMPREHEN METABOLIC PANEL: CPT

## 2019-06-14 PROCEDURE — 97530 THERAPEUTIC ACTIVITIES: CPT | Performed by: PHYSICAL THERAPIST

## 2019-06-14 PROCEDURE — 97530 THERAPEUTIC ACTIVITIES: CPT

## 2019-06-14 PROCEDURE — 36415 COLL VENOUS BLD VENIPUNCTURE: CPT

## 2019-06-14 PROCEDURE — 97110 THERAPEUTIC EXERCISES: CPT | Performed by: PHYSICAL THERAPIST

## 2019-06-14 RX ORDER — MICONAZOLE NITRATE 2 %
POWDER (GRAM) TOPICAL
Status: DISCONTINUED | OUTPATIENT
Start: 2019-06-14 | End: 2019-06-18 | Stop reason: HOSPADM

## 2019-06-14 RX ADMIN — LATANOPROST 1 DROP: 50 SOLUTION OPHTHALMIC at 09:06

## 2019-06-14 RX ADMIN — CARBIDOPA AND LEVODOPA 1 TABLET: 25; 250 TABLET ORAL at 06:06

## 2019-06-14 RX ADMIN — VANCOMYCIN HYDROCHLORIDE 1250 MG: 100 INJECTION, POWDER, LYOPHILIZED, FOR SOLUTION INTRAVENOUS at 01:06

## 2019-06-14 RX ADMIN — OXYCODONE AND ACETAMINOPHEN 1 TABLET: 10; 325 TABLET ORAL at 11:06

## 2019-06-14 RX ADMIN — PRAVASTATIN SODIUM 40 MG: 20 TABLET ORAL at 09:06

## 2019-06-14 RX ADMIN — CYCLOBENZAPRINE HYDROCHLORIDE 10 MG: 10 TABLET, FILM COATED ORAL at 02:06

## 2019-06-14 RX ADMIN — OXYCODONE AND ACETAMINOPHEN 1 TABLET: 10; 325 TABLET ORAL at 06:06

## 2019-06-14 RX ADMIN — Medication 100 MG: at 01:06

## 2019-06-14 RX ADMIN — Medication: at 09:06

## 2019-06-14 RX ADMIN — CARBIDOPA AND LEVODOPA 1 TABLET: 25; 250 TABLET ORAL at 01:06

## 2019-06-14 RX ADMIN — PRAMIPEXOLE DIHYDROCHLORIDE 1 MG: 0.5 TABLET ORAL at 02:06

## 2019-06-14 RX ADMIN — PANTOPRAZOLE SODIUM 40 MG: 40 TABLET, DELAYED RELEASE ORAL at 09:06

## 2019-06-14 RX ADMIN — LEVOTHYROXINE SODIUM 125 MCG: 125 TABLET ORAL at 06:06

## 2019-06-14 RX ADMIN — OXYCODONE AND ACETAMINOPHEN 1 TABLET: 10; 325 TABLET ORAL at 12:06

## 2019-06-14 RX ADMIN — PRAMIPEXOLE DIHYDROCHLORIDE 1 MG: 0.5 TABLET ORAL at 09:06

## 2019-06-14 RX ADMIN — CYCLOBENZAPRINE HYDROCHLORIDE 10 MG: 10 TABLET, FILM COATED ORAL at 09:06

## 2019-06-14 RX ADMIN — VANCOMYCIN HYDROCHLORIDE 2250 MG: 100 INJECTION, POWDER, LYOPHILIZED, FOR SOLUTION INTRAVENOUS at 06:06

## 2019-06-14 RX ADMIN — CARBIDOPA AND LEVODOPA 1 TABLET: 25; 250 TABLET ORAL at 09:06

## 2019-06-14 RX ADMIN — MICONAZOLE NITRATE: 2 OINTMENT TOPICAL at 09:06

## 2019-06-14 RX ADMIN — LOSARTAN POTASSIUM 50 MG: 50 TABLET, FILM COATED ORAL at 09:06

## 2019-06-14 RX ADMIN — CYCLOBENZAPRINE HYDROCHLORIDE 10 MG: 10 TABLET, FILM COATED ORAL at 10:06

## 2019-06-14 RX ADMIN — ENOXAPARIN SODIUM 40 MG: 100 INJECTION SUBCUTANEOUS at 06:06

## 2019-06-14 NOTE — PROGRESS NOTES
Called to bedside d/t wound vac alarming air leak detected. Turned pt to right side to assess dsg. VAC drape covering site noted to be torn over the plastic round disc hose attachment. Reinforced dsg with additional tegaderm, but air leak still present. Reinforced with small piece of foam tape, no air leak detected, wound vac ttherapy continued at 125mmHg

## 2019-06-14 NOTE — PROGRESS NOTES
Pharmacokinetic Assessment Follow Up: IV Vancomycin    Vancomycin serum concentration assessment(s):    The trough level was drawned correctly and can be used to guide therapy at this time.    Vancomycin Regimen Plan:    Change regimen to Vancomycin 2250 mg IV every 24hour with next serum trough concentration measured at 1600 prior to next dose on 6/15   (level was 20.2, decreased TTD and increased dosing interval, getting trough prior to next dose)    Pharmacy will continue to follow and monitor vancomycin.    Please contact pharmacy at extension [phone] for questions regarding this assessment.    Thank you for the consult,   Hayde Gregory     Patient brief summary:  Ross Isbell is a 73 y.o. female initiated on antimicrobial therapy with IV Vancomycin for treatment of suspected bone/joint    The patient received a loading dose, followed by the current treatment regimen: vancomycin 2250 mg IV every 24 hours    Drug Allergies:   Review of patient's allergies indicates:   Allergen Reactions    Codeine      Other reaction(s): Nausea.  Patient tolerated morphine 5/2019 with no reported problems.     Codeine sulfate      Unknown^    Diphenhydramine hcl      Unknown^  Other reaction(s): Rash    Sulfa (sulfonamide antibiotics) Hives and Nausea And Vomiting    Penicillins Rash     Rash only as a child  Tolerated ceftriaxone 4/30/19  Tolerated piperacillin-tazobactam 5/23/19       Actual Body Weight:   98.8 kg    Renal Function:   Estimated Creatinine Clearance: 74.3 mL/min (based on SCr of 0.8 mg/dL).,     Dialysis Method (if applicable):  N/A     CBC (last 72 hours):  Recent Labs   Lab Result Units 06/12/19  0617 06/13/19  0436 06/14/19  0423   WBC K/uL 5.66 8.09 7.44   Hemoglobin g/dL 10.4* 10.4* 10.8*   Hematocrit % 32.9* 33.6* 35.3*   Platelets K/uL 422* 428* 413*   Gran% % 80.3* 62.0 54.9   Lymph% % 14.8* 26.8 30.5   Mono% % 4.9 10.0 10.8   Eosinophil% % 0.0 1.0 3.5   Basophil% % 0.0 0.2 0.3   Differential  Method  Automated Automated Automated       Metabolic Panel (last 72 hours):  Recent Labs   Lab Result Units 06/12/19  0617 06/13/19  0436 06/14/19  0423   Sodium mmol/L 131* 133* 131*   Potassium mmol/L 4.8 4.1 4.3   Chloride mmol/L 94* 97 96   CO2 mmol/L 26 27 24   Glucose mg/dL 208* 135* 129*   BUN, Bld mg/dL 10 12 13   Creatinine mg/dL 0.8 0.8 0.8   Albumin g/dL 3.0* 2.9* 3.0*   Total Bilirubin mg/dL 0.4 0.4 0.5   Alkaline Phosphatase U/L 102 93 97   AST U/L 21 19 18   ALT U/L 9* <5* <5*       Vancomycin Administrations:  vancomycin given in the last 96 hours                     vancomycin (VANCOCIN) 1,250 mg in dextrose 5 % 250 mL IVPB (mg) 1,250 mg New Bag 06/14/19 0145     1,250 mg New Bag 06/13/19 1328    vancomycin (VANCOCIN) 2,500 mg in dextrose 5 % 500 mL IVPB (mg) 2,500 mg New Bag 06/12/19 1928     2,500 mg New Bag 06/11/19 1755                      Drug levels (last 3 results):  Recent Labs   Lab Result Units 06/12/19  1900 06/14/19  1400   Vancomycin-Trough ug/mL 11.4 20.2       Microbiologic Results:  Microbiology Results (last 7 days)       Procedure Component Value Units Date/Time    Aerobic culture [[id number]] Collected:  06/11/19 1434    Order Status:  Completed Specimen:  Abscess from Back Updated:  06/14/19 1328     Aerobic Bacterial Culture --     CANDIDA ALBICANS  Few      Culture, Anaerobe [[id number]] Collected:  06/11/19 1434    Order Status:  Completed Specimen:  Abscess from Back Updated:  06/14/19 1303     Anaerobic Culture Culture in progress    Blood Culture #2 **CANNOT BE ORDERED STAT** [[id number]] Collected:  06/10/19 1440    Order Status:  Completed Specimen:  Blood from Peripheral, Antecubital, Right Updated:  06/13/19 2212     Blood Culture, Routine No Growth to date     Blood Culture, Routine No Growth to date     Blood Culture, Routine No Growth to date     Blood Culture, Routine No Growth to date    Blood Culture #1 **CANNOT BE ORDERED STAT** [[id number]] Collected:  06/10/19  1526    Order Status:  Completed Specimen:  Blood from Peripheral, Forearm, Right Updated:  06/13/19 2212     Blood Culture, Routine No Growth to date     Blood Culture, Routine No Growth to date     Blood Culture, Routine No Growth to date     Blood Culture, Routine No Growth to date    AFB Culture & Smear [[id number]] Collected:  06/11/19 1434    Order Status:  Completed Specimen:  Abscess from Back Updated:  06/13/19 0927     AFB Culture & Smear Culture in progress     AFB CULTURE STAIN No acid fast bacilli seen.    Gram stain [[id number]] Collected:  06/11/19 1434    Order Status:  Completed Specimen:  Abscess from Back Updated:  06/11/19 2329     Gram Stain Result Few WBC's      No organisms seen    Fungus culture [[id number]] Collected:  06/11/19 1434    Order Status:  Sent Specimen:  Abscess from Back Updated:  06/11/19 2210

## 2019-06-14 NOTE — CONSULTS
"Consulted on this 74 y/o F patient for wound vac dressing change and management. Patient is well known to wound care service, and was assessed prior to this recent surgery on 6/11. She is currently up in chair brushing teeth, back brace is on. She is assisted back to bed x 2 person max assist, back brace removed, and she turned to right side with assistance. MASD with candidiosis is healing well to gluteal fold and perineal region, it had spread to ischium as well, with small partial thickness tissue loss noted to right ischium within this MASD, however it is all healing, redness and satellite lesions improved with use of Aloe Quincy AF moisture barrier. Recommend continued use per MAR.   I Wound vac ULTA paused, clamped, and disconnected from patient. Wound vac dressing gently removed from midline lumbar/thoracic spine incision. Estela incisional skin again with bright deep red discoloration noted, moist shiny and satellite lesions consistent with MASD candidosis. Within this to left of incision at 9 o'clock, her previously noted stage 3 pressure injury continues to heal, today measures 0.5x0.5x<0.1cm with moist beefy red granulating wound bed, epithelialization to edges, scant serous drainage. (this is located within area of candidosis).  Incision measures [id number] with 6cm tunnel noted at 7 o'clock, and 4cm tunnel noted at 11 o'clock. Wound bed is moist red tissue, budding granulation, adipose tissue, and deep fascia and bone exposed, no slough noted, no purulence. Cleansed all well with sterile saline and patted dry. Estela incision skin dusted with miconazole powder per MAR, and then sprayed with cavilon skin barrier spray in 2 layers using "crusting" technique. Tunneled areas of wound filled with white foam, and white foam also applied into depth of wound to cover exposed bone/fascia, then remaining wound filled with black foam, all secured with drape to seal and attached to I wound vac ULTA at continuous -125 " mmHg low intensity suction with good seal noted, no leak. Patient tolerated well, did request pain medication at end of wound care, and primary nurse Scott notified by nurse vania Kaur. Will follow closely, next wound vac change scheduled for Monday, 6/17.

## 2019-06-14 NOTE — SUBJECTIVE & OBJECTIVE
Interval History: Pt seen and examined.  at bedside. Pt reports feeling much better today vs yesterday. No complaints at this time. Will continue to monitor    Review of Systems   Constitutional: Positive for activity change and fatigue.   HENT: Negative.    Eyes: Negative for visual disturbance.   Respiratory: Negative for apnea, cough, choking, chest tightness, shortness of breath, wheezing and stridor.    Cardiovascular: Negative for chest pain, palpitations and leg swelling.   Gastrointestinal: Negative for abdominal pain, constipation, diarrhea, nausea and vomiting.   Endocrine: Negative.    Musculoskeletal: Positive for back pain. Negative for arthralgias, joint swelling, myalgias, neck pain and neck stiffness.   Skin: Negative.    Allergic/Immunologic: Negative.    Neurological: Positive for tremors and weakness. Negative for dizziness, seizures, syncope, facial asymmetry, speech difficulty, light-headedness, numbness and headaches.   Hematological: Negative.    Psychiatric/Behavioral: Negative for agitation, behavioral problems and confusion.     Objective:     Vital Signs (Most Recent):  Temp: 98.4 °F (36.9 °C) (06/14/19 1129)  Pulse: 101 (06/14/19 1129)  Resp: 18 (06/14/19 1129)  BP: 122/62 (06/14/19 1129)  SpO2: 96 % (06/14/19 1129) Vital Signs (24h Range):  Temp:  [97.6 °F (36.4 °C)-98.9 °F (37.2 °C)] 98.4 °F (36.9 °C)  Pulse:  [] 101  Resp:  [12-22] 18  SpO2:  [94 %-99 %] 96 %  BP: (117-133)/(56-72) 122/62     Weight: 98.8 kg (217 lb 13 oz)  Body mass index is 35.16 kg/m².    Intake/Output Summary (Last 24 hours) at 6/14/2019 1424  Last data filed at 6/14/2019 1220  Gross per 24 hour   Intake 1130 ml   Output 2500 ml   Net -1370 ml      Physical Exam   Constitutional: She is oriented to person, place, and time. She appears well-developed and well-nourished. She is cooperative. She is easily aroused. She appears ill. Nasal cannula in place.   HENT:   Head: Normocephalic and atraumatic.    Mouth/Throat: Oropharynx is clear and moist.   Eyes: Pupils are equal, round, and reactive to light. Conjunctivae and EOM are normal.   Neck: Normal range of motion. Neck supple.   Cardiovascular: Normal rate, regular rhythm and intact distal pulses.   Murmur heard.   Systolic murmur is present with a grade of 3/6.  Pulmonary/Chest: Effort normal. No accessory muscle usage or stridor. No tachypnea. No respiratory distress. She has decreased breath sounds. She has no wheezes. She has no rhonchi. She has no rales. She exhibits no tenderness.   Abdominal: Soft. Bowel sounds are normal. She exhibits no distension. There is no tenderness. There is no rebound and no guarding.   Genitourinary:   Genitourinary Comments: Deferred   Musculoskeletal: She exhibits no edema, tenderness or deformity.   L/S wound vac in place   Neurological: She is alert, oriented to person, place, and time and easily aroused. She displays tremor. No sensory deficit. GCS eye subscore is 4. GCS verbal subscore is 5.   Skin: Skin is warm and dry. Capillary refill takes less than 2 seconds.   Psychiatric: She has a normal mood and affect. Her behavior is normal. Thought content normal.   Nursing note and vitals reviewed.      Significant Labs:   CBC:   Recent Labs   Lab 06/13/19  0436 06/14/19  0423   WBC 8.09 7.44   HGB 10.4* 10.8*   HCT 33.6* 35.3*   * 413*     CMP:   Recent Labs   Lab 06/13/19  0436 06/14/19  0423   * 131*   K 4.1 4.3   CL 97 96   CO2 27 24   * 129*   BUN 12 13   CREATININE 0.8 0.8   CALCIUM 9.7 9.6   PROT 6.6 6.5   ALBUMIN 2.9* 3.0*   BILITOT 0.4 0.5   ALKPHOS 93 97   AST 19 18   ALT <5* <5*   ANIONGAP 9 11   EGFRNONAA >60 >60     POCT Glucose:   Recent Labs   Lab 06/13/19  2050 06/14/19  0617 06/14/19  1114   POCTGLUCOSE 163* 126* 155*       Significant Imaging: I have reviewed all pertinent imaging results/findings within the past 24 hours.

## 2019-06-14 NOTE — PROGRESS NOTES
[facility] Hospital Medicine  Progress Note    Patient Name: Ross Isbell  MRN: [id number]  Patient Class: IP- Inpatient   Admission Date: 6/10/2019  Length of Stay: 3 days  Attending Physician: Hemanth العراقي MD  Primary Care Provider: Anna Penn MD        Subjective:     Principal Problem:Osteomyelitis      HPI:  Mrs. Isbell sent from Dr. Griffith office for admissions and antibiotics after she went for follow up and per the daughter a significant amount of drainage from her back wound. She recently had surgery for osteo of lumbar spine. She states she still has some pain but otherwise is doing okay.        Overview/Hospital Course:  Patient is a 73-year-old female with type 2 diabetes, hypertension and parkinsonism, Ch Hyponatremia, recent h/o osteomyelitis of spine, s/p surgery on 5/25/19 for posterior lumbar fusion and abscess debridement.  She was sent from Dr. Nicolas's office as she had significant amount of drainage from her back wound spine. She states she still has some pain but otherwise is doing okay.    6/11- resting well with obvious resting tremor. Underwent washout out of her wound by Dr. Nicolas today, post op doing well, wound Vac in place. Pain under control.  6/12- looks and feels much better, s/p Day 1 post op for l/S I&D, pain much better, wound vac in place. Radiology does not think that the psoas abscess needs any drainage at present and can be watched. Wound Cx remain NGTD-- on IV Vanc.  6/13 - Patient continues to experience discomfort with mobility. PT / OT consulted and recommending post acute care. CM consulted for return to post acute care. Discussed patient with Neuro Sx regarding POC and ongoing needs. D/C planning in progress.   06/14 -- No acute distress overnight. POD # 3. Pt reports pain much improved today vs yesterday. Pt up with PT today, tolerated. First wound vac change scheduled for today then every 3 days. Pt will return to [facility] on Monday,  06/17/19    Interval History: Pt seen and examined.  at bedside. Pt reports feeling much better today vs yesterday. No complaints at this time. Will continue to monitor    Review of Systems   Constitutional: Positive for activity change and fatigue.   HENT: Negative.    Eyes: Negative for visual disturbance.   Respiratory: Negative for apnea, cough, choking, chest tightness, shortness of breath, wheezing and stridor.    Cardiovascular: Negative for chest pain, palpitations and leg swelling.   Gastrointestinal: Negative for abdominal pain, constipation, diarrhea, nausea and vomiting.   Endocrine: Negative.    Musculoskeletal: Positive for back pain. Negative for arthralgias, joint swelling, myalgias, neck pain and neck stiffness.   Skin: Negative.    Allergic/Immunologic: Negative.    Neurological: Positive for tremors and weakness. Negative for dizziness, seizures, syncope, facial asymmetry, speech difficulty, light-headedness, numbness and headaches.   Hematological: Negative.    Psychiatric/Behavioral: Negative for agitation, behavioral problems and confusion.     Objective:     Vital Signs (Most Recent):  Temp: 98.4 °F (36.9 °C) (06/14/19 1129)  Pulse: 101 (06/14/19 1129)  Resp: 18 (06/14/19 1129)  BP: 122/62 (06/14/19 1129)  SpO2: 96 % (06/14/19 1129) Vital Signs (24h Range):  Temp:  [97.6 °F (36.4 °C)-98.9 °F (37.2 °C)] 98.4 °F (36.9 °C)  Pulse:  [] 101  Resp:  [12-22] 18  SpO2:  [94 %-99 %] 96 %  BP: (117-133)/(56-72) 122/62     Weight: 98.8 kg (217 lb 13 oz)  Body mass index is 35.16 kg/m².    Intake/Output Summary (Last 24 hours) at 6/14/2019 1424  Last data filed at 6/14/2019 1220  Gross per 24 hour   Intake 1130 ml   Output 2500 ml   Net -1370 ml      Physical Exam   Constitutional: She is oriented to person, place, and time. She appears well-developed and well-nourished. She is cooperative. She is easily aroused. She appears ill. Nasal cannula in place.   HENT:   Head: Normocephalic and  atraumatic.   Mouth/Throat: Oropharynx is clear and moist.   Eyes: Pupils are equal, round, and reactive to light. Conjunctivae and EOM are normal.   Neck: Normal range of motion. Neck supple.   Cardiovascular: Normal rate, regular rhythm and intact distal pulses.   Murmur heard.   Systolic murmur is present with a grade of 3/6.  Pulmonary/Chest: Effort normal. No accessory muscle usage or stridor. No tachypnea. No respiratory distress. She has decreased breath sounds. She has no wheezes. She has no rhonchi. She has no rales. She exhibits no tenderness.   Abdominal: Soft. Bowel sounds are normal. She exhibits no distension. There is no tenderness. There is no rebound and no guarding.   Genitourinary:   Genitourinary Comments: Deferred   Musculoskeletal: She exhibits no edema, tenderness or deformity.   L/S wound vac in place   Neurological: She is alert, oriented to person, place, and time and easily aroused. She displays tremor. No sensory deficit. GCS eye subscore is 4. GCS verbal subscore is 5.   Skin: Skin is warm and dry. Capillary refill takes less than 2 seconds.   Psychiatric: She has a normal mood and affect. Her behavior is normal. Thought content normal.   Nursing note and vitals reviewed.      Significant Labs:   CBC:   Recent Labs   Lab 06/13/19  0436 06/14/19  0423   WBC 8.09 7.44   HGB 10.4* 10.8*   HCT 33.6* 35.3*   * 413*     CMP:   Recent Labs   Lab 06/13/19  0436 06/14/19  0423   * 131*   K 4.1 4.3   CL 97 96   CO2 27 24   * 129*   BUN 12 13   CREATININE 0.8 0.8   CALCIUM 9.7 9.6   PROT 6.6 6.5   ALBUMIN 2.9* 3.0*   BILITOT 0.4 0.5   ALKPHOS 93 97   AST 19 18   ALT <5* <5*   ANIONGAP 9 11   EGFRNONAA >60 >60     POCT Glucose:   Recent Labs   Lab 06/13/19  2050 06/14/19  0617 06/14/19  1114   POCTGLUCOSE 163* 126* 155*       Significant Imaging: I have reviewed all pertinent imaging results/findings within the past 24 hours.      Assessment/Plan:      * Osteomyelitis  Plan is to  go to the OR for a washout  Will continue with current abx started in the ED    S/p spinal wash out, on wound vac    POD 1 s/p I&D of wound dehiscence of lumbar surgery   looks and feels much better  On IV Vanc  Continue present care  D/c back to [facility] when cleared by Dr. Nicolas     6/13  Plan for LTAC  PT / OT  ABX    06/14  - Will return to [facility] on Monday, 06/17/19  - Continue PT/OT  - Continue IV abx -- ID following  - LSO brace when OOB  - Wound vac change today then every 3 days  - F/U with Dr. Nicolas upon discharge      Hyperlipidemia LDL goal <70  - Continue Statin      Type 2 diabetes, controlled, with peripheral neuropathy  - Accuchecks and low dose SSI      Hypothyroidism  - Continue with levothyroxine      Essential hypertension  - BP stable  - Continue Losartan      PD (Parkinson's disease)  Resume her home medications  Doing well    6/13  Carbidopa  PT / OT  Monitor    06/14  - Reviewed. No changes.  - Continue current management      VTE Risk Mitigation (From admission, onward)        Ordered     enoxaparin injection 40 mg  Daily      06/12/19 1112     IP VTE HIGH RISK PATIENT  Once      06/11/19 1704     Place sequential compression device  Until discontinued      06/11/19 1704     Place sequential compression device  Until discontinued      06/10/19 1932                KACIE Cowart  Department of Hospital Medicine   [facility]

## 2019-06-14 NOTE — PLAN OF CARE
Problem: Adult Inpatient Plan of Care  Goal: Plan of Care Review  Outcome: Ongoing (interventions implemented as appropriate)  POC reviewed, verbalized understanding. SR on tele monitor, O2 at 3L, VSS. Continuous wound vac therapy at 125mmHg to lumbar spine continued, output monitored, dsg and seal reinforced overnight. IV abx given per order. Carlson care done. Pain better controlled this shift since increase in dosage of percocet. Blood glucose monitored/managed. Turned/repositioned with wedge q2h. Fall precautions maintained with bed alarm set. Remains free from injury/falls this shift. Instructed to call for assistance.

## 2019-06-14 NOTE — PT/OT/SLP PROGRESS
"Occupational Therapy  Treatment    Ross Isbell   MRN: [id number]   Admitting Diagnosis: Osteomyelitis    OT Date of Treatment: 06/14/19   OT Start Time: 1035  OT Stop Time: 1100  OT Total Time (min): 25 min    Billable Minutes:  Therapeutic Activity 15 min and Therapeutic Exercise 10 min    General Precautions: Standard, fall  Orthopedic Precautions: N/A  Braces: LSO         Subjective:  Communicated with Nurse Scott and epic chart review prior to session.  Pt found supine in bed and agreeable to tx at this time.  Pain/Comfort  Pain Rating 1: 6/10  Location - Side 1: Right  Location - Orientation 1: lower  Location 1: back  Pain Rating Post-Intervention 1: 6/10    Objective:  Patient found with: peripheral IV, telemetry, bed alarm, oxygen, wound vac     Functional Mobility:  Therapeutic Activities and Exercises:  Pt performed supine> sit Mod A; sat EOB and donned shoes with Total A, doffed/ donned Min A, donned LSO with Mod A, washed face and combed hair with set up. Pt performed sit>stand x 3 trials with Max A, squat pivot to chair with Max A, scoot back in chair with Max A. Pt performed (B) UE ROM exercises 1 x 20 reps in sitting: shoulder flex, chest press, bicep curls, finger flex/ ext.     AM-PAC 6 CLICK ADL   How much help from another person does this patient currently need?   1 = Unable, Total/Dependent Assistance  2 = A lot, Maximum/Moderate Assistance  3 = A little, Minimum/Contact Guard/Supervision  4 = None, Modified [place]/Independent    Putting on and taking off regular lower body clothing? : 2  Bathing (including washing, rinsing, drying)?: 2  Toileting, which includes using toilet, bedpan, or urinal? : 2  Putting on and taking off regular upper body clothing?: 2  Taking care of personal grooming such as brushing teeth?: 2  Eating meals?: 3  Daily Activity Total Score: 13     AM-PAC Raw Score CMS "G-Code Modifier Level of Impairment Assistance   6 % Total / Unable   7 - 8 CM 80 - 100% " Maximal Assist   9-13 CL 60 - 80% Moderate Assist   14 - 19 CK 40 - 60% Moderate Assist   20 - 22 CJ 20 - 40% Minimal Assist   23 CI 1-20% SBA / CGA   24 CH 0% Independent/ Mod I       Patient left up in chair with all lines intact, call button in reach, chair alarm on and Nurse Scott notified    ASSESSMENT:  Ross Isbell is a 73 y.o. female with a medical diagnosis of Osteomyelitis and presents with impaired functional mobility and ADLs. Pt will benefit from continued OT in order to address impairments.    Rehab identified problem list/impairments: Rehab identified problem list/impairments: weakness, impaired endurance, impaired self care skills, impaired functional mobilty, decreased coordination, edema, impaired skin, pain, decreased safety awareness, decreased lower extremity function, decreased upper extremity function, gait instability, impaired balance, orthopedic precautions    Rehab potential is fair.    Activity tolerance: Fair    Discharge recommendations: Discharge Facility/Level of Care Needs: nursing facility, skilled     Barriers to discharge: Barriers to Discharge: Decreased caregiver support    Equipment recommendations:       GOALS:   Multidisciplinary Problems     Occupational Therapy Goals        Problem: Occupational Therapy Goal    Goal Priority Disciplines Outcome Interventions   Occupational Therapy Goal     OT, PT/OT Ongoing (interventions implemented as appropriate)    Description:  ot goals to be met by 6-20-19  sba with rolling l<>r  Pt will tolerate sitting eob with fair+ sitting balance to prep for g/h task  Mod a with bsc t/f's                      Plan:  Patient to be seen 3 x/week to address the above listed problems via self-care/home management, therapeutic activities, therapeutic exercises  Plan of Care expires: 06/20/19  Plan of Care reviewed with: patient    OT G-codes  Functional Assessment Tool Used: [facility]  Score: 13    Angelica Capps, PT/OT  06/14/2019

## 2019-06-14 NOTE — PT/OT/SLP PROGRESS
Physical Therapy  Treatment    Ross Isbell   MRN: [id number]   Admitting Diagnosis: Osteomyelitis    PT Received On: 06/14/19  PT Start Time: 0900     PT Stop Time: 0925    PT Total Time (min): 25 min       Billable Minutes:  Therapeutic Activity 15 and Therapeutic Exercise 10    Treatment Type: Treatment  PT/PTA: PT             General Precautions: Standard, fall  Orthopedic Precautions: N/A   Braces: LSO         Subjective:  Communicated with NURSE VICTORIA AND Epic CHART REVIEW prior to session.   PT AGREED TO TX     Pain/Comfort  Pain Rating 1: 6/10  Location - Side 1: Right  Location 1: back  Pain Rating Post-Intervention 1: 6/10    Objective:   Patient found with: telemetry, peripheral IV, wound vac, bed alarm, oxygen    Functional Mobility:  PT MET IN RM LOG ROLLED TO LEFT AND SEATED EOB WITH MOD A. PT DONNED LSO WITH MOD A. PT SCOOTED TO EOB AND SHOES WERE DONNED WITH TOTAL A. PT STOOD X 3 REPS WITH RW WITH EMPHASIS ON T/F TRAINING. PT STOOD WITH MAX A . PT COMPLETED SQUAT PIVOT T/F TO CHAIR WITH MAX A AND SCOOTED BACK IN CHAIR WITH MAX A. PT COMPLETED SIT>STAND X 3 REPS WITH MAX A  AND TACTILE CUES FOR POSTURE. PT RETURNED SEATED IN CHAIR AND COMPLETED BE LE TE X 20 REPS OF GLUT SETS, MIP, TKE, AND AP WITH LIMITED ROM. PT LEFT SEATED IN WC WITH ALL NEEDS MET.     AM-PAC 6 CLICK MOBILITY  How much help from another person does this patient currently need?   1 = Unable, Total/Dependent Assistance  2 = A lot, Maximum/Moderate Assistance  3 = A little, Minimum/Contact Guard/Supervision  4 = None, Modified [place]/Independent    Turning over in bed (including adjusting bedclothes, sheets and blankets)?: 2  Sitting down on and standing up from a chair with arms (e.g., wheelchair, bedside commode, etc.): 2  Moving from lying on back to sitting on the side of the bed?: 2  Moving to and from a bed to a chair (including a wheelchair)?: 2  Need to walk in hospital room?: 1  Climbing 3-5 steps with a railing?:  1  Basic Mobility Total Score: 10    AM-PAC Raw Score CMS G-Code Modifier Level of Impairment Assistance   6 % Total / Unable   7 - 9 CM 80 - 100% Maximal Assist   10 - 14 CL 60 - 80% Moderate Assist   15 - 19 CK 40 - 60% Moderate Assist   20 - 22 CJ 20 - 40% Minimal Assist   23 CI 1-20% SBA / CGA   24 CH 0% Independent/ Mod I     Patient left up in chair with call button in reach and chair alarm on.    Assessment:  PT PROGRESSING WITH SIT>STAND AND T/F TRAINING.     Rehab identified problem list/impairments: Rehab identified problem list/impairments: weakness, gait instability, impaired balance, impaired endurance, impaired self care skills, impaired functional mobilty, decreased lower extremity function, decreased ROM, decreased upper extremity function, decreased safety awareness, pain, decreased coordination, abnormal tone, orthopedic precautions    Rehab potential is fair.    Activity tolerance: Fair    Discharge recommendations: Discharge Facility/Level of Care Needs: nursing facility, skilled     Barriers to discharge:      Equipment recommendations: Equipment Needed After Discharge: none     GOALS:   Multidisciplinary Problems     Physical Therapy Goals        Problem: Physical Therapy Goal    Goal Priority Disciplines Outcome Goal Variances Interventions   Physical Therapy Goal     PT, PT/OT Ongoing (interventions implemented as appropriate)     Description:  PT WILL BE SEEN FOR P.T. FOR A MIN OF 5 OUT OF 7 DAYS A WEEK  LT19  1. PT WILL COMPLETE BED MOBILITY WITH MOD A  2. PT WILL SCOOT IN BED WITH MOD A  3. PT WILL T/F TO CHAIR WITH MAX A X 1 AND LSO ON  4. PT WILL COMPLETE B LE TE X 10 REPS FOR ROM AND STRENGTHENING                    PLAN:    Patient to be seen to address the above listed problems via therapeutic activities, therapeutic exercises, wheelchair management/training  Plan of Care expires: 19  Plan of Care reviewed with: patient         Kristen Lala,  PT  06/14/2019

## 2019-06-14 NOTE — PLAN OF CARE
Problem: Physical Therapy Goal  Goal: Physical Therapy Goal  PT WILL BE SEEN FOR P.T. FOR A MIN OF 5 OUT OF 7 DAYS A WEEK  LT19  1. PT WILL COMPLETE BED MOBILITY WITH MOD A  2. PT WILL SCOOT IN BED WITH MOD A  3. PT WILL T/F TO CHAIR WITH MAX A X 1 AND LSO ON  4. PT WILL COMPLETE B LE TE X 10 REPS FOR ROM AND STRENGTHENING   Outcome: Ongoing (interventions implemented as appropriate)  PT T/F TO CHAIR WITH SQUAT PIVOT AND MAX A WITH LSO ON

## 2019-06-14 NOTE — ASSESSMENT & PLAN NOTE
Resume her home medications  Doing well    6/13  Carbidopa  PT / OT  Monitor    06/14  - Reviewed. No changes.  - Continue current management

## 2019-06-14 NOTE — ASSESSMENT & PLAN NOTE
Plan is to go to the OR for a washout  Will continue with current abx started in the ED    S/p spinal wash out, on wound vac    POD 1 s/p I&D of wound dehiscence of lumbar surgery   looks and feels much better  On IV Vanc  Continue present care  D/c back to [facility] when cleared by Dr. Nicolas     6/13  Plan for LTAC  PT / OT  ABX    06/14  - Will return to [facility] on Monday, 06/17/19  - Continue PT/OT  - Continue IV abx -- ID following  - LSO brace when OOB  - Wound vac change today then every 3 days  - F/U with Dr. Nicolas upon discharge

## 2019-06-14 NOTE — PLAN OF CARE
Problem: Occupational Therapy Goal  Goal: Occupational Therapy Goal  ot goals to be met by 6-20-19  sba with rolling l<>r  Pt will tolerate sitting eob with fair+ sitting balance to prep for g/h task  Mod a with bsc t/f's     Outcome: Ongoing (interventions implemented as appropriate)  Working toward goals

## 2019-06-14 NOTE — PROGRESS NOTES
[facility]  Neurosurgery  Progress Note    Subjective:     Interval History:   P/O day #3  Patient sitting up in bed eating her breakfast   Reports pain much improved today vs yesterday  Wound vac in place with good seal now, report last night detected leak and was reinforced with microfoam tape and tegaderm  120 ml output since 6/13/19 0700 up until now  Carlson catheter in place  Afebrile overnight    WBC trending down  AFB cx stain - no acid fast bacilli seen  Gram stain- few wbcs, no organisms  Aerobic cx- candida albicans  Anaerobic cx- in progress    History of Present Illness:   Patient is a 73-year-old female who previously had osteomyelitis and diskitis at L2-3 status post L1- L4 stabilization.  Patient presented with inferior drainage at her incision site.  Labs demonstrated increasing CRP and ESR.  She has a number of medical issues including diabetes and low albumin levels. CT scan showed new abscess to the psoas muscle on the right side at at L2-3.  Patient was consented for I and D and debridement of wound with placement of wound VAC.     Date of Procedure: 6/11/2019    Procedure: Procedure(s) (LRB):  DEBRIDEMENT, WOUND (Bilateral)  APPLICATION, WOUND VAC (N/A)    Surgeon(s) and Role:     * Stanislav Nicolas MD - Primary   Pre-Operative Diagnosis: Osteomyelitis, unspecified site, unspecified type [M86.9]  Discitis of lumbar region [M46.46]  Abscess in epidural space of lumbar spine [G06.1]   Post-Operative Diagnosis: Post-Op Diagnosis Codes:     * Osteomyelitis, unspecified site, unspecified type [M86.9]     * Discitis of lumbar region [M46.46]     * Abscess in epidural space of lumbar spine [G06.1]   Anesthesia: General   Technical Procedures Used:   1.  Subfascial and suprafascial wound debridement  2.  Placement of wound VAC   Description of the Findings of the Procedure:  Superficial and subfascial wound dehiscence      Post-Op Info:  Procedure(s) (LRB):  DEBRIDEMENT, WOUND  (Bilateral)  APPLICATION, WOUND VAC (N/A)   3 Days Post-Op      Medications:  Continuous Infusions:  Scheduled Meds:   carbidopa-levodopa  mg  1 tablet Oral 5x Daily    enoxaparin  40 mg Subcutaneous Daily    latanoprost  1 drop Both Eyes QHS    levothyroxine  125 mcg Oral Before breakfast    losartan  50 mg Oral Daily    micafungin (MYCAMINE) IVPB  100 mg Intravenous Q24H    miconazole NITRATE 2 %   Topical (Top) Every Mon, Wed, Fri    miconazole nitrate 2%   Topical (Top) BID    pantoprazole  40 mg Oral Daily    pramipexole  1 mg Oral TID    pravastatin  40 mg Oral Daily    vancomycin (VANCOCIN) IVPB  1,250 mg Intravenous Q12H     PRN Meds:acetaminophen, albuterol-ipratropium, cyclobenzaprine, dextrose 50%, glucagon (human recombinant), glucose, insulin aspart U-100, ondansetron, oxyCODONE-acetaminophen, promethazine (PHENERGAN) IVPB, sodium chloride 0.9%     Review of Systems     Constitutional: Negative for fever. Positive for activity change and fatigue.  Gastrointestinal: Negative for nausea and vomiting.   Genitourinary: Negative for decreased urine volume.   Musculoskeletal: Positive for back pain and gait problem.   Skin: Positive for wound (wound vac in place over lower back).   Neurological: Positive for tremors and weakness.   Psychiatric/Behavioral: Negative for behavioral problems.      Objective:     Weight: 98.8 kg (217 lb 13 oz)  Body mass index is 35.16 kg/m².  Vital Signs (Most Recent):  Temp: 97.8 °F (36.6 °C) (06/14/19 0744)  Pulse: 89 (06/14/19 0744)  Resp: 12 (06/14/19 0744)  BP: 133/66 (06/14/19 0744)  SpO2: (!) 94 % (06/14/19 0748) Vital Signs (24h Range):  Temp:  [96.2 °F (35.7 °C)-98.9 °F (37.2 °C)] 97.8 °F (36.6 °C)  Pulse:  [] 89  Resp:  [12-22] 12  SpO2:  [94 %-99 %] 94 %  BP: (117-167)/(56-74) 133/66                Oxygen Concentration (%):  [28] 28         Urethral Catheter 06/11/19 8879 Latex;Straight-tip 16 Fr. (Active)   Site Assessment Clean;Intact;Dry  "6/13/2019  7:42 PM   Collection Container Urimeter 6/13/2019  7:42 PM   Securement Method secured to top of thigh w/ adhesive device 6/13/2019  7:42 PM   Catheter Care Performed yes 6/13/2019  7:42 PM   Reason for Continuing Urinary Catheterization Post operative;Non-healing sacral/perineal wound 6/13/2019  7:42 PM   CAUTI Prevention Bundle StatLock in place w 1" slack;Intact seal between catheter & drainage tubing;Drainage bag off the floor;Green sheeting clip in use;No dependent loops or kinks;Drainage bag not overfilled (<2/3 full);Drainage bag below bladder 6/13/2019  7:42 PM   Output (mL) 400 mL 6/13/2019  9:12 PM       Neurosurgery Physical Exam     Nursing note and vitals reviewed  Gen:Oriented to person, place. Appears stated age.   Head:Normocephalic and atraumatic.  Nose: Nose normal.    Eyes: EOM are normal. Pupils are equal, round, and reactive to light.   Neck: Neck supple. No masses or lesions palpated  Cardiovascular: Intact distal pulses.    Abdominal: Tenderness of RLQ and LLQ  Neurological: Alert and oriented to person and place. Patient displays tremor.   Psychiatric: Normal mood and affect. Behavior is normal.           Back:  Wound vac in place with good seal      Mild tenderness to palpation across upper and lower l-spine (marcellus.) Paraspinal tenderness   none   Paraspinal muscle spasms     Unable to assess, patient in bed Pain with flexion and extention     Unable to assess, patient in bed Range of motion      + marcellus Straight leg raise      Motor: Patient is able to raise and move her legs. Dropfoot at baseline.     Right Right Left Left  Level Group   5 4 5 4 L2 Hip flexor (Psoas)   5 4 5 4 L3 Leg extension (Quads)   5 3 5 3 L4 Dorsiflexion & foot inversion (Tibialis Anterior)   5 3 5 3 L5 Great toe extension ( EHL)   5 3 5 3 S1 Foot eversion (Gastroc, PL & PB)      Sensation:  NL Decreased (R/L/BL) Level Sensation    X   L2 Anterio-medial thigh   X   L3 Medial thigh around knee     Decreased Marcellus " L4 Medial foot     Decreased Marcellus L5 Dorsum foot     Decreased Marcellus S1 Lateral foot           Significant Labs:  Recent Labs   Lab 06/13/19  0436 06/14/19  0423   * 129*   * 131*   K 4.1 4.3   CL 97 96   CO2 27 24   BUN 12 13   CREATININE 0.8 0.8   CALCIUM 9.7 9.6     Recent Labs   Lab 06/13/19  0436 06/14/19  0423   WBC 8.09 7.44   HGB 10.4* 10.8*   HCT 33.6* 35.3*   * 413*     No results for input(s): LABPT, INR, APTT in the last 48 hours.  Microbiology Results (last 7 days)     Procedure Component Value Units Date/Time    Blood Culture #2 **CANNOT BE ORDERED STAT** [[id number]] Collected:  06/10/19 1440    Order Status:  Completed Specimen:  Blood from Peripheral, Antecubital, Right Updated:  06/13/19 2212     Blood Culture, Routine No Growth to date     Blood Culture, Routine No Growth to date     Blood Culture, Routine No Growth to date     Blood Culture, Routine No Growth to date    Blood Culture #1 **CANNOT BE ORDERED STAT** [[id number]] Collected:  06/10/19 1526    Order Status:  Completed Specimen:  Blood from Peripheral, Forearm, Right Updated:  06/13/19 2212     Blood Culture, Routine No Growth to date     Blood Culture, Routine No Growth to date     Blood Culture, Routine No Growth to date     Blood Culture, Routine No Growth to date    Aerobic culture [[id number]] Collected:  06/11/19 1434    Order Status:  Completed Specimen:  Abscess from Back Updated:  06/13/19 1229     Aerobic Bacterial Culture --     CANDIDA ALBICANS  Few      AFB Culture & Smear [[id number]] Collected:  06/11/19 1434    Order Status:  Completed Specimen:  Abscess from Back Updated:  06/13/19 0927     AFB Culture & Smear Culture in progress     AFB CULTURE STAIN No acid fast bacilli seen.    Culture, Anaerobe [[id number]] Collected:  06/11/19 1434    Order Status:  Completed Specimen:  Abscess from Back Updated:  06/13/19 0756     Anaerobic Culture Culture in progress    Gram stain [[id number]] Collected:  06/11/19 1434     Order Status:  Completed Specimen:  Abscess from Back Updated:  06/11/19 2329     Gram Stain Result Few WBC's      No organisms seen    Fungus culture [[id number]] Collected:  06/11/19 1434    Order Status:  Sent Specimen:  Abscess from Back Updated:  06/11/19 2215        ABGs: No results for input(s): PH, PCO2, PO2, HCO3, POCSATURATED, BE in the last 48 hours.  Cardiac markers: No results for input(s): CKMB, CPKMB, TROPONINT, TROPONINI, MYOGLOBIN in the last 48 hours.  CMP:   Recent Labs   Lab 06/13/19  0436 06/14/19  0423   * 129*   CALCIUM 9.7 9.6   ALBUMIN 2.9* 3.0*   PROT 6.6 6.5   * 131*   K 4.1 4.3   CO2 27 24   CL 97 96   BUN 12 13   CREATININE 0.8 0.8   ALKPHOS 93 97   ALT <5* <5*   AST 19 18   BILITOT 0.4 0.5     CRP: No results for input(s): CRP in the last 48 hours.  All pertinent labs from the last 24 hours have been reviewed.      Assessment/Plan:     Active Diagnoses:    Diagnosis Date Noted POA    PRINCIPAL PROBLEM:  Osteomyelitis [M86.9] 06/10/2019 Yes     Chronic    Hypothyroidism [E03.9] 11/19/2013 Yes     Chronic    PD (Parkinson's disease) [G20]  Yes     Chronic      Problems Resolved During this Admission:       Continue wound care with wound vac in place. Continuous suction at 125 mm hg. Change out sponge every 3 days. First change is scheduled for today, 6/14  Continue treatment regimen including Abx per HM and ID recommendations  Continue PT/ OT  LSO brace when OOB with therapy  Lovenox 40 mg Q24  Muscle relaxant PRN  D/c planning in progress. Return to [facility] (patient's request) early next week      Mayra Long PA-C  Neurosurgery  [facility]

## 2019-06-15 LAB
ALBUMIN SERPL BCP-MCNC: 2.9 G/DL (ref 3.5–5.2)
ALP SERPL-CCNC: 103 U/L (ref 55–135)
ALT SERPL W/O P-5'-P-CCNC: <5 U/L (ref 10–44)
ANION GAP SERPL CALC-SCNC: 11 MMOL/L (ref 8–16)
AST SERPL-CCNC: 17 U/L (ref 10–40)
BACTERIA BLD CULT: NORMAL
BACTERIA BLD CULT: NORMAL
BASOPHILS # BLD AUTO: 0.02 K/UL (ref 0–0.2)
BASOPHILS NFR BLD: 0.3 % (ref 0–1.9)
BILIRUB SERPL-MCNC: 0.6 MG/DL (ref 0.1–1)
BUN SERPL-MCNC: 14 MG/DL (ref 8–23)
CALCIUM SERPL-MCNC: 9.4 MG/DL (ref 8.7–10.5)
CHLORIDE SERPL-SCNC: 95 MMOL/L (ref 95–110)
CO2 SERPL-SCNC: 25 MMOL/L (ref 23–29)
CREAT SERPL-MCNC: 0.8 MG/DL (ref 0.5–1.4)
CRP SERPL-MCNC: 34.8 MG/L (ref 0–8.2)
DIFFERENTIAL METHOD: ABNORMAL
EOSINOPHIL # BLD AUTO: 0.3 K/UL (ref 0–0.5)
EOSINOPHIL NFR BLD: 4.1 % (ref 0–8)
ERYTHROCYTE [DISTWIDTH] IN BLOOD BY AUTOMATED COUNT: 15 % (ref 11.5–14.5)
ERYTHROCYTE [SEDIMENTATION RATE] IN BLOOD BY WESTERGREN METHOD: 105 MM/HR (ref 0–20)
EST. GFR  (AFRICAN AMERICAN): >60 ML/MIN/1.73 M^2
EST. GFR  (NON AFRICAN AMERICAN): >60 ML/MIN/1.73 M^2
GLUCOSE SERPL-MCNC: 144 MG/DL (ref 70–110)
HCT VFR BLD AUTO: 34.6 % (ref 37–48.5)
HGB BLD-MCNC: 10.8 G/DL (ref 12–16)
LYMPHOCYTES # BLD AUTO: 1.9 K/UL (ref 1–4.8)
LYMPHOCYTES NFR BLD: 27.8 % (ref 18–48)
MCH RBC QN AUTO: 27.8 PG (ref 27–31)
MCHC RBC AUTO-ENTMCNC: 31.2 G/DL (ref 32–36)
MCV RBC AUTO: 89 FL (ref 82–98)
MONOCYTES # BLD AUTO: 0.8 K/UL (ref 0.3–1)
MONOCYTES NFR BLD: 11 % (ref 4–15)
NEUTROPHILS # BLD AUTO: 3.9 K/UL (ref 1.8–7.7)
NEUTROPHILS NFR BLD: 56.8 % (ref 38–73)
PLATELET # BLD AUTO: 362 K/UL (ref 150–350)
PMV BLD AUTO: 8 FL (ref 9.2–12.9)
POCT GLUCOSE: 148 MG/DL (ref 70–110)
POCT GLUCOSE: 151 MG/DL (ref 70–110)
POCT GLUCOSE: 182 MG/DL (ref 70–110)
POCT GLUCOSE: 197 MG/DL (ref 70–110)
POTASSIUM SERPL-SCNC: 4.2 MMOL/L (ref 3.5–5.1)
PROT SERPL-MCNC: 6.3 G/DL (ref 6–8.4)
RBC # BLD AUTO: 3.89 M/UL (ref 4–5.4)
SODIUM SERPL-SCNC: 131 MMOL/L (ref 136–145)
WBC # BLD AUTO: 6.84 K/UL (ref 3.9–12.7)

## 2019-06-15 PROCEDURE — 97530 THERAPEUTIC ACTIVITIES: CPT

## 2019-06-15 PROCEDURE — 21400001 HC TELEMETRY ROOM

## 2019-06-15 PROCEDURE — 36415 COLL VENOUS BLD VENIPUNCTURE: CPT

## 2019-06-15 PROCEDURE — 27000221 HC OXYGEN, UP TO 24 HOURS

## 2019-06-15 PROCEDURE — 94761 N-INVAS EAR/PLS OXIMETRY MLT: CPT

## 2019-06-15 PROCEDURE — 85651 RBC SED RATE NONAUTOMATED: CPT

## 2019-06-15 PROCEDURE — 85025 COMPLETE CBC W/AUTO DIFF WBC: CPT

## 2019-06-15 PROCEDURE — 25000003 PHARM REV CODE 250: Performed by: PHYSICIAN ASSISTANT

## 2019-06-15 PROCEDURE — 25000003 PHARM REV CODE 250: Performed by: INTERNAL MEDICINE

## 2019-06-15 PROCEDURE — 63600175 PHARM REV CODE 636 W HCPCS: Performed by: PHYSICIAN ASSISTANT

## 2019-06-15 PROCEDURE — 97110 THERAPEUTIC EXERCISES: CPT

## 2019-06-15 PROCEDURE — 86140 C-REACTIVE PROTEIN: CPT

## 2019-06-15 PROCEDURE — 80053 COMPREHEN METABOLIC PANEL: CPT

## 2019-06-15 PROCEDURE — 63600175 PHARM REV CODE 636 W HCPCS: Performed by: INTERNAL MEDICINE

## 2019-06-15 PROCEDURE — 97535 SELF CARE MNGMENT TRAINING: CPT

## 2019-06-15 RX ORDER — FLUCONAZOLE 2 MG/ML
400 INJECTION, SOLUTION INTRAVENOUS
Status: DISCONTINUED | OUTPATIENT
Start: 2019-06-15 | End: 2019-06-18 | Stop reason: HOSPADM

## 2019-06-15 RX ADMIN — PANTOPRAZOLE SODIUM 40 MG: 40 TABLET, DELAYED RELEASE ORAL at 09:06

## 2019-06-15 RX ADMIN — CARBIDOPA AND LEVODOPA 1 TABLET: 25; 250 TABLET ORAL at 09:06

## 2019-06-15 RX ADMIN — FLUCONAZOLE 400 MG: 2 INJECTION, SOLUTION INTRAVENOUS at 01:06

## 2019-06-15 RX ADMIN — OXYCODONE AND ACETAMINOPHEN 1 TABLET: 10; 325 TABLET ORAL at 09:06

## 2019-06-15 RX ADMIN — PRAMIPEXOLE DIHYDROCHLORIDE 1 MG: 0.5 TABLET ORAL at 03:06

## 2019-06-15 RX ADMIN — CARBIDOPA AND LEVODOPA 1 TABLET: 25; 250 TABLET ORAL at 05:06

## 2019-06-15 RX ADMIN — PRAMIPEXOLE DIHYDROCHLORIDE 1 MG: 0.5 TABLET ORAL at 09:06

## 2019-06-15 RX ADMIN — OXYCODONE AND ACETAMINOPHEN 1 TABLET: 10; 325 TABLET ORAL at 06:06

## 2019-06-15 RX ADMIN — CEFTRIAXONE 2 G: 2 INJECTION, SOLUTION INTRAVENOUS at 11:06

## 2019-06-15 RX ADMIN — MICONAZOLE NITRATE: 2 OINTMENT TOPICAL at 09:06

## 2019-06-15 RX ADMIN — INSULIN ASPART 2 UNITS: 100 INJECTION, SOLUTION INTRAVENOUS; SUBCUTANEOUS at 05:06

## 2019-06-15 RX ADMIN — LATANOPROST 1 DROP: 50 SOLUTION OPHTHALMIC at 09:06

## 2019-06-15 RX ADMIN — PRAVASTATIN SODIUM 40 MG: 20 TABLET ORAL at 09:06

## 2019-06-15 RX ADMIN — ENOXAPARIN SODIUM 40 MG: 100 INJECTION SUBCUTANEOUS at 05:06

## 2019-06-15 RX ADMIN — CARBIDOPA AND LEVODOPA 1 TABLET: 25; 250 TABLET ORAL at 03:06

## 2019-06-15 RX ADMIN — CYCLOBENZAPRINE HYDROCHLORIDE 10 MG: 10 TABLET, FILM COATED ORAL at 05:06

## 2019-06-15 RX ADMIN — LOSARTAN POTASSIUM 50 MG: 50 TABLET, FILM COATED ORAL at 09:06

## 2019-06-15 RX ADMIN — LEVOTHYROXINE SODIUM 125 MCG: 125 TABLET ORAL at 05:06

## 2019-06-15 RX ADMIN — OXYCODONE AND ACETAMINOPHEN 1 TABLET: 10; 325 TABLET ORAL at 03:06

## 2019-06-15 RX ADMIN — OXYCODONE AND ACETAMINOPHEN 1 TABLET: 10; 325 TABLET ORAL at 12:06

## 2019-06-15 NOTE — PLAN OF CARE
Problem: Physical Therapy Goal  Goal: Physical Therapy Goal  PT WILL BE SEEN FOR P.T. FOR A MIN OF 5 OUT OF 7 DAYS A WEEK  LT19  1. PT WILL COMPLETE BED MOBILITY WITH MOD A  2. PT WILL SCOOT IN BED WITH MOD A  3. PT WILL T/F TO CHAIR WITH MAX A X 1 AND LSO ON  4. PT WILL COMPLETE B LE TE X 10 REPS FOR ROM AND STRENGTHENING   Outcome: Ongoing (interventions implemented as appropriate)  Mod assist for bed mobility and max assist for stand-pivot transfer from bed to chair.

## 2019-06-15 NOTE — ASSESSMENT & PLAN NOTE
Plan is to go to the OR for a washout  Will continue with current abx started in the ED    S/p spinal wash out, on wound vac    POD 1 s/p I&D of wound dehiscence of lumbar surgery   looks and feels much better  On IV Vanc  Continue present care  D/c back to [facility] when cleared by Dr. Nicolas     6/13  Plan for LTAC  PT / OT  ABX    06/14  - Will return to [facility] on Monday, 06/17/19  - Continue PT/OT  - Continue IV abx -- ID following  - LSO brace when OOB  - Wound vac change today then every 3 days  - F/U with Dr. Nicolas upon discharge    06/15  - ID following -- IV abx changed from Vancomycin to Rocephin  - IV Diflucan added for candida albicans  - BC NGTD  - Continue PT/OT  - LSO brace when OOB  - Wound vac changed on 06/14. To change every 3 days, next change on 06/17

## 2019-06-15 NOTE — SUBJECTIVE & OBJECTIVE
Interval History: 73 year old woman with history of spinal osteomyelitis .  Cultures - Candida albicans-06/11  Gram stain -negative     Review of Systems   Constitutional: Positive for activity change and fatigue.   HENT: Negative.    Eyes: Negative for visual disturbance.   Respiratory: Negative for apnea, cough, choking, chest tightness, shortness of breath, wheezing and stridor.    Cardiovascular: Negative for chest pain, palpitations and leg swelling.   Gastrointestinal: Negative for abdominal pain, constipation, diarrhea, nausea and vomiting.   Endocrine: Negative.    Musculoskeletal: Positive for back pain. Negative for arthralgias, joint swelling, myalgias, neck pain and neck stiffness.   Skin: Negative.    Allergic/Immunologic: Negative.    Neurological: Positive for tremors and weakness. Negative for dizziness, seizures, syncope, facial asymmetry, speech difficulty, light-headedness, numbness and headaches.   Hematological: Negative.    Psychiatric/Behavioral: Negative for agitation, behavioral problems and confusion.     Objective:     Vital Signs (Most Recent):  Temp: 98.8 °F (37.1 °C) (06/15/19 0743)  Pulse: 94 (06/15/19 0806)  Resp: 18 (06/15/19 0743)  BP: (!) 114/57 (06/15/19 0743)  SpO2: 96 % (06/15/19 0806) Vital Signs (24h Range):  Temp:  [97.5 °F (36.4 °C)-98.8 °F (37.1 °C)] 98.8 °F (37.1 °C)  Pulse:  [] 94  Resp:  [12-18] 18  SpO2:  [94 %-96 %] 96 %  BP: ()/(51-70) 114/57     Weight: 96.4 kg (212 lb 8.4 oz)  Body mass index is 34.3 kg/m².    Estimated Creatinine Clearance: 73.3 mL/min (based on SCr of 0.8 mg/dL).    Physical Exam   Constitutional: She is oriented to person, place, and time. She appears well-developed and well-nourished. She is cooperative. She is easily aroused. She appears ill. Nasal cannula in place.   HENT:   Head: Normocephalic and atraumatic.   Mouth/Throat: Oropharynx is clear and moist.   Eyes: Pupils are equal, round, and reactive to light. Conjunctivae and EOM  are normal.   Neck: Normal range of motion. Neck supple.   Cardiovascular: Normal rate, regular rhythm and intact distal pulses.   Murmur heard.   Systolic murmur is present with a grade of 3/6.  Pulmonary/Chest: Effort normal. No accessory muscle usage or stridor. No tachypnea. No respiratory distress. She has decreased breath sounds. She has no wheezes. She has no rhonchi. She has no rales. She exhibits no tenderness.   Abdominal: Soft. Bowel sounds are normal. She exhibits no distension. There is no tenderness. There is no rebound and no guarding.   Genitourinary:   Genitourinary Comments: Deferred   Musculoskeletal: She exhibits no edema, tenderness or deformity.   L/S wound vac in place   Neurological: She is alert, oriented to person, place, and time and easily aroused. She displays tremor. No sensory deficit. GCS eye subscore is 4. GCS verbal subscore is 5.   Skin: Skin is warm and dry. Capillary refill takes less than 2 seconds.   Psychiatric: She has a normal mood and affect. Her behavior is normal. Thought content normal.   Nursing note and vitals reviewed.      Significant Labs:   Blood Culture:   Recent Labs   Lab 05/28/19  1159 06/01/19  0003 06/01/19  0010 06/10/19  1440 06/10/19  1526   LABBLOO No growth after 5 days. No growth after 5 days. No growth after 5 days. No Growth to date  No Growth to date  No Growth to date  No Growth to date  No Growth to date No Growth to date  No Growth to date  No Growth to date  No Growth to date  No Growth to date     BMP:   Recent Labs   Lab 06/15/19  0419   *   *   K 4.2   CL 95   CO2 25   BUN 14   CREATININE 0.8   CALCIUM 9.4       Significant Imaging: I have reviewed all pertinent imaging results/findings within the past 24 hours.

## 2019-06-15 NOTE — PLAN OF CARE
Problem: Adult Inpatient Plan of Care  Goal: Plan of Care Review  Outcome: Ongoing (interventions implemented as appropriate)  Plan of care reviewed with patient and spouse, both verbalized understanding.  Pt remains free from falls this shift, call light within reach and pt encouraged to call for assistance.  Pt remains free from further skin breakdown, she is turned every 2 hours per order.  Pt has a wound vac in place to continuous suction at 125.  Pt complained of pain this afternoon, medication given.  Pt has several wounds being monitored closely.  AAOx3, VSS, NAD noted at this time, 4L O2 via NC.  Blood glucose monitoring.  Pt has a valentine in place.  Pt is currently resting in bed.  Will continue to monitot.

## 2019-06-15 NOTE — PT/OT/SLP PROGRESS
Occupational Therapy  Treatment    Ross Isbell   MRN: [id number]   Admitting Diagnosis: Osteomyelitis    OT Date of Treatment: 06/15/19   OT Start Time: 1510  OT Stop Time: 1625  OT Total Time (min): 75 min    Billable Minutes:  Self Care/Home Management 15 minutes, Therapeutic Activity 45 minutes and Therapeutic Exercise 15 minutes    General Precautions: Standard, fall  Orthopedic Precautions: N/A  Braces: N/A         Subjective:  Communicated with nurse Kellogg and epic chart review prior to session.    Pain/Comfort  Pain Rating 1: 10/10  Location - Orientation 1: generalized  Location 1: groin  Pain Addressed 1: Pre-medicate for activity, Reposition, Distraction    Objective:  Patient found with: peripheral IV, telemetry, wound vac     Functional Mobility:  Bed Mobility:   Mod a with rolling l<>r   Max a x 2 with supine<>sit  Max a with forward scooting    Transfers:   step t/f's bed<>wc max a x 2       Activities of Daily Living:     Feeding adaptive equipment: na     UE adaptive equipment: max a [facility] hospital robe and TLSO brace  LE adaptive equipment: total a    Bathing adaptive equipment: na    Balance:   Static Sit: POOR: Needs MODERATE assist to maintain  Dynamic Sit: poor-  Static Stand: poor-  Dynamic stand: poor-    Therapeutic Activities and Exercises:  Pt seen in room.  Pt req mod a with rolling R<>L and req max a x 2 with sit<>supine. Pt req max a with [facility] hospital robe as well as back brace sitting eob. Pt req max a with forward scooting and max a with sit<>stand x 2 trials . Pt req max a x 2 with bed<>w/c. Pt req max a with rolling pt in w/c x 50 feet x 2 . Pt performed 1 set x 10-15 reps b ue a/aarom exercise in all available planes and ranges seated in wc. Pt returned to room and transfer to bed. Valdez notified of dislodging of IV during transfer. Pt left on right side lying with all needs met and call button in reach. Nurse Valdez and family present in room  AM-PAC 6 CLICK ADL   How much  "help from another person does this patient currently need?   1 = Unable, Total/Dependent Assistance  2 = A lot, Maximum/Moderate Assistance  3 = A little, Minimum/Contact Guard/Supervision  4 = None, Modified [place]/Independent    Putting on and taking off regular lower body clothing? : 2  Bathing (including washing, rinsing, drying)?: 2  Toileting, which includes using toilet, bedpan, or urinal? : 2  Putting on and taking off regular upper body clothing?: 2  Taking care of personal grooming such as brushing teeth?: 2  Eating meals?: 2  Daily Activity Total Score: 12     AM-PAC Raw Score CMS "G-Code Modifier Level of Impairment Assistance   6 % Total / Unable   7 - 8 CM 80 - 100% Maximal Assist   9-13 CL 60 - 80% Moderate Assist   14 - 19 CK 40 - 60% Moderate Assist   20 - 22 CJ 20 - 40% Minimal Assist   23 CI 1-20% SBA / CGA   24 CH 0% Independent/ Mod I       Patient left right sidelying with all lines intact, call button in reach, bed alarm on, nurse Valdez notified and nurse Valdez and family present    ASSESSMENT:  Ross Isbell is a 73 y.o. female with a medical diagnosis of Osteomyelitis and presents with debility and generalized weakness. Pt will continue to benefit from skilled OT    Rehab identified problem list/impairments: Rehab identified problem list/impairments: weakness, impaired self care skills, impaired balance, decreased coordination, decreased safety awareness, impaired endurance, impaired functional mobilty, decreased lower extremity function    Rehab potential is fair.    Activity tolerance: Fair    Discharge recommendations: Discharge Facility/Level of Care Needs: nursing facility, skilled     Barriers to discharge: Barriers to Discharge: Decreased caregiver support    Equipment recommendations: (tbd)     GOALS:   Multidisciplinary Problems     Occupational Therapy Goals        Problem: Occupational Therapy Goal    Goal Priority Disciplines Outcome Interventions   Occupational " Therapy Goal     OT, PT/OT Ongoing (interventions implemented as appropriate)    Description:  ot goals to be met by 6-20-19  sba with rolling l<>r  Pt will tolerate sitting eob with fair+ sitting balance to prep for g/h task  Mod a with bsc t/f's                      Plan:  Patient to be seen 3 x/week to address the above listed problems via self-care/home management, therapeutic exercises, therapeutic activities  Plan of Care expires: 06/20/19  Plan of Care reviewed with: patient, caregiver, spouse         Cyndie Hernandez OT  06/15/2019

## 2019-06-15 NOTE — PROGRESS NOTES
Clinical Pharmacy: Vancomycin Consult - Signoff Note    Therapy with vancomycin complete and/or consult discontinued by provider.  Pharmacy will sign off, please re-consult as needed.    Thank you for allowing us to participate in this patient's care.   Katherine McArdle, Pharm.D. 6/15/2019 12:56 PM

## 2019-06-15 NOTE — PROGRESS NOTES
[facility]  Neurosurgery  Progress Note    Subjective:     Interval History:   P/O day #4  Patient has been reporting improving pain over last 2 days  Wound vac in place with good seal now, replaced yesterday  Patient up with PT at bedside, LSO brace in place  Carlson catheter in place  Afebrile overnight    WBC trending down  AFB cx stain - no acid fast bacilli seen  Gram stain- few wbcs, no organisms  Aerobic cx- candida albicans  Anaerobic cx- in progress  Fungus cx - in progress    History of Present Illness:   Patient is a 73-year-old female who previously had osteomyelitis and diskitis at L2-3 status post L1- L4 stabilization.  Patient presented with inferior drainage at her incision site.  Labs demonstrated increasing CRP and ESR.  She has a number of medical issues including diabetes and low albumin levels. CT scan showed new abscess to the psoas muscle on the right side at at L2-3.  Patient was consented for I and D and debridement of wound with placement of wound VAC.     Date of Procedure: 6/11/2019    Procedure: Procedure(s) (LRB):  DEBRIDEMENT, WOUND (Bilateral)  APPLICATION, WOUND VAC (N/A)    Surgeon(s) and Role:     * Stanislav Nicolas MD - Primary   Pre-Operative Diagnosis: Osteomyelitis, unspecified site, unspecified type [M86.9]  Discitis of lumbar region [M46.46]  Abscess in epidural space of lumbar spine [G06.1]   Post-Operative Diagnosis: Post-Op Diagnosis Codes:     * Osteomyelitis, unspecified site, unspecified type [M86.9]     * Discitis of lumbar region [M46.46]     * Abscess in epidural space of lumbar spine [G06.1]   Anesthesia: General   Technical Procedures Used:   1.  Subfascial and suprafascial wound debridement  2.  Placement of wound VAC   Description of the Findings of the Procedure:  Superficial and subfascial wound dehiscence      Post-Op Info:  Procedure(s) (LRB):  DEBRIDEMENT, WOUND (Bilateral)  APPLICATION, WOUND VAC (N/A)   4 Days Post-Op       Medications:  Continuous Infusions:  Scheduled Meds:   carbidopa-levodopa  mg  1 tablet Oral 5x Daily    enoxaparin  40 mg Subcutaneous Daily    latanoprost  1 drop Both Eyes QHS    levothyroxine  125 mcg Oral Before breakfast    losartan  50 mg Oral Daily    micafungin (MYCAMINE) IVPB  100 mg Intravenous Q24H    miconazole NITRATE 2 %   Topical (Top) Every Mon, Wed, Fri    miconazole nitrate 2%   Topical (Top) BID    pantoprazole  40 mg Oral Daily    pramipexole  1 mg Oral TID    pravastatin  40 mg Oral Daily    vancomycin (VANCOCIN) IVPB  2,250 mg Intravenous Q24H     PRN Meds:acetaminophen, albuterol-ipratropium, cyclobenzaprine, dextrose 50%, glucagon (human recombinant), glucose, insulin aspart U-100, ondansetron, oxyCODONE-acetaminophen, promethazine (PHENERGAN) IVPB, sodium chloride 0.9%     Review of Systems     Constitutional: Negative for fever. Positive for activity change and fatigue.  Gastrointestinal: Negative for nausea and vomiting.   Genitourinary: Negative for decreased urine volume.   Musculoskeletal: Positive for back pain and gait problem.   Skin: Positive for wound (wound vac in place over lower back).   Neurological: Positive for tremors and weakness.   Psychiatric/Behavioral: Negative for behavioral problems.      Objective:     Weight: 96.4 kg (212 lb 8.4 oz)  Body mass index is 34.3 kg/m².  Vital Signs (Most Recent):  Temp: 98.8 °F (37.1 °C) (06/15/19 0743)  Pulse: 94 (06/15/19 0806)  Resp: 18 (06/15/19 0743)  BP: (!) 114/57 (06/15/19 0743)  SpO2: 96 % (06/15/19 0806) Vital Signs (24h Range):  Temp:  [97.5 °F (36.4 °C)-98.8 °F (37.1 °C)] 98.8 °F (37.1 °C)  Pulse:  [] 94  Resp:  [12-18] 18  SpO2:  [94 %-96 %] 96 %  BP: ()/(51-70) 114/57                Oxygen Concentration (%):  [28] 28         Urethral Catheter 06/11/19 1415 Latex;Straight-tip 16 Fr. (Active)   Site Assessment Clean;Intact;Dry 6/13/2019  7:42 PM   Collection Container Urimeter 6/13/2019  7:42  "PM   Securement Method secured to top of thigh w/ adhesive device 6/13/2019  7:42 PM   Catheter Care Performed yes 6/13/2019  7:42 PM   Reason for Continuing Urinary Catheterization Post operative;Non-healing sacral/perineal wound 6/13/2019  7:42 PM   CAUTI Prevention Bundle StatLock in place w 1" slack;Intact seal between catheter & drainage tubing;Drainage bag off the floor;Green sheeting clip in use;No dependent loops or kinks;Drainage bag not overfilled (<2/3 full);Drainage bag below bladder 6/13/2019  7:42 PM   Output (mL) 400 mL 6/13/2019  9:12 PM       Neurosurgery Physical Exam     Nursing note and vitals reviewed  Gen:Oriented to person, place. Appears stated age.   Head:Normocephalic and atraumatic.  Nose: Nose normal.    Eyes: EOM are normal. Pupils are equal, round, and reactive to light.   Neck: Neck supple. No masses or lesions palpated  Cardiovascular: Intact distal pulses.    Abdominal: Tenderness of RLQ and LLQ  Neurological: Alert and oriented to person and place. Patient displays tremor.   Psychiatric: Normal mood and affect. Behavior is normal.           Back:  Wound vac in place with good seal      Mild tenderness to palpation across upper and lower l-spine (marcellus.) Paraspinal tenderness   none   Paraspinal muscle spasms     Positive Pain with flexion and extention     Limited due to pain Range of motion      Unable to assess. Patient sitting with PT  Straight leg raise      Motor: Patient is able to raise and move her legs. Dropfoot at baseline.     Right Right Left Left  Level Group   5 4 5 4 L2 Hip flexor (Psoas)   5 4 5 4 L3 Leg extension (Quads)   5 3 5 3 L4 Dorsiflexion & foot inversion (Tibialis Anterior)   5 3 5 3 L5 Great toe extension ( EHL)   5 3 5 3 S1 Foot eversion (Gastroc, PL & PB)      Sensation:  NL Decreased (R/L/BL) Level Sensation    X   L2 Anterio-medial thigh   X   L3 Medial thigh around knee     Decreased Marcellus L4 Medial foot     Decreased Marcellus L5 Dorsum foot     Decreased Marcellus " S1 Lateral foot           Significant Labs:  Recent Labs   Lab 06/14/19  0423 06/15/19  0419   * 144*   * 131*   K 4.3 4.2   CL 96 95   CO2 24 25   BUN 13 14   CREATININE 0.8 0.8   CALCIUM 9.6 9.4     Recent Labs   Lab 06/14/19  0423 06/15/19  0419   WBC 7.44 6.84   HGB 10.8* 10.8*   HCT 35.3* 34.6*   * 362*     No results for input(s): LABPT, INR, APTT in the last 48 hours.  Microbiology Results (last 7 days)     Procedure Component Value Units Date/Time    Blood Culture #2 **CANNOT BE ORDERED STAT** [[id number]] Collected:  06/10/19 1440    Order Status:  Completed Specimen:  Blood from Peripheral, Antecubital, Right Updated:  06/14/19 2212     Blood Culture, Routine No Growth to date     Blood Culture, Routine No Growth to date     Blood Culture, Routine No Growth to date     Blood Culture, Routine No Growth to date     Blood Culture, Routine No Growth to date    Blood Culture #1 **CANNOT BE ORDERED STAT** [[id number]] Collected:  06/10/19 1526    Order Status:  Completed Specimen:  Blood from Peripheral, Forearm, Right Updated:  06/14/19 2212     Blood Culture, Routine No Growth to date     Blood Culture, Routine No Growth to date     Blood Culture, Routine No Growth to date     Blood Culture, Routine No Growth to date     Blood Culture, Routine No Growth to date    Aerobic culture [[id number]] Collected:  06/11/19 1434    Order Status:  Completed Specimen:  Abscess from Back Updated:  06/14/19 1328     Aerobic Bacterial Culture --     CANDIDA ALBICANS  Few      Culture, Anaerobe [[id number]] Collected:  06/11/19 1434    Order Status:  Completed Specimen:  Abscess from Back Updated:  06/14/19 1303     Anaerobic Culture Culture in progress    AFB Culture & Smear [[id number]] Collected:  06/11/19 1434    Order Status:  Completed Specimen:  Abscess from Back Updated:  06/13/19 0927     AFB Culture & Smear Culture in progress     AFB CULTURE STAIN No acid fast bacilli seen.    Gram stain [[id number]]  Collected:  06/11/19 1434    Order Status:  Completed Specimen:  Abscess from Back Updated:  06/11/19 6170     Gram Stain Result Few WBC's      No organisms seen    Fungus culture [[id number]] Collected:  06/11/19 1434    Order Status:  Sent Specimen:  Abscess from Back Updated:  06/11/19 2214        ABGs: No results for input(s): PH, PCO2, PO2, HCO3, POCSATURATED, BE in the last 48 hours.  Cardiac markers: No results for input(s): CKMB, CPKMB, TROPONINT, TROPONINI, MYOGLOBIN in the last 48 hours.  CMP:   Recent Labs   Lab 06/14/19  0423 06/15/19  0419   * 144*   CALCIUM 9.6 9.4   ALBUMIN 3.0* 2.9*   PROT 6.5 6.3   * 131*   K 4.3 4.2   CO2 24 25   CL 96 95   BUN 13 14   CREATININE 0.8 0.8   ALKPHOS 97 103   ALT <5* <5*   AST 18 17   BILITOT 0.5 0.6     CRP: No results for input(s): CRP in the last 48 hours.  All pertinent labs from the last 24 hours have been reviewed.      Assessment/Plan:     Active Diagnoses:    Diagnosis Date Noted POA    PRINCIPAL PROBLEM:  Osteomyelitis [M86.9] 06/10/2019 Yes     Chronic    Hyperlipidemia LDL goal <70 [E78.5] 10/06/2015 Yes    Type 2 diabetes, controlled, with peripheral neuropathy [E11.42] 05/29/2014 Yes     Chronic    Hypothyroidism [E03.9] 11/19/2013 Yes     Chronic    Essential hypertension [I10]  Yes     Chronic    PD (Parkinson's disease) [G20]  Yes     Chronic      Problems Resolved During this Admission:       Continue wound care with wound vac in place. Continuous suction at 125 mm hg. Change out sponge every 3 days. Next change is scheduled for Monday, 6/17  Continue treatment regimen including Abx per HM and ID recommendations  Continue PT/ OT  LSO brace when OOB with therapy  Repeat CRP/ESR today  WBC trending down  Lovenox 40 mg Q24  Muscle relaxant PRN  D/c planning in progress. Return to [facility] (patient's request) early next week      Katherine Jacques PA-C  Neurosurgery  [facility]

## 2019-06-15 NOTE — ASSESSMENT & PLAN NOTE
06/11- all cultures reviewed.  )6/11- yeast   04/30- strep    She is on Vancomycin , will follow new cultures and will plan to switch to Rocephin depending ob cultures.  Will add Micafungin until ID of yeast is known    06/14- cultures reviewed-   Candida albicans-will switch to fluconazole .  Will also switch to rocephin in AM

## 2019-06-15 NOTE — PLAN OF CARE
Problem: Adult Inpatient Plan of Care  Goal: Plan of Care Review  Outcome: Ongoing (interventions implemented as appropriate)  POC reviewed with patient. Pt verbalized understanding  Pt remains free of injuries and falls; fall precaution in place.   C/o pain in lower back and groin. Moderately controlled by PRN meds and relaxation techniques.   NR on tele monitor. HR in 80's.  IV antibiotics.  IV saline locked;intact.  Oxygen at 2L NC.  Wound vac in place @ 125 mm/hg continuous suction to lower back.  Turn 2qhr maintained.   No other c/o at this time.  Bed low, side rails x2, call light in reach, personal belongings at bedside.  Reminded to call for assistance.  Hourly rounding complete. Will continue to monitor.

## 2019-06-15 NOTE — PROGRESS NOTES
[facility]  Infectious Disease  Progress Note    Patient Name: Ross Isbell  MRN: [id number]  Admission Date: 6/10/2019  Length of Stay: 4 days  Attending Physician: Swetha Biswas MD  Primary Care Provider: Anna Penn MD    Isolation Status: No active isolations  Assessment/Plan:      * Osteomyelitis  06/11- all cultures reviewed.  )6/11- yeast   04/30- strep    She is on Vancomycin , will follow new cultures and will plan to switch to Rocephin depending ob cultures.  Will add Micafungin until ID of yeast is known    06/14- cultures reviewed-   Candida albicans-will switch to fluconazole .  Will also switch to rocephin in AM      Type 2 diabetes, controlled, with peripheral neuropathy  06/14- will closely monitor glucose    Essential hypertension  06/14- BP control as per primary team        Anticipated Disposition:     Thank you for your consult. I will follow-up with patient. Please contact us if you have any additional questions.    Dada Gomez MD  Infectious Disease  [facility]    Subjective:     Principal Problem:Osteomyelitis    HPI: 73 year old woman with history of lumbar osteomyelitis with strep.  She was started on therapy with Rocephin but has noticed worsening drainage over the lumbar area. She was seen by Neurosurgery and I and D was done.  Since presentation, CT scan of the lumbar region showed- A pattern of erosive destructive change of the endplates at L2-3 with relative sparing of the disc space suggest changes of discitis in this postoperative patient.  Additionally a rim enhancing fluid collection in the right psoas muscle adjacent to the disc may represent a focal area of abscess.  It measures 2.8 cm in by 1.4 x 1.7 cm.  She was seen by neurosurgery and on 06/12 ,had I and D done .    Interval History: 73 year old woman with history of spinal osteomyelitis .  Cultures - Candida albicans-06/11  Gram stain -negative     Review of Systems   Constitutional:  Positive for activity change and fatigue.   HENT: Negative.    Eyes: Negative for visual disturbance.   Respiratory: Negative for apnea, cough, choking, chest tightness, shortness of breath, wheezing and stridor.    Cardiovascular: Negative for chest pain, palpitations and leg swelling.   Gastrointestinal: Negative for abdominal pain, constipation, diarrhea, nausea and vomiting.   Endocrine: Negative.    Musculoskeletal: Positive for back pain. Negative for arthralgias, joint swelling, myalgias, neck pain and neck stiffness.   Skin: Negative.    Allergic/Immunologic: Negative.    Neurological: Positive for tremors and weakness. Negative for dizziness, seizures, syncope, facial asymmetry, speech difficulty, light-headedness, numbness and headaches.   Hematological: Negative.    Psychiatric/Behavioral: Negative for agitation, behavioral problems and confusion.     Objective:     Vital Signs (Most Recent):  Temp: 98.8 °F (37.1 °C) (06/15/19 0743)  Pulse: 94 (06/15/19 0806)  Resp: 18 (06/15/19 0743)  BP: (!) 114/57 (06/15/19 0743)  SpO2: 96 % (06/15/19 0806) Vital Signs (24h Range):  Temp:  [97.5 °F (36.4 °C)-98.8 °F (37.1 °C)] 98.8 °F (37.1 °C)  Pulse:  [] 94  Resp:  [12-18] 18  SpO2:  [94 %-96 %] 96 %  BP: ()/(51-70) 114/57     Weight: 96.4 kg (212 lb 8.4 oz)  Body mass index is 34.3 kg/m².    Estimated Creatinine Clearance: 73.3 mL/min (based on SCr of 0.8 mg/dL).    Physical Exam   Constitutional: She is oriented to person, place, and time. She appears well-developed and well-nourished. She is cooperative. She is easily aroused. She appears ill. Nasal cannula in place.   HENT:   Head: Normocephalic and atraumatic.   Mouth/Throat: Oropharynx is clear and moist.   Eyes: Pupils are equal, round, and reactive to light. Conjunctivae and EOM are normal.   Neck: Normal range of motion. Neck supple.   Cardiovascular: Normal rate, regular rhythm and intact distal pulses.   Murmur heard.   Systolic murmur is  present with a grade of 3/6.  Pulmonary/Chest: Effort normal. No accessory muscle usage or stridor. No tachypnea. No respiratory distress. She has decreased breath sounds. She has no wheezes. She has no rhonchi. She has no rales. She exhibits no tenderness.   Abdominal: Soft. Bowel sounds are normal. She exhibits no distension. There is no tenderness. There is no rebound and no guarding.   Genitourinary:   Genitourinary Comments: Deferred   Musculoskeletal: She exhibits no edema, tenderness or deformity.   L/S wound vac in place   Neurological: She is alert, oriented to person, place, and time and easily aroused. She displays tremor. No sensory deficit. GCS eye subscore is 4. GCS verbal subscore is 5.   Skin: Skin is warm and dry. Capillary refill takes less than 2 seconds.   Psychiatric: She has a normal mood and affect. Her behavior is normal. Thought content normal.   Nursing note and vitals reviewed.      Significant Labs:   Blood Culture:   Recent Labs   Lab 05/28/19  1159 06/01/19  0003 06/01/19  0010 06/10/19  1440 06/10/19  1526   LABBLOO No growth after 5 days. No growth after 5 days. No growth after 5 days. No Growth to date  No Growth to date  No Growth to date  No Growth to date  No Growth to date No Growth to date  No Growth to date  No Growth to date  No Growth to date  No Growth to date     BMP:   Recent Labs   Lab 06/15/19  0419   *   *   K 4.2   CL 95   CO2 25   BUN 14   CREATININE 0.8   CALCIUM 9.4       Significant Imaging: I have reviewed all pertinent imaging results/findings within the past 24 hours.

## 2019-06-15 NOTE — PROGRESS NOTES
[facility] Hospital Medicine  Progress Note    Patient Name: Ross Isbell  MRN: [id number]  Patient Class: IP- Inpatient   Admission Date: 6/10/2019  Length of Stay: 4 days  Attending Physician: Swetha Biswas MD  Primary Care Provider: Anna ePnn MD        Subjective:     Principal Problem:Osteomyelitis      HPI:  Mrs. Isbell sent from Dr. Griffith office for admissions and antibiotics after she went for follow up and per the daughter a significant amount of drainage from her back wound. She recently had surgery for osteo of lumbar spine. She states she still has some pain but otherwise is doing okay.        Overview/Hospital Course:  Patient is a 73-year-old female with type 2 diabetes, hypertension and parkinsonism, Ch Hyponatremia, recent h/o osteomyelitis of spine, s/p surgery on 5/25/19 for posterior lumbar fusion and abscess debridement.  She was sent from Dr. Nicolas's office as she had significant amount of drainage from her back wound spine. She states she still has some pain but otherwise is doing okay.    6/11- resting well with obvious resting tremor. Underwent washout out of her wound by Dr. Nicolas today, post op doing well, wound Vac in place. Pain under control.  6/12- looks and feels much better, s/p Day 1 post op for l/S I&D, pain much better, wound vac in place. Radiology does not think that the psoas abscess needs any drainage at present and can be watched. Wound Cx remain NGTD-- on IV Vanc.  6/13 - Patient continues to experience discomfort with mobility. PT / OT consulted and recommending post acute care. CM consulted for return to post acute care. Discussed patient with Neuro Sx regarding POC and ongoing needs. D/C planning in progress.   06/14 -- No acute distress overnight. POD # 3. Pt reports pain much improved today vs yesterday. Pt up with PT today, tolerated. First wound vac change scheduled for today then every 3 days. Pt will return to [facility] on Monday,  06/17/19  06/15 POD # 4. No acute events overnight. Afebrile. WBC count stable. PT/OT following. Wound vac with good seal, replaced yesterday 06/14. IV Rocephin and Diflucan started by ID today. Pt was switched from Vancomycin to Rocephin and candida albicans in aerobic culture dated 06/11    Interval History: Pt seen and examined.  at bedside. Pt has no complaints at this time. In good spirits.     Review of Systems   Constitutional: Positive for activity change and fatigue.   HENT: Negative.    Eyes: Negative for visual disturbance.   Respiratory: Negative for apnea, cough, choking, chest tightness, shortness of breath, wheezing and stridor.    Cardiovascular: Negative for chest pain, palpitations and leg swelling.   Gastrointestinal: Negative for abdominal pain, constipation, diarrhea, nausea and vomiting.   Endocrine: Negative.    Musculoskeletal: Positive for back pain. Negative for arthralgias, joint swelling, myalgias, neck pain and neck stiffness.   Skin: Negative.    Allergic/Immunologic: Negative.    Neurological: Positive for tremors (chronic) and weakness. Negative for dizziness, seizures, syncope, facial asymmetry, speech difficulty, light-headedness, numbness and headaches.   Hematological: Negative.    Psychiatric/Behavioral: Negative for agitation, behavioral problems and confusion.     Objective:     Vital Signs (Most Recent):  Temp: 98.7 °F (37.1 °C) (06/15/19 1101)  Pulse: 108 (06/15/19 1338)  Resp: 18 (06/15/19 1101)  BP: (!) 114/57 (06/15/19 0743)  SpO2: 96 % (06/15/19 1101) Vital Signs (24h Range):  Temp:  [97.5 °F (36.4 °C)-98.8 °F (37.1 °C)] 98.7 °F (37.1 °C)  Pulse:  [] 108  Resp:  [18] 18  SpO2:  [94 %-96 %] 96 %  BP: ()/(52-70) 114/57     Weight: 96.4 kg (212 lb 8.4 oz)  Body mass index is 34.3 kg/m².    Intake/Output Summary (Last 24 hours) at 6/15/2019 1548  Last data filed at 6/15/2019 0600  Gross per 24 hour   Intake 600 ml   Output 1280 ml   Net -680 ml       Physical Exam   Constitutional: She is oriented to person, place, and time. She appears well-developed and well-nourished. She is cooperative. She is easily aroused. She appears ill. Nasal cannula in place.   HENT:   Head: Normocephalic and atraumatic.   Mouth/Throat: Oropharynx is clear and moist.   Eyes: Pupils are equal, round, and reactive to light. Conjunctivae and EOM are normal.   Neck: Normal range of motion. Neck supple.   Cardiovascular: Normal rate, regular rhythm and intact distal pulses.   Murmur heard.   Systolic murmur is present with a grade of 3/6.  Pulmonary/Chest: Effort normal. No accessory muscle usage or stridor. No tachypnea. No respiratory distress. She has decreased breath sounds. She has no wheezes. She has no rhonchi. She has no rales. She exhibits no tenderness.   Abdominal: Soft. Bowel sounds are normal. She exhibits no distension. There is no tenderness. There is no rebound and no guarding.   Genitourinary:   Genitourinary Comments: Deferred   Musculoskeletal: She exhibits no edema, tenderness or deformity.   L/S wound vac in place   Neurological: She is alert, oriented to person, place, and time and easily aroused. She displays tremor. No sensory deficit. GCS eye subscore is 4. GCS verbal subscore is 5.   Skin: Skin is warm and dry. Capillary refill takes less than 2 seconds.   Psychiatric: She has a normal mood and affect. Her behavior is normal. Thought content normal.   Nursing note and vitals reviewed.      Significant Labs:   CBC:   Recent Labs   Lab 06/14/19  0423 06/15/19  0419   WBC 7.44 6.84   HGB 10.8* 10.8*   HCT 35.3* 34.6*   * 362*     CMP:   Recent Labs   Lab 06/14/19  0423 06/15/19  0419   * 131*   K 4.3 4.2   CL 96 95   CO2 24 25   * 144*   BUN 13 14   CREATININE 0.8 0.8   CALCIUM 9.6 9.4   PROT 6.5 6.3   ALBUMIN 3.0* 2.9*   BILITOT 0.5 0.6   ALKPHOS 97 103   AST 18 17   ALT <5* <5*   ANIONGAP 11 11   EGFRNONAA >60 >60     POCT Glucose:   Recent  Labs   Lab 06/15/19  0520 06/15/19  0642 06/15/19  1114   POCTGLUCOSE 151* 148* 182*       Significant Imaging: I have reviewed all pertinent imaging results/findings within the past 24 hours.      Assessment/Plan:      * Osteomyelitis  Plan is to go to the OR for a washout  Will continue with current abx started in the ED    S/p spinal wash out, on wound vac    POD 1 s/p I&D of wound dehiscence of lumbar surgery   looks and feels much better  On IV Vanc  Continue present care  D/c back to [facility] when cleared by Dr. Nicolas     6/13  Plan for LTAC  PT / OT  ABX    06/14  - Will return to [facility] on Monday, 06/17/19  - Continue PT/OT  - Continue IV abx -- ID following  - LSO brace when OOB  - Wound vac change today then every 3 days  - F/U with Dr. Nicolas upon discharge    06/15  - ID following -- IV abx changed from Vancomycin to Rocephin  - IV Diflucan added for candida albicans  - BC NGTD  - Continue PT/OT  - LSO brace when OOB  - Wound vac changed on 06/14. To change every 3 days, next change on 06/17      Hyperlipidemia LDL goal <70  - Continue Statin      Type 2 diabetes, controlled, with peripheral neuropathy  - Accuchecks and low dose SSI      Hypothyroidism  - Continue Levothyroxine      Essential hypertension  - BP stable  - Continue Losartan      PD (Parkinson's disease)  - Continue Carbidopa  - PT/OT following      VTE Risk Mitigation (From admission, onward)        Ordered     enoxaparin injection 40 mg  Daily      06/12/19 1112     IP VTE HIGH RISK PATIENT  Once      06/11/19 1704     Place sequential compression device  Until discontinued      06/11/19 1704     Place sequential compression device  Until discontinued      06/10/19 1932                Melody Oliver, KACIE  Department of Hospital Medicine   [facility]

## 2019-06-15 NOTE — PT/OT/SLP PROGRESS
Physical Therapy  Treatment    Ross Isbell   MRN: [id number]   Admitting Diagnosis: Osteomyelitis    PT Received On: 06/15/19  PT Start Time: 1005     PT Stop Time: 1028    PT Total Time (min): 23 min       Billable Minutes:  Therapeutic Activity 13 minutes and Therapeutic Exercise 10 minutes    Treatment Type: Treatment  PT/PTA: PT     PTA Visit Number: 1       General Precautions: Standard, fall  Orthopedic Precautions: N/A   Braces: LSO    Spiritual, Cultural Beliefs, Restoration Practices, Values that Affect Care: yes    Subjective:  Communicated with epic and nurse Valdez prior to session.      Pain/Comfort  Pain Rating 1: 5/10  Location - Side 1: Right  Location 1: back  Pain Addressed 1: Distraction    Objective:   Patient found with: peripheral IV, telemetry, wound vac, bed alarm    Functional Mobility:  Bed Mobility: mod assist       Transfers:max assist       Gait: max assist x 2 for small stand-pivot steps bed to chair       Stairs:n/a          Balance:   Static Sit: GOOD-: Takes MODERATE challenges from all directions but inconsistently  Dynamic Sit: GOOD-: Incosistently Maintains balance through MODERATE excursions of active trunk movement,     Static Stand: POOR: Needs MODERATE assist to maintain  Dynamic stand: 0: N/A     Therapeutic Activities and Exercises:  Mod assist for bed mobility and supine to sit. Max assist for sit to stand and stand-pivot from EOB to w/c using RW. In sitting completed B LE therex: LAQ, MIP, hip abd/add and heel/toe taps.      AM-PAC 6 CLICK MOBILITY  How much help from another person does this patient currently need?   1 = Unable, Total/Dependent Assistance  2 = A lot, Maximum/Moderate Assistance  3 = A little, Minimum/Contact Guard/Supervision  4 = None, Modified [place]/Independent    Turning over in bed (including adjusting bedclothes, sheets and blankets)?: 2  Sitting down on and standing up from a chair with arms (e.g., wheelchair, bedside commode, etc.): 2  Moving  from lying on back to sitting on the side of the bed?: 2  Moving to and from a bed to a chair (including a wheelchair)?: 2  Need to walk in hospital room?: 2  Climbing 3-5 steps with a railing?: 1  Basic Mobility Total Score: 11    AM-PAC Raw Score CMS G-Code Modifier Level of Impairment Assistance   6 % Total / Unable   7 - 9 CM 80 - 100% Maximal Assist   10 - 14 CL 60 - 80% Moderate Assist   15 - 19 CK 40 - 60% Moderate Assist   20 - 22 CJ 20 - 40% Minimal Assist   23 CI 1-20% SBA / CGA   24 CH 0% Independent/ Mod I     Patient left up in chair with all lines intact, call button in reach and nursing notified.    Assessment:  Ross Isbell is a 73 y.o. female with a medical diagnosis of Osteomyelitis.    Rehab identified problem list/impairments: Rehab identified problem list/impairments: weakness, impaired endurance, pain, decreased safety awareness, impaired self care skills, impaired functional mobilty    Rehab potential is fair.    Activity tolerance: Fair    Discharge recommendations: Discharge Facility/Level of Care Needs: nursing facility, skilled     Barriers to discharge:      Equipment recommendations:       GOALS:   Multidisciplinary Problems     Physical Therapy Goals        Problem: Physical Therapy Goal    Goal Priority Disciplines Outcome Goal Variances Interventions   Physical Therapy Goal     PT, PT/OT Ongoing (interventions implemented as appropriate)     Description:  PT WILL BE SEEN FOR P.T. FOR A MIN OF 5 OUT OF 7 DAYS A WEEK  LT19  1. PT WILL COMPLETE BED MOBILITY WITH MOD A  2. PT WILL SCOOT IN BED WITH MOD A  3. PT WILL T/F TO CHAIR WITH MAX A X 1 AND LSO ON  4. PT WILL COMPLETE B LE TE X 10 REPS FOR ROM AND STRENGTHENING                    PLAN:    Patient to be seen 5 x/week  to address the above listed problems via gait training, therapeutic activities, therapeutic exercises  Plan of Care expires: 19  Plan of Care reviewed with: patient         Gavin Ruano,  PTA  06/15/2019

## 2019-06-15 NOTE — SUBJECTIVE & OBJECTIVE
Interval History: Pt seen and examined.  at bedside. Pt has no complaints at this time. In good spirits.     Review of Systems   Constitutional: Positive for activity change and fatigue.   HENT: Negative.    Eyes: Negative for visual disturbance.   Respiratory: Negative for apnea, cough, choking, chest tightness, shortness of breath, wheezing and stridor.    Cardiovascular: Negative for chest pain, palpitations and leg swelling.   Gastrointestinal: Negative for abdominal pain, constipation, diarrhea, nausea and vomiting.   Endocrine: Negative.    Musculoskeletal: Positive for back pain. Negative for arthralgias, joint swelling, myalgias, neck pain and neck stiffness.   Skin: Negative.    Allergic/Immunologic: Negative.    Neurological: Positive for tremors (chronic) and weakness. Negative for dizziness, seizures, syncope, facial asymmetry, speech difficulty, light-headedness, numbness and headaches.   Hematological: Negative.    Psychiatric/Behavioral: Negative for agitation, behavioral problems and confusion.     Objective:     Vital Signs (Most Recent):  Temp: 98.7 °F (37.1 °C) (06/15/19 1101)  Pulse: 108 (06/15/19 1338)  Resp: 18 (06/15/19 1101)  BP: (!) 114/57 (06/15/19 0743)  SpO2: 96 % (06/15/19 1101) Vital Signs (24h Range):  Temp:  [97.5 °F (36.4 °C)-98.8 °F (37.1 °C)] 98.7 °F (37.1 °C)  Pulse:  [] 108  Resp:  [18] 18  SpO2:  [94 %-96 %] 96 %  BP: ()/(52-70) 114/57     Weight: 96.4 kg (212 lb 8.4 oz)  Body mass index is 34.3 kg/m².    Intake/Output Summary (Last 24 hours) at 6/15/2019 1548  Last data filed at 6/15/2019 0600  Gross per 24 hour   Intake 600 ml   Output 1280 ml   Net -680 ml      Physical Exam   Constitutional: She is oriented to person, place, and time. She appears well-developed and well-nourished. She is cooperative. She is easily aroused. She appears ill. Nasal cannula in place.   HENT:   Head: Normocephalic and atraumatic.   Mouth/Throat: Oropharynx is clear and moist.    Eyes: Pupils are equal, round, and reactive to light. Conjunctivae and EOM are normal.   Neck: Normal range of motion. Neck supple.   Cardiovascular: Normal rate, regular rhythm and intact distal pulses.   Murmur heard.   Systolic murmur is present with a grade of 3/6.  Pulmonary/Chest: Effort normal. No accessory muscle usage or stridor. No tachypnea. No respiratory distress. She has decreased breath sounds. She has no wheezes. She has no rhonchi. She has no rales. She exhibits no tenderness.   Abdominal: Soft. Bowel sounds are normal. She exhibits no distension. There is no tenderness. There is no rebound and no guarding.   Genitourinary:   Genitourinary Comments: Deferred   Musculoskeletal: She exhibits no edema, tenderness or deformity.   L/S wound vac in place   Neurological: She is alert, oriented to person, place, and time and easily aroused. She displays tremor. No sensory deficit. GCS eye subscore is 4. GCS verbal subscore is 5.   Skin: Skin is warm and dry. Capillary refill takes less than 2 seconds.   Psychiatric: She has a normal mood and affect. Her behavior is normal. Thought content normal.   Nursing note and vitals reviewed.      Significant Labs:   CBC:   Recent Labs   Lab 06/14/19  0423 06/15/19  0419   WBC 7.44 6.84   HGB 10.8* 10.8*   HCT 35.3* 34.6*   * 362*     CMP:   Recent Labs   Lab 06/14/19  0423 06/15/19  0419   * 131*   K 4.3 4.2   CL 96 95   CO2 24 25   * 144*   BUN 13 14   CREATININE 0.8 0.8   CALCIUM 9.6 9.4   PROT 6.5 6.3   ALBUMIN 3.0* 2.9*   BILITOT 0.5 0.6   ALKPHOS 97 103   AST 18 17   ALT <5* <5*   ANIONGAP 11 11   EGFRNONAA >60 >60     POCT Glucose:   Recent Labs   Lab 06/15/19  0520 06/15/19  0642 06/15/19  1114   POCTGLUCOSE 151* 148* 182*       Significant Imaging: I have reviewed all pertinent imaging results/findings within the past 24 hours.

## 2019-06-16 LAB
ALBUMIN SERPL BCP-MCNC: 2.8 G/DL (ref 3.5–5.2)
ALP SERPL-CCNC: 99 U/L (ref 55–135)
ALT SERPL W/O P-5'-P-CCNC: <5 U/L (ref 10–44)
ANION GAP SERPL CALC-SCNC: 11 MMOL/L (ref 8–16)
AST SERPL-CCNC: 16 U/L (ref 10–40)
BASOPHILS # BLD AUTO: 0.02 K/UL (ref 0–0.2)
BASOPHILS NFR BLD: 0.3 % (ref 0–1.9)
BILIRUB SERPL-MCNC: 0.5 MG/DL (ref 0.1–1)
BUN SERPL-MCNC: 15 MG/DL (ref 8–23)
CALCIUM SERPL-MCNC: 9.3 MG/DL (ref 8.7–10.5)
CHLORIDE SERPL-SCNC: 96 MMOL/L (ref 95–110)
CO2 SERPL-SCNC: 23 MMOL/L (ref 23–29)
CREAT SERPL-MCNC: 0.8 MG/DL (ref 0.5–1.4)
CRP SERPL-MCNC: 53 MG/L (ref 0–8.2)
DIFFERENTIAL METHOD: ABNORMAL
EOSINOPHIL # BLD AUTO: 0.3 K/UL (ref 0–0.5)
EOSINOPHIL NFR BLD: 4.9 % (ref 0–8)
ERYTHROCYTE [DISTWIDTH] IN BLOOD BY AUTOMATED COUNT: 15.3 % (ref 11.5–14.5)
ERYTHROCYTE [SEDIMENTATION RATE] IN BLOOD BY WESTERGREN METHOD: 81 MM/HR (ref 0–20)
EST. GFR  (AFRICAN AMERICAN): >60 ML/MIN/1.73 M^2
EST. GFR  (NON AFRICAN AMERICAN): >60 ML/MIN/1.73 M^2
GLUCOSE SERPL-MCNC: 153 MG/DL (ref 70–110)
HCT VFR BLD AUTO: 33 % (ref 37–48.5)
HGB BLD-MCNC: 10.4 G/DL (ref 12–16)
LYMPHOCYTES # BLD AUTO: 1.9 K/UL (ref 1–4.8)
LYMPHOCYTES NFR BLD: 29.1 % (ref 18–48)
MCH RBC QN AUTO: 28 PG (ref 27–31)
MCHC RBC AUTO-ENTMCNC: 31.5 G/DL (ref 32–36)
MCV RBC AUTO: 89 FL (ref 82–98)
MONOCYTES # BLD AUTO: 0.8 K/UL (ref 0.3–1)
MONOCYTES NFR BLD: 11.6 % (ref 4–15)
NEUTROPHILS # BLD AUTO: 3.5 K/UL (ref 1.8–7.7)
NEUTROPHILS NFR BLD: 54.1 % (ref 38–73)
PLATELET # BLD AUTO: 327 K/UL (ref 150–350)
PMV BLD AUTO: 8.2 FL (ref 9.2–12.9)
POCT GLUCOSE: 147 MG/DL (ref 70–110)
POCT GLUCOSE: 158 MG/DL (ref 70–110)
POCT GLUCOSE: 171 MG/DL (ref 70–110)
POCT GLUCOSE: 205 MG/DL (ref 70–110)
POCT GLUCOSE: 228 MG/DL (ref 70–110)
POTASSIUM SERPL-SCNC: 4.1 MMOL/L (ref 3.5–5.1)
PROT SERPL-MCNC: 6.1 G/DL (ref 6–8.4)
RBC # BLD AUTO: 3.72 M/UL (ref 4–5.4)
SODIUM SERPL-SCNC: 130 MMOL/L (ref 136–145)
WBC # BLD AUTO: 6.54 K/UL (ref 3.9–12.7)

## 2019-06-16 PROCEDURE — 25000003 PHARM REV CODE 250: Performed by: INTERNAL MEDICINE

## 2019-06-16 PROCEDURE — 21400001 HC TELEMETRY ROOM

## 2019-06-16 PROCEDURE — 25000003 PHARM REV CODE 250: Performed by: PHYSICIAN ASSISTANT

## 2019-06-16 PROCEDURE — 27000221 HC OXYGEN, UP TO 24 HOURS

## 2019-06-16 PROCEDURE — 85651 RBC SED RATE NONAUTOMATED: CPT

## 2019-06-16 PROCEDURE — 97530 THERAPEUTIC ACTIVITIES: CPT

## 2019-06-16 PROCEDURE — 63600175 PHARM REV CODE 636 W HCPCS: Performed by: PHYSICIAN ASSISTANT

## 2019-06-16 PROCEDURE — 85025 COMPLETE CBC W/AUTO DIFF WBC: CPT

## 2019-06-16 PROCEDURE — 63600175 PHARM REV CODE 636 W HCPCS: Performed by: INTERNAL MEDICINE

## 2019-06-16 PROCEDURE — 36415 COLL VENOUS BLD VENIPUNCTURE: CPT

## 2019-06-16 PROCEDURE — 80053 COMPREHEN METABOLIC PANEL: CPT

## 2019-06-16 PROCEDURE — 86140 C-REACTIVE PROTEIN: CPT

## 2019-06-16 PROCEDURE — 25000003 PHARM REV CODE 250: Performed by: NURSE PRACTITIONER

## 2019-06-16 PROCEDURE — 94761 N-INVAS EAR/PLS OXIMETRY MLT: CPT

## 2019-06-16 RX ORDER — OXYCODONE AND ACETAMINOPHEN 10; 325 MG/1; MG/1
1 TABLET ORAL EVERY 4 HOURS PRN
Status: DISCONTINUED | OUTPATIENT
Start: 2019-06-17 | End: 2019-06-18 | Stop reason: HOSPADM

## 2019-06-16 RX ORDER — OXYCODONE AND ACETAMINOPHEN 10; 325 MG/1; MG/1
1 TABLET ORAL EVERY 4 HOURS PRN
Status: DISCONTINUED | OUTPATIENT
Start: 2019-06-16 | End: 2019-06-16

## 2019-06-16 RX ORDER — OXYCODONE AND ACETAMINOPHEN 7.5; 325 MG/1; MG/1
1 TABLET ORAL EVERY 6 HOURS PRN
Status: DISCONTINUED | OUTPATIENT
Start: 2019-06-16 | End: 2019-06-16

## 2019-06-16 RX ORDER — OXYCODONE AND ACETAMINOPHEN 7.5; 325 MG/1; MG/1
1 TABLET ORAL EVERY 4 HOURS PRN
Status: DISCONTINUED | OUTPATIENT
Start: 2019-06-16 | End: 2019-06-18 | Stop reason: HOSPADM

## 2019-06-16 RX ORDER — OXYCODONE AND ACETAMINOPHEN 10; 325 MG/1; MG/1
1 TABLET ORAL EVERY 6 HOURS PRN
Status: DISCONTINUED | OUTPATIENT
Start: 2019-06-16 | End: 2019-06-16

## 2019-06-16 RX ADMIN — LOSARTAN POTASSIUM 50 MG: 50 TABLET, FILM COATED ORAL at 09:06

## 2019-06-16 RX ADMIN — FLUCONAZOLE 400 MG: 2 INJECTION, SOLUTION INTRAVENOUS at 12:06

## 2019-06-16 RX ADMIN — OXYCODONE AND ACETAMINOPHEN 1 TABLET: 10; 325 TABLET ORAL at 03:06

## 2019-06-16 RX ADMIN — INSULIN ASPART 1 UNITS: 100 INJECTION, SOLUTION INTRAVENOUS; SUBCUTANEOUS at 08:06

## 2019-06-16 RX ADMIN — CARBIDOPA AND LEVODOPA 1 TABLET: 25; 250 TABLET ORAL at 06:06

## 2019-06-16 RX ADMIN — CYCLOBENZAPRINE HYDROCHLORIDE 10 MG: 10 TABLET, FILM COATED ORAL at 05:06

## 2019-06-16 RX ADMIN — CYCLOBENZAPRINE HYDROCHLORIDE 10 MG: 10 TABLET, FILM COATED ORAL at 01:06

## 2019-06-16 RX ADMIN — MICONAZOLE NITRATE: 2 OINTMENT TOPICAL at 08:06

## 2019-06-16 RX ADMIN — PRAMIPEXOLE DIHYDROCHLORIDE 1 MG: 0.5 TABLET ORAL at 03:06

## 2019-06-16 RX ADMIN — CEFTRIAXONE 2 G: 2 INJECTION, SOLUTION INTRAVENOUS at 12:06

## 2019-06-16 RX ADMIN — OXYCODONE HYDROCHLORIDE AND ACETAMINOPHEN 1 TABLET: 7.5; 325 TABLET ORAL at 11:06

## 2019-06-16 RX ADMIN — PANTOPRAZOLE SODIUM 40 MG: 40 TABLET, DELAYED RELEASE ORAL at 09:06

## 2019-06-16 RX ADMIN — OXYCODONE AND ACETAMINOPHEN 1 TABLET: 10; 325 TABLET ORAL at 06:06

## 2019-06-16 RX ADMIN — CARBIDOPA AND LEVODOPA 1 TABLET: 25; 250 TABLET ORAL at 09:06

## 2019-06-16 RX ADMIN — MICONAZOLE NITRATE: 2 OINTMENT TOPICAL at 09:06

## 2019-06-16 RX ADMIN — OXYCODONE AND ACETAMINOPHEN 1 TABLET: 10; 325 TABLET ORAL at 09:06

## 2019-06-16 RX ADMIN — CARBIDOPA AND LEVODOPA 1 TABLET: 25; 250 TABLET ORAL at 05:06

## 2019-06-16 RX ADMIN — LATANOPROST 1 DROP: 50 SOLUTION OPHTHALMIC at 08:06

## 2019-06-16 RX ADMIN — PRAVASTATIN SODIUM 40 MG: 20 TABLET ORAL at 09:06

## 2019-06-16 RX ADMIN — PRAMIPEXOLE DIHYDROCHLORIDE 1 MG: 0.5 TABLET ORAL at 08:06

## 2019-06-16 RX ADMIN — ENOXAPARIN SODIUM 40 MG: 100 INJECTION SUBCUTANEOUS at 05:06

## 2019-06-16 RX ADMIN — LEVOTHYROXINE SODIUM 125 MCG: 125 TABLET ORAL at 05:06

## 2019-06-16 RX ADMIN — CYCLOBENZAPRINE HYDROCHLORIDE 10 MG: 10 TABLET, FILM COATED ORAL at 09:06

## 2019-06-16 RX ADMIN — PRAMIPEXOLE DIHYDROCHLORIDE 1 MG: 0.5 TABLET ORAL at 09:06

## 2019-06-16 RX ADMIN — CARBIDOPA AND LEVODOPA 1 TABLET: 25; 250 TABLET ORAL at 03:06

## 2019-06-16 NOTE — PROGRESS NOTES
[facility]  Infectious Disease  Progress Note    Patient Name: Ross Isbell  MRN: [id number]  Admission Date: 6/10/2019  Length of Stay: 5 days  Attending Physician: Swetha Biswas MD  Primary Care Provider: Anna Penn MD    Isolation Status: No active isolations  Assessment/Plan:      * Osteomyelitis  06/11- all cultures reviewed.  )6/11- yeast   04/30- strep    She is on Vancomycin , will follow new cultures and will plan to switch to Rocephin depending ob cultures.  Will add Micafungin until ID of yeast is known    06/14- cultures reviewed-   Candida albicans-will switch to fluconazole .  Will also switch to rocephin in AM  06/15- will continue fluconazole/rocephin and will check ESR and CRP in AM  Day 5/42       Type 2 diabetes, controlled, with peripheral neuropathy  06/14- will closely monitor glucose    Essential hypertension  06/14- BP control as per primary team    PD (Parkinson's disease)  06/11- will continue supportive care         Anticipated Disposition:     Thank you for your consult. I will follow-up with patient. Please contact us if you have any additional questions.    Dada Gomez MD  Infectious Disease  [facility]    Subjective:     Principal Problem:Osteomyelitis    HPI: 73 year old woman with history of lumbar osteomyelitis with strep.  She was started on therapy with Rocephin but has noticed worsening drainage over the lumbar area. She was seen by Neurosurgery and I and D was done.  Since presentation, CT scan of the lumbar region showed- A pattern of erosive destructive change of the endplates at L2-3 with relative sparing of the disc space suggest changes of discitis in this postoperative patient.  Additionally a rim enhancing fluid collection in the right psoas muscle adjacent to the disc may represent a focal area of abscess.  It measures 2.8 cm in by 1.4 x 1.7 cm.  She was seen by neurosurgery and on 06/12 ,had I and D done .    Interval History:  73 year old woman with history of spinal osteomyelitis .  Cultures - Candida albicans-06/11  Gram stain -negative     Review of Systems   Constitutional: Positive for activity change and fatigue.   HENT: Negative.    Eyes: Negative for visual disturbance.   Respiratory: Negative for apnea, cough, choking, chest tightness, shortness of breath, wheezing and stridor.    Cardiovascular: Negative for chest pain, palpitations and leg swelling.   Gastrointestinal: Negative for abdominal pain, constipation, diarrhea, nausea and vomiting.   Endocrine: Negative.    Musculoskeletal: Positive for back pain. Negative for arthralgias, joint swelling, myalgias, neck pain and neck stiffness.   Skin: Negative.    Allergic/Immunologic: Negative.    Neurological: Positive for tremors and weakness. Negative for dizziness, seizures, syncope, facial asymmetry, speech difficulty, light-headedness, numbness and headaches.   Hematological: Negative.    Psychiatric/Behavioral: Negative for agitation, behavioral problems and confusion.     Objective:     Vital Signs (Most Recent):  Temp: 98.2 °F (36.8 °C) (06/16/19 0737)  Pulse: 87 (06/16/19 0743)  Resp: 18 (06/16/19 0743)  BP: (!) 133/58 (06/16/19 0737)  SpO2: 95 % (06/16/19 0743) Vital Signs (24h Range):  Temp:  [97.6 °F (36.4 °C)-98.7 °F (37.1 °C)] 98.2 °F (36.8 °C)  Pulse:  [] 87  Resp:  [18-20] 18  SpO2:  [95 %-99 %] 95 %  BP: (101-133)/(49-58) 133/58     Weight: 92.7 kg (204 lb 5.9 oz)  Body mass index is 32.99 kg/m².    Estimated Creatinine Clearance: 71.9 mL/min (based on SCr of 0.8 mg/dL).    Physical Exam   Constitutional: She is oriented to person, place, and time. She appears well-developed and well-nourished. She is cooperative. She is easily aroused. She appears ill. Nasal cannula in place.   HENT:   Head: Normocephalic and atraumatic.   Mouth/Throat: Oropharynx is clear and moist.   Eyes: Pupils are equal, round, and reactive to light. Conjunctivae and EOM are normal.    Neck: Normal range of motion. Neck supple.   Cardiovascular: Normal rate, regular rhythm and intact distal pulses.   Murmur heard.   Systolic murmur is present with a grade of 3/6.  Pulmonary/Chest: Effort normal. No accessory muscle usage or stridor. No tachypnea. No respiratory distress. She has decreased breath sounds. She has no wheezes. She has no rhonchi. She has no rales. She exhibits no tenderness.   Abdominal: Soft. Bowel sounds are normal. She exhibits no distension. There is no tenderness. There is no rebound and no guarding.   Genitourinary:   Genitourinary Comments: Deferred   Musculoskeletal: She exhibits no edema, tenderness or deformity.   L/S wound vac in place   Neurological: She is alert, oriented to person, place, and time and easily aroused. She displays tremor. No sensory deficit. GCS eye subscore is 4. GCS verbal subscore is 5.   Skin: Skin is warm and dry. Capillary refill takes less than 2 seconds.   Psychiatric: She has a normal mood and affect. Her behavior is normal. Thought content normal.   Nursing note and vitals reviewed.      Significant Labs:   Blood Culture:   Recent Labs   Lab 05/28/19  1159 06/01/19  0003 06/01/19  0010 06/10/19  1440 06/10/19  1526   LABBLOO No growth after 5 days. No growth after 5 days. No growth after 5 days. No growth after 5 days. No growth after 5 days.     BMP:   Recent Labs   Lab 06/16/19  0413   *   *   K 4.1   CL 96   CO2 23   BUN 15   CREATININE 0.8   CALCIUM 9.3       Significant Imaging: I have reviewed all pertinent imaging results/findings within the past 24 hours.

## 2019-06-16 NOTE — PLAN OF CARE
Problem: Physical Therapy Goal  Goal: Physical Therapy Goal  PT WILL BE SEEN FOR P.T. FOR A MIN OF 5 OUT OF 7 DAYS A WEEK  LT19  1. PT WILL COMPLETE BED MOBILITY WITH MOD A  2. PT WILL SCOOT IN BED WITH MOD A  3. PT WILL T/F TO CHAIR WITH MAX A X 1 AND LSO ON  4. PT WILL COMPLETE B LE TE X 10 REPS FOR ROM AND STRENGTHENING   Outcome: Ongoing (interventions implemented as appropriate)  Mod assist x 2 for sit to stand and stand-pivot transfer into bedside chair.

## 2019-06-16 NOTE — ASSESSMENT & PLAN NOTE
06/11- all cultures reviewed.  )6/11- yeast   04/30- strep    She is on Vancomycin , will follow new cultures and will plan to switch to Rocephin depending ob cultures.  Will add Micafungin until ID of yeast is known    06/14- cultures reviewed-   Candida albicans-will switch to fluconazole .  Will also switch to rocephin in AM  06/15- will continue fluconazole/rocephin and will check ESR and CRP in AM  Day 5/42 06/16- will continue Rocephin/Fluconazole at 400mg daily.  Will follow ESR and CRP  Day 6/42

## 2019-06-16 NOTE — PLAN OF CARE
Problem: Adult Inpatient Plan of Care  Goal: Plan of Care Review  Outcome: Ongoing (interventions implemented as appropriate)  POC reviewed with patient. Pt verbalized understanding  Pt remains free of injuries and falls; fall precaution in place.   C/o pain in groins, BLE, and back. Mildly controlled by PRN meds and relaxation techniques.   NR on tele monitor. HR 70's-80's.  IV saline locked; intact.  Turn 2qhr maintained.  No other c/o at this time.  Bed low, side rails x2, call light in reach, personal belongings at bedside.  Reminded to call for assistance.  Hourly rounding complete. Will continue to monitor.

## 2019-06-16 NOTE — PROGRESS NOTES
[facility]  Neurosurgery  Progress Note    Subjective:     Interval History:   P/O day #5  Patient has been reporting improving pain over last few days  Wound vac in place with good seal now, replaced yesterday  Patient up with PT at bedside, LSO brace in place  Carlson catheter in place  Afebrile overnight    WBC trending down  AFB cx stain - no acid fast bacilli seen  Gram stain- few wbcs, no organisms  Aerobic cx- candida albicans  Anaerobic cx- in progress  Fungus cx - in progress    History of Present Illness:   Patient is a 73-year-old female who previously had osteomyelitis and diskitis at L2-3 status post L1- L4 stabilization.  Patient presented with inferior drainage at her incision site.  Labs demonstrated increasing CRP and ESR.  She has a number of medical issues including diabetes and low albumin levels. CT scan showed new abscess to the psoas muscle on the right side at at L2-3.  Patient was consented for I and D and debridement of wound with placement of wound VAC.     Date of Procedure: 6/11/2019    Procedure: Procedure(s) (LRB):  DEBRIDEMENT, WOUND (Bilateral)  APPLICATION, WOUND VAC (N/A)    Surgeon(s) and Role:     * Stanislav Nicolas MD - Primary   Pre-Operative Diagnosis: Osteomyelitis, unspecified site, unspecified type [M86.9]  Discitis of lumbar region [M46.46]  Abscess in epidural space of lumbar spine [G06.1]   Post-Operative Diagnosis: Post-Op Diagnosis Codes:     * Osteomyelitis, unspecified site, unspecified type [M86.9]     * Discitis of lumbar region [M46.46]     * Abscess in epidural space of lumbar spine [G06.1]   Anesthesia: General   Technical Procedures Used:   1.  Subfascial and suprafascial wound debridement  2.  Placement of wound VAC   Description of the Findings of the Procedure:  Superficial and subfascial wound dehiscence      Post-Op Info:  Procedure(s) (LRB):  DEBRIDEMENT, WOUND (Bilateral)  APPLICATION, WOUND VAC (N/A)   5 Days Post-Op       Medications:  Continuous Infusions:  Scheduled Meds:   carbidopa-levodopa  mg  1 tablet Oral 5x Daily    cefTRIAXone (ROCEPHIN) IVPB  2 g Intravenous Q24H    enoxaparin  40 mg Subcutaneous Daily    fluconazole (DIFLUCAN) IVPB  400 mg Intravenous Q24H    latanoprost  1 drop Both Eyes QHS    levothyroxine  125 mcg Oral Before breakfast    losartan  50 mg Oral Daily    miconazole NITRATE 2 %   Topical (Top) Every Mon, Wed, Fri    miconazole nitrate 2%   Topical (Top) BID    pantoprazole  40 mg Oral Daily    pramipexole  1 mg Oral TID    pravastatin  40 mg Oral Daily     PRN Meds:acetaminophen, albuterol-ipratropium, cyclobenzaprine, dextrose 50%, glucagon (human recombinant), glucose, insulin aspart U-100, ondansetron, oxyCODONE-acetaminophen, promethazine (PHENERGAN) IVPB, sodium chloride 0.9%     Review of Systems     Constitutional: Negative for fever. Positive for activity change and fatigue.  Gastrointestinal: Negative for nausea and vomiting.   Genitourinary: Negative for decreased urine volume.   Musculoskeletal: Positive for back pain and gait problem.   Skin: Positive for wound (wound vac in place over lower back).   Neurological: Positive for tremors and weakness.   Psychiatric/Behavioral: Negative for behavioral problems.      Objective:     Weight: 92.7 kg (204 lb 5.9 oz)  Body mass index is 32.99 kg/m².  Vital Signs (Most Recent):  Temp: 98.2 °F (36.8 °C) (06/16/19 0737)  Pulse: 87 (06/16/19 0743)  Resp: 18 (06/16/19 0743)  BP: (!) 133/58 (06/16/19 0737)  SpO2: 95 % (06/16/19 0743) Vital Signs (24h Range):  Temp:  [97.6 °F (36.4 °C)-98.7 °F (37.1 °C)] 98.2 °F (36.8 °C)  Pulse:  [] 87  Resp:  [18-20] 18  SpO2:  [95 %-99 %] 95 %  BP: (101-133)/(49-58) 133/58                Oxygen Concentration (%):  [28] 28         Urethral Catheter 06/11/19 1415 Latex;Straight-tip 16 Fr. (Active)   Site Assessment Clean;Intact;Dry 6/13/2019  7:42 PM   Collection Container Urimeter 6/13/2019  7:42  "PM   Securement Method secured to top of thigh w/ adhesive device 6/13/2019  7:42 PM   Catheter Care Performed yes 6/13/2019  7:42 PM   Reason for Continuing Urinary Catheterization Post operative;Non-healing sacral/perineal wound 6/13/2019  7:42 PM   CAUTI Prevention Bundle StatLock in place w 1" slack;Intact seal between catheter & drainage tubing;Drainage bag off the floor;Green sheeting clip in use;No dependent loops or kinks;Drainage bag not overfilled (<2/3 full);Drainage bag below bladder 6/13/2019  7:42 PM   Output (mL) 400 mL 6/13/2019  9:12 PM       Neurosurgery Physical Exam     Nursing note and vitals reviewed  Gen:Oriented to person, place. Appears stated age.   Head:Normocephalic and atraumatic.  Nose: Nose normal.    Eyes: EOM are normal. Pupils are equal, round, and reactive to light.   Neck: Neck supple. No masses or lesions palpated  Cardiovascular: Intact distal pulses.    Abdominal: Tenderness of RLQ and LLQ  Neurological: Alert and oriented to person and place. Patient displays tremor.   Psychiatric: Normal mood and affect. Behavior is normal.           Back:  Wound vac in place with good seal      Mild tenderness to palpation across upper and lower l-spine (marcellus.) Paraspinal tenderness   none   Paraspinal muscle spasms     Positive Pain with flexion and extention     Limited due to pain Range of motion      Unable to assess. Patient sitting with PT  Straight leg raise      Motor: Patient is able to raise and move her legs. Dropfoot at baseline.     Right Right Left Left  Level Group   5 4 5 4 L2 Hip flexor (Psoas)   5 4 5 4 L3 Leg extension (Quads)   5 3 5 3 L4 Dorsiflexion & foot inversion (Tibialis Anterior)   5 3 5 3 L5 Great toe extension ( EHL)   5 3 5 3 S1 Foot eversion (Gastroc, PL & PB)      Sensation:  NL Decreased (R/L/BL) Level Sensation    X   L2 Anterio-medial thigh   X   L3 Medial thigh around knee     Improving but dec Marcellus L4 Medial foot     Improving but dec Marcellus L5 Dorsum foot "     Improving but dec Marcellus S1 Lateral foot           Significant Labs:  Recent Labs   Lab 06/15/19  0419 06/16/19  0413   * 153*   * 130*   K 4.2 4.1   CL 95 96   CO2 25 23   BUN 14 15   CREATININE 0.8 0.8   CALCIUM 9.4 9.3     Recent Labs   Lab 06/15/19  0419 06/16/19  0413   WBC 6.84 6.54   HGB 10.8* 10.4*   HCT 34.6* 33.0*   * 327     No results for input(s): LABPT, INR, APTT in the last 48 hours.  Microbiology Results (last 7 days)     Procedure Component Value Units Date/Time    Blood Culture #2 **CANNOT BE ORDERED STAT** [[id number]] Collected:  06/10/19 1440    Order Status:  Completed Specimen:  Blood from Peripheral, Antecubital, Right Updated:  06/15/19 2212     Blood Culture, Routine No growth after 5 days.    Blood Culture #1 **CANNOT BE ORDERED STAT** [[id number]] Collected:  06/10/19 1526    Order Status:  Completed Specimen:  Blood from Peripheral, Forearm, Right Updated:  06/15/19 2212     Blood Culture, Routine No growth after 5 days.    Aerobic culture [[id number]] Collected:  06/11/19 1434    Order Status:  Completed Specimen:  Abscess from Back Updated:  06/14/19 1328     Aerobic Bacterial Culture --     CANDIDA ALBICANS  Few      Culture, Anaerobe [[id number]] Collected:  06/11/19 1434    Order Status:  Completed Specimen:  Abscess from Back Updated:  06/14/19 1303     Anaerobic Culture Culture in progress    AFB Culture & Smear [[id number]] Collected:  06/11/19 1434    Order Status:  Completed Specimen:  Abscess from Back Updated:  06/13/19 0927     AFB Culture & Smear Culture in progress     AFB CULTURE STAIN No acid fast bacilli seen.    Gram stain [[id number]] Collected:  06/11/19 1434    Order Status:  Completed Specimen:  Abscess from Back Updated:  06/11/19 2329     Gram Stain Result Few WBC's      No organisms seen    Fungus culture [[id number]] Collected:  06/11/19 1434    Order Status:  Sent Specimen:  Abscess from Back Updated:  06/11/19 2215        ABGs: No results for  input(s): PH, PCO2, PO2, HCO3, POCSATURATED, BE in the last 48 hours.  Cardiac markers: No results for input(s): CKMB, CPKMB, TROPONINT, TROPONINI, MYOGLOBIN in the last 48 hours.  CMP:   Recent Labs   Lab 06/15/19  0419 06/16/19  0413   * 153*   CALCIUM 9.4 9.3   ALBUMIN 2.9* 2.8*   PROT 6.3 6.1   * 130*   K 4.2 4.1   CO2 25 23   CL 95 96   BUN 14 15   CREATININE 0.8 0.8   ALKPHOS 103 99   ALT <5* <5*   AST 17 16   BILITOT 0.6 0.5     CRP:   Recent Labs   Lab 06/15/19  1018   CRP 34.8*     All pertinent labs from the last 24 hours have been reviewed.      Assessment/Plan:     Active Diagnoses:    Diagnosis Date Noted POA    PRINCIPAL PROBLEM:  Osteomyelitis [M86.9] 06/10/2019 Yes     Chronic    Hyperlipidemia LDL goal <70 [E78.5] 10/06/2015 Yes    Type 2 diabetes, controlled, with peripheral neuropathy [E11.42] 05/29/2014 Yes     Chronic    Hypothyroidism [E03.9] 11/19/2013 Yes     Chronic    Essential hypertension [I10]  Yes     Chronic    PD (Parkinson's disease) [G20]  Yes     Chronic      Problems Resolved During this Admission:     Patient continuing to improve  Continue wound care with wound vac in place. Continuous suction at 125 mm hg. Change out sponge every 3 days. Next change is scheduled for Monday, 6/17  Continue treatment regimen including Abx per HM and ID recommendations - will continue fluconazole/rocephin   Continue PT/ OT  LSO brace when OOB with therapy  WBC/CRP trending down   to 104 over last 6 days  Improving bilateral lower extremity sensation per pt  Lovenox 40 mg Q24  Muscle relaxant PRN  D/c planning in progress. Return to [facility] (patient's request) early next week      Katherine Jacques PA-C  Neurosurgery  [facility]

## 2019-06-16 NOTE — PROGRESS NOTES
[facility] Hospital Medicine  Progress Note    Patient Name: Ross Isbell  MRN: [id number]  Patient Class: IP- Inpatient   Admission Date: 6/10/2019  Length of Stay: 5 days  Attending Physician: Swetha Biswas MD  Primary Care Provider: Anna Penn MD        Subjective:     Principal Problem:Osteomyelitis      HPI:  Mrs. Isbell sent from Dr. Griffith office for admissions and antibiotics after she went for follow up and per the daughter a significant amount of drainage from her back wound. She recently had surgery for osteo of lumbar spine. She states she still has some pain but otherwise is doing okay.        Overview/Hospital Course:  Patient is a 73-year-old female with type 2 diabetes, hypertension and parkinsonism, Ch Hyponatremia, recent h/o osteomyelitis of spine, s/p surgery on 5/25/19 for posterior lumbar fusion and abscess debridement.  She was sent from Dr. Nicolas's office as she had significant amount of drainage from her back wound spine. She states she still has some pain but otherwise is doing okay.    6/11- resting well with obvious resting tremor. Underwent washout out of her wound by Dr. Nicolas today, post op doing well, wound Vac in place. Pain under control.  6/12- looks and feels much better, s/p Day 1 post op for l/S I&D, pain much better, wound vac in place. Radiology does not think that the psoas abscess needs any drainage at present and can be watched. Wound Cx remain NGTD-- on IV Vanc.  6/13 - Patient continues to experience discomfort with mobility. PT / OT consulted and recommending post acute care. CM consulted for return to post acute care. Discussed patient with Neuro Sx regarding POC and ongoing needs. D/C planning in progress.   06/14 -- No acute distress overnight. POD # 3. Pt reports pain much improved today vs yesterday. Pt up with PT today, tolerated. First wound vac change scheduled for today then every 3 days. Pt will return to [facility] on Monday,  06/17/19  06/15 POD # 4. No acute events overnight. Afebrile. WBC count stable. PT/OT following. Wound vac with good seal, replaced yesterday 06/14. IV Rocephin and Diflucan started by ID today. Pt was switched from Vancomycin to Rocephin and candida albicans in aerobic culture dated 06/11.   06/16 POD #5. No acute events overnight. Afebrile overnight. Working with PT. OOB in chair with LSO brace on. Currently on day 6/42 with IV Rocephin and Diflucan. Pt has no complaints at this time. Wound vac change scheduled for tomorrow, 06/17. Will likely be discharged back to [facility] afterward with F/U with Dr. Nicolas.     Interval History: Pt seen and examined. Daughter at bedside. Pt currently sitting up in chair with LSO brace on. Pleasant lady. Currently has no complaints at this time.     Review of Systems   Constitutional: Positive for activity change and fatigue.   HENT: Negative.    Eyes: Negative for visual disturbance.   Respiratory: Negative for apnea, cough, choking, chest tightness, shortness of breath, wheezing and stridor.    Cardiovascular: Negative for chest pain, palpitations and leg swelling.   Gastrointestinal: Negative for abdominal pain, constipation, diarrhea, nausea and vomiting.   Endocrine: Negative.    Musculoskeletal: Positive for back pain. Negative for arthralgias, joint swelling, myalgias, neck pain and neck stiffness.   Skin: Negative.    Allergic/Immunologic: Negative.    Neurological: Positive for tremors (chronic) and weakness. Negative for dizziness, seizures, syncope, facial asymmetry, speech difficulty, light-headedness, numbness and headaches.   Hematological: Negative.    Psychiatric/Behavioral: Negative for agitation, behavioral problems and confusion.     Objective:     Vital Signs (Most Recent):  Temp: 98.2 °F (36.8 °C) (06/16/19 0737)  Pulse: 87 (06/16/19 0743)  Resp: 18 (06/16/19 0743)  BP: (!) 133/58 (06/16/19 0737)  SpO2: 95 % (06/16/19 0743) Vital Signs (24h  Range):  Temp:  [97.6 °F (36.4 °C)-98.7 °F (37.1 °C)] 98.2 °F (36.8 °C)  Pulse:  [] 87  Resp:  [18-20] 18  SpO2:  [95 %-99 %] 95 %  BP: (101-133)/(49-58) 133/58     Weight: 92.7 kg (204 lb 5.9 oz)  Body mass index is 32.99 kg/m².    Intake/Output Summary (Last 24 hours) at 6/16/2019 1009  Last data filed at 6/16/2019 0500  Gross per 24 hour   Intake 100 ml   Output 1120 ml   Net -1020 ml      Physical Exam   Constitutional: She is oriented to person, place, and time. She appears well-developed and well-nourished. She is cooperative. She is easily aroused. She appears ill. Nasal cannula in place.   HENT:   Head: Normocephalic and atraumatic.   Mouth/Throat: Oropharynx is clear and moist.   Eyes: Pupils are equal, round, and reactive to light. Conjunctivae and EOM are normal.   Neck: Normal range of motion. Neck supple.   Cardiovascular: Normal rate, regular rhythm and intact distal pulses.   Murmur heard.   Systolic murmur is present with a grade of 3/6.  Pulmonary/Chest: Effort normal. No accessory muscle usage or stridor. No tachypnea. No respiratory distress. She has decreased breath sounds. She has no wheezes. She has no rhonchi. She has no rales. She exhibits no tenderness.   Abdominal: Soft. Bowel sounds are normal. She exhibits no distension. There is no tenderness. There is no rebound and no guarding.   Genitourinary:   Genitourinary Comments: Deferred   Musculoskeletal: She exhibits no edema, tenderness or deformity.   L/S wound vac in place   Neurological: She is alert, oriented to person, place, and time and easily aroused. She displays tremor. No sensory deficit. GCS eye subscore is 4. GCS verbal subscore is 5.   Skin: Skin is warm and dry. Capillary refill takes less than 2 seconds.   Psychiatric: She has a normal mood and affect. Her behavior is normal. Thought content normal.   Nursing note and vitals reviewed.      Significant Labs:   CBC:   Recent Labs   Lab 06/15/19  0419 06/16/19  0413   WBC  6.84 6.54   HGB 10.8* 10.4*   HCT 34.6* 33.0*   * 327     CMP:   Recent Labs   Lab 06/15/19  0419 06/16/19  0413   * 130*   K 4.2 4.1   CL 95 96   CO2 25 23   * 153*   BUN 14 15   CREATININE 0.8 0.8   CALCIUM 9.4 9.3   PROT 6.3 6.1   ALBUMIN 2.9* 2.8*   BILITOT 0.6 0.5   ALKPHOS 103 99   AST 17 16   ALT <5* <5*   ANIONGAP 11 11   EGFRNONAA >60 >60     POCT Glucose:   Recent Labs   Lab 06/15/19  1751 06/15/19  2124 06/16/19  0521   POCTGLUCOSE 205* 197* 147*       Significant Imaging: I have reviewed all pertinent imaging results/findings within the past 24 hours.      Assessment/Plan:      * Osteomyelitis  Plan is to go to the OR for a washout  Will continue with current abx started in the ED    S/p spinal wash out, on wound vac    POD 1 s/p I&D of wound dehiscence of lumbar surgery   looks and feels much better  On IV Vanc  Continue present care  D/c back to [facility] when cleared by Dr. Nicolas     6/13  Plan for LTAC  PT / OT  ABX    06/14  - Will return to [facility] on Monday, 06/17/19  - Continue PT/OT  - Continue IV abx -- ID following  - LSO brace when OOB  - Wound vac change today then every 3 days  - F/U with Dr. Nicolas upon discharge    06/15  - ID following -- IV abx changed from Vancomycin to Rocephin  - IV Diflucan added for candida albicans  - BC NGTD  - Continue PT/OT  - LSO brace when OOB  - Wound vac changed on 06/14. To change every 3 days, next change on 06/17 06/16  - Continue IV Rocephin and Diflucan, currently on day 6/42  - BC NTGD  - Continue PT/OT  - LSO brace when OOB  - Wound vac change scheduled for tomorrow, 06/17      Hyperlipidemia LDL goal <70  - Continue Statin      Type 2 diabetes, controlled, with peripheral neuropathy  - Accuchecks and low dose SSI      Hypothyroidism  - Continue Levothyroxine      Essential hypertension  - BP stable  - Continue Losartan      PD (Parkinson's disease)  - Continue Carbidopa  - PT/OT following    VTE Risk Mitigation (From  admission, onward)        Ordered     enoxaparin injection 40 mg  Daily      06/12/19 1112     IP VTE HIGH RISK PATIENT  Once      06/11/19 1704     Place sequential compression device  Until discontinued      06/11/19 1704     Place sequential compression device  Until discontinued      06/10/19 1932                KACIE Cowart  Department of Hospital Medicine   [facility]

## 2019-06-16 NOTE — PROGRESS NOTES
[facility]  Infectious Disease  Progress Note    Patient Name: Ross Isbell  MRN: [id number]  Admission Date: 6/10/2019  Length of Stay: 5 days  Attending Physician: Swetha Biswas MD  Primary Care Provider: Anna Penn MD    Isolation Status: No active isolations  Assessment/Plan:      No new Assessment & Plan notes have been filed under this hospital service since the last note was generated.  Service: Infectious Diseases      Anticipated Disposition:     Thank you for your consult. I will follow-up with patient. Please contact us if you have any additional questions.    Dada Gomez MD  Infectious Disease  [facility]    Subjective:     Principal Problem:Osteomyelitis    HPI: 73 year old woman with history of lumbar osteomyelitis with strep.  She was started on therapy with Rocephin but has noticed worsening drainage over the lumbar area. She was seen by Neurosurgery and I and D was done.  Since presentation, CT scan of the lumbar region showed- A pattern of erosive destructive change of the endplates at L2-3 with relative sparing of the disc space suggest changes of discitis in this postoperative patient.  Additionally a rim enhancing fluid collection in the right psoas muscle adjacent to the disc may represent a focal area of abscess.  It measures 2.8 cm in by 1.4 x 1.7 cm.  She was seen by neurosurgery and on 06/12 ,had I and D done .    No new subjective & objective note has been filed under this hospital service since the last note was generated.

## 2019-06-16 NOTE — PT/OT/SLP PROGRESS
Physical Therapy  Treatment    Ross Isbell   MRN: [id number]   Admitting Diagnosis: Osteomyelitis    PT Received On: 06/16/19  PT Start Time: 0922     PT Stop Time: 0937    PT Total Time (min): 15 min       Billable Minutes:  Therapeutic Activity 15 minutes    Treatment Type: Treatment  PT/PTA: PTA     PTA Visit Number: 2       General Precautions: Standard, fall  Orthopedic Precautions: N/A   Braces: LSO    Spiritual, Cultural Beliefs, Methodist Practices, Values that Affect Care: yes    Subjective:  Communicated with epic and nurse Wooten prior to session.      Pain/Comfort  Pain Rating 1: 4/10  Location 1: back    Objective:   Patient found with: peripheral IV, oxygen, telemetry    Functional Mobility:  Bed Mobility: min assist        Transfers:mod assist       Gait: mod assist x 2 stand-pivot transfer       Stairs:n/a          Balance:   Static Sit: GOOD: Takes MODERATE challenges from all directions  Dynamic Sit: GOOD-: Incosistently Maintains balance through MODERATE excursions of active trunk movement,     Static Stand: FAIR: Maintains without assist but unable to take challenges  Dynamic stand: POOR: Needs MOD (moderate) assist during gait     Therapeutic Activities and Exercises:  With min assist came to supine to side lying and min-mod assist side lying to sitting EOB. Max assist to hua back brace. Mod assist sit to stand to RW and for stand-pivot transfer to w/c at bedside. Able to take small lateral and pivot steps. Followed cues for proper sequencing and reached back for armrests to support descent into w/c.     AM-PAC 6 CLICK MOBILITY  How much help from another person does this patient currently need?   1 = Unable, Total/Dependent Assistance  2 = A lot, Maximum/Moderate Assistance  3 = A little, Minimum/Contact Guard/Supervision  4 = None, Modified [place]/Independent    Turning over in bed (including adjusting bedclothes, sheets and blankets)?: 3  Sitting down on and standing up from a chair  with arms (e.g., wheelchair, bedside commode, etc.): 2  Moving from lying on back to sitting on the side of the bed?: 2  Moving to and from a bed to a chair (including a wheelchair)?: 2  Need to walk in hospital room?: 2  Climbing 3-5 steps with a railing?: 1  Basic Mobility Total Score: 12    AM-PAC Raw Score CMS G-Code Modifier Level of Impairment Assistance   6 % Total / Unable   7 - 9 CM 80 - 100% Maximal Assist   10 - 14 CL 60 - 80% Moderate Assist   15 - 19 CK 40 - 60% Moderate Assist   20 - 22 CJ 20 - 40% Minimal Assist   23 CI 1-20% SBA / CGA   24 CH 0% Independent/ Mod I     Patient left up in chair with all lines intact, call button in reach, [id number] notified and daughter present.    Assessment:  Ross Isbell is a 73 y.o. female with a medical diagnosis of Osteomyelitis.    Rehab identified problem list/impairments: Rehab identified problem list/impairments: weakness, impaired endurance, pain, decreased safety awareness    Rehab potential is fair.    Activity tolerance: Good    Discharge recommendations: Discharge Facility/Level of Care Needs: nursing facility, skilled     Barriers to discharge:      Equipment recommendations:       GOALS:   Multidisciplinary Problems     Physical Therapy Goals        Problem: Physical Therapy Goal    Goal Priority Disciplines Outcome Goal Variances Interventions   Physical Therapy Goal     PT, PT/OT Ongoing (interventions implemented as appropriate)     Description:  PT WILL BE SEEN FOR P.T. FOR A MIN OF 5 OUT OF 7 DAYS A WEEK  LT19  1. PT WILL COMPLETE BED MOBILITY WITH MOD A  2. PT WILL SCOOT IN BED WITH MOD A  3. PT WILL T/F TO CHAIR WITH MAX A X 1 AND LSO ON  4. PT WILL COMPLETE B LE TE X 10 REPS FOR ROM AND STRENGTHENING                    PLAN:    Patient to be seen 5 x/week  to address the above listed problems via gait training, therapeutic activities, therapeutic exercises  Plan of Care expires: 19  Plan of Care reviewed with: patient,  daughter         Gavin Ruano, PTA  06/16/2019

## 2019-06-16 NOTE — ASSESSMENT & PLAN NOTE
Plan is to go to the OR for a washout  Will continue with current abx started in the ED    S/p spinal wash out, on wound vac    POD 1 s/p I&D of wound dehiscence of lumbar surgery   looks and feels much better  On IV Vanc  Continue present care  D/c back to [facility] when cleared by Dr. Nicolas     6/13  Plan for LTAC  PT / OT  ABX    06/14  - Will return to [facility] on Monday, 06/17/19  - Continue PT/OT  - Continue IV abx -- ID following  - LSO brace when OOB  - Wound vac change today then every 3 days  - F/U with Dr. Nicolas upon discharge    06/15  - ID following -- IV abx changed from Vancomycin to Rocephin  - IV Diflucan added for candida albicans  - BC NGTD  - Continue PT/OT  - LSO brace when OOB  - Wound vac changed on 06/14. To change every 3 days, next change on 06/17 06/16  - Continue IV Rocephin and Diflucan, currently on day 6/42  - BC NTGD  - Continue PT/OT  - LSO brace when OOB  - Wound vac change scheduled for tomorrow, 06/17

## 2019-06-16 NOTE — PROGRESS NOTES
[facility]  Infectious Disease  Progress Note    Patient Name: Ross Isbell  MRN: [id number]  Admission Date: 6/10/2019  Length of Stay: 5 days  Attending Physician: Swetha Biswas MD  Primary Care Provider: Anna Penn MD    Isolation Status: No active isolations  Assessment/Plan:      * Osteomyelitis  06/11- all cultures reviewed.  )6/11- yeast   04/30- strep    She is on Vancomycin , will follow new cultures and will plan to switch to Rocephin depending ob cultures.  Will add Micafungin until ID of yeast is known    06/14- cultures reviewed-   Candida albicans-will switch to fluconazole .  Will also switch to rocephin in AM  06/15- will continue fluconazole/rocephin and will check ESR and CRP in AM  Day 5/42 06/16- will continue Rocephin/Fluconazole at 400mg daily.  Will follow ESR and CRP  Day 6/42      Type 2 diabetes, controlled, with peripheral neuropathy  06/14- will closely monitor glucose    Essential hypertension  06/14- BP control as per primary team        Anticipated Disposition:     Thank you for your consult. I will follow-up with patient. Please contact us if you have any additional questions.    Dada Gomez MD  Infectious Disease  [facility]    Subjective:     Principal Problem:Osteomyelitis    HPI: 73 year old woman with history of lumbar osteomyelitis with strep.  She was started on therapy with Rocephin but has noticed worsening drainage over the lumbar area. She was seen by Neurosurgery and I and D was done.  Since presentation, CT scan of the lumbar region showed- A pattern of erosive destructive change of the endplates at L2-3 with relative sparing of the disc space suggest changes of discitis in this postoperative patient.  Additionally a rim enhancing fluid collection in the right psoas muscle adjacent to the disc may represent a focal area of abscess.  It measures 2.8 cm in by 1.4 x 1.7 cm.  She was seen by neurosurgery and on 06/12 ,had I and D  done .    Interval History: 73 year old woman with history of spinal osteomyelitis .  Cultures - Candida albicans-06/11  Gram stain -negative     06/16- she is weak, denies fever or chills  Review of Systems   Constitutional: Positive for activity change and fatigue.   HENT: Negative.    Eyes: Negative for visual disturbance.   Respiratory: Negative for apnea, cough, choking, chest tightness, shortness of breath, wheezing and stridor.    Cardiovascular: Negative for chest pain, palpitations and leg swelling.   Gastrointestinal: Negative for abdominal pain, constipation, diarrhea, nausea and vomiting.   Endocrine: Negative.    Musculoskeletal: Positive for back pain. Negative for arthralgias, joint swelling, myalgias, neck pain and neck stiffness.   Skin: Negative.    Allergic/Immunologic: Negative.    Neurological: Positive for tremors and weakness. Negative for dizziness, seizures, syncope, facial asymmetry, speech difficulty, light-headedness, numbness and headaches.   Hematological: Negative.    Psychiatric/Behavioral: Negative for agitation, behavioral problems and confusion.     Objective:     Vital Signs (Most Recent):  Temp: 98.3 °F (36.8 °C) (06/16/19 1525)  Pulse: 94 (06/16/19 1525)  Resp: 18 (06/16/19 1525)  BP: 100/64 (06/16/19 1525)  SpO2: 99 % (06/16/19 1525) Vital Signs (24h Range):  Temp:  [97.6 °F (36.4 °C)-98.3 °F (36.8 °C)] 98.3 °F (36.8 °C)  Pulse:  [] 94  Resp:  [18-20] 18  SpO2:  [95 %-99 %] 99 %  BP: (100-133)/(49-64) 100/64     Weight: 92.7 kg (204 lb 5.9 oz)  Body mass index is 32.99 kg/m².    Estimated Creatinine Clearance: 71.9 mL/min (based on SCr of 0.8 mg/dL).    Physical Exam   Constitutional: She is oriented to person, place, and time. She appears well-developed and well-nourished. She is cooperative. She is easily aroused. She appears ill. Nasal cannula in place.   HENT:   Head: Normocephalic and atraumatic.   Mouth/Throat: Oropharynx is clear and moist.   Eyes: Pupils are  equal, round, and reactive to light. Conjunctivae and EOM are normal.   Neck: Normal range of motion. Neck supple.   Cardiovascular: Normal rate, regular rhythm and intact distal pulses.   Murmur heard.   Systolic murmur is present with a grade of 3/6.  Pulmonary/Chest: Effort normal. No accessory muscle usage or stridor. No tachypnea. No respiratory distress. She has decreased breath sounds. She has no wheezes. She has no rhonchi. She has no rales. She exhibits no tenderness.   Abdominal: Soft. Bowel sounds are normal. She exhibits no distension. There is no tenderness. There is no rebound and no guarding.   Genitourinary:   Genitourinary Comments: Deferred   Musculoskeletal: She exhibits no edema, tenderness or deformity.   L/S wound vac in place   Neurological: She is alert, oriented to person, place, and time and easily aroused. She displays tremor. No sensory deficit. GCS eye subscore is 4. GCS verbal subscore is 5.   Skin: Skin is warm and dry. Capillary refill takes less than 2 seconds.   Psychiatric: She has a normal mood and affect. Her behavior is normal. Thought content normal.   Nursing note and vitals reviewed.      Significant Labs:   Blood Culture:   Recent Labs   Lab 05/28/19  1159 06/01/19  0003 06/01/19  0010 06/10/19  1440 06/10/19  1526   LABBLOO No growth after 5 days. No growth after 5 days. No growth after 5 days. No growth after 5 days. No growth after 5 days.     BMP:   Recent Labs   Lab 06/16/19  0413   *   *   K 4.1   CL 96   CO2 23   BUN 15   CREATININE 0.8   CALCIUM 9.3       Significant Imaging: I have reviewed all pertinent imaging results/findings within the past 24 hours.

## 2019-06-16 NOTE — SUBJECTIVE & OBJECTIVE
Interval History: 73 year old woman with history of spinal osteomyelitis .  Cultures - Candida albicans-06/11  Gram stain -negative     Review of Systems   Constitutional: Positive for activity change and fatigue.   HENT: Negative.    Eyes: Negative for visual disturbance.   Respiratory: Negative for apnea, cough, choking, chest tightness, shortness of breath, wheezing and stridor.    Cardiovascular: Negative for chest pain, palpitations and leg swelling.   Gastrointestinal: Negative for abdominal pain, constipation, diarrhea, nausea and vomiting.   Endocrine: Negative.    Musculoskeletal: Positive for back pain. Negative for arthralgias, joint swelling, myalgias, neck pain and neck stiffness.   Skin: Negative.    Allergic/Immunologic: Negative.    Neurological: Positive for tremors and weakness. Negative for dizziness, seizures, syncope, facial asymmetry, speech difficulty, light-headedness, numbness and headaches.   Hematological: Negative.    Psychiatric/Behavioral: Negative for agitation, behavioral problems and confusion.     Objective:     Vital Signs (Most Recent):  Temp: 98.2 °F (36.8 °C) (06/16/19 0737)  Pulse: 87 (06/16/19 0743)  Resp: 18 (06/16/19 0743)  BP: (!) 133/58 (06/16/19 0737)  SpO2: 95 % (06/16/19 0743) Vital Signs (24h Range):  Temp:  [97.6 °F (36.4 °C)-98.7 °F (37.1 °C)] 98.2 °F (36.8 °C)  Pulse:  [] 87  Resp:  [18-20] 18  SpO2:  [95 %-99 %] 95 %  BP: (101-133)/(49-58) 133/58     Weight: 92.7 kg (204 lb 5.9 oz)  Body mass index is 32.99 kg/m².    Estimated Creatinine Clearance: 71.9 mL/min (based on SCr of 0.8 mg/dL).    Physical Exam   Constitutional: She is oriented to person, place, and time. She appears well-developed and well-nourished. She is cooperative. She is easily aroused. She appears ill. Nasal cannula in place.   HENT:   Head: Normocephalic and atraumatic.   Mouth/Throat: Oropharynx is clear and moist.   Eyes: Pupils are equal, round, and reactive to light. Conjunctivae and  EOM are normal.   Neck: Normal range of motion. Neck supple.   Cardiovascular: Normal rate, regular rhythm and intact distal pulses.   Murmur heard.   Systolic murmur is present with a grade of 3/6.  Pulmonary/Chest: Effort normal. No accessory muscle usage or stridor. No tachypnea. No respiratory distress. She has decreased breath sounds. She has no wheezes. She has no rhonchi. She has no rales. She exhibits no tenderness.   Abdominal: Soft. Bowel sounds are normal. She exhibits no distension. There is no tenderness. There is no rebound and no guarding.   Genitourinary:   Genitourinary Comments: Deferred   Musculoskeletal: She exhibits no edema, tenderness or deformity.   L/S wound vac in place   Neurological: She is alert, oriented to person, place, and time and easily aroused. She displays tremor. No sensory deficit. GCS eye subscore is 4. GCS verbal subscore is 5.   Skin: Skin is warm and dry. Capillary refill takes less than 2 seconds.   Psychiatric: She has a normal mood and affect. Her behavior is normal. Thought content normal.   Nursing note and vitals reviewed.      Significant Labs:   Blood Culture:   Recent Labs   Lab 05/28/19  1159 06/01/19  0003 06/01/19  0010 06/10/19  1440 06/10/19  1526   LABBLOO No growth after 5 days. No growth after 5 days. No growth after 5 days. No growth after 5 days. No growth after 5 days.     BMP:   Recent Labs   Lab 06/16/19  0413   *   *   K 4.1   CL 96   CO2 23   BUN 15   CREATININE 0.8   CALCIUM 9.3       Significant Imaging: I have reviewed all pertinent imaging results/findings within the past 24 hours.

## 2019-06-16 NOTE — ASSESSMENT & PLAN NOTE
06/11- all cultures reviewed.  )6/11- yeast   04/30- strep    She is on Vancomycin , will follow new cultures and will plan to switch to Rocephin depending ob cultures.  Will add Micafungin until ID of yeast is known    06/14- cultures reviewed-   Candida albicans-will switch to fluconazole .  Will also switch to rocephin in AM  06/15- will continue fluconazole/rocephin and will check ESR and CRP in AM  Day 5/42

## 2019-06-16 NOTE — SUBJECTIVE & OBJECTIVE
Interval History: Pt seen and examined. Daughter at bedside. Pt currently sitting up in chair with LSO brace on. Pleasant lady. Currently has no complaints at this time.     Review of Systems   Constitutional: Positive for activity change and fatigue.   HENT: Negative.    Eyes: Negative for visual disturbance.   Respiratory: Negative for apnea, cough, choking, chest tightness, shortness of breath, wheezing and stridor.    Cardiovascular: Negative for chest pain, palpitations and leg swelling.   Gastrointestinal: Negative for abdominal pain, constipation, diarrhea, nausea and vomiting.   Endocrine: Negative.    Musculoskeletal: Positive for back pain. Negative for arthralgias, joint swelling, myalgias, neck pain and neck stiffness.   Skin: Negative.    Allergic/Immunologic: Negative.    Neurological: Positive for tremors (chronic) and weakness. Negative for dizziness, seizures, syncope, facial asymmetry, speech difficulty, light-headedness, numbness and headaches.   Hematological: Negative.    Psychiatric/Behavioral: Negative for agitation, behavioral problems and confusion.     Objective:     Vital Signs (Most Recent):  Temp: 98.2 °F (36.8 °C) (06/16/19 0737)  Pulse: 87 (06/16/19 0743)  Resp: 18 (06/16/19 0743)  BP: (!) 133/58 (06/16/19 0737)  SpO2: 95 % (06/16/19 0743) Vital Signs (24h Range):  Temp:  [97.6 °F (36.4 °C)-98.7 °F (37.1 °C)] 98.2 °F (36.8 °C)  Pulse:  [] 87  Resp:  [18-20] 18  SpO2:  [95 %-99 %] 95 %  BP: (101-133)/(49-58) 133/58     Weight: 92.7 kg (204 lb 5.9 oz)  Body mass index is 32.99 kg/m².    Intake/Output Summary (Last 24 hours) at 6/16/2019 1009  Last data filed at 6/16/2019 0500  Gross per 24 hour   Intake 100 ml   Output 1120 ml   Net -1020 ml      Physical Exam   Constitutional: She is oriented to person, place, and time. She appears well-developed and well-nourished. She is cooperative. She is easily aroused. She appears ill. Nasal cannula in place.   HENT:   Head: Normocephalic  and atraumatic.   Mouth/Throat: Oropharynx is clear and moist.   Eyes: Pupils are equal, round, and reactive to light. Conjunctivae and EOM are normal.   Neck: Normal range of motion. Neck supple.   Cardiovascular: Normal rate, regular rhythm and intact distal pulses.   Murmur heard.   Systolic murmur is present with a grade of 3/6.  Pulmonary/Chest: Effort normal. No accessory muscle usage or stridor. No tachypnea. No respiratory distress. She has decreased breath sounds. She has no wheezes. She has no rhonchi. She has no rales. She exhibits no tenderness.   Abdominal: Soft. Bowel sounds are normal. She exhibits no distension. There is no tenderness. There is no rebound and no guarding.   Genitourinary:   Genitourinary Comments: Deferred   Musculoskeletal: She exhibits no edema, tenderness or deformity.   L/S wound vac in place   Neurological: She is alert, oriented to person, place, and time and easily aroused. She displays tremor. No sensory deficit. GCS eye subscore is 4. GCS verbal subscore is 5.   Skin: Skin is warm and dry. Capillary refill takes less than 2 seconds.   Psychiatric: She has a normal mood and affect. Her behavior is normal. Thought content normal.   Nursing note and vitals reviewed.      Significant Labs:   CBC:   Recent Labs   Lab 06/15/19  0419 06/16/19  0413   WBC 6.84 6.54   HGB 10.8* 10.4*   HCT 34.6* 33.0*   * 327     CMP:   Recent Labs   Lab 06/15/19  0419 06/16/19  0413   * 130*   K 4.2 4.1   CL 95 96   CO2 25 23   * 153*   BUN 14 15   CREATININE 0.8 0.8   CALCIUM 9.4 9.3   PROT 6.3 6.1   ALBUMIN 2.9* 2.8*   BILITOT 0.6 0.5   ALKPHOS 103 99   AST 17 16   ALT <5* <5*   ANIONGAP 11 11   EGFRNONAA >60 >60     POCT Glucose:   Recent Labs   Lab 06/15/19  1751 06/15/19  2124 06/16/19  0521   POCTGLUCOSE 205* 197* 147*       Significant Imaging: I have reviewed all pertinent imaging results/findings within the past 24 hours.

## 2019-06-16 NOTE — SUBJECTIVE & OBJECTIVE
Interval History: 73 year old woman with history of spinal osteomyelitis .  Cultures - Candida albicans-06/11  Gram stain -negative     06/16- she is weak, denies fever or chills  Review of Systems   Constitutional: Positive for activity change and fatigue.   HENT: Negative.    Eyes: Negative for visual disturbance.   Respiratory: Negative for apnea, cough, choking, chest tightness, shortness of breath, wheezing and stridor.    Cardiovascular: Negative for chest pain, palpitations and leg swelling.   Gastrointestinal: Negative for abdominal pain, constipation, diarrhea, nausea and vomiting.   Endocrine: Negative.    Musculoskeletal: Positive for back pain. Negative for arthralgias, joint swelling, myalgias, neck pain and neck stiffness.   Skin: Negative.    Allergic/Immunologic: Negative.    Neurological: Positive for tremors and weakness. Negative for dizziness, seizures, syncope, facial asymmetry, speech difficulty, light-headedness, numbness and headaches.   Hematological: Negative.    Psychiatric/Behavioral: Negative for agitation, behavioral problems and confusion.     Objective:     Vital Signs (Most Recent):  Temp: 98.3 °F (36.8 °C) (06/16/19 1525)  Pulse: 94 (06/16/19 1525)  Resp: 18 (06/16/19 1525)  BP: 100/64 (06/16/19 1525)  SpO2: 99 % (06/16/19 1525) Vital Signs (24h Range):  Temp:  [97.6 °F (36.4 °C)-98.3 °F (36.8 °C)] 98.3 °F (36.8 °C)  Pulse:  [] 94  Resp:  [18-20] 18  SpO2:  [95 %-99 %] 99 %  BP: (100-133)/(49-64) 100/64     Weight: 92.7 kg (204 lb 5.9 oz)  Body mass index is 32.99 kg/m².    Estimated Creatinine Clearance: 71.9 mL/min (based on SCr of 0.8 mg/dL).    Physical Exam   Constitutional: She is oriented to person, place, and time. She appears well-developed and well-nourished. She is cooperative. She is easily aroused. She appears ill. Nasal cannula in place.   HENT:   Head: Normocephalic and atraumatic.   Mouth/Throat: Oropharynx is clear and moist.   Eyes: Pupils are equal, round,  and reactive to light. Conjunctivae and EOM are normal.   Neck: Normal range of motion. Neck supple.   Cardiovascular: Normal rate, regular rhythm and intact distal pulses.   Murmur heard.   Systolic murmur is present with a grade of 3/6.  Pulmonary/Chest: Effort normal. No accessory muscle usage or stridor. No tachypnea. No respiratory distress. She has decreased breath sounds. She has no wheezes. She has no rhonchi. She has no rales. She exhibits no tenderness.   Abdominal: Soft. Bowel sounds are normal. She exhibits no distension. There is no tenderness. There is no rebound and no guarding.   Genitourinary:   Genitourinary Comments: Deferred   Musculoskeletal: She exhibits no edema, tenderness or deformity.   L/S wound vac in place   Neurological: She is alert, oriented to person, place, and time and easily aroused. She displays tremor. No sensory deficit. GCS eye subscore is 4. GCS verbal subscore is 5.   Skin: Skin is warm and dry. Capillary refill takes less than 2 seconds.   Psychiatric: She has a normal mood and affect. Her behavior is normal. Thought content normal.   Nursing note and vitals reviewed.      Significant Labs:   Blood Culture:   Recent Labs   Lab 05/28/19  1159 06/01/19  0003 06/01/19  0010 06/10/19  1440 06/10/19  1526   LABBLOO No growth after 5 days. No growth after 5 days. No growth after 5 days. No growth after 5 days. No growth after 5 days.     BMP:   Recent Labs   Lab 06/16/19  0413   *   *   K 4.1   CL 96   CO2 23   BUN 15   CREATININE 0.8   CALCIUM 9.3       Significant Imaging: I have reviewed all pertinent imaging results/findings within the past 24 hours.

## 2019-06-17 LAB
ALBUMIN SERPL BCP-MCNC: 2.8 G/DL (ref 3.5–5.2)
ALP SERPL-CCNC: 104 U/L (ref 55–135)
ALT SERPL W/O P-5'-P-CCNC: <5 U/L (ref 10–44)
ANION GAP SERPL CALC-SCNC: 9 MMOL/L (ref 8–16)
AST SERPL-CCNC: 17 U/L (ref 10–40)
BASOPHILS # BLD AUTO: 0.02 K/UL (ref 0–0.2)
BASOPHILS NFR BLD: 0.3 % (ref 0–1.9)
BILIRUB SERPL-MCNC: 0.4 MG/DL (ref 0.1–1)
BUN SERPL-MCNC: 12 MG/DL (ref 8–23)
CALCIUM SERPL-MCNC: 9.2 MG/DL (ref 8.7–10.5)
CHLORIDE SERPL-SCNC: 95 MMOL/L (ref 95–110)
CO2 SERPL-SCNC: 27 MMOL/L (ref 23–29)
CREAT SERPL-MCNC: 0.8 MG/DL (ref 0.5–1.4)
DIFFERENTIAL METHOD: ABNORMAL
EOSINOPHIL # BLD AUTO: 0.4 K/UL (ref 0–0.5)
EOSINOPHIL NFR BLD: 5.8 % (ref 0–8)
ERYTHROCYTE [DISTWIDTH] IN BLOOD BY AUTOMATED COUNT: 15.3 % (ref 11.5–14.5)
EST. GFR  (AFRICAN AMERICAN): >60 ML/MIN/1.73 M^2
EST. GFR  (NON AFRICAN AMERICAN): >60 ML/MIN/1.73 M^2
GLUCOSE SERPL-MCNC: 153 MG/DL (ref 70–110)
HCT VFR BLD AUTO: 33.6 % (ref 37–48.5)
HGB BLD-MCNC: 10.4 G/DL (ref 12–16)
LYMPHOCYTES # BLD AUTO: 2 K/UL (ref 1–4.8)
LYMPHOCYTES NFR BLD: 31.1 % (ref 18–48)
MCH RBC QN AUTO: 27.7 PG (ref 27–31)
MCHC RBC AUTO-ENTMCNC: 31 G/DL (ref 32–36)
MCV RBC AUTO: 90 FL (ref 82–98)
MONOCYTES # BLD AUTO: 0.8 K/UL (ref 0.3–1)
MONOCYTES NFR BLD: 13.3 % (ref 4–15)
NEUTROPHILS # BLD AUTO: 3.1 K/UL (ref 1.8–7.7)
NEUTROPHILS NFR BLD: 49.5 % (ref 38–73)
PLATELET # BLD AUTO: 330 K/UL (ref 150–350)
PMV BLD AUTO: 8.5 FL (ref 9.2–12.9)
POCT GLUCOSE: 174 MG/DL (ref 70–110)
POCT GLUCOSE: 180 MG/DL (ref 70–110)
POCT GLUCOSE: 182 MG/DL (ref 70–110)
POCT GLUCOSE: 198 MG/DL (ref 70–110)
POTASSIUM SERPL-SCNC: 4.3 MMOL/L (ref 3.5–5.1)
PROT SERPL-MCNC: 6.2 G/DL (ref 6–8.4)
RBC # BLD AUTO: 3.75 M/UL (ref 4–5.4)
SODIUM SERPL-SCNC: 131 MMOL/L (ref 136–145)
WBC # BLD AUTO: 6.33 K/UL (ref 3.9–12.7)

## 2019-06-17 PROCEDURE — 97530 THERAPEUTIC ACTIVITIES: CPT

## 2019-06-17 PROCEDURE — 25000003 PHARM REV CODE 250: Performed by: INTERNAL MEDICINE

## 2019-06-17 PROCEDURE — 21400001 HC TELEMETRY ROOM

## 2019-06-17 PROCEDURE — 97606 NEG PRS WND THER DME>50 SQCM: CPT

## 2019-06-17 PROCEDURE — 63600175 PHARM REV CODE 636 W HCPCS: Performed by: NURSE PRACTITIONER

## 2019-06-17 PROCEDURE — 25000003 PHARM REV CODE 250: Performed by: NURSE PRACTITIONER

## 2019-06-17 PROCEDURE — 63600175 PHARM REV CODE 636 W HCPCS: Performed by: INTERNAL MEDICINE

## 2019-06-17 PROCEDURE — 25000003 PHARM REV CODE 250: Performed by: PHYSICIAN ASSISTANT

## 2019-06-17 PROCEDURE — 27000221 HC OXYGEN, UP TO 24 HOURS

## 2019-06-17 PROCEDURE — 27000325 HC DRESSING INFOVAC LARGE BLK

## 2019-06-17 PROCEDURE — 85025 COMPLETE CBC W/AUTO DIFF WBC: CPT

## 2019-06-17 PROCEDURE — 63600175 PHARM REV CODE 636 W HCPCS: Performed by: PHYSICIAN ASSISTANT

## 2019-06-17 PROCEDURE — 36415 COLL VENOUS BLD VENIPUNCTURE: CPT

## 2019-06-17 PROCEDURE — 97110 THERAPEUTIC EXERCISES: CPT

## 2019-06-17 PROCEDURE — 94760 N-INVAS EAR/PLS OXIMETRY 1: CPT

## 2019-06-17 PROCEDURE — 80053 COMPREHEN METABOLIC PANEL: CPT

## 2019-06-17 RX ORDER — MORPHINE SULFATE 2 MG/ML
2 INJECTION, SOLUTION INTRAMUSCULAR; INTRAVENOUS ONCE
Status: COMPLETED | OUTPATIENT
Start: 2019-06-17 | End: 2019-06-17

## 2019-06-17 RX ADMIN — CARBIDOPA AND LEVODOPA 1 TABLET: 25; 250 TABLET ORAL at 09:06

## 2019-06-17 RX ADMIN — CARBIDOPA AND LEVODOPA 1 TABLET: 25; 250 TABLET ORAL at 10:06

## 2019-06-17 RX ADMIN — Medication: at 08:06

## 2019-06-17 RX ADMIN — OXYCODONE AND ACETAMINOPHEN 1 TABLET: 10; 325 TABLET ORAL at 02:06

## 2019-06-17 RX ADMIN — LEVOTHYROXINE SODIUM 125 MCG: 125 TABLET ORAL at 05:06

## 2019-06-17 RX ADMIN — FLUCONAZOLE 400 MG: 2 INJECTION, SOLUTION INTRAVENOUS at 12:06

## 2019-06-17 RX ADMIN — LOSARTAN POTASSIUM 50 MG: 50 TABLET, FILM COATED ORAL at 08:06

## 2019-06-17 RX ADMIN — CYCLOBENZAPRINE HYDROCHLORIDE 10 MG: 10 TABLET, FILM COATED ORAL at 09:06

## 2019-06-17 RX ADMIN — PRAMIPEXOLE DIHYDROCHLORIDE 1 MG: 0.5 TABLET ORAL at 09:06

## 2019-06-17 RX ADMIN — PRAMIPEXOLE DIHYDROCHLORIDE 1 MG: 0.5 TABLET ORAL at 08:06

## 2019-06-17 RX ADMIN — PANTOPRAZOLE SODIUM 40 MG: 40 TABLET, DELAYED RELEASE ORAL at 08:06

## 2019-06-17 RX ADMIN — OXYCODONE AND ACETAMINOPHEN 1 TABLET: 10; 325 TABLET ORAL at 06:06

## 2019-06-17 RX ADMIN — CARBIDOPA AND LEVODOPA 1 TABLET: 25; 250 TABLET ORAL at 02:06

## 2019-06-17 RX ADMIN — OXYCODONE AND ACETAMINOPHEN 1 TABLET: 10; 325 TABLET ORAL at 05:06

## 2019-06-17 RX ADMIN — CEFTRIAXONE 2 G: 2 INJECTION, SOLUTION INTRAVENOUS at 12:06

## 2019-06-17 RX ADMIN — MORPHINE SULFATE 2 MG: 2 INJECTION, SOLUTION INTRAMUSCULAR; INTRAVENOUS at 12:06

## 2019-06-17 RX ADMIN — CARBIDOPA AND LEVODOPA 1 TABLET: 25; 250 TABLET ORAL at 06:06

## 2019-06-17 RX ADMIN — MICONAZOLE NITRATE: 2 OINTMENT TOPICAL at 09:06

## 2019-06-17 RX ADMIN — CARBIDOPA AND LEVODOPA 1 TABLET: 25; 250 TABLET ORAL at 05:06

## 2019-06-17 RX ADMIN — LATANOPROST 1 DROP: 50 SOLUTION OPHTHALMIC at 09:06

## 2019-06-17 RX ADMIN — CYCLOBENZAPRINE HYDROCHLORIDE 10 MG: 10 TABLET, FILM COATED ORAL at 08:06

## 2019-06-17 RX ADMIN — ENOXAPARIN SODIUM 40 MG: 100 INJECTION SUBCUTANEOUS at 04:06

## 2019-06-17 RX ADMIN — PRAMIPEXOLE DIHYDROCHLORIDE 1 MG: 0.5 TABLET ORAL at 02:06

## 2019-06-17 RX ADMIN — PRAVASTATIN SODIUM 40 MG: 20 TABLET ORAL at 08:06

## 2019-06-17 RX ADMIN — MICONAZOLE NITRATE: 2 OINTMENT TOPICAL at 10:06

## 2019-06-17 RX ADMIN — OXYCODONE AND ACETAMINOPHEN 1 TABLET: 10; 325 TABLET ORAL at 10:06

## 2019-06-17 NOTE — PROGRESS NOTES
[facility] Hospital Medicine  Progress Note    Patient Name: Ross Isbell  MRN: [id number]  Patient Class: IP- Inpatient   Admission Date: 6/10/2019  Length of Stay: 6 days  Attending Physician: Swetha Biswas MD  Primary Care Provider: Anna Penn MD        Subjective:     Principal Problem:Osteomyelitis      HPI:  Mrs. Isbell sent from Dr. Griffith office for admissions and antibiotics after she went for follow up and per the daughter a significant amount of drainage from her back wound. She recently had surgery for osteo of lumbar spine. She states she still has some pain but otherwise is doing okay.        Overview/Hospital Course:  Patient is a 73-year-old female with type 2 diabetes, hypertension and parkinsonism, Ch Hyponatremia, recent h/o osteomyelitis of spine, s/p surgery on 5/25/19 for posterior lumbar fusion and abscess debridement.  She was sent from Dr. Nicolas's office as she had significant amount of drainage from her back wound spine. She states she still has some pain but otherwise is doing okay.    6/11- resting well with obvious resting tremor. Underwent washout out of her wound by Dr. Nicolas today, post op doing well, wound Vac in place. Pain under control.  6/12- looks and feels much better, s/p Day 1 post op for l/S I&D, pain much better, wound vac in place. Radiology does not think that the psoas abscess needs any drainage at present and can be watched. Wound Cx remain NGTD-- on IV Vanc.  6/13 - Patient continues to experience discomfort with mobility. PT / OT consulted and recommending post acute care. CM consulted for return to post acute care. Discussed patient with Neuro Sx regarding POC and ongoing needs. D/C planning in progress.   06/14 -- No acute distress overnight. POD # 3. Pt reports pain much improved today vs yesterday. Pt up with PT today, tolerated. First wound vac change scheduled for today then every 3 days. Pt will return to [facility] on Monday,  06/17/19  06/15 POD # 4. No acute events overnight. Afebrile. WBC count stable. PT/OT following. Wound vac with good seal, replaced yesterday 06/14. IV Rocephin and Diflucan started by ID today. Pt was switched from Vancomycin to Rocephin and candida albicans in aerobic culture dated 06/11.   06/16 POD #5. No acute events overnight. Afebrile overnight. Working with PT. OOB in chair with LSO brace on. Currently on day 6/42 with IV Rocephin and Diflucan. Pt has no complaints at this time. Wound vac change scheduled for tomorrow, 06/17. Will likely be discharged back to [facility] afterward with F/U with Dr. Nicolas.   06/17 POD #6. Wound vac changed today, 06/17. Unable to discharge to [facility] today due SNF not having wound vac available. CM reported that SNF should have by tomorrow morning, 06/18. Continue current regimen.      Interval History: Pt seen and examined. Pt has no complaints at this time. Will likely be discharged to [facility] tomorrow, 06/18 once wound vac available    Review of Systems   Constitutional: Positive for activity change and fatigue.   HENT: Negative.    Eyes: Negative for visual disturbance.   Respiratory: Negative for apnea, cough, choking, chest tightness, shortness of breath, wheezing and stridor.    Cardiovascular: Negative for chest pain, palpitations and leg swelling.   Gastrointestinal: Negative for abdominal pain, constipation, diarrhea, nausea and vomiting.   Endocrine: Negative.    Musculoskeletal: Positive for back pain. Negative for arthralgias, joint swelling, myalgias, neck pain and neck stiffness.   Skin: Negative.    Allergic/Immunologic: Negative.    Neurological: Positive for tremors (chronic) and weakness. Negative for dizziness, seizures, syncope, facial asymmetry, speech difficulty, light-headedness, numbness and headaches.   Hematological: Negative.    Psychiatric/Behavioral: Negative for agitation, behavioral problems and confusion.     Objective:     Vital  Signs (Most Recent):  Temp: 98.6 °F (37 °C) (06/17/19 1603)  Pulse: 92 (06/17/19 1603)  Resp: 18 (06/17/19 1603)  BP: (!) 108/53 (06/17/19 1603)  SpO2: (!) 94 % (06/17/19 1603) Vital Signs (24h Range):  Temp:  [96.8 °F (36 °C)-99.1 °F (37.3 °C)] 98.6 °F (37 °C)  Pulse:  [] 92  Resp:  [17-18] 18  SpO2:  [93 %-98 %] 94 %  BP: (108-134)/(53-61) 108/53     Weight: 88.9 kg (195 lb 15.8 oz)  Body mass index is 31.63 kg/m².    Intake/Output Summary (Last 24 hours) at 6/17/2019 1642  Last data filed at 6/17/2019 0600  Gross per 24 hour   Intake --   Output 700 ml   Net -700 ml      Physical Exam   Constitutional: She is oriented to person, place, and time. She appears well-developed and well-nourished. She is cooperative. She is easily aroused. She appears ill. Nasal cannula in place.   HENT:   Head: Normocephalic and atraumatic.   Mouth/Throat: Oropharynx is clear and moist.   Eyes: Pupils are equal, round, and reactive to light. Conjunctivae and EOM are normal.   Neck: Normal range of motion. Neck supple.   Cardiovascular: Normal rate, regular rhythm and intact distal pulses.   Murmur heard.   Systolic murmur is present with a grade of 3/6.  Pulmonary/Chest: Effort normal and breath sounds normal. No accessory muscle usage or stridor. No tachypnea. No respiratory distress. She has no wheezes. She has no rhonchi. She has no rales. She exhibits no tenderness.   Abdominal: Soft. Bowel sounds are normal. She exhibits no distension. There is no tenderness. There is no rebound and no guarding.   Genitourinary:   Genitourinary Comments: Deferred   Musculoskeletal: She exhibits no edema, tenderness or deformity.   L/S wound vac in place   Neurological: She is alert, oriented to person, place, and time and easily aroused. She displays tremor. No sensory deficit. GCS eye subscore is 4. GCS verbal subscore is 5.   Skin: Skin is warm and dry. Capillary refill takes less than 2 seconds.   Psychiatric: She has a normal mood and  affect. Her behavior is normal. Thought content normal.   Nursing note and vitals reviewed.      Significant Labs:   CBC:   Recent Labs   Lab 06/16/19  0413 06/17/19  0414   WBC 6.54 6.33   HGB 10.4* 10.4*   HCT 33.0* 33.6*    330     CMP:   Recent Labs   Lab 06/16/19  0413 06/17/19  0414   * 131*   K 4.1 4.3   CL 96 95   CO2 23 27   * 153*   BUN 15 12   CREATININE 0.8 0.8   CALCIUM 9.3 9.2   PROT 6.1 6.2   ALBUMIN 2.8* 2.8*   BILITOT 0.5 0.4   ALKPHOS 99 104   AST 16 17   ALT <5* <5*   ANIONGAP 11 9   EGFRNONAA >60 >60     POCT Glucose:   Recent Labs   Lab 06/16/19 2046 06/17/19  0529 06/17/19  1054   POCTGLUCOSE 228* 180* 198*       Significant Imaging: I have reviewed all pertinent imaging results/findings within the past 24 hours.      Assessment/Plan:      * Osteomyelitis  Plan is to go to the OR for a washout  Will continue with current abx started in the ED    S/p spinal wash out, on wound vac    POD 1 s/p I&D of wound dehiscence of lumbar surgery   looks and feels much better  On IV Vanc  Continue present care  D/c back to [facility] when cleared by Dr. Nicolas     6/13  Plan for LTAC  PT / OT  ABX    06/14  - Will return to [facility] on Monday, 06/17/19  - Continue PT/OT  - Continue IV abx -- ID following  - LSO brace when OOB  - Wound vac change today then every 3 days  - F/U with Dr. Nicolas upon discharge    06/15  - ID following -- IV abx changed from Vancomycin to Rocephin  - IV Diflucan added for candida albicans  - BC NGTD  - Continue PT/OT  - LSO brace when OOB  - Wound vac changed on 06/14. To change every 3 days, next change on 06/17 06/16  - Continue IV Rocephin and Diflucan, currently on day 6/42  - BC NTGD  - Continue PT/OT  - LSO brace when OOB  - Wound vac change scheduled for tomorrow, 06/17 06/17  - Wound vac changed today  - Unable to discharge today due to SNF not having wound vac available. Informed by CM that wound vac should be there by tomorrow morning  -  Continue PT/OT  - LSO brace when OOB  - Pt will follow up with Neurosurgery, Laquita Long, in 1 week      Hyperlipidemia LDL goal <70  - Continue Statin      Type 2 diabetes, controlled, with peripheral neuropathy  - Accuchecks and low dose SSI      Hypothyroidism  - Continue Levothyroxine      Essential hypertension  - BP stable  - Continue Losartan      PD (Parkinson's disease)  - Continue Carbidopa  - PT/OT following      VTE Risk Mitigation (From admission, onward)        Ordered     enoxaparin injection 40 mg  Daily      06/12/19 1112     IP VTE HIGH RISK PATIENT  Once      06/11/19 1704     Place sequential compression device  Until discontinued      06/11/19 1704     Place sequential compression device  Until discontinued      06/10/19 1932                Melody Oliver, KACIE  Department of Hospital Medicine   [facility]

## 2019-06-17 NOTE — PLAN OF CARE
Advised patient of rights to appeal , patient received written notification of discharge appeal process . Patient signed and dated PT understood rights.

## 2019-06-17 NOTE — PLAN OF CARE
Jian sent dc orders to [facility] via [facility]. Jian called and left  for liaison.

## 2019-06-17 NOTE — ASSESSMENT & PLAN NOTE
Plan is to go to the OR for a washout  Will continue with current abx started in the ED    S/p spinal wash out, on wound vac    POD 1 s/p I&D of wound dehiscence of lumbar surgery   looks and feels much better  On IV Vanc  Continue present care  D/c back to [facility] when cleared by Dr. Nicolas     6/13  Plan for LTAC  PT / OT  ABX    06/14  - Will return to [facility] on Monday, 06/17/19  - Continue PT/OT  - Continue IV abx -- ID following  - LSO brace when OOB  - Wound vac change today then every 3 days  - F/U with Dr. Nicolas upon discharge    06/15  - ID following -- IV abx changed from Vancomycin to Rocephin  - IV Diflucan added for candida albicans  - BC NGTD  - Continue PT/OT  - LSO brace when OOB  - Wound vac changed on 06/14. To change every 3 days, next change on 06/17 06/16  - Continue IV Rocephin and Diflucan, currently on day 6/42  - BC NTGD  - Continue PT/OT  - LSO brace when OOB  - Wound vac change scheduled for tomorrow, 06/17 06/17  - Wound vac changed today  - Unable to discharge today due to SNF not having wound vac available. Informed by CM that wound vac should be there by tomorrow morning  - Continue PT/OT  - LSO brace when OOB  - Pt will follow up with Neurosurgery, Laquita Long, in 1 week

## 2019-06-17 NOTE — PT/OT/SLP PROGRESS
Physical Therapy  Treatment    Ross Isbell   MRN: [id number]   Admitting Diagnosis: Osteomyelitis    PT Received On: 06/17/19  PT Start Time: 0908     PT Stop Time: 0932    PT Total Time (min): 24 min       Billable Minutes:  Therapeutic Activity 24    Treatment Type: Treatment  PT/PTA: PT     PTA Visit Number: 2       General Precautions: Standard, fall  Orthopedic Precautions: N/A   Braces: LSO    Spiritual, Cultural Beliefs, Faith Practices, Values that Affect Care: yes    Subjective:  Communicated with NURSE CONNELLY AND Epic CHART REVIEW  prior to session.   PT AGREED TO TX     Pain/Comfort  Pain Rating 1: 10/10  Location - Side 1: Left  Location 1: groin  Pain Addressed 1: Nurse notified  Pain Rating Post-Intervention 1: 10/10    Objective:   Patient found with: peripheral IV, telemetry, oxygen    Functional Mobility:  PT MET IN RM SUP IN BED PT LOG ROLLED TO LEFT AND SEATED EOB WITH MAX A AND INC TIME. PT SCOOTED TO EOB WITH MAX A P.T. DONNED LSO. PT COMPLETED SQUAT PIVOT T/F TO CHAIR TO RIGHT WITH MAX A . PT SEATED IN CHAIR FOR REST. PT ATTEMPTED TO STAND X 5 TRIALS WITH MAX A HOWEVER UNABLE TO COMPLETE. PT WITH POST PUSHING LIMITING STANDING. PT RETURNED SEATED IN CHAIR AND LEFT WITH ALL NEEDS MET AND CALL BELL IN REACH.      AM-PAC 6 CLICK MOBILITY  How much help from another person does this patient currently need?   1 = Unable, Total/Dependent Assistance  2 = A lot, Maximum/Moderate Assistance  3 = A little, Minimum/Contact Guard/Supervision  4 = None, Modified [place]/Independent    Turning over in bed (including adjusting bedclothes, sheets and blankets)?: 2  Sitting down on and standing up from a chair with arms (e.g., wheelchair, bedside commode, etc.): 1  Moving from lying on back to sitting on the side of the bed?: 2  Moving to and from a bed to a chair (including a wheelchair)?: 2  Need to walk in hospital room?: 1  Climbing 3-5 steps with a railing?: 1  Basic Mobility Total Score:  9    AM-PAC Raw Score CMS G-Code Modifier Level of Impairment Assistance   6 % Total / Unable   7 - 9 CM 80 - 100% Maximal Assist   10 - 14 CL 60 - 80% Moderate Assist   15 - 19 CK 40 - 60% Moderate Assist   20 - 22 CJ 20 - 40% Minimal Assist   23 CI 1-20% SBA / CGA   24 CH 0% Independent/ Mod I     Patient left up in chair with call button in reach, chair alarm on and  present.    Assessment:  PT LUCÍA TX WITH INC FATIGUE AND GROIN PAIN LIMITING MOBILITY    Rehab identified problem list/impairments: Rehab identified problem list/impairments: weakness, impaired endurance, impaired self care skills, gait instability, decreased coordination, decreased lower extremity function, pain, decreased ROM, orthopedic precautions, impaired skin, decreased safety awareness, decreased upper extremity function, impaired balance, impaired functional mobilty    Rehab potential is fair.    Activity tolerance: Fair    Discharge recommendations: Discharge Facility/Level of Care Needs: nursing facility, skilled     Barriers to discharge:      Equipment recommendations: Equipment Needed After Discharge: none     GOALS:   Multidisciplinary Problems     Physical Therapy Goals        Problem: Physical Therapy Goal    Goal Priority Disciplines Outcome Goal Variances Interventions   Physical Therapy Goal     PT, PT/OT Ongoing (interventions implemented as appropriate)     Description:  PT WILL BE SEEN FOR P.T. FOR A MIN OF 5 OUT OF 7 DAYS A WEEK  LT19  1. PT WILL COMPLETE BED MOBILITY WITH MOD A  2. PT WILL SCOOT IN BED WITH MOD A  3. PT WILL T/F TO CHAIR WITH MAX A X 1 AND LSO ON  4. PT WILL COMPLETE B LE TE X 10 REPS FOR ROM AND STRENGTHENING                    PLAN:    Patient to be seen 5 x/week  to address the above listed problems via therapeutic activities, therapeutic exercises  Plan of Care expires: 19  Plan of Care reviewed with: patient, spouse         Kristen Lala, PT  2019

## 2019-06-17 NOTE — PLAN OF CARE
Problem: Adult Inpatient Plan of Care  Goal: Plan of Care Review  Outcome: Ongoing (interventions implemented as appropriate)  POC reviewed with patient. Pt verbalized understanding  Pt remains free of injuries and falls; fall precaution in place.   C/o pain in groins, BLE, and back. Moderately controlled by PRN meds and relaxation techniques.   NR on tele monitor. HR in 70's.  Oxygen at 2L  IV saline locked; intact  Turn 2qhr maintained.  Wound vac at 125 mm/hg continuous suction to lower lumbar.  No other c/o at this time.   Bed low, side rails x2, call light in reach, personal belongings at bedside.  Reminded to call for assistance.  Hourly rounding complete. Will continue to monitor.

## 2019-06-17 NOTE — PT/OT/SLP PROGRESS
Occupational Therapy      Patient Name:  Ross Isbell   MRN:  [id number]    S: pt cooperative with therapy session.  O: pt performed 1 set x 15 reps b ue rom exercise in all available planes and ranges supine in bed with 1.5 dowel  A: pt continues to display improvements with b ue strength / enduraance  Cyndie Hernandez OT  6/17/2019   [id number]  1 estrella

## 2019-06-17 NOTE — PROGRESS NOTES
[facility]  Neurosurgery  Progress Note    Subjective:     Interval History:   P/O day #5  Patient has been reporting improving pain in her back over last few days  Wound vac in place with good seal now, replaced this AM  Patient has been getting up with PT, LSO brace in place  Carlson catheter in place  Afebrile overnight    WBC trending down  AFB cx stain - no acid fast bacilli seen  Gram stain- few wbcs, no organisms  Aerobic cx- candida albicans  Anaerobic cx- in progress  Fungus cx - in progress    History of Present Illness:   Patient is a 73-year-old female who previously had osteomyelitis and diskitis at L2-3 status post L1- L4 stabilization.  Patient presented with inferior drainage at her incision site.  Labs demonstrated increasing CRP and ESR.  She has a number of medical issues including diabetes and low albumin levels. CT scan showed new abscess to the psoas muscle on the right side at at L2-3.  Patient was consented for I and D and debridement of wound with placement of wound VAC.     Date of Procedure: 6/11/2019    Procedure: Procedure(s) (LRB):  DEBRIDEMENT, WOUND (Bilateral)  APPLICATION, WOUND VAC (N/A)    Surgeon(s) and Role:     * Stanislav Nicolas MD - Primary   Pre-Operative Diagnosis: Osteomyelitis, unspecified site, unspecified type [M86.9]  Discitis of lumbar region [M46.46]  Abscess in epidural space of lumbar spine [G06.1]   Post-Operative Diagnosis: Post-Op Diagnosis Codes:     * Osteomyelitis, unspecified site, unspecified type [M86.9]     * Discitis of lumbar region [M46.46]     * Abscess in epidural space of lumbar spine [G06.1]   Anesthesia: General   Technical Procedures Used:   1.  Subfascial and suprafascial wound debridement  2.  Placement of wound VAC   Description of the Findings of the Procedure:  Superficial and subfascial wound dehiscence      Post-Op Info:  Procedure(s) (LRB):  DEBRIDEMENT, WOUND (Bilateral)  APPLICATION, WOUND VAC (N/A)   6 Days Post-Op       Medications:  Continuous Infusions:  Scheduled Meds:   carbidopa-levodopa  mg  1 tablet Oral 5x Daily    cefTRIAXone (ROCEPHIN) IVPB  2 g Intravenous Q24H    enoxaparin  40 mg Subcutaneous Daily    fluconazole (DIFLUCAN) IVPB  400 mg Intravenous Q24H    latanoprost  1 drop Both Eyes QHS    levothyroxine  125 mcg Oral Before breakfast    losartan  50 mg Oral Daily    miconazole NITRATE 2 %   Topical (Top) Every Mon, Wed, Fri    miconazole nitrate 2%   Topical (Top) BID    pantoprazole  40 mg Oral Daily    pramipexole  1 mg Oral TID    pravastatin  40 mg Oral Daily     PRN Meds:acetaminophen, albuterol-ipratropium, cyclobenzaprine, dextrose 50%, glucagon (human recombinant), glucose, insulin aspart U-100, ondansetron, oxyCODONE-acetaminophen, oxyCODONE-acetaminophen, promethazine (PHENERGAN) IVPB, sodium chloride 0.9%     Review of Systems     Constitutional: Negative for fever. Positive for activity change and fatigue.  Gastrointestinal: Negative for nausea and vomiting.   Genitourinary: Negative for decreased urine volume.   Musculoskeletal: Positive for back pain and gait problem.   Skin: Positive for wound (wound vac in place over lower back).   Neurological: Positive for tremors and weakness.   Psychiatric/Behavioral: Negative for behavioral problems.      Objective:     Weight: 88.9 kg (195 lb 15.8 oz)  Body mass index is 31.63 kg/m².  Vital Signs (Most Recent):  Temp: 98.1 °F (36.7 °C) (06/17/19 1200)  Pulse: 103 (06/17/19 1200)  Resp: 18 (06/17/19 1200)  BP: (!) 111/53 (06/17/19 1200)  SpO2: (!) 93 % (06/17/19 1200) Vital Signs (24h Range):  Temp:  [96.8 °F (36 °C)-99.1 °F (37.3 °C)] 98.1 °F (36.7 °C)  Pulse:  [] 103  Resp:  [17-18] 18  SpO2:  [93 %-99 %] 93 %  BP: (100-134)/(53-64) 111/53                Oxygen Concentration (%):  [28] 28         Urethral Catheter 06/11/19 1415 Latex;Straight-tip 16 Fr. (Active)   Site Assessment Clean;Intact;Dry 6/13/2019  7:42 PM   Collection  "Container Urimeter 6/13/2019  7:42 PM   Securement Method secured to top of thigh w/ adhesive device 6/13/2019  7:42 PM   Catheter Care Performed yes 6/13/2019  7:42 PM   Reason for Continuing Urinary Catheterization Post operative;Non-healing sacral/perineal wound 6/13/2019  7:42 PM   CAUTI Prevention Bundle StatLock in place w 1" slack;Intact seal between catheter & drainage tubing;Drainage bag off the floor;Green sheeting clip in use;No dependent loops or kinks;Drainage bag not overfilled (<2/3 full);Drainage bag below bladder 6/13/2019  7:42 PM   Output (mL) 400 mL 6/13/2019  9:12 PM       Neurosurgery Physical Exam     Nursing note and vitals reviewed  Gen:Oriented to person, place. Appears stated age.   Head:Normocephalic and atraumatic.  Nose: Nose normal.    Eyes: EOM are normal. Pupils are equal, round, and reactive to light.   Neck: Neck supple. No masses or lesions palpated  Cardiovascular: Intact distal pulses.    Abdominal: Tenderness of RLQ and LLQ  Neurological: Alert and oriented to person and place. Patient displays tremor.   Psychiatric: Normal mood and affect. Behavior is normal.           Back:  Wound vac in place with good seal      Mild tenderness to palpation across upper and lower l-spine (marcellus.) Paraspinal tenderness   none   Paraspinal muscle spasms     Positive Pain with flexion and extention     Limited due to pain Range of motion      Unable to assess. Patient sitting with PT  Straight leg raise      Motor: Patient is able to raise and move her legs. Dropfoot at baseline.     Right Right Left Left  Level Group   5 4 5 4 L2 Hip flexor (Psoas)   5 4 5 4 L3 Leg extension (Quads)   5 3 5 3 L4 Dorsiflexion & foot inversion (Tibialis Anterior)   5 3 5 3 L5 Great toe extension ( EHL)   5 3 5 3 S1 Foot eversion (Gastroc, PL & PB)      Sensation:  NL Decreased (R/L/BL) Level Sensation    X   L2 Anterio-medial thigh   X   L3 Medial thigh around knee     Improving but dec Marcellus L4 Medial foot     " Improving but dec Marcellus L5 Dorsum foot     Improving but dec Marcellus S1 Lateral foot           Significant Labs:  Recent Labs   Lab 06/16/19  0413 06/17/19  0414   * 153*   * 131*   K 4.1 4.3   CL 96 95   CO2 23 27   BUN 15 12   CREATININE 0.8 0.8   CALCIUM 9.3 9.2     Recent Labs   Lab 06/16/19  0413 06/17/19  0414   WBC 6.54 6.33   HGB 10.4* 10.4*   HCT 33.0* 33.6*    330     No results for input(s): LABPT, INR, APTT in the last 48 hours.  Microbiology Results (last 7 days)     Procedure Component Value Units Date/Time    Blood Culture #2 **CANNOT BE ORDERED STAT** [[id number]] Collected:  06/10/19 1440    Order Status:  Completed Specimen:  Blood from Peripheral, Antecubital, Right Updated:  06/15/19 2212     Blood Culture, Routine No growth after 5 days.    Blood Culture #1 **CANNOT BE ORDERED STAT** [[id number]] Collected:  06/10/19 1526    Order Status:  Completed Specimen:  Blood from Peripheral, Forearm, Right Updated:  06/15/19 2212     Blood Culture, Routine No growth after 5 days.    Aerobic culture [[id number]] Collected:  06/11/19 1434    Order Status:  Completed Specimen:  Abscess from Back Updated:  06/14/19 1328     Aerobic Bacterial Culture --     CANDIDA ALBICANS  Few      Culture, Anaerobe [[id number]] Collected:  06/11/19 1434    Order Status:  Completed Specimen:  Abscess from Back Updated:  06/14/19 1303     Anaerobic Culture Culture in progress    AFB Culture & Smear [[id number]] Collected:  06/11/19 1434    Order Status:  Completed Specimen:  Abscess from Back Updated:  06/13/19 0927     AFB Culture & Smear Culture in progress     AFB CULTURE STAIN No acid fast bacilli seen.    Gram stain [[id number]] Collected:  06/11/19 1434    Order Status:  Completed Specimen:  Abscess from Back Updated:  06/11/19 2329     Gram Stain Result Few WBC's      No organisms seen    Fungus culture [[id number]] Collected:  06/11/19 1434    Order Status:  Sent Specimen:  Abscess from Back Updated:  06/11/19  2215        ABGs: No results for input(s): PH, PCO2, PO2, HCO3, POCSATURATED, BE in the last 48 hours.  Cardiac markers: No results for input(s): CKMB, CPKMB, TROPONINT, TROPONINI, MYOGLOBIN in the last 48 hours.  CMP:   Recent Labs   Lab 06/16/19  0413 06/17/19  0414   * 153*   CALCIUM 9.3 9.2   ALBUMIN 2.8* 2.8*   PROT 6.1 6.2   * 131*   K 4.1 4.3   CO2 23 27   CL 96 95   BUN 15 12   CREATININE 0.8 0.8   ALKPHOS 99 104   ALT <5* <5*   AST 16 17   BILITOT 0.5 0.4     CRP:   Recent Labs   Lab 06/16/19  1637   CRP 53.0*     All pertinent labs from the last 24 hours have been reviewed.      Assessment/Plan:     Active Diagnoses:    Diagnosis Date Noted POA    PRINCIPAL PROBLEM:  Osteomyelitis [M86.9] 06/10/2019 Yes     Chronic    Hyperlipidemia LDL goal <70 [E78.5] 10/06/2015 Yes    Type 2 diabetes, controlled, with peripheral neuropathy [E11.42] 05/29/2014 Yes     Chronic    Hypothyroidism [E03.9] 11/19/2013 Yes     Chronic    Essential hypertension [I10]  Yes     Chronic    PD (Parkinson's disease) [G20]  Yes     Chronic      Problems Resolved During this Admission:     - Patient continuing to improve from back wound perspective  - Patient main complaint is left stomach pain  - Continue wound care with wound vac in place. Continuous suction at 125 mm hg. Change out sponge every 3 days. Next change is scheduled for Monday, 6/20  - Continue treatment regimen including Abx per HM and ID recommendations - will continue fluconazole/rocephin   - Continue PT/ OT  - LSO brace when OOB and with therapy  - Improving bilateral lower extremity sensation per pt  - Lovenox 40 mg Q24  - Muscle relaxant PRN  - D/c planning in progress. Return to [facility] (patient's request) early next week  - Follow up with Mayra Long PA-C in Neurosurgery by 06/24/19      Katherine Jacques PA-C  Neurosurgery  [facility]

## 2019-06-17 NOTE — PROGRESS NOTES
"Follow up visit with Ms. Isbell for Wound vac dressing change.     MASD with candidiosis is healing well to gluteal fold and perineal region, it had spread to ischium as well, with small partial thickness tissue loss noted to right ischium within this MASD, however it is all healing, redness and satellite lesions improved with use of Aloe [place] AF moisture barrier. Recommend continued use per MAR.     KCI Wound vac ULTA paused, clamped, and disconnected from patient. Wound vac dressing gently removed from midline lumbar/thoracic spine incision. Estela incisional skin candidosis improving, again with red discoloration noted, moist shiny and satellite lesions. Within this to left of incision at 9 o'clock, her previously noted stage 3 pressure injury continues to heal, today measures 0.4x0.4x<0.1cm with moist beefy red granulating wound bed, epithelialization to edges, scant serous drainage. (this is located within area of candidosis).  Incision measures [id number] with 6cm tunnel noted at 7 o'clock, and 4cm tunnel noted at 11 o'clock. Wound bed is moist red tissue, budding granulation, adipose tissue, and deep fascia and bone exposed, no slough noted, no purulence. Cleansed all well with sterile saline and patted dry. Estela incision skin dusted with miconazole powder per MAR, and then sprayed with cavilon skin barrier spray in 2 layers using "crusting" technique. Tunneled areas of wound filled with white foam, and white foam also applied into depth of wound to cover exposed bone/fascia, then remaining wound filled with black foam, all secured with drape to seal and attached to KCI wound vac ULTA at continuous -125 mmHg low intensity suction with good seal noted, no leak. Patient tolerated well, did request pain medication at end of wound care, and primary nurse Scott notified by nurse vania Kaur. Will follow closely, next wound vac change scheduled for Wednesday, 6/19. Patient is expected to discharge to SNF today with " wound vac.

## 2019-06-17 NOTE — PLAN OF CARE
Problem: Physical Therapy Goal  Goal: Physical Therapy Goal  PT WILL BE SEEN FOR P.T. FOR A MIN OF 5 OUT OF 7 DAYS A WEEK  LT19  1. PT WILL COMPLETE BED MOBILITY WITH MOD A  2. PT WILL SCOOT IN BED WITH MOD A  3. PT WILL T/F TO CHAIR WITH MAX A X 1 AND LSO ON  4. PT WILL COMPLETE B LE TE X 10 REPS FOR ROM AND STRENGTHENING   Outcome: Ongoing (interventions implemented as appropriate)  PT COMPLETED SQUAT PIVOT T/F TO CHAIR WITH MAX A X 1. PT UNABLE TO STAND WITH RW TODAY.

## 2019-06-17 NOTE — SUBJECTIVE & OBJECTIVE
Interval History: Pt seen and examined. Pt has no complaints at this time. Will likely be discharged to [facility] tomorrow, 06/18 once wound vac available    Review of Systems   Constitutional: Positive for activity change and fatigue.   HENT: Negative.    Eyes: Negative for visual disturbance.   Respiratory: Negative for apnea, cough, choking, chest tightness, shortness of breath, wheezing and stridor.    Cardiovascular: Negative for chest pain, palpitations and leg swelling.   Gastrointestinal: Negative for abdominal pain, constipation, diarrhea, nausea and vomiting.   Endocrine: Negative.    Musculoskeletal: Positive for back pain. Negative for arthralgias, joint swelling, myalgias, neck pain and neck stiffness.   Skin: Negative.    Allergic/Immunologic: Negative.    Neurological: Positive for tremors (chronic) and weakness. Negative for dizziness, seizures, syncope, facial asymmetry, speech difficulty, light-headedness, numbness and headaches.   Hematological: Negative.    Psychiatric/Behavioral: Negative for agitation, behavioral problems and confusion.     Objective:     Vital Signs (Most Recent):  Temp: 98.6 °F (37 °C) (06/17/19 1603)  Pulse: 92 (06/17/19 1603)  Resp: 18 (06/17/19 1603)  BP: (!) 108/53 (06/17/19 1603)  SpO2: (!) 94 % (06/17/19 1603) Vital Signs (24h Range):  Temp:  [96.8 °F (36 °C)-99.1 °F (37.3 °C)] 98.6 °F (37 °C)  Pulse:  [] 92  Resp:  [17-18] 18  SpO2:  [93 %-98 %] 94 %  BP: (108-134)/(53-61) 108/53     Weight: 88.9 kg (195 lb 15.8 oz)  Body mass index is 31.63 kg/m².    Intake/Output Summary (Last 24 hours) at 6/17/2019 1642  Last data filed at 6/17/2019 0600  Gross per 24 hour   Intake --   Output 700 ml   Net -700 ml      Physical Exam   Constitutional: She is oriented to person, place, and time. She appears well-developed and well-nourished. She is cooperative. She is easily aroused. She appears ill. Nasal cannula in place.   HENT:   Head: Normocephalic and atraumatic.    Mouth/Throat: Oropharynx is clear and moist.   Eyes: Pupils are equal, round, and reactive to light. Conjunctivae and EOM are normal.   Neck: Normal range of motion. Neck supple.   Cardiovascular: Normal rate, regular rhythm and intact distal pulses.   Murmur heard.   Systolic murmur is present with a grade of 3/6.  Pulmonary/Chest: Effort normal and breath sounds normal. No accessory muscle usage or stridor. No tachypnea. No respiratory distress. She has no wheezes. She has no rhonchi. She has no rales. She exhibits no tenderness.   Abdominal: Soft. Bowel sounds are normal. She exhibits no distension. There is no tenderness. There is no rebound and no guarding.   Genitourinary:   Genitourinary Comments: Deferred   Musculoskeletal: She exhibits no edema, tenderness or deformity.   L/S wound vac in place   Neurological: She is alert, oriented to person, place, and time and easily aroused. She displays tremor. No sensory deficit. GCS eye subscore is 4. GCS verbal subscore is 5.   Skin: Skin is warm and dry. Capillary refill takes less than 2 seconds.   Psychiatric: She has a normal mood and affect. Her behavior is normal. Thought content normal.   Nursing note and vitals reviewed.      Significant Labs:   CBC:   Recent Labs   Lab 06/16/19  0413 06/17/19  0414   WBC 6.54 6.33   HGB 10.4* 10.4*   HCT 33.0* 33.6*    330     CMP:   Recent Labs   Lab 06/16/19  0413 06/17/19  0414   * 131*   K 4.1 4.3   CL 96 95   CO2 23 27   * 153*   BUN 15 12   CREATININE 0.8 0.8   CALCIUM 9.3 9.2   PROT 6.1 6.2   ALBUMIN 2.8* 2.8*   BILITOT 0.5 0.4   ALKPHOS 99 104   AST 16 17   ALT <5* <5*   ANIONGAP 11 9   EGFRNONAA >60 >60     POCT Glucose:   Recent Labs   Lab 06/16/19  2046 06/17/19  0529 06/17/19  1054   POCTGLUCOSE 228* 180* 198*       Significant Imaging: I have reviewed all pertinent imaging results/findings within the past 24 hours.

## 2019-06-17 NOTE — PT/OT/SLP PROGRESS
Physical Therapy      Patient Name:  Ross Isbell   MRN:  [id number]    S: PT MET IN RM SEATED IN CHAIR.   O: PT COMPLETED B LE TE X 20 REPS OF AP, TKE, MIP AND GLUT SETS WITH LIMITED ROM AND INC REST BREAKS. PT WITH C/O LEFT GROIN PAIN. PT LEFT SEATED IN CHAIR WITH ALL NEEDS MET   A: PT LUCÍA TE WELL  P: CONT POC     Kristen Lala, PT,6/17/2019

## 2019-06-18 ENCOUNTER — TELEPHONE (OUTPATIENT)
Dept: ORTHOPEDICS | Facility: CLINIC | Age: 74
End: 2019-06-18

## 2019-06-18 VITALS
BODY MASS INDEX: 33.62 KG/M2 | SYSTOLIC BLOOD PRESSURE: 122 MMHG | DIASTOLIC BLOOD PRESSURE: 56 MMHG | HEIGHT: 66 IN | TEMPERATURE: 98 F | RESPIRATION RATE: 18 BRPM | WEIGHT: 209.19 LBS | HEART RATE: 90 BPM | OXYGEN SATURATION: 92 %

## 2019-06-18 LAB
ALBUMIN SERPL BCP-MCNC: 2.8 G/DL (ref 3.5–5.2)
ALP SERPL-CCNC: 99 U/L (ref 55–135)
ALT SERPL W/O P-5'-P-CCNC: <5 U/L (ref 10–44)
ANION GAP SERPL CALC-SCNC: 12 MMOL/L (ref 8–16)
AST SERPL-CCNC: 18 U/L (ref 10–40)
BASOPHILS # BLD AUTO: 0.02 K/UL (ref 0–0.2)
BASOPHILS NFR BLD: 0.3 % (ref 0–1.9)
BILIRUB SERPL-MCNC: 0.4 MG/DL (ref 0.1–1)
BUN SERPL-MCNC: 10 MG/DL (ref 8–23)
CALCIUM SERPL-MCNC: 9.5 MG/DL (ref 8.7–10.5)
CHLORIDE SERPL-SCNC: 94 MMOL/L (ref 95–110)
CO2 SERPL-SCNC: 25 MMOL/L (ref 23–29)
CREAT SERPL-MCNC: 0.8 MG/DL (ref 0.5–1.4)
DIFFERENTIAL METHOD: ABNORMAL
EOSINOPHIL # BLD AUTO: 0.4 K/UL (ref 0–0.5)
EOSINOPHIL NFR BLD: 5.2 % (ref 0–8)
ERYTHROCYTE [DISTWIDTH] IN BLOOD BY AUTOMATED COUNT: 15.1 % (ref 11.5–14.5)
EST. GFR  (AFRICAN AMERICAN): >60 ML/MIN/1.73 M^2
EST. GFR  (NON AFRICAN AMERICAN): >60 ML/MIN/1.73 M^2
GLUCOSE SERPL-MCNC: 158 MG/DL (ref 70–110)
HCT VFR BLD AUTO: 32 % (ref 37–48.5)
HGB BLD-MCNC: 10.1 G/DL (ref 12–16)
LYMPHOCYTES # BLD AUTO: 1.9 K/UL (ref 1–4.8)
LYMPHOCYTES NFR BLD: 28.5 % (ref 18–48)
MCH RBC QN AUTO: 28.1 PG (ref 27–31)
MCHC RBC AUTO-ENTMCNC: 31.6 G/DL (ref 32–36)
MCV RBC AUTO: 89 FL (ref 82–98)
MONOCYTES # BLD AUTO: 0.9 K/UL (ref 0.3–1)
MONOCYTES NFR BLD: 13.3 % (ref 4–15)
NEUTROPHILS # BLD AUTO: 3.6 K/UL (ref 1.8–7.7)
NEUTROPHILS NFR BLD: 52.7 % (ref 38–73)
PLATELET # BLD AUTO: 291 K/UL (ref 150–350)
PMV BLD AUTO: 8.5 FL (ref 9.2–12.9)
POCT GLUCOSE: 162 MG/DL (ref 70–110)
POCT GLUCOSE: 208 MG/DL (ref 70–110)
POTASSIUM SERPL-SCNC: 4.4 MMOL/L (ref 3.5–5.1)
PROT SERPL-MCNC: 6 G/DL (ref 6–8.4)
RBC # BLD AUTO: 3.6 M/UL (ref 4–5.4)
SODIUM SERPL-SCNC: 131 MMOL/L (ref 136–145)
WBC # BLD AUTO: 6.78 K/UL (ref 3.9–12.7)

## 2019-06-18 PROCEDURE — 25000003 PHARM REV CODE 250: Performed by: NURSE PRACTITIONER

## 2019-06-18 PROCEDURE — 25000003 PHARM REV CODE 250: Performed by: PHYSICIAN ASSISTANT

## 2019-06-18 PROCEDURE — 80053 COMPREHEN METABOLIC PANEL: CPT

## 2019-06-18 PROCEDURE — 97535 SELF CARE MNGMENT TRAINING: CPT

## 2019-06-18 PROCEDURE — 25000003 PHARM REV CODE 250: Performed by: HOSPITALIST

## 2019-06-18 PROCEDURE — 36415 COLL VENOUS BLD VENIPUNCTURE: CPT

## 2019-06-18 PROCEDURE — 85025 COMPLETE CBC W/AUTO DIFF WBC: CPT

## 2019-06-18 PROCEDURE — 97110 THERAPEUTIC EXERCISES: CPT

## 2019-06-18 PROCEDURE — 97530 THERAPEUTIC ACTIVITIES: CPT

## 2019-06-18 PROCEDURE — 25000003 PHARM REV CODE 250: Performed by: EMERGENCY MEDICINE

## 2019-06-18 PROCEDURE — 63600175 PHARM REV CODE 636 W HCPCS: Performed by: INTERNAL MEDICINE

## 2019-06-18 RX ORDER — BISACODYL 5 MG
10 TABLET, DELAYED RELEASE (ENTERIC COATED) ORAL DAILY PRN
Status: DISCONTINUED | OUTPATIENT
Start: 2019-06-18 | End: 2019-06-18 | Stop reason: HOSPADM

## 2019-06-18 RX ORDER — ENOXAPARIN SODIUM 100 MG/ML
40 INJECTION SUBCUTANEOUS DAILY
Start: 2019-06-18

## 2019-06-18 RX ORDER — POLYETHYLENE GLYCOL 3350 17 G/17G
17 POWDER, FOR SOLUTION ORAL DAILY
Status: DISCONTINUED | OUTPATIENT
Start: 2019-06-18 | End: 2019-06-18 | Stop reason: HOSPADM

## 2019-06-18 RX ORDER — OXYCODONE AND ACETAMINOPHEN 7.5; 325 MG/1; MG/1
1 TABLET ORAL EVERY 4 HOURS PRN
Qty: 5 TABLET | Refills: 0 | Status: SHIPPED | OUTPATIENT
Start: 2019-06-18 | End: 2019-06-24 | Stop reason: SDUPTHER

## 2019-06-18 RX ORDER — AMOXICILLIN 250 MG
1 CAPSULE ORAL 2 TIMES DAILY PRN
Status: DISCONTINUED | OUTPATIENT
Start: 2019-06-18 | End: 2019-06-18

## 2019-06-18 RX ORDER — FLUCONAZOLE 2 MG/ML
400 INJECTION, SOLUTION INTRAVENOUS DAILY
Qty: 8400 ML | Refills: 0
Start: 2019-06-15 | End: 2019-07-27

## 2019-06-18 RX ORDER — AMOXICILLIN 250 MG
2 CAPSULE ORAL DAILY
Status: DISCONTINUED | OUTPATIENT
Start: 2019-06-18 | End: 2019-06-18 | Stop reason: HOSPADM

## 2019-06-18 RX ADMIN — DOCUSATE SODIUM AND SENNOSIDES 2 TABLET: 8.6; 5 TABLET, FILM COATED ORAL at 01:06

## 2019-06-18 RX ADMIN — CARBIDOPA AND LEVODOPA 1 TABLET: 25; 250 TABLET ORAL at 05:06

## 2019-06-18 RX ADMIN — MICONAZOLE NITRATE: 2 OINTMENT TOPICAL at 09:06

## 2019-06-18 RX ADMIN — INSULIN ASPART 2 UNITS: 100 INJECTION, SOLUTION INTRAVENOUS; SUBCUTANEOUS at 11:06

## 2019-06-18 RX ADMIN — LOSARTAN POTASSIUM 50 MG: 50 TABLET, FILM COATED ORAL at 09:06

## 2019-06-18 RX ADMIN — CARBIDOPA AND LEVODOPA 1 TABLET: 25; 250 TABLET ORAL at 01:06

## 2019-06-18 RX ADMIN — POLYETHYLENE GLYCOL 3350 17 G: 17 POWDER, FOR SOLUTION ORAL at 02:06

## 2019-06-18 RX ADMIN — PRAVASTATIN SODIUM 40 MG: 20 TABLET ORAL at 09:06

## 2019-06-18 RX ADMIN — CARBIDOPA AND LEVODOPA 1 TABLET: 25; 250 TABLET ORAL at 09:06

## 2019-06-18 RX ADMIN — SENNOSIDES AND DOCUSATE SODIUM 1 TABLET: 8.6; 5 TABLET ORAL at 12:06

## 2019-06-18 RX ADMIN — OXYCODONE AND ACETAMINOPHEN 1 TABLET: 10; 325 TABLET ORAL at 03:06

## 2019-06-18 RX ADMIN — PANTOPRAZOLE SODIUM 40 MG: 40 TABLET, DELAYED RELEASE ORAL at 09:06

## 2019-06-18 RX ADMIN — OXYCODONE AND ACETAMINOPHEN 1 TABLET: 10; 325 TABLET ORAL at 09:06

## 2019-06-18 RX ADMIN — LEVOTHYROXINE SODIUM 125 MCG: 125 TABLET ORAL at 05:06

## 2019-06-18 RX ADMIN — PRAMIPEXOLE DIHYDROCHLORIDE 1 MG: 0.5 TABLET ORAL at 02:06

## 2019-06-18 RX ADMIN — FLUCONAZOLE 400 MG: 2 INJECTION, SOLUTION INTRAVENOUS at 01:06

## 2019-06-18 RX ADMIN — OXYCODONE HYDROCHLORIDE AND ACETAMINOPHEN 1 TABLET: 7.5; 325 TABLET ORAL at 03:06

## 2019-06-18 RX ADMIN — PRAMIPEXOLE DIHYDROCHLORIDE 1 MG: 0.5 TABLET ORAL at 09:06

## 2019-06-18 RX ADMIN — CEFTRIAXONE 2 G: 2 INJECTION, SOLUTION INTRAVENOUS at 11:06

## 2019-06-18 NOTE — PT/OT/SLP PROGRESS
Physical Therapy  Treatment    Ross Isbell   MRN: [id number]   Admitting Diagnosis: Osteomyelitis    PT Received On: 06/18/19  PT Start Time: 1046     PT Stop Time: 1110    PT Total Time (min): 24 min       Billable Minutes:  Therapeutic Activity 14 and Therapeutic Exercise 10    Treatment Type: Treatment  PT/PTA: PT     PTA Visit Number: 2       General Precautions: Standard, fall  Orthopedic Precautions: N/A   Braces: LSO    Spiritual, Cultural Beliefs, Adventism Practices, Values that Affect Care: yes    Subjective:  Communicated with NURSE AND Epic CHART REVIEW prior to session.   PT AGREED TO TX     Pain/Comfort  Pain Rating 1: 10/10  Location - Side 1: Left  Location 1: groin  Pain Rating Post-Intervention 1: 10/10    Objective:   Patient found with: telemetry, peripheral IV, oxygen    Functional Mobility:  PT MET IN RM SUP>SIT EOB WITH MAX A AND SCOOTED TO EOB WITH MAX A. P.T. ASSISTED IN DONNING LSO. PT STOOD WITH RW AND MAX A X 2 FOR STAND PIVOT T/F TO CHAIR WITH B KNEES BUCKLING AND MAX A X 2 WITH DEC SAFETY AND RW USE. PT SEATED IN CHAIR WITH MAX A X 2. PT COMPLETED B LE TE 2X10 REPS OF AP, TKE, MIP AND GLUT SETS. PT LEFT WITH ALL NEEDS MET AND CALL BELL IN REACH.     AM-PAC 6 CLICK MOBILITY  How much help from another person does this patient currently need?   1 = Unable, Total/Dependent Assistance  2 = A lot, Maximum/Moderate Assistance  3 = A little, Minimum/Contact Guard/Supervision  4 = None, Modified [place]/Independent    Turning over in bed (including adjusting bedclothes, sheets and blankets)?: 2  Sitting down on and standing up from a chair with arms (e.g., wheelchair, bedside commode, etc.): 2  Moving from lying on back to sitting on the side of the bed?: 2  Moving to and from a bed to a chair (including a wheelchair)?: 2  Need to walk in hospital room?: 1  Climbing 3-5 steps with a railing?: 1  Basic Mobility Total Score: 10    AM-PAC Raw Score CMS G-Code Modifier Level of Impairment  Assistance   6 % Total / Unable   7 - 9 CM 80 - 100% Maximal Assist   10 - 14 CL 60 - 80% Moderate Assist   15 - 19 CK 40 - 60% Moderate Assist   20 - 22 CJ 20 - 40% Minimal Assist   23 CI 1-20% SBA / CGA   24 CH 0% Independent/ Mod I     Patient left up in chair with call button in reach and chair alarm on.    Assessment:  PT LUCÍA T/F TRAINING.     Rehab identified problem list/impairments: Rehab identified problem list/impairments: weakness, gait instability, impaired balance, impaired endurance, impaired self care skills, impaired functional mobilty, decreased safety awareness, decreased lower extremity function, decreased ROM, pain    Rehab potential is fair.    Activity tolerance: Poor    Discharge recommendations: Discharge Facility/Level of Care Needs: nursing facility, skilled     Barriers to discharge:      Equipment recommendations: Equipment Needed After Discharge: none     GOALS:   Multidisciplinary Problems     Physical Therapy Goals        Problem: Physical Therapy Goal    Goal Priority Disciplines Outcome Goal Variances Interventions   Physical Therapy Goal     PT, PT/OT Ongoing (interventions implemented as appropriate)     Description:  PT WILL BE SEEN FOR P.T. FOR A MIN OF 5 OUT OF 7 DAYS A WEEK  LT19  1. PT WILL COMPLETE BED MOBILITY WITH MOD A  2. PT WILL SCOOT IN BED WITH MOD A  3. PT WILL T/F TO CHAIR WITH MAX A X 1 AND LSO ON  4. PT WILL COMPLETE B LE TE X 10 REPS FOR ROM AND STRENGTHENING                    PLAN:    Patient to be seen 5 x/week  to address the above listed problems via therapeutic activities, therapeutic exercises  Plan of Care expires: 19  Plan of Care reviewed with: patient         Kristen Lala, PT  2019

## 2019-06-18 NOTE — PROGRESS NOTES
[facility]  Infectious Disease  Progress Note    Patient Name: Ross Isbell  MRN: [id number]  Admission Date: 6/10/2019  Length of Stay: 7 days  Attending Physician: Swetha Biswas MD  Primary Care Provider: Anna Penn MD    Isolation Status: No active isolations  Assessment/Plan:      * Osteomyelitis  06/11- all cultures reviewed.  )6/11- yeast   04/30- strep    She is on Vancomycin , will follow new cultures and will plan to switch to Rocephin depending ob cultures.  Will add Micafungin until ID of yeast is known    06/14- cultures reviewed-   Candida albicans-will switch to fluconazole .  Will also switch to rocephin in AM  06/15- will continue fluconazole/rocephin and will check ESR and CRP in AM  Day 5/42 06/16- will continue Rocephin/Fluconazole at 400mg daily.  Will follow ESR and CRP  Day 6/42 06/17- day 7/42  ESR -81, CRP-53.  Continue Rocephin/fluconazole -  Day 7/42  Will need close follow up    Type 2 diabetes, controlled, with peripheral neuropathy  06/14- will closely monitor glucose    PD (Parkinson's disease)  06/11- will continue supportive care         Anticipated Disposition:     Thank you for your consult. I will follow-up with patient. Please contact us if you have any additional questions.    Dada Gomez MD  Infectious Disease  [facility]    Subjective:     Principal Problem:Osteomyelitis    HPI: 73 year old woman with history of lumbar osteomyelitis with strep.  She was started on therapy with Rocephin but has noticed worsening drainage over the lumbar area. She was seen by Neurosurgery and I and D was done.  Since presentation, CT scan of the lumbar region showed- A pattern of erosive destructive change of the endplates at L2-3 with relative sparing of the disc space suggest changes of discitis in this postoperative patient.  Additionally a rim enhancing fluid collection in the right psoas muscle adjacent to the disc may represent a focal area of  abscess.  It measures 2.8 cm in by 1.4 x 1.7 cm.  She was seen by neurosurgery and on 06/12 ,had I and D done .    Interval History: 73 year old woman with history of spinal osteomyelitis .  Cultures - Candida albicans-06/11  Gram stain -negative     06/16- she is weak, denies fever or chills  06/17 - she feels weak, reports pain over the left hip   Review of Systems   Constitutional: Positive for activity change and fatigue.   HENT: Negative.    Eyes: Negative for visual disturbance.   Respiratory: Negative for apnea, cough, choking, chest tightness, shortness of breath, wheezing and stridor.    Cardiovascular: Negative for chest pain, palpitations and leg swelling.   Gastrointestinal: Negative for abdominal pain, constipation, diarrhea, nausea and vomiting.   Endocrine: Negative.    Musculoskeletal: Positive for back pain. Negative for arthralgias, joint swelling, myalgias, neck pain and neck stiffness.   Skin: Negative.    Allergic/Immunologic: Negative.    Neurological: Positive for tremors and weakness. Negative for dizziness, seizures, syncope, facial asymmetry, speech difficulty, light-headedness, numbness and headaches.   Hematological: Negative.    Psychiatric/Behavioral: Negative for agitation, behavioral problems and confusion.     Objective:     Vital Signs (Most Recent):  Temp: 98.6 °F (37 °C) (06/18/19 0732)  Pulse: 84 (06/18/19 0732)  Resp: 16 (06/18/19 0732)  BP: (!) 117/56 (06/18/19 0732)  SpO2: 95 % (06/18/19 0732) Vital Signs (24h Range):  Temp:  [97.4 °F (36.3 °C)-98.6 °F (37 °C)] 98.6 °F (37 °C)  Pulse:  [] 84  Resp:  [16-19] 16  SpO2:  [92 %-96 %] 95 %  BP: (106-119)/(53-56) 117/56     Weight: 94.9 kg (209 lb 3.5 oz)  Body mass index is 33.77 kg/m².    Estimated Creatinine Clearance: 72.7 mL/min (based on SCr of 0.8 mg/dL).    Physical Exam   Constitutional: She is oriented to person, place, and time. She appears well-developed and well-nourished. She is cooperative. She is easily  aroused. She appears ill. Nasal cannula in place.   HENT:   Head: Normocephalic and atraumatic.   Mouth/Throat: Oropharynx is clear and moist.   Eyes: Pupils are equal, round, and reactive to light. Conjunctivae and EOM are normal.   Neck: Normal range of motion. Neck supple.   Cardiovascular: Normal rate, regular rhythm and intact distal pulses.   Murmur heard.   Systolic murmur is present with a grade of 3/6.  Pulmonary/Chest: Effort normal. No accessory muscle usage or stridor. No tachypnea. No respiratory distress. She has decreased breath sounds. She has no wheezes. She has no rhonchi. She has no rales. She exhibits no tenderness.   Abdominal: Soft. Bowel sounds are normal. She exhibits no distension. There is no tenderness. There is no rebound and no guarding.   Genitourinary:   Genitourinary Comments: Deferred   Musculoskeletal: She exhibits no edema, tenderness or deformity.   L/S wound vac in place   Neurological: She is alert, oriented to person, place, and time and easily aroused. She displays tremor. No sensory deficit. GCS eye subscore is 4. GCS verbal subscore is 5.   Skin: Skin is warm and dry. Capillary refill takes less than 2 seconds.   Psychiatric: She has a normal mood and affect. Her behavior is normal. Thought content normal.   Nursing note and vitals reviewed.      Significant Labs:   Blood Culture:   Recent Labs   Lab 05/28/19  1159 06/01/19  0003 06/01/19  0010 06/10/19  1440 06/10/19  1526   LABBLOO No growth after 5 days. No growth after 5 days. No growth after 5 days. No growth after 5 days. No growth after 5 days.     BMP:   Recent Labs   Lab 06/18/19  0421   *   *   K 4.4   CL 94*   CO2 25   BUN 10   CREATININE 0.8   CALCIUM 9.5       Significant Imaging: I have reviewed all pertinent imaging results/findings within the past 24 hours.

## 2019-06-18 NOTE — PROGRESS NOTES
[facility]  Neurosurgery  Progress Note    Subjective:     Interval History:   P/O day #7  Patient has been reporting improving pain in her back over last few days  Wound vac in place with leak, attempts made to patch unsuccessfully. Wound care nurse notified.   Patient has been getting up with PT, LSO brace in place   Carlson catheter in place  Afebrile overnight  Patient was delayed being sent out to [facility] yesterday since they did not have wound vac and needed to order one - plan is to discharge today    WBC trending down  AFB cx stain - no acid fast bacilli seen  Gram stain- few wbcs, no organisms  Aerobic cx- candida albicans  Anaerobic cx- in progress  Fungus cx - in progress    History of Present Illness:   Patient is a 73-year-old female who previously had osteomyelitis and diskitis at L2-3 status post L1- L4 stabilization.  Patient presented with inferior drainage at her incision site.  Labs demonstrated increasing CRP and ESR.  She has a number of medical issues including diabetes and low albumin levels. CT scan showed new abscess to the psoas muscle on the right side at at L2-3.  Patient was consented for I and D and debridement of wound with placement of wound VAC.     Date of Procedure: 6/11/2019    Procedure: Procedure(s) (LRB):  DEBRIDEMENT, WOUND (Bilateral)  APPLICATION, WOUND VAC (N/A)    Surgeon(s) and Role:     * Stanislav Nicolas MD - Primary   Pre-Operative Diagnosis: Osteomyelitis, unspecified site, unspecified type [M86.9]  Discitis of lumbar region [M46.46]  Abscess in epidural space of lumbar spine [G06.1]   Post-Operative Diagnosis: Post-Op Diagnosis Codes:     * Osteomyelitis, unspecified site, unspecified type [M86.9]     * Discitis of lumbar region [M46.46]     * Abscess in epidural space of lumbar spine [G06.1]   Anesthesia: General   Technical Procedures Used:   1.  Subfascial and suprafascial wound debridement  2.  Placement of wound VAC   Description of the  Findings of the Procedure:  Superficial and subfascial wound dehiscence      Post-Op Info:  Procedure(s) (LRB):  DEBRIDEMENT, WOUND (Bilateral)  APPLICATION, WOUND VAC (N/A)   7 Days Post-Op      Medications:  Continuous Infusions:  Scheduled Meds:   carbidopa-levodopa  mg  1 tablet Oral 5x Daily    cefTRIAXone (ROCEPHIN) IVPB  2 g Intravenous Q24H    enoxaparin  40 mg Subcutaneous Daily    fluconazole (DIFLUCAN) IVPB  400 mg Intravenous Q24H    latanoprost  1 drop Both Eyes QHS    levothyroxine  125 mcg Oral Before breakfast    losartan  50 mg Oral Daily    miconazole NITRATE 2 %   Topical (Top) Every Mon, Wed, Fri    miconazole nitrate 2%   Topical (Top) BID    pantoprazole  40 mg Oral Daily    pramipexole  1 mg Oral TID    pravastatin  40 mg Oral Daily     PRN Meds:acetaminophen, albuterol-ipratropium, cyclobenzaprine, dextrose 50%, glucagon (human recombinant), glucose, insulin aspart U-100, ondansetron, oxyCODONE-acetaminophen, oxyCODONE-acetaminophen, promethazine (PHENERGAN) IVPB, senna-docusate 8.6-50 mg, sodium chloride 0.9%     Review of Systems     Constitutional: Negative for fever. Positive for activity change and fatigue.  Gastrointestinal: Negative for nausea and vomiting.   Genitourinary: Negative for decreased urine volume.   Musculoskeletal: Positive for back pain and gait problem.   Skin: Positive for wound (wound vac in place over lower back).   Neurological: Positive for tremors and weakness.   Psychiatric/Behavioral: Negative for behavioral problems.      Objective:     Weight: 94.9 kg (209 lb 3.5 oz)  Body mass index is 33.77 kg/m².  Vital Signs (Most Recent):  Temp: 98.6 °F (37 °C) (06/18/19 0732)  Pulse: 84 (06/18/19 0732)  Resp: 16 (06/18/19 0732)  BP: (!) 117/56 (06/18/19 0732)  SpO2: 95 % (06/18/19 0732) Vital Signs (24h Range):  Temp:  [97.4 °F (36.3 °C)-98.6 °F (37 °C)] 98.6 °F (37 °C)  Pulse:  [] 84  Resp:  [16-19] 16  SpO2:  [92 %-96 %] 95 %  BP:  "(106-119)/(53-56) 117/56     Date 06/18/19 0700 - 06/19/19 0659   Shift 7066-2410 6753-9156 6307-7708 24 Hour Total   INTAKE   P.O. 118   118   Shift Total(mL/kg) 118(1.2)   118(1.2)   OUTPUT   Other 475   475   Shift Total(mL/kg) 475(5)   475(5)   Weight (kg) 94.9 94.9 94.9 94.9                        Urethral Catheter 06/11/19 1415 Latex;Straight-tip 16 Fr. (Active)   Site Assessment Clean;Intact;Dry 6/13/2019  7:42 PM   Collection Container Urimeter 6/13/2019  7:42 PM   Securement Method secured to top of thigh w/ adhesive device 6/13/2019  7:42 PM   Catheter Care Performed yes 6/13/2019  7:42 PM   Reason for Continuing Urinary Catheterization Post operative;Non-healing sacral/perineal wound 6/13/2019  7:42 PM   CAUTI Prevention Bundle StatLock in place w 1" slack;Intact seal between catheter & drainage tubing;Drainage bag off the floor;Green sheeting clip in use;No dependent loops or kinks;Drainage bag not overfilled (<2/3 full);Drainage bag below bladder 6/13/2019  7:42 PM   Output (mL) 400 mL 6/13/2019  9:12 PM       Neurosurgery Physical Exam     Nursing note and vitals reviewed  Gen:Oriented to person, place. Appears stated age.   Head:Normocephalic and atraumatic.  Nose: Nose normal.    Eyes: EOM are normal. Pupils are equal, round, and reactive to light.   Neck: Neck supple. No masses or lesions palpated  Cardiovascular: Intact distal pulses.    Abdominal: Tenderness of RLQ and LLQ  Neurological: Alert and oriented to person and place. Patient displays tremor.   Psychiatric: Normal mood and affect. Behavior is normal.           Back:  Wound vac in place with good seal      Mild tenderness to palpation across upper and lower l-spine (norma.) Paraspinal tenderness   none   Paraspinal muscle spasms     Positive Pain with flexion and extention     Limited due to pain Range of motion        Straight leg raise      Motor: Patient is able to raise and move her legs. Dropfoot at baseline.     Right Right Left Left  " Level Group   5 4 5 4 L2 Hip flexor (Psoas)   5 4 5 4 L3 Leg extension (Quads)   5 4 5 4 L4 Dorsiflexion & foot inversion (Tibialis Anterior)   5 4 5 4 L5 Great toe extension ( EHL)   5 4 5 4 S1 Foot eversion (Gastroc, PL & PB)      Sensation:  NL Decreased (R/L/BL) Level Sensation    X   L2 Anterio-medial thigh   X   L3 Medial thigh around knee     Equal bilateral  L4 Medial foot     Equal Marcellus L5 Dorsum foot     Equal Marcellus S1 Lateral foot           Significant Labs:  Recent Labs   Lab 06/17/19 0414 06/18/19  0421   * 158*   * 131*   K 4.3 4.4   CL 95 94*   CO2 27 25   BUN 12 10   CREATININE 0.8 0.8   CALCIUM 9.2 9.5     Recent Labs   Lab 06/17/19 0414 06/18/19  0421   WBC 6.33 6.78   HGB 10.4* 10.1*   HCT 33.6* 32.0*    291     No results for input(s): LABPT, INR, APTT in the last 48 hours.  Microbiology Results (last 7 days)     Procedure Component Value Units Date/Time    Fungus culture [[id number]] Collected:  06/11/19 1434    Order Status:  Completed Specimen:  Abscess from Back Updated:  06/18/19 0949     Fungus (Mycology) Culture Culture in progress    Blood Culture #2 **CANNOT BE ORDERED STAT** [[id number]] Collected:  06/10/19 1440    Order Status:  Completed Specimen:  Blood from Peripheral, Antecubital, Right Updated:  06/15/19 2212     Blood Culture, Routine No growth after 5 days.    Blood Culture #1 **CANNOT BE ORDERED STAT** [[id number]] Collected:  06/10/19 1526    Order Status:  Completed Specimen:  Blood from Peripheral, Forearm, Right Updated:  06/15/19 2212     Blood Culture, Routine No growth after 5 days.    Aerobic culture [[id number]] Collected:  06/11/19 1434    Order Status:  Completed Specimen:  Abscess from Back Updated:  06/14/19 1328     Aerobic Bacterial Culture --     CANDIDA ALBICANS  Few      Culture, Anaerobe [[id number]] Collected:  06/11/19 1434    Order Status:  Completed Specimen:  Abscess from Back Updated:  06/14/19 1303     Anaerobic Culture Culture in  progress    AFB Culture & Smear [[id number]] Collected:  06/11/19 1434    Order Status:  Completed Specimen:  Abscess from Back Updated:  06/13/19 0932     AFB Culture & Smear Culture in progress     AFB CULTURE STAIN No acid fast bacilli seen.    Gram stain [[id number]] Collected:  06/11/19 1434    Order Status:  Completed Specimen:  Abscess from Back Updated:  06/11/19 2329     Gram Stain Result Few WBC's      No organisms seen        ABGs: No results for input(s): PH, PCO2, PO2, HCO3, POCSATURATED, BE in the last 48 hours.  Cardiac markers: No results for input(s): CKMB, CPKMB, TROPONINT, TROPONINI, MYOGLOBIN in the last 48 hours.  CMP:   Recent Labs   Lab 06/17/19  0414 06/18/19  0421   * 158*   CALCIUM 9.2 9.5   ALBUMIN 2.8* 2.8*   PROT 6.2 6.0   * 131*   K 4.3 4.4   CO2 27 25   CL 95 94*   BUN 12 10   CREATININE 0.8 0.8   ALKPHOS 104 99   ALT <5* <5*   AST 17 18   BILITOT 0.4 0.4     CRP:   Recent Labs   Lab 06/16/19  1637   CRP 53.0*     All pertinent labs from the last 24 hours have been reviewed.      Assessment/Plan:     Active Diagnoses:    Diagnosis Date Noted POA    PRINCIPAL PROBLEM:  Osteomyelitis [M86.9] 06/10/2019 Yes     Chronic    Hyperlipidemia LDL goal <70 [E78.5] 10/06/2015 Yes    Type 2 diabetes, controlled, with peripheral neuropathy [E11.42] 05/29/2014 Yes     Chronic    Hypothyroidism [E03.9] 11/19/2013 Yes     Chronic    Essential hypertension [I10]  Yes     Chronic    PD (Parkinson's disease) [G20]  Yes     Chronic      Problems Resolved During this Admission:     - Patient continuing to improve from back wound perspective  - Stomach/groin pain has improved today  - Continue wound care with wound vac in place. Continuous suction at 125 mm hg. Change out sponge every 3 days. Next change is scheduled for Monday, 6/20  - Continue treatment regimen including Abx per HM and ID recommendations - will continue fluconazole/rocephin   - Continue PT/ OT  - LSO brace when OOB and  with therapy  - Improving bilateral lower extremity sensation per pt  - Lovenox 40 mg Q24  - Muscle relaxant PRN  - D/c planning in progress. Return to [facility] (patient's request) today  - Follow up with Mayra Long PA-C in Neurosurgery by 06/24/19      Katherine Jacques PA-C  Neurosurgery  [facility]

## 2019-06-18 NOTE — ASSESSMENT & PLAN NOTE
06/11- all cultures reviewed.  )6/11- yeast   04/30- strep    She is on Vancomycin , will follow new cultures and will plan to switch to Rocephin depending ob cultures.  Will add Micafungin until ID of yeast is known    06/14- cultures reviewed-   Candida albicans-will switch to fluconazole .  Will also switch to rocephin in AM  06/15- will continue fluconazole/rocephin and will check ESR and CRP in AM  Day 5/42 06/16- will continue Rocephin/Fluconazole at 400mg daily.  Will follow ESR and CRP  Day 6/42 06/17- day 7/42  ESR -81, CRP-53.  Continue Rocephin/fluconazole -  Day 7/42  Will need close follow up

## 2019-06-18 NOTE — PT/OT/SLP PROGRESS
"Occupational Therapy  Treatment    Ross Isbell   MRN: [id number]   Admitting Diagnosis: Osteomyelitis    OT Date of Treatment: 06/18/19   OT Start Time: 1000  OT Stop Time: 1025  OT Total Time (min): 25 min    Billable Minutes:  Self Care/Home Management 10 minutes and Therapeutic Activity 15 minutes    General Precautions: Standard, fall  Orthopedic Precautions: N/A  Braces: LSO         Subjective:  Communicated with NIKA Oliver and epic chart review prior to session.    Pain/Comfort  Pain Rating 1: 10/10  Location - Side 1: Bilateral  Location 1: groin  Pain Rating Post-Intervention 1: 10/10    Objective:  Patient found with: telemetry, peripheral IV, oxygen     Functional Mobility:  Bed Mobility:  Max a     Transfers:   max a  x2    Functional Ambulation: na    Activities of Daily Living:     Feeding adaptive equipment: na     UE adaptive equipment: max a hua lso     LE adaptive equipment: total a hua shoes    Bathing adaptive equipment: na  Balance:   Static Sit: POOR: Needs MODERATE assist to maintain holding bed rails  Dynamic Sit: poor-  Static Stand: poor-  Dynamic stand: poor-    Therapeutic Activities and Exercises:  Pt performed 1 set x 10 reps b ue rom exercise seated in wc in all available planes and ranges    AM-PAC 6 CLICK ADL   How much help from another person does this patient currently need?   1 = Unable, Total/Dependent Assistance  2 = A lot, Maximum/Moderate Assistance  3 = A little, Minimum/Contact Guard/Supervision  4 = None, Modified [place]/Independent    Putting on and taking off regular lower body clothing? : 2  Bathing (including washing, rinsing, drying)?: 2  Toileting, which includes using toilet, bedpan, or urinal? : 2  Putting on and taking off regular upper body clothing?: 2  Taking care of personal grooming such as brushing teeth?: 2  Eating meals?: 3  Daily Activity Total Score: 13     AM-PAC Raw Score CMS "G-Code Modifier Level of Impairment Assistance   6 % Total / " Unable   7 - 8 CM 80 - 100% Maximal Assist   9-13 CL 60 - 80% Moderate Assist   14 - 19 CK 40 - 60% Moderate Assist   20 - 22 CJ 20 - 40% Minimal Assist   23 CI 1-20% SBA / CGA   24 CH 0% Independent/ Mod I       Patient left seated in wc with all lines intact, call button in reach, chair alarm on and nurse notified    ASSESSMENT:  Ross Isbell is a 73 y.o. female with a medical diagnosis of Osteomyelitis and presents with debility and generalized weakness.    Rehab identified problem list/impairments: Rehab identified problem list/impairments: impaired self care skills, impaired balance, impaired endurance, impaired functional mobilty, weakness, gait instability, decreased safety awareness    Rehab potential is good.    Activity tolerance: Good    Discharge recommendations: Discharge Facility/Level of Care Needs: nursing facility, skilled     Barriers to discharge: Barriers to Discharge: Decreased caregiver support    Equipment recommendations: (tbd)     GOALS:   Multidisciplinary Problems     Occupational Therapy Goals        Problem: Occupational Therapy Goal    Goal Priority Disciplines Outcome Interventions   Occupational Therapy Goal     OT, PT/OT Ongoing (interventions implemented as appropriate)    Description:  ot goals to be met by 6-20-19  sba with rolling l<>r  Pt will tolerate sitting eob with fair+ sitting balance to prep for g/h task  Mod a with bsc t/f's                      Plan:  Patient to be seen 3 x/week to address the above listed problems via self-care/home management, therapeutic activities, therapeutic exercises  Plan of Care expires: 06/20/19  Plan of Care reviewed with: patient    OT G-codes  Functional Assessment Tool Used: gladis vieyra pac  Score: 13  Functional Limitation: Self care    Cyndie Hernandez OT  06/18/2019

## 2019-06-18 NOTE — PLAN OF CARE
Sw sent dc order and AVS to [facility] via [facility]. Nurse can call report to [phone].       06/18/19 5377   Post-Acute Status   Post-Acute Authorization Placement   Post-Acute Placement Status Set-up Complete

## 2019-06-18 NOTE — PHYSICIAN QUERY
PT Name: Ross Isbell  MR #: [id number]    Physician Query Form - Consultant Diagnosis Clarification     CDS/: Archana WING, RN               Contact information: [email or web address]  This form is a permanent document in the medical record.     Query Date: June 18, 2019      By submitting this query, we are merely seeking further clarification of documentation.  Please utilize your independent clinical judgment when addressing the question(s) below.      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 Estela-incisional skin with bright deep red discoloration, moist shiny and satellite lesions noted consistent with MASD candidosis.      Patient is a history of L1-L4 decompression and fusion for osteomyelitis diskitis.  She has been on antibiotics at a nursing home IV.  Patient presented to clinic yesterday for wound check and suture removal.  She had noted erythema to the posterior incision with drainage to the inferior portion of the incision.       Description of the Findings of the Procedure:  Superficial and subfascial wound dehiscence   Wound Ostomy Consult 6/11/19              Hospital Medicine Progress note by Dr. Biswas 6/11/19                Neurosurgery Op note 6/12/19         Do you agree with the Consultants diagnosis of ______Stage 3 pressure injury to lower thoracic spine_______?    [xx] Yes   [  ] No   [  ] Clinically insignificant   [  ] Other/Clarification of findings:   [  ] Clinically undetermined

## 2019-06-18 NOTE — DISCHARGE SUMMARY
[facility] Hospital Medicine  Discharge Summary      Patient Name: Ross Isbell  MRN: [id number]  Admission Date: 6/10/2019  Hospital Length of Stay: 7 days  Discharge Date and Time:  06/18/2019 4:51 PM  Attending Physician: Swetha Biswas MD   Discharging Provider: KACIE Cowart  Primary Care Provider: Anna Penn MD      HPI:   Mrs. Isbell sent from Dr. Griffith office for admissions and antibiotics after she went for follow up and per the daughter a significant amount of drainage from her back wound. She recently had surgery for osteo of lumbar spine. She states she still has some pain but otherwise is doing okay.        Procedure(s) (LRB):  DEBRIDEMENT, WOUND (Bilateral)  APPLICATION, WOUND VAC (N/A)      Hospital Course:   Patient is a 73-year-old female with type 2 diabetes, hypertension and parkinsonism, Ch Hyponatremia, recent h/o osteomyelitis of spine, s/p surgery on 5/25/19 for posterior lumbar fusion and abscess debridement.  She was sent from Dr. Nicolas's office as she had significant amount of drainage from her back wound spine. She states she still has some pain but otherwise is doing okay.    6/11- resting well with obvious resting tremor. Underwent washout out of her wound by Dr. Nicolas today, post op doing well, wound Vac in place. Pain under control.  6/12- looks and feels much better, s/p Day 1 post op for l/S I&D, pain much better, wound vac in place. Radiology does not think that the psoas abscess needs any drainage at present and can be watched. Wound Cx remain NGTD-- on IV Vanc.  6/13 - Patient continues to experience discomfort with mobility. PT / OT consulted and recommending post acute care. CM consulted for return to post acute care. Discussed patient with Neuro Sx regarding POC and ongoing needs. D/C planning in progress.   06/14 -- No acute distress overnight. POD # 3. Pt reports pain much improved today vs yesterday. Pt up with PT today, tolerated. First wound  vac change scheduled for today then every 3 days. Pt will return to [facility] on Monday, 06/17/19  06/15 POD # 4. No acute events overnight. Afebrile. WBC count stable. PT/OT following. Wound vac with good seal, replaced yesterday 06/14. IV Rocephin and Diflucan started by ID today. Pt was switched from Vancomycin to Rocephin and candida albicans in aerobic culture dated 06/11.   06/16 POD #5. No acute events overnight. Afebrile overnight. Working with PT. OOB in chair with LSO brace on. Currently on day 6/42 with IV Rocephin and Diflucan. Pt has no complaints at this time. Wound vac change scheduled for tomorrow, 06/17. Will likely be discharged back to [facility] afterward with F/U with Dr. Nicolas.   06/17 POD #6. Wound vac changed today, 06/17. Unable to discharge to [facility] today due SNF not having wound vac available. CM reported that SNF should have by tomorrow morning, 06/18. Continue current regimen.    06/18 POD# 7 [facility] ready to accept today -- wound vac available at facility. Pt will continue IV Rocephin/Diflucan for total of 6 weeks. Wound vac to be changed on [facility] per wound care recommendations. No leukocytosis/leukopenia. Remains afebrile. WBC count stable at 9.78K. BC NGTD. Hemodynamically stable. Pt will follow up with RAMIRO Arreaga (neurosurgery) prior to 06/24/19 for wound recheck, ambulatory referral placed for appt. This patient was seen and examined on the date of discharge and determined suitable for discharge.      Consults:   Consults (From admission, onward)        Status Ordering Provider     Inpatient consult to Infectious Diseases  Once     Provider:  Dada Gomez MD    Acknowledged KAREN NICOLAS     Inpatient consult to Infectious Diseases  Once     Provider:  Dada Gomez MD    Acknowledged LAURA VYAS     Inpatient consult to Interventional Radiology  Once     Provider:  Rashad Clark MD    Completed LAURA VYAS     Inpatient consult to  Neurosurgery  Once     Provider:  Stanislav Nicolas MD    Completed LESLEY SILVA JR          Final Active Diagnoses:    Diagnosis Date Noted POA    PRINCIPAL PROBLEM:  Osteomyelitis [M86.9] 06/10/2019 Yes     Chronic    Hyperlipidemia LDL goal <70 [E78.5] 10/06/2015 Yes    Type 2 diabetes, controlled, with peripheral neuropathy [E11.42] 05/29/2014 Yes     Chronic    Hypothyroidism [E03.9] 11/19/2013 Yes     Chronic    Essential hypertension [I10]  Yes     Chronic    PD (Parkinson's disease) [G20]  Yes     Chronic      Problems Resolved During this Admission:       Discharged Condition: stable    Disposition: Skilled Nursing Facility    Follow Up:  Follow-up Information     Mayra Long PA-C In 1 week.    Specialty:  Neurosurgery  Why:  For wound re-check  Contact information:  [address] [place] [address] [address] [place] [address] [phone]             Anna Penn MD. Schedule an appointment as soon as possible for a visit in 3 days.    Specialty:  Family Medicine  Why:  hospital follow up  Contact information:  [address] [address] [address] [phone]                 Patient Instructions:      Ambulatory Referral to Neurosurgery   Referral Priority: Routine Referral Type: Consultation   Referral Reason: Specialty Services Required   Referred to Provider: MAYRA LONG Requested Specialty: Neurosurgery   Number of Visits Requested: 1     Notify your health care provider if you experience any of the following:  increased confusion or weakness     Notify your health care provider if you experience any of the following:  persistent dizziness, light-headedness, or visual disturbances     Notify your health care provider if you experience any of the following:  difficulty breathing or increased cough     Notify your health care provider if you experience any of the following:  redness, tenderness, or signs of infection (pain, swelling, redness, odor or green/yellow discharge around  incision site)     Notify your health care provider if you experience any of the following:  severe uncontrolled pain     Activity as tolerated       Significant Diagnostic Studies: Labs:   CMP   Recent Labs   Lab 06/17/19 0414 06/18/19  0421   * 131*   K 4.3 4.4   CL 95 94*   CO2 27 25   * 158*   BUN 12 10   CREATININE 0.8 0.8   CALCIUM 9.2 9.5   PROT 6.2 6.0   ALBUMIN 2.8* 2.8*   BILITOT 0.4 0.4   ALKPHOS 104 99   AST 17 18   ALT <5* <5*   ANIONGAP 9 12   ESTGFRAFRICA >60 >60   EGFRNONAA >60 >60    and CBC   Recent Labs   Lab 06/17/19 0414 06/18/19  0421   WBC 6.33 6.78   HGB 10.4* 10.1*   HCT 33.6* 32.0*    291     Microbiology:   Blood Culture   Lab Results   Component Value Date    LABBLOO No growth after 5 days. 06/10/2019    and Wound Culture: positive for JUSTYN ALBICANS  Radiology:   Imaging Results          CT Lumbar Spine Without Contrast (Final result)  Result time 06/10/19 16:02:17    Final result by Rashad Alcaraz III, MD (06/10/19 16:02:17)                 Impression:      1.  The findings at L2-L3 are consistent with discitis/osteomyelitis.  Detail is grossly limited due to streak artifact but I strongly suspect at least some bony destruction involving the inferior margin of the L2 vertebral body with soft tissue density within the L2-L3 interspace region.  Cannot exclude some involvement of the superior aspect of L3.    2.  L3-4 shows neural foraminal encroachment greater on the left.    3.  At L4-5 there is mild anterolisthesis of L4 related L5 with disc bulging causing severe bilateral neural foraminal stenosis.  There is at least mild to moderate central canal stenosis.    4.  L5-S1 shows less severe central canal stenosis.    All CT scans at this facility use dose modulation, iterative reconstruction, and/or weight based dosing when appropriate to reduce radiation dose to as low as reasonably achievable.      Electronically signed by: Rashad  Milagroslashaun  Date:    06/10/2019  Time:    16:02             Narrative:    EXAMINATION:  CT LUMBAR SPINE WITHOUT CONTRAST    CLINICAL HISTORY:  Presurgical evaluation, lumbar spine;    FINDINGS:  Multiplanar images submitted.  The exam is degraded by extensive metallic artifacts caused by patient's multilevel fusion.  This can be gets at T11.  T11-12 and T12-L1 show no significant findings.  There is atheromatous change along the aorta.    Beginning at L1, screws cause the streak artifact previously noted.  L1-L2 shows no gross bony abnormality within the visualized segments.  There is severe artifact limiting detail but there is evidence of what appears to be bony destruction involving the inferior portion of the L2 vertebral body greatest anteriorly and to the right.  Is a great deal of soft tissue density between the L2 and L3 vertebral body.  Cannot exclude minimal involvement of the superior aspect of L3 as well.    L3-L4 shows interspace narrowing with arthritic lipping.  Neural foraminal encroachment, greater on the left.  L4-L5 shows moderate bulging of the disc extending back and causing severe bilateral neural foraminal stenosis.  There is central canal stenosis from facet hypertrophy and ligamentous thickening.  At L5-S1 there is bulging of the disc with bilateral facet arthropathy.    The hardware appears grossly intact.                                Pending Diagnostic Studies:     None         Medications:  Reconciled Home Medications:      Medication List      START taking these medications    enoxaparin 40 mg/0.4 mL Syrg  Commonly known as:  LOVENOX  Inject 0.4 mLs (40 mg total) into the skin once daily.     fluconazole 400 mg/200 mL IVPB  Commonly known as:  DIFLUCAN  Inject 200 mLs (400 mg total) into the vein once daily.     miconazole nitrate 2% 2 % Oint  Commonly known as:  MICOTIN  Apply topically 2 (two) times daily.  Replaces:  miconazole NITRATE 2 % 2 % top powder     oxyCODONE-acetaminophen  "7.5-325 mg per tablet  Commonly known as:  PERCOCET  Take 1 tablet by mouth every 4 (four) hours as needed.  Replaces:  oxyCODONE-acetaminophen 5-325 mg per tablet        CONTINUE taking these medications    ACCU-CHEK SOFTCLIX LANCETS Misc  Generic drug:  lancets  TEST THREE TIMES DAILY     acetaminophen 325 MG tablet  Commonly known as:  TYLENOL  Take 2 tablets (650 mg total) by mouth every 6 (six) hours as needed.     aspirin 81 MG EC tablet  Commonly known as:  ECOTRIN  Take 1 tablet (81 mg total) by mouth once daily.     blood sugar diagnostic Strp  1 strip by Misc.(Non-Drug; Combo Route) route 3 (three) times daily. Accuchek Felicia Plus Test Strips     blood-glucose meter kit  Accu-chek Felicia Plus Meter     carbidopa-levodopa  mg  mg per tablet  Commonly known as:  SINEMET  TAKE 1 TABLET BY MOUTH 5 TIMES DAILY     cefTRIAXone 2 g in dextrose 5 % 50 mL 2 g/50 mL Pgbk IVPB  Commonly known as:  ROCEPHIN  Inject 50 mLs (2 g total) into the vein once daily.     CENTRUM SILVER 0.4-300-250 mg-mcg-mcg Tab  Generic drug:  multivit-min-FA-lycopen-lutein  Take 1 tablet by mouth once daily.     insulin syringe-needle U-100 0.5 mL 31 gauge x 5/16" Syrg  USE THREE TIMES DAILY     isosorbide mononitrate 120 MG 24 hr tablet  Commonly known as:  IMDUR  Take 1 tablet (120 mg total) by mouth once daily.     latanoprost 0.005 % ophthalmic solution  INSTILL 1 DROP INTO EACH EYE IN THE EVENING     levothyroxine 125 MCG tablet  Commonly known as:  SYNTHROID  TAKE 1 TABLET BY MOUTH IN THE MORNING BEFORE BREAKFAST     losartan 50 MG tablet  Commonly known as:  COZAAR  TAKE 1 TABLET BY MOUTH ONCE DAILY     metFORMIN 500 MG tablet  Commonly known as:  GLUCOPHAGE  TAKE ONE TABLET BY MOUTH THREE TIMES DAILY     metoprolol succinate 25 MG 24 hr tablet  Commonly known as:  TOPROL-XL  TAKE 1 TABLET BY MOUTH ONCE DAILY     NovoLIN 70/30 U-100 Insulin 100 unit/mL (70-30) injection  Generic drug:  insulin NPH-insulin regular " (70/30)  Inject 55 Units into the skin 3 (three) times daily.     pantoprazole 40 MG tablet  Commonly known as:  PROTONIX  Take 1 tablet (40 mg total) by mouth once daily.     pramipexole 1 MG tablet  Commonly known as:  MIRAPEX  TAKE ONE TABLET BY MOUTH THREE TIMES DAILY     pravastatin 40 MG tablet  Commonly known as:  PRAVACHOL  TAKE ONE TABLET BY MOUTH ONCE DAILY        STOP taking these medications    miconazole NITRATE 2 % 2 % top powder  Commonly known as:  MICOTIN  Replaced by:  miconazole nitrate 2% 2 % Oint     oxyCODONE-acetaminophen 5-325 mg per tablet  Commonly known as:  PERCOCET  Replaced by:  oxyCODONE-acetaminophen 7.5-325 mg per tablet            Indwelling Lines/Drains at time of discharge:   Lines/Drains/Airways     Pressure Ulcer                 Pressure Injury 05/23/19 1911 lower Thoracic spine Stage 3 25 days         Negative Pressure Wound Therapy  7 days                Time spent on the discharge of patient: 35 minutes  Patient was seen and examined on the date of discharge and determined to be suitable for discharge.         KACIE Cowart  Department of Hospital Medicine  [facility]

## 2019-06-18 NOTE — PHYSICIAN QUERY
"PT Name: Ross Isbell  MR #: [id number]    Physician Query Form - Procedure Clarification     CDS/: Archana WING, RN              Contact information: [email or web address]  This form is a permanent document in the medical record.     Query Date: June 18, 2019  By submitting this query, we are merely seeking further clarification of documentation. Please utilize your independent clinical judgment when addressing the question(s) below.    The Medical record contains the following:     Indicators       Supporting Clinical Findings   Location in Medical Record     x Documentation of "Debridement"    Technical Procedures Used:   1.  Subfascial and suprafascial wound debridement     The fascial layer was opened up as well.  Retractors were brought into the field.  The wound was copiously irrigated with a Pulsavac irrigation solution with vancomycin powder.  The subfascial layer did not appear to be grossly infected.  The hardware remained intact.  Vancomycin powder was left to the subfascial cavity.  The fascial layer was then closed with 0 Vicryl in an inverted fashion.  The super fascial layer was again irrigated with the Pulsavac irrigation solution.  The tissue was debrided.  Neurosurgery Op note by Dr. Stanislav Nicolas 6/12/19    Documentation of "I & D"      EBL =      Other:       Excisional debridement is a surgical removal of  nonvitalized tissue, necrosis or slough. The use of a sharp instrument does not always indicate that an excisional debridement was performed.  Non excisional debridement is the scraping, washing, irrigating, brushing away or removal of loose tissue fragments.    Provider, please specify type of procedure(s) performed:    [  X  ]  Excisional Debridement (Specify site and depth of tissue removed)       * Site: (Specify) __lumbar spine____       *Depth of tissue excised:       [    ] Skin [    X] Subcutaneous Tissue/Fascia [  X  ] Muscle [    ] Tendon [    ] Bone   [  X  ]  " Non-excisional Debridement    *Site : (Specify): _lumbar______________       *Depth of tissue excised:       [   X ] Skin [  X  ] Subcutaneous Tissue/Fascia [  X  ] Muscle [    ] Tendon [  X  ] Bone        [  X  ] Other Procedure (Specify) ___Placement of wound vac_____         [  ] Clinically Undetermined

## 2019-06-18 NOTE — PLAN OF CARE
Problem: Adult Inpatient Plan of Care  Goal: Plan of Care Review  Outcome: Ongoing (interventions implemented as appropriate)  Pt alert and oriented. BG monitored no correction dose needed. Carlson CDI, Wound vac in place no leakage or drainage to site ..  Pt remained free of falls during shift, Turn Q2hour. Bed alarm set , call light in reach, room free of clutter, side rails  up x2, pt on telemetry monitor SR, will continue to monitor.

## 2019-06-18 NOTE — PLAN OF CARE
Problem: Physical Therapy Goal  Goal: Physical Therapy Goal  PT WILL BE SEEN FOR P.T. FOR A MIN OF 5 OUT OF 7 DAYS A WEEK  LT19  1. PT WILL COMPLETE BED MOBILITY WITH MOD A  2. PT WILL SCOOT IN BED WITH MOD A  3. PT WILL T/F TO CHAIR WITH MAX A X 1 AND LSO ON  4. PT WILL COMPLETE B LE TE X 10 REPS FOR ROM AND STRENGTHENING   Outcome: Ongoing (interventions implemented as appropriate)  Pt t/f to chair with RW and MAX A X 2

## 2019-06-18 NOTE — PROGRESS NOTES
"Called to room by RAMIRO Montero regarding alarming wound vac - air leak. Seal has been patched with transparent film dressing, currently no air leak noted. However, edges of drape are noted to be "rolled up" tightly in multiple areas. Wound vac paused and clamped. Drape of wound vac carefully removed from distal 1/2 of wound, leaving proximal portion of drape intact including Sensatrac pad.  Foam also left in place. Additional drape then applied to distal portion of dressing to seal, sensatrac pad windowpaned with drape as well, then reconnected to [facility] wound vac ULTA @ continuous -125 mmHg low intensity suction with good seal noted, no air leak. Cannister noted to be nearly full and changed at this time as well. 475 mL dark serosanguinous drainage is noted in cannister. Primary nurse FREDDIE Ribera updated on care provided. Patient is ready to discharge to SNF once they order wound vac, and next dressing change is scheduled for tomorrow (6/19/19).  "

## 2019-06-19 ENCOUNTER — TELEPHONE (OUTPATIENT)
Dept: ORTHOPEDICS | Facility: CLINIC | Age: 74
End: 2019-06-19

## 2019-06-19 LAB — BACTERIA SPEC ANAEROBE CULT: NORMAL

## 2019-06-19 NOTE — PLAN OF CARE
06/19/19 0856   Final Note   Assessment Type Final Discharge Note   Anticipated Discharge Disposition SNF   Right Care Referral Info   Post Acute Recommendation SNF / Sub-Acute Rehab   Facility Name [place], [place]

## 2019-06-20 ENCOUNTER — TELEPHONE (OUTPATIENT)
Dept: NEUROSURGERY | Facility: CLINIC | Age: 74
End: 2019-06-20

## 2019-06-20 DIAGNOSIS — Z98.1 STATUS POST LUMBAR SPINAL FUSION: Primary | ICD-10-CM

## 2019-06-20 NOTE — TELEPHONE ENCOUNTER
Spoke with Anahi with [facility] informed her of pt's order for stat CMP and Sed rate labs and pt is scheduled for CT tomorrow at 3pm at the hospital on [address]

## 2019-06-21 ENCOUNTER — HOSPITAL ENCOUNTER (OUTPATIENT)
Dept: RADIOLOGY | Facility: HOSPITAL | Age: 74
Discharge: HOME | End: 2019-06-21
Attending: PHYSICIAN ASSISTANT
Payer: MEDICARE

## 2019-06-21 DIAGNOSIS — M46.26 OSTEOMYELITIS OF LUMBAR SPINE: ICD-10-CM

## 2019-06-21 PROCEDURE — 72131 CT LUMBAR SPINE W/O DYE: CPT | Mod: TC

## 2019-06-24 ENCOUNTER — OFFICE VISIT (OUTPATIENT)
Dept: NEUROSURGERY | Facility: CLINIC | Age: 74
End: 2019-06-24
Payer: MEDICARE

## 2019-06-24 VITALS
HEIGHT: 66 IN | DIASTOLIC BLOOD PRESSURE: 69 MMHG | WEIGHT: 209 LBS | BODY MASS INDEX: 33.59 KG/M2 | HEART RATE: 98 BPM | SYSTOLIC BLOOD PRESSURE: 116 MMHG

## 2019-06-24 DIAGNOSIS — M46.26 OSTEOMYELITIS OF LUMBAR SPINE: ICD-10-CM

## 2019-06-24 DIAGNOSIS — Z98.1 STATUS POST LUMBAR SPINAL FUSION: Primary | ICD-10-CM

## 2019-06-24 PROCEDURE — 99999 PR PBB SHADOW E&M-EST. PATIENT-LVL V: ICD-10-PCS | Mod: PBBFAC,,, | Performed by: PHYSICIAN ASSISTANT

## 2019-06-24 PROCEDURE — 99499 UNLISTED E&M SERVICE: CPT | Mod: S$GLB,,, | Performed by: PHYSICIAN ASSISTANT

## 2019-06-24 PROCEDURE — 99499 RISK ADDL DX/OHS AUDIT: ICD-10-PCS | Mod: S$GLB,,, | Performed by: PHYSICIAN ASSISTANT

## 2019-06-24 PROCEDURE — 99024 PR POST-OP FOLLOW-UP VISIT: ICD-10-PCS | Mod: S$GLB,,, | Performed by: PHYSICIAN ASSISTANT

## 2019-06-24 PROCEDURE — 99999 PR PBB SHADOW E&M-EST. PATIENT-LVL V: CPT | Mod: PBBFAC,,, | Performed by: PHYSICIAN ASSISTANT

## 2019-06-24 PROCEDURE — 99024 POSTOP FOLLOW-UP VISIT: CPT | Mod: S$GLB,,, | Performed by: PHYSICIAN ASSISTANT

## 2019-06-24 RX ORDER — OXYCODONE AND ACETAMINOPHEN 7.5; 325 MG/1; MG/1
1 TABLET ORAL EVERY 4 HOURS PRN
Qty: 40 TABLET | Refills: 0 | Status: SHIPPED | OUTPATIENT
Start: 2019-06-24 | End: 2019-07-16

## 2019-06-24 RX ORDER — OXYCODONE AND ACETAMINOPHEN 7.5; 325 MG/1; MG/1
1 TABLET ORAL EVERY 4 HOURS PRN
Qty: 40 TABLET | Refills: 0 | Status: SHIPPED | OUTPATIENT
Start: 2019-06-24 | End: 2019-06-24 | Stop reason: SDUPTHER

## 2019-06-24 RX ORDER — ONDANSETRON HYDROCHLORIDE 8 MG/1
8 TABLET, FILM COATED ORAL EVERY 8 HOURS PRN
Qty: 40 TABLET | Refills: 0 | Status: SHIPPED | OUTPATIENT
Start: 2019-06-24

## 2019-06-24 RX ORDER — ONDANSETRON HYDROCHLORIDE 8 MG/1
8 TABLET, FILM COATED ORAL EVERY 8 HOURS PRN
Qty: 40 TABLET | Refills: 0 | Status: SHIPPED | OUTPATIENT
Start: 2019-06-24 | End: 2019-06-24 | Stop reason: SDUPTHER

## 2019-06-24 RX ORDER — METHOCARBAMOL 750 MG/1
750 TABLET, FILM COATED ORAL 2 TIMES DAILY PRN
Qty: 60 TABLET | Refills: 0 | Status: SHIPPED | OUTPATIENT
Start: 2019-06-24 | End: 2019-07-04

## 2019-06-24 RX ORDER — METHOCARBAMOL 750 MG/1
750 TABLET, FILM COATED ORAL 2 TIMES DAILY PRN
Qty: 60 TABLET | Refills: 0 | Status: SHIPPED | OUTPATIENT
Start: 2019-06-24 | End: 2019-06-24 | Stop reason: SDUPTHER

## 2019-06-24 NOTE — PROGRESS NOTES
Subjective:      Patient ID: Ross Isbell is a 73 y.o. female.    Chief Complaint: Post-op Evaluation    HPI   Patient seen today for wound vac check. She currently C/o groin pain, worse on the left side. Has nausea all of the time but no vomiting. Patient also states she's having back spasms and current meds aren't helping relieve the pain.     Patient is a 73-year-old female who previously had osteomyelitis and diskitis at L2-3 status post L1- L4 stabilization.  Patient presented with inferior drainage at her incision site.  Labs demonstrated increasing CRP and ESR.  She has a number of medical issues including diabetes and low albumin levels. CT scan showed new abscess to the psoas muscle on the right side at at L2-3.  Patient was consented for I and D and debridement of wound with placement of wound VAC.     Date of Procedure: 6/11/2019    Procedure: Procedure(s) (LRB):  DEBRIDEMENT, WOUND (Bilateral)  APPLICATION, WOUND VAC (N/A)    Surgeon(s) and Role:     * Stanislav Nicolas MD - Primary   Pre-Operative Diagnosis: Osteomyelitis, unspecified site, unspecified type [M86.9]  Discitis of lumbar region [M46.46]  Abscess in epidural space of lumbar spine [G06.1]   Post-Operative Diagnosis: Post-Op Diagnosis Codes:     * Osteomyelitis, unspecified site, unspecified type [M86.9]     * Discitis of lumbar region [M46.46]     * Abscess in epidural space of lumbar spine [G06.1]   Anesthesia: General   Technical Procedures Used:   1.  Subfascial and suprafascial wound debridement  2.  Placement of wound VAC   Description of the Findings of the Procedure:  Superficial and subfascial wound dehiscence      ROS     Constitutional: Negative for fever. Positive for activity change and fatigue.  Gastrointestinal: Negative for nausea and vomiting.   Genitourinary: Negative for decreased urine volume.   Musculoskeletal: Positive for back pain and gait problem.   Skin: Positive for wound (wound vac in place over lower back).    Neurological: Positive for tremors and weakness.   Psychiatric/Behavioral: Negative for behavioral problems.         Objective:            Ortho/SPM Exam    Gen:Oriented to person, place. Appears stated age.   Head:Normocephalic and atraumatic.  Nose: Nose normal.    Eyes: EOM are normal. Pupils are equal, round, and reactive to light.   Neck: Neck supple. No masses or lesions palpated  Cardiovascular: Intact distal pulses.    Abdominal: Tenderness of RLQ and LLQ  Neurological: Alert and oriented to person and place. Patient displays tremor.   Psychiatric: Normal mood and affect. Behavior is normal.          Back:  Wound vac in place with good seal       Mild tenderness to palpation across upper and lower l-spine (marcellus.) Paraspinal tenderness      Present Paraspinal muscle spasms     Positive Pain with flexion and extension     Limited due to pain Range of motion       Motor: Patient is able to raise and move her legs. Dropfoot at baseline.      Right Right Left Left  Level Group   5 4 5 4 L2 Hip flexor (Psoas)   5 4 5 4 L3 Leg extension (Quads)   5 4 5 4 L4 Dorsiflexion & foot inversion (Tibialis Anterior)   5 4 5 4 L5 Great toe extension ( EHL)   5 4 5 4 S1 Foot eversion (Gastroc, PL & PB)      Sensation:  NL Decreased (R/L/BL) Level Sensation    X   L2 Anterio-medial thigh   X   L3 Medial thigh around knee     Equal Marcellus L4 Medial foot     Equal Marcellus L5 Dorsum foot     Equal Marcellus S1 Lateral foot                       Assessment:       Encounter Diagnoses   Name Primary?    Status post lumbar spinal fusion Yes    Osteomyelitis of lumbar spine           Plan:       Ross was seen today for post-op evaluation.    Diagnoses and all orders for this visit:    Status post lumbar spinal fusion    Osteomyelitis of lumbar spine    Other orders  -     Discontinue: oxyCODONE-acetaminophen (PERCOCET) 7.5-325 mg per tablet; Take 1 tablet by mouth every 4 (four) hours as needed for Pain (moderate pain).  -     Discontinue:  methocarbamol (ROBAXIN) 750 MG Tab; Take 1 tablet (750 mg total) by mouth 2 (two) times daily as needed. Take for spasms.  -     Discontinue: ondansetron (ZOFRAN) 8 MG tablet; Take 1 tablet (8 mg total) by mouth every 8 (eight) hours as needed for Nausea.  -     methocarbamol (ROBAXIN) 750 MG Tab; Take 1 tablet (750 mg total) by mouth 2 (two) times daily as needed. Take for spasms.  -     oxyCODONE-acetaminophen (PERCOCET) 7.5-325 mg per tablet; Take 1 tablet by mouth every 4 (four) hours as needed for Pain (moderate pain).  -     ondansetron (ZOFRAN) 8 MG tablet; Take 1 tablet (8 mg total) by mouth every 8 (eight) hours as needed for Nausea.        Patient expected to wear wound vac for total of 2 months  Patient informed to wear brace when OOB  She will follow-up in 2 weeks with Dr. Nicolas for wound vac check    Medications were ordered-- percocet 7.5 mg. Muscle relaxant changed to Robaxin 750 mg.   Discontinue Tizanidine. Add zofran for Nausea.             Mayra Long PA-C

## 2019-06-24 NOTE — PATIENT INSTRUCTIONS
Wear LSO brace when out of bed.    Prescriptions given for pain, nausea and spasms.    Follow-up in 2 weeks for wound check.     If symptoms worsen, report to local ER.

## 2019-06-24 NOTE — LETTER
July 7, 2019      Melody Oliver, [facility]  [address]   [facility]  [place] [address]           [facility]  [address] [place] [address]  Phone: [phone]  Fax: [phone]          Patient: Ross Isbell   MR Number: [id number]   YOB: 1945   Date of Visit: 6/24/2019       Dear Melody Oliver:    Thank you for referring Ross Isbell to me for evaluation. Attached you will find relevant portions of my assessment and plan of care.    If you have questions, please do not hesitate to call me. I look forward to following Ross Isbell along with you.    Sincerely,    Mayra Long PA-C    Enclosure  CC:  No Recipients    If you would like to receive this communication electronically, please contact [email or web address] or [phone] to request more information on [employer] access.    For providers and/or their staff who would like to refer a patient to [facility], please contact us through our one-stop-shop provider referral line, [facility], at [phone].    If you feel you have received this communication in error or would no longer like to receive these types of communications, please e-mail [email or web address]

## 2019-07-08 ENCOUNTER — TELEPHONE (OUTPATIENT)
Dept: NEUROSURGERY | Facility: CLINIC | Age: 74
End: 2019-07-08

## 2019-07-08 NOTE — TELEPHONE ENCOUNTER
Phoned [facility], which is the facility the pt is living at, and informed there nurse for the pt that her appointment will be Wednesday July 10th at 2:30 for her 2 week f/u on her lumbar spine.

## 2019-07-11 ENCOUNTER — TELEPHONE (OUTPATIENT)
Dept: NEUROSURGERY | Facility: CLINIC | Age: 74
End: 2019-07-11

## 2019-07-11 NOTE — TELEPHONE ENCOUNTER
Spoke with pt informed her she is schedule for Tuesday 7/16 at 10am stated that's a good time and when asked how she was feeling stated she is feeling a lot better//ns     ----- Message from Vicky Barrios sent at 7/11/2019  1:57 PM CDT -----  Contact: pt  She's calling in regards to sooner appt    Two weeks f/u appt     ,pls call pt back at [phone] (home) [phone] (work)

## 2019-07-11 NOTE — TELEPHONE ENCOUNTER
Spoke with Douglas in reference to the message below to reschedule pt for missed appt on yesterday, pt is scheduled for 7/16 with C[place]AND Long at the [place] location//ns     ----- Message from Vonda Almaguer sent at 7/11/2019 11:51 AM CDT -----  Contact: Douglas - [facility]  request a call concerning a surgery appt for this pt, no additional info given and can be reached at [phone]///thxMW

## 2019-07-15 LAB — FUNGUS SPEC CULT: NORMAL

## 2019-07-15 NOTE — PROGRESS NOTES
"Subjective:      Patient ID: Ross Isbell is a 73 y.o. female.    Chief Complaint: Follow-up; Cervical Spine Pain (C-spine); and Post-op Evaluation    HPI     Patient seen today for wound vac check. She reports to me that the nursing home ([facility]) decided to remove her wound vac earlier so that "you could take a look at it." They were not given instructions to do so. Our original plan was to leave in place for a total of 2 months. I spoke to her nurse at 10:40 A.M. Today and she reported that the patient instructed her to remove the wound vac. She was not given official orders to do so. This was an inappropriate action and I told her that she would have to place the wound vac on the patient as soon as she returned or the patient would have to be admitted for wound vac placement.     She currently C/o groin pain, worse on the right side today. Has nausea all of the time but no vomiting. Patient also states she's having back spasms and current meds aren't helping relieve the pain.    Patient is a 73-year-old female who previously had osteomyelitis and diskitis at L2-3 status post L1- L4 stabilization.  Patient presented with inferior drainage at her incision site.  Labs demonstrated increasing CRP and ESR.  She has a number of medical issues including diabetes and low albumin levels. CT scan showed new abscess to the psoas muscle on the right side at at L2-3.  Patient was consented for I and D and debridement of wound with placement of wound VAC.     Date of Procedure: 6/11/2019    Procedure: Procedure(s) (LRB):  DEBRIDEMENT, WOUND (Bilateral)  APPLICATION, WOUND VAC (N/A)    Surgeon(s) and Role:     * Stanislav Nicolas MD - Primary   Pre-Operative Diagnosis: Osteomyelitis, unspecified site, unspecified type [M86.9]  Discitis of lumbar region [M46.46]  Abscess in epidural space of lumbar spine [G06.1]   Post-Operative Diagnosis: Post-Op Diagnosis Codes:     * Osteomyelitis, unspecified site, unspecified type " [M86.9]     * Discitis of lumbar region [M46.46]     * Abscess in epidural space of lumbar spine [G06.1]   Anesthesia: General   Technical Procedures Used:   1.  Subfascial and suprafascial wound debridement  2.  Placement of wound VAC   Description of the Findings of the Procedure:  Superficial and subfascial wound dehiscence       ROS        Constitutional: Negative for fever. Positive for activity change and fatigue.  Gastrointestinal: Negative for nausea and vomiting.   Genitourinary: Negative for decreased urine volume.   Musculoskeletal: Positive for back pain and gait problem.   Skin: Positive for wound ( wound vac was removed prior to appointment), skin and surrounding area do not have increased erythema, swelling or fluctuance. Wound was covered with moistened gauze and ABD pad. Wound was reinforced with add'l gauze and microfoam tape.  Neurological: Positive for tremors and weakness.   Psychiatric/Behavioral: Negative for behavioral problems.          Objective:            Ortho/SPM Exam      Ortho/SPM Exam     Gen:Oriented to person, place. Appears stated age.   Head:Normocephalic and atraumatic.  Nose: Nose normal.    Eyes: EOM are normal. Pupils are equal, round, and reactive to light.   Neck: Neck supple. No masses or lesions palpated  Cardiovascular: Intact distal pulses.    Abdominal: Tenderness of RLQ and LLQ  Neurological: Alert and oriented to person and place. Patient displays tremor.   Psychiatric: Normal mood and affect. Behavior is normal.          Back:  Wound vac in place with good seal       Mild tenderness to palpation across upper and lower l-spine (norma.) Paraspinal tenderness      Present Paraspinal muscle spasms     Positive Pain with flexion and extension     Limited due to pain Range of motion       Motor: Patient is able to raise and move her legs. Dropfoot at baseline.      Right Right Left Left  Level Group   5 4 5 4 L2 Hip flexor (Psoas)   5 4 5 4 L3 Leg extension (Quads)   5 4 5  4 L4 Dorsiflexion & foot inversion (Tibialis Anterior)   5 4 5 4 L5 Great toe extension ( EHL)   5 4 5 4 S1 Foot eversion (Gastroc, PL & PB)      Sensation:  NL Decreased (R/L/BL) Level Sensation    X   L2 Anterio-medial thigh   X   L3 Medial thigh around knee     Equal Marcellus L4 Medial foot     Equal Marcellus L5 Dorsum foot     Equal Marcellus S1 Lateral foot                     Assessment:       Encounter Diagnoses   Name Primary?    Status post lumbar spinal fusion Yes    Osteomyelitis of lumbar spine           Plan:       Ross was seen today for follow-up, cervical spine pain (c-spine) and post-op evaluation.    Diagnoses and all orders for this visit:    Status post lumbar spinal fusion    Osteomyelitis of lumbar spine    Other orders  -     oxyCODONE-acetaminophen (PERCOCET)  mg per tablet; Take 1 tablet by mouth every 4 (four) hours as needed for Pain.  -     methocarbamol (ROBAXIN) 750 MG Tab; Take 1 tablet (750 mg total) by mouth 3 (three) times daily as needed (muscle spasms).        Patient expected to wear wound vac for total of 2 months. She will still need to wear this for 3 -4 more weeks as of today.   I wrote in the progress note from NH that they cannot remove wound vac unless given orders per provider or physician.    Patient will continue to wear brace when OOB  She will follow-up in 3 weeks for wound vac check  Rx printed out for pain control and muscle spasms             Mayra Long PA-C

## 2019-07-16 ENCOUNTER — OFFICE VISIT (OUTPATIENT)
Dept: NEUROSURGERY | Facility: CLINIC | Age: 74
End: 2019-07-16
Payer: MEDICARE

## 2019-07-16 VITALS
SYSTOLIC BLOOD PRESSURE: 138 MMHG | TEMPERATURE: 97 F | DIASTOLIC BLOOD PRESSURE: 85 MMHG | BODY MASS INDEX: 33.59 KG/M2 | HEIGHT: 66 IN | HEART RATE: 104 BPM | WEIGHT: 209 LBS

## 2019-07-16 DIAGNOSIS — Z98.1 STATUS POST LUMBAR SPINAL FUSION: Primary | ICD-10-CM

## 2019-07-16 DIAGNOSIS — M46.26 OSTEOMYELITIS OF LUMBAR SPINE: ICD-10-CM

## 2019-07-16 PROCEDURE — 99024 PR POST-OP FOLLOW-UP VISIT: ICD-10-PCS | Mod: S$GLB,,, | Performed by: PHYSICIAN ASSISTANT

## 2019-07-16 PROCEDURE — 99499 RISK ADDL DX/OHS AUDIT: ICD-10-PCS | Mod: S$GLB,,, | Performed by: PHYSICIAN ASSISTANT

## 2019-07-16 PROCEDURE — 99024 POSTOP FOLLOW-UP VISIT: CPT | Mod: S$GLB,,, | Performed by: PHYSICIAN ASSISTANT

## 2019-07-16 PROCEDURE — 99999 PR PBB SHADOW E&M-EST. PATIENT-LVL IV: CPT | Mod: PBBFAC,,, | Performed by: PHYSICIAN ASSISTANT

## 2019-07-16 PROCEDURE — 99999 PR PBB SHADOW E&M-EST. PATIENT-LVL IV: ICD-10-PCS | Mod: PBBFAC,,, | Performed by: PHYSICIAN ASSISTANT

## 2019-07-16 PROCEDURE — 99499 UNLISTED E&M SERVICE: CPT | Mod: S$GLB,,, | Performed by: PHYSICIAN ASSISTANT

## 2019-07-16 RX ORDER — METHOCARBAMOL 750 MG/1
750 TABLET, FILM COATED ORAL 3 TIMES DAILY PRN
Qty: 60 TABLET | Refills: 0 | Status: SHIPPED | OUTPATIENT
Start: 2019-07-16 | End: 2019-07-16 | Stop reason: SDUPTHER

## 2019-07-16 RX ORDER — METHOCARBAMOL 750 MG/1
750 TABLET, FILM COATED ORAL 3 TIMES DAILY PRN
Qty: 60 TABLET | Refills: 0 | Status: SHIPPED | OUTPATIENT
Start: 2019-07-16

## 2019-07-16 RX ORDER — OXYCODONE AND ACETAMINOPHEN 10; 325 MG/1; MG/1
1 TABLET ORAL EVERY 4 HOURS PRN
Qty: 30 TABLET | Refills: 0 | Status: SHIPPED | OUTPATIENT
Start: 2019-07-16 | End: 2019-07-16 | Stop reason: SDUPTHER

## 2019-07-16 RX ORDER — OXYCODONE AND ACETAMINOPHEN 10; 325 MG/1; MG/1
1 TABLET ORAL EVERY 4 HOURS PRN
Qty: 30 TABLET | Refills: 0 | Status: SHIPPED | OUTPATIENT
Start: 2019-07-16

## 2019-07-19 ENCOUNTER — TELEPHONE (OUTPATIENT)
Dept: NEUROLOGY | Facility: CLINIC | Age: 74
End: 2019-07-19

## 2019-07-19 NOTE — TELEPHONE ENCOUNTER
----- Message from Paris Piper sent at 7/19/2019  2:41 PM CDT -----  Contact: pt   Pt stated she having a lot of pain and will like a call back, she can be reached at [phone] Thanks

## 2019-07-23 ENCOUNTER — TELEPHONE (OUTPATIENT)
Dept: NEUROSURGERY | Facility: CLINIC | Age: 74
End: 2019-07-23

## 2019-07-23 NOTE — TELEPHONE ENCOUNTER
Returned call regarding message below.     Spoke with Abhishek and was informed that she is aware that they have reviewed orders from this office regarding pt wound vac - implicating that it cannot be removed for 3 months.     However, Abhishek proceeded to state that they aren't aware of what's going on under the wound vac - also stated pt is suppose to see a wound vac practioner.    I informed her that Mayra isn't near, but once she returns I will pass along her message and have her give them a called.    Understanding was advised.             ----- Message from Hattie Gray sent at 7/23/2019 12:13 PM CDT -----  Contact: Abhishek- [employer]  Please call back in reference to patient, about a wound Vac Ph [phone].

## 2019-07-26 NOTE — ANESTHESIA POSTPROCEDURE EVALUATION
Anesthesia Post Evaluation    Patient: Ross Isbell    Procedure(s) Performed: Procedure(s) (LRB):  DEBRIDEMENT, WOUND (Bilateral)  APPLICATION, WOUND VAC (N/A)    Final Anesthesia Type: general  Patient location during evaluation: PACU  Patient participation: Yes- Able to Participate  Level of consciousness: awake and alert  Post-procedure vital signs: reviewed and stable  Pain management: adequate  Airway patency: patent  PONV status at discharge: No PONV  Anesthetic complications: no      Cardiovascular status: hemodynamically stable  Respiratory status: spontaneous ventilation  Hydration status: euvolemic  Follow-up not needed.              Event Time     Out of Recovery 06/11/2019 16:41:32          Pain/Tripp Score: No data recorded

## 2019-07-30 ENCOUNTER — TELEPHONE (OUTPATIENT)
Dept: NEUROSURGERY | Facility: CLINIC | Age: 74
End: 2019-07-30

## 2019-07-30 NOTE — TELEPHONE ENCOUNTER
----- Message from Victoria Aguirre sent at 7/30/2019  8:15 AM CDT -----  .Type:  Patient Returning Call    Who Called: Pt   Who Left Message for Patient: Nurse   Does the patient know what this is regarding?: return call   Would the patient rather a call back or a response via [facility]? Call back  Best Call Back Number: ..[phone] (home)     Additional Information:

## 2019-07-30 NOTE — TELEPHONE ENCOUNTER
Spoke pt in regards to returning a call.    I informed pt that no one from this department gave her a recent call - per notes in pt chart.    Pt advised understanding.

## 2019-08-05 ENCOUNTER — TELEPHONE (OUTPATIENT)
Dept: NEUROSURGERY | Facility: CLINIC | Age: 74
End: 2019-08-05

## 2019-08-05 NOTE — TELEPHONE ENCOUNTER
Called left message to return call to discuss patient, canceling today's appt - she has an appt wednesdday

## 2019-08-05 NOTE — TELEPHONE ENCOUNTER
Spoke w/ patient spouse (Jean-Pierre ) advised that appt today need to be cancelled,but informed appt scheduled Wednesday must be kept to evaluate wound.       Mr. Lujanald verbalized understanding.

## 2019-08-06 ENCOUNTER — TELEPHONE (OUTPATIENT)
Dept: NEUROSURGERY | Facility: CLINIC | Age: 74
End: 2019-08-06

## 2019-08-06 NOTE — TELEPHONE ENCOUNTER
Grace  (nurse at nursing facility) indicate patient hasn't had wound vac check, want to know can their wound care PA  get orders to check wound today?       She was informed patient has an wound check schedule tomorrow 8/7/19 at 1030 am.       ----- Message from Lana Gordon sent at 8/6/2019 11:00 AM CDT -----  Contact: Grace-- [facility]  Type: Needs Medical Advice    Who Called:  Grace-- nusing home  Best Call Back Number: [phone]  Additional Information: patient was supposed to have an appointment yesterday but I was cancelled. She needs orders for wound vac. Wound has not been changed for over a week. Please give call back

## 2019-08-07 ENCOUNTER — OFFICE VISIT (OUTPATIENT)
Dept: NEUROSURGERY | Facility: CLINIC | Age: 74
End: 2019-08-07
Payer: MEDICARE

## 2019-08-07 ENCOUNTER — LAB VISIT (OUTPATIENT)
Dept: LAB | Facility: HOSPITAL | Age: 74
End: 2019-08-07
Attending: PHYSICIAN ASSISTANT
Payer: MEDICARE

## 2019-08-07 VITALS
HEART RATE: 102 BPM | HEIGHT: 66 IN | RESPIRATION RATE: 16 BRPM | DIASTOLIC BLOOD PRESSURE: 65 MMHG | SYSTOLIC BLOOD PRESSURE: 105 MMHG | BODY MASS INDEX: 33.73 KG/M2

## 2019-08-07 DIAGNOSIS — K68.12 PSOAS ABSCESS: ICD-10-CM

## 2019-08-07 DIAGNOSIS — K68.12 PSOAS ABSCESS: Primary | ICD-10-CM

## 2019-08-07 DIAGNOSIS — Z98.890 STATUS POST LUMBAR LAMINECTOMY: ICD-10-CM

## 2019-08-07 LAB
CREAT SERPL-MCNC: 0.8 MG/DL (ref 0.5–1.4)
EST. GFR  (AFRICAN AMERICAN): >60 ML/MIN/1.73 M^2
EST. GFR  (NON AFRICAN AMERICAN): >60 ML/MIN/1.73 M^2

## 2019-08-07 PROCEDURE — 99999 PR PBB SHADOW E&M-EST. PATIENT-LVL V: ICD-10-PCS | Mod: PBBFAC,,, | Performed by: NEUROLOGICAL SURGERY

## 2019-08-07 PROCEDURE — 82565 ASSAY OF CREATININE: CPT

## 2019-08-07 PROCEDURE — 99024 PR POST-OP FOLLOW-UP VISIT: ICD-10-PCS | Mod: S$GLB,,, | Performed by: NEUROLOGICAL SURGERY

## 2019-08-07 PROCEDURE — 99024 POSTOP FOLLOW-UP VISIT: CPT | Mod: S$GLB,,, | Performed by: NEUROLOGICAL SURGERY

## 2019-08-07 PROCEDURE — 99999 PR PBB SHADOW E&M-EST. PATIENT-LVL V: CPT | Mod: PBBFAC,,, | Performed by: NEUROLOGICAL SURGERY

## 2019-08-07 PROCEDURE — 36415 COLL VENOUS BLD VENIPUNCTURE: CPT

## 2019-08-07 NOTE — PROGRESS NOTES
Subjective:      Patient ID: Ross Isbell is a 73 y.o. female.    Chief Complaint: Follow-up (Wound vac check )    Patient is here today for follow-up  She is done with IV antibiotics  She has continued back pain without any radiation down the lower extremities  No focal weakness  No numbness and tingling  She has had the wound VAC in place which is changed weekly  No issues with the wound VAC noted      Review of Systems   Constitutional: Negative for activity change, appetite change and chills.   HENT: Negative for hearing loss, sore throat and tinnitus.    Eyes: Negative for pain, discharge and itching.   Cardiovascular: Negative for chest pain.   Gastrointestinal: Negative for abdominal pain.   Endocrine: Negative for cold intolerance and heat intolerance.   Genitourinary: Negative for difficulty urinating and dysuria.   Musculoskeletal: Positive for back pain and gait problem.   Allergic/Immunologic: Negative for environmental allergies.   Neurological: Positive for weakness. Negative for dizziness, tremors, light-headedness and headaches.   Hematological: Negative for adenopathy.   Psychiatric/Behavioral: Negative for agitation, behavioral problems and confusion.         Objective:       Neurosurgery Physical Exam  Ortho/SPM Exam    Nursing note and vitals reviewed  Gen:Oriented to person, place, and time.             Appears stated age   Skin: no rashes or lesions identified   Head:Normocephalic and atraumatic.  Nose: Nose normal.    Eyes: EOM are normal. Pupils are equal, round, and reactive to light.   Neck: Neck supple. No masses or lesions palpated  Cardiovascular: Intact distal pulses.    Abdominal: Soft.   Neurological: Alert and oriented to person, place, and time.  No cranial nerve deficit.  Coordination normal. Normal muscle tone  Psychiatric: Normal mood and affect. Behavior is normal.      Back:       None Spasms bilaterally Paraspinal muscle spasms   None  Pain with flexion and extention   WNL   Range of motion    Neg  Straight leg raise     Motor   Right Right Left Left  Level Group   5  5  L2 Hip flexor (Psoas)   5  5  L3 Leg extension (Quads)   5  5  L4 Dorsiflexion & foot inversion (Tibialis Anterior)   5  5  L5 Great toe extension ( EHL)   5  5  S1 Foot eversion (Gastroc, PL & PB)     Sensation  NL Decreased (R/L/BL) Level Sensation    X  L2 Anterio-medial thigh   X  L3 Medial thigh around knee   X  L4 Medial foot   X  L5 Dorsum foot   X  S1 Lateral foot     Reflex  2+  Patellar tendon (L4)   2+  Achilles tendon (S1)   Wound VAC in place with good seal  The skin edges appear clean without any erythema noted  Incision continues to heal appropriately    I  reviewed all pertinent imaging regarding this case.  Assessment:     1. Psoas abscess    2. Status post lumbar laminectomy      Plan:     Psoas abscess  -     CT Chest Abdomen Pelvis W W/O Contrast (XPD); Future; Expected date: 08/07/2019  -     Creatinine, serum; Future; Expected date: 08/07/2019    Status post lumbar laminectomy  -     CT Chest Abdomen Pelvis W W/O Contrast (XPD); Future; Expected date: 08/07/2019  -     Creatinine, serum; Future; Expected date: 08/07/2019     Patient had ESR and CRP which were noted to be elevated  Will recommend CT scan chest abdomen pelvis with IV contrast  To make sure there is no additional are new abscesses noted  Will for this to ID once the CT scan is complete      Thank you for the referral   Please call with any questions    Stanislav Nicolas MD  Neurosurgery

## 2019-08-08 ENCOUNTER — TELEPHONE (OUTPATIENT)
Dept: NEUROSURGERY | Facility: CLINIC | Age: 74
End: 2019-08-08

## 2019-08-08 NOTE — TELEPHONE ENCOUNTER
11:04 AM  I spoke to Pam regarding patient's wound vac. Wound vac will stay in place until wound is significantly smaller . After this they can transition to wet-to-dry dressings.       NATE Long PA-C      ----- Message from Sherry Arias MA sent at 8/7/2019  3:51 PM CDT -----  Contact: Pam-[facility]-[phone]      ----- Message -----  From: Radha Morales  Sent: 8/7/2019   2:19 PM  To: Ethan YANES Staff    Would like to have orders for wound vac, please call back at [phone].  Md Trae

## 2019-08-12 ENCOUNTER — TELEPHONE (OUTPATIENT)
Dept: RADIOLOGY | Facility: HOSPITAL | Age: 74
End: 2019-08-12

## 2019-08-13 ENCOUNTER — HOSPITAL ENCOUNTER (OUTPATIENT)
Dept: RADIOLOGY | Facility: HOSPITAL | Age: 74
Discharge: HOME | End: 2019-08-13
Attending: PHYSICIAN ASSISTANT
Payer: MEDICARE

## 2019-08-13 DIAGNOSIS — K68.12 PSOAS ABSCESS: ICD-10-CM

## 2019-08-13 DIAGNOSIS — Z98.890 STATUS POST LUMBAR LAMINECTOMY: ICD-10-CM

## 2019-08-13 PROCEDURE — 25500020 PHARM REV CODE 255: Performed by: PHYSICIAN ASSISTANT

## 2019-08-13 PROCEDURE — 71260 CT THORAX DX C+: CPT | Mod: 26,,, | Performed by: RADIOLOGY

## 2019-08-13 PROCEDURE — 71260 CT CHEST ABDOMEN PELVIS WITH CONTRAST (XPD): ICD-10-PCS | Mod: 26,,, | Performed by: RADIOLOGY

## 2019-08-13 PROCEDURE — 74177 CT CHEST ABDOMEN PELVIS WITH CONTRAST (XPD): ICD-10-PCS | Mod: 26,,, | Performed by: RADIOLOGY

## 2019-08-13 PROCEDURE — 74177 CT ABD & PELVIS W/CONTRAST: CPT | Mod: TC

## 2019-08-13 PROCEDURE — 74177 CT ABD & PELVIS W/CONTRAST: CPT | Mod: 26,,, | Performed by: RADIOLOGY

## 2019-08-13 RX ADMIN — IOHEXOL 100 ML: 350 INJECTION, SOLUTION INTRAVENOUS at 02:08

## 2019-08-13 RX ADMIN — IOHEXOL 30 ML: 350 INJECTION, SOLUTION INTRAVENOUS at 02:08

## 2019-08-14 LAB
ACID FAST MOD KINY STN SPEC: NORMAL
MYCOBACTERIUM SPEC QL CULT: NORMAL

## 2019-08-15 ENCOUNTER — TELEPHONE (OUTPATIENT)
Dept: NEUROSURGERY | Facility: CLINIC | Age: 74
End: 2019-08-15

## 2019-08-15 NOTE — TELEPHONE ENCOUNTER
Phoned pt in regards to message below.    I informed pt and her  that the pt f/u appt is 8/21/19 with Dr. Nicolas to f/u with her CT scans.    Understanding was advised.      ----- Message from Vonda Almaguer sent at 8/15/2019  9:36 AM CDT -----  Contact: pt  The pt request a call concerning her CT results, no additional info given and can be reached at [phone] or [phone]///thxW

## 2019-08-21 ENCOUNTER — HOSPITAL ENCOUNTER (INPATIENT)
Facility: HOSPITAL | Age: 74
LOS: 8 days | Discharge: NURSING, REHAB OR LONG-TERM CARE | DRG: 463 | End: 2019-08-29
Attending: EMERGENCY MEDICINE | Admitting: INTERNAL MEDICINE
Payer: MEDICARE

## 2019-08-21 ENCOUNTER — OFFICE VISIT (OUTPATIENT)
Dept: NEUROSURGERY | Facility: CLINIC | Age: 74
End: 2019-08-21
Payer: MEDICARE

## 2019-08-21 VITALS
BODY MASS INDEX: 33.59 KG/M2 | HEIGHT: 66 IN | DIASTOLIC BLOOD PRESSURE: 57 MMHG | SYSTOLIC BLOOD PRESSURE: 99 MMHG | WEIGHT: 209 LBS | HEART RATE: 101 BPM

## 2019-08-21 DIAGNOSIS — B95.5 STREPTOCOCCAL BACTEREMIA: ICD-10-CM

## 2019-08-21 DIAGNOSIS — T14.8XXA DRAINAGE FROM WOUND: ICD-10-CM

## 2019-08-21 DIAGNOSIS — M46.26 ACUTE OSTEOMYELITIS OF LUMBAR SPINE: ICD-10-CM

## 2019-08-21 DIAGNOSIS — T14.8XXA DEEP TISSUE INJURY: ICD-10-CM

## 2019-08-21 DIAGNOSIS — Z98.1 STATUS POST LUMBAR SPINAL FUSION: ICD-10-CM

## 2019-08-21 DIAGNOSIS — K68.12 PSOAS ABSCESS: Primary | ICD-10-CM

## 2019-08-21 DIAGNOSIS — M46.26 OSTEOMYELITIS OF LUMBAR SPINE: ICD-10-CM

## 2019-08-21 DIAGNOSIS — R78.81 STREPTOCOCCAL BACTEREMIA: ICD-10-CM

## 2019-08-21 DIAGNOSIS — E66.01 MORBID OBESITY WITH BMI OF 40.0-44.9, ADULT: Chronic | ICD-10-CM

## 2019-08-21 DIAGNOSIS — T14.8XXA WOUND DRAINAGE: ICD-10-CM

## 2019-08-21 DIAGNOSIS — M54.50 LOW BACK PAIN, NON-SPECIFIC: ICD-10-CM

## 2019-08-21 DIAGNOSIS — Z98.1 STATUS POST LUMBAR SPINAL FUSION: Primary | ICD-10-CM

## 2019-08-21 DIAGNOSIS — G06.1 ABSCESS IN EPIDURAL SPACE OF LUMBAR SPINE: ICD-10-CM

## 2019-08-21 DIAGNOSIS — M86.9 OSTEOMYELITIS, UNSPECIFIED SITE, UNSPECIFIED TYPE: ICD-10-CM

## 2019-08-21 LAB
ABO + RH BLD: NORMAL
ANION GAP SERPL CALC-SCNC: 12 MMOL/L (ref 8–16)
APTT BLDCRRT: 30.5 SEC (ref 21–32)
BACTERIA #/AREA URNS HPF: ABNORMAL /HPF
BASOPHILS # BLD AUTO: 0.01 K/UL (ref 0–0.2)
BASOPHILS NFR BLD: 0.1 % (ref 0–1.9)
BILIRUB UR QL STRIP: NEGATIVE
BLD GP AB SCN CELLS X3 SERPL QL: NORMAL
BUN SERPL-MCNC: 31 MG/DL (ref 8–23)
CALCIUM SERPL-MCNC: 10.6 MG/DL (ref 8.7–10.5)
CHLORIDE SERPL-SCNC: 97 MMOL/L (ref 95–110)
CLARITY UR: CLEAR
CO2 SERPL-SCNC: 26 MMOL/L (ref 23–29)
COLOR UR: YELLOW
CREAT SERPL-MCNC: 0.9 MG/DL (ref 0.5–1.4)
CRP SERPL-MCNC: 2.2 MG/L (ref 0–8.2)
DIFFERENTIAL METHOD: ABNORMAL
EOSINOPHIL # BLD AUTO: 0.1 K/UL (ref 0–0.5)
EOSINOPHIL NFR BLD: 0.9 % (ref 0–8)
ERYTHROCYTE [DISTWIDTH] IN BLOOD BY AUTOMATED COUNT: 14.6 % (ref 11.5–14.5)
ERYTHROCYTE [SEDIMENTATION RATE] IN BLOOD BY WESTERGREN METHOD: 22 MM/HR (ref 0–20)
EST. GFR  (AFRICAN AMERICAN): >60 ML/MIN/1.73 M^2
EST. GFR  (NON AFRICAN AMERICAN): >60 ML/MIN/1.73 M^2
GLUCOSE SERPL-MCNC: 103 MG/DL (ref 70–110)
GLUCOSE UR QL STRIP: NEGATIVE
HCT VFR BLD AUTO: 37.7 % (ref 37–48.5)
HGB BLD-MCNC: 12.2 G/DL (ref 12–16)
HGB UR QL STRIP: NEGATIVE
HYALINE CASTS #/AREA URNS LPF: 5 /LPF
INR PPP: 1 (ref 0.8–1.2)
KETONES UR QL STRIP: NEGATIVE
LEUKOCYTE ESTERASE UR QL STRIP: ABNORMAL
LYMPHOCYTES # BLD AUTO: 2.4 K/UL (ref 1–4.8)
LYMPHOCYTES NFR BLD: 20.5 % (ref 18–48)
MCH RBC QN AUTO: 27.9 PG (ref 27–31)
MCHC RBC AUTO-ENTMCNC: 32.4 G/DL (ref 32–36)
MCV RBC AUTO: 86 FL (ref 82–98)
MICROSCOPIC COMMENT: ABNORMAL
MONOCYTES # BLD AUTO: 0.8 K/UL (ref 0.3–1)
MONOCYTES NFR BLD: 7.1 % (ref 4–15)
NEUTROPHILS # BLD AUTO: 8.3 K/UL (ref 1.8–7.7)
NEUTROPHILS NFR BLD: 71.4 % (ref 38–73)
NITRITE UR QL STRIP: NEGATIVE
PH UR STRIP: 6 [PH] (ref 5–8)
PLATELET # BLD AUTO: 303 K/UL (ref 150–350)
PMV BLD AUTO: 9 FL (ref 9.2–12.9)
POTASSIUM SERPL-SCNC: 4.5 MMOL/L (ref 3.5–5.1)
PROT UR QL STRIP: NEGATIVE
PROTHROMBIN TIME: 10.7 SEC (ref 9–12.5)
RBC # BLD AUTO: 4.38 M/UL (ref 4–5.4)
RBC #/AREA URNS HPF: 1 /HPF (ref 0–4)
SODIUM SERPL-SCNC: 135 MMOL/L (ref 136–145)
SP GR UR STRIP: 1.02 (ref 1–1.03)
URN SPEC COLLECT METH UR: ABNORMAL
UROBILINOGEN UR STRIP-ACNC: NEGATIVE EU/DL
WBC # BLD AUTO: 11.67 K/UL (ref 3.9–12.7)
WBC #/AREA URNS HPF: 15 /HPF (ref 0–5)
YEAST URNS QL MICRO: ABNORMAL

## 2019-08-21 PROCEDURE — 11000001 HC ACUTE MED/SURG PRIVATE ROOM

## 2019-08-21 PROCEDURE — 96366 THER/PROPH/DIAG IV INF ADDON: CPT

## 2019-08-21 PROCEDURE — 96375 TX/PRO/DX INJ NEW DRUG ADDON: CPT

## 2019-08-21 PROCEDURE — 87040 BLOOD CULTURE FOR BACTERIA: CPT

## 2019-08-21 PROCEDURE — 99214 OFFICE O/P EST MOD 30 MIN: CPT | Mod: 24,S$GLB,, | Performed by: NEUROLOGICAL SURGERY

## 2019-08-21 PROCEDURE — 85730 THROMBOPLASTIN TIME PARTIAL: CPT

## 2019-08-21 PROCEDURE — 99999 PR PBB SHADOW E&M-EST. PATIENT-LVL IV: ICD-10-PCS | Mod: PBBFAC,,, | Performed by: NEUROLOGICAL SURGERY

## 2019-08-21 PROCEDURE — 85651 RBC SED RATE NONAUTOMATED: CPT

## 2019-08-21 PROCEDURE — 80048 BASIC METABOLIC PNL TOTAL CA: CPT

## 2019-08-21 PROCEDURE — 85610 PROTHROMBIN TIME: CPT

## 2019-08-21 PROCEDURE — 3074F SYST BP LT 130 MM HG: CPT | Mod: CPTII,S$GLB,, | Performed by: NEUROLOGICAL SURGERY

## 2019-08-21 PROCEDURE — 1101F PR PT FALLS ASSESS DOC 0-1 FALLS W/OUT INJ PAST YR: ICD-10-PCS | Mod: CPTII,S$GLB,, | Performed by: NEUROLOGICAL SURGERY

## 2019-08-21 PROCEDURE — 81000 URINALYSIS NONAUTO W/SCOPE: CPT

## 2019-08-21 PROCEDURE — 99285 EMERGENCY DEPT VISIT HI MDM: CPT | Mod: 25

## 2019-08-21 PROCEDURE — 86140 C-REACTIVE PROTEIN: CPT

## 2019-08-21 PROCEDURE — 85025 COMPLETE CBC W/AUTO DIFF WBC: CPT

## 2019-08-21 PROCEDURE — 96365 THER/PROPH/DIAG IV INF INIT: CPT

## 2019-08-21 PROCEDURE — 3078F DIAST BP <80 MM HG: CPT | Mod: CPTII,S$GLB,, | Performed by: NEUROLOGICAL SURGERY

## 2019-08-21 PROCEDURE — 63600175 PHARM REV CODE 636 W HCPCS: Performed by: EMERGENCY MEDICINE

## 2019-08-21 PROCEDURE — 3074F PR MOST RECENT SYSTOLIC BLOOD PRESSURE < 130 MM HG: ICD-10-PCS | Mod: CPTII,S$GLB,, | Performed by: NEUROLOGICAL SURGERY

## 2019-08-21 PROCEDURE — G0378 HOSPITAL OBSERVATION PER HR: HCPCS

## 2019-08-21 PROCEDURE — 87086 URINE CULTURE/COLONY COUNT: CPT

## 2019-08-21 PROCEDURE — 96367 TX/PROPH/DG ADDL SEQ IV INF: CPT

## 2019-08-21 PROCEDURE — 86850 RBC ANTIBODY SCREEN: CPT

## 2019-08-21 PROCEDURE — 3078F PR MOST RECENT DIASTOLIC BLOOD PRESSURE < 80 MM HG: ICD-10-PCS | Mod: CPTII,S$GLB,, | Performed by: NEUROLOGICAL SURGERY

## 2019-08-21 PROCEDURE — 1101F PT FALLS ASSESS-DOCD LE1/YR: CPT | Mod: CPTII,S$GLB,, | Performed by: NEUROLOGICAL SURGERY

## 2019-08-21 PROCEDURE — 99214 PR OFFICE/OUTPT VISIT, EST, LEVL IV, 30-39 MIN: ICD-10-PCS | Mod: 24,S$GLB,, | Performed by: NEUROLOGICAL SURGERY

## 2019-08-21 PROCEDURE — 99999 PR PBB SHADOW E&M-EST. PATIENT-LVL IV: CPT | Mod: PBBFAC,,, | Performed by: NEUROLOGICAL SURGERY

## 2019-08-21 PROCEDURE — 96376 TX/PRO/DX INJ SAME DRUG ADON: CPT

## 2019-08-21 RX ORDER — MORPHINE SULFATE 4 MG/ML
4 INJECTION, SOLUTION INTRAMUSCULAR; INTRAVENOUS
Status: COMPLETED | OUTPATIENT
Start: 2019-08-21 | End: 2019-08-21

## 2019-08-21 RX ORDER — ONDANSETRON 2 MG/ML
4 INJECTION INTRAMUSCULAR; INTRAVENOUS
Status: COMPLETED | OUTPATIENT
Start: 2019-08-21 | End: 2019-08-21

## 2019-08-21 RX ORDER — CEFEPIME HYDROCHLORIDE 1 G/50ML
1 INJECTION, SOLUTION INTRAVENOUS
Status: COMPLETED | OUTPATIENT
Start: 2019-08-21 | End: 2019-08-21

## 2019-08-21 RX ADMIN — SODIUM CHLORIDE, SODIUM LACTATE, POTASSIUM CHLORIDE, AND CALCIUM CHLORIDE 2565 ML: .6; .31; .03; .02 INJECTION, SOLUTION INTRAVENOUS at 08:08

## 2019-08-21 RX ADMIN — ONDANSETRON 4 MG: 2 INJECTION INTRAMUSCULAR; INTRAVENOUS at 09:08

## 2019-08-21 RX ADMIN — CEFEPIME HYDROCHLORIDE 1 G: 1 INJECTION, SOLUTION INTRAVENOUS at 08:08

## 2019-08-21 RX ADMIN — MORPHINE SULFATE 4 MG: 4 INJECTION, SOLUTION INTRAMUSCULAR; INTRAVENOUS at 09:08

## 2019-08-21 RX ADMIN — MORPHINE SULFATE 4 MG: 4 INJECTION, SOLUTION INTRAMUSCULAR; INTRAVENOUS at 11:08

## 2019-08-21 RX ADMIN — VANCOMYCIN HYDROCHLORIDE 2500 MG: 1 INJECTION, POWDER, LYOPHILIZED, FOR SOLUTION INTRAVENOUS at 10:08

## 2019-08-21 RX ADMIN — ONDANSETRON 4 MG: 2 INJECTION INTRAMUSCULAR; INTRAVENOUS at 11:08

## 2019-08-21 NOTE — PROGRESS NOTES
Subjective:      Patient ID: Ross Isbell is a 73 y.o. female.    Chief Complaint: Lumbar Spine Pain (L-Spine) and Post-op Evaluation    Patient here today for follow-up with a CT scan of the lumbar abdomen and pelvis     To briefly summarize she is a 73-year-old female who was found to have osteomyelitis as well as diskitis of the lumbar spine.  She had axial back pain with destruction of the disc space and was taken to surgery on 05/24 for posterior washout and debridement with placement of instrumentation.  Microbiology was obtained which grew back Streptococcus.  She was placed on the appropriate antibiotics for this.  She had a posterior dehiscence of her incision and was taken back to the OR for wound VAC placement on 6/11.  She was transferred to a nursing home for after a PICC line was placed and she was on IV antibiotics for approximately 2 months.  She recently finished her antibiotics several weeks ago  She came in for follow-up 1 week ago complaining of acute onset of worsening of the lower back pain going into the groin bilaterally.    Prior to this she was making progress with physical therapy in the nursing home  For the past week the pain has been so great she has been unable to ambulate secondary to pain  This CT scan done shows there is a lucency around the superior 4 screws with backing out of the hardware and noted to be pressing on the thecal sac   For the past week the patient has been found to the wheelchair for mobility purposes  Prior to this she was ambulating with several steps with a rolling walker        Review of Systems   Constitutional: Negative for activity change, appetite change and chills.   HENT: Negative for hearing loss, sore throat and tinnitus.    Eyes: Negative for pain, discharge and itching.   Cardiovascular: Negative for chest pain.   Gastrointestinal: Negative for abdominal pain.   Endocrine: Negative for cold intolerance and heat intolerance.   Genitourinary:  Negative for difficulty urinating and dysuria.   Musculoskeletal: Positive for back pain and gait problem.   Allergic/Immunologic: Negative for environmental allergies.   Neurological: Positive for weakness. Negative for dizziness, tremors, light-headedness and headaches.   Hematological: Negative for adenopathy.   Psychiatric/Behavioral: Negative for agitation, behavioral problems and confusion.     Objective:       Nursing note and vitals reviewed  Gen:Oriented to person, place, and time.             Appears stated age   Skin: no rashes or lesions identified   Head:Normocephalic and atraumatic.  Nose: Nose normal.    Eyes: EOM are normal. Pupils are equal, round, and reactive to light.   Neck: Neck supple. No masses or lesions palpated  Cardiovascular: Intact distal pulses.    Abdominal: Soft.   Neurological: Alert and oriented to person, place, and time.  No cranial nerve deficit.  Coordination normal. Normal muscle tone  Psychiatric: Normal mood and affect. Behavior is normal.      Back:        Spasms bilaterally Paraspinal muscle spasms    Pain with flexion or extension Pain with flexion and extention    Unable to assess Range of motion     Unable to assess Straight leg raise     Motor   Right Right Left Left  Level Group   5 5 5 5 L2 Hip flexor (Psoas)   5 5 5 5 L3 Leg extension (Quads)   5 5 5 5 L4 Dorsiflexion & foot inversion (Tibialis Anterior)   5 5 5 5 L5 Great toe extension ( EHL)   5 5 5 5 S1 Foot eversion (Gastroc, PL & PB)   Grossly the patient remains neurologically intact     Posterior wound VAC is in place the edges are clean and dry without any erythema or drainage around the incision site    Sensation  NL Decreased (R/L/BL) Level Sensation    X  L2 Anterio-medial thigh   X  L3 Medial thigh around knee   X  L4 Medial foot   X  L5 Dorsum foot   X  S1 Lateral foot     Reflex  2+  Patellar tendon (L4)   2+  Achilles tendon (S1)         I  reviewed all pertinent imaging regarding this  case.      Assessment:     1. Psoas abscess    2. Osteomyelitis of lumbar spine    3. Wound drainage    4. Status post lumbar spinal fusion      Plan:     Psoas abscess    Osteomyelitis of lumbar spine    Wound drainage    Status post lumbar spinal fusion    Patient with history of osteomyelitis status post lumbar fusion and disc will washout and then secondary wound VAC placement comes in with failure of instrumentation and elevated infectious markers.    She will need removal of the instrumentation with placement of antibiotic beads to the incision cavity along with a wound VAC placement.  This will need to be in place for approximately 7-10 days.  After this time she would need to taken back for possible stabilization versus removal of the antibiotic beads.        CBC, BMP, PT, PTT, INR, ESR, and CRP  Blood cultures x2.  Type and screen  UTI  Likely take for wound VAC removal and removal instrumentation tomorrow.  NPO after midnight    Thank you for the referral   Please call with any questions    Stanislav Nicolas MD  Neurosurgery

## 2019-08-22 PROBLEM — Z98.1 STATUS POST LUMBAR SPINAL FUSION: Status: ACTIVE | Noted: 2019-05-25

## 2019-08-22 PROBLEM — R82.90 ABNORMAL URINALYSIS: Status: ACTIVE | Noted: 2019-08-22

## 2019-08-22 LAB
ALBUMIN SERPL BCP-MCNC: 3.6 G/DL (ref 3.5–5.2)
ALP SERPL-CCNC: 71 U/L (ref 55–135)
ALT SERPL W/O P-5'-P-CCNC: <5 U/L (ref 10–44)
ANION GAP SERPL CALC-SCNC: 9 MMOL/L (ref 8–16)
AST SERPL-CCNC: 22 U/L (ref 10–40)
BASOPHILS # BLD AUTO: 0.01 K/UL (ref 0–0.2)
BASOPHILS NFR BLD: 0.1 % (ref 0–1.9)
BILIRUB SERPL-MCNC: 0.5 MG/DL (ref 0.1–1)
BUN SERPL-MCNC: 19 MG/DL (ref 8–23)
CALCIUM SERPL-MCNC: 9.9 MG/DL (ref 8.7–10.5)
CHLORIDE SERPL-SCNC: 99 MMOL/L (ref 95–110)
CO2 SERPL-SCNC: 27 MMOL/L (ref 23–29)
CREAT SERPL-MCNC: 0.7 MG/DL (ref 0.5–1.4)
DIFFERENTIAL METHOD: ABNORMAL
EOSINOPHIL # BLD AUTO: 0.2 K/UL (ref 0–0.5)
EOSINOPHIL NFR BLD: 1.9 % (ref 0–8)
ERYTHROCYTE [DISTWIDTH] IN BLOOD BY AUTOMATED COUNT: 15 % (ref 11.5–14.5)
EST. GFR  (AFRICAN AMERICAN): >60 ML/MIN/1.73 M^2
EST. GFR  (NON AFRICAN AMERICAN): >60 ML/MIN/1.73 M^2
GLUCOSE SERPL-MCNC: 100 MG/DL (ref 70–110)
HCT VFR BLD AUTO: 35.6 % (ref 37–48.5)
HGB BLD-MCNC: 11.5 G/DL (ref 12–16)
LYMPHOCYTES # BLD AUTO: 1.8 K/UL (ref 1–4.8)
LYMPHOCYTES NFR BLD: 20.4 % (ref 18–48)
MCH RBC QN AUTO: 27.7 PG (ref 27–31)
MCHC RBC AUTO-ENTMCNC: 32.3 G/DL (ref 32–36)
MCV RBC AUTO: 86 FL (ref 82–98)
MONOCYTES # BLD AUTO: 0.7 K/UL (ref 0.3–1)
MONOCYTES NFR BLD: 7.4 % (ref 4–15)
NEUTROPHILS # BLD AUTO: 6.3 K/UL (ref 1.8–7.7)
NEUTROPHILS NFR BLD: 70.4 % (ref 38–73)
PLATELET # BLD AUTO: 246 K/UL (ref 150–350)
PMV BLD AUTO: 8.8 FL (ref 9.2–12.9)
POCT GLUCOSE: 111 MG/DL (ref 70–110)
POCT GLUCOSE: 115 MG/DL (ref 70–110)
POCT GLUCOSE: 119 MG/DL (ref 70–110)
POTASSIUM SERPL-SCNC: 4.5 MMOL/L (ref 3.5–5.1)
PROT SERPL-MCNC: 6.6 G/DL (ref 6–8.4)
RBC # BLD AUTO: 4.15 M/UL (ref 4–5.4)
SODIUM SERPL-SCNC: 135 MMOL/L (ref 136–145)
WBC # BLD AUTO: 8.91 K/UL (ref 3.9–12.7)

## 2019-08-22 PROCEDURE — 87070 CULTURE OTHR SPECIMN AEROBIC: CPT

## 2019-08-22 PROCEDURE — 63600175 PHARM REV CODE 636 W HCPCS: Performed by: EMERGENCY MEDICINE

## 2019-08-22 PROCEDURE — G0378 HOSPITAL OBSERVATION PER HR: HCPCS

## 2019-08-22 PROCEDURE — 25500020 PHARM REV CODE 255: Performed by: INTERNAL MEDICINE

## 2019-08-22 PROCEDURE — A9585 GADOBUTROL INJECTION: HCPCS | Performed by: INTERNAL MEDICINE

## 2019-08-22 PROCEDURE — 25000003 PHARM REV CODE 250: Performed by: NURSE PRACTITIONER

## 2019-08-22 PROCEDURE — 11000001 HC ACUTE MED/SURG PRIVATE ROOM

## 2019-08-22 PROCEDURE — 80053 COMPREHEN METABOLIC PANEL: CPT

## 2019-08-22 PROCEDURE — 85025 COMPLETE CBC W/AUTO DIFF WBC: CPT

## 2019-08-22 PROCEDURE — 96376 TX/PRO/DX INJ SAME DRUG ADON: CPT

## 2019-08-22 PROCEDURE — 96366 THER/PROPH/DIAG IV INF ADDON: CPT

## 2019-08-22 PROCEDURE — 63600175 PHARM REV CODE 636 W HCPCS: Performed by: NURSE PRACTITIONER

## 2019-08-22 PROCEDURE — 25000003 PHARM REV CODE 250: Performed by: PHYSICIAN ASSISTANT

## 2019-08-22 RX ORDER — PRAVASTATIN SODIUM 20 MG/1
40 TABLET ORAL DAILY
Status: DISCONTINUED | OUTPATIENT
Start: 2019-08-22 | End: 2019-08-29 | Stop reason: HOSPADM

## 2019-08-22 RX ORDER — OXYCODONE AND ACETAMINOPHEN 10; 325 MG/1; MG/1
1 TABLET ORAL EVERY 4 HOURS PRN
Status: DISCONTINUED | OUTPATIENT
Start: 2019-08-22 | End: 2019-08-29 | Stop reason: HOSPADM

## 2019-08-22 RX ORDER — MORPHINE SULFATE 2 MG/ML
2 INJECTION, SOLUTION INTRAMUSCULAR; INTRAVENOUS ONCE
Status: COMPLETED | OUTPATIENT
Start: 2019-08-22 | End: 2019-08-22

## 2019-08-22 RX ORDER — CARBIDOPA AND LEVODOPA 25; 250 MG/1; MG/1
1 TABLET ORAL
Status: DISCONTINUED | OUTPATIENT
Start: 2019-08-22 | End: 2019-08-29 | Stop reason: HOSPADM

## 2019-08-22 RX ORDER — ISOSORBIDE MONONITRATE 30 MG/1
120 TABLET, EXTENDED RELEASE ORAL DAILY
Status: DISCONTINUED | OUTPATIENT
Start: 2019-08-22 | End: 2019-08-29 | Stop reason: HOSPADM

## 2019-08-22 RX ORDER — LEVOTHYROXINE SODIUM 125 UG/1
125 TABLET ORAL
Status: DISCONTINUED | OUTPATIENT
Start: 2019-08-22 | End: 2019-08-29 | Stop reason: HOSPADM

## 2019-08-22 RX ORDER — GADOBUTROL 604.72 MG/ML
10 INJECTION INTRAVENOUS
Status: COMPLETED | OUTPATIENT
Start: 2019-08-22 | End: 2019-08-22

## 2019-08-22 RX ORDER — METOPROLOL SUCCINATE 25 MG/1
25 TABLET, EXTENDED RELEASE ORAL DAILY
Status: DISCONTINUED | OUTPATIENT
Start: 2019-08-22 | End: 2019-08-29 | Stop reason: HOSPADM

## 2019-08-22 RX ORDER — LOSARTAN POTASSIUM 50 MG/1
50 TABLET ORAL DAILY
Status: DISCONTINUED | OUTPATIENT
Start: 2019-08-22 | End: 2019-08-29 | Stop reason: HOSPADM

## 2019-08-22 RX ORDER — DIAZEPAM 5 MG/1
5 TABLET ORAL ONCE
Status: COMPLETED | OUTPATIENT
Start: 2019-08-22 | End: 2019-08-22

## 2019-08-22 RX ORDER — PANTOPRAZOLE SODIUM 40 MG/1
40 TABLET, DELAYED RELEASE ORAL DAILY
Status: DISCONTINUED | OUTPATIENT
Start: 2019-08-22 | End: 2019-08-29 | Stop reason: HOSPADM

## 2019-08-22 RX ORDER — INSULIN ASPART 100 [IU]/ML
0-5 INJECTION, SOLUTION INTRAVENOUS; SUBCUTANEOUS EVERY 6 HOURS PRN
Status: DISCONTINUED | OUTPATIENT
Start: 2019-08-22 | End: 2019-08-22

## 2019-08-22 RX ORDER — INSULIN ASPART 100 [IU]/ML
0-5 INJECTION, SOLUTION INTRAVENOUS; SUBCUTANEOUS
Status: DISCONTINUED | OUTPATIENT
Start: 2019-08-22 | End: 2019-08-29 | Stop reason: HOSPADM

## 2019-08-22 RX ORDER — INSULIN ASPART 100 [IU]/ML
0-5 INJECTION, SOLUTION INTRAVENOUS; SUBCUTANEOUS
Status: DISCONTINUED | OUTPATIENT
Start: 2019-08-22 | End: 2019-08-22

## 2019-08-22 RX ORDER — ASPIRIN 81 MG/1
81 TABLET ORAL DAILY
Status: DISCONTINUED | OUTPATIENT
Start: 2019-08-22 | End: 2019-08-23

## 2019-08-22 RX ORDER — GLUCAGON 1 MG
1 KIT INJECTION
Status: DISCONTINUED | OUTPATIENT
Start: 2019-08-22 | End: 2019-08-29 | Stop reason: HOSPADM

## 2019-08-22 RX ADMIN — CARBIDOPA AND LEVODOPA 1 TABLET: 25; 250 TABLET ORAL at 06:08

## 2019-08-22 RX ADMIN — GADOBUTROL 8 ML: 604.72 INJECTION INTRAVENOUS at 03:08

## 2019-08-22 RX ADMIN — OXYCODONE HYDROCHLORIDE AND ACETAMINOPHEN 1 TABLET: 10; 325 TABLET ORAL at 05:08

## 2019-08-22 RX ADMIN — VANCOMYCIN HYDROCHLORIDE 2000 MG: 100 INJECTION, POWDER, LYOPHILIZED, FOR SOLUTION INTRAVENOUS at 09:08

## 2019-08-22 RX ADMIN — CARBIDOPA AND LEVODOPA 1 TABLET: 25; 250 TABLET ORAL at 09:08

## 2019-08-22 RX ADMIN — OXYCODONE HYDROCHLORIDE AND ACETAMINOPHEN 1 TABLET: 10; 325 TABLET ORAL at 03:08

## 2019-08-22 RX ADMIN — PANTOPRAZOLE SODIUM 40 MG: 40 TABLET, DELAYED RELEASE ORAL at 09:08

## 2019-08-22 RX ADMIN — OXYCODONE HYDROCHLORIDE AND ACETAMINOPHEN 1 TABLET: 10; 325 TABLET ORAL at 09:08

## 2019-08-22 RX ADMIN — PRAVASTATIN SODIUM 40 MG: 20 TABLET ORAL at 09:08

## 2019-08-22 RX ADMIN — CARBIDOPA AND LEVODOPA 1 TABLET: 25; 250 TABLET ORAL at 01:08

## 2019-08-22 RX ADMIN — MORPHINE SULFATE 2 MG: 2 INJECTION, SOLUTION INTRAMUSCULAR; INTRAVENOUS at 10:08

## 2019-08-22 RX ADMIN — DIAZEPAM 5 MG: 5 TABLET ORAL at 01:08

## 2019-08-22 RX ADMIN — ASPIRIN 81 MG: 81 TABLET, COATED ORAL at 09:08

## 2019-08-22 RX ADMIN — CARBIDOPA AND LEVODOPA 1 TABLET: 25; 250 TABLET ORAL at 05:08

## 2019-08-22 RX ADMIN — LEVOTHYROXINE SODIUM 125 MCG: 125 TABLET ORAL at 06:08

## 2019-08-22 NOTE — PROGRESS NOTES
[facility] -   Neurosurgery  Progress Note    Subjective:       History of Present Illness:   To briefly summarize she is a 73-year-old female who was found to have osteomyelitis as well as diskitis of the lumbar spine.  She had axial back pain with destruction of the disc space and was taken to surgery on 05/24 for posterior washout and debridement with placement of instrumentation.  Microbiology was obtained which grew back Streptococcus.  She was placed on the appropriate antibiotics for this.  She had a posterior dehiscence of her incision and was taken back to the OR for wound VAC placement on 6/11.  She was transferred to a nursing home for after a PICC line was placed and she was on IV antibiotics for approximately 2 months.  She recently finished her antibiotics several weeks ago  She came in for follow-up 1 week ago complaining of acute onset of worsening of the lower back pain going into the groin bilaterally.    Prior to this she was making progress with physical therapy in the nursing home  For the past week the pain has been so great she has been unable to ambulate secondary to pain  This CT scan done shows there is a lucency around the superior 4 screws with backing out of the hardware and noted to be pressing on the thecal sac   For the past week the patient has been found to the wheelchair for mobility purposes  Prior to this she was ambulating with several steps with a rolling walker        Patient seen today in ED 03 lying in bed  About to get transferred to hospital bed  No changes since yesterday  Patient updated on plan- MRI and awaiting consult by Dr. Grady            Post-Op Info:  * No surgery found *          Medications:  Continuous Infusions:  Scheduled Meds:   aspirin  81 mg Oral Daily    carbidopa-levodopa  mg  1 tablet Oral 5x Daily    diazePAM  5 mg Oral Once    isosorbide mononitrate  120 mg Oral Daily    levothyroxine  125 mcg Oral Before breakfast     losartan  50 mg Oral Daily    metoprolol succinate  25 mg Oral Daily    pantoprazole  40 mg Oral Daily    pravastatin  40 mg Oral Daily    vancomycin (VANCOCIN) IVPB  2,000 mg Intravenous Q24H     PRN Meds:Dextrose 10% Bolus, glucagon (human recombinant), insulin aspart U-100, oxyCODONE-acetaminophen     Review of Systems     Constitutional: Negative for activity change, appetite change and chills.   HENT: Negative for hearing loss, sore throat and tinnitus.    Eyes: Negative for pain, discharge and itching.   Cardiovascular: Negative for chest pain.   Gastrointestinal: Negative for abdominal pain.   Endocrine: Negative for cold intolerance and heat intolerance.   Genitourinary: Negative for difficulty urinating and dysuria.   Musculoskeletal: Positive for back pain and gait problem.   Allergic/Immunologic: Negative for environmental allergies.   Neurological: Positive for weakness. Negative for dizziness, tremors, light-headedness and headaches.   Hematological: Negative for adenopathy.   Psychiatric/Behavioral: Negative for agitation, behavioral problems and confusion.   Objective:     Weight: 85.5 kg (188 lb 8 oz)  Body mass index is 30.42 kg/m².  Vital Signs (Most Recent):  Temp: 97.4 °F (36.3 °C) (08/21/19 1834)  Pulse: 84 (08/22/19 1201)  Resp: 20 (08/22/19 1201)  BP: (!) 115/54 (08/22/19 1201)  SpO2: 95 % (08/22/19 1201) Vital Signs (24h Range):  Temp:  [97.4 °F (36.3 °C)] 97.4 °F (36.3 °C)  Pulse:  [] 84  Resp:  [16-24] 20  SpO2:  [93 %-96 %] 95 %  BP: ()/(51-89) 115/54                          Urethral Catheter 08/21/19 2153 Latex 16 Fr. (Active)       Neurosurgery Physical Exam     Nursing note and vitals reviewed  Gen:Oriented to person, place, and time.             Appears stated age   Skin: no rashes or lesions identified   Head:Normocephalic and atraumatic.  Nose: Nose normal.    Eyes: EOM are normal. Pupils are equal, round, and reactive to light.   Neck: Neck supple. No masses or  lesions palpated  Cardiovascular: Intact distal pulses.    Abdominal: Soft.   Neurological: Alert and oriented to person, place, and time.  No cranial nerve deficit.  Coordination normal. Normal muscle tone  Psychiatric: Normal mood and affect. Behavior is normal.        Back:            Spasms bilaterally Paraspinal muscle spasms     Pain with flexion or extension Pain with flexion and extention     Unable to assess Range of motion      Unable to assess Straight leg raise      Motor   Right Right Left Left  Level Group   5 5 5 5 L2 Hip flexor (Psoas)   5 5 5 5 L3 Leg extension (Quads)   5 5 5 5 L4 Dorsiflexion & foot inversion (Tibialis Anterior)   5 5 5 5 L5 Great toe extension ( EHL)   5 5 5 5 S1 Foot eversion (Gastroc, PL & PB)   Grossly the patient remains neurologically intact     Carlson catheter in place       Sensation  NL Decreased (R/L/BL) Level Sensation    X   L2 Anterio-medial thigh   X   L3 Medial thigh around knee   X   L4 Medial foot   X   L5 Dorsum foot   X   S1 Lateral foot      Reflex  2+   Patellar tendon (L4)   2+   Achilles tendon (S1)         Significant Labs:  Recent Labs   Lab 08/21/19 2028 08/22/19  0926    100   * 135*   K 4.5 4.5   CL 97 99   CO2 26 27   BUN 31* 19   CREATININE 0.9 0.7   CALCIUM 10.6* 9.9     Recent Labs   Lab 08/21/19 2028 08/22/19  0926   WBC 11.67 8.91   HGB 12.2 11.5*   HCT 37.7 35.6*    246     Recent Labs   Lab 08/21/19 2028   INR 1.0   APTT 30.5     Microbiology Results (last 7 days)     Procedure Component Value Units Date/Time    Blood culture #1 **CANNOT BE ORDERED STAT** [[id number]] Collected:  08/21/19 2020    Order Status:  Completed Specimen:  Blood from Peripheral, Antecubital, Left Updated:  08/22/19 0915     Blood Culture, Routine No Growth to date    Blood culture #2 **CANNOT BE ORDERED STAT** [[id number]] Collected:  08/21/19 2020    Order Status:  Completed Specimen:  Blood from Line, PICC Right Cephalic Updated:  08/22/19 0915      Blood Culture, Routine No Growth to date    Urine culture [[id number]] Collected:  08/21/19 2200    Order Status:  No result Specimen:  Urine Updated:  08/21/19 2250        Cardiac markers: No results for input(s): CKMB, CPKMB, TROPONINT, TROPONINI, MYOGLOBIN in the last 48 hours.  CMP:   Recent Labs   Lab 08/21/19 2028 08/22/19 0926    100   CALCIUM 10.6* 9.9   ALBUMIN  --  3.6   PROT  --  6.6   * 135*   K 4.5 4.5   CO2 26 27   CL 97 99   BUN 31* 19   CREATININE 0.9 0.7   ALKPHOS  --  71   ALT  --  <5*   AST  --  22   BILITOT  --  0.5     Recent Lab Results       08/22/19 0926 08/22/19 0722 08/21/19 2200 08/21/19 2121 08/21/19 2028        Albumin 3.6             Alkaline Phosphatase 71             ALT <5             Anion Gap 9       12     Appearance, UA     Clear         aPTT         30.5  Comment:  aPTT therapeutic range = 39-69 seconds     AST 22             Bacteria, UA     Moderate         Baso # 0.01       0.01     Basophil% 0.1       0.1     Bilirubin (UA)     Negative         BILIRUBIN TOTAL 0.5  Comment:  For infants and newborns, interpretation of results should be based  on gestational age, weight and in agreement with clinical  observations.  Premature Infant recommended reference ranges:  Up to 24 hours.............<8.0 mg/dL  Up to 48 hours............<12.0 mg/dL  3-5 days..................<15.0 mg/dL  6-29 days.................<15.0 mg/dL               Blood Culture, Routine               BUN, Bld 19       31     Calcium 9.9       10.6     Chloride 99       97     CO2 27       26     Color, UA     Yellow         Creatinine 0.7       0.9     CRP         2.2     Differential Method Automated       Automated     eGFR if  >60       >60     eGFR if non  >60  Comment:  Calculation used to obtain the estimated glomerular filtration  rate (eGFR) is the CKD-EPI equation.          >60  Comment:  Calculation used to obtain the estimated glomerular  filtration  rate (eGFR) is the CKD-EPI equation.        Eos # 0.2       0.1     Eosinophil% 1.9       0.9     Glucose 100       103     Glucose, UA     Negative         Gran # (ANC) 6.3       8.3     Gran% 70.4       71.4     Group & Rh       A POS       Hematocrit 35.6       37.7     Hemoglobin 11.5       12.2     Hyaline Casts, UA     5         INDIRECT GERALD       NEG       Coumadin Monitoring INR         1.0  Comment:  Coumadin Therapy:  2.0 - 3.0 for INR for all indicators except mechanical heart valves  and antiphospholipid syndromes which should use 2.5 - 3.5.       Ketones, UA     Negative         Leukocytes, UA     1+         Lymph # 1.8       2.4     Lymph% 20.4       20.5     MCH 27.7       27.9     MCHC 32.3       32.4     MCV 86       86     Microscopic Comment     SEE COMMENT  Comment:  Other formed elements not mentioned in the report are not   present in the microscopic examination.            Mono # 0.7       0.8     Mono% 7.4       7.1     MPV 8.8       9.0     NITRITE UA     Negative         Occult Blood UA     Negative         pH, UA     6.0         Platelets 246       303     POCT Glucose   115           Potassium 4.5       4.5     PROTEIN TOTAL 6.6             Protein, UA     Negative  Comment:  Recommend a 24 hour urine protein or a urine   protein/creatinine ratio if globulin induced proteinuria is  clinically suspected.           Protime         10.7     RBC 4.15       4.38     RBC, UA     1         RDW 15.0       14.6     Sed Rate         22     Sodium 135       135     Specific [place], UA     1.020         Specimen UA     Urine, Catheterized         UROBILINOGEN UA     Negative         WBC, UA     15         WBC 8.91       11.67     Yeast, UA     Many                          08/21/19 2020        Albumin       Alkaline Phosphatase       ALT       Anion Gap       Appearance, UA       aPTT       AST       Bacteria, UA       Baso #       Basophil%       Bilirubin (UA)       BILIRUBIN TOTAL        Blood Culture, Routine No Growth to date[P]      No Growth to date[P]     BUN, Bld       Calcium       Chloride       CO2       Color, UA       Creatinine       CRP       Differential Method       eGFR if        eGFR if non        Eos #       Eosinophil%       Glucose       Glucose, UA       Gran # (ANC)       Gran%       Group & Rh       Hematocrit       Hemoglobin       Hyaline Casts, UA       INDIRECT GERALD       Coumadin Monitoring INR       Ketones, UA       Leukocytes, UA       Lymph #       Lymph%       MCH       MCHC       MCV       Microscopic Comment       Mono #       Mono%       MPV       NITRITE UA       Occult Blood UA       pH, UA       Platelets       POCT Glucose       Potassium       PROTEIN TOTAL       Protein, UA       Protime       RBC       RBC, UA       RDW       Sed Rate       Sodium       Specific Gravity, UA       Specimen UA       UROBILINOGEN UA       WBC, UA       WBC       Yeast, UA           All pertinent labs from the last 24 hours have been reviewed.  Significant Diagnostics:  Awaiting MRI with and without contrast.    Assessment/Plan:     Active Diagnoses:    Diagnosis Date Noted POA    PRINCIPAL PROBLEM:  Status post lumbar spinal fusion [Z98.1] 05/25/2019 Not Applicable    Drainage from wound [T14.8XXA] 08/21/2019 Yes    Osteomyelitis of lumbar spine [M46.26] 05/23/2019 Yes    Morbid obesity with BMI of 40.0-44.9, adult [E66.01, Z68.41] 06/17/2015 Not Applicable     Chronic    Type 2 diabetes, controlled, with peripheral neuropathy [E11.42] 05/29/2014 Yes     Chronic    Hypothyroidism [E03.9] 11/19/2013 Yes     Chronic    Essential hypertension [I10]  Yes     Chronic    PD (Parkinson's disease) [G20]  Yes     Chronic      Problems Resolved During this Admission:     Psoas abscess   Osteomyelitis of lumbar spine   Wound drainage   Status post lumbar spinal fusion     Patient will have MRI with and without contrast today   Resume diet  today   Awaiting consult with Dr. Grady   Will need assistance with plastics coverage- schedule surgery whenever convenient for Dr. Grady   Will plan for surgery- washout with revision of instrumentation and wound closure      Mayra Long PA-C  Neurosurgery  [facility] [place]

## 2019-08-22 NOTE — ED NOTES
MRI called, stated they will take pt in about 20 minutes to give ordered anxiety medicine    Principal Discharge DX:	Dog bite, initial encounter

## 2019-08-22 NOTE — SUBJECTIVE & OBJECTIVE
Interval History: pt continues to report back pain and abdominal pain.  MRI results reviewed. MRI completed today with results reviewed. Wound washout with revision of instrumentation and wound closure planned per [facility]gery [facility] Plastics.     Review of Systems   Constitutional: Positive for activity change and fatigue. Negative for chills and fever.   HENT: Negative for congestion, rhinorrhea and sinus pressure.    Respiratory: Negative for apnea, cough, choking, chest tightness, shortness of breath, wheezing and stridor.    Cardiovascular: Negative for chest pain, palpitations and leg swelling.   Gastrointestinal: Positive for abdominal pain. Negative for abdominal distention, diarrhea, nausea and vomiting.   Endocrine: Negative for cold intolerance and heat intolerance.   Genitourinary: Negative for difficulty urinating and hematuria.   Musculoskeletal: Positive for back pain. Negative for arthralgias and joint swelling.   Skin: Negative for color change, pallor and rash.   Neurological: Positive for weakness. Negative for dizziness, seizures, numbness and headaches.   Psychiatric/Behavioral: Negative for agitation. The patient is not nervous/anxious.      Objective:     Vital Signs (Most Recent):  Temp: 98.4 °F (36.9 °C) (08/22/19 1618)  Pulse: 83 (08/22/19 1618)  Resp: 18 (08/22/19 1618)  BP: 131/83 (08/22/19 1618)  SpO2: (!) 93 % (08/22/19 1618) Vital Signs (24h Range):  Temp:  [97.4 °F (36.3 °C)-98.4 °F (36.9 °C)] 98.4 °F (36.9 °C)  Pulse:  [] 83  Resp:  [16-24] 18  SpO2:  [93 %-96 %] 93 %  BP: (100-137)/(51-89) 131/83     Weight: 85.5 kg (188 lb 8 oz)  Body mass index is 30.42 kg/m².    Intake/Output Summary (Last 24 hours) at 8/22/2019 1729  Last data filed at 8/22/2019 0623  Gross per 24 hour   Intake 3065 ml   Output 2500 ml   Net 565 ml      Physical Exam   Constitutional: She is oriented to person, place, and time. No distress.   HENT:   Nose: Nose normal.   Mouth/Throat: No oropharyngeal  exudate.   Eyes: Right eye exhibits no discharge. Left eye exhibits no discharge.   Neck: No JVD present.   Cardiovascular: Normal heart sounds and intact distal pulses. Exam reveals no gallop and no friction rub.   No murmur heard.  Pulmonary/Chest: No stridor. No respiratory distress. She has no wheezes. She has no rales. She exhibits no tenderness.   Abdominal: She exhibits no distension and no mass. There is no tenderness. There is no rebound and no guarding. No hernia.   Genitourinary:   Genitourinary Comments: Carlson catheter present    Musculoskeletal: She exhibits no edema or deformity.   Tremor in hands noted    Neurological: She is alert and oriented to person, place, and time.   Skin: Skin is warm and dry. Capillary refill takes 2 to 3 seconds. She is not diaphoretic.   Wound to spine with dressing in place  -Erythema to sacral area blanchable   Psychiatric: She has a normal mood and affect. Her behavior is normal.   Nursing note and vitals reviewed.      Significant Labs:   CBC:   Recent Labs   Lab 08/21/19 2028 08/22/19  0926   WBC 11.67 8.91   HGB 12.2 11.5*   HCT 37.7 35.6*    246     CMP:   Recent Labs   Lab 08/21/19 2028 08/22/19  0926   * 135*   K 4.5 4.5   CL 97 99   CO2 26 27    100   BUN 31* 19   CREATININE 0.9 0.7   CALCIUM 10.6* 9.9   PROT  --  6.6   ALBUMIN  --  3.6   BILITOT  --  0.5   ALKPHOS  --  71   AST  --  22   ALT  --  <5*   ANIONGAP 12 9   EGFRNONAA >60 >60       Significant Imaging:   Imaging Results          MRI Lumbar Spine W WO Cont (Final result)  Result time 08/22/19 15:57:32    Final result by Phong Perea Jr., MD (08/22/19 15:57:32)                 Impression:      1. Generally favorable response to therapy with diminished abnormal bone marrow signal abnormalities relating to evolution of osteomyelitis change.  Diminished swelling of the right psoas muscle.  No evidence of abscess.  2. Laminectomy at L2-L3 and L3-L4 with posterior fusion as described.   There is mild to moderate spinal stenosis at L2-L3 with lateral recess stenosis that could affect the descending L3 spinal nerve roots.  No enhancing granulation tissue compresses the thecal sac or neural structures.  3. Grade 1 degenerative spondylolisthesis at L4-L5 with severe lateral recess stenosis that could compress the descending L5 spinal nerve roots.      Electronically signed by: Phong Perea Jr., MD  Date:    08/22/2019  Time:    15:57             Narrative:    EXAMINATION:  MRI LUMBAR SPINE W WO CONTRAST    CLINICAL HISTORY:  Low back painLow back pain, prior surgery, new or progressive sx;    TECHNIQUE:  MR Lumbar spine with contrast. Sagittal T1, T2, STIR. Axial T1, T2. Post contrast Sagittal and Axial T1.    COMPARISON:  CT from June 11, 2019 was reviewed.  MRI from May 23, 2019 was reviewed as well.    FINDINGS:  Since the previous MRI there has been a laminectomy at L2-L3 with posterior fusion spanning L1 inferiorly to L4.  Grade 1 degenerative spondylolisthesis at L4-L5.  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  There are no peripheral enhancing fluid collections on the current exam.  There is expected enhancement of granulation tissue at the laminectomy site.  No abnormal intradural enhancement.  The conus medullaris terminates at the level [id number]. No abnormal signal within the conus. Intervertebral disc levels are as follows:    T12-L1 disc: Normal disc space height with anterior osteophytes.  Normal facet joints.  The thecal sac measures 18 mm AP.  No significant foraminal stenosis.    L1-L2 disc : Level of interval posterior fusion.  Disc space height loss with a circumferential disc bulge.  Anterior osteophytes.  Normal facet joints.  Mild Modic type 1 endplate change which is similar to the prior exam.  The thecal sac measures 18 mm.  No significant foraminal stenosis.    L2-L3  disc: Level of interval laminectomy and posterior fusion.  Enhancing granulation tissue at the laminectomy site.  No compression of the thecal sac in relation to the granulation tissue.  Evolution of marrow edema pattern relating to treatment.  Posteriorly protruding disc/osteophyte material flattens the thecal sac to a trefoil configuration.  It measures 12 mm AP with mild bilateral lateral recess stenosis.  Mild/moderate bilateral foraminal stenosis.  On the preoperative study the thecal sac measured 7 mm AP at this level.    L3-L4 disc: Level of laminectomy and posterior fusion.  Enhancing granulation tissue at the laminectomy site that does not contribute to spinal stenosis or foraminal stenosis.  Fatty Modic type 2 endplate change.  The thecal sac measures 17 mm AP.  Moderate/severe left foraminal stenosis.  No significant right foraminal stenosis.    L4-L5 disc: Grade 1 degenerative spondylolisthesis with unroofing and bulging of disc.  Severe degenerative facet hypertrophy.  Buckling of ligamentum flavum.  Severe lateral recess stenosis bilaterally.  The thecal sac measures 10 mm AP.  Moderate bilateral foraminal stenosis.    L5-S1 disc: Normal disc space height.  Mild degenerative facet hypertrophy.  No significant spinal or foraminal stenosis.

## 2019-08-22 NOTE — ASSESSMENT & PLAN NOTE
As above   -pt completed IV antibiotics   -MRI results reviewed  -pt may benefit from ID consult   -hx of  Microbiology that grew Streptococcus s/p washout on 5/24/19 with appropriate antibiotics completed of note  -hx of posterior dehiscence of incision noted with transferred to a nursing home for long term antibiotic therapy via PICC line for approximately 2 months.  -Blood culture results pending

## 2019-08-22 NOTE — ASSESSMENT & PLAN NOTE
-UA consistent with possible infectious process   -Iv antibiotics   -urine culture results pending

## 2019-08-22 NOTE — PLAN OF CARE
Problem: Adult Inpatient Plan of Care  Goal: Plan of Care Review  Outcome: Ongoing (interventions implemented as appropriate)  Pt admitted from ED. PRN Percocet given once. NADN. Will continue to monitor.

## 2019-08-22 NOTE — PROGRESS NOTES
Pharmacokinetic Initial Assessment: IV Vancomycin    Assessment/Plan:    Initiate intravenous vancomycin with loading dose of 2500 mg once followed by a maintenance dose of vancomycin 2000 mg IV every 24 hours   Due to the patient's age.   Desired empiric serum trough concentration is 15 to 20 mcg/mL  Draw vancomycin trough level 30 min prior to third dose on 8/23/19 at approximately 2200   Pharmacy will continue to follow and monitor vancomycin.      Please contact pharmacy at extension [phone] with any questions regarding this assessment.     Thank you for the consult,   Rashad Bishop       Patient brief summary:  Ross Isbell is a 73 y.o. female initiated on antimicrobial therapy with IV Vancomycin for treatment of suspected bone/joint infection    Drug Allergies:   Review of patient's allergies indicates:   Allergen Reactions    Codeine      Other reaction(s): Nausea.  Patient tolerated morphine 5/2019 with no reported problems.     Codeine sulfate      Unknown^    Diphenhydramine hcl      Unknown^  Other reaction(s): Rash    Sulfa (sulfonamide antibiotics) Hives and Nausea And Vomiting    Penicillins Rash     Rash only as a child  Tolerated ceftriaxone 4/30/19  Tolerated piperacillin-tazobactam 5/23/19       Actual Body Weight:   85.5 kg    Renal Function:   Estimated Creatinine Clearance: 61.3 mL/min (based on SCr of 0.9 mg/dL).,     Dialysis Method (if applicable):  N/A     CBC (last 72 hours):  Recent Labs   Lab Result Units 08/21/19 2028   WBC K/uL 11.67   Hemoglobin g/dL 12.2   Hematocrit % 37.7   Platelets K/uL 303   Gran% % 71.4   Lymph% % 20.5   Mono% % 7.1   Eosinophil% % 0.9   Basophil% % 0.1   Differential Method  Automated       Metabolic Panel (last 72 hours):  Recent Labs   Lab Result Units 08/21/19 2028 08/21/19  2200   Sodium mmol/L 135*  --    Potassium mmol/L 4.5  --    Chloride mmol/L 97  --    CO2 mmol/L 26  --    Glucose mg/dL 103  --    Glucose, UA   --  Negative   BUN, Bld mg/dL 31*   --    Creatinine mg/dL 0.9  --        Drug levels (last 3 results):  No results for input(s): VANCOMYCINRA, VANCOMYCINPE, VANCOMYCINTR in the last 72 hours.    Microbiologic Results:  Microbiology Results (last 7 days)       Procedure Component Value Units Date/Time    Urine culture [[id number]] Collected:  08/21/19 2200    Order Status:  No result Specimen:  Urine Updated:  08/21/19 2250    Blood culture #2 **CANNOT BE ORDERED STAT** [[id number]] Collected:  08/21/19 2020    Order Status:  Sent Specimen:  Blood from Line, PICC Right Cephalic Updated:  08/21/19 2037    Blood culture #1 **CANNOT BE ORDERED STAT** [[id number]] Collected:  08/21/19 2020    Order Status:  Sent Specimen:  Blood from Peripheral, Antecubital, Left Updated:  08/21/19 2029

## 2019-08-22 NOTE — ED NOTES
Report given to FREDDIE Rod. MRI states they will take pt to her  med surg room when scan is complete

## 2019-08-22 NOTE — HOSPITAL COURSE
Pt admitted to Observation Unit s/p lumbar spinal fusion.  NeuroSurgery consulted.  MRI showed favorable response to therapy with diminished abnormal bone marrow signal abnormalities relating to evolution of osteomyelitis change.  Diminished swelling of the right psoas muscle with no evidence of abscess.  2. Laminectomy at L2-L3 and L3-L4 with posterior fusion with mild to moderate spinal stenosis at L2-L3 with lateral recess stenosis that could affect the descending L3 spinal nerve roots.  No enhancing granulation tissue compresses the thecal sac or neural structures.  Grade 1 degenerative spondylolisthesis at L4-L5 with severe lateral recess stenosis that could compress the descending L5 spinal nerve roots.  Pt treated with IV antibiotics and analgesia prn. Blood cultures with no growth to date.  Urine culture in progress.  Washout with revision of instrumentation and wound closure per NeuroSurgery and Plastics planned.  Vital signs stable. On 8/23/19, pt transferred to Inpatient status.  Blood culture grew gram positive cocci x 1 bottle.  ID consulted.  MRI reviewed by Neuro Surgery with signs of improving osteomyelitis noted and plan for revision on Monday afternoon pending cultures.  Plastic Surgery to assist with wound closure and covering.      As of 08/24 BC x 1 resulted with COAGULASE- NEGATIVE STAPHYLOCOCCUS SPECIES, likely contaminant. ID was consulted due to BC and recommended to continue IV Vancomycin and Zosyn until final sensitivity. Pt scheduled for revision on Monday afternoon with Plastics and Neurosurgery pending cultures    As of 08/25 BC x 1 likely contaminant -- COAGULASE-NEGATIVE STAPHYLOCOCCUS SPECIES. ID recommended to repeat CRP and ESR. CRP 3.1, Sed rate 27. BC resulted at 1334 today with VIRIDANS STREPTOCOCCUS GROUP. Susceptibility testing not routinely performed. Informed Dr. Gomez (ID) and Dr. Nicolas (neurosurgery) of results. Aerobic culture NGTD. Continue IV Vancomycin and Zosyn for  now. NPO after midnight for surgery in AM.     As of 08/26 s/p bilateral wound debridement, hardware removal, and placement of abx vancomycin/tobramycin pellets today. Sx performed by Dr. Nicolas today. Pt just made it back from surgery. Pt currently c/o nausea. No other complaints at this time.     As of 08/27  POD # 1. Aerobic cultures from surgery pending. Repeat BC pending. PT/OT following -- recommending SNF placement. Will consult CM for placement.     As of 08/28 POD # 2. Repeat BC NGTD. Aerobic culture from surgery NGTD. Labs reviewed, stable. Remains afebrile. Discussed case with Dr. Nicolas -- cleared for discharge from neurosurgery standpoint. He recommended she F/U in clinic in 2 weeks for suture removal. He also recommended daily dressing changes with bacitracin ointment. Will hold d/c today as to get ID recommendations for PO abx upon discharge. She will be discharged back to [facility] with SNF services on tomorrow, 08/29.    As of 08/29 POD # 3. Labs reviewed, stable. Repeat BC NGTD. Aerobic culture NGTD. Remains afebrile. No leukocytosis/leukopenia. WBC count stable at 6.82K. Discussed case with Dr. Gomez (ID) -- recommended upon discharge Rocephin 1 gm IV daily x 3 weeks. Per Neurosurgery, resume SQ Lovenox but hold daily ASA. Pt will follow up with Dr. Nicolas within 2 weeks after discharge for hospital follow up and suture removal. Dr. Nicolas recommended daily dressing changes with bacitracin ointment daily. Pt discharged back to [facility] for SNF services. Pt will follow up with PCP within 2-3 days after discharge for hospital follow up. This patient was seen and examined on the date of discharge and determined suitable for discharge.

## 2019-08-22 NOTE — ASSESSMENT & PLAN NOTE
Admit to Observation   Neurosurgery managing   Plan removal of the instrumentation with placement of antibiotic beads to the incision cavity along with a wound VAC placement.   NPO after MN  Cefepime  Vancomycin    Pain control

## 2019-08-22 NOTE — ED NOTES
Pt transferred to hospital bed with waffle mattress. Heels elevated on pillow. Pt turned. NAD noted. Bed rails up x2. Call light within reach. Will continue to monitor

## 2019-08-22 NOTE — HPI
Ms Isbell is a 73 year old female with PMHx of CAD, HTN, DM, Parkinson disease, HLD and Osteomyelitis of lumbar spine who presented to [facility] Emergency Room with increase back pain. She  had axial back pain with destruction of the disc space underwent  posterior washout and debridement with placement of instrumentation on 5/24. Patient  had dehiscence of her incision and was taken back to the OR for wound VAC placement on 6/11. She has been experiencing increase back pain, unable to walk. She had been doing well with Physical Therapy, but now has been a wheel chair. She has experienced failure of instrumentation and elevated infectious markers. Neurosurery surgery following, plan for removal of instumentation with antibotic beads placement and wound vac on tomorrow. She being placed in observation under to care of Hospital Medicine.

## 2019-08-22 NOTE — ASSESSMENT & PLAN NOTE
-IV antibiotics continued   -wound cultures to be obtained   -NeuroSurgery following with washout with revision of instrumentation and wound closure  -wound care consult to be placed when appropriate

## 2019-08-22 NOTE — H&P
[facility] Hospital Medicine  History & Physical    Patient Name: Ross Isbell  MRN: [id number]  Admission Date: 8/21/2019  Attending Physician: Hemanth Augustine MD   Primary Care Provider: Anna Penn MD         Patient information was obtained from patient and ER records.     Subjective:     Principal Problem:Status post lumbar spinal fusion    Chief Complaint:   Chief Complaint   Patient presents with    Back Pain     pt told to present to ED by Dr. Nicolas for admission for her surgery tomorrow        HPI: Ms Isbell is a 73 year old female with PMHx of CAD, HTN, DM, Parkinson disease, HLD and Osteomyelitis of lumbar spine who presented to [facility] Emergency Room with increase back pain. She  had axial back pain with destruction of the disc space underwent  posterior washout and debridement with placement of instrumentation on 5/24. Patient  had dehiscence of her incision and was taken back to the OR for wound VAC placement on 6/11. She has been experiencing increase back pain, unable to walk. She had been doing well with Physical Therapy, but now has been a wheel chair. She has experienced failure of instrumentation and elevated infectious markers. Neurosurery surgery following, plan for removal of instumentation with antibotic beads placement and wound vac on tomorrow. She being placed in observation under to care of Hospital Medicine.       Past Medical History:   Diagnosis Date    Abnormal nuclear stress test 11/1/2015    Anemia 6/1/2019    Background diabetic retinopathy 12/28/2015    CAD (coronary artery disease) 2002    stents    Cataract     Gait disorder 10/12/2012    Glaucoma     History of PTCA 6/1/2019    MI (myocardial infarction)     Obesity     CORINE (obstructive sleep apnea)     Other and unspecified hyperlipidemia     PD (Parkinson's disease) 2002    tremor-predominant (per patient)    Type II or unspecified type diabetes mellitus without mention of complication, not  stated as uncontrolled 2002      am    Unspecified essential hypertension        Past Surgical History:   Procedure Laterality Date    APPLICATION, WOUND VAC N/A 6/11/2019    Performed [facility] OR    BACK SURGERY  05/25/2019    BREAST BIOPSY Left     benign - about 3 yrs ago    CORONARY ANGIOPLASTY  2002    CORONARY STENT PLACEMENT  2002    DEBRIDEMENT, ABSCESS, PARASPINAL N/A 5/25/2019    [facility] OR    DEBRIDEMENT, WOUND Bilateral 6/11/2019    Per[facility] by [facility] at [facility] OR    DILATION AND CURETTAGE OF UTERUS      FOREIGN BODY REMOVAL      FUSION, SPINE, LUMBAR, TLIF, POSTERIOR APPROACH, USING PEDICLE SCREW N/A 5/25/2019    Performed by [facility] at [facility] OR    FUSION, SPINE, POSTERIOR SPINAL COLUMN, LUMBAR, USING COMPUTER-ASSISTED NAVIGATION  5/25/2019    Per[facility] by [facility] at [facility] OR    HEART CATH-LEFT Left 11/5/2015    Performed by Constance Pace MD at [facility] CATH LAB    HEART CATH-LEFT Left 11/2/2015    Performed by [facility] at [facility] CATH LAB    HEART CATH-LEFT/pci stent lad Left 11/10/2015    Performed by [facility] at [facility] CATH LAB    INNER EAR SURGERY      LAMINECTOMY, SPINE, LUMBAR  5/25/2019    Per[facility] by [facility] OR    TONSILLECTOMY, ADENOIDECTOMY         Review of patient's allergies indicates:   Allergen Reactions    Codeine      Other reaction(s): Nausea.  Patient tolerated morphine 5/2019 with no reported problems.     Codeine sulfate      Unknown^    Diphenhydramine hcl      Unknown^  Other reaction(s): Rash    Sulfa (sulfonamide antibiotics) Hives and Nausea And Vomiting    Penicillins Rash     Rash only as a child  Tolerated ceftriaxone 4/30/19  Tolerated piperacillin-tazobactam 5/23/19       No current facility-administered medications on file prior to encounter.      Current Outpatient Medications on File Prior to Encounter   Medication  Sig    carbidopa-levodopa  mg (SINEMET)  mg per tablet TAKE 1 TABLET BY MOUTH 5 TIMES DAILY    enoxaparin (LOVENOX) 40 mg/0.4 mL Syrg Inject 0.4 mLs (40 mg total) into the skin once daily.    isosorbide mononitrate (IMDUR) 120 MG 24 hr tablet Take 1 tablet (120 mg total) by mouth once daily.    latanoprost 0.005 % ophthalmic solution INSTILL 1 DROP INTO EACH EYE IN THE EVENING    levothyroxine (SYNTHROID) 125 MCG tablet TAKE 1 TABLET BY MOUTH IN THE MORNING BEFORE BREAKFAST    losartan (COZAAR) 50 MG tablet TAKE 1 TABLET BY MOUTH ONCE DAILY    metFORMIN (GLUCOPHAGE) 500 MG tablet TAKE ONE TABLET BY MOUTH THREE TIMES DAILY    methocarbamol (ROBAXIN) 750 MG Tab Take 1 tablet (750 mg total) by mouth 3 (three) times daily as needed (muscle spasms).    metoprolol succinate (TOPROL-XL) 25 MG 24 hr tablet TAKE 1 TABLET BY MOUTH ONCE DAILY    miconazole nitrate 2% (MICOTIN) 2 % Oint Apply topically 2 (two) times daily.    NOVOLIN 70/30 U-100 INSULIN 100 unit/mL (70-30) injection Inject 55 Units into the skin 3 (three) times daily.    ondansetron (ZOFRAN) 8 MG tablet Take 1 tablet (8 mg total) by mouth every 8 (eight) hours as needed for Nausea.    oxyCODONE-acetaminophen (PERCOCET)  mg per tablet Take 1 tablet by mouth every 4 (four) hours as needed for Pain.    pantoprazole (PROTONIX) 40 MG tablet Take 1 tablet (40 mg total) by mouth once daily.    pramipexole (MIRAPEX) 1 MG tablet TAKE ONE TABLET BY MOUTH THREE TIMES DAILY    pravastatin (PRAVACHOL) 40 MG tablet TAKE ONE TABLET BY MOUTH ONCE DAILY    ACCU-CHEK SOFTCLIX LANCETS Misc TEST THREE TIMES DAILY    acetaminophen (TYLENOL) 325 MG tablet Take 2 tablets (650 mg total) by mouth every 6 (six) hours as needed.    aspirin (ECOTRIN) 81 MG EC tablet Take 1 tablet (81 mg total) by mouth once daily.    blood sugar diagnostic Strp 1 strip by Misc.(Non-Drug; Combo Route) route 3 (three) times daily. Accuchek Felicia Plus Test Strips     blood-glucose meter kit Accu-chek Felicia Plus Meter    insulin syringe-needle U-100 0.5 mL 31 gauge x 5/16 Syrg USE THREE TIMES DAILY    multivit-min-FA-lycopen-lutein (CENTRUM SILVER) 0.4-300-250 mg-mcg-mcg Tab Take 1 tablet by mouth once daily.     Family History     Problem Relation (Age of Onset)    Breast cancer Paternal Grandmother    Diabetes Maternal Grandmother    Glaucoma Mother, Maternal Grandmother    Heart attack Father (65), Mother (65)    Hypertension Father, Mother, Maternal Grandmother    Ovarian cancer Mother    Parkinsonism Other, Paternal Aunt    Tremor Father        Tobacco Use    Smoking status: Former Smoker     Years: 40.00     Last attempt to quit: 10/12/2010     Years since quittin.8    Smokeless tobacco: Never Used    Tobacco comment: On & off for the past few years   Substance and Sexual Activity    Alcohol use: No     Alcohol/week: 0.0 oz    Drug use: No    Sexual activity: Not Currently     Review of Systems   Constitutional: Positive for activity change and fatigue. Negative for chills and fever.   HENT: Negative for congestion, rhinorrhea and sinus pressure.    Respiratory: Negative for apnea, cough, choking, chest tightness, shortness of breath, wheezing and stridor.    Cardiovascular: Negative for chest pain, palpitations and leg swelling.   Gastrointestinal: Positive for abdominal pain. Negative for abdominal distention, diarrhea, nausea and vomiting.   Endocrine: Negative for cold intolerance and heat intolerance.   Genitourinary: Negative for difficulty urinating and hematuria.   Musculoskeletal: Positive for back pain. Negative for arthralgias and joint swelling.   Skin: Negative for color change, pallor and rash.   Neurological: Positive for weakness. Negative for dizziness, seizures, numbness and headaches.   Psychiatric/Behavioral: Negative for agitation. The patient is not nervous/anxious.      Objective:     Vital Signs (Most Recent):  Temp: 97.4 °F (36.3 °C)  (08/21/19 1834)  Pulse: 93 (08/22/19 0000)  Resp: 16 (08/22/19 0000)  BP: (!) 122/56 (08/22/19 0000)  SpO2: 95 % (08/22/19 0000) Vital Signs (24h Range):  Temp:  [97.4 °F (36.3 °C)] 97.4 °F (36.3 °C)  Pulse:  [] 93  Resp:  [16-24] 16  SpO2:  [95 %-96 %] 95 %  BP: ()/(56-67) 122/56     Weight: 85.5 kg (188 lb 8 oz)  Body mass index is 30.42 kg/m².    Physical Exam   Constitutional: She is oriented to person, place, and time. No distress.   HENT:   Mouth/Throat: No oropharyngeal exudate.   Eyes: Right eye exhibits no discharge. Left eye exhibits no discharge.   Neck: No JVD present.   Cardiovascular: Exam reveals no gallop and no friction rub.   No murmur heard.  Pulmonary/Chest: No stridor. No respiratory distress. She has no wheezes. She has no rales. She exhibits no tenderness.   Abdominal: She exhibits no distension and no mass. There is no tenderness. There is no rebound and no guarding. No hernia.   Musculoskeletal: She exhibits no edema or deformity.   Neurological: She is alert and oriented to person, place, and time.   Skin: Skin is warm and dry. Capillary refill takes 2 to 3 seconds. She is not diaphoretic.   Dressing to mid back intact    Nursing note and vitals reviewed.          Significant Labs:   CBC:   Recent Labs   Lab 08/21/19 2028   WBC 11.67   HGB 12.2   HCT 37.7        CMP:   Recent Labs   Lab 08/21/19 2028   *   K 4.5   CL 97   CO2 26      BUN 31*   CREATININE 0.9   CALCIUM 10.6*   ANIONGAP 12   EGFRNONAA >60       Significant Imaging:     Imaging Results    None       Assessment/Plan:     * Status post lumbar spinal fusion  Admit to Observation   Neurosurgery managing   Plan removal of the instrumentation with placement of antibiotic beads to the incision cavity along with a wound VAC placement.   NPO after MN  Cefepime  Vancomycin    Pain control         Osteomyelitis of lumbar spine  As above       Morbid obesity with BMI of 40.0-44.9, adult  Weight lost  encourage       Type 2 diabetes, controlled, with peripheral neuropathy  Accu with SSI       Hypothyroidism    Continue Synthroid     Essential hypertension  Continue home meds       PD (Parkinson's disease)  Continue home Parkinson's meds      VTE Risk Mitigation (From admission, onward)        Ordered     Place sequential compression device  Until discontinued      08/22/19 0052             Kvng Kyle NP  Department of Hospital Medicine   [facility]

## 2019-08-22 NOTE — PROGRESS NOTES
Screening completed on this 74 y/o F patient due to history of stage 3 pressure injury to thoracic spine. She is well known to me from previous hospitalizations. She is seen in ER while awaiting floor bed. Per chart review, Dr. Nicolas is planning surgery tomorrow for removal of back hardware and potential wound vac placement. She is awake and alert, skin assessment completed. Bilateral heels with blanchable redness noted, left > right. Foam preventative dressing applied. Abdominal fold intact, no intertrigo noted. Carlson catheter is noted. Patient turned to Right side with minimal assistance. Sacrum, coccyx, bilateral buttock assessed. Blanchable redness to coccyx noted, painted with cavilon and preventative foam dressing applied. ML lumbar and thoracic spine incision noted with moist gauze dressing intact. Gentian violet stain noted to wound edges. Previously noted stage 3 pressure injury to mid incision kina wound skin is healed/resurfaced. Recommend pressure injury prevention to include: waffle mattress overlay, float heels, turn q2h.

## 2019-08-22 NOTE — SUBJECTIVE & OBJECTIVE
Past Medical History:   Diagnosis Date    Abnormal nuclear stress test 11/1/2015    Anemia 6/1/2019    Background diabetic retinopathy 12/28/2015    CAD (coronary artery disease) 2002    stents    Cataract     Gait disorder 10/12/2012    Glaucoma     History of PTCA 6/1/2019    MI (myocardial infarction)     Obesity     CORINE (obstructive sleep apnea)     Other and unspecified hyperlipidemia     PD (Parkinson's disease) 2002    tremor-predominant (per patient)    Type II or unspecified type diabetes mellitus without mention of complication, not stated as uncontrolled 2002      am    Unspecified essential hypertension        Past Surgical History:   Procedure Laterality Date    APPLICATION, WOUND VAC N/A 6/11/2019    Performed by [facility]islav [facility]oclas [facility] at [facility]    BACK SURGERY  05/25/2019    BREAST BIOPSY Left     benign - about 3 yrs ago    CORONARY ANGIOPLASTY  2002    CORONARY STENT PLACEMENT  2002    DEBRIDEMENT, ABSCESS, PARASPINAL N/A 5/25/2019    Performed by Stanislav Nicolas MD at [facility] OR    DEBRIDEMENT, WOUND Bilateral 6/11/2019    Performed by [facility]islav Nicolas [facility] at [facility]    DILATION AND CURETTAGE OF UTERUS      FOREIGN BODY REMOVAL      FUSION, SPINE, LUMBAR, TLIF, POSTERIOR APPROACH, USING PEDICLE SCREW N/A 5/25/2019    Performed by Stanislav Nicolas MD at [facility] OR    FUSION, SPINE, POSTERIOR SPINAL COLUMN, LUMBAR, USING COMPUTER-ASSISTED NAVIGATION  5/25/2019    Performed by Stanislav Nicolas MD at [facility] OR    HEART CATH-LEFT Left 11/5/2015    Performed by Constance Pace MD at [facility] CATH LAB    HEART CATH-LEFT Left 11/2/2015    Performed by Constance Pace [facility] at [facility] CATH LAB    HEART CATH-LEFT/pci stent lad Left 11/10/2015    Performed by Constance Pace MD at [facility] CATH LAB    INNER EAR SURGERY      LAMINECTOMY, SPINE, LUMBAR  5/25/2019    Performed by Stanislav Nicolas MD at [facility] OR    TONSILLECTOMY, ADENOIDECTOMY         Review of patient's allergies indicates:    Allergen Reactions    Codeine      Other reaction(s): Nausea.  Patient tolerated morphine 5/2019 with no reported problems.     Codeine sulfate      Unknown^    Diphenhydramine hcl      Unknown^  Other reaction(s): Rash    Sulfa (sulfonamide antibiotics) Hives and Nausea And Vomiting    Penicillins Rash     Rash only as a child  Tolerated ceftriaxone 4/30/19  Tolerated piperacillin-tazobactam 5/23/19       No current facility-administered medications on file prior to encounter.      Current Outpatient Medications on File Prior to Encounter   Medication Sig    carbidopa-levodopa  mg (SINEMET)  mg per tablet TAKE 1 TABLET BY MOUTH 5 TIMES DAILY    enoxaparin (LOVENOX) 40 mg/0.4 mL Syrg Inject 0.4 mLs (40 mg total) into the skin once daily.    isosorbide mononitrate (IMDUR) 120 MG 24 hr tablet Take 1 tablet (120 mg total) by mouth once daily.    latanoprost 0.005 % ophthalmic solution INSTILL 1 DROP INTO EACH EYE IN THE EVENING    levothyroxine (SYNTHROID) 125 MCG tablet TAKE 1 TABLET BY MOUTH IN THE MORNING BEFORE BREAKFAST    losartan (COZAAR) 50 MG tablet TAKE 1 TABLET BY MOUTH ONCE DAILY    metFORMIN (GLUCOPHAGE) 500 MG tablet TAKE ONE TABLET BY MOUTH THREE TIMES DAILY    methocarbamol (ROBAXIN) 750 MG Tab Take 1 tablet (750 mg total) by mouth 3 (three) times daily as needed (muscle spasms).    metoprolol succinate (TOPROL-XL) 25 MG 24 hr tablet TAKE 1 TABLET BY MOUTH ONCE DAILY    miconazole nitrate 2% (MICOTIN) 2 % Oint Apply topically 2 (two) times daily.    NOVOLIN 70/30 U-100 INSULIN 100 unit/mL (70-30) injection Inject 55 Units into the skin 3 (three) times daily.    ondansetron (ZOFRAN) 8 MG tablet Take 1 tablet (8 mg total) by mouth every 8 (eight) hours as needed for Nausea.    oxyCODONE-acetaminophen (PERCOCET)  mg per tablet Take 1 tablet by mouth every 4 (four) hours as needed for Pain.    pantoprazole (PROTONIX) 40 MG tablet Take 1 tablet (40 mg total) by mouth  once daily.    pramipexole (MIRAPEX) 1 MG tablet TAKE ONE TABLET BY MOUTH THREE TIMES DAILY    pravastatin (PRAVACHOL) 40 MG tablet TAKE ONE TABLET BY MOUTH ONCE DAILY    ACCU-CHEK SOFTCLIX LANCETS Misc TEST THREE TIMES DAILY    acetaminophen (TYLENOL) 325 MG tablet Take 2 tablets (650 mg total) by mouth every 6 (six) hours as needed.    aspirin (ECOTRIN) 81 MG EC tablet Take 1 tablet (81 mg total) by mouth once daily.    blood sugar diagnostic Strp 1 strip by Misc.(Non-Drug; Combo Route) route 3 (three) times daily. Accuchek Felicia Plus Test Strips    blood-glucose meter kit Accu-chek Felicia Plus Meter    insulin syringe-needle U-100 0.5 mL 31 gauge x 5/16 Syrg USE THREE TIMES DAILY    multivit-min-FA-lycopen-lutein (CENTRUM SILVER) 0.4-300-250 mg-mcg-mcg Tab Take 1 tablet by mouth once daily.     Family History     Problem Relation (Age of Onset)    Breast cancer Paternal Grandmother    Diabetes Maternal Grandmother    Glaucoma Mother, Maternal Grandmother    Heart attack Father (65), Mother (65)    Hypertension Father, Mother, Maternal Grandmother    Ovarian cancer Mother    Parkinsonism Other, Paternal Aunt    Tremor Father        Tobacco Use    Smoking status: Former Smoker     Years: 40.00     Last attempt to quit: 10/12/2010     Years since quittin.8    Smokeless tobacco: Never Used    Tobacco comment: On & off for the past few years   Substance and Sexual Activity    Alcohol use: No     Alcohol/week: 0.0 oz    Drug use: No    Sexual activity: Not Currently     Review of Systems   Constitutional: Positive for activity change and fatigue. Negative for chills and fever.   HENT: Negative for congestion, rhinorrhea and sinus pressure.    Respiratory: Negative for apnea, cough, choking, chest tightness, shortness of breath, wheezing and stridor.    Cardiovascular: Negative for chest pain, palpitations and leg swelling.   Gastrointestinal: Positive for abdominal pain. Negative for abdominal  distention, diarrhea, nausea and vomiting.   Endocrine: Negative for cold intolerance and heat intolerance.   Genitourinary: Negative for difficulty urinating and hematuria.   Musculoskeletal: Positive for back pain. Negative for arthralgias and joint swelling.   Skin: Negative for color change, pallor and rash.   Neurological: Positive for weakness. Negative for dizziness, seizures, numbness and headaches.   Psychiatric/Behavioral: Negative for agitation. The patient is not nervous/anxious.      Objective:     Vital Signs (Most Recent):  Temp: 97.4 °F (36.3 °C) (08/21/19 1834)  Pulse: 93 (08/22/19 0000)  Resp: 16 (08/22/19 0000)  BP: (!) 122/56 (08/22/19 0000)  SpO2: 95 % (08/22/19 0000) Vital Signs (24h Range):  Temp:  [97.4 °F (36.3 °C)] 97.4 °F (36.3 °C)  Pulse:  [] 93  Resp:  [16-24] 16  SpO2:  [95 %-96 %] 95 %  BP: ()/(56-67) 122/56     Weight: 85.5 kg (188 lb 8 oz)  Body mass index is 30.42 kg/m².    Physical Exam   Constitutional: She is oriented to person, place, and time. No distress.   HENT:   Mouth/Throat: No oropharyngeal exudate.   Eyes: Right eye exhibits no discharge. Left eye exhibits no discharge.   Neck: No JVD present.   Cardiovascular: Exam reveals no gallop and no friction rub.   No murmur heard.  Pulmonary/Chest: No stridor. No respiratory distress. She has no wheezes. She has no rales. She exhibits no tenderness.   Abdominal: She exhibits no distension and no mass. There is no tenderness. There is no rebound and no guarding. No hernia.   Musculoskeletal: She exhibits no edema or deformity.   Neurological: She is alert and oriented to person, place, and time.   Skin: Skin is warm and dry. Capillary refill takes 2 to 3 seconds. She is not diaphoretic.   Dressing to mid back intact    Nursing note and vitals reviewed.          Significant Labs:   CBC:   Recent Labs   Lab 08/21/19 2028   WBC 11.67   HGB 12.2   HCT 37.7        CMP:   Recent Labs   Lab 08/21/19 2028   *    K 4.5   CL 97   CO2 26      BUN 31*   CREATININE 0.9   CALCIUM 10.6*   ANIONGAP 12   EGFRNONAA >60       Significant Imaging:     Imaging Results    None

## 2019-08-22 NOTE — ED PROVIDER NOTES
SCRIBE #1 NOTE: I, Tammi Jaramillo, am scribing for, and in the presence of, Tom Meyer MD. I have scribed the entire note.      History      Chief Complaint   Patient presents with    Back Pain     pt told to present to ED by Dr. Nicolas for admission for her surgery tomorrow       Review of patient's allergies indicates:   Allergen Reactions    Codeine      Other reaction(s): Nausea.  Patient tolerated morphine 5/2019 with no reported problems.     Codeine sulfate      Unknown^    Diphenhydramine hcl      Unknown^  Other reaction(s): Rash    Sulfa (sulfonamide antibiotics) Hives and Nausea And Vomiting    Penicillins Rash     Rash only as a child  Tolerated ceftriaxone 4/30/19  Tolerated piperacillin-tazobactam 5/23/19        HPI   HPI    8/21/2019, 8:32 PM   History obtained from the patient      History of Present Illness: Ross Isbell is a 73 y.o. female patient who presents to the Emergency Department for evaluation of chronic back pain. Symptoms are constant and moderate in severity. Patient reports Dr. Nicolas (Neurosurgery) advised her to check into the ED for admissions for her surgery that is scheduled for tomorrow. No mitigating or exacerbating factors reported. No associated sxs. Patient is also c/o lower abdominal pain that she states has been present for about 3 weeks now. Patient denies any fever, N/V/D, extremity numbness/weakness, neck pain, dysuria, hematuria, CP, SOB, and all other sxs at this time. No further complaints or concerns at this time.         Arrival mode: Personal vehicle     PCP: Anna Penn MD       Past Medical History:  Past Medical History:   Diagnosis Date    Abnormal nuclear stress test 11/1/2015    Anemia 6/1/2019    Background diabetic retinopathy 12/28/2015    CAD (coronary artery disease) 2002    stents    Cataract     Gait disorder 10/12/2012    Glaucoma     History of PTCA 6/1/2019    MI (myocardial infarction)     Obesity     CORINE (obstructive  sleep apnea)     Other and unspecified hyperlipidemia     PD (Parkinson's disease) 2002    tremor-predominant (per patient)    Type II or unspecified type diabetes mellitus without mention of complication, not stated as uncontrolled 2002      am    Unspecified essential hypertension        Past Surgical History:  Past Surgical History:   Procedure Laterality Date    APPLICATION, WOUND VAC N/A 6/11/2019    Performed by Stanislav Nicolas MD at [facility] OR    BACK SURGERY  05/25/2019    BREAST BIOPSY Left     benign - about 3 yrs ago    CORONARY ANGIOPLASTY  2002    CORONARY STENT PLACEMENT  2002    DEBRIDEMENT, ABSCESS, PARASPINAL N/A 5/25/2019    Performed by Stanislav Nicolas MD at [facility] OR    DEBRIDEMENT, WOUND Bilateral 6/11/2019    Performed by Stanislav Nicolas MD at [facility] OR    DILATION AND CURETTAGE OF UTERUS      FOREIGN BODY REMOVAL      FUSION, SPINE, LUMBAR, TLIF, POSTERIOR APPROACH, USING PEDICLE SCREW N/A 5/25/2019    Performed by Stanislav Nicolas MD at [facility] OR    FUSION, SPINE, POSTERIOR SPINAL COLUMN, LUMBAR, USING COMPUTER-ASSISTED NAVIGATION  5/25/2019    Performed by Stanislav Nicolas MD at [facility] OR    HEART CATH-LEFT Left 11/5/2015    Performed by Constance Pace MD at [facility] CATH LAB    HEART CATH-LEFT Left 11/2/2015    Performed by Constance Pace MD at [facility] CATH LAB    HEART CATH-LEFT/pci stent lad Left 11/10/2015    Performed by Constance Pace MD at [facility] CATH LAB    INNER EAR SURGERY      LAMINECTOMY, SPINE, LUMBAR  5/25/2019    Performed by Stanislav Nicolas MD at [facility] OR    TONSILLECTOMY, ADENOIDECTOMY           Family History:  Family History   Problem Relation Age of Onset    Parkinsonism Other     Tremor Father     Hypertension Father     Heart attack Father 65        MI    Parkinsonism Paternal Aunt     Glaucoma Mother     Hypertension Mother     Heart attack Mother 65        MI    Ovarian cancer Mother     Glaucoma Maternal Grandmother     Hypertension  Maternal Grandmother     Diabetes Maternal Grandmother     Breast cancer Paternal Grandmother        Social History:  Social History     Tobacco Use    Smoking status: Former Smoker     Years: 40.00     Last attempt to quit: 10/12/2010     Years since quittin.8    Smokeless tobacco: Never Used    Tobacco comment: On & off for the past few years   Substance and Sexual Activity    Alcohol use: No     Alcohol/week: 0.0 oz    Drug use: No    Sexual activity: Not Currently       ROS   Review of Systems   Constitutional: Negative for fever.   HENT: Negative for sore throat.    Respiratory: Negative for shortness of breath.    Cardiovascular: Negative for chest pain.   Gastrointestinal: Positive for abdominal pain. Negative for nausea and vomiting.   Genitourinary: Negative for dysuria and hematuria.   Musculoskeletal: Positive for back pain. Negative for neck pain.   Skin: Negative for rash.   Neurological: Negative for weakness and numbness.   Hematological: Does not bruise/bleed easily.   All other systems reviewed and are negative.    Physical Exam      Initial Vitals [19 1834]   BP Pulse Resp Temp SpO2   128/67 (!) 114 (!) 24 97.4 °F (36.3 °C) 96 %      MAP       --          Physical Exam  Nursing Notes and Vital Signs Reviewed.  Constitutional: Patient is in mild distress. Well-developed and well-nourished.  Head: Atraumatic. Normocephalic.  Eyes: PERRL. EOM intact. Conjunctivae are not pale. No scleral icterus.  ENT: Mucous membranes are moist.    Neck: Supple. Full ROM. No lymphadenopathy.  Back: Laminectomy site with Gentian violet noted, no draiange from wound.  Cardiovascular: Regular rate. Regular rhythm. No murmurs, rubs, or gallops. Distal pulses are 2+ and symmetric.  Pulmonary/Chest: No respiratory distress. Clear to auscultation bilaterally. No wheezing or rales.  Abdominal: Soft and non-distended.  There is no tenderness.  No rebound, guarding, or rigidity. Good bowel  sounds.  Genitourinary: No CVA tenderness  Musculoskeletal: Moves all extremities. No obvious deformities. No edema.  Skin: Warm and dry.  Neurological:  Alert, awake, and appropriate.  Normal speech.  No acute focal neurological deficits are appreciated.  Psychiatric: Normal affect. Good eye contact. Appropriate in content.    ED Course    Procedures  ED Vital Signs:  Vitals:    08/22/19 0933 08/22/19 1002 08/22/19 1033 08/22/19 1106   BP: (!) 113/55 137/60 124/89 (!) 102/54   Pulse: 91 89 92 87   Resp: 20 20 20   Temp:       TempSrc:       SpO2: 95% (!) 93% 95% 95%   Weight:       Height:        08/22/19 1201 08/22/19 1401 08/22/19 1532 08/22/19 1618   BP: (!) 115/54 127/62 132/61 131/83   Pulse: 84 87 88 83   Resp: 20 18 16 18   Temp:   98 °F (36.7 °C) 98.4 °F (36.9 °C)   TempSrc:   Oral Oral   SpO2: 95% 95% (!) 93% (!) 93%   Weight:       Height:        08/22/19 2005 08/22/19 2330 08/23/19 0326 08/23/19 0802   BP: 137/61 126/62 (!) 131/58 (!) 142/65   Pulse: 78 81 80 76   Resp: 16 19 18 18   Temp: 97.5 °F (36.4 °C) 97.9 °F (36.6 °C) 97.4 °F (36.3 °C) 97.9 °F (36.6 °C)   TempSrc: Oral Oral Axillary Oral   SpO2: (!) 94% 98% 98% (!) 94%   Weight: 88.3 kg (194 lb 10.7 oz)      Height:        08/23/19 1200 08/23/19 1336 08/23/19 1632   BP: (!) 92/51 110/62 (!) 91/54   Pulse: 92  84   Resp: 18  18   Temp: 96.5 °F (35.8 °C)  98.3 °F (36.8 °C)   TempSrc: Axillary  Oral   SpO2: 95%  95%   Weight:      Height:          Abnormal Lab Results:  Labs Reviewed   CBC W/ AUTO DIFFERENTIAL - Abnormal; Notable for the following components:       Result Value    RDW 14.6 (*)     MPV 9.0 (*)     Gran # (ANC) 8.3 (*)     All other components within normal limits   BASIC METABOLIC PANEL - Abnormal; Notable for the following components:    Sodium 135 (*)     BUN, Bld 31 (*)     Calcium 10.6 (*)     All other components within normal limits   SEDIMENTATION RATE - Abnormal; Notable for the following components:    Sed Rate 22 (*)      All other components within normal limits   URINALYSIS, REFLEX TO URINE CULTURE - Abnormal; Notable for the following components:    Leukocytes, UA 1+ (*)     All other components within normal limits    Narrative:     Preferred Collection Type->Urine, Clean Catch   URINALYSIS MICROSCOPIC - Abnormal; Notable for the following components:    WBC, UA 15 (*)     Bacteria Moderate (*)     Yeast, UA Many (*)     Hyaline Casts, UA 5 (*)     All other components within normal limits    Narrative:     Preferred Collection Type->Urine, Clean Catch   CBC W/ AUTO DIFFERENTIAL - Abnormal; Notable for the following components:    Hemoglobin 11.5 (*)     Hematocrit 35.6 (*)     RDW 15.0 (*)     MPV 8.8 (*)     All other components within normal limits   COMPREHENSIVE METABOLIC PANEL - Abnormal; Notable for the following components:    Sodium 135 (*)     ALT <5 (*)     All other components within normal limits   POCT GLUCOSE - Abnormal; Notable for the following components:    POCT Glucose 115 (*)     All other components within normal limits   APTT   PROTIME-INR   C-REACTIVE PROTEIN   TYPE & SCREEN        All Lab Results:  Results for orders placed or performed during the hospital encounter of 08/21/19   Blood culture #1 **CANNOT BE ORDERED STAT**   Result Value Ref Range    Blood Culture, Routine Gram stain aer bottle: Gram positive cocci     Blood Culture, Routine       Results called to and read back by:Viola Reaves RN 08/22/2019  18:02    Blood Culture, Routine Gram stain flaco bottle: Gram positive cocci      Blood Culture, Routine Positive results previously called    Blood culture #2 **CANNOT BE ORDERED STAT**   Result Value Ref Range    Blood Culture, Routine No Growth to date     Blood Culture, Routine No Growth to date    Urine culture   Result Value Ref Range    Urine Culture, Routine No significant growth    CBC auto differential   Result Value Ref Range    WBC 11.67 3.90 - 12.70 K/uL    RBC 4.38 4.00 - 5.40 M/uL     Hemoglobin 12.2 12.0 - 16.0 g/dL    Hematocrit 37.7 37.0 - 48.5 %    Mean Corpuscular Volume 86 82 - 98 fL    Mean Corpuscular Hemoglobin 27.9 27.0 - 31.0 pg    Mean Corpuscular Hemoglobin Conc 32.4 32.0 - 36.0 g/dL    RDW 14.6 (H) 11.5 - 14.5 %    Platelets 303 150 - 350 K/uL    MPV 9.0 (L) 9.2 - 12.9 fL    Gran # (ANC) 8.3 (H) 1.8 - 7.7 K/uL    Lymph # 2.4 1.0 - 4.8 K/uL    Mono # 0.8 0.3 - 1.0 K/uL    Eos # 0.1 0.0 - 0.5 K/uL    Baso # 0.01 0.00 - 0.20 K/uL    Gran% 71.4 38.0 - 73.0 %    Lymph% 20.5 18.0 - 48.0 %    Mono% 7.1 4.0 - 15.0 %    Eosinophil% 0.9 0.0 - 8.0 %    Basophil% 0.1 0.0 - 1.9 %    Differential Method Automated    Basic metabolic panel   Result Value Ref Range    Sodium 135 (L) 136 - 145 mmol/L    Potassium 4.5 3.5 - 5.1 mmol/L    Chloride 97 95 - 110 mmol/L    CO2 26 23 - 29 mmol/L    Glucose 103 70 - 110 mg/dL    BUN, Bld 31 (H) 8 - 23 mg/dL    Creatinine 0.9 0.5 - 1.4 mg/dL    Calcium 10.6 (H) 8.7 - 10.5 mg/dL    Anion Gap 12 8 - 16 mmol/L    eGFR if African American >60 >60 mL/min/1.73 m^2    eGFR if non African American >60 >60 mL/min/1.73 m^2   APTT   Result Value Ref Range    aPTT 30.5 21.0 - 32.0 sec   Protime-INR   Result Value Ref Range    Prothrombin Time 10.7 9.0 - 12.5 sec    INR 1.0 0.8 - 1.2   Sedimentation rate   Result Value Ref Range    Sed Rate 22 (H) 0 - 20 mm/Hr   C-reactive protein   Result Value Ref Range    CRP 2.2 0.0 - 8.2 mg/L   Urinalysis, Reflex to Urine Culture Urine, Clean Catch   Result Value Ref Range    Specimen UA Urine, Catheterized     Color, UA Yellow Yellow, Straw, Radah    Appearance, UA Clear Clear    pH, UA 6.0 5.0 - 8.0    Specific Gravity, UA 1.020 1.005 - 1.030    Protein, UA Negative Negative    Glucose, UA Negative Negative    Ketones, UA Negative Negative    Bilirubin (UA) Negative Negative    Occult Blood UA Negative Negative    Nitrite, UA Negative Negative    Urobilinogen, UA Negative <2.0 EU/dL    Leukocytes, UA 1+ (A) Negative   Urinalysis  Microscopic   Result Value Ref Range    RBC, UA 1 0 - 4 /hpf    WBC, UA 15 (H) 0 - 5 /hpf    Bacteria Moderate (A) None-Occ /hpf    Yeast, UA Many (A) None    Hyaline Casts, UA 5 (A) 0-1/lpf /lpf    Microscopic Comment SEE COMMENT    CBC auto differential   Result Value Ref Range    WBC 8.91 3.90 - 12.70 K/uL    RBC 4.15 4.00 - 5.40 M/uL    Hemoglobin 11.5 (L) 12.0 - 16.0 g/dL    Hematocrit 35.6 (L) 37.0 - 48.5 %    Mean Corpuscular Volume 86 82 - 98 fL    Mean Corpuscular Hemoglobin 27.7 27.0 - 31.0 pg    Mean Corpuscular Hemoglobin Conc 32.3 32.0 - 36.0 g/dL    RDW 15.0 (H) 11.5 - 14.5 %    Platelets 246 150 - 350 K/uL    MPV 8.8 (L) 9.2 - 12.9 fL    Gran # (ANC) 6.3 1.8 - 7.7 K/uL    Lymph # 1.8 1.0 - 4.8 K/uL    Mono # 0.7 0.3 - 1.0 K/uL    Eos # 0.2 0.0 - 0.5 K/uL    Baso # 0.01 0.00 - 0.20 K/uL    Gran% 70.4 38.0 - 73.0 %    Lymph% 20.4 18.0 - 48.0 %    Mono% 7.4 4.0 - 15.0 %    Eosinophil% 1.9 0.0 - 8.0 %    Basophil% 0.1 0.0 - 1.9 %    Differential Method Automated    Comprehensive metabolic panel   Result Value Ref Range    Sodium 135 (L) 136 - 145 mmol/L    Potassium 4.5 3.5 - 5.1 mmol/L    Chloride 99 95 - 110 mmol/L    CO2 27 23 - 29 mmol/L    Glucose 100 70 - 110 mg/dL    BUN, Bld 19 8 - 23 mg/dL    Creatinine 0.7 0.5 - 1.4 mg/dL    Calcium 9.9 8.7 - 10.5 mg/dL    Total Protein 6.6 6.0 - 8.4 g/dL    Albumin 3.6 3.5 - 5.2 g/dL    Total Bilirubin 0.5 0.1 - 1.0 mg/dL    Alkaline Phosphatase 71 55 - 135 U/L    AST 22 10 - 40 U/L    ALT <5 (L) 10 - 44 U/L    Anion Gap 9 8 - 16 mmol/L    eGFR if African American >60 >60 mL/min/1.73 m^2    eGFR if non African American >60 >60 mL/min/1.73 m^2   CBC auto differential   Result Value Ref Range    WBC 6.40 3.90 - 12.70 K/uL    RBC 4.31 4.00 - 5.40 M/uL    Hemoglobin 11.9 (L) 12.0 - 16.0 g/dL    Hematocrit 36.6 (L) 37.0 - 48.5 %    Mean Corpuscular Volume 85 82 - 98 fL    Mean Corpuscular Hemoglobin 27.6 27.0 - 31.0 pg    Mean Corpuscular Hemoglobin Conc 32.5 32.0 -  36.0 g/dL    RDW 14.5 11.5 - 14.5 %    Platelets 232 150 - 350 K/uL    MPV 9.1 (L) 9.2 - 12.9 fL    Gran # (ANC) 4.1 1.8 - 7.7 K/uL    Lymph # 1.5 1.0 - 4.8 K/uL    Mono # 0.6 0.3 - 1.0 K/uL    Eos # 0.2 0.0 - 0.5 K/uL    Baso # 0.01 0.00 - 0.20 K/uL    Gran% 64.2 38.0 - 73.0 %    Lymph% 22.8 18.0 - 48.0 %    Mono% 9.2 4.0 - 15.0 %    Eosinophil% 3.8 0.0 - 8.0 %    Basophil% 0.2 0.0 - 1.9 %    Differential Method Automated    Comprehensive metabolic panel   Result Value Ref Range    Sodium 132 (L) 136 - 145 mmol/L    Potassium 4.3 3.5 - 5.1 mmol/L    Chloride 96 95 - 110 mmol/L    CO2 26 23 - 29 mmol/L    Glucose 113 (H) 70 - 110 mg/dL    BUN, Bld 12 8 - 23 mg/dL    Creatinine 0.7 0.5 - 1.4 mg/dL    Calcium 9.8 8.7 - 10.5 mg/dL    Total Protein 6.7 6.0 - 8.4 g/dL    Albumin 3.6 3.5 - 5.2 g/dL    Total Bilirubin 0.6 0.1 - 1.0 mg/dL    Alkaline Phosphatase 70 55 - 135 U/L    AST 23 10 - 40 U/L    ALT 11 10 - 44 U/L    Anion Gap 10 8 - 16 mmol/L    eGFR if African American >60 >60 mL/min/1.73 m^2    eGFR if non African American >60 >60 mL/min/1.73 m^2   Type & Screen   Result Value Ref Range    Group & Rh A POS     Indirect Corey NEG    POCT glucose   Result Value Ref Range    POCT Glucose 115 (H) 70 - 110 mg/dL   POCT glucose   Result Value Ref Range    POCT Glucose 111 (H) 70 - 110 mg/dL   POCT glucose   Result Value Ref Range    POCT Glucose 119 (H) 70 - 110 mg/dL   POCT glucose   Result Value Ref Range    POCT Glucose 119 (H) 70 - 110 mg/dL   POCT glucose   Result Value Ref Range    POCT Glucose 180 (H) 70 - 110 mg/dL   POCT glucose   Result Value Ref Range    POCT Glucose 139 (H) 70 - 110 mg/dL         Imaging Results:  Imaging Results          MRI Lumbar Spine W WO Cont (Final result)  Result time 08/22/19 15:57:32    Final result by Phong Perea Jr., MD (08/22/19 15:57:32)                 Impression:      1. Generally favorable response to therapy with diminished abnormal bone marrow signal abnormalities  relating to evolution of osteomyelitis change.  Diminished swelling of the right psoas muscle.  No evidence of abscess.  2. Laminectomy at L2-L3 and L3-L4 with posterior fusion as described.  There is mild to moderate spinal stenosis at L2-L3 with lateral recess stenosis that could affect the descending L3 spinal nerve roots.  No enhancing granulation tissue compresses the thecal sac or neural structures.  3. Grade 1 degenerative spondylolisthesis at L4-L5 with severe lateral recess stenosis that could compress the descending L5 spinal nerve roots.      Electronically signed by: Phong Perea Jr., MD  Date:    08/22/2019  Time:    15:57             Narrative:    EXAMINATION:  MRI LUMBAR SPINE W WO CONTRAST    CLINICAL HISTORY:  Low back painLow back pain, prior surgery, new or progressive sx;    TECHNIQUE:  MR Lumbar spine with contrast. Sagittal T1, T2, STIR. Axial T1, T2. Post contrast Sagittal and Axial T1.    COMPARISON:  CT from June 11, 2019 was reviewed.  MRI from May 23, 2019 was reviewed as well.    FINDINGS:  Since the previous MRI there has been a laminectomy at L2-L3 with posterior fusion spanning L1 inferiorly to L4.  Grade 1 degenerative spondylolisthesis at L4-L5.  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  There are no peripheral enhancing fluid collections on the current exam.  There is expected enhancement of granulation tissue at the laminectomy site.  No abnormal intradural enhancement.  The conus medullaris terminates at the level [id number]. No abnormal signal within the conus. Intervertebral disc levels are as follows:    T12-L1 disc: Normal disc space height with anterior osteophytes.  Normal facet joints.  The thecal sac measures 18 mm AP.  No significant foraminal stenosis.    L1-L2 disc : Level of interval posterior fusion.  Disc space height loss with a circumferential disc bulge.   Anterior osteophytes.  Normal facet joints.  Mild Modic type 1 endplate change which is similar to the prior exam.  The thecal sac measures 18 mm.  No significant foraminal stenosis.    L2-L3 disc: Level of interval laminectomy and posterior fusion.  Enhancing granulation tissue at the laminectomy site.  No compression of the thecal sac in relation to the granulation tissue.  Evolution of marrow edema pattern relating to treatment.  Posteriorly protruding disc/osteophyte material flattens the thecal sac to a trefoil configuration.  It measures 12 mm AP with mild bilateral lateral recess stenosis.  Mild/moderate bilateral foraminal stenosis.  On the preoperative study the thecal sac measured 7 mm AP at this level.    L3-L4 disc: Level of laminectomy and posterior fusion.  Enhancing granulation tissue at the laminectomy site that does not contribute to spinal stenosis or foraminal stenosis.  Fatty Modic type 2 endplate change.  The thecal sac measures 17 mm AP.  Moderate/severe left foraminal stenosis.  No significant right foraminal stenosis.    L4-L5 disc: Grade 1 degenerative spondylolisthesis with unroofing and bulging of disc.  Severe degenerative facet hypertrophy.  Buckling of ligamentum flavum.  Severe lateral recess stenosis bilaterally.  The thecal sac measures 10 mm AP.  Moderate bilateral foraminal stenosis.    L5-S1 disc: Normal disc space height.  Mild degenerative facet hypertrophy.  No significant spinal or foraminal stenosis.                                        The Emergency Provider reviewed the vital signs and test results, which are outlined above.    ED Discussion     10:21 PM: Discussed pt's case with Dr. Nicolas (Neurosurgery) who agrees w/ current care and recommends hospital medicine admission, NPO at midnight for operation in AM.      10:37 PM: Discussed case with Dr. Duran (Hospital Medicine). Dr. Duran agrees with current care and management of pt and accepts admission.   Admitting  Service: Hospital Medicine  Admitting Physician: Dr. Duran  Admit to: Obs Med-surg    10:40 PM: Re-evaluated pt. I have discussed test results, shared treatment plan, and the need for admission with patient and family at bedside. Pt and family express understanding at this time and agree with all information. All questions answered. Pt and family have no further questions or concerns at this time. Pt is ready for admit.      ED Medication(s):  Medications   vancomycin 2 g in dextrose 5 % 500 mL IVPB ( Intravenous Trough Due 30 minutes Before Dose 8/23/19 2200)   levothyroxine tablet 125 mcg (125 mcg Oral Given 8/23/19 0505)   losartan tablet 50 mg (50 mg Oral Given 8/23/19 0831)   carbidopa-levodopa  mg per tablet 1 tablet (1 tablet Oral Given 8/23/19 1716)   isosorbide mononitrate 24 hr tablet 120 mg (120 mg Oral Given 8/23/19 0830)   metoprolol succinate (TOPROL-XL) 24 hr tablet 25 mg (25 mg Oral Given 8/23/19 0830)   pantoprazole EC tablet 40 mg (40 mg Oral Given 8/23/19 0830)   oxyCODONE-acetaminophen  mg per tablet 1 tablet (1 tablet Oral Given 8/23/19 1732)   pravastatin tablet 40 mg (40 mg Oral Given 8/23/19 0830)   glucagon (human recombinant) injection 1 mg (has no administration in time range)   dextrose 10% (D10W) Bolus (has no administration in time range)   insulin aspart U-100 pen 0-5 Units (has no administration in time range)   piperacillin-tazobactam 4.5 g in dextrose 5 % 100 mL IVPB (ready to mix system) (4.5 g Intravenous New Bag 8/23/19 1716)   ondansetron injection 4 mg (4 mg Intravenous Given 8/23/19 1056)   lactated ringers bolus 2,565 mL (0 mL/kg × 85.5 kg Intravenous Stopped 8/21/19 2355)   cefepime in dextrose 5 % 1 gram/50 mL IVPB 1 g (0 g Intravenous Stopped 8/21/19 2200)   vancomycin (VANCOCIN) 2,500 mg in dextrose 5 % 500 mL IVPB (0 mg Intravenous Stopped 8/22/19 0230)   morphine injection 4 mg (4 mg Intravenous Given 8/21/19 2653)   ondansetron injection 4 mg (4 mg  Intravenous Given 8/21/19 2111)   ondansetron injection 4 mg (4 mg Intravenous Given 8/21/19 2340)   morphine injection 4 mg (4 mg Intravenous Given 8/21/19 2340)   diazePAM tablet 5 mg (5 mg Oral Given 8/22/19 1344)   gadobutrol (GADAVIST) injection 10 mL (8 mLs Intravenous Given 8/22/19 1501)   morphine injection 2 mg (2 mg Intravenous Given 8/22/19 2234)             Medical Decision Making    Medical Decision Making:   Clinical Tests:   Lab Tests: Ordered and Reviewed           Scribe Attestation:   Scribe #1: I performed the above scribed service and the documentation accurately describes the services I performed. I attest to the accuracy of the note.    Attending:   Physician Attestation Statement for Scribe #1: I, Tom Meyer MD, personally performed the services described in this documentation, as scribed by Tammi Jaramillo, in my presence, and it is both accurate and complete.          Clinical Impression       ICD-10-CM ICD-9-CM   1. Drainage from wound T14.8XXA 879.8   2. Low back pain, non-specific M54.5 724.2       Disposition:   Disposition: Placed in Observation (Med surg)  Condition: Stable         Tom Meyer MD  08/23/19 0644

## 2019-08-22 NOTE — PROGRESS NOTES
[facility] Hospital Medicine  Progress Note    Patient Name: Ross Isbell  MRN: [id number]  Patient Class: OP- Observation   Admission Date: 8/21/2019  Length of Stay: 0 days  Attending Physician: Paul Duran MD  Primary Care Provider: Anna Penn MD        Subjective:     Principal Problem:Status post lumbar spinal fusion        HPI:  Ms Isbell is a 73 year old female with PMHx of CAD, HTN, DM, Parkinson disease, HLD and Osteomyelitis of lumbar spine who presented to [facility] Emergency Room with increase back pain. She  had axial back pain with destruction of the disc space underwent  posterior washout and debridement with placement of instrumentation on 5/24. Patient  had dehiscence of her incision and was taken back to the OR for wound VAC placement on 6/11. She has been experiencing increase back pain, unable to walk. She had been doing well with Physical Therapy, but now has been a wheel chair. She has experienced failure of instrumentation and elevated infectious markers. Neurosurery surgery following, plan for removal of instumentation with antibotic beads placement and wound vac on tomorrow. She being placed in observation under to care of Hospital Medicine.       Overview/Hospital Course:  Pt admitted to Observation Unit s/p lumbar spinal fusion.  NeuroSurgery consulted.  MRI showed favorable response to therapy with diminished abnormal bone marrow signal abnormalities relating to evolution of osteomyelitis change.  Diminished swelling of the right psoas muscle with no evidence of abscess.  2. Laminectomy at L2-L3 and L3-L4 with posterior fusion with mild to moderate spinal stenosis at L2-L3 with lateral recess stenosis that could affect the descending L3 spinal nerve roots.  No enhancing granulation tissue compresses the thecal sac or neural structures.  Grade 1 degenerative spondylolisthesis at L4-L5 with severe lateral recess stenosis that could compress the descending L5 spinal nerve  roots.  Pt treated with IV antibiotics and analgesia prn. Blood cultures with no growth to date.  Urine culture in progress.  Washout with revision of instrumentation and wound closure per NeuroSurgery and Plastics planned.  Vital signs stable.     Interval History: pt continues to report back pain and abdominal pain.  MRI results reviewed. MRI completed today with results reviewed. Wound washout with revision of instrumentation and wound closure planned per NeuroSurgery and Plastics.     Review of Systems   Constitutional: Positive for activity change and fatigue. Negative for chills and fever.   HENT: Negative for congestion, rhinorrhea and sinus pressure.    Respiratory: Negative for apnea, cough, choking, chest tightness, shortness of breath, wheezing and stridor.    Cardiovascular: Negative for chest pain, palpitations and leg swelling.   Gastrointestinal: Positive for abdominal pain. Negative for abdominal distention, diarrhea, nausea and vomiting.   Endocrine: Negative for cold intolerance and heat intolerance.   Genitourinary: Negative for difficulty urinating and hematuria.   Musculoskeletal: Positive for back pain. Negative for arthralgias and joint swelling.   Skin: Negative for color change, pallor and rash.   Neurological: Positive for weakness. Negative for dizziness, seizures, numbness and headaches.   Psychiatric/Behavioral: Negative for agitation. The patient is not nervous/anxious.      Objective:     Vital Signs (Most Recent):  Temp: 98.4 °F (36.9 °C) (08/22/19 1618)  Pulse: 83 (08/22/19 1618)  Resp: 18 (08/22/19 1618)  BP: 131/83 (08/22/19 1618)  SpO2: (!) 93 % (08/22/19 1618) Vital Signs (24h Range):  Temp:  [97.4 °F (36.3 °C)-98.4 °F (36.9 °C)] 98.4 °F (36.9 °C)  Pulse:  [] 83  Resp:  [16-24] 18  SpO2:  [93 %-96 %] 93 %  BP: (100-137)/(51-89) 131/83     Weight: 85.5 kg (188 lb 8 oz)  Body mass index is 30.42 kg/m².    Intake/Output Summary (Last 24 hours) at 8/22/2019 4633  Last data  filed at 8/22/2019 0623  Gross per 24 hour   Intake 3065 ml   Output 2500 ml   Net 565 ml      Physical Exam   Constitutional: She is oriented to person, place, and time. No distress.   HENT:   Nose: Nose normal.   Mouth/Throat: No oropharyngeal exudate.   Eyes: Right eye exhibits no discharge. Left eye exhibits no discharge.   Neck: No JVD present.   Cardiovascular: Normal heart sounds and intact distal pulses. Exam reveals no gallop and no friction rub.   No murmur heard.  Pulmonary/Chest: No stridor. No respiratory distress. She has no wheezes. She has no rales. She exhibits no tenderness.   Abdominal: She exhibits no distension and no mass. There is no tenderness. There is no rebound and no guarding. No hernia.   Genitourinary:   Genitourinary Comments: Carlson catheter present    Musculoskeletal: She exhibits no edema or deformity.   Tremor in hands noted    Neurological: She is alert and oriented to person, place, and time.   Skin: Skin is warm and dry. Capillary refill takes 2 to 3 seconds. She is not diaphoretic.   Wound to spine with dressing in place  -Erythema to sacral area blanchable   Psychiatric: She has a normal mood and affect. Her behavior is normal.   Nursing note and vitals reviewed.      Significant Labs:   CBC:   Recent Labs   Lab 08/21/19 2028 08/22/19  0926   WBC 11.67 8.91   HGB 12.2 11.5*   HCT 37.7 35.6*    246     CMP:   Recent Labs   Lab 08/21/19 2028 08/22/19  0926   * 135*   K 4.5 4.5   CL 97 99   CO2 26 27    100   BUN 31* 19   CREATININE 0.9 0.7   CALCIUM 10.6* 9.9   PROT  --  6.6   ALBUMIN  --  3.6   BILITOT  --  0.5   ALKPHOS  --  71   AST  --  22   ALT  --  <5*   ANIONGAP 12 9   EGFRNONAA >60 >60       Significant Imaging:   Imaging Results          MRI Lumbar Spine W WO Cont (Final result)  Result time 08/22/19 15:57:32    Final result by Phong Perea Jr., MD (08/22/19 15:57:32)                 Impression:      1. Generally favorable response to therapy  with diminished abnormal bone marrow signal abnormalities relating to evolution of osteomyelitis change.  Diminished swelling of the right psoas muscle.  No evidence of abscess.  2. Laminectomy at L2-L3 and L3-L4 with posterior fusion as described.  There is mild to moderate spinal stenosis at L2-L3 with lateral recess stenosis that could affect the descending L3 spinal nerve roots.  No enhancing granulation tissue compresses the thecal sac or neural structures.  3. Grade 1 degenerative spondylolisthesis at L4-L5 with severe lateral recess stenosis that could compress the descending L5 spinal nerve roots.      Electronically signed by: Phong Perea Jr., MD  Date:    08/22/2019  Time:    15:57             Narrative:    EXAMINATION:  MRI LUMBAR SPINE W WO CONTRAST    CLINICAL HISTORY:  Low back painLow back pain, prior surgery, new or progressive sx;    TECHNIQUE:  MR Lumbar spine with contrast. Sagittal T1, T2, STIR. Axial T1, T2. Post contrast Sagittal and Axial T1.    COMPARISON:  CT from June 11, 2019 was reviewed.  MRI from May 23, 2019 was reviewed as well.    FINDINGS:  Since the previous MRI there has been a laminectomy at L2-L3 with posterior fusion spanning L1 inferiorly to L4.  Grade 1 degenerative spondylolisthesis at L4-L5.  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  There are no peripheral enhancing fluid collections on the current exam.  There is expected enhancement of granulation tissue at the laminectomy site.  No abnormal intradural enhancement.  The conus medullaris terminates at the level lfV32-E6. No abnormal signal within the conus. Intervertebral disc levels are as follows:    T12-L1 disc: Normal disc space height with anterior osteophytes.  Normal facet joints.  The thecal sac measures 18 mm AP.  No significant foraminal stenosis.    L1-L2 disc : Level of interval posterior fusion.  Disc  space height loss with a circumferential disc bulge.  Anterior osteophytes.  Normal facet joints.  Mild Modic type 1 endplate change which is similar to the prior exam.  The thecal sac measures 18 mm.  No significant foraminal stenosis.    L2-L3 disc: Level of interval laminectomy and posterior fusion.  Enhancing granulation tissue at the laminectomy site.  No compression of the thecal sac in relation to the granulation tissue.  Evolution of marrow edema pattern relating to treatment.  Posteriorly protruding disc/osteophyte material flattens the thecal sac to a trefoil configuration.  It measures 12 mm AP with mild bilateral lateral recess stenosis.  Mild/moderate bilateral foraminal stenosis.  On the preoperative study the thecal sac measured 7 mm AP at this level.    L3-L4 disc: Level of laminectomy and posterior fusion.  Enhancing granulation tissue at the laminectomy site that does not contribute to spinal stenosis or foraminal stenosis.  Fatty Modic type 2 endplate change.  The thecal sac measures 17 mm AP.  Moderate/severe left foraminal stenosis.  No significant right foraminal stenosis.    L4-L5 disc: Grade 1 degenerative spondylolisthesis with unroofing and bulging of disc.  Severe degenerative facet hypertrophy.  Buckling of ligamentum flavum.  Severe lateral recess stenosis bilaterally.  The thecal sac measures 10 mm AP.  Moderate bilateral foraminal stenosis.    L5-S1 disc: Normal disc space height.  Mild degenerative facet hypertrophy.  No significant spinal or foraminal stenosis.                                Assessment/Plan:      * Status post lumbar spinal fusion  Admit to Observation   Neurosurgery managing   Plan removal of the instrumentation with placement of antibiotic beads to the incision cavity along with a wound VAC placement.   NPO after MN  Cefepime  Vancomycin    Pain control   -antiemetics prn       Abnormal urinalysis  -UA consistent with possible infectious process   -Iv antibiotics    -urine culture results pending       Drainage from wound  -IV antibiotics continued   -wound cultures to be obtained   -NeuroSurgery following with washout with revision of instrumentation and wound closure  -wound care consult to be placed when appropriate       Osteomyelitis of lumbar spine  As above   -pt completed IV antibiotics   -MRI results reviewed  -pt may benefit from ID consult   -hx of  Microbiology  that grew Streptococcus s/p washout on 5/24/19 with appropriate antibiotics completed of note  -hx of posterior dehiscence of incision noted with transferred to a nursing home for long term antibiotic therapy via PICC line for approximately 2 months.  -Blood culture results pending       Morbid obesity with BMI of 40.0-44.9, adult  Pt may benefit from nutrition consult upon discharge       Type 2 diabetes, controlled, with peripheral neuropathy  Accu with SSI       Hypothyroidism  Continue Synthroid     Essential hypertension  Continue home meds       PD (Parkinson's disease)  Continue home Parkinson's meds        VTE Risk Mitigation (From admission, onward)        Ordered     Place sequential compression device  Until discontinued      08/22/19 0052                Cecilia Tinsley NP  Department of Hospital Medicine   [facility]

## 2019-08-23 LAB
ALBUMIN SERPL BCP-MCNC: 3.6 G/DL (ref 3.5–5.2)
ALP SERPL-CCNC: 70 U/L (ref 55–135)
ALT SERPL W/O P-5'-P-CCNC: 11 U/L (ref 10–44)
ANION GAP SERPL CALC-SCNC: 10 MMOL/L (ref 8–16)
AST SERPL-CCNC: 23 U/L (ref 10–40)
BACTERIA UR CULT: NORMAL
BASOPHILS # BLD AUTO: 0.01 K/UL (ref 0–0.2)
BASOPHILS NFR BLD: 0.2 % (ref 0–1.9)
BILIRUB SERPL-MCNC: 0.6 MG/DL (ref 0.1–1)
BUN SERPL-MCNC: 12 MG/DL (ref 8–23)
CALCIUM SERPL-MCNC: 9.8 MG/DL (ref 8.7–10.5)
CHLORIDE SERPL-SCNC: 96 MMOL/L (ref 95–110)
CO2 SERPL-SCNC: 26 MMOL/L (ref 23–29)
CREAT SERPL-MCNC: 0.7 MG/DL (ref 0.5–1.4)
DIFFERENTIAL METHOD: ABNORMAL
EOSINOPHIL # BLD AUTO: 0.2 K/UL (ref 0–0.5)
EOSINOPHIL NFR BLD: 3.8 % (ref 0–8)
ERYTHROCYTE [DISTWIDTH] IN BLOOD BY AUTOMATED COUNT: 14.5 % (ref 11.5–14.5)
EST. GFR  (AFRICAN AMERICAN): >60 ML/MIN/1.73 M^2
EST. GFR  (NON AFRICAN AMERICAN): >60 ML/MIN/1.73 M^2
GLUCOSE SERPL-MCNC: 113 MG/DL (ref 70–110)
HCT VFR BLD AUTO: 36.6 % (ref 37–48.5)
HGB BLD-MCNC: 11.9 G/DL (ref 12–16)
LYMPHOCYTES # BLD AUTO: 1.5 K/UL (ref 1–4.8)
LYMPHOCYTES NFR BLD: 22.8 % (ref 18–48)
MCH RBC QN AUTO: 27.6 PG (ref 27–31)
MCHC RBC AUTO-ENTMCNC: 32.5 G/DL (ref 32–36)
MCV RBC AUTO: 85 FL (ref 82–98)
MONOCYTES # BLD AUTO: 0.6 K/UL (ref 0.3–1)
MONOCYTES NFR BLD: 9.2 % (ref 4–15)
NEUTROPHILS # BLD AUTO: 4.1 K/UL (ref 1.8–7.7)
NEUTROPHILS NFR BLD: 64.2 % (ref 38–73)
PLATELET # BLD AUTO: 232 K/UL (ref 150–350)
PMV BLD AUTO: 9.1 FL (ref 9.2–12.9)
POCT GLUCOSE: 119 MG/DL (ref 70–110)
POCT GLUCOSE: 139 MG/DL (ref 70–110)
POCT GLUCOSE: 147 MG/DL (ref 70–110)
POCT GLUCOSE: 180 MG/DL (ref 70–110)
POTASSIUM SERPL-SCNC: 4.3 MMOL/L (ref 3.5–5.1)
PROT SERPL-MCNC: 6.7 G/DL (ref 6–8.4)
RBC # BLD AUTO: 4.31 M/UL (ref 4–5.4)
SODIUM SERPL-SCNC: 132 MMOL/L (ref 136–145)
VANCOMYCIN TROUGH SERPL-MCNC: 12.4 UG/ML (ref 10–22)
WBC # BLD AUTO: 6.4 K/UL (ref 3.9–12.7)

## 2019-08-23 PROCEDURE — 85025 COMPLETE CBC W/AUTO DIFF WBC: CPT

## 2019-08-23 PROCEDURE — 11000001 HC ACUTE MED/SURG PRIVATE ROOM

## 2019-08-23 PROCEDURE — 80202 ASSAY OF VANCOMYCIN: CPT

## 2019-08-23 PROCEDURE — 63600175 PHARM REV CODE 636 W HCPCS: Performed by: EMERGENCY MEDICINE

## 2019-08-23 PROCEDURE — 80053 COMPREHEN METABOLIC PANEL: CPT

## 2019-08-23 PROCEDURE — 87070 CULTURE OTHR SPECIMN AEROBIC: CPT

## 2019-08-23 PROCEDURE — 63600175 PHARM REV CODE 636 W HCPCS: Performed by: NURSE PRACTITIONER

## 2019-08-23 PROCEDURE — 99232 PR SUBSEQUENT HOSPITAL CARE,LEVL II: ICD-10-PCS | Mod: 24,,, | Performed by: NEUROLOGICAL SURGERY

## 2019-08-23 PROCEDURE — 99232 SBSQ HOSP IP/OBS MODERATE 35: CPT | Mod: 24,,, | Performed by: NEUROLOGICAL SURGERY

## 2019-08-23 PROCEDURE — 25000003 PHARM REV CODE 250: Performed by: NURSE PRACTITIONER

## 2019-08-23 PROCEDURE — 87075 CULTR BACTERIA EXCEPT BLOOD: CPT

## 2019-08-23 PROCEDURE — 21400001 HC TELEMETRY ROOM

## 2019-08-23 RX ORDER — ONDANSETRON 2 MG/ML
4 INJECTION INTRAMUSCULAR; INTRAVENOUS EVERY 8 HOURS PRN
Status: DISCONTINUED | OUTPATIENT
Start: 2019-08-23 | End: 2019-08-29 | Stop reason: HOSPADM

## 2019-08-23 RX ADMIN — PIPERACILLIN AND TAZOBACTAM 4.5 G: 4; .5 INJECTION, POWDER, LYOPHILIZED, FOR SOLUTION INTRAVENOUS; PARENTERAL at 09:08

## 2019-08-23 RX ADMIN — CARBIDOPA AND LEVODOPA 1 TABLET: 25; 250 TABLET ORAL at 09:08

## 2019-08-23 RX ADMIN — ASPIRIN 81 MG: 81 TABLET, COATED ORAL at 08:08

## 2019-08-23 RX ADMIN — PIPERACILLIN AND TAZOBACTAM 4.5 G: 4; .5 INJECTION, POWDER, LYOPHILIZED, FOR SOLUTION INTRAVENOUS; PARENTERAL at 05:08

## 2019-08-23 RX ADMIN — CARBIDOPA AND LEVODOPA 1 TABLET: 25; 250 TABLET ORAL at 05:08

## 2019-08-23 RX ADMIN — METOPROLOL SUCCINATE 25 MG: 25 TABLET, EXTENDED RELEASE ORAL at 08:08

## 2019-08-23 RX ADMIN — ISOSORBIDE MONONITRATE 120 MG: 30 TABLET, EXTENDED RELEASE ORAL at 08:08

## 2019-08-23 RX ADMIN — OXYCODONE HYDROCHLORIDE AND ACETAMINOPHEN 1 TABLET: 10; 325 TABLET ORAL at 09:08

## 2019-08-23 RX ADMIN — OXYCODONE HYDROCHLORIDE AND ACETAMINOPHEN 1 TABLET: 10; 325 TABLET ORAL at 05:08

## 2019-08-23 RX ADMIN — LEVOTHYROXINE SODIUM 125 MCG: 125 TABLET ORAL at 05:08

## 2019-08-23 RX ADMIN — PRAVASTATIN SODIUM 40 MG: 20 TABLET ORAL at 08:08

## 2019-08-23 RX ADMIN — LOSARTAN POTASSIUM 50 MG: 50 TABLET, FILM COATED ORAL at 08:08

## 2019-08-23 RX ADMIN — ONDANSETRON 4 MG: 2 INJECTION INTRAMUSCULAR; INTRAVENOUS at 10:08

## 2019-08-23 RX ADMIN — VANCOMYCIN HYDROCHLORIDE 2000 MG: 100 INJECTION, POWDER, LYOPHILIZED, FOR SOLUTION INTRAVENOUS at 09:08

## 2019-08-23 RX ADMIN — PANTOPRAZOLE SODIUM 40 MG: 40 TABLET, DELAYED RELEASE ORAL at 08:08

## 2019-08-23 RX ADMIN — OXYCODONE HYDROCHLORIDE AND ACETAMINOPHEN 1 TABLET: 10; 325 TABLET ORAL at 08:08

## 2019-08-23 RX ADMIN — OXYCODONE HYDROCHLORIDE AND ACETAMINOPHEN 1 TABLET: 10; 325 TABLET ORAL at 01:08

## 2019-08-23 RX ADMIN — OXYCODONE HYDROCHLORIDE AND ACETAMINOPHEN 1 TABLET: 10; 325 TABLET ORAL at 02:08

## 2019-08-23 RX ADMIN — CARBIDOPA AND LEVODOPA 1 TABLET: 25; 250 TABLET ORAL at 01:08

## 2019-08-23 NOTE — ASSESSMENT & PLAN NOTE
-IV antibiotics continued   -wound cultures results pending    -NeuroSurgery following with washout with revision of instrumentation and wound closure  -wound care consulted

## 2019-08-23 NOTE — PLAN OF CARE
Pt presented to the hospital with complaints of pain to back, stomach, groin, and drainage from wound related to previous spinal surgery.  The pt is alert and oriented x 3 and primary complaint is lack of pain management. Pt was previously admitted to hospital in June and had spinal surgery via Dr. Nicolas.  Pt continues to have pain and likely will require second spinal surgery to fuse spine order to alleviate pain.  Per pt and daughter, Dr. Nicolas has scheduled the surgery for Monday, 8/26/2019.  Prior to admission the pt was residing at [facility] where she had initially received SNF services but currently is only there for group home care.  Pt is unsure what her needs will be after her surgery, but pt is likely to return to [facility] and may qualify for SNF services again, pending PT/OT eval.  CM provided a transitional care folder, information on advanced directives, information on pharmacy bedside delivery, and discharge planning begins on admission with contact information for any needs/questions.    D/C plan: TBD (likely SNF vs return to nursing home)       08/23/19 1022   Discharge Assessment   Assessment Type Discharge Planning Assessment   Confirmed/corrected address and phone number on facesheet? Yes   Assessment information obtained from? Patient;Medical Record;Caregiver   Expected Length of Stay (days)   (tbd)   Communicated expected length of stay with patient/caregiver yes   Prior to hospitilization cognitive status: Alert/Oriented   Prior to hospitalization functional status: Assistive Equipment;Needs Assistance   Current cognitive status: Alert/Oriented   Current Functional Status: Assistive Equipment;Needs Assistance   Facility Arrived From: [facility]    Lives With facility resident   Able to Return to Prior Arrangements yes   Is patient able to care for self after discharge? Yes   Who are your caregiver(s) and their phone number(s)? Jean-Pierre Marcicheryl, spouse: [phone]  (home), [phone] (mobile); Uzma Isbell, daughter: [phone]   Patient's perception of discharge disposition skilled nursing facility;nursing home   Readmission Within the Last 30 Days no previous admission in last 30 days   Patient currently being followed by outpatient case management? No   Patient currently receives any other outside agency services? No   Equipment Currently Used at Home wheelchair;bedside commode;hospital bed;oxygen;rollator;grab bar;shower chair   Do you have any problems affording any of your prescribed medications? No   Is the patient taking medications as prescribed? yes   Does the patient have transportation home? Yes   Transportation Anticipated family or friend will provide;agency   Does the patient receive services at the [facility]? No   Discharge Plan A Skilled Nursing Facility   Discharge Plan B Return to Nursing Home   DME Needed Upon Discharge  none   Patient/Family in Agreement with Plan yes

## 2019-08-23 NOTE — PLAN OF CARE
Problem: Adult Inpatient Plan of Care  Goal: Plan of Care Review  Outcome: Ongoing (interventions implemented as appropriate)  Remains free from injury. States relief of pain with available prn meds. Abx administered as ordered. Turning q2hr for pressure ulcer prevention/healing. Vital signs stable. Chart reviewed. Will continue to monitor.

## 2019-08-23 NOTE — PROGRESS NOTES
Plan:  Patient MRI shows sign of improving osteomyelitis.  The screws have pulled out superiorly.  We will plan for revision on Monday afternoon pending cultures.      Interval History:   Since admission patient has remained comfortable in bed  Has back pain into the groin as before  MRI was completed which shows improvement of the overall the disc space and signs of previous osteomyelitis  Cultures were taken results were noted below  Tolerating a diet  No other new complaints today    Medications:  Continuous Infusions:  Scheduled Meds:   aspirin  81 mg Oral Daily    carbidopa-levodopa  mg  1 tablet Oral 5x Daily    isosorbide mononitrate  120 mg Oral Daily    levothyroxine  125 mcg Oral Before breakfast    losartan  50 mg Oral Daily    metoprolol succinate  25 mg Oral Daily    pantoprazole  40 mg Oral Daily    piperacillin-tazobactam (ZOSYN) IVPB  4.5 g Intravenous Q8H    pravastatin  40 mg Oral Daily    vancomycin (VANCOCIN) IVPB  2,000 mg Intravenous Q24H     PRN Meds:Dextrose 10% Bolus, glucagon (human recombinant), insulin aspart U-100, oxyCODONE-acetaminophen     Review of Systems  Objective:     Weight: 88.3 kg (194 lb 10.7 oz)  Body mass index is 31.42 kg/m².  Vital Signs (Most Recent):  Temp: 97.9 °F (36.6 °C) (08/23/19 0802)  Pulse: 76 (08/23/19 0802)  Resp: 18 (08/23/19 0802)  BP: (!) 142/65 (08/23/19 0802)  SpO2: (!) 94 % (08/23/19 0802) Vital Signs (24h Range):  Temp:  [97.4 °F (36.3 °C)-98.4 °F (36.9 °C)] 97.9 °F (36.6 °C)  Pulse:  [76-92] 76  Resp:  [16-20] 18  SpO2:  [93 %-98 %] 94 %  BP: (102-142)/(54-89) 142/65                 Urethral Catheter 08/21/19 2153 Latex 16 Fr. (Active)   Site Assessment Clean;Intact 8/23/2019  3:09 AM   Collection Container Standard drainage bag 8/23/2019  3:09 AM   Securement Method secured to top of thigh w/ adhesive device 8/23/2019  3:09 AM   Catheter Care Performed yes 8/23/2019  3:09 AM   Reason for Continuing Urinary Catheterization Required  "immobilization 8/23/2019  3:09 AM   CAUTI Prevention Bundle StatLock in place w 1" slack;Intact seal between catheter & drainage tubing;Green sheeting clip in use;No dependent loops or kinks;Drainage bag not overfilled (<2/3 full);Drainage bag below bladder;Drainage bag off the floor 8/23/2019  3:09 AM   Output (mL) 1200 mL 8/23/2019  3:26 AM       Neurosurgery Physical Exam    Significant Labs:  Recent Labs   Lab 08/21/19 2028 08/22/19  0926 08/23/19  0500    100 113*   * 135* 132*   K 4.5 4.5 4.3   CL 97 99 96   CO2 26 27 26   BUN 31* 19 12   CREATININE 0.9 0.7 0.7   CALCIUM 10.6* 9.9 9.8     Recent Labs   Lab 08/21/19 2028 08/22/19  0926 08/23/19  0500   WBC 11.67 8.91 6.40   HGB 12.2 11.5* 11.9*   HCT 37.7 35.6* 36.6*    246 232     Recent Labs   Lab 08/21/19 2028   INR 1.0   APTT 30.5     Microbiology Results (last 7 days)     Procedure Component Value Units Date/Time    Blood culture #1 **CANNOT BE ORDERED STAT** [[id number]] Collected:  08/21/19 2020    Order Status:  Completed Specimen:  Blood from Peripheral, Antecubital, Left Updated:  08/23/19 0854     Blood Culture, Routine Gram stain aer bottle: Gram positive cocci      Results called to and read back by:Viola Reaves RN 08/22/2019  18:02      Gram stain flaco bottle: Gram positive cocci       Positive results previously called    Blood culture #2 **CANNOT BE ORDERED STAT** [[id number]] Collected:  08/21/19 2020    Order Status:  Completed Specimen:  Blood from Line, PICC Right Cephalic Updated:  08/23/19 0612     Blood Culture, Routine No Growth to date      No Growth to date    Aerobic culture [[id number]] Collected:  08/22/19 1855    Order Status:  Sent Specimen:  Wound from Back Updated:  08/23/19 0141    Urine culture [[id number]] Collected:  08/21/19 2200    Order Status:  No result Specimen:  Urine Updated:  08/21/19 0025        All pertinent labs from the last 24 hours have been reviewed.    Significant Diagnostics:  I have " reviewed all pertinent imaging results/findings within the past 24 hours.

## 2019-08-23 NOTE — CONSULTS
08/23/19 0930   Handoff Report   Given To FREDDIE Louis   Skin   Skin WDL ex   Skin Temperature warm   Skin Moisture dry   Skin Elasticity quick return to original state   Nathan Risk Assessment   Sensory Perception 4-->no impairment   Moisture 4-->rarely moist   Activity 1-->bedfast   Mobility 3-->slightly limited   Nutrition 3-->adequate   Friction and Shear 2-->potential problem   Nathan Score 17        Incision/Site 06/11/19 1503 Back midline   Date First Assessed/Time First Assessed: 06/11/19 1503   Present Prior to Hospital Arrival?: Yes  Location: Back  Orientation: midline   Wound Image    Incision WDL ex   Dressing Appearance Moist drainage;Intact   Drainage Amount Small   Drainage Characteristics/Odor Serosanguineous   Appearance Red;Granulating;Moist   Periwound Area Dry;Intact   Wound Edges Open   Wound Length (cm) 8.5 cm   Wound Width (cm) 2 cm   Wound Depth (cm) 1.5 cm   Wound Volume (cm^3) 25.5 cm^3   Wound Surface Area (cm^2) 17 cm^2   Tunneling (depth (cm)/location) 0   Undermining (depth (cm)/location) 0   Care Cleansed with:;Sterile normal saline;Applied:;Skin Barrier   Dressing Hydrofiber;Foam   Packing Incision packed with  (aquacel extra)   Packing Inserted  1   Dressing Change Due 08/27/19   Skin Interventions   Pressure Reduction Devices pressure-redistributing mattress utilized;positioning supports utilized;foam padding utilized;other (see comments)  (waffle mattress overlay)   Pressure Reduction Techniques heels elevated off bed;positioned off wounds   Skin Protection adhesive use limited;incontinence pads utilized;silicone foam dressing in place;skin sealant/moisture barrier applied;tubing/devices free from skin contact   Positioning   Body Position turned;side-lying, right   Head of Bed (HOB) HOB at 20-30 degrees   Positioning/Transfer Devices wedge;pillows;applied     New consult noted for suspected skin breakdown to coccyx. Patient was assessed yesterday by wound care in ER prior to floor  arrival, and at that time, no skin breakdown was present. She is awake and alert, awaiting neurosurgery on Monday by Dr. Nicolas. She is on waffle mattress overlay for pressure injury prevention. She turned independently to right side. Sacral foam dressing removed. Sacrum, coccyx, and bilateral buttock intact with blanchable redness, no breakdown noted, NO PRESSURE INJURIES. Painted with cavilon and new sacral foam dressing applied for prevention. Attention then turned to midline lumbar/thoracic spine open incision. Dressing removed. Incision measures 8.5x2x1.5cm with moist red granulating wound bed, small amount serosanguinous drainage. Cleansed with saline and patted dry. Kina wound skin painted with cavilon. Filled wound with aquacel extra and secured with foam dressing. No other wound care needs noted. Please see below for wound care recommendations:    Midline back open incision:  1. Cleanse with saline  2. Pat dry  3. Paint kina wound skin with cavilon  4. Fill wound with aquacel extra  5. Secure with large foam dressing  6. Change every Tu/Fri and prn excess drainage  7. Please ensure that patient is repositioned at least every 2 hours with the actual position of the patient documented in EPIC flow sheet.

## 2019-08-23 NOTE — ASSESSMENT & PLAN NOTE
As above   -ID consulted   -IV antibiotics   -MRI results reviewed -improved per Neuro Surgery   -hx of  Microbiology that grew Streptococcus s/p washout on 5/24/19 with appropriate antibiotics completed of note  -hx of posterior dehiscence of incision noted with transferred to a nursing home for long term -antibiotic therapy completed via PICC line for approximately 2 months for Strep infection   -Blood culture results showed gram positive cocci x 1 bottle- ID consulted

## 2019-08-23 NOTE — SUBJECTIVE & OBJECTIVE
Interval History: pt reports that she is feeling better today.  Pt reports abdominal pain, back pain at surgical site, and nausea.  Antiemetics ordered.  Pt afebrile with no leukocytosis.  Blood culture grew gram positive cocci x 1 bottle.  ID consulted. Neuro Surgery and Plastics following with surgical intervention planned for Monday.      Review of Systems   Constitutional: Positive for activity change and fatigue. Negative for chills and fever.   HENT: Negative for congestion, rhinorrhea and sinus pressure.    Respiratory: Negative for apnea, cough, choking, chest tightness, shortness of breath, wheezing and stridor.    Cardiovascular: Negative for chest pain, palpitations and leg swelling.   Gastrointestinal: Positive for abdominal pain and nausea. Negative for abdominal distention, diarrhea and vomiting.   Endocrine: Negative for cold intolerance and heat intolerance.   Genitourinary: Negative for difficulty urinating and hematuria.   Musculoskeletal: Positive for back pain. Negative for arthralgias and joint swelling.   Skin: Positive for color change and wound. Negative for pallor and rash.   Neurological: Positive for weakness. Negative for dizziness, seizures, numbness and headaches.   Psychiatric/Behavioral: Negative for agitation, confusion and sleep disturbance. The patient is not nervous/anxious.      Objective:     Vital Signs (Most Recent):  Temp: 96.5 °F (35.8 °C) (08/23/19 1200)  Pulse: 92 (08/23/19 1200)  Resp: 18 (08/23/19 1200)  BP: 110/62 (08/23/19 1336)  SpO2: 95 % (08/23/19 1200) Vital Signs (24h Range):  Temp:  [96.5 °F (35.8 °C)-98.4 °F (36.9 °C)] 96.5 °F (35.8 °C)  Pulse:  [76-92] 92  Resp:  [16-19] 18  SpO2:  [93 %-98 %] 95 %  BP: ()/(51-83) 110/62     Weight: 88.3 kg (194 lb 10.7 oz)  Body mass index is 31.42 kg/m².    Intake/Output Summary (Last 24 hours) at 8/23/2019 1601  Last data filed at 8/23/2019 0800  Gross per 24 hour   Intake 1380 ml   Output 1600 ml   Net -220 ml       Physical Exam   Constitutional: She is oriented to person, place, and time. She appears well-developed and well-nourished. No distress.   HENT:   Nose: Nose normal.   Mouth/Throat: No oropharyngeal exudate.   Eyes: Right eye exhibits no discharge. Left eye exhibits no discharge.   Neck: No JVD present.   Cardiovascular: Normal heart sounds and intact distal pulses. Exam reveals no gallop and no friction rub.   No murmur heard.  Pulmonary/Chest: No stridor. No respiratory distress. She has no wheezes. She has no rales. She exhibits no tenderness.   Abdominal: Soft. Normal appearance and bowel sounds are normal. She exhibits no distension and no mass. There is tenderness. There is no rebound and no guarding. No hernia.   Genitourinary:   Genitourinary Comments: Carlson catheter present    Musculoskeletal: She exhibits no edema or deformity.   Tremor in hands noted    Neurological: She is alert and oriented to person, place, and time.   Skin: Skin is warm and dry. Capillary refill takes 2 to 3 seconds. She is not diaphoretic. There is erythema (spinal wound ).   Wound to spine with dressing in place  -Erythema to sacral area blanchable   Psychiatric: She has a normal mood and affect. Her behavior is normal.   Nursing note and vitals reviewed.      Significant Labs:   A1C:   Recent Labs   Lab 04/23/19  0834 06/01/19  0545   HGBA1C 7.8* 6.0*     CBC:   Recent Labs   Lab 08/21/19 2028 08/22/19  0926 08/23/19  0500   WBC 11.67 8.91 6.40   HGB 12.2 11.5* 11.9*   HCT 37.7 35.6* 36.6*    246 232     CMP:   Recent Labs   Lab 08/21/19 2028 08/22/19  0926 08/23/19  0500   * 135* 132*   K 4.5 4.5 4.3   CL 97 99 96   CO2 26 27 26    100 113*   BUN 31* 19 12   CREATININE 0.9 0.7 0.7   CALCIUM 10.6* 9.9 9.8   PROT  --  6.6 6.7   ALBUMIN  --  3.6 3.6   BILITOT  --  0.5 0.6   ALKPHOS  --  71 70   AST  --  22 23   ALT  --  <5* 11   ANIONGAP 12 9 10   EGFRNONAA >60 >60 >60     Magnesium: No results for input(s): MG  in the last 48 hours.    Significant Imaging:   Imaging Results          MRI Lumbar Spine W WO Cont (Final result)  Result time 08/22/19 15:57:32    Final result by Phong Perea Jr., MD (08/22/19 15:57:32)                 Impression:      1. Generally favorable response to therapy with diminished abnormal bone marrow signal abnormalities relating to evolution of osteomyelitis change.  Diminished swelling of the right psoas muscle.  No evidence of abscess.  2. Laminectomy at L2-L3 and L3-L4 with posterior fusion as described.  There is mild to moderate spinal stenosis at L2-L3 with lateral recess stenosis that could affect the descending L3 spinal nerve roots.  No enhancing granulation tissue compresses the thecal sac or neural structures.  3. Grade 1 degenerative spondylolisthesis at L4-L5 with severe lateral recess stenosis that could compress the descending L5 spinal nerve roots.      Electronically signed by: Phong Perea Jr., MD  Date:    08/22/2019  Time:    15:57             Narrative:    EXAMINATION:  MRI LUMBAR SPINE W WO CONTRAST    CLINICAL HISTORY:  Low back painLow back pain, prior surgery, new or progressive sx;    TECHNIQUE:  MR Lumbar spine with contrast. Sagittal T1, T2, STIR. Axial T1, T2. Post contrast Sagittal and Axial T1.    COMPARISON:  CT from June 11, 2019 was reviewed.  MRI from May 23, 2019 was reviewed as well.    FINDINGS:  Since the previous MRI there has been a laminectomy at L2-L3 with posterior fusion spanning L1 inferiorly to L4.  Grade 1 degenerative spondylolisthesis at L4-L5.  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  There are no peripheral enhancing fluid collections on the current exam.  There is expected enhancement of granulation tissue at the laminectomy site.  No abnormal intradural enhancement.  The conus medullaris terminates at the level [id number]. No abnormal  signal within the conus. Intervertebral disc levels are as follows:    T12-L1 disc: Normal disc space height with anterior osteophytes.  Normal facet joints.  The thecal sac measures 18 mm AP.  No significant foraminal stenosis.    L1-L2 disc : Level of interval posterior fusion.  Disc space height loss with a circumferential disc bulge.  Anterior osteophytes.  Normal facet joints.  Mild Modic type 1 endplate change which is similar to the prior exam.  The thecal sac measures 18 mm.  No significant foraminal stenosis.    L2-L3 disc: Level of interval laminectomy and posterior fusion.  Enhancing granulation tissue at the laminectomy site.  No compression of the thecal sac in relation to the granulation tissue.  Evolution of marrow edema pattern relating to treatment.  Posteriorly protruding disc/osteophyte material flattens the thecal sac to a trefoil configuration.  It measures 12 mm AP with mild bilateral lateral recess stenosis.  Mild/moderate bilateral foraminal stenosis.  On the preoperative study the thecal sac measured 7 mm AP at this level.    L3-L4 disc: Level of laminectomy and posterior fusion.  Enhancing granulation tissue at the laminectomy site that does not contribute to spinal stenosis or foraminal stenosis.  Fatty Modic type 2 endplate change.  The thecal sac measures 17 mm AP.  Moderate/severe left foraminal stenosis.  No significant right foraminal stenosis.    L4-L5 disc: Grade 1 degenerative spondylolisthesis with unroofing and bulging of disc.  Severe degenerative facet hypertrophy.  Buckling of ligamentum flavum.  Severe lateral recess stenosis bilaterally.  The thecal sac measures 10 mm AP.  Moderate bilateral foraminal stenosis.    L5-S1 disc: Normal disc space height.  Mild degenerative facet hypertrophy.  No significant spinal or foraminal stenosis.

## 2019-08-23 NOTE — ASSESSMENT & PLAN NOTE
-UA consistent with possible infectious process   -IV antibiotics   -urine culture results pending

## 2019-08-23 NOTE — CONSULTS
[facility] -   History & Physical  Plastic Surgery    SUBJECTIVE:     Chief Complaint/Reason for Admission: Groin pain    History of Present Illness:  Patient is a 73 y.o. female presents with groin pain secondary to lower back infection requiring surgery a few months ago.  She had a washout and abscess drainage in June and now has worsening pain and will need coverage of her back following neurosurgical intervention.  Currently she is in bed and has pain to her groin.  She was previously somewhat ambulatory, but has been in a wheelchair for the last week secondary to the pain.  She denies fevers or other complaints.      PTA Medications   Medication Sig    carbidopa-levodopa  mg (SINEMET)  mg per tablet TAKE 1 TABLET BY MOUTH 5 TIMES DAILY    enoxaparin (LOVENOX) 40 mg/0.4 mL Syrg Inject 0.4 mLs (40 mg total) into the skin once daily.    isosorbide mononitrate (IMDUR) 120 MG 24 hr tablet Take 1 tablet (120 mg total) by mouth once daily.    latanoprost 0.005 % ophthalmic solution INSTILL 1 DROP INTO EACH EYE IN THE EVENING    levothyroxine (SYNTHROID) 125 MCG tablet TAKE 1 TABLET BY MOUTH IN THE MORNING BEFORE BREAKFAST    losartan (COZAAR) 50 MG tablet TAKE 1 TABLET BY MOUTH ONCE DAILY    metFORMIN (GLUCOPHAGE) 500 MG tablet TAKE ONE TABLET BY MOUTH THREE TIMES DAILY    methocarbamol (ROBAXIN) 750 MG Tab Take 1 tablet (750 mg total) by mouth 3 (three) times daily as needed (muscle spasms).    metoprolol succinate (TOPROL-XL) 25 MG 24 hr tablet TAKE 1 TABLET BY MOUTH ONCE DAILY    miconazole nitrate 2% (MICOTIN) 2 % Oint Apply topically 2 (two) times daily.    NOVOLIN 70/30 U-100 INSULIN 100 unit/mL (70-30) injection Inject 55 Units into the skin 3 (three) times daily.    ondansetron (ZOFRAN) 8 MG tablet Take 1 tablet (8 mg total) by mouth every 8 (eight) hours as needed for Nausea.    oxyCODONE-acetaminophen (PERCOCET)  mg per tablet Take 1 tablet by mouth every 4 (four)  hours as needed for Pain.    pantoprazole (PROTONIX) 40 MG tablet Take 1 tablet (40 mg total) by mouth once daily.    pramipexole (MIRAPEX) 1 MG tablet TAKE ONE TABLET BY MOUTH THREE TIMES DAILY    pravastatin (PRAVACHOL) 40 MG tablet TAKE ONE TABLET BY MOUTH ONCE DAILY    ACCU-CHEK SOFTCLIX LANCETS Misc TEST THREE TIMES DAILY    acetaminophen (TYLENOL) 325 MG tablet Take 2 tablets (650 mg total) by mouth every 6 (six) hours as needed.    aspirin (ECOTRIN) 81 MG EC tablet Take 1 tablet (81 mg total) by mouth once daily.    blood sugar diagnostic Strp 1 strip by Misc.(Non-Drug; Combo Route) route 3 (three) times daily. Accuchek Felicia Plus Test Strips    blood-glucose meter kit Accu-chek Felicia Plus Meter    insulin syringe-needle U-100 0.5 mL 31 gauge x 5/16 Syrg USE THREE TIMES DAILY    multivit-min-FA-lycopen-lutein (CENTRUM SILVER) 0.4-300-250 mg-mcg-mcg Tab Take 1 tablet by mouth once daily.       Review of patient's allergies indicates:   Allergen Reactions    Codeine      Other reaction(s): Nausea.  Patient tolerated morphine 5/2019 with no reported problems.     Codeine sulfate      Unknown^    Diphenhydramine hcl      Unknown^  Other reaction(s): Rash    Sulfa (sulfonamide antibiotics) Hives and Nausea And Vomiting    Penicillins Rash     Rash only as a child  Tolerated ceftriaxone 4/30/19  Tolerated piperacillin-tazobactam 5/23/19       Past Medical History:   Diagnosis Date    Abnormal nuclear stress test 11/1/2015    Anemia 6/1/2019    Background diabetic retinopathy 12/28/2015    CAD (coronary artery disease) 2002    stents    Cataract     Gait disorder 10/12/2012    Glaucoma     History of PTCA 6/1/2019    MI (myocardial infarction)     Obesity     CORINE (obstructive sleep apnea)     Other and unspecified hyperlipidemia     PD (Parkinson's disease) 2002    tremor-predominant (per patient)    Type II or unspecified type diabetes mellitus without mention of complication, not  stated as uncontrolled       am    Unspecified essential hypertension      Past Surgical History:   Procedure Laterality Date    APPLICATION, WOUND VAC N/A 2019    Performed by [facility] at [facility]    BACK SURGERY  2019    BREAST BIOPSY Left     benign - about 3 yrs ago    CORONARY ANGIOPLASTY      CORONARY STENT PLACEMENT      DEBRIDEMENT, ABSCESS, PARASPINAL N/A 2019    Performed by [facility]islav [facility] at [facility]    DEBRIDEMENT, WOUND Bilateral 2019    Performed by [facility]islav [facility] MD at [facility]    DILATION AND CURETTAGE OF UTERUS      FOREIGN BODY REMOVAL      FUSION, SPINE, LUMBAR, TLIF, POSTERIOR APPROACH, USING PEDICLE SCREW N/A 2019    Performed by [facility]islav [facility] MD at [facility]    FUSION, SPINE, POSTERIOR SPINAL COLUMN, LUMBAR, USING COMPUTER-ASSISTED NAVIGATION  2019    Performed by [facility]islav [facility] MD at [facility]    HEART CATH-LEFT Left 2015    Performed by Constance Pace MD at [facility] CATH LAB    HEART CATH-LEFT Left 2015    Performed by Constance [facility] MD at [facility] CATH LAB    HEART CATH-LEFT/pci stent lad Left 11/10/2015    Performed by Constance [facility]ace MD at [facility] CATH LAB    INNER EAR SURGERY      LAMINECTOMY, SPINE, LUMBAR  2019    Performed by [facility]islav [facility] MD at [facility]    TONSILLECTOMY, ADENOIDECTOMY       Family History   Problem Relation Age of Onset    Parkinsonism Other     Tremor Father     Hypertension Father     Heart attack Father 65        MI    Parkinsonism Paternal Aunt     Glaucoma Mother     Hypertension Mother     Heart attack Mother 65        MI    Ovarian cancer Mother     Glaucoma Maternal Grandmother     Hypertension Maternal Grandmother     Diabetes Maternal Grandmother     Breast cancer Paternal Grandmother      Social History     Tobacco Use    Smoking status: Former Smoker     Years: 40.00     Last attempt to quit: 10/12/2010     Years since quittin.8    Smokeless  tobacco: Never Used    Tobacco comment: On & off for the past few years   Substance Use Topics    Alcohol use: No     Alcohol/week: 0.0 oz    Drug use: No        Review of Systems:  Denies SOB, CP, N/V    OBJECTIVE:     Vital Signs (Most Recent):  Temp: 97.9 °F (36.6 °C) (08/23/19 0802)  Pulse: 76 (08/23/19 0802)  Resp: 18 (08/23/19 0802)  BP: (!) 142/65 (08/23/19 0802)  SpO2: (!) 94 % (08/23/19 0802)    Physical Exam:  Gen- AO x 3, uncomfortable lying in bed  Neck - supple  Chest - non labored respirations  HT - RRR  Back - lower midline vertical incision with dressing in place.  healthy appearing granulation over the wound.  Narrow, No exposed prosthetics, no erythema  Ext - no CCE, moving all extremities    Laboratory:  Labs noted.  WBC WNL, ESR, CRP trending down    Diagnostic Results:  MRI reviewed from yesterday.  No recurrent abscess.  Some stenosis of spinal column.    ASSESSMENT/PLAN:     74 y/o with Lower back wound requiring coverage  1. Will speak with NS.  I can be present at time of NS procedure and can advance muscle flaps bilaterally to cover wound.  Will coordinate with NS.  2. Thank you for the consult.

## 2019-08-23 NOTE — PROGRESS NOTES
[facility] Hospital Medicine  Progress Note    Patient Name: Ross Isbell  MRN: [id number]  Patient Class: IP- Inpatient   Admission Date: 8/21/2019  Length of Stay: 0 days  Attending Physician: Hemanth Augustine MD  Primary Care Provider: Anna Penn MD        Subjective:     Principal Problem:Status post lumbar spinal fusion        HPI:  Ms Isbell is a 73 year old female with PMHx of CAD, HTN, DM, Parkinson disease, HLD and Osteomyelitis of lumbar spine who presented to [facility] Emergency Room with increase back pain. She  had axial back pain with destruction of the disc space underwent  posterior washout and debridement with placement of instrumentation on 5/24. Patient  had dehiscence of her incision and was taken back to the OR for wound VAC placement on 6/11. She has been experiencing increase back pain, unable to walk. She had been doing well with Physical Therapy, but now has been a wheel chair. She has experienced failure of instrumentation and elevated infectious markers. Neurosurery surgery following, plan for removal of instumentation with antibotic beads placement and wound vac on tomorrow. She being placed in observation under to care of Hospital Medicine.       Overview/Hospital Course:  Pt admitted to Observation Unit s/p lumbar spinal fusion.  NeuroSurgery consulted.  MRI showed favorable response to therapy with diminished abnormal bone marrow signal abnormalities relating to evolution of osteomyelitis change.  Diminished swelling of the right psoas muscle with no evidence of abscess.  Laminectomy at L2-L3 and L3-L4 with posterior fusion with mild to moderate spinal stenosis at L2-L3 with lateral recess stenosis that could affect the descending L3 spinal nerve roots.  No enhancing granulation tissue compresses the thecal sac or neural structures.  Grade 1 degenerative spondylolisthesis at L4-L5 with severe lateral recess stenosis that could compress the descending L5 spinal nerve  roots.  Pt treated with IV antibiotics and analgesia prn. Blood cultures with no growth to date.  Urine culture in progress.  Washout with revision of instrumentation and wound closure per NeuroSurgery and Plastics planned.  Vital signs stable. On 8/23/19, pt transferred to Inpatient status.  Blood culture grew gram positive cocci x 1 bottle.  ID consulted.  MRI reviewed by Neuro Surgery with signs of improving osteomyelitis noted and plan for revision on Monday afternoon pending cultures.  Plastic Surgery to assist with wound closure and covering.  CM to assist with discharge planning and will likely require placement.      Interval History: pt reports that she is feeling better today.  Pt reports abdominal pain, back pain at surgical site, and nausea.  Antiemetics ordered.  Pt afebrile with no leukocytosis.  Blood culture grew gram positive cocci x 1 bottle.  ID consulted. Neuro Surgery and Plastics following with surgical intervention planned for Monday.  Pt transferred to Inpatient status.     Review of Systems   Constitutional: Positive for activity change and fatigue. Negative for chills and fever.   HENT: Negative for congestion, rhinorrhea and sinus pressure.    Respiratory: Negative for apnea, cough, choking, chest tightness, shortness of breath, wheezing and stridor.    Cardiovascular: Negative for chest pain, palpitations and leg swelling.   Gastrointestinal: Positive for abdominal pain and nausea. Negative for abdominal distention, diarrhea and vomiting.   Endocrine: Negative for cold intolerance and heat intolerance.   Genitourinary: Negative for difficulty urinating and hematuria.   Musculoskeletal: Positive for back pain. Negative for arthralgias and joint swelling.   Skin: Positive for color change and wound. Negative for pallor and rash.   Neurological: Positive for weakness. Negative for dizziness, seizures, numbness and headaches.   Psychiatric/Behavioral: Negative for agitation, confusion and  sleep disturbance. The patient is not nervous/anxious.      Objective:     Vital Signs (Most Recent):  Temp: 96.5 °F (35.8 °C) (08/23/19 1200)  Pulse: 92 (08/23/19 1200)  Resp: 18 (08/23/19 1200)  BP: 110/62 (08/23/19 1336)  SpO2: 95 % (08/23/19 1200) Vital Signs (24h Range):  Temp:  [96.5 °F (35.8 °C)-98.4 °F (36.9 °C)] 96.5 °F (35.8 °C)  Pulse:  [76-92] 92  Resp:  [16-19] 18  SpO2:  [93 %-98 %] 95 %  BP: ()/(51-83) 110/62     Weight: 88.3 kg (194 lb 10.7 oz)  Body mass index is 31.42 kg/m².    Intake/Output Summary (Last 24 hours) at 8/23/2019 1601  Last data filed at 8/23/2019 0800  Gross per 24 hour   Intake 1380 ml   Output 1600 ml   Net -220 ml      Physical Exam   Constitutional: She is oriented to person, place, and time. She appears well-developed and well-nourished. No distress.   HENT:   Nose: Nose normal.   Mouth/Throat: No oropharyngeal exudate.   Eyes: Right eye exhibits no discharge. Left eye exhibits no discharge.   Neck: No JVD present.   Cardiovascular: Normal heart sounds and intact distal pulses. Exam reveals no gallop and no friction rub.   No murmur heard.  Pulmonary/Chest: No stridor. No respiratory distress. She has no wheezes. She has no rales. She exhibits no tenderness.   Abdominal: Soft. Normal appearance and bowel sounds are normal. She exhibits no distension and no mass. There is tenderness. There is no rebound and no guarding. No hernia.   Genitourinary:   Genitourinary Comments: Carlson catheter present    Musculoskeletal: She exhibits no edema or deformity.   Tremor in hands noted    Neurological: She is alert and oriented to person, place, and time.   Skin: Skin is warm and dry. Capillary refill takes 2 to 3 seconds. She is not diaphoretic. There is erythema (spinal wound ).   Wound to spine with dressing in place  -Erythema to sacral area blanchable   Psychiatric: She has a normal mood and affect. Her behavior is normal.   Nursing note and vitals reviewed.      Significant  Labs:   A1C:   Recent Labs   Lab 04/23/19  0834 06/01/19  0545   HGBA1C 7.8* 6.0*     CBC:   Recent Labs   Lab 08/21/19 2028 08/22/19  0926 08/23/19  0500   WBC 11.67 8.91 6.40   HGB 12.2 11.5* 11.9*   HCT 37.7 35.6* 36.6*    246 232     CMP:   Recent Labs   Lab 08/21/19 2028 08/22/19  0926 08/23/19  0500   * 135* 132*   K 4.5 4.5 4.3   CL 97 99 96   CO2 26 27 26    100 113*   BUN 31* 19 12   CREATININE 0.9 0.7 0.7   CALCIUM 10.6* 9.9 9.8   PROT  --  6.6 6.7   ALBUMIN  --  3.6 3.6   BILITOT  --  0.5 0.6   ALKPHOS  --  71 70   AST  --  22 23   ALT  --  <5* 11   ANIONGAP 12 9 10   EGFRNONAA >60 >60 >60     Magnesium: No results for input(s): MG in the last 48 hours.    Significant Imaging:   Imaging Results          MRI Lumbar Spine W WO Cont (Final result)  Result time 08/22/19 15:57:32    Final result by Phong Perea Jr., MD (08/22/19 15:57:32)                 Impression:      1. Generally favorable response to therapy with diminished abnormal bone marrow signal abnormalities relating to evolution of osteomyelitis change.  Diminished swelling of the right psoas muscle.  No evidence of abscess.  2. Laminectomy at L2-L3 and L3-L4 with posterior fusion as described.  There is mild to moderate spinal stenosis at L2-L3 with lateral recess stenosis that could affect the descending L3 spinal nerve roots.  No enhancing granulation tissue compresses the thecal sac or neural structures.  3. Grade 1 degenerative spondylolisthesis at L4-L5 with severe lateral recess stenosis that could compress the descending L5 spinal nerve roots.      Electronically signed by: Phong Perea Jr., MD  Date:    08/22/2019  Time:    15:57             Narrative:    EXAMINATION:  MRI LUMBAR SPINE W WO CONTRAST    CLINICAL HISTORY:  Low back painLow back pain, prior surgery, new or progressive sx;    TECHNIQUE:  MR Lumbar spine with contrast. Sagittal T1, T2, STIR. Axial T1, T2. Post contrast Sagittal and Axial  T1.    COMPARISON:  CT from June 11, 2019 was reviewed.  MRI from May 23, 2019 was reviewed as well.    FINDINGS:  Since the previous MRI there has been a laminectomy at L2-L3 with posterior fusion spanning L1 inferiorly to L4.  Grade 1 degenerative spondylolisthesis at L4-L5.  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  There are no peripheral enhancing fluid collections on the current exam.  There is expected enhancement of granulation tissue at the laminectomy site.  No abnormal intradural enhancement.  The conus medullaris terminates at the level [id number]. No abnormal signal within the conus. Intervertebral disc levels are as follows:    T12-L1 disc: Normal disc space height with anterior osteophytes.  Normal facet joints.  The thecal sac measures 18 mm AP.  No significant foraminal stenosis.    L1-L2 disc : Level of interval posterior fusion.  Disc space height loss with a circumferential disc bulge.  Anterior osteophytes.  Normal facet joints.  Mild Modic type 1 endplate change which is similar to the prior exam.  The thecal sac measures 18 mm.  No significant foraminal stenosis.    L2-L3 disc: Level of interval laminectomy and posterior fusion.  Enhancing granulation tissue at the laminectomy site.  No compression of the thecal sac in relation to the granulation tissue.  Evolution of marrow edema pattern relating to treatment.  Posteriorly protruding disc/osteophyte material flattens the thecal sac to a trefoil configuration.  It measures 12 mm AP with mild bilateral lateral recess stenosis.  Mild/moderate bilateral foraminal stenosis.  On the preoperative study the thecal sac measured 7 mm AP at this level.    L3-L4 disc: Level of laminectomy and posterior fusion.  Enhancing granulation tissue at the laminectomy site that does not contribute to spinal stenosis or foraminal stenosis.  Fatty Modic type 2  endplate change.  The thecal sac measures 17 mm AP.  Moderate/severe left foraminal stenosis.  No significant right foraminal stenosis.    L4-L5 disc: Grade 1 degenerative spondylolisthesis with unroofing and bulging of disc.  Severe degenerative facet hypertrophy.  Buckling of ligamentum flavum.  Severe lateral recess stenosis bilaterally.  The thecal sac measures 10 mm AP.  Moderate bilateral foraminal stenosis.    L5-S1 disc: Normal disc space height.  Mild degenerative facet hypertrophy.  No significant spinal or foraminal stenosis.                                Assessment/Plan:      * Status post lumbar spinal fusion  Admit to Observation   Neurosurgery managing   Plan removal of the instrumentation with placement of antibiotic beads to the incision cavity along with a wound VAC placement.   -8/23/19- MRI reviewed per Neuro Surgery with screws pulled out superiorly and plan for revision on Monday afternoon pending cultures.    Cefepime  Vancomycin    Pain control   -wound culture results pending   -wound care consulted         Abnormal urinalysis  -UA consistent with possible infectious process   -IV antibiotics   -urine culture with significant growth       Drainage from wound  -IV antibiotics continued   -wound cultures results pending    -NeuroSurgery following with washout with revision of instrumentation and wound closure  -wound care consulted      Osteomyelitis of lumbar spine  As above   -ID consulted   -IV antibiotics   -MRI results reviewed -improved per Neuro Surgery   -hx of  Microbiology that grew Streptococcus s/p washout on 5/24/19 with appropriate antibiotics completed of note  -hx of posterior dehiscence of incision noted with transferred to a nursing home for long term -antibiotic therapy completed via PICC line for approximately 2 months for Strep infection   -Blood culture results showed gram positive cocci x 1 bottle- ID consulted      Morbid obesity with BMI of 40.0-44.9, adult  Pt may  benefit from nutrition consult upon discharge       Type 2 diabetes, controlled, with peripheral neuropathy  Accu with SSI   -diabetic diet       Hypothyroidism  Continue Synthroid     Essential hypertension  Continue home meds       PD (Parkinson's disease)  Continue home Parkinson's meds        VTE Risk Mitigation (From admission, onward)        Ordered     Place sequential compression device  Until discontinued      08/22/19 0052                Cecilia Tinsley NP  Department of Hospital Medicine   [facility]

## 2019-08-23 NOTE — ASSESSMENT & PLAN NOTE
Admit to Observation   Neurosurgery managing   Plan removal of the instrumentation with placement of antibiotic beads to the incision cavity along with a wound VAC placement.   -8/23/19- MRI reviewed per Neuro Surgery with screws pulled out superiorly and plan for revision on Monday afternoon pending cultures.    Cefepime  Vancomycin    Pain control   -wound culture results pending   -wound care consulted

## 2019-08-24 LAB
ALBUMIN SERPL BCP-MCNC: 3.6 G/DL (ref 3.5–5.2)
ALP SERPL-CCNC: 65 U/L (ref 55–135)
ALT SERPL W/O P-5'-P-CCNC: 9 U/L (ref 10–44)
ANION GAP SERPL CALC-SCNC: 8 MMOL/L (ref 8–16)
AST SERPL-CCNC: 19 U/L (ref 10–40)
BASOPHILS # BLD AUTO: 0.01 K/UL (ref 0–0.2)
BASOPHILS NFR BLD: 0.2 % (ref 0–1.9)
BILIRUB SERPL-MCNC: 0.6 MG/DL (ref 0.1–1)
BUN SERPL-MCNC: 13 MG/DL (ref 8–23)
CALCIUM SERPL-MCNC: 9.4 MG/DL (ref 8.7–10.5)
CHLORIDE SERPL-SCNC: 97 MMOL/L (ref 95–110)
CO2 SERPL-SCNC: 26 MMOL/L (ref 23–29)
CREAT SERPL-MCNC: 0.8 MG/DL (ref 0.5–1.4)
DIFFERENTIAL METHOD: ABNORMAL
EOSINOPHIL # BLD AUTO: 0.3 K/UL (ref 0–0.5)
EOSINOPHIL NFR BLD: 4.5 % (ref 0–8)
ERYTHROCYTE [DISTWIDTH] IN BLOOD BY AUTOMATED COUNT: 14.7 % (ref 11.5–14.5)
EST. GFR  (AFRICAN AMERICAN): >60 ML/MIN/1.73 M^2
EST. GFR  (NON AFRICAN AMERICAN): >60 ML/MIN/1.73 M^2
GLUCOSE SERPL-MCNC: 120 MG/DL (ref 70–110)
HCT VFR BLD AUTO: 34.8 % (ref 37–48.5)
HGB BLD-MCNC: 11.4 G/DL (ref 12–16)
LYMPHOCYTES # BLD AUTO: 1.8 K/UL (ref 1–4.8)
LYMPHOCYTES NFR BLD: 28.9 % (ref 18–48)
MCH RBC QN AUTO: 27.7 PG (ref 27–31)
MCHC RBC AUTO-ENTMCNC: 32.8 G/DL (ref 32–36)
MCV RBC AUTO: 85 FL (ref 82–98)
MONOCYTES # BLD AUTO: 0.5 K/UL (ref 0.3–1)
MONOCYTES NFR BLD: 8.5 % (ref 4–15)
NEUTROPHILS # BLD AUTO: 3.6 K/UL (ref 1.8–7.7)
NEUTROPHILS NFR BLD: 58.1 % (ref 38–73)
PLATELET # BLD AUTO: 227 K/UL (ref 150–350)
PMV BLD AUTO: 9.3 FL (ref 9.2–12.9)
POCT GLUCOSE: 117 MG/DL (ref 70–110)
POCT GLUCOSE: 143 MG/DL (ref 70–110)
POTASSIUM SERPL-SCNC: 4.1 MMOL/L (ref 3.5–5.1)
PROT SERPL-MCNC: 6.6 G/DL (ref 6–8.4)
RBC # BLD AUTO: 4.12 M/UL (ref 4–5.4)
SODIUM SERPL-SCNC: 131 MMOL/L (ref 136–145)
VANCOMYCIN TROUGH SERPL-MCNC: 13.7 UG/ML (ref 10–22)
WBC # BLD AUTO: 6.2 K/UL (ref 3.9–12.7)

## 2019-08-24 PROCEDURE — 63600175 PHARM REV CODE 636 W HCPCS: Performed by: INTERNAL MEDICINE

## 2019-08-24 PROCEDURE — 85025 COMPLETE CBC W/AUTO DIFF WBC: CPT

## 2019-08-24 PROCEDURE — 21400001 HC TELEMETRY ROOM

## 2019-08-24 PROCEDURE — 25000003 PHARM REV CODE 250: Performed by: NURSE PRACTITIONER

## 2019-08-24 PROCEDURE — 80053 COMPREHEN METABOLIC PANEL: CPT

## 2019-08-24 PROCEDURE — 80202 ASSAY OF VANCOMYCIN: CPT

## 2019-08-24 PROCEDURE — 63600175 PHARM REV CODE 636 W HCPCS: Performed by: NURSE PRACTITIONER

## 2019-08-24 PROCEDURE — 11000001 HC ACUTE MED/SURG PRIVATE ROOM

## 2019-08-24 RX ADMIN — LOSARTAN POTASSIUM 50 MG: 50 TABLET, FILM COATED ORAL at 09:08

## 2019-08-24 RX ADMIN — PIPERACILLIN AND TAZOBACTAM 4.5 G: 4; .5 INJECTION, POWDER, LYOPHILIZED, FOR SOLUTION INTRAVENOUS; PARENTERAL at 01:08

## 2019-08-24 RX ADMIN — VANCOMYCIN HYDROCHLORIDE 2250 MG: 1 INJECTION, POWDER, LYOPHILIZED, FOR SOLUTION INTRAVENOUS at 11:08

## 2019-08-24 RX ADMIN — CARBIDOPA AND LEVODOPA 1 TABLET: 25; 250 TABLET ORAL at 05:08

## 2019-08-24 RX ADMIN — PIPERACILLIN AND TAZOBACTAM 4.5 G: 4; .5 INJECTION, POWDER, LYOPHILIZED, FOR SOLUTION INTRAVENOUS; PARENTERAL at 09:08

## 2019-08-24 RX ADMIN — CARBIDOPA AND LEVODOPA 1 TABLET: 25; 250 TABLET ORAL at 02:08

## 2019-08-24 RX ADMIN — OXYCODONE HYDROCHLORIDE AND ACETAMINOPHEN 1 TABLET: 10; 325 TABLET ORAL at 09:08

## 2019-08-24 RX ADMIN — OXYCODONE HYDROCHLORIDE AND ACETAMINOPHEN 1 TABLET: 10; 325 TABLET ORAL at 08:08

## 2019-08-24 RX ADMIN — METOPROLOL SUCCINATE 25 MG: 25 TABLET, EXTENDED RELEASE ORAL at 09:08

## 2019-08-24 RX ADMIN — PRAVASTATIN SODIUM 40 MG: 20 TABLET ORAL at 09:08

## 2019-08-24 RX ADMIN — PIPERACILLIN AND TAZOBACTAM 4.5 G: 4; .5 INJECTION, POWDER, LYOPHILIZED, FOR SOLUTION INTRAVENOUS; PARENTERAL at 05:08

## 2019-08-24 RX ADMIN — LEVOTHYROXINE SODIUM 125 MCG: 125 TABLET ORAL at 05:08

## 2019-08-24 RX ADMIN — CARBIDOPA AND LEVODOPA 1 TABLET: 25; 250 TABLET ORAL at 09:08

## 2019-08-24 RX ADMIN — OXYCODONE HYDROCHLORIDE AND ACETAMINOPHEN 1 TABLET: 10; 325 TABLET ORAL at 05:08

## 2019-08-24 RX ADMIN — OXYCODONE HYDROCHLORIDE AND ACETAMINOPHEN 1 TABLET: 10; 325 TABLET ORAL at 02:08

## 2019-08-24 RX ADMIN — ISOSORBIDE MONONITRATE 120 MG: 30 TABLET, EXTENDED RELEASE ORAL at 09:08

## 2019-08-24 RX ADMIN — PANTOPRAZOLE SODIUM 40 MG: 40 TABLET, DELAYED RELEASE ORAL at 09:08

## 2019-08-24 RX ADMIN — ONDANSETRON 4 MG: 2 INJECTION INTRAMUSCULAR; INTRAVENOUS at 05:08

## 2019-08-24 NOTE — ASSESSMENT & PLAN NOTE
As above   -ID consulted   -IV antibiotics   -MRI results reviewed -improved per Neuro Surgery   -hx of  Microbiology that grew Streptococcus s/p washout on 5/24/19 with appropriate antibiotics completed of note  -hx of posterior dehiscence of incision noted with transferred to a nursing home for long term -antibiotic therapy completed via PICC line for approximately 2 months for Strep infection   -Blood culture results showed gram positive cocci x 1 bottle- ID consulted    08/25  - BC x 1 resulted with COAGULASE- NEGATIVE STAPHYLOCOCCUS SPECIES, likely contaminant  - ID following  - Continue IV abx

## 2019-08-24 NOTE — SUBJECTIVE & OBJECTIVE
Past Medical History:   Diagnosis Date    Abnormal nuclear stress test 11/1/2015    Anemia 6/1/2019    Background diabetic retinopathy 12/28/2015    CAD (coronary artery disease) 2002    stents    Cataract     Gait disorder 10/12/2012    Glaucoma     History of PTCA 6/1/2019    MI (myocardial infarction)     Obesity     CORINE (obstructive sleep apnea)     Other and unspecified hyperlipidemia     PD (Parkinson's disease) 2002    tremor-predominant (per patient)    Type II or unspecified type diabetes mellitus without mention of complication, not stated as uncontrolled 2002      am    Unspecified essential hypertension        Past Surgical History:   Procedure Laterality Date    APPLICATION, WOUND VAC N/A 6/11/2019    [facility]    BACK SURGERY  05/25/2019    BREAST BIOPSY Left     benign - about 3 yrs ago    CORONARY ANGIOPLASTY  2002    CORONARY STENT PLACEMENT  2002    DEBRIDEMENT, ABSCESS, PARASPINAL N/A 5/25/2019    Performed by [facility]islav [facility] at [facility]    DEBRIDEMENT, WOUND Bilateral 6/11/2019    Performed by [facility]islav [facility] at [facility]    DILATION AND CURETTAGE OF UTERUS      FOREIGN BODY REMOVAL      FUSION, SPINE, LUMBAR, TLIF, POSTERIOR APPROACH, USING PEDICLE SCREW N/A 5/25/2019    Performed by [facility]islav [facility] MD at [facility] OR    FUSION, SPINE, POSTERIOR SPINAL COLUMN, LUMBAR, USING COMPUTER-ASSISTED NAVIGATION  5/25/2019    Performed by [facility]islav [facility] MD at [facility]    HEART CATH-LEFT Left 11/5/2015    Performed by Constance Pace MD at [facility] CATH LAB    HEART CATH-LEFT Left 11/2/2015    Performed by Constance Pace [facility] at [facility] CATH LAB    HEART CATH-LEFT/pci stent lad Left 11/10/2015    Performed by Constance P[facility] MD at [facility] CATH LAB    INNER EAR SURGERY      LAMINECTOMY, SPINE, LUMBAR  5/25/2019    Performed by [facility]islav Nicolas MD at [facility] OR    TONSILLECTOMY, ADENOIDECTOMY         Review of patient's allergies indicates:    Allergen Reactions    Codeine      Other reaction(s): Nausea.  Patient tolerated morphine 5/2019 with no reported problems.     Codeine sulfate      Unknown^    Diphenhydramine hcl      Unknown^  Other reaction(s): Rash    Sulfa (sulfonamide antibiotics) Hives and Nausea And Vomiting    Penicillins Rash     Rash only as a child  Tolerated ceftriaxone 4/30/19  Tolerated piperacillin-tazobactam 5/23/19       Medications:  Medications Prior to Admission   Medication Sig    carbidopa-levodopa  mg (SINEMET)  mg per tablet TAKE 1 TABLET BY MOUTH 5 TIMES DAILY    enoxaparin (LOVENOX) 40 mg/0.4 mL Syrg Inject 0.4 mLs (40 mg total) into the skin once daily.    isosorbide mononitrate (IMDUR) 120 MG 24 hr tablet Take 1 tablet (120 mg total) by mouth once daily.    latanoprost 0.005 % ophthalmic solution INSTILL 1 DROP INTO EACH EYE IN THE EVENING    levothyroxine (SYNTHROID) 125 MCG tablet TAKE 1 TABLET BY MOUTH IN THE MORNING BEFORE BREAKFAST    losartan (COZAAR) 50 MG tablet TAKE 1 TABLET BY MOUTH ONCE DAILY    metFORMIN (GLUCOPHAGE) 500 MG tablet TAKE ONE TABLET BY MOUTH THREE TIMES DAILY    methocarbamol (ROBAXIN) 750 MG Tab Take 1 tablet (750 mg total) by mouth 3 (three) times daily as needed (muscle spasms).    metoprolol succinate (TOPROL-XL) 25 MG 24 hr tablet TAKE 1 TABLET BY MOUTH ONCE DAILY    miconazole nitrate 2% (MICOTIN) 2 % Oint Apply topically 2 (two) times daily.    NOVOLIN 70/30 U-100 INSULIN 100 unit/mL (70-30) injection Inject 55 Units into the skin 3 (three) times daily.    ondansetron (ZOFRAN) 8 MG tablet Take 1 tablet (8 mg total) by mouth every 8 (eight) hours as needed for Nausea.    oxyCODONE-acetaminophen (PERCOCET)  mg per tablet Take 1 tablet by mouth every 4 (four) hours as needed for Pain.    pantoprazole (PROTONIX) 40 MG tablet Take 1 tablet (40 mg total) by mouth once daily.    pramipexole (MIRAPEX) 1 MG tablet TAKE ONE TABLET BY MOUTH THREE TIMES DAILY     pravastatin (PRAVACHOL) 40 MG tablet TAKE ONE TABLET BY MOUTH ONCE DAILY    ACCU-CHEK SOFTCLIX LANCETS Misc TEST THREE TIMES DAILY    acetaminophen (TYLENOL) 325 MG tablet Take 2 tablets (650 mg total) by mouth every 6 (six) hours as needed.    aspirin (ECOTRIN) 81 MG EC tablet Take 1 tablet (81 mg total) by mouth once daily.    blood sugar diagnostic Strp 1 strip by Misc.(Non-Drug; Combo Route) route 3 (three) times daily. Accuchek Felicia Plus Test Strips    blood-glucose meter kit Accu-chek Felicia Plus Meter    insulin syringe-needle U-100 0.5 mL 31 gauge x 5/16 Syrg USE THREE TIMES DAILY    multivit-min-FA-lycopen-lutein (CENTRUM SILVER) 0.4-300-250 mg-mcg-mcg Tab Take 1 tablet by mouth once daily.     Antibiotics (From admission, onward)    Start     Stop Route Frequency Ordered    08/23/19 0930  piperacillin-tazobactam 4.5 g in dextrose 5 % 100 mL IVPB (ready to mix system)      -- IV Every 8 hours (non-standard times) 08/23/19 0820    08/22/19 2230  vancomycin 2 g in dextrose 5 % 500 mL IVPB      -- IV Every 24 hours (non-standard times) 08/21/19 2313        Antifungals (From admission, onward)    None        Antivirals (From admission, onward)    None           Immunization History   Administered Date(s) Administered    Influenza 10/30/2007, 10/29/2009, 12/02/2010    Influenza - High Dose - PF (65 years and older) 01/05/2012, 10/31/2012, 10/23/2014, 10/19/2016, 11/22/2017, 10/05/2018    Pneumococcal Conjugate - 13 Valent 11/19/2015    Pneumococcal Polysaccharide - 23 Valent 12/02/2010    Tdap 11/19/2015       Family History     Problem Relation (Age of Onset)    Breast cancer Paternal Grandmother    Diabetes Maternal Grandmother    Glaucoma Mother, Maternal Grandmother    Heart attack Father (65), Mother (65)    Hypertension Father, Mother, Maternal Grandmother    Ovarian cancer Mother    Parkinsonism Other, Paternal Aunt    Tremor Father        Social History     Socioeconomic History     Marital status:      Spouse name: Not on file    Number of children: Not on file    Years of education: Not on file    Highest education level: Not on file   Occupational History    Not on file   Social Needs    Financial resource strain: Not on file    Food insecurity:     Worry: Not on file     Inability: Not on file    Transportation needs:     Medical: Not on file     Non-medical: Not on file   Tobacco Use    Smoking status: Former Smoker     Years: 40.00     Last attempt to quit: 10/12/2010     Years since quittin.8    Smokeless tobacco: Never Used    Tobacco comment: On & off for the past few years   Substance and Sexual Activity    Alcohol use: No     Alcohol/week: 0.0 oz    Drug use: No    Sexual activity: Not Currently   Lifestyle    Physical activity:     Days per week: Not on file     Minutes per session: Not on file    Stress: Not at all   Relationships    Social connections:     Talks on phone: Not on file     Gets together: Not on file     Attends Jehovah's witness service: Not on file     Active member of club or organization: Not on file     Attends meetings of clubs or organizations: Not on file     Relationship status: Not on file   Other Topics Concern    Not on file   Social History Narrative     . Lives with spouse. Has 2 children. 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     Review of Systems  Objective:     Vital Signs (Most Recent):  Temp: 98.1 °F (36.7 °C) (19 032)  Pulse: 75 (19 0324)  Resp: 17 (19)  BP: (!) 125/55 (19 0324)  SpO2: 97 % (19) Vital Signs (24h Range):  Temp:  [96.5 °F (35.8 °C)-98.3 °F (36.8 °C)] 98.1 °F (36.7 °C)  Pulse:  [70-92] 75  Resp:  [16-18] 17  SpO2:  [94 %-97 %] 97 %  BP: ()/(50-65) 125/55     Weight: 88.3 kg (194 lb 10.7 oz)  Body mass index is 31.42 kg/m².    Estimated Creatinine Clearance: 80.1 mL/min (based on SCr of 0.7 mg/dL).    Physical Exam   Constitutional: She is  oriented to person, place, and time. She appears well-developed and well-nourished. No distress.   HENT:   Nose: Nose normal.   Mouth/Throat: No oropharyngeal exudate.   Eyes: Right eye exhibits no discharge. Left eye exhibits no discharge.   Neck: No JVD present.   Cardiovascular: Normal heart sounds and intact distal pulses. Exam reveals no gallop and no friction rub.   No murmur heard.  Pulmonary/Chest: No stridor. No respiratory distress. She has no wheezes. She has no rales. She exhibits no tenderness.   Abdominal: Soft. Normal appearance and bowel sounds are normal. She exhibits no distension and no mass. There is tenderness. There is no rebound and no guarding. No hernia.   Genitourinary:   Genitourinary Comments: Carlson catheter present    Musculoskeletal: She exhibits no edema or deformity.   Tremor in hands noted    Neurological: She is alert and oriented to person, place, and time.   Skin: Skin is warm and dry. Capillary refill takes 2 to 3 seconds. She is not diaphoretic. There is erythema (spinal wound ).   PLEASE SEE PICS   Psychiatric: She has a normal mood and affect. Her behavior is normal.   Nursing note and vitals reviewed.              Significant Labs:   Blood Culture:   Recent Labs   Lab 06/01/19  0003 06/01/19  0010 06/10/19  1440 06/10/19  1526 08/21/19  2020   LABBLOO No growth after 5 days. No growth after 5 days. No growth after 5 days. No growth after 5 days. Gram stain aer bottle: Gram positive cocci  Results called to and read back by:Viola Reaves RN 08/22/2019  18:02  Gram stain flaco bottle: Gram positive cocci   Positive results previously called  No Growth to date  No Growth to date     BMP:   Recent Labs   Lab 08/23/19  0500   *   *   K 4.3   CL 96   CO2 26   BUN 12   CREATININE 0.7   CALCIUM 9.8     CBC:   Recent Labs   Lab 08/22/19  0926 08/23/19  0500   WBC 8.91 6.40   HGB 11.5* 11.9*   HCT 35.6* 36.6*    232     All pertinent labs within the past 24 hours  have been reviewed.    Significant Imaging: I have reviewed all pertinent imaging results/findings within the past 24 hours.

## 2019-08-24 NOTE — PLAN OF CARE
Problem: Adult Inpatient Plan of Care  Goal: Plan of Care Review  Outcome: Ongoing (interventions implemented as appropriate)  Remains free from injury. States relief of pain with available prn meds. Abx administered as ordered. Vital signs stable. Chart reviewed. Will continue to monitor.

## 2019-08-24 NOTE — PLAN OF CARE
Problem: Adult Inpatient Plan of Care  Goal: Plan of Care Review  Outcome: Ongoing (interventions implemented as appropriate)  Fall precautions maintained. Pt free from falls/injuries.  Patient denies any complaints at this time.   Pain controled with PRN meds.  Blood glucose monitored.  Cardiac monitor 8626 applied, NSR  Turned and repositioned every 2 hours to protect against skin breakdown.  Wound care saw pt today and changed coccyx and back dressings. Dressing to be changed every Tuesday, Friday and PRN.  Carlson in place.  PIV. AAO X4  IV Antibiotics.   Plan of care and medications discussed with patient.  Patient verbalized understanding.  Bed locked and low, call bell within reach.  Will continue to monitor

## 2019-08-24 NOTE — PROGRESS NOTES
[facility] Hospital Medicine  Progress Note    Patient Name: Ross Isbell  MRN: [id number]  Patient Class: IP- Inpatient   Admission Date: 8/21/2019  Length of Stay: 1 days  Attending Physician: Rodger Loya MD  Primary Care Provider: Anna Penn MD        Subjective:     Principal Problem:Status post lumbar spinal fusion        HPI:  Ms Isbell is a 73 year old female with PMHx of CAD, HTN, DM, Parkinson disease, HLD and Osteomyelitis of lumbar spine who presented to [facility] Emergency Room with increase back pain. She  had axial back pain with destruction of the disc space underwent  posterior washout and debridement with placement of instrumentation on 5/24. Patient  had dehiscence of her incision and was taken back to the OR for wound VAC placement on 6/11. She has been experiencing increase back pain, unable to walk. She had been doing well with Physical Therapy, but now has been a wheel chair. She has experienced failure of instrumentation and elevated infectious markers. Neurosurery surgery following, plan for removal of instumentation with antibotic beads placement and wound vac on tomorrow. She being placed in observation under to care of Hospital Medicine.       Overview/Hospital Course:  Pt admitted to Observation Unit s/p lumbar spinal fusion.  NeuroSurgery consulted.  MRI showed favorable response to therapy with diminished abnormal bone marrow signal abnormalities relating to evolution of osteomyelitis change.  Diminished swelling of the right psoas muscle with no evidence of abscess.  2. Laminectomy at L2-L3 and L3-L4 with posterior fusion with mild to moderate spinal stenosis at L2-L3 with lateral recess stenosis that could affect the descending L3 spinal nerve roots.  No enhancing granulation tissue compresses the thecal sac or neural structures.  Grade 1 degenerative spondylolisthesis at L4-L5 with severe lateral recess stenosis that could compress the descending L5 spinal nerve  roots.  Pt treated with IV antibiotics and analgesia prn. Blood cultures with no growth to date.  Urine culture in progress.  Washout with revision of instrumentation and wound closure per NeuroSurgery and Plastics planned.  Vital signs stable. On 8/23/19, pt transferred to Inpatient status.  Blood culture grew gram positive cocci x 1 bottle.  ID consulted.  MRI reviewed by Neuro Surgery with signs of improving osteomyelitis noted and plan for revision on Monday afternoon pending cultures.  Plastic Surgery to assist with wound closure and covering.    As of 08/24 BC x 1 resulted with COAGULASE- NEGATIVE STAPHYLOCOCCUS SPECIES, likely contaminant. ID was consulted due to BC and recommended to continue IV Vancomycin and Zosyn until final sensitivity. Pt scheduled for revision on Monday afternoon with Plastics and Neurosurgery pending cultures    Interval History: Pt seen and examined. Pleasant patient. Pt has no complaints at this time.     Review of Systems   Constitutional: Positive for activity change and fatigue. Negative for chills and fever.   HENT: Negative for congestion, rhinorrhea and sinus pressure.    Respiratory: Negative for apnea, cough, choking, chest tightness, shortness of breath, wheezing and stridor.    Cardiovascular: Negative for chest pain, palpitations and leg swelling.   Gastrointestinal: Positive for nausea. Negative for abdominal distention, abdominal pain, diarrhea and vomiting.   Endocrine: Negative for cold intolerance and heat intolerance.   Genitourinary: Negative for difficulty urinating and hematuria.   Musculoskeletal: Positive for back pain. Negative for arthralgias and joint swelling.   Skin: Positive for color change and wound. Negative for pallor and rash.   Neurological: Positive for weakness. Negative for dizziness, seizures, numbness and headaches.   Psychiatric/Behavioral: Negative for agitation, confusion and sleep disturbance. The patient is not nervous/anxious.       Objective:     Vital Signs (Most Recent):  Temp: 98.8 °F (37.1 °C) (08/24/19 1705)  Pulse: 86 (08/24/19 1705)  Resp: 18 (08/24/19 1705)  BP: (!) 184/117 (08/24/19 1705)  SpO2: (!) 93 % (08/24/19 1705) Vital Signs (24h Range):  Temp:  [96.5 °F (35.8 °C)-98.8 °F (37.1 °C)] 98.8 °F (37.1 °C)  Pulse:  [73-98] 86  Resp:  [16-18] 18  SpO2:  [93 %-100 %] 93 %  BP: (117-184)/() 184/117     Weight: 88.3 kg (194 lb 10.7 oz)  Body mass index is 31.42 kg/m².    Intake/Output Summary (Last 24 hours) at 8/24/2019 1721  Last data filed at 8/24/2019 1200  Gross per 24 hour   Intake 1320 ml   Output 800 ml   Net 520 ml      Physical Exam   Constitutional: She is oriented to person, place, and time. She appears well-developed and well-nourished. No distress.   HENT:   Nose: Nose normal.   Mouth/Throat: No oropharyngeal exudate.   Eyes: Right eye exhibits no discharge. Left eye exhibits no discharge.   Neck: No JVD present.   Cardiovascular: Normal heart sounds and intact distal pulses. Exam reveals no gallop and no friction rub.   No murmur heard.  Pulmonary/Chest: No stridor. No respiratory distress. She has no wheezes. She has no rales. She exhibits no tenderness.   Abdominal: Soft. Normal appearance and bowel sounds are normal. She exhibits no distension. There is no tenderness. There is no rebound and no guarding. No hernia.   Genitourinary:   Genitourinary Comments: Carlson catheter present    Musculoskeletal: She exhibits no edema or deformity.   Tremor in hands noted    Neurological: She is alert and oriented to person, place, and time.   Skin: Skin is warm and dry. Capillary refill takes 2 to 3 seconds. She is not diaphoretic. No erythema.   Lumbar dressing C/D/I  Sacral dressing C/D/I   Psychiatric: She has a normal mood and affect. Her behavior is normal. Thought content normal.   Nursing note and vitals reviewed.      Significant Labs:   CBC:   Recent Labs   Lab 08/23/19  0500 08/24/19  0423   WBC 6.40 6.20   HGB  11.9* 11.4*   HCT 36.6* 34.8*    227     CMP:   Recent Labs   Lab 08/23/19  0500 08/24/19  0423   * 131*   K 4.3 4.1   CL 96 97   CO2 26 26   * 120*   BUN 12 13   CREATININE 0.7 0.8   CALCIUM 9.8 9.4   PROT 6.7 6.6   ALBUMIN 3.6 3.6   BILITOT 0.6 0.6   ALKPHOS 70 65   AST 23 19   ALT 11 9*   ANIONGAP 10 8   EGFRNONAA >60 >60     POCT Glucose:   Recent Labs   Lab 08/23/19  2141 08/24/19  0513 08/24/19  1146   POCTGLUCOSE 147* 117* 143*       Significant Imaging: I have reviewed all pertinent imaging results/findings within the past 24 hours.      Assessment/Plan:      * Status post lumbar spinal fusion  Admit to Observation   Neurosurgery managing   Plan removal of the instrumentation with placement of antibiotic beads to the incision cavity along with a wound VAC placement.   -8/23/19- MRI reviewed per Neuro Surgery with screws pulled out superiorly and plan for revision on Monday afternoon pending cultures.    Cefepime  Vancomycin    Pain control   -wound culture results pending   -wound care consulted     08/25  - Continue IV Vancomycin and Zosyn  - Aerobic culture NGTD, repeat pending  - BC x 1 resulted with COAGULASE- NEGATIVE STAPHYLOCOCCUS SPECIES, likely contaminant  - ID following  - Pt scheduled revision on Monday afternoon with Plastics and Neurosurgery pending cutlures          Osteomyelitis of lumbar spine  As above   -ID consulted   -IV antibiotics   -MRI results reviewed -improved per Neuro Surgery   -hx of  Microbiology that grew Streptococcus s/p washout on 5/24/19 with appropriate antibiotics completed of note  -hx of posterior dehiscence of incision noted with transferred to a nursing home for long term -antibiotic therapy completed via PICC line for approximately 2 months for Strep infection   -Blood culture results showed gram positive cocci x 1 bottle- ID consulted    08/25  - BC x 1 resulted with COAGULASE- NEGATIVE STAPHYLOCOCCUS SPECIES, likely contaminant  - ID  following  - Continue IV abx      Abnormal urinalysis  -UA consistent with possible infectious process   -IV antibiotics   -urine culture NGTD      Drainage from wound  -IV antibiotics continued   -wound cultures results -- NGTD  -NeuroSurgery following with washout with revision of instrumentation and wound closure on Monday  -wound care consulted      Morbid obesity with BMI of 40.0-44.9, adult  Pt may benefit from nutrition consult upon discharge   Discussed lifestyle modifications including diet and exercise       Type 2 diabetes, controlled, with peripheral neuropathy  Accu with SSI   -diabetic diet       Hypothyroidism  Continue Synthroid     Essential hypertension  Continue home meds       PD (Parkinson's disease)  Continue home Parkinson's meds        VTE Risk Mitigation (From admission, onward)        Ordered     Place sequential compression device  Until discontinued      08/22/19 0052                KACIE Cowart  Department of Hospital Medicine   [facility]

## 2019-08-24 NOTE — CONSULTS
[facility]  Infectious Disease  Consult Note    Patient Name: Ross Isbell  MRN: [id number]  Admission Date: 8/21/2019  Hospital Length of Stay: 1 days  Attending Physician: Hemanth Augustine MD  Primary Care Provider: Anna Penn MD     Isolation Status: No active isolations    Patient information was obtained from patient, past medical records and ER records.      Consults  Assessment/Plan:     Drainage from wound  08/23- will do wound cultures, continue vanco/zosyn for now     Osteomyelitis of lumbar spine  08/23- MRI showed interval improvement .  Blood culture is positive for GPC-will continue vanco/zosyn and will use cultures to guide therapy      Type 2 diabetes, controlled, with peripheral neuropathy  08/23- will continue insulin sliding scale , monitor closely     Essential hypertension  08/23-   BP control as per primary team    PD (Parkinson's disease)  08/23- will follow neurology /primary team         Thank you for your consult. I will follow-up with patient. Please contact us if you have any additional questions.    Dada Gomez MD  Infectious Disease  [facility]    Subjective:     Principal Problem: Status post lumbar spinal fusion    HPI: 73 year old female with PMHx of CAD, HTN, DM, Parkinson disease, HLD and Osteomyelitis of lumbar spine who was admitted with worsening back pain. She is well known to me from previous hospital admits . All previous cultures reviewed-  06/11- candida from the back,  05/23- blood culture-  Streptococcus gallinaceus  She was also seen by Neurosurgery and had I and D done - posterior washout and debridement with placement of instrumentation on 5/24. Patient  had dehiscence of her incision and was taken back to the OR for wound VAC placement on 6/11. She was given therapy with  .IV Rocephin/Diflucan for total of 6 weeks(from 06/18)  Since admission, MRI of the lumbar region-  Generally favorable response to therapy with diminished  abnormal bone marrow signal abnormalities relating to evolution of osteomyelitis change.  Diminished swelling of the right psoas muscle.  No evidence of abscess.  2. Laminectomy at L2-L3 and L3-L4 with posterior fusion as described.      Neurosurery surgery following, plan for removal of instumentation with antibotic beads placement and wound vac on Monday .  Since admission, blood cultures are again positive for strep.        Past Medical History:   Diagnosis Date    Abnormal nuclear stress test 11/1/2015    Anemia 6/1/2019    Background diabetic retinopathy 12/28/2015    CAD (coronary artery disease) 2002    stents    Cataract     Gait disorder 10/12/2012    Glaucoma     History of PTCA 6/1/2019    MI (myocardial infarction)     Obesity     CORINE (obstructive sleep apnea)     Other and unspecified hyperlipidemia     PD (Parkinson's disease) 2002    tremor-predominant (per patient)    Type II or unspecified type diabetes mellitus without mention of complication, not stated as uncontrolled 2002      am    Unspecified essential hypertension        Past Surgical History:   Procedure Laterality Date    APPLICATION, WOUND VAC N/A 6/11/2019    [facility]    BACK SURGERY  05/25/2019    BREAST BIOPSY Left     benign - about 3 yrs ago    CORONARY ANGIOPLASTY  2002    CORONARY STENT PLACEMENT  2002    DEBRIDEMENT, ABSCESS, PARASPINAL N/A 5/25/2019    [facility]    DEBRIDEMENT, WOUND Bilateral 6/11/2019    [facility]    DILATION AND CURETTAGE OF UTERUS      FOREIGN BODY REMOVAL      FUSION, SPINE, LUMBAR, TLIF, POSTERIOR APPROACH, USING PEDICLE SCREW N/A 5/25/2019    [facility]    FUSION, SPINE, POSTERIOR SPINAL COLUMN, LUMBAR, USING COMPUTER-ASSISTED NAVIGATION  5/25/2019    [facility] by [facility]    HEART CATH-LEFT Left 11/5/2015     Per[facility] at [facility] C[facility]    HEART CATH-LEFT Left 11/2/2015    Performed [facility] at [facility] C[facility]    HEART CATH-LEFT/pci stent lad Left 11/10/2015    Performed [facility] at [facility] CATH [facility]    INNER EAR SURGERY      LAMINECTOMY, SPINE, LUMBAR  5/25/2019    Per[facility]av [facility] at [facility]    TONSILLECTOMY, ADENOIDECTOMY         Review of patient's allergies indicates:   Allergen Reactions    Codeine      Other reaction(s): Nausea.  Patient tolerated morphine 5/2019 with no reported problems.     Codeine sulfate      Unknown^    Diphenhydramine hcl      Unknown^  Other reaction(s): Rash    Sulfa (sulfonamide antibiotics) Hives and Nausea And Vomiting    Penicillins Rash     Rash only as a child  Tolerated ceftriaxone 4/30/19  Tolerated piperacillin-tazobactam 5/23/19       Medications:  Medications Prior to Admission   Medication Sig    carbidopa-levodopa  mg (SINEMET)  mg per tablet TAKE 1 TABLET BY MOUTH 5 TIMES DAILY    enoxaparin (LOVENOX) 40 mg/0.4 mL Syrg Inject 0.4 mLs (40 mg total) into the skin once daily.    isosorbide mononitrate (IMDUR) 120 MG 24 hr tablet Take 1 tablet (120 mg total) by mouth once daily.    latanoprost 0.005 % ophthalmic solution INSTILL 1 DROP INTO EACH EYE IN THE EVENING    levothyroxine (SYNTHROID) 125 MCG tablet TAKE 1 TABLET BY MOUTH IN THE MORNING BEFORE BREAKFAST    losartan (COZAAR) 50 MG tablet TAKE 1 TABLET BY MOUTH ONCE DAILY    metFORMIN (GLUCOPHAGE) 500 MG tablet TAKE ONE TABLET BY MOUTH THREE TIMES DAILY    methocarbamol (ROBAXIN) 750 MG Tab Take 1 tablet (750 mg total) by mouth 3 (three) times daily as needed (muscle spasms).    metoprolol succinate (TOPROL-XL) 25 MG 24 hr tablet TAKE 1 TABLET BY MOUTH ONCE DAILY    miconazole nitrate 2% (MICOTIN) 2 % Oint Apply topically 2 (two) times daily.    NOVOLIN 70/30 U-100 INSULIN 100 unit/mL (70-30) injection Inject 55 Units into the skin 3  (three) times daily.    ondansetron (ZOFRAN) 8 MG tablet Take 1 tablet (8 mg total) by mouth every 8 (eight) hours as needed for Nausea.    oxyCODONE-acetaminophen (PERCOCET)  mg per tablet Take 1 tablet by mouth every 4 (four) hours as needed for Pain.    pantoprazole (PROTONIX) 40 MG tablet Take 1 tablet (40 mg total) by mouth once daily.    pramipexole (MIRAPEX) 1 MG tablet TAKE ONE TABLET BY MOUTH THREE TIMES DAILY    pravastatin (PRAVACHOL) 40 MG tablet TAKE ONE TABLET BY MOUTH ONCE DAILY    ACCU-CHEK SOFTCLIX LANCETS Misc TEST THREE TIMES DAILY    acetaminophen (TYLENOL) 325 MG tablet Take 2 tablets (650 mg total) by mouth every 6 (six) hours as needed.    aspirin (ECOTRIN) 81 MG EC tablet Take 1 tablet (81 mg total) by mouth once daily.    blood sugar diagnostic Strp 1 strip by Misc.(Non-Drug; Combo Route) route 3 (three) times daily. Accuchek Felicia Plus Test Strips    blood-glucose meter kit Accu-chek Felicia Plus Meter    insulin syringe-needle U-100 0.5 mL 31 gauge x 5/16 Syrg USE THREE TIMES DAILY    multivit-min-FA-lycopen-lutein (CENTRUM SILVER) 0.4-300-250 mg-mcg-mcg Tab Take 1 tablet by mouth once daily.     Antibiotics (From admission, onward)    Start     Stop Route Frequency Ordered    08/23/19 0930  piperacillin-tazobactam 4.5 g in dextrose 5 % 100 mL IVPB (ready to mix system)      -- IV Every 8 hours (non-standard times) 08/23/19 0820    08/22/19 2230  vancomycin 2 g in dextrose 5 % 500 mL IVPB      -- IV Every 24 hours (non-standard times) 08/21/19 2313        Antifungals (From admission, onward)    None        Antivirals (From admission, onward)    None           Immunization History   Administered Date(s) Administered    Influenza 10/30/2007, 10/29/2009, 12/02/2010    Influenza - High Dose - PF (65 years and older) 01/05/2012, 10/31/2012, 10/23/2014, 10/19/2016, 11/22/2017, 10/05/2018    Pneumococcal Conjugate - 13 Valent 11/19/2015    Pneumococcal Polysaccharide - 23  Evy 2010    Tdap 2015       Family History     Problem Relation (Age of Onset)    Breast cancer Paternal Grandmother    Diabetes Maternal Grandmother    Glaucoma Mother, Maternal Grandmother    Heart attack Father (65), Mother (65)    Hypertension Father, Mother, Maternal Grandmother    Ovarian cancer Mother    Parkinsonism Other, Paternal Aunt    Tremor Father        Social History     Socioeconomic History    Marital status:      Spouse name: Not on file    Number of children: Not on file    Years of education: Not on file    Highest education level: Not on file   Occupational History    Not on file   Social Needs    Financial resource strain: Not on file    Food insecurity:     Worry: Not on file     Inability: Not on file    Transportation needs:     Medical: Not on file     Non-medical: Not on file   Tobacco Use    Smoking status: Former Smoker     Years: 40.00     Last attempt to quit: 10/12/2010     Years since quittin.8    Smokeless tobacco: Never Used    Tobacco comment: On & off for the past few years   Substance and Sexual Activity    Alcohol use: No     Alcohol/week: 0.0 oz    Drug use: No    Sexual activity: Not Currently   Lifestyle    Physical activity:     Days per week: Not on file     Minutes per session: Not on file    Stress: Not at all   Relationships    Social connections:     Talks on phone: Not on file     Gets together: Not on file     Attends Congregation service: Not on file     Active member of club or organization: Not on file     Attends meetings of clubs or organizations: Not on file     Relationship status: Not on file   Other Topics Concern    Not on file   Social History Narrative     . Lives with spouse. Has 2 children. 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     Review of Systems  Objective:     Vital Signs (Most Recent):  Temp: 98.1 °F (36.7 °C) (19 0324)  Pulse: 75 (19)  Resp: 17 (19  0324)  BP: (!) 125/55 (08/24/19 0324)  SpO2: 97 % (08/24/19 0324) Vital Signs (24h Range):  Temp:  [96.5 °F (35.8 °C)-98.3 °F (36.8 °C)] 98.1 °F (36.7 °C)  Pulse:  [70-92] 75  Resp:  [16-18] 17  SpO2:  [94 %-97 %] 97 %  BP: ()/(50-65) 125/55     Weight: 88.3 kg (194 lb 10.7 oz)  Body mass index is 31.42 kg/m².    Estimated Creatinine Clearance: 80.1 mL/min (based on SCr of 0.7 mg/dL).    Physical Exam   Constitutional: She is oriented to person, place, and time. She appears well-developed and well-nourished. No distress.   HENT:   Nose: Nose normal.   Mouth/Throat: No oropharyngeal exudate.   Eyes: Right eye exhibits no discharge. Left eye exhibits no discharge.   Neck: No JVD present.   Cardiovascular: Normal heart sounds and intact distal pulses. Exam reveals no gallop and no friction rub.   No murmur heard.  Pulmonary/Chest: No stridor. No respiratory distress. She has no wheezes. She has no rales. She exhibits no tenderness.   Abdominal: Soft. Normal appearance and bowel sounds are normal. She exhibits no distension and no mass. There is tenderness. There is no rebound and no guarding. No hernia.   Genitourinary:   Genitourinary Comments: Carlson catheter present    Musculoskeletal: She exhibits no edema or deformity.   Tremor in hands noted    Neurological: She is alert and oriented to person, place, and time.   Skin: Skin is warm and dry. Capillary refill takes 2 to 3 seconds. She is not diaphoretic. There is erythema (spinal wound ).   PLEASE SEE PICS   Psychiatric: She has a normal mood and affect. Her behavior is normal.   Nursing note and vitals reviewed.              Significant Labs:   Blood Culture:   Recent Labs   Lab 06/01/19  0003 06/01/19  0010 06/10/19  1440 06/10/19  1526 08/21/19  2020   LABBLOO No growth after 5 days. No growth after 5 days. No growth after 5 days. No growth after 5 days. Gram stain aer bottle: Gram positive cocci  Results called to and read back by:Viola Reaves RN  08/22/2019  18:02  Gram stain flaco bottle: Gram positive cocci   Positive results previously called  No Growth to date  No Growth to date     BMP:   Recent Labs   Lab 08/23/19  0500   *   *   K 4.3   CL 96   CO2 26   BUN 12   CREATININE 0.7   CALCIUM 9.8     CBC:   Recent Labs   Lab 08/22/19  0926 08/23/19  0500   WBC 8.91 6.40   HGB 11.5* 11.9*   HCT 35.6* 36.6*    232     All pertinent labs within the past 24 hours have been reviewed.    Significant Imaging: I have reviewed all pertinent imaging results/findings within the past 24 hours.

## 2019-08-24 NOTE — ASSESSMENT & PLAN NOTE
Pt may benefit from nutrition consult upon discharge   Discussed lifestyle modifications including diet and exercise

## 2019-08-24 NOTE — SUBJECTIVE & OBJECTIVE
Interval History: Pt seen and examined. Pleasant patient. Pt has no complaints at this time.     Review of Systems   Constitutional: Positive for activity change and fatigue. Negative for chills and fever.   HENT: Negative for congestion, rhinorrhea and sinus pressure.    Respiratory: Negative for apnea, cough, choking, chest tightness, shortness of breath, wheezing and stridor.    Cardiovascular: Negative for chest pain, palpitations and leg swelling.   Gastrointestinal: Positive for nausea. Negative for abdominal distention, abdominal pain, diarrhea and vomiting.   Endocrine: Negative for cold intolerance and heat intolerance.   Genitourinary: Negative for difficulty urinating and hematuria.   Musculoskeletal: Positive for back pain. Negative for arthralgias and joint swelling.   Skin: Positive for color change and wound. Negative for pallor and rash.   Neurological: Positive for weakness. Negative for dizziness, seizures, numbness and headaches.   Psychiatric/Behavioral: Negative for agitation, confusion and sleep disturbance. The patient is not nervous/anxious.      Objective:     Vital Signs (Most Recent):  Temp: 98.8 °F (37.1 °C) (08/24/19 1705)  Pulse: 86 (08/24/19 1705)  Resp: 18 (08/24/19 1705)  BP: (!) 184/117 (08/24/19 1705)  SpO2: (!) 93 % (08/24/19 1705) Vital Signs (24h Range):  Temp:  [96.5 °F (35.8 °C)-98.8 °F (37.1 °C)] 98.8 °F (37.1 °C)  Pulse:  [73-98] 86  Resp:  [16-18] 18  SpO2:  [93 %-100 %] 93 %  BP: (117-184)/() 184/117     Weight: 88.3 kg (194 lb 10.7 oz)  Body mass index is 31.42 kg/m².    Intake/Output Summary (Last 24 hours) at 8/24/2019 1721  Last data filed at 8/24/2019 1200  Gross per 24 hour   Intake 1320 ml   Output 800 ml   Net 520 ml      Physical Exam   Constitutional: She is oriented to person, place, and time. She appears well-developed and well-nourished. No distress.   HENT:   Nose: Nose normal.   Mouth/Throat: No oropharyngeal exudate.   Eyes: Right eye exhibits no  discharge. Left eye exhibits no discharge.   Neck: No JVD present.   Cardiovascular: Normal heart sounds and intact distal pulses. Exam reveals no gallop and no friction rub.   No murmur heard.  Pulmonary/Chest: No stridor. No respiratory distress. She has no wheezes. She has no rales. She exhibits no tenderness.   Abdominal: Soft. Normal appearance and bowel sounds are normal. She exhibits no distension. There is no tenderness. There is no rebound and no guarding. No hernia.   Genitourinary:   Genitourinary Comments: Carlson catheter present    Musculoskeletal: She exhibits no edema or deformity.   Tremor in hands noted    Neurological: She is alert and oriented to person, place, and time.   Skin: Skin is warm and dry. Capillary refill takes 2 to 3 seconds. She is not diaphoretic. No erythema.   Lumbar dressing C/D/I  Sacral dressing C/D/I   Psychiatric: She has a normal mood and affect. Her behavior is normal. Thought content normal.   Nursing note and vitals reviewed.      Significant Labs:   CBC:   Recent Labs   Lab 08/23/19  0500 08/24/19  0423   WBC 6.40 6.20   HGB 11.9* 11.4*   HCT 36.6* 34.8*    227     CMP:   Recent Labs   Lab 08/23/19  0500 08/24/19  0423   * 131*   K 4.3 4.1   CL 96 97   CO2 26 26   * 120*   BUN 12 13   CREATININE 0.7 0.8   CALCIUM 9.8 9.4   PROT 6.7 6.6   ALBUMIN 3.6 3.6   BILITOT 0.6 0.6   ALKPHOS 70 65   AST 23 19   ALT 11 9*   ANIONGAP 10 8   EGFRNONAA >60 >60     POCT Glucose:   Recent Labs   Lab 08/23/19  2141 08/24/19  0513 08/24/19  1146   POCTGLUCOSE 147* 117* 143*       Significant Imaging: I have reviewed all pertinent imaging results/findings within the past 24 hours.

## 2019-08-24 NOTE — ASSESSMENT & PLAN NOTE
08/23- MRI showed interval improvement .  Blood culture is positive for GPC-will continue vanco/zosyn and will use cultures to guide therapy

## 2019-08-24 NOTE — PLAN OF CARE
Problem: Adult Inpatient Plan of Care  Goal: Plan of Care Review  Outcome: Ongoing (interventions implemented as appropriate)  POC reviewed with patient. Pt verbalized understanding  Pt remains free of injuries and falls; fall precaution in place.   Occasional complaints of pain on this shift. Pain relieved by PRN meds  IV abx administered per MAR.  Continuous heart monitor. Normal Sinus rhythm throughout shift  Carlson maintained d/t required immobilization. CAUTI precautions maintained. Catheter care performed.  Accu checks ACHS  Bed low, side rails x2, call light in reach, personal belongings at bedside.  Reminded to call for assistance.  Chart check complete. Will continue to monitor.

## 2019-08-24 NOTE — PROGRESS NOTES
Pharmacokinetic Assessment Follow Up: IV Vancomycin    Vancomycin serum concentration assessment(s):    The trough level was drawn correctly and can be used to guide therapy at this time. The measurement is below the desired definitive target range of 15 to 20 mcg/mL.    Vancomycin Regimen Plan:    Continue regimen to Vancomycin 2000 mg IV every 24 hours with next serum trough concentration measured at 2130 prior to the next dose on 8/24/19  Tonight's dose was given before an adjustment could be made. If the next trough level is below 15 mcg/mL then an adjustment must be made.     Drug levels (last 3 results):  Recent Labs   Lab Result Units 08/23/19  2146   Vancomycin-Trough ug/mL 12.4       Pharmacy will continue to follow and monitor vancomycin.    Please contact pharmacy at extension [phone] for questions regarding this assessment.    Thank you for the consult,   Rashad Bishop       Patient brief summary:  Ross Isbell is a 73 y.o. female initiated on antimicrobial therapy with IV Vancomycin for treatment of bone/joint infection    The patient's current regimen is Vancomycin 2 g every 24 hours.    Drug Allergies:   Review of patient's allergies indicates:   Allergen Reactions    Codeine      Other reaction(s): Nausea.  Patient tolerated morphine 5/2019 with no reported problems.     Codeine sulfate      Unknown^    Diphenhydramine hcl      Unknown^  Other reaction(s): Rash    Sulfa (sulfonamide antibiotics) Hives and Nausea And Vomiting    Penicillins Rash     Rash only as a child  Tolerated ceftriaxone 4/30/19  Tolerated piperacillin-tazobactam 5/23/19       Actual Body Weight:   88.3 kg    Renal Function:   Estimated Creatinine Clearance: 80.1 mL/min (based on SCr of 0.7 mg/dL).,     Dialysis Method (if applicable):  N/A     CBC (last 72 hours):  Recent Labs   Lab Result Units 08/21/19 2028 08/22/19  0926 08/23/19  0500   WBC K/uL 11.67 8.91 6.40   Hemoglobin g/dL 12.2 11.5* 11.9*   Hematocrit % 37.7 35.6*  36.6*   Platelets K/uL 303 246 232   Gran% % 71.4 70.4 64.2   Lymph% % 20.5 20.4 22.8   Mono% % 7.1 7.4 9.2   Eosinophil% % 0.9 1.9 3.8   Basophil% % 0.1 0.1 0.2   Differential Method  Automated Automated Automated       Metabolic Panel (last 72 hours):  Recent Labs   Lab Result Units 08/21/19 2028 08/21/19 2200 08/22/19  0926 08/23/19  0500   Sodium mmol/L 135*  --  135* 132*   Potassium mmol/L 4.5  --  4.5 4.3   Chloride mmol/L 97  --  99 96   CO2 mmol/L 26  --  27 26   Glucose mg/dL 103  --  100 113*   Glucose, UA   --  Negative  --   --    BUN, Bld mg/dL 31*  --  19 12   Creatinine mg/dL 0.9  --  0.7 0.7   Albumin g/dL  --   --  3.6 3.6   Total Bilirubin mg/dL  --   --  0.5 0.6   Alkaline Phosphatase U/L  --   --  71 70   AST U/L  --   --  22 23   ALT U/L  --   --  <5* 11       Vancomycin Administrations:  vancomycin given in the last 96 hours                     vancomycin 2 g in dextrose 5 % 500 mL IVPB (mg) 2,000 mg New Bag 08/23/19 2152     2,000 mg New Bag 08/22/19 2136                      Microbiologic Results:  Microbiology Results (last 7 days)       Procedure Component Value Units Date/Time    Culture, Anaerobe [[id number]] Collected:  08/23/19 2200    Order Status:  Sent Specimen:  Incision site from Back Updated:  08/24/19 0232    Aerobic culture [[id number]] Collected:  08/23/19 2200    Order Status:  Sent Specimen:  Incision site from Back Updated:  08/24/19 0232    Urine culture [[id number]] Collected:  08/21/19 2200    Order Status:  Completed Specimen:  Urine Updated:  08/23/19 1357     Urine Culture, Routine No significant growth    Narrative:       Preferred Collection Type->Urine, Clean Catch    Blood culture #1 **CANNOT BE ORDERED STAT** [[id number]] Collected:  08/21/19 2020    Order Status:  Completed Specimen:  Blood from Peripheral, Antecubital, Left Updated:  08/23/19 0854     Blood Culture, Routine Gram stain aer bottle: Gram positive cocci      Results called to and read back by:Viola  FREDDIE Reaves 08/22/2019  18:02      Gram stain flaco bottle: Gram positive cocci       Positive results previously called    Blood culture #2 **CANNOT BE ORDERED STAT** [[id number]] Collected:  08/21/19 2020    Order Status:  Completed Specimen:  Blood from Line, PICC Right Cephalic Updated:  08/23/19 0612     Blood Culture, Routine No Growth to date      No Growth to date    Aerobic culture [[id number]] Collected:  08/22/19 1855    Order Status:  Sent Specimen:  Wound from Back Updated:  08/23/19 0141

## 2019-08-24 NOTE — HPI
73 year old female with PMHx of CAD, HTN, DM, Parkinson disease, HLD and Osteomyelitis of lumbar spine who was admitted with worsening back pain. She is well known to me from previous hospital admits . All previous cultures reviewed-  06/11- candida from the back,  05/23- blood culture-  Streptococcus gallinaceus  She was also seen by Neurosurgery and had I and D done - posterior washout and debridement with placement of instrumentation on 5/24. Patient  had dehiscence of her incision and was taken back to the OR for wound VAC placement on 6/11. She was given therapy with  .IV Rocephin/Diflucan for total of 6 weeks(from 06/18)  Since admission, MRI of the lumbar region-  Generally favorable response to therapy with diminished abnormal bone marrow signal abnormalities relating to evolution of osteomyelitis change.  Diminished swelling of the right psoas muscle.  No evidence of abscess.  2. Laminectomy at L2-L3 and L3-L4 with posterior fusion as described.      Neurosurery surgery following, plan for removal of instumentation with antibotic beads placement and wound vac on Monday .  Since admission, blood cultures are again positive for strep.

## 2019-08-24 NOTE — ASSESSMENT & PLAN NOTE
Admit to Observation   Neurosurgery managing   Plan removal of the instrumentation with placement of antibiotic beads to the incision cavity along with a wound VAC placement.   -8/23/19- MRI reviewed per Neuro Surgery with screws pulled out superiorly and plan for revision on Monday afternoon pending cultures.    Cefepime  Vancomycin    Pain control   -wound culture results pending   -wound care consulted     08/25  - Continue IV Vancomycin and Zosyn  - Aerobic culture NGTD, repeat pending  - BC x 1 resulted with COAGULASE- NEGATIVE STAPHYLOCOCCUS SPECIES, likely contaminant  - ID following  - Pt scheduled revision on Monday afternoon with Plastics and Neurosurgery pending cutlures

## 2019-08-24 NOTE — ASSESSMENT & PLAN NOTE
-IV antibiotics continued   -wound cultures results -- NGTD  -NeuroSurgery following with washout with revision of instrumentation and wound closure on Monday  -wound care consulted

## 2019-08-25 LAB
ANION GAP SERPL CALC-SCNC: 10 MMOL/L (ref 8–16)
BACTERIA BLD CULT: ABNORMAL
BASOPHILS # BLD AUTO: 0.02 K/UL (ref 0–0.2)
BASOPHILS NFR BLD: 0.3 % (ref 0–1.9)
BUN SERPL-MCNC: 11 MG/DL (ref 8–23)
CALCIUM SERPL-MCNC: 9.5 MG/DL (ref 8.7–10.5)
CHLORIDE SERPL-SCNC: 96 MMOL/L (ref 95–110)
CO2 SERPL-SCNC: 26 MMOL/L (ref 23–29)
CREAT SERPL-MCNC: 0.8 MG/DL (ref 0.5–1.4)
CRP SERPL-MCNC: 3.1 MG/L (ref 0–8.2)
DIFFERENTIAL METHOD: ABNORMAL
EOSINOPHIL # BLD AUTO: 0.3 K/UL (ref 0–0.5)
EOSINOPHIL NFR BLD: 4.1 % (ref 0–8)
ERYTHROCYTE [DISTWIDTH] IN BLOOD BY AUTOMATED COUNT: 14.8 % (ref 11.5–14.5)
ERYTHROCYTE [SEDIMENTATION RATE] IN BLOOD BY WESTERGREN METHOD: 27 MM/HR (ref 0–20)
EST. GFR  (AFRICAN AMERICAN): >60 ML/MIN/1.73 M^2
EST. GFR  (NON AFRICAN AMERICAN): >60 ML/MIN/1.73 M^2
GLUCOSE SERPL-MCNC: 137 MG/DL (ref 70–110)
HCT VFR BLD AUTO: 35.5 % (ref 37–48.5)
HGB BLD-MCNC: 11.4 G/DL (ref 12–16)
LYMPHOCYTES # BLD AUTO: 2.1 K/UL (ref 1–4.8)
LYMPHOCYTES NFR BLD: 35 % (ref 18–48)
MCH RBC QN AUTO: 27.4 PG (ref 27–31)
MCHC RBC AUTO-ENTMCNC: 32.1 G/DL (ref 32–36)
MCV RBC AUTO: 85 FL (ref 82–98)
MONOCYTES # BLD AUTO: 0.6 K/UL (ref 0.3–1)
MONOCYTES NFR BLD: 9.6 % (ref 4–15)
NEUTROPHILS # BLD AUTO: 3.1 K/UL (ref 1.8–7.7)
NEUTROPHILS NFR BLD: 51.2 % (ref 38–73)
PLATELET # BLD AUTO: 257 K/UL (ref 150–350)
PMV BLD AUTO: 9.4 FL (ref 9.2–12.9)
POCT GLUCOSE: 149 MG/DL (ref 70–110)
POCT GLUCOSE: 150 MG/DL (ref 70–110)
POCT GLUCOSE: 156 MG/DL (ref 70–110)
POTASSIUM SERPL-SCNC: 4.1 MMOL/L (ref 3.5–5.1)
RBC # BLD AUTO: 4.16 M/UL (ref 4–5.4)
SODIUM SERPL-SCNC: 132 MMOL/L (ref 136–145)
VANCOMYCIN TROUGH SERPL-MCNC: 15.6 UG/ML (ref 10–22)
WBC # BLD AUTO: 6.12 K/UL (ref 3.9–12.7)

## 2019-08-25 PROCEDURE — 25000003 PHARM REV CODE 250: Performed by: NURSE PRACTITIONER

## 2019-08-25 PROCEDURE — 86140 C-REACTIVE PROTEIN: CPT

## 2019-08-25 PROCEDURE — 11000001 HC ACUTE MED/SURG PRIVATE ROOM

## 2019-08-25 PROCEDURE — 80048 BASIC METABOLIC PNL TOTAL CA: CPT

## 2019-08-25 PROCEDURE — 21400001 HC TELEMETRY ROOM

## 2019-08-25 PROCEDURE — 85651 RBC SED RATE NONAUTOMATED: CPT

## 2019-08-25 PROCEDURE — 63600175 PHARM REV CODE 636 W HCPCS: Performed by: NURSE PRACTITIONER

## 2019-08-25 PROCEDURE — 80202 ASSAY OF VANCOMYCIN: CPT

## 2019-08-25 PROCEDURE — 85025 COMPLETE CBC W/AUTO DIFF WBC: CPT

## 2019-08-25 RX ORDER — MORPHINE SULFATE 2 MG/ML
2 INJECTION, SOLUTION INTRAMUSCULAR; INTRAVENOUS EVERY 6 HOURS PRN
Status: DISCONTINUED | OUTPATIENT
Start: 2019-08-25 | End: 2019-08-29 | Stop reason: HOSPADM

## 2019-08-25 RX ORDER — DOCUSATE SODIUM 100 MG/1
100 CAPSULE, LIQUID FILLED ORAL DAILY
Status: DISCONTINUED | OUTPATIENT
Start: 2019-08-25 | End: 2019-08-29 | Stop reason: HOSPADM

## 2019-08-25 RX ORDER — POLYETHYLENE GLYCOL 3350 17 G/17G
17 POWDER, FOR SOLUTION ORAL DAILY
Status: DISCONTINUED | OUTPATIENT
Start: 2019-08-25 | End: 2019-08-29 | Stop reason: HOSPADM

## 2019-08-25 RX ADMIN — OXYCODONE HYDROCHLORIDE AND ACETAMINOPHEN 1 TABLET: 10; 325 TABLET ORAL at 09:08

## 2019-08-25 RX ADMIN — OXYCODONE HYDROCHLORIDE AND ACETAMINOPHEN 1 TABLET: 10; 325 TABLET ORAL at 12:08

## 2019-08-25 RX ADMIN — PRAVASTATIN SODIUM 40 MG: 20 TABLET ORAL at 09:08

## 2019-08-25 RX ADMIN — PIPERACILLIN AND TAZOBACTAM 4.5 G: 4; .5 INJECTION, POWDER, LYOPHILIZED, FOR SOLUTION INTRAVENOUS; PARENTERAL at 09:08

## 2019-08-25 RX ADMIN — PANTOPRAZOLE SODIUM 40 MG: 40 TABLET, DELAYED RELEASE ORAL at 09:08

## 2019-08-25 RX ADMIN — PIPERACILLIN AND TAZOBACTAM 4.5 G: 4; .5 INJECTION, POWDER, LYOPHILIZED, FOR SOLUTION INTRAVENOUS; PARENTERAL at 12:08

## 2019-08-25 RX ADMIN — CARBIDOPA AND LEVODOPA 1 TABLET: 25; 250 TABLET ORAL at 01:08

## 2019-08-25 RX ADMIN — CARBIDOPA AND LEVODOPA 1 TABLET: 25; 250 TABLET ORAL at 09:08

## 2019-08-25 RX ADMIN — OXYCODONE HYDROCHLORIDE AND ACETAMINOPHEN 1 TABLET: 10; 325 TABLET ORAL at 05:08

## 2019-08-25 RX ADMIN — OXYCODONE HYDROCHLORIDE AND ACETAMINOPHEN 1 TABLET: 10; 325 TABLET ORAL at 01:08

## 2019-08-25 RX ADMIN — LOSARTAN POTASSIUM 50 MG: 50 TABLET, FILM COATED ORAL at 09:08

## 2019-08-25 RX ADMIN — CARBIDOPA AND LEVODOPA 1 TABLET: 25; 250 TABLET ORAL at 06:08

## 2019-08-25 RX ADMIN — CARBIDOPA AND LEVODOPA 1 TABLET: 25; 250 TABLET ORAL at 05:08

## 2019-08-25 RX ADMIN — DOCUSATE SODIUM 100 MG: 100 CAPSULE, LIQUID FILLED ORAL at 04:08

## 2019-08-25 RX ADMIN — PIPERACILLIN AND TAZOBACTAM 4.5 G: 4; .5 INJECTION, POWDER, LYOPHILIZED, FOR SOLUTION INTRAVENOUS; PARENTERAL at 04:08

## 2019-08-25 RX ADMIN — ISOSORBIDE MONONITRATE 120 MG: 30 TABLET, EXTENDED RELEASE ORAL at 09:08

## 2019-08-25 RX ADMIN — POLYETHYLENE GLYCOL 3350 17 G: 17 POWDER, FOR SOLUTION ORAL at 04:08

## 2019-08-25 RX ADMIN — LEVOTHYROXINE SODIUM 125 MCG: 125 TABLET ORAL at 06:08

## 2019-08-25 RX ADMIN — METOPROLOL SUCCINATE 25 MG: 25 TABLET, EXTENDED RELEASE ORAL at 09:08

## 2019-08-25 RX ADMIN — MORPHINE SULFATE 2 MG: 2 INJECTION, SOLUTION INTRAMUSCULAR; INTRAVENOUS at 08:08

## 2019-08-25 NOTE — SUBJECTIVE & OBJECTIVE
Review of Systems   Constitutional: Positive for activity change and fatigue. Negative for chills and fever.   HENT: Negative for congestion, rhinorrhea and sinus pressure.    Respiratory: Negative for apnea, cough, choking, chest tightness, shortness of breath, wheezing and stridor.    Cardiovascular: Negative for chest pain, palpitations and leg swelling.   Gastrointestinal: Positive for nausea. Negative for abdominal distention, abdominal pain, diarrhea and vomiting.   Endocrine: Negative for cold intolerance and heat intolerance.   Genitourinary: Negative for difficulty urinating and hematuria.   Musculoskeletal: Positive for back pain. Negative for arthralgias and joint swelling.   Skin: Positive for color change and wound. Negative for pallor and rash.   Neurological: Positive for weakness. Negative for dizziness, seizures, numbness and headaches.   Psychiatric/Behavioral: Negative for agitation, confusion and sleep disturbance. The patient is not nervous/anxious.      Objective:     Vital Signs (Most Recent):  Temp: 98 °F (36.7 °C) (08/25/19 1634)  Pulse: 96 (08/25/19 1735)  Resp: 18 (08/25/19 1634)  BP: (!) 120/59 (08/25/19 1634)  SpO2: 95 % (08/25/19 1634) Vital Signs (24h Range):  Temp:  [97.3 °F (36.3 °C)-98.4 °F (36.9 °C)] 98 °F (36.7 °C)  Pulse:  [] 96  Resp:  [15-20] 18  SpO2:  [90 %-97 %] 95 %  BP: (120-153)/(56-84) 120/59     Weight: 88.3 kg (194 lb 10.7 oz)  Body mass index is 31.42 kg/m².    Intake/Output Summary (Last 24 hours) at 8/25/2019 1815  Last data filed at 8/25/2019 1639  Gross per 24 hour   Intake 1560 ml   Output 2050 ml   Net -490 ml      Physical Exam   Constitutional: She is oriented to person, place, and time. She appears well-developed and well-nourished. No distress.   HENT:   Nose: Nose normal.   Mouth/Throat: No oropharyngeal exudate.   Eyes: Right eye exhibits no discharge. Left eye exhibits no discharge.   Neck: No JVD present.   Cardiovascular: Normal rate, normal  heart sounds and intact distal pulses. Exam reveals no gallop and no friction rub.   No murmur heard.  Pulmonary/Chest: No stridor. No respiratory distress. She has no wheezes. She has no rales. She exhibits no tenderness.   Abdominal: Soft. Normal appearance and bowel sounds are normal. She exhibits no distension. There is no tenderness. There is no rebound and no guarding. No hernia.   Genitourinary:   Genitourinary Comments: Carlson catheter present    Musculoskeletal: She exhibits no edema or deformity.   Tremor in hands noted    Neurological: She is alert and oriented to person, place, and time.   Skin: Skin is warm and dry. Capillary refill takes 2 to 3 seconds. She is not diaphoretic. No erythema.   Lumbar dressing C/D/I  Sacral dressing C/D/I   Psychiatric: She has a normal mood and affect. Her behavior is normal. Thought content normal.   Nursing note and vitals reviewed.      Significant Labs:   BMP:   Recent Labs   Lab 08/25/19  0600   *   *   K 4.1   CL 96   CO2 26   BUN 11   CREATININE 0.8   CALCIUM 9.5     CBC:   Recent Labs   Lab 08/24/19  0423 08/25/19  0600   WBC 6.20 6.12   HGB 11.4* 11.4*   HCT 34.8* 35.5*    257     POCT Glucose:   Recent Labs   Lab 08/24/19  1146 08/25/19  1131 08/25/19  1642   POCTGLUCOSE 143* 156* 150*     CRP:  3.1    Sedimentation rate:  27    Significant Imaging: I have reviewed all pertinent imaging results/findings within the past 24 hours.

## 2019-08-25 NOTE — ASSESSMENT & PLAN NOTE
Admit to Observation   Neurosurgery managing   Plan removal of the instrumentation with placement of antibiotic beads to the incision cavity along with a wound VAC placement.   -8/23/19- MRI reviewed per Neuro Surgery with screws pulled out superiorly and plan for revision on Monday afternoon pending cultures.    Cefepime  Vancomycin    Pain control   -wound culture results pending   -wound care consulted     08/24  - Continue IV Vancomycin and Zosyn  - Aerobic culture NGTD, repeat pending  - BC x 1 resulted with COAGULASE- NEGATIVE STAPHYLOCOCCUS SPECIES, likely contaminant  - ID following  - Pt scheduled revision on Monday afternoon with Plastics and Neurosurgery pending cultures    08/25  - BC x 1 resulted on 08/24 as likely contaminant -- COAGULASE-NEGATIVE STAPHYLOCOCCUS SPECIES  - ID recommended to repeat CRP and ESR to see if BC was contaminant. CRP 3.1, Sed rate 27  - Same BC resulted at 1334 today with [employer]IRID[employer]REPTOC[employer]US [employer]. Susceptibility testing not routinely performed  - Informed Dr. Gomez (ID) and Dr. Nicolas (neurosurgery) of results  - Continue IV Vancomycin and Zosyn

## 2019-08-25 NOTE — PLAN OF CARE
Problem: Adult Inpatient Plan of Care  Goal: Plan of Care Review  Outcome: Ongoing (interventions implemented as appropriate)  No acute distress noted. Antibiotics infusing. Blood glucose being monitored. Safety measures are in place. Call bell within reach. Pain being managed. Pt free of falls. Will continue to monitor. Chart check complete.

## 2019-08-25 NOTE — ASSESSMENT & PLAN NOTE
- Pt may benefit from nutrition consult upon discharge   - Discussed lifestyle modifications including diet and exercise

## 2019-08-25 NOTE — ASSESSMENT & PLAN NOTE
-IV antibiotics continued   -wound cultures results -- NGTD  -NeuroSurgery following with washout with revision of instrumentation and wound closure on Monday, 08/26  -wound care consulted

## 2019-08-25 NOTE — ASSESSMENT & PLAN NOTE
As above   -ID consulted   -IV antibiotics   -MRI results reviewed -improved per Neuro Surgery   -hx of  Microbiology that grew Streptococcus s/p washout on 5/24/19 with appropriate antibiotics completed of note  -hx of posterior dehiscence of incision noted with transferred to a nursing home for long term -antibiotic therapy completed via PICC line for approximately 2 months for Strep infection   -Blood culture results showed gram positive cocci x 1 bottle- ID consulted    08/24  - BC x 1 resulted with COAGULASE- NEGATIVE STAPHYLOCOCCUS SPECIES, likely contaminant  - ID following  - Continue IV abx    08/25  - BC x 1 resulted on 08/24 as likely contaminant -- COAGULASE-NEGATIVE STAPHYLOCOCCUS SPECIES  - ID recommended to repeat CRP and ESR to see if BC was contaminant. CRP 3.1, Sed rate 27  - Same BC resulted at 1334 today with VIRID[employer] STREPTOCOCCUS [employer]. Susceptibility testing not routinely performed  - Informed Dr. Gomez (ID) and Dr. Nicolas (neurosurgery) of results  - Continue IV Vancomycin and Zosyn

## 2019-08-25 NOTE — PROGRESS NOTES
[facility] Hospital Medicine  Progress Note    Patient Name: Ross Isbell  MRN: [id number]  Patient Class: IP- Inpatient   Admission Date: 8/21/2019  Length of Stay: 2 days  Attending Physician: Rodger Loya MD  Primary Care Provider: Anna Penn MD        Subjective:     Principal Problem:Status post lumbar spinal fusion        HPI:  Ms Isbell is a 73 year old female with PMHx of CAD, HTN, DM, Parkinson disease, HLD and Osteomyelitis of lumbar spine who presented to [facility] Emergency Room with increase back pain. She  had axial back pain with destruction of the disc space underwent  posterior washout and debridement with placement of instrumentation on 5/24. Patient  had dehiscence of her incision and was taken back to the OR for wound VAC placement on 6/11. She has been experiencing increase back pain, unable to walk. She had been doing well with Physical Therapy, but now has been a wheel chair. She has experienced failure of instrumentation and elevated infectious markers. Neurosurery surgery following, plan for removal of instumentation with antibotic beads placement and wound vac on tomorrow. She being placed in observation under to care of Hospital Medicine.       Overview/Hospital Course:  Pt admitted to Observation Unit s/p lumbar spinal fusion.  NeuroSurgery consulted.  MRI showed favorable response to therapy with diminished abnormal bone marrow signal abnormalities relating to evolution of osteomyelitis change.  Diminished swelling of the right psoas muscle with no evidence of abscess.  2. Laminectomy at L2-L3 and L3-L4 with posterior fusion with mild to moderate spinal stenosis at L2-L3 with lateral recess stenosis that could affect the descending L3 spinal nerve roots.  No enhancing granulation tissue compresses the thecal sac or neural structures.  Grade 1 degenerative spondylolisthesis at L4-L5 with severe lateral recess stenosis that could compress the descending L5 spinal nerve  roots.  Pt treated with IV antibiotics and analgesia prn. Blood cultures with no growth to date.  Urine culture in progress.  Washout with revision of instrumentation and wound closure per NeuroSurgery and Plastics planned.  Vital signs stable. On 8/23/19, pt transferred to Inpatient status.  Blood culture grew gram positive cocci x 1 bottle.  ID consulted.  MRI reviewed by Neuro Surgery with signs of improving osteomyelitis noted and plan for revision on Monday afternoon pending cultures.  Plastic Surgery to assist with wound closure and covering.    As of 08/24 BC x 1 resulted with COAGULASE- NEGATIVE STAPHYLOCOCCUS SPECIES, likely contaminant. ID was consulted due to BC and recommended to continue IV Vancomycin and Zosyn until final sensitivity. Pt scheduled for revision on Monday afternoon with Plastics and Neurosurgery pending cultures  As of 08/24 BC x 1 likely contaminant -- COAGULASE-NEGATIVE STAPHYLOCOCCUS SPECIES. ID recommended to repeat CRP and ESR. CRP 3.1, Sed rate 27. BC resulted at 1334 today with VIRIDANS STREPTOCOCCUS GROUP. Susceptibility testing not routinely performed. Informed Dr. Gomez (ID) and Dr. Nicolas (neurosurgery) of results. Aerobic culture NGTD. Continue IV Vancomycin and Zosyn for now. NPO after midnight for surgery in AM.       Review of Systems   Constitutional: Positive for activity change and fatigue. Negative for chills and fever.   HENT: Negative for congestion, rhinorrhea and sinus pressure.    Respiratory: Negative for apnea, cough, choking, chest tightness, shortness of breath, wheezing and stridor.    Cardiovascular: Negative for chest pain, palpitations and leg swelling.   Gastrointestinal: Positive for nausea. Negative for abdominal distention, abdominal pain, diarrhea and vomiting.   Endocrine: Negative for cold intolerance and heat intolerance.   Genitourinary: Negative for difficulty urinating and hematuria.   Musculoskeletal: Positive for back pain. Negative for  arthralgias and joint swelling.   Skin: Positive for color change and wound. Negative for pallor and rash.   Neurological: Positive for weakness. Negative for dizziness, seizures, numbness and headaches.   Psychiatric/Behavioral: Negative for agitation, confusion and sleep disturbance. The patient is not nervous/anxious.      Objective:     Vital Signs (Most Recent):  Temp: 98 °F (36.7 °C) (08/25/19 1634)  Pulse: 96 (08/25/19 1735)  Resp: 18 (08/25/19 1634)  BP: (!) 120/59 (08/25/19 1634)  SpO2: 95 % (08/25/19 1634) Vital Signs (24h Range):  Temp:  [97.3 °F (36.3 °C)-98.4 °F (36.9 °C)] 98 °F (36.7 °C)  Pulse:  [] 96  Resp:  [15-20] 18  SpO2:  [90 %-97 %] 95 %  BP: (120-153)/(56-84) 120/59     Weight: 88.3 kg (194 lb 10.7 oz)  Body mass index is 31.42 kg/m².    Intake/Output Summary (Last 24 hours) at 8/25/2019 1815  Last data filed at 8/25/2019 1639  Gross per 24 hour   Intake 1560 ml   Output 2050 ml   Net -490 ml      Physical Exam   Constitutional: She is oriented to person, place, and time. She appears well-developed and well-nourished. No distress.   HENT:   Nose: Nose normal.   Mouth/Throat: No oropharyngeal exudate.   Eyes: Right eye exhibits no discharge. Left eye exhibits no discharge.   Neck: No JVD present.   Cardiovascular: Normal rate, normal heart sounds and intact distal pulses. Exam reveals no gallop and no friction rub.   No murmur heard.  Pulmonary/Chest: No stridor. No respiratory distress. She has no wheezes. She has no rales. She exhibits no tenderness.   Abdominal: Soft. Normal appearance and bowel sounds are normal. She exhibits no distension. There is no tenderness. There is no rebound and no guarding. No hernia.   Genitourinary:   Genitourinary Comments: Carlson catheter present    Musculoskeletal: She exhibits no edema or deformity.   Tremor in hands noted    Neurological: She is alert and oriented to person, place, and time.   Skin: Skin is warm and dry. Capillary refill takes 2 to 3  seconds. She is not diaphoretic. No erythema.   Lumbar dressing C/D/I  Sacral dressing C/D/I   Psychiatric: She has a normal mood and affect. Her behavior is normal. Thought content normal.   Nursing note and vitals reviewed.      Significant Labs:   BMP:   Recent Labs   Lab 08/25/19  0600   *   *   K 4.1   CL 96   CO2 26   BUN 11   CREATININE 0.8   CALCIUM 9.5     CBC:   Recent Labs   Lab 08/24/19  0423 08/25/19  0600   WBC 6.20 6.12   HGB 11.4* 11.4*   HCT 34.8* 35.5*    257     POCT Glucose:   Recent Labs   Lab 08/24/19  1146 08/25/19  1131 08/25/19  1642   POCTGLUCOSE 143* 156* 150*     CRP:  3.1    Sedimentation rate:  27    Significant Imaging: I have reviewed all pertinent imaging results/findings within the past 24 hours.      Assessment/Plan:      * Status post lumbar spinal fusion  Admit to Observation   Neurosurgery managing   Plan removal of the instrumentation with placement of antibiotic beads to the incision cavity along with a wound VAC placement.   -8/23/19- MRI reviewed per Neuro Surgery with screws pulled out superiorly and plan for revision on Monday afternoon pending cultures.    Cefepime  Vancomycin    Pain control   -wound culture results pending   -wound care consulted     08/24  - Continue IV Vancomycin and Zosyn  - Aerobic culture NGTD, repeat pending  - BC x 1 resulted with COAGULASE- NEGATIVE STAPHYLOCOCCUS SPECIES, likely contaminant  - ID following  - Pt scheduled revision on Monday afternoon with Plastics and Neurosurgery pending cultures    08/25  - BC x 1 resulted on 08/24 as likely contaminant -- COAGULASE-NEGATIVE STAPHYLOCOCCUS SPECIES  - ID recommended to repeat CRP and ESR to see if BC was contaminant. CRP 3.1, Sed rate 27  - Same BC resulted at 1334 today with VIRIDANS STREPTOCOCCUS GROUP. Susceptibility testing not routinely performed  - Informed Dr. Gomez (ID) and Dr. Nicolas (neurosurgery) of results  - Continue IV Vancomycin and Zosyn    Osteomyelitis of  lumbar spine  As above   -ID consulted   -IV antibiotics   -MRI results reviewed -improved per Neuro Surgery   -hx of  Microbiology that grew Streptococcus s/p washout on 5/24/19 with appropriate antibiotics completed of note  -hx of posterior dehiscence of incision noted with transferred to a nursing home for long term -antibiotic therapy completed via PICC line for approximately 2 months for Strep infection   -Blood culture results showed gram positive cocci x 1 bottle- ID consulted    08/24  - BC x 1 resulted with COAGULASE- NEGATIVE STAPHYLOCOCCUS SPECIES, likely contaminant  - ID following  - Continue IV abx    08/25  - BC x 1 resulted on 08/24 as likely contaminant -- COAGULASE-NEGATIVE STAPHYLOCOCCUS SPECIES  - ID recommended to repeat CRP and ESR to see if BC was contaminant. CRP 3.1, Sed rate 27  - Same BC resulted at 1334 today with VIRIDANS STREPTOCOCCUS GROUP. Susceptibility testing not routinely performed  - Informed Dr. Gomez (ID) and Dr. Nicolas (neurosurgery) of results  - Continue IV Vancomycin and Zosyn      Abnormal urinalysis  -UA consistent with possible infectious process   -IV antibiotics   -urine culture NGTD      Drainage from wound  -IV antibiotics continued   -wound cultures results -- NGTD  -NeuroSurgery following with washout with revision of instrumentation and wound closure on Monday, 08/26  -wound care consulted      Morbid obesity with BMI of 40.0-44.9, adult  - Pt may benefit from nutrition consult upon discharge   - Discussed lifestyle modifications including diet and exercise       Type 2 diabetes, controlled, with peripheral neuropathy  - A1C two months ago -- 6.0 -- controlled  - Accuchecks and low dose SSI      Hypothyroidism  - Continue Synthroid     Essential hypertension  - Continue home medications including Metoprolol and Losartan      PD (Parkinson's disease)  - Continue home Parkinson's meds        VTE Risk Mitigation (From admission, onward)        Ordered     Place  sequential compression device  Until discontinued      08/22/19 0052                Melody Oliver, KACIE  Department of Hospital Medicine   [facility]

## 2019-08-26 ENCOUNTER — ANESTHESIA EVENT (OUTPATIENT)
Dept: SURGERY | Facility: HOSPITAL | Age: 74
DRG: 463 | End: 2019-08-26
Payer: MEDICARE

## 2019-08-26 ENCOUNTER — ANESTHESIA (OUTPATIENT)
Dept: SURGERY | Facility: HOSPITAL | Age: 74
DRG: 463 | End: 2019-08-26
Payer: MEDICARE

## 2019-08-26 LAB
ANION GAP SERPL CALC-SCNC: 11 MMOL/L (ref 8–16)
BACTERIA SPEC AEROBE CULT: NO GROWTH
BASOPHILS # BLD AUTO: 0.01 K/UL (ref 0–0.2)
BASOPHILS NFR BLD: 0.2 % (ref 0–1.9)
BUN SERPL-MCNC: 11 MG/DL (ref 8–23)
CALCIUM SERPL-MCNC: 9.5 MG/DL (ref 8.7–10.5)
CHLORIDE SERPL-SCNC: 99 MMOL/L (ref 95–110)
CO2 SERPL-SCNC: 25 MMOL/L (ref 23–29)
CREAT SERPL-MCNC: 0.7 MG/DL (ref 0.5–1.4)
DIFFERENTIAL METHOD: ABNORMAL
EOSINOPHIL # BLD AUTO: 0.2 K/UL (ref 0–0.5)
EOSINOPHIL NFR BLD: 3.7 % (ref 0–8)
ERYTHROCYTE [DISTWIDTH] IN BLOOD BY AUTOMATED COUNT: 14.7 % (ref 11.5–14.5)
EST. GFR  (AFRICAN AMERICAN): >60 ML/MIN/1.73 M^2
EST. GFR  (NON AFRICAN AMERICAN): >60 ML/MIN/1.73 M^2
GLUCOSE SERPL-MCNC: 131 MG/DL (ref 70–110)
HCT VFR BLD AUTO: 35.4 % (ref 37–48.5)
HGB BLD-MCNC: 11.5 G/DL (ref 12–16)
LYMPHOCYTES # BLD AUTO: 2 K/UL (ref 1–4.8)
LYMPHOCYTES NFR BLD: 35 % (ref 18–48)
MCH RBC QN AUTO: 27.8 PG (ref 27–31)
MCHC RBC AUTO-ENTMCNC: 32.5 G/DL (ref 32–36)
MCV RBC AUTO: 86 FL (ref 82–98)
MONOCYTES # BLD AUTO: 0.4 K/UL (ref 0.3–1)
MONOCYTES NFR BLD: 7.8 % (ref 4–15)
NEUTROPHILS # BLD AUTO: 3 K/UL (ref 1.8–7.7)
NEUTROPHILS NFR BLD: 53.5 % (ref 38–73)
PLATELET # BLD AUTO: 239 K/UL (ref 150–350)
PMV BLD AUTO: 9.5 FL (ref 9.2–12.9)
POCT GLUCOSE: 137 MG/DL (ref 70–110)
POCT GLUCOSE: 161 MG/DL (ref 70–110)
POCT GLUCOSE: 163 MG/DL (ref 70–110)
POTASSIUM SERPL-SCNC: 4 MMOL/L (ref 3.5–5.1)
RBC # BLD AUTO: 4.14 M/UL (ref 4–5.4)
SODIUM SERPL-SCNC: 135 MMOL/L (ref 136–145)
VANCOMYCIN TROUGH SERPL-MCNC: 19.1 UG/ML (ref 10–22)
WBC # BLD AUTO: 5.63 K/UL (ref 3.9–12.7)

## 2019-08-26 PROCEDURE — 11981 PR INSERT, DRUG DELIVERY IMPLANT, BIORESORB/BIODEGR/NON-BIODEGR: ICD-10-PCS | Mod: 51,,, | Performed by: NEUROLOGICAL SURGERY

## 2019-08-26 PROCEDURE — 36000706: Performed by: NEUROLOGICAL SURGERY

## 2019-08-26 PROCEDURE — 22852 PR REMOVE SPINE FIX DEV,POST SGMTAL: ICD-10-PCS | Mod: 78,,, | Performed by: NEUROLOGICAL SURGERY

## 2019-08-26 PROCEDURE — 63600175 PHARM REV CODE 636 W HCPCS: Performed by: INTERNAL MEDICINE

## 2019-08-26 PROCEDURE — 25000003 PHARM REV CODE 250: Performed by: NURSE PRACTITIONER

## 2019-08-26 PROCEDURE — 22852 REMOVE SPINE FIXATION DEVICE: CPT | Mod: AS,,, | Performed by: PHYSICIAN ASSISTANT

## 2019-08-26 PROCEDURE — 80048 BASIC METABOLIC PNL TOTAL CA: CPT

## 2019-08-26 PROCEDURE — 37000009 HC ANESTHESIA EA ADD 15 MINS: Performed by: NEUROLOGICAL SURGERY

## 2019-08-26 PROCEDURE — 63600175 PHARM REV CODE 636 W HCPCS: Performed by: NEUROLOGICAL SURGERY

## 2019-08-26 PROCEDURE — 80202 ASSAY OF VANCOMYCIN: CPT

## 2019-08-26 PROCEDURE — 11981 INSERTION DRUG DLVR IMPLANT: CPT | Mod: 51,,, | Performed by: NEUROLOGICAL SURGERY

## 2019-08-26 PROCEDURE — 85025 COMPLETE CBC W/AUTO DIFF WBC: CPT

## 2019-08-26 PROCEDURE — 37000008 HC ANESTHESIA 1ST 15 MINUTES: Performed by: NEUROLOGICAL SURGERY

## 2019-08-26 PROCEDURE — 36000707: Performed by: NEUROLOGICAL SURGERY

## 2019-08-26 PROCEDURE — 88300 SURGICAL PATH GROSS: CPT | Mod: 26,,, | Performed by: PATHOLOGY

## 2019-08-26 PROCEDURE — 22852 REMOVE SPINE FIXATION DEVICE: CPT | Mod: 78,,, | Performed by: NEUROLOGICAL SURGERY

## 2019-08-26 PROCEDURE — 21400001 HC TELEMETRY ROOM

## 2019-08-26 PROCEDURE — 22852 PR REMOVE SPINE FIX DEV,POST SGMTAL: ICD-10-PCS | Mod: AS,,, | Performed by: PHYSICIAN ASSISTANT

## 2019-08-26 PROCEDURE — 63600175 PHARM REV CODE 636 W HCPCS: Performed by: NURSE PRACTITIONER

## 2019-08-26 PROCEDURE — 27201423 OPTIME MED/SURG SUP & DEVICES STERILE SUPPLY: Performed by: NEUROLOGICAL SURGERY

## 2019-08-26 PROCEDURE — 87070 CULTURE OTHR SPECIMN AEROBIC: CPT

## 2019-08-26 PROCEDURE — 11000001 HC ACUTE MED/SURG PRIVATE ROOM

## 2019-08-26 PROCEDURE — 25000003 PHARM REV CODE 250: Performed by: NEUROLOGICAL SURGERY

## 2019-08-26 PROCEDURE — 25000003 PHARM REV CODE 250: Performed by: PHYSICIAN ASSISTANT

## 2019-08-26 PROCEDURE — 94799 UNLISTED PULMONARY SVC/PX: CPT

## 2019-08-26 PROCEDURE — C1713 ANCHOR/SCREW BN/BN,TIS/BN: HCPCS | Performed by: NEUROLOGICAL SURGERY

## 2019-08-26 PROCEDURE — 88300 SURGICAL PATH GROSS: CPT | Performed by: PATHOLOGY

## 2019-08-26 PROCEDURE — 63600175 PHARM REV CODE 636 W HCPCS: Performed by: NURSE ANESTHETIST, CERTIFIED REGISTERED

## 2019-08-26 PROCEDURE — 63600175 PHARM REV CODE 636 W HCPCS: Performed by: ANESTHESIOLOGY

## 2019-08-26 PROCEDURE — 88300 TISSUE SPECIMEN TO PATHOLOGY - SURGERY: ICD-10-PCS | Mod: 26,,, | Performed by: PATHOLOGY

## 2019-08-26 PROCEDURE — 25000003 PHARM REV CODE 250: Performed by: NURSE ANESTHETIST, CERTIFIED REGISTERED

## 2019-08-26 PROCEDURE — C1729 CATH, DRAINAGE: HCPCS | Performed by: NEUROLOGICAL SURGERY

## 2019-08-26 PROCEDURE — 87075 CULTR BACTERIA EXCEPT BLOOD: CPT

## 2019-08-26 PROCEDURE — 71000039 HC RECOVERY, EACH ADD'L HOUR: Performed by: NEUROLOGICAL SURGERY

## 2019-08-26 PROCEDURE — 71000033 HC RECOVERY, INTIAL HOUR: Performed by: NEUROLOGICAL SURGERY

## 2019-08-26 DEVICE — KIT GRAFT BONE/SUB STIM 20ML: Type: IMPLANTABLE DEVICE | Site: SPINE LUMBAR | Status: FUNCTIONAL

## 2019-08-26 RX ORDER — NEOSTIGMINE METHYLSULFATE 1 MG/ML
INJECTION, SOLUTION INTRAVENOUS
Status: DISCONTINUED | OUTPATIENT
Start: 2019-08-26 | End: 2019-08-26

## 2019-08-26 RX ORDER — OXYCODONE AND ACETAMINOPHEN 5; 325 MG/1; MG/1
1 TABLET ORAL EVERY 4 HOURS PRN
Status: DISCONTINUED | OUTPATIENT
Start: 2019-08-26 | End: 2019-08-29 | Stop reason: HOSPADM

## 2019-08-26 RX ORDER — ONDANSETRON 8 MG/1
8 TABLET, ORALLY DISINTEGRATING ORAL EVERY 6 HOURS PRN
Status: DISCONTINUED | OUTPATIENT
Start: 2019-08-26 | End: 2019-08-28 | Stop reason: SDUPTHER

## 2019-08-26 RX ORDER — ACETAMINOPHEN 10 MG/ML
1000 INJECTION, SOLUTION INTRAVENOUS ONCE
Status: COMPLETED | OUTPATIENT
Start: 2019-08-26 | End: 2019-08-26

## 2019-08-26 RX ORDER — HYDROMORPHONE HYDROCHLORIDE 2 MG/ML
0.2 INJECTION, SOLUTION INTRAMUSCULAR; INTRAVENOUS; SUBCUTANEOUS EVERY 5 MIN PRN
Status: DISCONTINUED | OUTPATIENT
Start: 2019-08-26 | End: 2019-08-26 | Stop reason: HOSPADM

## 2019-08-26 RX ORDER — METHOCARBAMOL 750 MG/1
750 TABLET, FILM COATED ORAL 3 TIMES DAILY
Status: DISCONTINUED | OUTPATIENT
Start: 2019-08-26 | End: 2019-08-29 | Stop reason: HOSPADM

## 2019-08-26 RX ORDER — AMOXICILLIN 250 MG
2 CAPSULE ORAL NIGHTLY PRN
Status: DISCONTINUED | OUTPATIENT
Start: 2019-08-26 | End: 2019-08-29 | Stop reason: HOSPADM

## 2019-08-26 RX ORDER — RAMELTEON 8 MG/1
8 TABLET ORAL NIGHTLY PRN
Status: DISCONTINUED | OUTPATIENT
Start: 2019-08-26 | End: 2019-08-29 | Stop reason: HOSPADM

## 2019-08-26 RX ORDER — ONDANSETRON 2 MG/ML
INJECTION INTRAMUSCULAR; INTRAVENOUS
Status: DISCONTINUED | OUTPATIENT
Start: 2019-08-26 | End: 2019-08-26

## 2019-08-26 RX ORDER — BACITRACIN ZINC 500 UNIT/G
OINTMENT (GRAM) TOPICAL
Status: DISCONTINUED | OUTPATIENT
Start: 2019-08-26 | End: 2019-08-26 | Stop reason: HOSPADM

## 2019-08-26 RX ORDER — SODIUM CHLORIDE, SODIUM LACTATE, POTASSIUM CHLORIDE, CALCIUM CHLORIDE 600; 310; 30; 20 MG/100ML; MG/100ML; MG/100ML; MG/100ML
INJECTION, SOLUTION INTRAVENOUS CONTINUOUS PRN
Status: DISCONTINUED | OUTPATIENT
Start: 2019-08-26 | End: 2019-08-26

## 2019-08-26 RX ORDER — SODIUM CHLORIDE 0.9 % (FLUSH) 0.9 %
10 SYRINGE (ML) INJECTION
Status: DISCONTINUED | OUTPATIENT
Start: 2019-08-26 | End: 2019-08-29 | Stop reason: HOSPADM

## 2019-08-26 RX ORDER — LIDOCAINE HYDROCHLORIDE 10 MG/ML
INJECTION, SOLUTION EPIDURAL; INFILTRATION; INTRACAUDAL; PERINEURAL
Status: DISCONTINUED | OUTPATIENT
Start: 2019-08-26 | End: 2019-08-26

## 2019-08-26 RX ORDER — TOBRAMYCIN 40 MG/ML
80 INJECTION INTRAMUSCULAR; INTRAVENOUS ONCE
Status: DISCONTINUED | OUTPATIENT
Start: 2019-08-26 | End: 2019-08-26 | Stop reason: CLARIF

## 2019-08-26 RX ORDER — GLYCOPYRROLATE 0.2 MG/ML
INJECTION INTRAMUSCULAR; INTRAVENOUS
Status: DISCONTINUED | OUTPATIENT
Start: 2019-08-26 | End: 2019-08-26

## 2019-08-26 RX ORDER — MAG HYDROX/ALUMINUM HYD/SIMETH 200-200-20
30 SUSPENSION, ORAL (FINAL DOSE FORM) ORAL EVERY 4 HOURS PRN
Status: DISCONTINUED | OUTPATIENT
Start: 2019-08-26 | End: 2019-08-29 | Stop reason: HOSPADM

## 2019-08-26 RX ORDER — OXYCODONE AND ACETAMINOPHEN 7.5; 325 MG/1; MG/1
1 TABLET ORAL EVERY 4 HOURS PRN
Status: DISCONTINUED | OUTPATIENT
Start: 2019-08-26 | End: 2019-08-29 | Stop reason: HOSPADM

## 2019-08-26 RX ORDER — OXYCODONE AND ACETAMINOPHEN 10; 325 MG/1; MG/1
1 TABLET ORAL EVERY 4 HOURS PRN
Status: DISCONTINUED | OUTPATIENT
Start: 2019-08-26 | End: 2019-08-27 | Stop reason: SDUPTHER

## 2019-08-26 RX ORDER — ROCURONIUM BROMIDE 10 MG/ML
INJECTION, SOLUTION INTRAVENOUS
Status: DISCONTINUED | OUTPATIENT
Start: 2019-08-26 | End: 2019-08-26

## 2019-08-26 RX ORDER — ACETAMINOPHEN 325 MG/1
650 TABLET ORAL EVERY 4 HOURS PRN
Status: DISCONTINUED | OUTPATIENT
Start: 2019-08-26 | End: 2019-08-29 | Stop reason: HOSPADM

## 2019-08-26 RX ORDER — CHLORHEXIDINE GLUCONATE ORAL RINSE 1.2 MG/ML
10 SOLUTION DENTAL
Status: DISCONTINUED | OUTPATIENT
Start: 2019-08-26 | End: 2019-08-26 | Stop reason: HOSPADM

## 2019-08-26 RX ORDER — MORPHINE SULFATE 4 MG/ML
2 INJECTION, SOLUTION INTRAMUSCULAR; INTRAVENOUS
Status: DISCONTINUED | OUTPATIENT
Start: 2019-08-26 | End: 2019-08-29 | Stop reason: HOSPADM

## 2019-08-26 RX ORDER — FENTANYL CITRATE 50 UG/ML
INJECTION, SOLUTION INTRAMUSCULAR; INTRAVENOUS
Status: DISCONTINUED | OUTPATIENT
Start: 2019-08-26 | End: 2019-08-26

## 2019-08-26 RX ORDER — SODIUM CHLORIDE 9 MG/ML
INJECTION, SOLUTION INTRAVENOUS CONTINUOUS
Status: DISCONTINUED | OUTPATIENT
Start: 2019-08-26 | End: 2019-08-28

## 2019-08-26 RX ORDER — VANCOMYCIN HYDROCHLORIDE 1 G/20ML
INJECTION, POWDER, LYOPHILIZED, FOR SOLUTION INTRAVENOUS
Status: DISCONTINUED | OUTPATIENT
Start: 2019-08-26 | End: 2019-08-26 | Stop reason: HOSPADM

## 2019-08-26 RX ORDER — KETOROLAC TROMETHAMINE 30 MG/ML
15 INJECTION, SOLUTION INTRAMUSCULAR; INTRAVENOUS EVERY 8 HOURS PRN
Status: DISCONTINUED | OUTPATIENT
Start: 2019-08-26 | End: 2019-08-26 | Stop reason: HOSPADM

## 2019-08-26 RX ORDER — PROPOFOL 10 MG/ML
VIAL (ML) INTRAVENOUS
Status: DISCONTINUED | OUTPATIENT
Start: 2019-08-26 | End: 2019-08-26

## 2019-08-26 RX ORDER — TOBRAMYCIN 1.2 G/30ML
1.2 INJECTION, POWDER, LYOPHILIZED, FOR SOLUTION INTRAVENOUS ONCE
Status: COMPLETED | OUTPATIENT
Start: 2019-08-26 | End: 2019-08-26

## 2019-08-26 RX ORDER — PHENYLEPHRINE HYDROCHLORIDE 10 MG/ML
INJECTION INTRAVENOUS
Status: DISCONTINUED | OUTPATIENT
Start: 2019-08-26 | End: 2019-08-26

## 2019-08-26 RX ORDER — ACETAMINOPHEN 325 MG/1
650 TABLET ORAL EVERY 6 HOURS PRN
Status: DISCONTINUED | OUTPATIENT
Start: 2019-08-26 | End: 2019-08-29 | Stop reason: HOSPADM

## 2019-08-26 RX ADMIN — PROPOFOL 90 MG: 10 INJECTION, EMULSION INTRAVENOUS at 01:08

## 2019-08-26 RX ADMIN — PIPERACILLIN AND TAZOBACTAM 4.5 G: 4; .5 INJECTION, POWDER, LYOPHILIZED, FOR SOLUTION INTRAVENOUS; PARENTERAL at 02:08

## 2019-08-26 RX ADMIN — DOCUSATE SODIUM 100 MG: 100 CAPSULE, LIQUID FILLED ORAL at 09:08

## 2019-08-26 RX ADMIN — KETOROLAC TROMETHAMINE 15 MG: 30 INJECTION, SOLUTION INTRAMUSCULAR at 03:08

## 2019-08-26 RX ADMIN — PHENYLEPHRINE HYDROCHLORIDE 200 MCG: 10 INJECTION INTRAVENOUS at 01:08

## 2019-08-26 RX ADMIN — PANTOPRAZOLE SODIUM 40 MG: 40 TABLET, DELAYED RELEASE ORAL at 09:08

## 2019-08-26 RX ADMIN — ONDANSETRON 4 MG: 2 INJECTION, SOLUTION INTRAMUSCULAR; INTRAVENOUS at 02:08

## 2019-08-26 RX ADMIN — FENTANYL CITRATE 100 MCG: 50 INJECTION, SOLUTION INTRAMUSCULAR; INTRAVENOUS at 12:08

## 2019-08-26 RX ADMIN — ONDANSETRON 4 MG: 2 INJECTION INTRAMUSCULAR; INTRAVENOUS at 04:08

## 2019-08-26 RX ADMIN — LEVOTHYROXINE SODIUM 125 MCG: 125 TABLET ORAL at 05:08

## 2019-08-26 RX ADMIN — LOSARTAN POTASSIUM 50 MG: 50 TABLET, FILM COATED ORAL at 09:08

## 2019-08-26 RX ADMIN — HYDROMORPHONE HYDROCHLORIDE 0.2 MG: 2 INJECTION, SOLUTION INTRAMUSCULAR; INTRAVENOUS; SUBCUTANEOUS at 03:08

## 2019-08-26 RX ADMIN — TOBRAMYCIN 1.2 G: 1.2 INJECTION, POWDER, LYOPHILIZED, FOR SOLUTION INTRAVENOUS at 01:08

## 2019-08-26 RX ADMIN — METOPROLOL SUCCINATE 25 MG: 25 TABLET, EXTENDED RELEASE ORAL at 09:08

## 2019-08-26 RX ADMIN — CARBIDOPA AND LEVODOPA 1 TABLET: 25; 250 TABLET ORAL at 05:08

## 2019-08-26 RX ADMIN — ROBINUL 0.4 MG: 0.2 INJECTION INTRAMUSCULAR; INTRAVENOUS at 02:08

## 2019-08-26 RX ADMIN — OXYCODONE HYDROCHLORIDE AND ACETAMINOPHEN 1 TABLET: 10; 325 TABLET ORAL at 12:08

## 2019-08-26 RX ADMIN — ROCURONIUM BROMIDE 30 MG: 10 INJECTION, SOLUTION INTRAVENOUS at 01:08

## 2019-08-26 RX ADMIN — MORPHINE SULFATE 2 MG: 2 INJECTION, SOLUTION INTRAMUSCULAR; INTRAVENOUS at 11:08

## 2019-08-26 RX ADMIN — OXYCODONE HYDROCHLORIDE AND ACETAMINOPHEN 1 TABLET: 10; 325 TABLET ORAL at 03:08

## 2019-08-26 RX ADMIN — OXYCODONE HYDROCHLORIDE AND ACETAMINOPHEN 1 TABLET: 10; 325 TABLET ORAL at 09:08

## 2019-08-26 RX ADMIN — ISOSORBIDE MONONITRATE 120 MG: 30 TABLET, EXTENDED RELEASE ORAL at 09:08

## 2019-08-26 RX ADMIN — LIDOCAINE HYDROCHLORIDE 50 MG: 10 INJECTION, SOLUTION EPIDURAL; INFILTRATION; INTRACAUDAL; PERINEURAL at 01:08

## 2019-08-26 RX ADMIN — ACETAMINOPHEN 1000 MG: 10 INJECTION, SOLUTION INTRAVENOUS at 03:08

## 2019-08-26 RX ADMIN — PIPERACILLIN AND TAZOBACTAM 4.5 G: 4; .5 INJECTION, POWDER, LYOPHILIZED, FOR SOLUTION INTRAVENOUS; PARENTERAL at 09:08

## 2019-08-26 RX ADMIN — PHENYLEPHRINE HYDROCHLORIDE 100 MCG: 10 INJECTION INTRAVENOUS at 01:08

## 2019-08-26 RX ADMIN — CARBIDOPA AND LEVODOPA 1 TABLET: 25; 250 TABLET ORAL at 09:08

## 2019-08-26 RX ADMIN — VANCOMYCIN HYDROCHLORIDE 2250 MG: 1 INJECTION, POWDER, LYOPHILIZED, FOR SOLUTION INTRAVENOUS at 12:08

## 2019-08-26 RX ADMIN — SODIUM CHLORIDE, SODIUM LACTATE, POTASSIUM CHLORIDE, AND CALCIUM CHLORIDE: 600; 310; 30; 20 INJECTION, SOLUTION INTRAVENOUS at 12:08

## 2019-08-26 RX ADMIN — OXYCODONE HYDROCHLORIDE AND ACETAMINOPHEN 1 TABLET: 10; 325 TABLET ORAL at 07:08

## 2019-08-26 RX ADMIN — PIPERACILLIN AND TAZOBACTAM 4.5 G: 4; .5 INJECTION, POWDER, LYOPHILIZED, FOR SOLUTION INTRAVENOUS; PARENTERAL at 05:08

## 2019-08-26 RX ADMIN — METHOCARBAMOL TABLETS 750 MG: 750 TABLET, COATED ORAL at 09:08

## 2019-08-26 RX ADMIN — HYDROMORPHONE HYDROCHLORIDE 0.2 MG: 2 INJECTION, SOLUTION INTRAMUSCULAR; INTRAVENOUS; SUBCUTANEOUS at 04:08

## 2019-08-26 RX ADMIN — MORPHINE SULFATE 2 MG: 2 INJECTION, SOLUTION INTRAMUSCULAR; INTRAVENOUS at 10:08

## 2019-08-26 RX ADMIN — NEOSTIGMINE METHYLSULFATE 3 MG: 1 INJECTION INTRAVENOUS at 02:08

## 2019-08-26 RX ADMIN — PRAVASTATIN SODIUM 40 MG: 20 TABLET ORAL at 09:08

## 2019-08-26 RX ADMIN — POLYETHYLENE GLYCOL 3350 17 G: 17 POWDER, FOR SOLUTION ORAL at 09:08

## 2019-08-26 RX ADMIN — FENTANYL CITRATE 100 MCG: 50 INJECTION, SOLUTION INTRAMUSCULAR; INTRAVENOUS at 03:08

## 2019-08-26 NOTE — PROGRESS NOTES
Interval History:  Patient seen and examined  Blood cultures were reviewed and discussed with the Medicine service.  Pain is located into the hips bilaterally.  No radiation past the knee.  Denies any numbness and tingling.  Denies any weakness  Pain is worse with activity and better with rest  Has not gotten out of bed since being in the hospital  Carlson catheter is in place      Medications:  Continuous Infusions:  Scheduled Meds:   carbidopa-levodopa  mg  1 tablet Oral 5x Daily    docusate sodium  100 mg Oral Daily    isosorbide mononitrate  120 mg Oral Daily    levothyroxine  125 mcg Oral Before breakfast    losartan  50 mg Oral Daily    metoprolol succinate  25 mg Oral Daily    pantoprazole  40 mg Oral Daily    piperacillin-tazobactam (ZOSYN) IVPB  4.5 g Intravenous Q8H    polyethylene glycol  17 g Oral Daily    pravastatin  40 mg Oral Daily    vancomycin (VANCOCIN) IVPB  2,250 mg Intravenous Q24H     PRN Meds:Dextrose 10% Bolus, glucagon (human recombinant), insulin aspart U-100, morphine, ondansetron, oxyCODONE-acetaminophen     Review of Systems   Constitutional: Negative for activity change, appetite change and chills.   HENT: Negative for hearing loss, sore throat and tinnitus.    Eyes: Negative for pain, discharge and itching.   Cardiovascular: Negative for chest pain.   Gastrointestinal: Negative for abdominal pain.   Endocrine: Negative for cold intolerance and heat intolerance.   Genitourinary: Negative for difficulty urinating and dysuria.   Musculoskeletal: Positive for back pain and gait problem.   Allergic/Immunologic: Negative for environmental allergies.   Neurological: Positive for weakness. Negative for dizziness, tremors, light-headedness and headaches.   Hematological: Negative for adenopathy.   Psychiatric/Behavioral: Negative for agitation, behavioral problems and confusion.     Objective:     Weight: 88.3 kg (194 lb 10.7 oz)  Body mass index is 31.42 kg/m².  Vital Signs  "(Most Recent):  Temp: 97.4 °F (36.3 °C) (08/26/19 0333)  Pulse: 71 (08/26/19 0500)  Resp: 18 (08/26/19 0333)  BP: (!) 156/67 (08/26/19 0333)  SpO2: 99 % (08/26/19 0333) Vital Signs (24h Range):  Temp:  [97.4 °F (36.3 °C)-98.2 °F (36.8 °C)] 97.4 °F (36.3 °C)  Pulse:  [] 71  Resp:  [15-18] 18  SpO2:  [94 %-99 %] 99 %  BP: (107-156)/(54-69) 156/67                          Urethral Catheter 08/21/19 2153 Latex 16 Fr. (Active)   Site Assessment Clean;Intact 8/26/2019  3:36 AM   Collection Container Standard drainage bag 8/26/2019  3:36 AM   Securement Method secured to top of thigh w/ adhesive device 8/26/2019  3:36 AM   Catheter Care Performed yes 8/25/2019  3:26 PM   Reason for Continuing Urinary Catheterization Required immobilization 8/25/2019  3:26 PM   CAUTI Prevention Bundle StatLock in place w 1" slack;Intact seal between catheter & drainage tubing;Drainage bag off the floor;Green sheeting clip in use;No dependent loops or kinks;Drainage bag not overfilled (<2/3 full);Drainage bag below bladder 8/25/2019  7:33 AM   Output (mL) 1150 mL 8/26/2019  3:33 AM       Nursing note and vitals reviewed  Gen:Oriented to person, place, and time.             Appears stated age   Skin: no rashes or lesions identified   Head:Normocephalic and atraumatic.  Nose: Nose normal.    Eyes: EOM are normal. Pupils are equal, round, and reactive to light.   Neck: Neck supple. No masses or lesions palpated  Cardiovascular: Intact distal pulses.    Abdominal: Soft.   Neurological: Alert and oriented to person, place, and time.  No cranial nerve deficit.  Coordination normal. Normal muscle tone  Psychiatric: Normal mood and affect. Behavior is normal.      Back:        Spasms bilaterally Paraspinal muscle spasms    Unable to assess Pain with flexion and extention    Unable to assess Range of motion     Did not assess Straight leg raise     Motor   Right Right Left Left  Level Group   5 5 5 5 L2 Hip flexor (Psoas)   5 5 5 5 L3 Leg " extension (Quads)   5 5 5 5 L4 Dorsiflexion & foot inversion (Tibialis Anterior)   5 5 5 5 L5 Great toe extension ( EHL)   5 5 5 5 S1 Foot eversion (Gastroc, PL & PB)     Sensation  NL Decreased (R/L/BL) Level Sensation    X  L2 Anterio-medial thigh   X  L3 Medial thigh around knee   X  L4 Medial foot   X  L5 Dorsum foot   X  S1 Lateral foot     Reflex  2+  Patellar tendon (L4)   2+  Achilles tendon (S1)           Significant Labs:  Recent Labs   Lab 08/25/19  0600 08/26/19  0515   * 131*   * 135*   K 4.1 4.0   CL 96 99   CO2 26 25   BUN 11 11   CREATININE 0.8 0.7   CALCIUM 9.5 9.5     Recent Labs   Lab 08/25/19  0600 08/26/19  0515   WBC 6.12 5.63   HGB 11.4* 11.5*   HCT 35.5* 35.4*    239     No results for input(s): LABPT, INR, APTT in the last 48 hours.  Microbiology Results (last 7 days)     Procedure Component Value Units Date/Time    Blood culture #2 **CANNOT BE ORDERED STAT** [[id number]] Collected:  08/21/19 2020    Order Status:  Completed Specimen:  Blood from Line, PICC Right Cephalic Updated:  08/26/19 0612     Blood Culture, Routine No Growth to date      No Growth to date      No Growth to date      No Growth to date      No Growth to date    Blood culture #1 **CANNOT BE ORDERED STAT** [[id number]]  (Abnormal) Collected:  08/21/19 2020    Order Status:  Completed Specimen:  Blood from Peripheral, Antecubital, Left Updated:  08/25/19 1334     Blood Culture, Routine Gram stain aer bottle: Gram positive cocci      Results called to and read back by:Viola Reaves RN 08/22/2019  18:02      Gram stain flaco bottle: Gram positive cocci       Positive results previously called      COAGULASE-NEGATIVE STAPHYLOCOCCUS SPECIES  Multiple morphotyes - probable contaminates        VIRIDANS STREPTOCOCCUS GROUP  Susceptibility testing not routinely performed      Aerobic culture [[id number]] Collected:  08/23/19 2200    Order Status:  Completed Specimen:  Incision site from Back Updated:  08/25/19 0785      Aerobic Bacterial Culture No growth    Aerobic culture [[id number]] Collected:  08/22/19 1855    Order Status:  Completed Specimen:  Wound from Back Updated:  08/24/19 0750     Aerobic Bacterial Culture No growth    Culture, Anaerobe [[id number]] Collected:  08/23/19 2200    Order Status:  Sent Specimen:  Incision site from Back Updated:  08/24/19 0232    Urine culture [[id number]] Collected:  08/21/19 2200    Order Status:  Completed Specimen:  Urine Updated:  08/23/19 1357     Urine Culture, Routine No significant growth    Narrative:       Preferred Collection Type->Urine, Clean Catch        All pertinent labs from the last 24 hours have been reviewed.    Significant Diagnostics:  I have reviewed all pertinent imaging results/findings within the past 24 hours.     Assessment and plan  Patient has cultures which were positive for Staph as well as strep  Will take patient back for removal of instrumentation and placement of antibiotic beads.  These will be left in place for approximately 10 days after which time we will take the patient back for re-instrumentation and closure of the incision. Patient understands and accepts the risks including.    The patient was informed of all benefits and potential risk of the operation including but not limited to:  Pain, infection, bleeding, coma, paralysis, death.  Cerebrospinal fluid leak, failure of any instrumentation, the need for additional procedures in the future. No guarantee was given that this procedure would alleviate all of the symptoms.Patient NPO will take this afternoon for procedure.

## 2019-08-26 NOTE — ASSESSMENT & PLAN NOTE
-IV antibiotics continued   -wound cultures results -- NGTD  -NeuroSurgery following with washout with revision of instrumentation and wound closure on Monday, 08/26  -wound care consulted    08/26  - S/p bilateral wound debridement, hardware removal, and placement of abx vancomycin/tobramycin pellets today by Dr. Nicolas

## 2019-08-26 NOTE — PLAN OF CARE
Problem: Adult Inpatient Plan of Care  Goal: Plan of Care Review  Outcome: Ongoing (interventions implemented as appropriate)  No acute distress noted. Antibiotics infusing. Blood glucose being monitored. NPO . Safety measures are in place. Call bell within reach. Pain being managed. Pt free of falls. Will continue to monitor. Chart check complete.

## 2019-08-26 NOTE — PROGRESS NOTES
[facility] Hospital Medicine  Progress Note    Patient Name: Ross Isbell  MRN: [id number]  Patient Class: IP- Inpatient   Admission Date: 8/21/2019  Length of Stay: 3 days  Attending Physician: Rodger Loya MD  Primary Care Provider: Anna Penn MD        Subjective:     Principal Problem:Status post lumbar spinal fusion        HPI:  Ms Isbell is a 73 year old female with PMHx of CAD, HTN, DM, Parkinson disease, HLD and Osteomyelitis of lumbar spine who presented to [facility] Emergency Room with increase back pain. She  had axial back pain with destruction of the disc space underwent  posterior washout and debridement with placement of instrumentation on 5/24. Patient  had dehiscence of her incision and was taken back to the OR for wound VAC placement on 6/11. She has been experiencing increase back pain, unable to walk. She had been doing well with Physical Therapy, but now has been a wheel chair. She has experienced failure of instrumentation and elevated infectious markers. Neurosurery surgery following, plan for removal of instumentation with antibotic beads placement and wound vac on tomorrow. She being placed in observation under to care of Hospital Medicine.       Overview/Hospital Course:  Pt admitted to Observation Unit s/p lumbar spinal fusion.  NeuroSurgery consulted.  MRI showed favorable response to therapy with diminished abnormal bone marrow signal abnormalities relating to evolution of osteomyelitis change.  Diminished swelling of the right psoas muscle with no evidence of abscess.  2. Laminectomy at L2-L3 and L3-L4 with posterior fusion with mild to moderate spinal stenosis at L2-L3 with lateral recess stenosis that could affect the descending L3 spinal nerve roots.  No enhancing granulation tissue compresses the thecal sac or neural structures.  Grade 1 degenerative spondylolisthesis at L4-L5 with severe lateral recess stenosis that could compress the descending L5 spinal nerve  roots.  Pt treated with IV antibiotics and analgesia prn. Blood cultures with no growth to date.  Urine culture in progress.  Washout with revision of instrumentation and wound closure per NeuroSurgery and Plastics planned.  Vital signs stable. On 8/23/19, pt transferred to Inpatient status.  Blood culture grew gram positive cocci x 1 bottle.  ID consulted.  MRI reviewed by Neuro Surgery with signs of improving osteomyelitis noted and plan for revision on Monday afternoon pending cultures.  Plastic Surgery to assist with wound closure and covering.      As of 08/24 BC x 1 resulted with COAGULASE- NEGATIVE STAPHYLOCOCCUS SPECIES, likely contaminant. ID was consulted due to BC and recommended to continue IV Vancomycin and Zosyn until final sensitivity. Pt scheduled for revision on Monday afternoon with Plastics and Neurosurgery pending cultures    As of 08/25 BC x 1 likely contaminant -- COAGULASE-NEGATIVE STAPHYLOCOCCUS SPECIES. ID recommended to repeat CRP and ESR. CRP 3.1, Sed rate 27. BC resulted at 1334 today with VIRIDANS STREPTOCOCCUS GROUP. Susceptibility testing not routinely performed. Informed Dr. Gomez (ID) and Dr. Nicolas (neurosurgery) of results. Aerobic culture NGTD. Continue IV Vancomycin and Zosyn for now. NPO after midnight for surgery in AM.     As of 08/26 s/p bilateral wound debridement, hardware removal, and placement of abx vancomycin/tobramycin pellets today. Sx performed by Dr. Nicolas today. Pt just made it back from surgery. Pt currently c/o nausea. No other complaints at this time.         Review of Systems   Constitutional: Positive for activity change and fatigue. Negative for chills and fever.   HENT: Negative for congestion, rhinorrhea and sinus pressure.    Respiratory: Negative for apnea, cough, choking, chest tightness, shortness of breath, wheezing and stridor.    Cardiovascular: Negative for chest pain, palpitations and leg swelling.   Gastrointestinal: Positive for nausea. Negative  for abdominal distention, abdominal pain, diarrhea and vomiting.   Endocrine: Negative for cold intolerance and heat intolerance.   Genitourinary: Negative for difficulty urinating and hematuria.   Musculoskeletal: Positive for back pain. Negative for arthralgias and joint swelling.   Skin: Positive for color change and wound. Negative for pallor and rash.   Neurological: Positive for weakness. Negative for dizziness, seizures, numbness and headaches.   Psychiatric/Behavioral: Negative for agitation, confusion and sleep disturbance. The patient is not nervous/anxious.      Objective:     Vital Signs (Most Recent):  Temp: 97.8 °F (36.6 °C) (08/26/19 1659)  Pulse: 74 (08/26/19 1659)  Resp: 12 (08/26/19 1659)  BP: 135/60 (08/26/19 1659)  SpO2: 98 % (08/26/19 1630) Vital Signs (24h Range):  Temp:  [97.4 °F (36.3 °C)-98.8 °F (37.1 °C)] 97.8 °F (36.6 °C)  Pulse:  [] 74  Resp:  [12-22] 12  SpO2:  [94 %-99 %] 98 %  BP: ()/(51-67) 135/60     Weight: 88.3 kg (194 lb 10.7 oz)  Body mass index is 31.42 kg/m².    Intake/Output Summary (Last 24 hours) at 8/26/2019 1723  Last data filed at 8/26/2019 1508  Gross per 24 hour   Intake 840 ml   Output 1200 ml   Net -360 ml      Physical Exam   Constitutional: She is oriented to person, place, and time. She appears well-developed and well-nourished. No distress.   HENT:   Nose: Nose normal.   Mouth/Throat: No oropharyngeal exudate.   Eyes: Right eye exhibits no discharge. Left eye exhibits no discharge.   Neck: No JVD present.   Cardiovascular: Normal rate and intact distal pulses. Exam reveals no gallop and no friction rub.   Murmur heard.  Pulmonary/Chest: Effort normal and breath sounds normal. No stridor. No respiratory distress. She has no wheezes. She has no rales. She exhibits no tenderness.   Abdominal: Soft. Normal appearance and bowel sounds are normal. She exhibits no distension. There is no tenderness. There is no rebound and no guarding. No hernia.    Genitourinary:   Genitourinary Comments: Carlson catheter present    Musculoskeletal: She exhibits no edema or deformity.   Tremor in hands noted    Neurological: She is alert and oriented to person, place, and time.   Skin: Skin is warm and dry. Capillary refill takes 2 to 3 seconds. She is not diaphoretic. No erythema.   Lumbar dressing C/D/I  Sacral dressing C/D/I   Psychiatric: She has a normal mood and affect. Her behavior is normal. Thought content normal.   Nursing note and vitals reviewed.      Significant Labs:   BMP:   Recent Labs   Lab 08/26/19  0515   *   *   K 4.0   CL 99   CO2 25   BUN 11   CREATININE 0.7   CALCIUM 9.5     CBC:   Recent Labs   Lab 08/25/19  0600 08/26/19  0515   WBC 6.12 5.63   HGB 11.4* 11.5*   HCT 35.5* 35.4*    239     POCT Glucose:   Recent Labs   Lab 08/25/19  1642 08/25/19 2007 08/26/19  1241   POCTGLUCOSE 150* 149* 137*       Significant Imaging: I have reviewed all pertinent imaging results/findings within the past 24 hours.      Assessment/Plan:      * Status post lumbar spinal fusion  Admit to Observation   Neurosurgery managing   Plan removal of the instrumentation with placement of antibiotic beads to the incision cavity along with a wound VAC placement.   -8/23/19- MRI reviewed per Neuro Surgery with screws pulled out superiorly and plan for revision on Monday afternoon pending cultures.    Cefepime  Vancomycin    Pain control   -wound culture results pending   -wound care consulted     08/24  - Continue IV Vancomycin and Zosyn  - Aerobic culture NGTD, repeat pending  - BC x 1 resulted with COAGULASE- NEGATIVE STAPHYLOCOCCUS SPECIES, likely contaminant  - ID following  - Pt scheduled revision on Monday afternoon with Plastics and Neurosurgery pending cultures    08/25  - BC x 1 resulted on 08/24 as likely contaminant -- COAGULASE-NEGATIVE STAPHYLOCOCCUS SPECIES  - ID recommended to repeat CRP and ESR to see if BC was contaminant. CRP 3.1, Sed rate  27  - Same BC resulted at 1334 today with VIRIDANS STREPTOCOCCUS GROUP. Susceptibility testing not routinely performed  - Informed Dr. Gomez (ID) and Dr. Nicolas (neurosurgery) of results  - Continue IV Vancomycin and Zosyn    08/26  - S/p bilateral wound debridement, hardware removal, and placement of abx vancomycin/tobramycin pellets today by Dr. Nicolas  - Continue IV Vancomycin and Zosyn    Osteomyelitis of lumbar spine  As above   -ID consulted   -IV antibiotics   -MRI results reviewed -improved per Neuro Surgery   -hx of  Microbiology that grew Streptococcus s/p washout on 5/24/19 with appropriate antibiotics completed of note  -hx of posterior dehiscence of incision noted with transferred to a nursing home for long term -antibiotic therapy completed via PICC line for approximately 2 months for Strep infection   -Blood culture results showed gram positive cocci x 1 bottle- ID consulted    08/24  - BC x 1 resulted with COAGULASE- NEGATIVE STAPHYLOCOCCUS SPECIES, likely contaminant  - ID following  - Continue IV abx    08/25  - BC x 1 resulted on 08/24 as likely contaminant -- COAGULASE-NEGATIVE STAPHYLOCOCCUS SPECIES  - ID recommended to repeat CRP and ESR to see if BC was contaminant. CRP 3.1, Sed rate 27  - Same BC resulted at 1334 today with VIRIDANS STREPTOCOCCUS GROUP. Susceptibility testing not routinely performed  - Informed Dr. Gomez (ID) and Dr. Nicolas (neurosurgery) of results  - Continue IV Vancomycin and Zosyn    08/26  - S/p bilateral wound debridement, hardware removal, and placement of abx vancomycin/tobramycin pellets today by Dr. Nicolas  - Continue IV Vancomycin and Zosyn      Abnormal urinalysis  -UA consistent with possible infectious process   -IV antibiotics   -urine culture NGTD      Drainage from wound  -IV antibiotics continued   -wound cultures results -- NGTD  -NeuroSurgery following with washout with revision of instrumentation and wound closure on Monday, 08/26  -wound care  consulted    08/26  - S/p bilateral wound debridement, hardware removal, and placement of abx vancomycin/tobramycin pellets today by Dr. Nicolas    Morbid obesity with BMI of 40.0-44.9, adult  - Pt may benefit from nutrition consult upon discharge   - Discussed lifestyle modifications including diet and exercise       Type 2 diabetes, controlled, with peripheral neuropathy  - A1C two months ago -- 6.0 -- controlled  - Accuchecks and low dose SSI      Hypothyroidism  - Continue Synthroid     Essential hypertension  - Continue home medications including Metoprolol and Losartan      PD (Parkinson's disease)  - Continue home Parkinson's meds        VTE Risk Mitigation (From admission, onward)        Ordered     Place QUIQUE hose  Until discontinued      08/26/19 1532     Place sequential compression device  Until discontinued      08/26/19 1532     IP VTE HIGH RISK PATIENT  Once      08/26/19 1532     Place QUIQUE hose  Until discontinued      08/26/19 1435     Place sequential compression device  Until discontinued      08/26/19 1435     Place sequential compression device  Until discontinued      08/22/19 0052                KACIE Cowart  Department of Hospital Medicine   [facility]

## 2019-08-26 NOTE — PLAN OF CARE
Old IV removed from lt ac (20 G) due to being prev placed upon arrival into hospital. New IV placed to lt hand #20 G, pt tolerated well.

## 2019-08-26 NOTE — SUBJECTIVE & OBJECTIVE
Review of Systems   Constitutional: Positive for activity change and fatigue. Negative for chills and fever.   HENT: Negative for congestion, rhinorrhea and sinus pressure.    Respiratory: Negative for apnea, cough, choking, chest tightness, shortness of breath, wheezing and stridor.    Cardiovascular: Negative for chest pain, palpitations and leg swelling.   Gastrointestinal: Positive for nausea. Negative for abdominal distention, abdominal pain, diarrhea and vomiting.   Endocrine: Negative for cold intolerance and heat intolerance.   Genitourinary: Negative for difficulty urinating and hematuria.   Musculoskeletal: Positive for back pain. Negative for arthralgias and joint swelling.   Skin: Positive for color change and wound. Negative for pallor and rash.   Neurological: Positive for weakness. Negative for dizziness, seizures, numbness and headaches.   Psychiatric/Behavioral: Negative for agitation, confusion and sleep disturbance. The patient is not nervous/anxious.      Objective:     Vital Signs (Most Recent):  Temp: 97.8 °F (36.6 °C) (08/26/19 1659)  Pulse: 74 (08/26/19 1659)  Resp: 12 (08/26/19 1659)  BP: 135/60 (08/26/19 1659)  SpO2: 98 % (08/26/19 1630) Vital Signs (24h Range):  Temp:  [97.4 °F (36.3 °C)-98.8 °F (37.1 °C)] 97.8 °F (36.6 °C)  Pulse:  [] 74  Resp:  [12-22] 12  SpO2:  [94 %-99 %] 98 %  BP: ()/(51-67) 135/60     Weight: 88.3 kg (194 lb 10.7 oz)  Body mass index is 31.42 kg/m².    Intake/Output Summary (Last 24 hours) at 8/26/2019 1723  Last data filed at 8/26/2019 1508  Gross per 24 hour   Intake 840 ml   Output 1200 ml   Net -360 ml      Physical Exam   Constitutional: She is oriented to person, place, and time. She appears well-developed and well-nourished. No distress.   HENT:   Nose: Nose normal.   Mouth/Throat: No oropharyngeal exudate.   Eyes: Right eye exhibits no discharge. Left eye exhibits no discharge.   Neck: No JVD present.   Cardiovascular: Normal rate and intact  distal pulses. Exam reveals no gallop and no friction rub.   Murmur heard.  Pulmonary/Chest: Effort normal and breath sounds normal. No stridor. No respiratory distress. She has no wheezes. She has no rales. She exhibits no tenderness.   Abdominal: Soft. Normal appearance and bowel sounds are normal. She exhibits no distension. There is no tenderness. There is no rebound and no guarding. No hernia.   Genitourinary:   Genitourinary Comments: Carlson catheter present    Musculoskeletal: She exhibits no edema or deformity.   Tremor in hands noted    Neurological: She is alert and oriented to person, place, and time.   Skin: Skin is warm and dry. Capillary refill takes 2 to 3 seconds. She is not diaphoretic. No erythema.   Lumbar dressing C/D/I  Sacral dressing C/D/I   Psychiatric: She has a normal mood and affect. Her behavior is normal. Thought content normal.   Nursing note and vitals reviewed.      Significant Labs:   BMP:   Recent Labs   Lab 08/26/19  0515   *   *   K 4.0   CL 99   CO2 25   BUN 11   CREATININE 0.7   CALCIUM 9.5     CBC:   Recent Labs   Lab 08/25/19  0600 08/26/19  0515   WBC 6.12 5.63   HGB 11.4* 11.5*   HCT 35.5* 35.4*    239     POCT Glucose:   Recent Labs   Lab 08/25/19  1642 08/25/19 2007 08/26/19  1241   POCTGLUCOSE 150* 149* 137*       Significant Imaging: I have reviewed all pertinent imaging results/findings within the past 24 hours.

## 2019-08-26 NOTE — ANESTHESIA POSTPROCEDURE EVALUATION
Anesthesia Post Evaluation    Patient: Ross Isbell    Procedure(s) Performed: Procedure(s) (LRB):  DEBRIDEMENT, WOUND (Bilateral)  REMOVAL, HARDWARE (Bilateral)    Final Anesthesia Type: general  Patient location during evaluation: PACU  Patient participation: Yes- Able to Participate  Level of consciousness: awake and alert and awake  Post-procedure vital signs: reviewed and stable  Pain management: adequate  Airway patency: patent  PONV status at discharge: No PONV  Anesthetic complications: no      Cardiovascular status: blood pressure returned to baseline  Respiratory status: unassisted and spontaneous ventilation  Hydration status: euvolemic  Follow-up not needed.          Vitals Value Taken Time   /57 8/26/2019  3:25 PM   Temp 37.1 °C (98.8 °F) 8/26/2019 11:45 AM   Pulse 89 8/26/2019  3:26 PM   Resp 11 8/26/2019  3:26 PM   SpO2 99 % 8/26/2019  3:26 PM   Vitals shown include unvalidated device data.      No case tracking events are documented in the log.      Pain/Tripp Score: Pain Rating Prior to Med Admin: 7 (8/26/2019 10:45 AM)  Pain Rating Post Med Admin: 0 (8/26/2019  8:07 AM)

## 2019-08-26 NOTE — OP NOTE
[facility]  Neurosurgery  Operative Note    SUMMARY      Date of Procedure: 8/26/2019     Procedure: Procedure(s) (LRB):  DEBRIDEMENT, WOUND (Bilateral)  REMOVAL, HARDWARE (Bilateral)     Surgeon(s) and Role:     * Stanislav Nicolas MD - Primary    Assistant: Katherine Jacques PA-C    Pre-Operative Diagnosis: Drainage from wound [T14.8XXA]  Morbid obesity with BMI of 40.0-44.9, adult [E66.01, Z68.41]  Osteomyelitis of lumbar spine [M46.26]  Abscess in epidural space of lumbar spine [G06.1]  Deep tissue injury [T14.8XXA]  Acute osteomyelitis of lumbar spine [M46.26]  Status post lumbar spinal fusion [Z98.1]  Osteomyelitis, unspecified site, unspecified type [M86.9]    Post-Operative Diagnosis: Post-Op Diagnosis Codes:     * Drainage from wound [T14.8XXA]     * Morbid obesity with BMI of 40.0-44.9, adult [E66.01, Z68.41]     * Osteomyelitis of lumbar spine [M46.26]     * Abscess in epidural space of lumbar spine [G06.1]     * Deep tissue injury [T14.8XXA]     * Acute osteomyelitis of lumbar spine [M46.26]     * Status post lumbar spinal fusion [Z98.1]     * Osteomyelitis, unspecified site, unspecified type [M86.9]    Anesthesia: General    Technical Procedures Used:   1.  Suprafascial and subfascial wound washout and debridement  2.  Removal of lumbar pedicle screws L1-L4  3.  Placement of resorbable antibiotic vancomycin and tobramycin pellets    Description of the Findings of the Procedure:   Failure of instrumentation at L1  No gross infection identified    Complications: No    Estimated Blood Loss (EBL): 50 mL           Specimens:   Specimen (12h ago, onward)    Start     Ordered    08/26/19 1413  Specimen to Pathology - Surgery  Once     Comments:  Pre-op Diagnosis: Drainage from wound [T14.8XXA]Morbid obesity with BMI of 40.0-44.9, adult [E66.01, Z68.41]Osteomyelitis of lumbar spine [M46.26]Abscess in epidural space of lumbar spine [G06.1]Deep tissue injury [T14.8XXA]Acute osteomyelitis of  lumbar spine [M46.26]Status post lumbar spinal fusion [Z98.1]Osteomyelitis, unspecified site, unspecified type [M86.9]Procedure(s):DEBRIDEMENT, WOUNDREMOVAL, HARDWARE Number of specimens: 1Name of specimens: hardware (gross path)     Start Status     08/26/19 1413 Collected (08/26/19 1413) Order ID: [id number]       08/26/19 1412           Implants:   Implant Name Type Inv. Item Serial No.  Lot No. LRB No. Used   KIT GRAFT BONE/SUB STIM 20ML - [id number]  KIT GRAFT BONE/SUB STIM 20ML  [employer] [id number]  1   FENESTRATED SCREW      N/A 2   MAGNIFUSE BONE GRAFT   [id number]   N/A 1   MAS SCREW      N/A 2   RODS COLBOT CROME      N/A 2   SCREW HORIZON SOLERA 6.5X35 - [id number]  SCREW HORIZON SOLERA 6.5X35  [employer]  N/A 3   SCREW HORIZON SOLERA 6.5X40 - [id number]  SCREW HORIZON SOLERA 6.5X40  [employer]  N/A 1   SET SCREW HORIZON SOLERA 5.5-6 - [id number]  SET SCREW HORIZON SOLERA 5.5-6  [employer]  N/A 8              Condition: Good    Disposition: PACU - hemodynamically stable.    Attestation: I was present and scrubbed for the entire procedure.     Patient is a 73-year-old female with a history of osteomyelitis who had a posterior lumbar fusion an IV antibiotics in May of 2019.  Several weeks later she had a dehiscence of her superficial wound was taken back for a wound VAC placement in June.  She has been in a nursing home since then receiving IV antibiotics.  The wound VAC was essentially discontinued due to the incision being well-healed.  She had a follow-up CT scan which showed resolution of the intradiscal fluid which was seen previously however she had pulled her L1 and L2 screws out of the bone.  She was having pain into the hips bilaterally.  She had infectious markers drawn up including blood cultures which were positive x2.  After consulting with infectious disease it was decided to take the patient back for removal of hardware with placement of antibiotic  pellets.    Patient and family were apprised of all risks  The patient was informed of all benefits and potential risk of the operation including but not limited to:  Pain, infection, bleeding, coma, paralysis, death.  Cerebrospinal fluid leak, failure of any instrumentation, the need for additional procedures in the future. No guarantee was given that this procedure would alleviate all of the symptoms.    After the informed consent was a fixated to the chart patient was taken back to the OR and intubated with anesthesia in a smooth fashion.  She had a Carlson in place prior to the case.  She was rolled prone onto the Ace table.  All contact points were carefully padded.  The wound was cleaned with alcohol.  Preoperative antibiotics were given.  A Betadine prep was done by the OR team.    The patient was then prepped and draped in the usual sterile fashion.  The prior incision was opened with a 10.  Blade.  The Bovie cautery was used to take the incision down to the fascial layer.  The fascial layer was dissected down the paraspinous muscles were dissected out laterally into the instrumentation was identified.  Aerobic and anaerobic cultures were taken of the deep layer. The set screws were removed using the Solara Medtronic set screw remover.  A total of 8 set screws were removed.  Next the pedicle screws were taken out.  At L1 there was failure of instrumentation where the screws backed out of the pedicle bilaterally.  At L2 the right pedicle screw was pulled out and the tip was pointing towards the disc space. The left pedicle screw was taken out without any issue.  The remaining screws at L3 and L4 were also removed without any difficulty.  The bone did not appear grossly infected.  The muscle in tissue did not appear grossly infected.    The antibiotic pellets were made with the Nuritas with 1 g of vancomycin powder along with 1.5 g of tobramycin.  Once these were prepared they replaced into the muscle  space. 0 Vicryl was used to reapproximate the fascial layer.  Next the subcutaneous tissue was undermined in order to reapproximate the skin.  Several pellets were left supra fascially.  The subcutaneous layer was approximated using a 2 0 Vicryl in an inverted fashion.  The skin was closed with a 2 0 running nylon in a locked fashion.  The middle area where the wound had not fully healed was closed with an interrupted nylon suture.  Bacitracin ointment and a dressing was placed superficially.    Patient was rolled supine onto the preoperative stretcher extubated with anesthesia and taken to the recovery room in a stable condition.    All sponge counts needle counts and instrument counts were correct at the end of the case x2.  Patient tolerated procedure well.

## 2019-08-26 NOTE — ANESTHESIA PREPROCEDURE EVALUATION
08/26/2019  Ross Isbell is a 73 y.o., female.    Anesthesia Evaluation    I have reviewed the Patient Summary Reports.    I have reviewed the Nursing Notes.   I have reviewed the Medications.     Review of Systems  Anesthesia Hx:  No problems with previous Anesthesia    Cardiovascular:   Hypertension Past MI CAD  CABG/stent     Pulmonary:   Sleep Apnea    Neurological:  Movement Disorder Dx, Parkinson's Disease   Endocrine:   Diabetes Hypothyroidism        Physical Exam  General:  Well nourished    Airway/Jaw/Neck:  Airway Findings: Mouth Opening: Normal Tongue: Normal  General Airway Assessment: Adult, Average  Mallampati: II  TM Distance: Normal, at least 6 cm      Dental:  Dental Findings:              Anesthesia Plan  Type of Anesthesia, risks & benefits discussed:  Anesthesia Type:  general  Patient's Preference:   Intra-op Monitoring Plan: standard ASA monitors  Intra-op Monitoring Plan Comments:   Post Op Pain Control Plan: multimodal analgesia and per primary service following discharge from PACU  Post Op Pain Control Plan Comments:   Induction:   IV  Beta Blocker:  Patient is not currently on a Beta-Blocker (No further documentation required).       Informed Consent: Patient understands risks and agrees with Anesthesia plan.  Questions answered. Anesthesia consent signed with patient.  ASA Score: 3     Day of Surgery Review of History & Physical:  There are no significant changes.          Ready For Surgery From Anesthesia Perspective.     Patient Active Problem List   Diagnosis    PD (Parkinson's disease)    CAD (coronary artery disease)    Gait disorder    Essential hypertension    Hypothyroidism    Type 2 diabetes, controlled, with peripheral neuropathy    Diabetic polyneuropathy    Insulin long-term use    Morbid obesity with BMI of 40.0-44.9, adult    CAD: S/P coronary artery  stent placement    Hyperlipidemia LDL goal <70    CORINE (obstructive sleep apnea)    Paresthesia    Parkinson disease    Nonrheumatic aortic valve stenosis    Osteomyelitis of lumbar spine    UTI (urinary tract infection)    Hyponatremia    Streptococcal bacteremia    Abscess in epidural space of lumbar spine    Pressure injury of back, stage 3    Intertrigo    Deep tissue injury    Chest pain    Acute osteomyelitis of lumbar spine    Status post lumbar spinal fusion    Postprocedural hypotension    Electrolyte abnormality    S/P lumbar laminectomy    CAD, multiple vessel    Anemia    Elevated troponin    Osteomyelitis    Drainage from wound    Abnormal urinalysis       Chemistry        Component Value Date/Time     (L) 08/26/2019 0515    K 4.0 08/26/2019 0515    CL 99 08/26/2019 0515    CO2 25 08/26/2019 0515    BUN 11 08/26/2019 0515    CREATININE 0.7 08/26/2019 0515     (H) 08/26/2019 0515        Component Value Date/Time    CALCIUM 9.5 08/26/2019 0515    ALKPHOS 65 08/24/2019 0423    AST 19 08/24/2019 0423    ALT 9 (L) 08/24/2019 0423    BILITOT 0.6 08/24/2019 0423    ESTGFRAFRICA >60 08/26/2019 0515    EGFRNONAA >60 08/26/2019 0515        Lab Results   Component Value Date    WBC 5.63 08/26/2019    HGB 11.5 (L) 08/26/2019    HCT 35.4 (L) 08/26/2019    MCV 86 08/26/2019     08/26/2019     EKG:  Normal sinus rhythm  Normal ECG  When compared with ECG of 23-MAY-2019 10:33,  Minimal criteria for Septal infarct are no longer Present  Confirmed by SAMUEL HUFFMAN MD ([id number]) on 6/1/2019 5:06:48 PM    Echo:  CONCLUSIONS     1 - Normal left ventricular systolic function (EF 55-60%).     2 - Normal right ventricular systolic function .     3 - Concentric hypertrophy.     Aortic Valve:  The aortic valve is markedly sclerotic with markedly restricted leaflet mobility.     This document has been electronically    SIGNED BY: Samuel Huffman MD On: 06/01/2019 13:30    Echo:  CONCLUSIONS      1 - Normal left ventricular systolic function (EF 60-65%).     2 - Indeterminate LV diastolic function.     3 - No wall motion abnormalities.     4 - Concentric remodeling.     5 - Normal right ventricular systolic function .     6 - The estimated PA systolic pressure is greater than 36 mmHg.     7 - Moderate aortic stenosis, JULIANNA = 0.78 cm2, AVAi = 0.37 cm2/m2, peak velocity = 3.04 m/s, mean gradient = 25 mmHg.     8 - Mild tricuspid regurgitation.     This document has been electronically    SIGNED BY: Nathen Sharpe MD On: 05/26/2019 13:09

## 2019-08-26 NOTE — ASSESSMENT & PLAN NOTE
As above   -ID consulted   -IV antibiotics   -MRI results reviewed -improved per Neuro Surgery   -hx of  Microbiology that grew Streptococcus s/p washout on 5/24/19 with appropriate antibiotics completed of note  -hx of posterior dehiscence of incision noted with transferred to a nursing home for long term -antibiotic therapy completed via PICC line for approximately 2 months for Strep infection   -Blood culture results showed gram positive cocci x 1 bottle- ID consulted    08/24  - BC x 1 resulted with COAGULASE- NEGATIVE STAPHYLOCOCCUS SPECIES, likely contaminant  - ID following  - Continue IV abx    08/25  - BC x 1 resulted on 08/24 as likely contaminant -- COAGULASE-NEGATIVE STAPHYLOCOCCUS SPECIES  - ID recommended to repeat CRP and ESR to see if BC was contaminant. CRP 3.1, Sed rate 27  - Same BC resulted at 1334 today with VIRIDANS STREPTOCOCCUS GROUP. Susceptibility testing not routinely performed  - Informed Dr. Gomez (ID) and Dr. Nicolas (neurosurgery) of results  - Continue IV Vancomycin and Zosyn    08/26  - S/p bilateral wound debridement, hardware removal, and placement of abx vancomycin/tobramycin pellets today by Dr. Nicolas  - Continue IV Vancomycin and Zosyn

## 2019-08-26 NOTE — PROGRESS NOTES
Pharmacokinetic Assessment Follow Up: IV Vancomycin    Vancomycin serum concentration assessment(s):    The trough level was drawn correctly and can be used to guide therapy at this time. The measurement is within the desired definitive target range of 15 to 20 mcg/mL.    Vancomycin Regimen Plan:    Continue regimen to Vancomycin 2250 mg IV every 24 hours with next serum trough concentration measured at 2300 prior to the next dose on 8/26/19     Drug levels (last 3 results):  Recent Labs   Lab Result Units 08/23/19  2146 08/24/19  2126 08/25/19  2311   Vancomycin-Trough ug/mL 12.4 13.7 15.6       Pharmacy will continue to follow and monitor vancomycin.    Please contact pharmacy at extension [phone] for questions regarding this assessment.    Thank you for the consult,   Rashad Bishop       Patient brief summary:  Ross Isbell is a 73 y.o. female initiated on antimicrobial therapy with IV Vancomycin for treatment of bone/joint infection    The patient's current regimen is Vancomycin 2250 mg every 24 hours.    Drug Allergies:   Review of patient's allergies indicates:   Allergen Reactions    Codeine      Other reaction(s): Nausea.  Patient tolerated morphine, hydrocodone, and hydromorphone in 5/2019 with no reported problems. Patient tolerated oxycodone in 6/2019 with no reported problems.     Codeine sulfate      Unknown^    Diphenhydramine hcl      Unknown^  Other reaction(s): Rash    Sulfa (sulfonamide antibiotics) Hives and Nausea And Vomiting    Penicillins Rash     Rash only as a child  Tolerated ceftriaxone 4/30/19  Tolerated piperacillin-tazobactam 5/23/19       Actual Body Weight:   88.3 kg    Renal Function:   Estimated Creatinine Clearance: 70.1 mL/min (based on SCr of 0.8 mg/dL).,     Dialysis Method (if applicable):  N/A     CBC (last 72 hours):  Recent Labs   Lab Result Units 08/23/19  0500 08/24/19  0423 08/25/19  0600   WBC K/uL 6.40 6.20 6.12   Hemoglobin g/dL 11.9* 11.4* 11.4*   Hematocrit %  36.6* 34.8* 35.5*   Platelets K/uL 232 227 257   Gran% % 64.2 58.1 51.2   Lymph% % 22.8 28.9 35.0   Mono% % 9.2 8.5 9.6   Eosinophil% % 3.8 4.5 4.1   Basophil% % 0.2 0.2 0.3   Differential Method  Automated Automated Automated       Metabolic Panel (last 72 hours):  Recent Labs   Lab Result Units 08/23/19  0500 08/24/19  0423 08/25/19  0600   Sodium mmol/L 132* 131* 132*   Potassium mmol/L 4.3 4.1 4.1   Chloride mmol/L 96 97 96   CO2 mmol/L 26 26 26   Glucose mg/dL 113* 120* 137*   BUN, Bld mg/dL 12 13 11   Creatinine mg/dL 0.7 0.8 0.8   Albumin g/dL 3.6 3.6  --    Total Bilirubin mg/dL 0.6 0.6  --    Alkaline Phosphatase U/L 70 65  --    AST U/L 23 19  --    ALT U/L 11 9*  --        Vancomycin Administrations:  vancomycin given in the last 96 hours                     vancomycin (VANCOCIN) 2,250 mg in dextrose 5 % 500 mL IVPB (mg) 2,250 mg New Bag 08/26/19 0022     2,250 mg New Bag 08/24/19 2327    vancomycin 2 g in dextrose 5 % 500 mL IVPB (mg) 2,000 mg New Bag 08/23/19 2152     2,000 mg New Bag 08/22/19 2136                      Microbiologic Results:  Microbiology Results (last 7 days)       Procedure Component Value Units Date/Time    Blood culture #1 **CANNOT BE ORDERED STAT** [[id number]]  (Abnormal) Collected:  08/21/19 2020    Order Status:  Completed Specimen:  Blood from Peripheral, Antecubital, Left Updated:  08/25/19 1334     Blood Culture, Routine Gram stain aer bottle: Gram positive cocci      Results called to and read back by:Viola Reaves RN 08/22/2019  18:02      Gram stain flaco bottle: Gram positive cocci       Positive results previously called      COAGULASE-NEGATIVE STAPHYLOCOCCUS SPECIES  Multiple morphotyes - probable contaminates        VIRIDANS STREPTOCOCCUS GROUP  Susceptibility testing not routinely performed      Aerobic culture [[id number]] Collected:  08/23/19 2200    Order Status:  Completed Specimen:  Incision site from Back Updated:  08/25/19 0723     Aerobic Bacterial Culture No  growth    Blood culture #2 **CANNOT BE ORDERED STAT** [[id number]] Collected:  08/21/19 2020    Order Status:  Completed Specimen:  Blood from Line, PICC Right Cephalic Updated:  08/25/19 0612     Blood Culture, Routine No Growth to date      No Growth to date      No Growth to date      No Growth to date    Aerobic culture [[id number]] Collected:  08/22/19 1855    Order Status:  Completed Specimen:  Wound from Back Updated:  08/24/19 0750     Aerobic Bacterial Culture No growth    Culture, Anaerobe [[id number]] Collected:  08/23/19 2200    Order Status:  Sent Specimen:  Incision site from Back Updated:  08/24/19 0232    Urine culture [[id number]] Collected:  08/21/19 2200    Order Status:  Completed Specimen:  Urine Updated:  08/23/19 1357     Urine Culture, Routine No significant growth    Narrative:       Preferred Collection Type->Urine, Clean Catch

## 2019-08-26 NOTE — TRANSFER OF CARE
"Anesthesia Transfer of Care Note    Patient: Ross Isbell    Procedure(s) Performed: Procedure(s) (LRB):  DEBRIDEMENT, WOUND (Bilateral)  REMOVAL, HARDWARE (Bilateral)    Patient location: PACU    Anesthesia Type: general    Transport from OR: Transported from OR on room air with adequate spontaneous ventilation    Post pain: adequate analgesia    Post assessment: no apparent anesthetic complications and tolerated procedure well    Post vital signs: stable    Level of consciousness: awake and alert    Nausea/Vomiting: no nausea/vomiting    Complications: none          Last vitals:   Visit Vitals  /64 (BP Location: Left leg, Patient Position: Lying)   Pulse 76   Temp 37.1 °C (98.8 °F) (Temporal)   Resp 18   Ht 5' 6" (1.676 m)   Wt 88.3 kg (194 lb 10.7 oz)   SpO2 95%   Breastfeeding? No   BMI 31.42 kg/m²     "

## 2019-08-26 NOTE — ASSESSMENT & PLAN NOTE
Admit to Observation   Neurosurgery managing   Plan removal of the instrumentation with placement of antibiotic beads to the incision cavity along with a wound VAC placement.   -8/23/19- MRI reviewed per Neuro Surgery with screws pulled out superiorly and plan for revision on Monday afternoon pending cultures.    Cefepime  Vancomycin    Pain control   -wound culture results pending   -wound care consulted     08/24  - Continue IV Vancomycin and Zosyn  - Aerobic culture NGTD, repeat pending  - BC x 1 resulted with COAGULASE- NEGATIVE STAPHYLOCOCCUS SPECIES, likely contaminant  - ID following  - Pt scheduled revision on Monday afternoon with Plastics and Neurosurgery pending cultures    08/25  - BC x 1 resulted on 08/24 as likely contaminant -- COAGULASE-NEGATIVE STAPHYLOCOCCUS SPECIES  - ID recommended to repeat CRP and ESR to see if BC was contaminant. CRP 3.1, Sed rate 27  - Same BC resulted at 1334 today with VIRIDANS STREPTOCOCCUS GROUP. Susceptibility testing not routinely performed  - Informed Dr. Gomez (ID) and Dr. Nicolas (neurosurgery) of results  - Continue IV Vancomycin and Zosyn    08/26  - S/p bilateral wound debridement, hardware removal, and placement of abx vancomycin/tobramycin pellets today by Dr. Nicolas  - Continue IV Vancomycin and Zosyn

## 2019-08-27 LAB
ANION GAP SERPL CALC-SCNC: 10 MMOL/L (ref 8–16)
BACTERIA BLD CULT: NORMAL
BACTERIA SPEC AEROBE CULT: NO GROWTH
BASOPHILS # BLD AUTO: 0.02 K/UL (ref 0–0.2)
BASOPHILS NFR BLD: 0.2 % (ref 0–1.9)
BUN SERPL-MCNC: 16 MG/DL (ref 8–23)
CALCIUM SERPL-MCNC: 9.6 MG/DL (ref 8.7–10.5)
CHLORIDE SERPL-SCNC: 96 MMOL/L (ref 95–110)
CO2 SERPL-SCNC: 25 MMOL/L (ref 23–29)
CREAT SERPL-MCNC: 0.8 MG/DL (ref 0.5–1.4)
DIFFERENTIAL METHOD: ABNORMAL
EOSINOPHIL # BLD AUTO: 0.2 K/UL (ref 0–0.5)
EOSINOPHIL NFR BLD: 2.7 % (ref 0–8)
ERYTHROCYTE [DISTWIDTH] IN BLOOD BY AUTOMATED COUNT: 14.9 % (ref 11.5–14.5)
EST. GFR  (AFRICAN AMERICAN): >60 ML/MIN/1.73 M^2
EST. GFR  (NON AFRICAN AMERICAN): >60 ML/MIN/1.73 M^2
GLUCOSE SERPL-MCNC: 142 MG/DL (ref 70–110)
HCT VFR BLD AUTO: 30.7 % (ref 37–48.5)
HGB BLD-MCNC: 10 G/DL (ref 12–16)
LYMPHOCYTES # BLD AUTO: 1.7 K/UL (ref 1–4.8)
LYMPHOCYTES NFR BLD: 20.7 % (ref 18–48)
MCH RBC QN AUTO: 27.8 PG (ref 27–31)
MCHC RBC AUTO-ENTMCNC: 32.6 G/DL (ref 32–36)
MCV RBC AUTO: 85 FL (ref 82–98)
MONOCYTES # BLD AUTO: 0.8 K/UL (ref 0.3–1)
MONOCYTES NFR BLD: 9.8 % (ref 4–15)
NEUTROPHILS # BLD AUTO: 5.5 K/UL (ref 1.8–7.7)
NEUTROPHILS NFR BLD: 66.7 % (ref 38–73)
PLATELET # BLD AUTO: 213 K/UL (ref 150–350)
PMV BLD AUTO: 8.8 FL (ref 9.2–12.9)
POCT GLUCOSE: 165 MG/DL (ref 70–110)
POCT GLUCOSE: 167 MG/DL (ref 70–110)
POCT GLUCOSE: 173 MG/DL (ref 70–110)
POCT GLUCOSE: 174 MG/DL (ref 70–110)
POTASSIUM SERPL-SCNC: 4.3 MMOL/L (ref 3.5–5.1)
RBC # BLD AUTO: 3.6 M/UL (ref 4–5.4)
SODIUM SERPL-SCNC: 131 MMOL/L (ref 136–145)
WBC # BLD AUTO: 8.2 K/UL (ref 3.9–12.7)

## 2019-08-27 PROCEDURE — 97162 PT EVAL MOD COMPLEX 30 MIN: CPT

## 2019-08-27 PROCEDURE — 85025 COMPLETE CBC W/AUTO DIFF WBC: CPT

## 2019-08-27 PROCEDURE — 80048 BASIC METABOLIC PNL TOTAL CA: CPT

## 2019-08-27 PROCEDURE — 36415 COLL VENOUS BLD VENIPUNCTURE: CPT

## 2019-08-27 PROCEDURE — 25000003 PHARM REV CODE 250: Performed by: NURSE PRACTITIONER

## 2019-08-27 PROCEDURE — 63600175 PHARM REV CODE 636 W HCPCS: Performed by: INTERNAL MEDICINE

## 2019-08-27 PROCEDURE — 21400001 HC TELEMETRY ROOM

## 2019-08-27 PROCEDURE — 97530 THERAPEUTIC ACTIVITIES: CPT

## 2019-08-27 PROCEDURE — 63600175 PHARM REV CODE 636 W HCPCS: Performed by: NURSE PRACTITIONER

## 2019-08-27 PROCEDURE — 94760 N-INVAS EAR/PLS OXIMETRY 1: CPT

## 2019-08-27 PROCEDURE — 11000001 HC ACUTE MED/SURG PRIVATE ROOM

## 2019-08-27 PROCEDURE — 87040 BLOOD CULTURE FOR BACTERIA: CPT | Mod: 59

## 2019-08-27 PROCEDURE — 94799 UNLISTED PULMONARY SVC/PX: CPT

## 2019-08-27 PROCEDURE — 97167 OT EVAL HIGH COMPLEX 60 MIN: CPT

## 2019-08-27 PROCEDURE — 25000003 PHARM REV CODE 250: Performed by: PHYSICIAN ASSISTANT

## 2019-08-27 RX ADMIN — OXYCODONE HYDROCHLORIDE AND ACETAMINOPHEN 1 TABLET: 10; 325 TABLET ORAL at 05:08

## 2019-08-27 RX ADMIN — METOPROLOL SUCCINATE 25 MG: 25 TABLET, EXTENDED RELEASE ORAL at 08:08

## 2019-08-27 RX ADMIN — CARBIDOPA AND LEVODOPA 1 TABLET: 25; 250 TABLET ORAL at 05:08

## 2019-08-27 RX ADMIN — METHOCARBAMOL TABLETS 750 MG: 750 TABLET, COATED ORAL at 08:08

## 2019-08-27 RX ADMIN — MORPHINE SULFATE 2 MG: 2 INJECTION, SOLUTION INTRAMUSCULAR; INTRAVENOUS at 02:08

## 2019-08-27 RX ADMIN — ONDANSETRON 4 MG: 2 INJECTION INTRAMUSCULAR; INTRAVENOUS at 05:08

## 2019-08-27 RX ADMIN — VANCOMYCIN HYDROCHLORIDE 2250 MG: 1 INJECTION, POWDER, LYOPHILIZED, FOR SOLUTION INTRAVENOUS at 01:08

## 2019-08-27 RX ADMIN — PIPERACILLIN AND TAZOBACTAM 4.5 G: 4; .5 INJECTION, POWDER, LYOPHILIZED, FOR SOLUTION INTRAVENOUS; PARENTERAL at 10:08

## 2019-08-27 RX ADMIN — METHOCARBAMOL TABLETS 750 MG: 750 TABLET, COATED ORAL at 09:08

## 2019-08-27 RX ADMIN — CARBIDOPA AND LEVODOPA 1 TABLET: 25; 250 TABLET ORAL at 10:08

## 2019-08-27 RX ADMIN — PIPERACILLIN AND TAZOBACTAM 4.5 G: 4; .5 INJECTION, POWDER, LYOPHILIZED, FOR SOLUTION INTRAVENOUS; PARENTERAL at 05:08

## 2019-08-27 RX ADMIN — PANTOPRAZOLE SODIUM 40 MG: 40 TABLET, DELAYED RELEASE ORAL at 08:08

## 2019-08-27 RX ADMIN — ONDANSETRON 4 MG: 2 INJECTION INTRAMUSCULAR; INTRAVENOUS at 08:08

## 2019-08-27 RX ADMIN — OXYCODONE HYDROCHLORIDE AND ACETAMINOPHEN 1 TABLET: 10; 325 TABLET ORAL at 02:08

## 2019-08-27 RX ADMIN — PIPERACILLIN AND TAZOBACTAM 4.5 G: 4; .5 INJECTION, POWDER, LYOPHILIZED, FOR SOLUTION INTRAVENOUS; PARENTERAL at 01:08

## 2019-08-27 RX ADMIN — RAMELTEON 8 MG: 8 TABLET ORAL at 09:08

## 2019-08-27 RX ADMIN — POLYETHYLENE GLYCOL 3350 17 G: 17 POWDER, FOR SOLUTION ORAL at 08:08

## 2019-08-27 RX ADMIN — METHOCARBAMOL TABLETS 750 MG: 750 TABLET, COATED ORAL at 02:08

## 2019-08-27 RX ADMIN — CARBIDOPA AND LEVODOPA 1 TABLET: 25; 250 TABLET ORAL at 02:08

## 2019-08-27 RX ADMIN — ISOSORBIDE MONONITRATE 120 MG: 30 TABLET, EXTENDED RELEASE ORAL at 08:08

## 2019-08-27 RX ADMIN — OXYCODONE HYDROCHLORIDE AND ACETAMINOPHEN 1 TABLET: 10; 325 TABLET ORAL at 10:08

## 2019-08-27 RX ADMIN — PRAVASTATIN SODIUM 40 MG: 20 TABLET ORAL at 08:08

## 2019-08-27 RX ADMIN — LOSARTAN POTASSIUM 50 MG: 50 TABLET, FILM COATED ORAL at 08:08

## 2019-08-27 RX ADMIN — DOCUSATE SODIUM 100 MG: 100 CAPSULE, LIQUID FILLED ORAL at 08:08

## 2019-08-27 RX ADMIN — LEVOTHYROXINE SODIUM 125 MCG: 125 TABLET ORAL at 05:08

## 2019-08-27 RX ADMIN — CARBIDOPA AND LEVODOPA 1 TABLET: 25; 250 TABLET ORAL at 09:08

## 2019-08-27 NOTE — CHAPLAIN
Initial visit with patient.  Provided ministries of listening, presence, and prayer.  Pt currently had no other spiritual needs and I will follow up as needed.    Chaplain Hugo Young M.Div., [facility]

## 2019-08-27 NOTE — PT/OT/SLP EVAL
Occupational Therapy   Evaluation    Name: Ross Isbell  MRN: [id number]  Admitting Diagnosis:  Status post lumbar spinal fusion 1 Day Post-Op    Recommendations:     Discharge Recommendations: nursing facility, skilled  Discharge Equipment Recommendations:  none  Barriers to discharge:       Assessment:     Ross Isbell is a 73 y.o. female with a medical diagnosis of Status post lumbar spinal fusion.  She presents with DEBILITY, IMPAIRED ADLS, FUNCTIONAL MOBILITY AND SAFETY AWARENESS. Performance deficits affecting function: weakness, impaired endurance, gait instability, impaired functional mobilty, impaired self care skills, impaired balance, decreased safety awareness, pain, impaired coordination, orthopedic precautions.      Rehab Prognosis: Fair; patient would benefit from acute skilled OT services to address these deficits and reach maximum level of function.       Plan:     Patient to be seen 3 x/week to address the above listed problems via self-care/home management, therapeutic activities, therapeutic exercises  · Plan of Care Expires: 09/03/19  · Plan of Care Reviewed with: patient    Subjective     Chief Complaint: PAIN  Patient/Family Comments/goals: RETURN TO PLOF    Occupational Profile:  Living Environment: RESIDENT AT NURSING HOME AND DOES NOT RECEIVE THERAPY ANYMORE (PER REPORT)  Previous level of function: PT REPORTS SHE NEEDS ASSISTANCE OF ONE PERSON WITH ADLS AND MOBILITY PRIOR TO ADMIT  Roles and Routines: MINIMAL  Equipment Used at Home:  wheelchair, bedside commode, hospital bed, grab bar, shower chair  Assistance upon Discharge: NH    Pain/Comfort:  · Pain Rating 1: 6/10  · Location - Orientation 1: lower  · Location 1: back  · Pain Addressed 1: Cessation of Activity, Reposition, Nurse notified  · Pain Rating Post-Intervention 1: 6/10    Patients cultural, spiritual, Rastafarian conflicts given the current situation:      Objective:     Communicated with: NURSE CARRILLO prior to session.   Patient found supine with peripheral IV, telemetry upon OT entry to room.    General Precautions: Standard, fall   Orthopedic Precautions:spinal precautions   Braces: LSO     Occupational Performance:    Bed Mobility:    · Patient completed Rolling/Turning to Right with moderate assistance  · Patient completed Scooting/Bridging with moderate assistance  · Patient completed Supine to Sit with moderate assistance    Functional Mobility/Transfers:  · Patient completed Sit <> Stand Transfer with moderate assistance  with  hand-held assist   · Patient completed Bed <> Chair Transfer using Stand Pivot technique with moderate assistance with hand-held assist  ·     Activities of Daily Living:  · Lower Body Dressing: maximal assistance and DONNING SOCKS DEPENDENT    Cognitive/Visual Perceptual:  Cognitive/Psychosocial Skills:     -       Oriented to: Person, Place and Situation   -       Follows Commands/attention:Follows two-step commands  -       Memory: No Deficits noted  -       Safety awareness/insight to disability: impaired     Physical Exam:  Balance:    -       FAIR  Upper Extremity Range of Motion:     -       Right Upper Extremity: WFL  -       Left Upper Extremity: WFL  Upper Extremity Strength:    -       Right Upper Extremity: WFL  -       Left Upper Extremity: WFL    AMPAC 6 Click ADL:  AMPAC Total Score: 12    Treatment & Education:  PT PARTICIPATED IN INITIAL OT EVALUATION TO ASSESS CURRENT LEVEL OF FUNCTION. PLEASE SEE ABOVE FOR FINDINGS. PT WILL BENEFIT FROM SKILLED OT SERVICES TO INCREASE FUNCTIONAL INDEPENDENCE AND SAFETY AWARENESS.  Education:    Patient left SEATED IN WHEELCHAIR  with all lines intact, call button in reach, chair alarm on and STAFF present    GOALS:   Multidisciplinary Problems     Occupational Therapy Goals        Problem: Occupational Therapy Goal    Goal Priority Disciplines Outcome Interventions   Occupational Therapy Goal     OT, PT/OT     Description:  Goals to be met by: 9/3/19      Patient will increase functional independence with ADLs by performing:    UE Dressing with Stand-by Assistance.  LE Dressing with Maximum Assistance.  Toileting from bedside commode with Moderate Assistance for hygiene and clothing management.   Toilet transfer to bedside commode with Moderate Assistance.  Upper extremity exercise program x10 reps per handout, with supervision.                      History:     Past Medical History:   Diagnosis Date    Abnormal nuclear stress test 11/1/2015    Anemia 6/1/2019    Background diabetic retinopathy 12/28/2015    CAD (coronary artery disease) 2002    stents    Cataract     Gait disorder 10/12/2012    Glaucoma     History of PTCA 6/1/2019    MI (myocardial infarction)     Obesity     CORINE (obstructive sleep apnea)     Other and unspecified hyperlipidemia     PD (Parkinson's disease) 2002    tremor-predominant (per patient)    Type II or unspecified type diabetes mellitus without mention of complication, not stated as uncontrolled 2002      am    Unspecified essential hypertension        Past Surgical History:   Procedure Laterality Date    APPLICATION, WOUND VAC N/A 6/11/2019    Per[facility] at [facility] OR    BACK SURGERY  05/25/2019    BREAST BIOPSY Left     benign - about 3 yrs ago    CORONARY ANGIOPLASTY  2002    CORONARY STENT PLACEMENT  2002    DEBRIDEMENT, ABSCESS, PARASPINAL N/A 5/25/2019    Performed by [facility]islav [facility] at [facility] OR    DEBRIDEMENT, WOUND Bilateral 8/26/2019    Performed by [facility]islav [facility] at [facility] OR    DEBRIDEMENT, WOUND Bilateral 6/11/2019    Performed by [facility]islav [facility] MD at [facility] OR    DILATION AND CURETTAGE OF UTERUS      FOREIGN BODY REMOVAL      FUSION, SPINE, LUMBAR, TLIF, POSTERIOR APPROACH, USING PEDICLE SCREW N/A 5/25/2019    Performed by [facility]islav [facility] MD at [facility] OR    FUSION, SPINE, POSTERIOR SPINAL COLUMN, LUMBAR, USING COMPUTER-ASSISTED NAVIGATION  5/25/2019    Performed  by [facility] at [facility] OR    HEART CATH-LEFT Left 11/5/2015    Performed by Constance Pace MD at [facility] CATH LAB    HEART CATH-LEFT Left 11/2/2015    Performed by Constance Pace MD at [facility] CATH LAB    HEART CATH-LEFT/pci stent lad Left 11/10/2015    Performed by Constance Pace MD at [facility] CATH LAB    INNER EAR SURGERY      LAMINECTOMY, SPINE, LUMBAR  5/25/2019    Performed by [facility] MD at [facility] OR    REMOVAL, HARDWARE Bilateral 8/26/2019    Performed by [facility]islav Nicolas MD at [facility] OR    TONSILLECTOMY, ADENOIDECTOMY      WASHOUT Bilateral 8/26/2019    Performed by [facility]islav Nicolas MD at [facility] OR       Time Tracking:     OT Date of Treatment: 08/27/19  OT Start Time: 0845  OT Stop Time: 0900  OT Total Time (min): 15 min    Billable Minutes:Evaluation 15    Sharda Ramos OT  8/27/2019

## 2019-08-27 NOTE — PLAN OF CARE
Problem: Adult Inpatient Plan of Care  Goal: Patient-Specific Goal (Individualization)  Outcome: Ongoing (interventions implemented as appropriate)  Patient awake and alert, in NAD. Patient had uneventful shift. VS stable. Patient complains of back pain, controlled with PRN medication. Patient on telemetry, NSR. Patient is max assist. Fall precautions in place. Bed locked and in lowest position. Yellow fall risk band and non-skid socks on patient. Patient free from fall/injury. Plan of care reviewed with patient. All questions answered. Will continue to monitor.

## 2019-08-27 NOTE — PLAN OF CARE
Problem: Adult Inpatient Plan of Care  Goal: Plan of Care Review  Outcome: Ongoing (interventions implemented as appropriate)  Chart reviewed, pt resting in bed with call light and personal belongings within reach. Pt vitals stable, heart monitor normal sinus, will monitor blood pressure due to running low. Pain controlled with ordered pain meds per MAR. Will remove valentine at 0500 and initiate voiding protocol. Spinal incision remained intact with no new drainage. IV antibiotics infusing. Pt was educated on importance of turning every 2 hours to prevent skin breakdown. Blood sugar checked per order. Will continue to monitor.

## 2019-08-27 NOTE — PROGRESS NOTES
[facility] Hospital Medicine  Progress Note    Patient Name: Ross Isbell  MRN: [id number]  Patient Class: IP- Inpatient   Admission Date: 8/21/2019  Length of Stay: 4 days  Attending Physician: Rodger Loya MD  Primary Care Provider: Anna Penn MD        Subjective:     Principal Problem:Status post lumbar spinal fusion        HPI:  Ms Isbell is a 73 year old female with PMHx of CAD, HTN, DM, Parkinson disease, HLD and Osteomyelitis of lumbar spine who presented to [facility] with increase back pain. She  had axial back pain with destruction of the disc space underwent  posterior washout and debridement with placement of instrumentation on 5/24. Patient  had dehiscence of her incision and was taken back to the OR for wound VAC placement on 6/11. She has been experiencing increase back pain, unable to walk. She had been doing well with Physical Therapy, but now has been a wheel chair. She has experienced failure of instrumentation and elevated infectious markers. Neurosurery surgery following, plan for removal of instumentation with antibotic beads placement and wound vac on tomorrow. She being placed in observation under to care of Hospital Medicine.       Overview/Hospital Course:  Pt admitted to Observation Unit s/p lumbar spinal fusion.  NeuroSurgery consulted.  MRI showed favorable response to therapy with diminished abnormal bone marrow signal abnormalities relating to evolution of osteomyelitis change.  Diminished swelling of the right psoas muscle with no evidence of abscess.  2. Laminectomy at L2-L3 and L3-L4 with posterior fusion with mild to moderate spinal stenosis at L2-L3 with lateral recess stenosis that could affect the descending L3 spinal nerve roots.  No enhancing granulation tissue compresses the thecal sac or neural structures.  Grade 1 degenerative spondylolisthesis at L4-L5 with severe lateral recess stenosis that could compress the descending L5 spinal nerve  roots.  Pt treated with IV antibiotics and analgesia prn. Blood cultures with no growth to date.  Urine culture in progress.  Washout with revision of instrumentation and wound closure per NeuroSurgery and Plastics planned.  Vital signs stable. On 8/23/19, pt transferred to Inpatient status.  Blood culture grew gram positive cocci x 1 bottle.  ID consulted.  MRI reviewed by Neuro Surgery with signs of improving osteomyelitis noted and plan for revision on Monday afternoon pending cultures.  Plastic Surgery to assist with wound closure and covering.      As of 08/24 BC x 1 resulted with COAGULASE- NEGATIVE STAPHYLOCOCCUS SPECIES, likely contaminant. ID was consulted due to BC and recommended to continue IV Vancomycin and Zosyn until final sensitivity. Pt scheduled for revision on Monday afternoon with Plastics and Neurosurgery pending cultures    As of 08/25 BC x 1 likely contaminant -- COAGULASE-NEGATIVE STAPHYLOCOCCUS SPECIES. ID recommended to repeat CRP and ESR. CRP 3.1, Sed rate 27. BC resulted at 1334 today with VIRIDANS STREPTOCOCCUS [employer]. Susceptibility testing not routinely performed. Informed Dr. Gomez (ID) and Dr. Nicolas (neurosurgery) of results. Aerobic culture NGTD. Continue IV Vancomycin and Zosyn for now. NPO after midnight for surgery in AM.     As of 08/26 s/p bilateral wound debridement, hardware removal, and placement of abx vancomycin/tobramycin pellets today. Sx performed by Dr. Nicolas today. Pt just made it back from surgery. Pt currently c/o nausea. No other complaints at this time.   As of 08/27  POD # 1. Aerobic cultures from surgery pending. Repeat BC pending. PT/OT following -- recommending SNF placement. Will consult CM for placement.       Review of Systems   Constitutional: Positive for activity change and fatigue. Negative for chills and fever.   HENT: Negative for congestion, rhinorrhea and sinus pressure.    Respiratory: Negative for apnea, cough, choking, chest tightness, shortness  of breath, wheezing and stridor.    Cardiovascular: Negative for chest pain, palpitations and leg swelling.   Gastrointestinal: Positive for nausea. Negative for abdominal distention, abdominal pain, diarrhea and vomiting.   Endocrine: Negative for cold intolerance and heat intolerance.   Genitourinary: Negative for difficulty urinating and hematuria.   Musculoskeletal: Positive for back pain. Negative for arthralgias and joint swelling.   Skin: Positive for color change and wound. Negative for pallor and rash.   Neurological: Positive for weakness. Negative for dizziness, seizures, numbness and headaches.   Psychiatric/Behavioral: Negative for agitation, confusion and sleep disturbance. The patient is not nervous/anxious.      Objective:     Vital Signs (Most Recent):  Temp: 98.6 °F (37 °C) (08/27/19 1556)  Pulse: 92 (08/27/19 1712)  Resp: 18 (08/27/19 1556)  BP: (!) 92/43 (08/27/19 1556)  SpO2: (!) 94 % (08/27/19 1556) Vital Signs (24h Range):  Temp:  [97.6 °F (36.4 °C)-98.6 °F (37 °C)] 98.6 °F (37 °C)  Pulse:  [] 92  Resp:  [16-18] 18  SpO2:  [94 %-100 %] 94 %  BP: ()/(43-60) 92/43     Weight: 88.3 kg (194 lb 10.7 oz)  Body mass index is 31.42 kg/m².    Intake/Output Summary (Last 24 hours) at 8/27/2019 1735  Last data filed at 8/27/2019 1045  Gross per 24 hour   Intake 1740 ml   Output 350 ml   Net 1390 ml      Physical Exam   Constitutional: She is oriented to person, place, and time. She appears well-developed and well-nourished. No distress.   HENT:   Nose: Nose normal.   Mouth/Throat: No oropharyngeal exudate.   Eyes: Right eye exhibits no discharge. Left eye exhibits no discharge.   Neck: No JVD present.   Cardiovascular: Normal rate and intact distal pulses. Exam reveals no gallop and no friction rub.   Murmur heard.  Pulmonary/Chest: Effort normal and breath sounds normal. No stridor. No respiratory distress. She has no wheezes. She has no rales. She exhibits no tenderness.   Abdominal: Soft.  Normal appearance and bowel sounds are normal. She exhibits no distension. There is no tenderness. There is no rebound and no guarding. No hernia.   Genitourinary:   Genitourinary Comments: Carlson catheter present    Musculoskeletal: She exhibits no edema or deformity.   Tremor in hands noted    Neurological: She is alert and oriented to person, place, and time.   Skin: Skin is warm and dry. Capillary refill takes 2 to 3 seconds. She is not diaphoretic. No erythema.   Lumbar dressing C/D/I   Psychiatric: She has a normal mood and affect. Her behavior is normal. Thought content normal.   Nursing note and vitals reviewed.      Significant Labs:   BMP:   Recent Labs   Lab 08/27/19  0407   *   *   K 4.3   CL 96   CO2 25   BUN 16   CREATININE 0.8   CALCIUM 9.6     CBC:   Recent Labs   Lab 08/26/19  0515 08/27/19  0407   WBC 5.63 8.20   HGB 11.5* 10.0*   HCT 35.4* 30.7*    213     POCT Glucose:   Recent Labs   Lab 08/27/19  0509 08/27/19  1046 08/27/19  1631   POCTGLUCOSE 167* 173* 174*       Significant Imaging: I have reviewed all pertinent imaging results/findings within the past 24 hours.      Assessment/Plan:      * Status post lumbar spinal fusion  Admit to Observation   Neurosurgery managing   Plan removal of the instrumentation with placement of antibiotic beads to the incision cavity along with a wound VAC placement.   -8/23/19- MRI reviewed per Neuro Surgery with screws pulled out superiorly and plan for revision on Monday afternoon pending cultures.    Cefepime  Vancomycin    Pain control   -wound culture results pending   -wound care consulted     08/24  - Continue IV Vancomycin and Zosyn  - Aerobic culture NGTD, repeat pending  - BC x 1 resulted with COAGULASE- NEGATIVE STAPHYLOCOCCUS SPECIES, likely contaminant  - ID following  - Pt scheduled revision on Monday afternoon with Plastics and Neurosurgery pending cultures    08/25  - BC x 1 resulted on 08/24 as likely contaminant --  COAGULASE-NEGATIVE STAPHYLOCOCCUS SPECIES  - ID recommended to repeat CRP and ESR to see if BC was contaminant. CRP 3.1, Sed rate 27  - Same BC resulted at 1334 today with VIRIDANS STREPTOCOCCUS [employer]. Susceptibility testing not routinely performed  - Informed Dr. Gomez (ID) and Dr. Nicolas (neurosurgery) of results  - Continue IV Vancomycin and Zosyn    08/26  - S/p bilateral wound debridement, hardware removal, and placement of abx vancomycin/tobramycin pellets today by Dr. Nicolas  - Continue IV Vancomycin and Zosyn    08/27  - POD # 1 stable  - PT/OT -- recommending SNF placement  - Continue IV Vancomycin and Zosyn  - Repeat BC pending  - Aerobic cultures from surgery pending    Osteomyelitis of lumbar spine  As above   -ID consulted   -IV antibiotics   -MRI results reviewed -improved per Neuro Surgery   -hx of  Microbiology that grew Streptococcus s/p washout on 5/24/19 with appropriate antibiotics completed of note  -hx of posterior dehiscence of incision noted with transferred to a nursing home for long term -antibiotic therapy completed via PICC line for approximately 2 months for Strep infection   -Blood culture results showed gram positive cocci x 1 bottle- ID consulted    08/24  - BC x 1 resulted with COAGULASE- NEGATIVE STAPHYLOCOCCUS SPECIES, likely contaminant  - ID following  - Continue IV abx    08/25  - BC x 1 resulted on 08/24 as likely contaminant -- COAGULASE-NEGATIVE STAPHYLOCOCCUS SPECIES  - ID recommended to repeat CRP and ESR to see if BC was contaminant. CRP 3.1, Sed rate 27  - Same BC resulted at 1334 today with VIRIDANS STREPTOCOCCUS [employer]. Susceptibility testing not routinely performed  - Informed Dr. Gomez (ID) and Dr. Nicolas (neurosurgery) of results  - Continue IV Vancomycin and Zosyn    08/26  - S/p bilateral wound debridement, hardware removal, and placement of abx vancomycin/tobramycin pellets today by Dr. Nicolas  - Continue IV Vancomycin and Zosyn    08/27  - POD # 1 stable  - PT/OT  -- recommending SNF placement  - Continue IV Vancomycin and Zosyn  - Repeat BC pending  - Aerobic cultures from surgery pending      Abnormal urinalysis  -UA consistent with possible infectious process   -IV antibiotics   -urine culture NGTD      Morbid obesity with BMI of 40.0-44.9, adult  - Pt may benefit from nutrition consult upon discharge   - Discussed lifestyle modifications including diet and exercise       Type 2 diabetes, controlled, with peripheral neuropathy  - A1C two months ago -- 6.0 -- controlled  - Accuchecks and low dose SSI      Hypothyroidism  - Continue Synthroid     Essential hypertension  - Continue home medications including Metoprolol and Losartan      PD (Parkinson's disease)  - Continue home Parkinson's meds      VTE Risk Mitigation (From admission, onward)        Ordered     Place QUIQUE hose  Until discontinued      08/26/19 1532     Place sequential compression device  Until discontinued      08/26/19 1532     IP VTE HIGH RISK PATIENT  Once      08/26/19 1532     Place QUIQUE hose  Until discontinued      08/26/19 1435     Place sequential compression device  Until discontinued      08/26/19 1435     Place sequential compression device  Until discontinued      08/22/19 0052                KACIE Cowart  Department of Hospital Medicine   [facility]

## 2019-08-27 NOTE — CONSULTS
Patient is a resident at [facility].  May be able to return to [facility] as a SNF patient pending PT/OT recommendations.

## 2019-08-27 NOTE — ASSESSMENT & PLAN NOTE
As above   -ID consulted   -IV antibiotics   -MRI results reviewed -improved per Neuro Surgery   -hx of  Microbiology that grew Streptococcus s/p washout on 5/24/19 with appropriate antibiotics completed of note  -hx of posterior dehiscence of incision noted with transferred to a nursing home for long term -antibiotic therapy completed via PICC line for approximately 2 months for Strep infection   -Blood culture results showed gram positive cocci x 1 bottle- ID consulted    08/24  - BC x 1 resulted with COAGULASE- NEGATIVE STAPHYLOCOCCUS SPECIES, likely contaminant  - ID following  - Continue IV abx    08/25  - BC x 1 resulted on 08/24 as likely contaminant -- COAGULASE-NEGATIVE STAPHYLOCOCCUS SPECIES  - ID recommended to repeat CRP and ESR to see if BC was contaminant. CRP 3.1, Sed rate 27  - Same BC resulted at 1334 today with VIRIDANS STREPTOCOCCUS GROUP. Susceptibility testing not routinely performed  - Informed Dr. Gomez (ID) and Dr. Nicolas (neurosurgery) of results  - Continue IV Vancomycin and Zosyn    08/26  - S/p bilateral wound debridement, hardware removal, and placement of abx vancomycin/tobramycin pellets today by Dr. Nicolas  - Continue IV Vancomycin and Zosyn    08/27  - POD # 1 stable  - PT/OT -- recommending SNF placement  - Continue IV Vancomycin and Zosyn  - Repeat BC pending  - Aerobic cultures from surgery pending

## 2019-08-27 NOTE — PT/OT/SLP EVAL
Physical Therapy Evaluation    Patient Name:  Ross Isbell   MRN:  [id number]    Recommendations:     Discharge Recommendations:  nursing facility, skilled   Discharge Equipment Recommendations: none   Barriers to discharge: None    Assessment:     Ross Isbell is a 73 y.o. female admitted with a medical diagnosis of Status post lumbar spinal fusion.  She presents with the following impairments/functional limitations:  weakness, decreased upper extremity function, decreased lower extremity function, impaired balance, impaired endurance, impaired functional mobilty, decreased ROM, impaired self care skills, pain, orthopedic precautions .    Rehab Prognosis: Fair; patient would benefit from acute skilled PT services to address these deficits and reach maximum level of function.    Recent Surgery: Procedure(s) (LRB):  DEBRIDEMENT, WOUND (Bilateral)  REMOVAL, HARDWARE (Bilateral)  WASHOUT (Bilateral) 1 Day Post-Op    Plan:     During this hospitalization, patient to be seen   to address the identified rehab impairments via therapeutic activities, therapeutic exercises, wheelchair management/training and progress toward the following goals:    · Plan of Care Expires:  09/03/19    Subjective     Chief Complaint: PAIN  Patient/Family Comments/goals: NONE STATED   Pain/Comfort:  · Pain Rating 1: 6/10  · Location 1: back  · Pain Rating Post-Intervention 1: 6/10    Patients cultural, spiritual, Faith conflicts given the current situation:      Living Environment:  PT IS A NH RESIDENT AND WAS NOT ON THERAPY SERVICES PRIOR TO HOSPITALIZATION.  Prior to admission, patients level of function was ASSIST X 1 FOR T/F TRAINING/BED MOBILITY ONLY.  Equipment used at home: wheelchair, bedside commode, hospital bed, grab bar, shower chair.  DME owned (not currently used): none.  Upon discharge, patient will have assistance from NH STAFF.    Objective:     Communicated with NURSE CARRILLO AND Epic CHART REVIEW prior to session.   Patient found supine with telemetry, peripheral IV  upon PT entry to room.    General Precautions: Standard, fall   Orthopedic Precautions:N/A   Braces: N/A     Exams:  · RLE ROM: LIMITED  · RLE Strength: LIMITED  · LLE ROM: LIMITED  · LLE Strength: LIMITED    Functional Mobility:  PT MET IN RM LOG ROLLED AND SEATED EOB WITH MAX A AND SCOOTED TO EOB . P.T. DONNED SOCKS AND SCOOTED TO EOB. PT COMPLETED SQUAT PIVOT T/F TO CHAIR WITH MOD A AND INC TIME. PT SEATED IN WC AND EDUCATED ON ROLE OF P.T. PT LEFT WITH ALL NEEDS MET.     AM-PAC 6 CLICK MOBILITY  Total Score:10     Patient left up in chair with call button in reach and chair alarm on.    GOALS:   Multidisciplinary Problems     Physical Therapy Goals        Problem: Physical Therapy Goal    Goal Priority Disciplines Outcome Goal Variances Interventions   Physical Therapy Goal     PT, PT/OT      Description:  PT WILL BE SEEN FOR P.T. FOR A MIN OF 5 OUT OF 7 DAYS A WEEK  LT/3/19  1. PT WILL COMPLETE BED MOBILITY WITH MIN A  2. PT WILL T/F TO CHAIR WITH MIN A SQUAT PIVOT T/F TO CHAIR.   3. PT WILL COMPLETE B LE TE X 10 REPS  4. PT WILL PROPEL WC X 50' WITH MIN A                    History:     Past Medical History:   Diagnosis Date    Abnormal nuclear stress test 2015    Anemia 2019    Background diabetic retinopathy 2015    CAD (coronary artery disease)     stents    Cataract     Gait disorder 10/12/2012    Glaucoma     History of PTCA 2019    MI (myocardial infarction)     Obesity     CORINE (obstructive sleep apnea)     Other and unspecified hyperlipidemia     PD (Parkinson's disease)     tremor-predominant (per patient)    Type II or unspecified type diabetes mellitus without mention of complication, not stated as uncontrolled       am    Unspecified essential hypertension        Past Surgical History:   Procedure Laterality Date    APPLICATION, WOUND VAC N/A 2019    [facility] at  [facility] OR    BACK SURGERY  05/25/2019    BREAST BIOPSY Left     benign - about 3 yrs ago    CORONARY ANGIOPLASTY  2002    CORONARY STENT PLACEMENT  2002    DEBRIDEMENT, ABSCESS, PARASPINAL N/A 5/25/2019    [facility] at [facility] OR    DEBRIDEMENT, WOUND Bilateral 8/26/2019    [facility] by [facility] at [facility] OR    DEBRIDEMENT, WOUND Bilateral 6/11/2019    [facility] by [facility] at [facility] OR    DILATION AND CURETTAGE OF UTERUS      FOREIGN BODY REMOVAL      FUSION, SPINE, LUMBAR, TLIF, POSTERIOR APPROACH, USING PEDICLE SCREW N/A 5/25/2019    [facility] by [facility] at [facility] OR    FUSION, SPINE, POSTERIOR SPINAL COLUMN, LUMBAR, USING COMPUTER-ASSISTED NAVIGATION  5/25/2019    [facility] by [facility] at [facility] OR    HEART CATH-LEFT Left 11/5/2015    Performed by Constance Pace MD at [facility] CATH LAB    HEART CATH-LEFT Left 11/2/2015    Performed by [facility]ace MD at [facility] CATH LAB    HEART CATH-LEFT/pci stent lad Left 11/10/2015    Performed by Constance [facility]ace MD at [facility] CATH LAB    INNER EAR SURGERY      LAMINECTOMY, SPINE, LUMBAR  5/25/2019    [facility] by [facility] at [facility] OR    REMOVAL, HARDWARE Bilateral 8/26/2019    [facility]formed by [facility] at [facility] OR    TONSILLECTOMY, ADENOIDECTOMY      WASHOUT Bilateral 8/26/2019    Performed by [facility] at [facility] OR       Time Tracking:     PT Received On: 08/27/19  PT Start Time: 0850     PT Stop Time: 0915  PT Total Time (min): 25 min     Billable Minutes: Evaluation 15 and Therapeutic Activity 10      Kristen Lala, PT  08/27/2019

## 2019-08-27 NOTE — SUBJECTIVE & OBJECTIVE
Review of Systems   Constitutional: Positive for activity change and fatigue. Negative for chills and fever.   HENT: Negative for congestion, rhinorrhea and sinus pressure.    Respiratory: Negative for apnea, cough, choking, chest tightness, shortness of breath, wheezing and stridor.    Cardiovascular: Negative for chest pain, palpitations and leg swelling.   Gastrointestinal: Positive for nausea. Negative for abdominal distention, abdominal pain, diarrhea and vomiting.   Endocrine: Negative for cold intolerance and heat intolerance.   Genitourinary: Negative for difficulty urinating and hematuria.   Musculoskeletal: Positive for back pain. Negative for arthralgias and joint swelling.   Skin: Positive for color change and wound. Negative for pallor and rash.   Neurological: Positive for weakness. Negative for dizziness, seizures, numbness and headaches.   Psychiatric/Behavioral: Negative for agitation, confusion and sleep disturbance. The patient is not nervous/anxious.      Objective:     Vital Signs (Most Recent):  Temp: 98.6 °F (37 °C) (08/27/19 1556)  Pulse: 92 (08/27/19 1712)  Resp: 18 (08/27/19 1556)  BP: (!) 92/43 (08/27/19 1556)  SpO2: (!) 94 % (08/27/19 1556) Vital Signs (24h Range):  Temp:  [97.6 °F (36.4 °C)-98.6 °F (37 °C)] 98.6 °F (37 °C)  Pulse:  [] 92  Resp:  [16-18] 18  SpO2:  [94 %-100 %] 94 %  BP: ()/(43-60) 92/43     Weight: 88.3 kg (194 lb 10.7 oz)  Body mass index is 31.42 kg/m².    Intake/Output Summary (Last 24 hours) at 8/27/2019 1735  Last data filed at 8/27/2019 1045  Gross per 24 hour   Intake 1740 ml   Output 350 ml   Net 1390 ml      Physical Exam   Constitutional: She is oriented to person, place, and time. She appears well-developed and well-nourished. No distress.   HENT:   Nose: Nose normal.   Mouth/Throat: No oropharyngeal exudate.   Eyes: Right eye exhibits no discharge. Left eye exhibits no discharge.   Neck: No JVD present.   Cardiovascular: Normal rate and intact  distal pulses. Exam reveals no gallop and no friction rub.   Murmur heard.  Pulmonary/Chest: Effort normal and breath sounds normal. No stridor. No respiratory distress. She has no wheezes. She has no rales. She exhibits no tenderness.   Abdominal: Soft. Normal appearance and bowel sounds are normal. She exhibits no distension. There is no tenderness. There is no rebound and no guarding. No hernia.   Genitourinary:   Genitourinary Comments: Carslon catheter present    Musculoskeletal: She exhibits no edema or deformity.   Tremor in hands noted    Neurological: She is alert and oriented to person, place, and time.   Skin: Skin is warm and dry. Capillary refill takes 2 to 3 seconds. She is not diaphoretic. No erythema.   Lumbar dressing C/D/I   Psychiatric: She has a normal mood and affect. Her behavior is normal. Thought content normal.   Nursing note and vitals reviewed.      Significant Labs:   BMP:   Recent Labs   Lab 08/27/19  0407   *   *   K 4.3   CL 96   CO2 25   BUN 16   CREATININE 0.8   CALCIUM 9.6     CBC:   Recent Labs   Lab 08/26/19  0515 08/27/19  0407   WBC 5.63 8.20   HGB 11.5* 10.0*   HCT 35.4* 30.7*    213     POCT Glucose:   Recent Labs   Lab 08/27/19  0509 08/27/19  1046 08/27/19  1631   POCTGLUCOSE 167* 173* 174*       Significant Imaging: I have reviewed all pertinent imaging results/findings within the past 24 hours.

## 2019-08-27 NOTE — PLAN OF CARE
CM met with patient/ at the bedside to discuss the discharge plan.  Patient is a resident at [facility].  CM explained that if she qualifies, she can return to [facility] as a SNF patient so that she can receive therapy.  Patient verbalized understanding.     08/27/19 1314   Discharge Reassessment   Assessment Type Discharge Planning Reassessment   Provided patient/caregiver education on the expected discharge date and the discharge plan Yes   Do you have any problems affording any of your prescribed medications? No   Discharge Plan A Skilled Nursing Facility   Discharge Plan B Return to Nursing Home   DME Needed Upon Discharge  none   Patient choice form signed by patient/caregiver N/A   Anticipated Discharge Disposition SNF   Can the patient answer the patient profile reliably? Yes, cognitively intact

## 2019-08-27 NOTE — PROGRESS NOTES
[facility]  Neurosurgery  Progress Note    Subjective:       History of Present Illness: No notes on file.    POD 1  Patient seen lying in bed eating breakfast  Reports vomited x3 last night  C/o nausea and groin pain  Denies back pain  Carlson catheter removed this morning    Post-Op Info:  Procedure(s) (LRB):  DEBRIDEMENT, WOUND (Bilateral)  REMOVAL, HARDWARE (Bilateral)   1 Day Post-Op      Medications:  Continuous Infusions:   sodium chloride 0.9%       Scheduled Meds:   carbidopa-levodopa  mg  1 tablet Oral 5x Daily    docusate sodium  100 mg Oral Daily    isosorbide mononitrate  120 mg Oral Daily    levothyroxine  125 mcg Oral Before breakfast    losartan  50 mg Oral Daily    methocarbamol  750 mg Oral TID    metoprolol succinate  25 mg Oral Daily    pantoprazole  40 mg Oral Daily    piperacillin-tazobactam (ZOSYN) IVPB  4.5 g Intravenous Q8H    polyethylene glycol  17 g Oral Daily    pravastatin  40 mg Oral Daily    vancomycin (VANCOCIN) IVPB  2,250 mg Intravenous Q24H     PRN Meds:acetaminophen, acetaminophen, aluminum-magnesium hydroxide-simethicone, Dextrose 10% Bolus, glucagon (human recombinant), insulin aspart U-100, morphine, morphine, ondansetron, ondansetron, oxyCODONE-acetaminophen, oxyCODONE-acetaminophen, oxyCODONE-acetaminophen, promethazine (PHENERGAN) IVPB, ramelteon, senna-docusate 8.6-50 mg, sodium chloride 0.9%, sodium chloride 0.9%       Objective:     Weight: 88.3 kg (194 lb 10.7 oz)  Body mass index is 31.42 kg/m².  Vital Signs (Most Recent):  Temp: 98.1 °F (36.7 °C) (08/27/19 0729)  Pulse: 81 (08/27/19 0729)  Resp: 16 (08/27/19 0729)  BP: 137/60 (08/27/19 0729)  SpO2: 97 % (08/27/19 0729) Vital Signs (24h Range):  Temp:  [97.6 °F (36.4 °C)-98.8 °F (37.1 °C)] 98.1 °F (36.7 °C)  Pulse:  [] 81  Resp:  [12-22] 16  SpO2:  [94 %-100 %] 97 %  BP: ()/(46-65) 137/60                Oxygen Concentration (%):  [98] 98         Neurosurgery Physical  Exam    Moves all 4 extremities  Incision on back CDI  Dressing remains flat  Sensation intact to light touch      Significant Labs:  Recent Labs   Lab 08/26/19  0515 08/27/19  0407   * 142*   * 131*   K 4.0 4.3   CL 99 96   CO2 25 25   BUN 11 16   CREATININE 0.7 0.8   CALCIUM 9.5 9.6     Recent Labs   Lab 08/26/19  0515 08/27/19  0407   WBC 5.63 8.20   HGB 11.5* 10.0*   HCT 35.4* 30.7*    213     No results for input(s): LABPT, INR, APTT in the last 48 hours.  Microbiology Results (last 7 days)     Procedure Component Value Units Date/Time    Aerobic culture [[id number]] Collected:  08/23/19 2200    Order Status:  Completed Specimen:  Incision site from Back Updated:  08/27/19 0650     Aerobic Bacterial Culture No growth    Blood culture #2 **CANNOT BE ORDERED STAT** [[id number]] Collected:  08/21/19 2020    Order Status:  Completed Specimen:  Blood from Line, PICC Right Cephalic Updated:  08/27/19 0612     Blood Culture, Routine No growth after 5 days.    Culture, Anaerobe [[id number]] Collected:  08/26/19 1408    Order Status:  Sent Specimen:  Wound from Back Updated:  08/26/19 2101    Aerobic culture [[id number]] Collected:  08/26/19 1408    Order Status:  Sent Specimen:  Wound from Back Updated:  08/26/19 2101    Aerobic culture [[id number]] Collected:  08/22/19 1855    Order Status:  Completed Specimen:  Wound from Back Updated:  08/26/19 0902     Aerobic Bacterial Culture No growth    Culture, Anaerobe [[id number]] Collected:  08/23/19 2200    Order Status:  Completed Specimen:  Incision site from Back Updated:  08/26/19 0739     Anaerobic Culture Culture in progress    Blood culture #1 **CANNOT BE ORDERED STAT** [[id number]]  (Abnormal) Collected:  08/21/19 2020    Order Status:  Completed Specimen:  Blood from Peripheral, Antecubital, Left Updated:  08/25/19 1334     Blood Culture, Routine Gram stain aer bottle: Gram positive cocci      Results called to and read back by:Viola Reaves RN  08/22/2019  18:02      Gram stain flaco bottle: Gram positive cocci       Positive results previously called      COAGULASE-NEGATIVE STAPHYLOCOCCUS SPECIES  Multiple morphotyes - probable contaminates        VIRIDANS STREPTOCOCCUS GROUP  Susceptibility testing not routinely performed      Urine culture [[id number]] Collected:  08/21/19 2200    Order Status:  Completed Specimen:  Urine Updated:  08/23/19 1357     Urine Culture, Routine No significant growth    Narrative:       Preferred Collection Type->Urine, Clean Catch        ABGs: No results for input(s): PH, PCO2, PO2, HCO3, POCSATURATED, BE in the last 48 hours.  Cardiac markers: No results for input(s): CKMB, CPKMB, TROPONINT, TROPONINI, MYOGLOBIN in the last 48 hours.  CMP:   Recent Labs   Lab 08/26/19  0515 08/27/19  0407   * 142*   CALCIUM 9.5 9.6   * 131*   K 4.0 4.3   CO2 25 25   CL 99 96   BUN 11 16   CREATININE 0.7 0.8     CRP:   Recent Labs   Lab 08/25/19  1519   CRP 3.1     Recent Lab Results       08/27/19  0509   08/27/19  0407   08/26/19  2338   08/26/19  2055   08/26/19  1725        Anion Gap   10           Baso #   0.02           Basophil%   0.2           BUN, Bld   16           Calcium   9.6           Chloride   96           CO2   25           Creatinine   0.8           Differential Method   Automated           eGFR if    >60           eGFR if non    >60  Comment:  Calculation used to obtain the estimated glomerular filtration  rate (eGFR) is the CKD-EPI equation.              Eos #   0.2           Eosinophil%   2.7           Glucose   142           Gran # (ANC)   5.5           Gran%   66.7           Hematocrit   30.7           Hemoglobin   10.0           Lymph #   1.7           Lymph%   20.7           MCH   27.8           MCHC   32.6           MCV   85           Mono #   0.8           Mono%   9.8           MPV   8.8           Platelets   213           POCT Glucose 167     161 163     Potassium   4.3            RBC   3.60           RDW   14.9           Sodium   131           Vancomycin-Trough     19.1         WBC   8.20                            08/26/19  1241        Anion Gap       Baso #       Basophil%       BUN, Bld       Calcium       Chloride       CO2       Creatinine       Differential Method       eGFR if        eGFR if non        Eos #       Eosinophil%       Glucose       Gran # (ANC)       Gran%       Hematocrit       Hemoglobin       Lymph #       Lymph%       MCH       MCHC       MCV       Mono #       Mono%       MPV       Platelets       POCT Glucose 137     Potassium       RBC       RDW       Sodium       Vancomycin-Trough       WBC           All pertinent labs from the last 24 hours have been reviewed.  Significant Diagnostics:  I have reviewed all pertinent imaging results/findings within the past 24 hours.    Assessment/Plan:     Active Diagnoses:    Diagnosis Date Noted POA    PRINCIPAL PROBLEM:  Status post lumbar spinal fusion [Z98.1] 05/25/2019 Not Applicable    Abnormal urinalysis [R82.90] 08/22/2019 Unknown    Drainage from wound [T14.8XXA] 08/21/2019 Yes    Osteomyelitis of lumbar spine [M46.26] 05/23/2019 Yes    Morbid obesity with BMI of 40.0-44.9, adult [E66.01, Z68.41] 06/17/2015 Not Applicable     Chronic    Type 2 diabetes, controlled, with peripheral neuropathy [E11.42] 05/29/2014 Yes     Chronic    Hypothyroidism [E03.9] 11/19/2013 Yes     Chronic    Essential hypertension [I10]  Yes     Chronic    PD (Parkinson's disease) [G20]  Yes     Chronic      Problems Resolved During this Admission:       POD 1  Patient appears more comfortable  Instructed to work with PT/OT  Diet -Boost TID with meals  If not tolerating solid foods due to nausea, will change diet  Will continue to monitor        Mayra Long PA-C  Neurosurgery  [facility]

## 2019-08-27 NOTE — ASSESSMENT & PLAN NOTE
Admit to Observation   Neurosurgery managing   Plan removal of the instrumentation with placement of antibiotic beads to the incision cavity along with a wound VAC placement.   -8/23/19- MRI reviewed per Neuro Surgery with screws pulled out superiorly and plan for revision on Monday afternoon pending cultures.    Cefepime  Vancomycin    Pain control   -wound culture results pending   -wound care consulted     08/24  - Continue IV Vancomycin and Zosyn  - Aerobic culture NGTD, repeat pending  - BC x 1 resulted with COAGULASE- NEGATIVE STAPHYLOCOCCUS SPECIES, likely contaminant  - ID following  - Pt scheduled revision on Monday afternoon with Plastics and Neurosurgery pending cultures    08/25  - BC x 1 resulted on 08/24 as likely contaminant -- COAGULASE-NEGATIVE STAPHYLOCOCCUS SPECIES  - ID recommended to repeat CRP and ESR to see if BC was contaminant. CRP 3.1, Sed rate 27  - Same BC resulted at 1334 today with VIRIDANS STREPTOCOCCUS GROUP. Susceptibility testing not routinely performed  - Informed Dr. Gomez (ID) and Dr. Nicolas (neurosurgery) of results  - Continue IV Vancomycin and Zosyn    08/26  - S/p bilateral wound debridement, hardware removal, and placement of abx vancomycin/tobramycin pellets today by Dr. Nicolas  - Continue IV Vancomycin and Zosyn    08/27  - POD # 1 stable  - PT/OT -- recommending SNF placement  - Continue IV Vancomycin and Zosyn  - Repeat BC pending  - Aerobic cultures from surgery pending

## 2019-08-28 LAB
ANION GAP SERPL CALC-SCNC: 10 MMOL/L (ref 8–16)
BASOPHILS # BLD AUTO: 0.02 K/UL (ref 0–0.2)
BASOPHILS NFR BLD: 0.2 % (ref 0–1.9)
BUN SERPL-MCNC: 14 MG/DL (ref 8–23)
CALCIUM SERPL-MCNC: 9.7 MG/DL (ref 8.7–10.5)
CHLORIDE SERPL-SCNC: 97 MMOL/L (ref 95–110)
CO2 SERPL-SCNC: 25 MMOL/L (ref 23–29)
CREAT SERPL-MCNC: 0.8 MG/DL (ref 0.5–1.4)
DIFFERENTIAL METHOD: ABNORMAL
EOSINOPHIL # BLD AUTO: 0.2 K/UL (ref 0–0.5)
EOSINOPHIL NFR BLD: 2.6 % (ref 0–8)
ERYTHROCYTE [DISTWIDTH] IN BLOOD BY AUTOMATED COUNT: 14.8 % (ref 11.5–14.5)
EST. GFR  (AFRICAN AMERICAN): >60 ML/MIN/1.73 M^2
EST. GFR  (NON AFRICAN AMERICAN): >60 ML/MIN/1.73 M^2
GLUCOSE SERPL-MCNC: 172 MG/DL (ref 70–110)
HCT VFR BLD AUTO: 28.4 % (ref 37–48.5)
HGB BLD-MCNC: 9.4 G/DL (ref 12–16)
LYMPHOCYTES # BLD AUTO: 1.5 K/UL (ref 1–4.8)
LYMPHOCYTES NFR BLD: 18.6 % (ref 18–48)
MCH RBC QN AUTO: 28.1 PG (ref 27–31)
MCHC RBC AUTO-ENTMCNC: 33.1 G/DL (ref 32–36)
MCV RBC AUTO: 85 FL (ref 82–98)
MONOCYTES # BLD AUTO: 0.9 K/UL (ref 0.3–1)
MONOCYTES NFR BLD: 11 % (ref 4–15)
NEUTROPHILS # BLD AUTO: 5.4 K/UL (ref 1.8–7.7)
NEUTROPHILS NFR BLD: 67.7 % (ref 38–73)
PLATELET # BLD AUTO: 219 K/UL (ref 150–350)
PMV BLD AUTO: 9 FL (ref 9.2–12.9)
POCT GLUCOSE: 144 MG/DL (ref 70–110)
POCT GLUCOSE: 148 MG/DL (ref 70–110)
POCT GLUCOSE: 194 MG/DL (ref 70–110)
POCT GLUCOSE: 237 MG/DL (ref 70–110)
POCT GLUCOSE: 271 MG/DL (ref 70–110)
POTASSIUM SERPL-SCNC: 4.3 MMOL/L (ref 3.5–5.1)
RBC # BLD AUTO: 3.34 M/UL (ref 4–5.4)
SODIUM SERPL-SCNC: 132 MMOL/L (ref 136–145)
VANCOMYCIN TROUGH SERPL-MCNC: 18.4 UG/ML (ref 10–22)
WBC # BLD AUTO: 8.01 K/UL (ref 3.9–12.7)

## 2019-08-28 PROCEDURE — 63600175 PHARM REV CODE 636 W HCPCS: Performed by: NURSE PRACTITIONER

## 2019-08-28 PROCEDURE — 97542 WHEELCHAIR MNGMENT TRAINING: CPT

## 2019-08-28 PROCEDURE — 11000001 HC ACUTE MED/SURG PRIVATE ROOM

## 2019-08-28 PROCEDURE — 80202 ASSAY OF VANCOMYCIN: CPT

## 2019-08-28 PROCEDURE — 25000003 PHARM REV CODE 250: Performed by: NURSE PRACTITIONER

## 2019-08-28 PROCEDURE — 85025 COMPLETE CBC W/AUTO DIFF WBC: CPT

## 2019-08-28 PROCEDURE — 21400001 HC TELEMETRY ROOM

## 2019-08-28 PROCEDURE — 97530 THERAPEUTIC ACTIVITIES: CPT

## 2019-08-28 PROCEDURE — 80048 BASIC METABOLIC PNL TOTAL CA: CPT

## 2019-08-28 PROCEDURE — 94799 UNLISTED PULMONARY SVC/PX: CPT

## 2019-08-28 PROCEDURE — 25000003 PHARM REV CODE 250: Performed by: PHYSICIAN ASSISTANT

## 2019-08-28 PROCEDURE — 63600175 PHARM REV CODE 636 W HCPCS: Performed by: INTERNAL MEDICINE

## 2019-08-28 RX ORDER — BACITRACIN ZINC 500 UNIT/G
OINTMENT (GRAM) TOPICAL SEE ADMIN INSTRUCTIONS
Status: DISCONTINUED | OUTPATIENT
Start: 2019-08-28 | End: 2019-08-29 | Stop reason: HOSPADM

## 2019-08-28 RX ADMIN — VANCOMYCIN HYDROCHLORIDE 2250 MG: 1 INJECTION, POWDER, LYOPHILIZED, FOR SOLUTION INTRAVENOUS at 02:08

## 2019-08-28 RX ADMIN — PIPERACILLIN AND TAZOBACTAM 4.5 G: 4; .5 INJECTION, POWDER, LYOPHILIZED, FOR SOLUTION INTRAVENOUS; PARENTERAL at 06:08

## 2019-08-28 RX ADMIN — METHOCARBAMOL TABLETS 750 MG: 750 TABLET, COATED ORAL at 09:08

## 2019-08-28 RX ADMIN — PANTOPRAZOLE SODIUM 40 MG: 40 TABLET, DELAYED RELEASE ORAL at 09:08

## 2019-08-28 RX ADMIN — INSULIN ASPART 2 UNITS: 100 INJECTION, SOLUTION INTRAVENOUS; SUBCUTANEOUS at 10:08

## 2019-08-28 RX ADMIN — METHOCARBAMOL TABLETS 750 MG: 750 TABLET, COATED ORAL at 02:08

## 2019-08-28 RX ADMIN — LOSARTAN POTASSIUM 50 MG: 50 TABLET, FILM COATED ORAL at 09:08

## 2019-08-28 RX ADMIN — CARBIDOPA AND LEVODOPA 1 TABLET: 25; 250 TABLET ORAL at 05:08

## 2019-08-28 RX ADMIN — RAMELTEON 8 MG: 8 TABLET ORAL at 09:08

## 2019-08-28 RX ADMIN — DOCUSATE SODIUM 100 MG: 100 CAPSULE, LIQUID FILLED ORAL at 09:08

## 2019-08-28 RX ADMIN — POLYETHYLENE GLYCOL 3350 17 G: 17 POWDER, FOR SOLUTION ORAL at 09:08

## 2019-08-28 RX ADMIN — OXYCODONE HYDROCHLORIDE AND ACETAMINOPHEN 1 TABLET: 10; 325 TABLET ORAL at 09:08

## 2019-08-28 RX ADMIN — CARBIDOPA AND LEVODOPA 1 TABLET: 25; 250 TABLET ORAL at 02:08

## 2019-08-28 RX ADMIN — PIPERACILLIN AND TAZOBACTAM 4.5 G: 4; .5 INJECTION, POWDER, LYOPHILIZED, FOR SOLUTION INTRAVENOUS; PARENTERAL at 09:08

## 2019-08-28 RX ADMIN — PIPERACILLIN AND TAZOBACTAM 4.5 G: 4; .5 INJECTION, POWDER, LYOPHILIZED, FOR SOLUTION INTRAVENOUS; PARENTERAL at 02:08

## 2019-08-28 RX ADMIN — LEVOTHYROXINE SODIUM 125 MCG: 125 TABLET ORAL at 05:08

## 2019-08-28 RX ADMIN — OXYCODONE HYDROCHLORIDE AND ACETAMINOPHEN 1 TABLET: 10; 325 TABLET ORAL at 05:08

## 2019-08-28 RX ADMIN — CARBIDOPA AND LEVODOPA 1 TABLET: 25; 250 TABLET ORAL at 09:08

## 2019-08-28 RX ADMIN — PRAVASTATIN SODIUM 40 MG: 20 TABLET ORAL at 09:08

## 2019-08-28 RX ADMIN — ONDANSETRON 4 MG: 2 INJECTION INTRAMUSCULAR; INTRAVENOUS at 12:08

## 2019-08-28 NOTE — PT/OT/SLP PROGRESS
Occupational Therapy  Treatment    Ross Isbell   MRN: [id number]   Admitting Diagnosis: Status post lumbar spinal fusion    OT Date of Treatment: 08/28/19   OT Start Time: 0820  OT Stop Time: 0845  OT Total Time (min): 25 min    Billable Minutes:  Therapeutic Activity 25    General Precautions: Standard, fall  Orthopedic Precautions: spinal precautions  Braces: LSO         Subjective:  Communicated with NURSE prior to session.    Pain/Comfort  Pain Rating 1: 2/10  Location - Orientation 1: lower  Location 1: back  Pain Addressed 1: Reposition, Distraction, Cessation of Activity  Pain Rating Post-Intervention 1: 2/10    Objective:  Patient found with: telemetry, peripheral IV       Balance:   Static Sit: GOOD: Takes MODERATE challenges from all directions  Dynamic Sit: FAIR+: Maintains balance through MINIMAL excursions of active trunk motion  Static Stand: POOR+: Needs MINIMAL assist to maintain  Dynamic stand: POOR+: Needs MIN (minimal ) assist during gait    Therapeutic Activities and Exercises:  PT FOUND WITH BILATERAL FEET HANGING OFF EDGE OF BED TRYING TO EAT BREAKFAST. ASSISTED PATIENT TO SIT EOB MIN A. DONNED LSO BRACE WITH MOD A FOR ADJUSTING STRAPS. PT TRANSFERRED TO PERSONAL WHEELCHAIR MIN A. PT PERFORMED WHEELCHAIR MOBILITY AND OCCASIONAL MIN A X40 FEET. PT THEN LOCKED BREAKS WITHOUT CUES, AND STOOD WITH HAND RAIL MIN A ~ 30 SECONDS. PT RETURNED TO ROOM AND LEFT UP IN CHAIR WITH CHAIR ALARM ARMED, CALL BUTTON IN REACH. NURSING NOTIFIED.    AM-PAC 6 CLICK ADL   How much help from another person does this patient currently need?   1 = Unable, Total/Dependent Assistance  2 = A lot, Maximum/Moderate Assistance  3 = A little, Minimum/Contact Guard/Supervision  4 = None, Modified [place]/Independent    Putting on and taking off regular lower body clothing? : 1  Bathing (including washing, rinsing, drying)?: 2  Toileting, which includes using toilet, bedpan, or urinal? : 2  Putting on and taking off  "regular upper body clothing?: 2  Taking care of personal grooming such as brushing teeth?: 3  Eating meals?: 3  Daily Activity Total Score: 13     AM-PAC Raw Score CMS "G-Code Modifier Level of Impairment Assistance   6 % Total / Unable   7 - 8 CM 80 - 100% Maximal Assist   9-13 CL 60 - 80% Moderate Assist   14 - 19 CK 40 - 60% Moderate Assist   20 - 22 CJ 20 - 40% Minimal Assist   23 CI 1-20% SBA / CGA   24 CH 0% Independent/ Mod I       Patient left up in chair with all lines intact, call button in reach, chair alarm on and NURSING notified    ASSESSMENT:  Ross Isbell is a 73 y.o. female with a medical diagnosis of Status post lumbar spinal fusion and presents with DEBILITY, IMPAIRED ADLS, FUNCTIONAL MOBILITY, PAIN AND SAFETY AWARENESS. PT WILL BENEFIT FROM CONTINUED SKILLED OT SERVICES TO INCREASE FUNCTIONAL INDEPENDENCE AND SAFETY AWARENESS.    Rehab identified problem list/impairments: Rehab identified problem list/impairments: weakness, impaired endurance, impaired self care skills, impaired functional mobilty, decreased coordination, impaired balance, gait instability, decreased ROM, orthopedic precautions, pain, decreased safety awareness    Rehab potential is good.    Activity tolerance: Good    Discharge recommendations: Discharge Facility/Level of Care Needs: nursing facility, skilled     Barriers to discharge:      Equipment recommendations: none     GOALS:   Multidisciplinary Problems     Occupational Therapy Goals        Problem: Occupational Therapy Goal    Goal Priority Disciplines Outcome Interventions   Occupational Therapy Goal     OT, PT/OT Ongoing (interventions implemented as appropriate)    Description:  Goals to be met by: 9/3/19     Patient will increase functional independence with ADLs by performing:    UE Dressing with Stand-by Assistance.  LE Dressing with Maximum Assistance.  Toileting from bedside commode with Moderate Assistance for hygiene and clothing management. "   Toilet transfer to bedside commode with Moderate Assistance.  Upper extremity exercise program x10 reps per handout, with supervision.                      Plan:  Patient to be seen 3 x/week to address the above listed problems via self-care/home management, therapeutic activities, therapeutic exercises  Plan of Care expires: 09/03/19  Plan of Care reviewed with: patient    OT G-codes  Functional Assessment Tool Used: [place] AM-PAC  Score: 13  Functional Limitation: Self care    Sharda Ramos OT  08/28/2019

## 2019-08-28 NOTE — PT/OT/SLP PROGRESS
Physical Therapy  Treatment    Ross Isbell   MRN: [id number]   Admitting Diagnosis: Status post lumbar spinal fusion    PT Received On: 08/28/19  PT Start Time: 0856     PT Stop Time: 0920    PT Total Time (min): 24 min       Billable Minutes:  Therapeutic Activity 14 and Train/Wheelchair Management 10    Treatment Type: Treatment  PT/PTA: PT             General Precautions: Standard, fall  Orthopedic Precautions: spinal precautions   Braces: LSO         Subjective:  Communicated with NURSE AND Epic CHART REVIEW prior to session.   PT AGREED TO TX    Pain/Comfort  Pain Rating 1: 2/10  Location 1: back  Pain Rating Post-Intervention 1: 2/10    Objective:   Patient found with: peripheral IV, telemetry    Functional Mobility:  PT MET IN  SUP IN BED. PT SEATED EOB WITH MIN A. PT SCOOTED TO EOB AND DONNED LSO WITH MIN A. PT STOOD T/F TO CHAIR WITH MIN A TO WC. PT SIT.STAND X 1 WITH MIN A. PT SEATED IN WC AND PRACTICES WC PROPULSION WITH CGA X 40' WITH REST BREAKS BETWEEN. PT RETURNED TO  HOOKED UP TO CHAIR ALARM AND LEFT WITH ALL NEEDS MET.     AM-PAC 6 CLICK MOBILITY  How much help from another person does this patient currently need?   1 = Unable, Total/Dependent Assistance  2 = A lot, Maximum/Moderate Assistance  3 = A little, Minimum/Contact Guard/Supervision  4 = None, Modified [place]/Independent    Turning over in bed (including adjusting bedclothes, sheets and blankets)?: 3  Sitting down on and standing up from a chair with arms (e.g., wheelchair, bedside commode, etc.): 3  Moving from lying on back to sitting on the side of the bed?: 3  Moving to and from a bed to a chair (including a wheelchair)?: 3  Need to walk in hospital room?: 3  Climbing 3-5 steps with a railing?: 1  Basic Mobility Total Score: 16    AM-PAC Raw Score CMS G-Code Modifier Level of Impairment Assistance   6 % Total / Unable   7 - 9 CM 80 - 100% Maximal Assist   10 - 14 CL 60 - 80% Moderate Assist   15 - 19 CK 40 - 60%  Moderate Assist   20 - 22 CJ 20 - 40% Minimal Assist   23 CI 1-20% SBA / CGA   24 CH 0% Independent/ Mod I     Patient left up in chair with call button in reach and chair alarm on.    Assessment:  PT PROGRESSING WITH GROSS FUNC MOBILITY     Rehab identified problem list/impairments: Rehab identified problem list/impairments: weakness, impaired endurance, impaired balance, impaired functional mobilty, decreased safety awareness, decreased ROM    Rehab potential is fair.    Activity tolerance: Fair    Discharge recommendations: Discharge Facility/Level of Care Needs: nursing facility, skilled     Barriers to discharge:      Equipment recommendations: Equipment Needed After Discharge: none     GOALS:   Multidisciplinary Problems     Physical Therapy Goals        Problem: Physical Therapy Goal    Goal Priority Disciplines Outcome Goal Variances Interventions   Physical Therapy Goal     PT, PT/OT Ongoing (interventions implemented as appropriate)     Description:  PT WILL BE SEEN FOR P.T. FOR A MIN OF 5 OUT OF 7 DAYS A WEEK  LT/3/19  1. PT WILL COMPLETE BED MOBILITY WITH MIN A  2. PT WILL T/F TO CHAIR WITH MIN A SQUAT PIVOT T/F TO CHAIR.   3. PT WILL COMPLETE B LE TE X 10 REPS  4. PT WILL PROPEL WC X 50' WITH MIN A                    PLAN:    Patient to be seen    to address the above listed problems via therapeutic exercises, therapeutic activities, wheelchair management/training  Plan of Care expires: 19  Plan of Care reviewed with: patient         Kristen Lala, PT  2019

## 2019-08-28 NOTE — PROGRESS NOTES
Pharmacokinetic Assessment Follow Up: IV Vancomycin    Vancomycin serum concentration assessment(s):    The trough level was drawn correctly and can be used to guide therapy at this time. The measurement is within the desired definitive target range of 15 to 20 mcg/mL.    Vancomycin Regimen Plan:    Continue regimen to Vancomycin 2250 mg IV every 24 hours with next serum trough concentration measured at 0200 prior to the next dose on 8/29/19     Drug levels (last 3 results):  Recent Labs   Lab Result Units 08/25/19  2311 08/26/19  2338 08/28/19  0055   Vancomycin-Trough ug/mL 15.6 19.1 18.4       Pharmacy will continue to follow and monitor vancomycin.    Please contact pharmacy at extension [phone] for questions regarding this assessment.    Thank you for the consult,   Rashad Bishop       Patient brief summary:  Ross Isbell is a 73 y.o. female initiated on antimicrobial therapy with IV Vancomycin for treatment of bone/joint infection    The patient's current regimen is Vancomycin 2,250 mg every 24 hours    Drug Allergies:   Review of patient's allergies indicates:   Allergen Reactions    Codeine      Other reaction(s): Nausea.  Patient tolerated morphine, hydrocodone, and hydromorphone in 5/2019 with no reported problems. Patient tolerated oxycodone in 6/2019 with no reported problems.     Codeine sulfate      Unknown^    Diphenhydramine hcl      Unknown^  Other reaction(s): Rash    Sulfa (sulfonamide antibiotics) Hives and Nausea And Vomiting    Penicillins Rash     Rash only as a child  Tolerated ceftriaxone 4/30/19  Tolerated piperacillin-tazobactam 5/23/19       Actual Body Weight:   88.3 kg    Renal Function:   Estimated Creatinine Clearance: 70.1 mL/min (based on SCr of 0.8 mg/dL).,     Dialysis Method (if applicable):  N/A    CBC (last 72 hours):  Recent Labs   Lab Result Units 08/25/19  0600 08/26/19  0515 08/27/19  0407 08/28/19  0055   WBC K/uL 6.12 5.63 8.20 8.01   Hemoglobin g/dL 11.4* 11.5* 10.0*  9.4*   Hematocrit % 35.5* 35.4* 30.7* 28.4*   Platelets K/uL 257 239 213 219   Gran% % 51.2 53.5 66.7 67.7   Lymph% % 35.0 35.0 20.7 18.6   Mono% % 9.6 7.8 9.8 11.0   Eosinophil% % 4.1 3.7 2.7 2.6   Basophil% % 0.3 0.2 0.2 0.2   Differential Method  Automated Automated Automated Automated       Metabolic Panel (last 72 hours):  Recent Labs   Lab Result Units 08/25/19  0600 08/26/19  0515 08/27/19  0407 08/28/19  0055   Sodium mmol/L 132* 135* 131* 132*   Potassium mmol/L 4.1 4.0 4.3 4.3   Chloride mmol/L 96 99 96 97   CO2 mmol/L 26 25 25 25   Glucose mg/dL 137* 131* 142* 172*   BUN, Bld mg/dL 11 11 16 14   Creatinine mg/dL 0.8 0.7 0.8 0.8       Vancomycin Administrations:  vancomycin given in the last 96 hours                     vancomycin (VANCOCIN) 2,250 mg in dextrose 5 % 500 mL IVPB (mg) 2,250 mg New Bag 08/28/19 0238     2,250 mg New Bag 08/27/19 0122     2,250 mg New Bag 08/26/19 0022     2,250 mg New Bag 08/24/19 2327    vancomycin injection (g) 4 g Given 08/26/19 1336                      Microbiologic Results:  Microbiology Results (last 7 days)       Procedure Component Value Units Date/Time    Blood culture [[id number]] Collected:  08/27/19 1822    Order Status:  Sent Specimen:  Blood Updated:  08/28/19 0159    Blood culture [[id number]] Collected:  08/27/19 1816    Order Status:  Sent Specimen:  Blood Updated:  08/28/19 0159    Blood culture [[id number]]     Order Status:  Canceled Specimen:  Blood     Blood culture [[id number]]     Order Status:  Canceled Specimen:  Blood     Aerobic culture [[id number]] Collected:  08/23/19 2200    Order Status:  Completed Specimen:  Incision site from Back Updated:  08/27/19 0650     Aerobic Bacterial Culture No growth    Blood culture #2 **CANNOT BE ORDERED STAT** [[id number]] Collected:  08/21/19 2020    Order Status:  Completed Specimen:  Blood from Line, PICC Right Cephalic Updated:  08/27/19 0612     Blood Culture, Routine No growth after 5 days.    Culture, Anaerobe  [[id number]] Collected:  08/26/19 1408    Order Status:  Sent Specimen:  Wound from Back Updated:  08/26/19 2101    Aerobic culture [[id number]] Collected:  08/26/19 1408    Order Status:  Sent Specimen:  Wound from Back Updated:  08/26/19 2101    Aerobic culture [[id number]] Collected:  08/22/19 1855    Order Status:  Completed Specimen:  Wound from Back Updated:  08/26/19 0902     Aerobic Bacterial Culture No growth    Culture, Anaerobe [[id number]] Collected:  08/23/19 2200    Order Status:  Completed Specimen:  Incision site from Back Updated:  08/26/19 0739     Anaerobic Culture Culture in progress    Blood culture #1 **CANNOT BE ORDERED STAT** [[id number]]  (Abnormal) Collected:  08/21/19 2020    Order Status:  Completed Specimen:  Blood from Peripheral, Antecubital, Left Updated:  08/25/19 1334     Blood Culture, Routine Gram stain aer bottle: Gram positive cocci      Results called to and read back by:Viola Reaves RN 08/22/2019  18:02      Gram stain flaco bottle: Gram positive cocci       Positive results previously called      COAGULASE-NEGATIVE STAPHYLOCOCCUS SPECIES  Multiple morphotyes - probable contaminates        VIRIDANS STREPTOCOCCUS GROUP  Susceptibility testing not routinely performed      Urine culture [[id number]] Collected:  08/21/19 2200    Order Status:  Completed Specimen:  Urine Updated:  08/23/19 1357     Urine Culture, Routine No significant growth    Narrative:       Preferred Collection Type->Urine, Clean Catch

## 2019-08-28 NOTE — ASSESSMENT & PLAN NOTE
As above   -ID consulted   -IV antibiotics   -MRI results reviewed -improved per Neuro Surgery   -hx of  Microbiology that grew Streptococcus s/p washout on 5/24/19 with appropriate antibiotics completed of note  -hx of posterior dehiscence of incision noted with transferred to a nursing home for long term -antibiotic therapy completed via PICC line for approximately 2 months for Strep infection   -Blood culture results showed gram positive cocci x 1 bottle- ID consulted    08/24  - BC x 1 resulted with COAGULASE- NEGATIVE STAPHYLOCOCCUS SPECIES, likely contaminant  - ID following  - Continue IV abx    08/25  - BC x 1 resulted on 08/24 as likely contaminant -- COAGULASE-NEGATIVE STAPHYLOCOCCUS SPECIES  - ID recommended to repeat CRP and ESR to see if BC was contaminant. CRP 3.1, Sed rate 27  - Same BC resulted at 1334 today with VIRIDANS STREPTOCOCCUS GROUP. Susceptibility testing not routinely performed  - Informed Dr. Gomez (ID) and Dr. Nicolas (neurosurgery) of results  - Continue IV Vancomycin and Zosyn    08/26  - S/p bilateral wound debridement, hardware removal, and placement of abx vancomycin/tobramycin pellets today by Dr. Nicolas  - Continue IV Vancomycin and Zosyn    08/27  - POD # 1 stable  - PT/OT -- recommending SNF placement  - Continue IV Vancomycin and Zosyn  - Repeat BC pending  - Aerobic cultures from surgery pending    08/28  - POD #2  - Stable  - Repeat BC NGTD  - Aerobic culture from surgery NGTD  - Continue IV Vancomycin and Zosyn

## 2019-08-28 NOTE — PROGRESS NOTES
[facility]  Neurosurgery  Progress Note    Subjective:     History of Present Illness: No notes on file    Post-Op Info:  Procedure(s) (LRB):  DEBRIDEMENT, WOUND (Bilateral)  REMOVAL, HARDWARE (Bilateral)  WASHOUT (Bilateral)   2 Days Post-Op   Doing well postop day 2  Denies any further nausea  Sitting up eating in bed  Was able to get into wheelchair and mobilize yesterday  States overall her back pain and groin pain much better.  Still has groin pain    Vitals reviewed    Moves extremities x4  Incision CD I  Dressing replaced and covered with bacitracin ointment      Assessment/Plan:   Doing well postop day 2  Will continue daily dressing changes  Physical therapy occupational therapy as tolerates  Antibiotics per ID  Cultures from lumbar spine are still negative  Will start back prophylactic Lovenox as patient is not ambulatory      Stanislav Nicolas MD  Neurosurgery  [facility]

## 2019-08-28 NOTE — PROGRESS NOTES
[facility] Hospital Medicine  Progress Note    Patient Name: Ross Isbell  MRN: [id number]  Patient Class: IP- Inpatient   Admission Date: 8/21/2019  Length of Stay: 5 days  Attending Physician: Rodger Loya MD  Primary Care Provider: Anna Penn MD        Subjective:     Principal Problem:Status post lumbar spinal fusion        HPI:  Ms Isbell is a 73 year old female with PMHx of CAD, HTN, DM, Parkinson disease, HLD and Osteomyelitis of lumbar spine who presented to [facility] Emergency Room with increase back pain. She  had axial back pain with destruction of the disc space underwent  posterior washout and debridement with placement of instrumentation on 5/24. Patient  had dehiscence of her incision and was taken back to the OR for wound VAC placement on 6/11. She has been experiencing increase back pain, unable to walk. She had been doing well with Physical Therapy, but now has been a wheel chair. She has experienced failure of instrumentation and elevated infectious markers. Neurosurery surgery following, plan for removal of instumentation with antibotic beads placement and wound vac on tomorrow. She being placed in observation under to care of Hospital Medicine.       Overview/Hospital Course:  Pt admitted to Observation Unit s/p lumbar spinal fusion.  NeuroSurgery consulted.  MRI showed favorable response to therapy with diminished abnormal bone marrow signal abnormalities relating to evolution of osteomyelitis change.  Diminished swelling of the right psoas muscle with no evidence of abscess.  2. Laminectomy at L2-L3 and L3-L4 with posterior fusion with mild to moderate spinal stenosis at L2-L3 with lateral recess stenosis that could affect the descending L3 spinal nerve roots.  No enhancing granulation tissue compresses the thecal sac or neural structures.  Grade 1 degenerative spondylolisthesis at L4-L5 with severe lateral recess stenosis that could compress the descending L5 spinal nerve  roots.  Pt treated with IV antibiotics and analgesia prn. Blood cultures with no growth to date.  Urine culture in progress.  Washout with revision of instrumentation and wound closure per NeuroSurgery and Plastics planned.  Vital signs stable. On 8/23/19, pt transferred to Inpatient status.  Blood culture grew gram positive cocci x 1 bottle.  ID consulted.  MRI reviewed by Neuro Surgery with signs of improving osteomyelitis noted and plan for revision on Monday afternoon pending cultures.  Plastic Surgery to assist with wound closure and covering.      As of 08/24 BC x 1 resulted with COAGULASE- NEGATIVE STAPHYLOCOCCUS SPECIES, likely contaminant. ID was consulted due to BC and recommended to continue IV Vancomycin and Zosyn until final sensitivity. Pt scheduled for revision on Monday afternoon with Plastics and Neurosurgery pending cultures    As of 08/25 BC x 1 likely contaminant -- COAGULASE-NEGATIVE STAPHYLOCOCCUS SPECIES. ID recommended to repeat CRP and ESR. CRP 3.1, Sed rate 27. BC resulted at 1334 today with VIRIDANS STREPTOCOCCUS [employer]. Susceptibility testing not routinely performed. Informed Dr. Gomez (ID) and Dr. Nicolas (neurosurgery) of results. Aerobic culture NGTD. Continue IV Vancomycin and Zosyn for now. NPO after midnight for surgery in AM.     As of 08/26 s/p bilateral wound debridement, hardware removal, and placement of abx vancomycin/tobramycin pellets today. Sx performed by Dr. Nicolas today. Pt just made it back from surgery. Pt currently c/o nausea. No other complaints at this time.     As of 08/27  POD # 1. Aerobic cultures from surgery pending. Repeat BC pending. PT/OT following -- recommending SNF placement. Will consult CM for placement.     As of 08/28 POD # 2. Repeat BC NGTD. Aerobic culture from surgery NGTD. Labs reviewed, stable. Remains afebrile. Discussed case with Dr. Nicolas -- cleared for discharge from neurosurgery standpoint. He recommended she F/U in clinic in 2 weeks for  suture removal. He also recommended daily dressing changes with bacitracin ointment. Will hold d/c today as to get ID recommendations for PO abx upon discharge. She will be discharged back to [facility] with SNF services on tomorrow, 08/29.    Review of Systems   Constitutional: Positive for activity change and fatigue. Negative for chills and fever.   HENT: Negative for congestion, rhinorrhea and sinus pressure.    Respiratory: Negative for apnea, cough, choking, chest tightness, shortness of breath, wheezing and stridor.    Cardiovascular: Negative for chest pain, palpitations and leg swelling.   Gastrointestinal: Positive for nausea. Negative for abdominal distention, abdominal pain, diarrhea and vomiting.   Endocrine: Negative for cold intolerance and heat intolerance.   Genitourinary: Negative for difficulty urinating and hematuria.   Musculoskeletal: Positive for back pain. Negative for arthralgias and joint swelling.   Skin: Positive for color change and wound. Negative for pallor and rash.   Neurological: Positive for weakness. Negative for dizziness, seizures, numbness and headaches.   Psychiatric/Behavioral: Negative for agitation, confusion and sleep disturbance. The patient is not nervous/anxious.      Objective:     Vital Signs (Most Recent):  Temp: 98.2 °F (36.8 °C) (08/28/19 1639)  Pulse: 95 (08/28/19 1713)  Resp: 18 (08/28/19 1639)  BP: 121/77 (08/28/19 1639)  SpO2: (!) 94 % (08/28/19 1639) Vital Signs (24h Range):  Temp:  [97.5 °F (36.4 °C)-98.5 °F (36.9 °C)] 98.2 °F (36.8 °C)  Pulse:  [] 95  Resp:  [16-19] 18  SpO2:  [93 %-97 %] 94 %  BP: ()/(51-77) 121/77     Weight: 88.3 kg (194 lb 10.7 oz)  Body mass index is 31.42 kg/m².    Intake/Output Summary (Last 24 hours) at 8/28/2019 1830  Last data filed at 8/28/2019 1757  Gross per 24 hour   Intake 2390 ml   Output --   Net 2390 ml      Physical Exam   Constitutional: She is oriented to person, place, and time. She appears well-developed  and well-nourished. No distress.   HENT:   Nose: Nose normal.   Mouth/Throat: No oropharyngeal exudate.   Eyes: Right eye exhibits no discharge. Left eye exhibits no discharge.   Neck: No JVD present.   Cardiovascular: Normal rate and intact distal pulses. Exam reveals no gallop and no friction rub.   Murmur heard.  Pulmonary/Chest: Effort normal and breath sounds normal. No stridor. No respiratory distress. She has no wheezes. She has no rales. She exhibits no tenderness.   Abdominal: Soft. Normal appearance and bowel sounds are normal. She exhibits no distension. There is no tenderness. There is no rebound and no guarding. No hernia.   Musculoskeletal: She exhibits no edema or deformity.   Tremor in hands noted    Neurological: She is alert and oriented to person, place, and time.   Skin: Skin is warm and dry. Capillary refill takes 2 to 3 seconds. She is not diaphoretic. No erythema.   Lumbar dressing C/D/I   Psychiatric: She has a normal mood and affect. Her behavior is normal. Thought content normal.   Nursing note and vitals reviewed.      Significant Labs:   BMP:   Recent Labs   Lab 08/28/19  0055   *   *   K 4.3   CL 97   CO2 25   BUN 14   CREATININE 0.8   CALCIUM 9.7     CBC:   Recent Labs   Lab 08/27/19  0407 08/28/19  0055   WBC 8.20 8.01   HGB 10.0* 9.4*   HCT 30.7* 28.4*    219     POCT Glucose:   Recent Labs   Lab 08/28/19  1030 08/28/19  1046 08/28/19  1613   POCTGLUCOSE 271* 237* 148*       Significant Imaging: I have reviewed all pertinent imaging results/findings within the past 24 hours.      Assessment/Plan:      * Status post lumbar spinal fusion  Admit to Observation   Neurosurgery managing   Plan removal of the instrumentation with placement of antibiotic beads to the incision cavity along with a wound VAC placement.   -8/23/19- MRI reviewed per Neuro Surgery with screws pulled out superiorly and plan for revision on Monday afternoon pending cultures.     Cefepime  Vancomycin    Pain control   -wound culture results pending   -wound care consulted     08/24  - Continue IV Vancomycin and Zosyn  - Aerobic culture NGTD, repeat pending  - BC x 1 resulted with COAGULASE- NEGATIVE STAPHYLOCOCCUS SPECIES, likely contaminant  - ID following  - Pt scheduled revision on Monday afternoon with Plastics and Neurosurgery pending cultures    08/25  - BC x 1 resulted on 08/24 as likely contaminant -- COAGULASE-NEGATIVE STAPHYLOCOCCUS SPECIES  - ID recommended to repeat CRP and ESR to see if BC was contaminant. CRP 3.1, Sed rate 27  - Same BC resulted at 1334 today with VIRIDANS STREPTOCOCCUS [employer]. Susceptibility testing not routinely performed  - Informed Dr. Gomez (ID) and Dr. Nicolas (neurosurgery) of results  - Continue IV Vancomycin and Zosyn    08/26  - S/p bilateral wound debridement, hardware removal, and placement of abx vancomycin/tobramycin pellets today by Dr. Nicolas  - Continue IV Vancomycin and Zosyn    08/27  - POD # 1 stable  - PT/OT -- recommending SNF placement  - Continue IV Vancomycin and Zosyn  - Repeat BC pending  - Aerobic cultures from surgery pending    08/28  - POD # 2  - BC NGTD  - Aerobic culture from surgery NGTD  - Continue IV Vancomycin and Zosyn    Osteomyelitis of lumbar spine  As above   -ID consulted   -IV antibiotics   -MRI results reviewed -improved per Neuro Surgery   -hx of  Microbiology that grew Streptococcus s/p washout on 5/24/19 with appropriate antibiotics completed of note  -hx of posterior dehiscence of incision noted with transferred to a nursing home for long term -antibiotic therapy completed via PICC line for approximately 2 months for Strep infection   -Blood culture results showed gram positive cocci x 1 bottle- ID consulted    08/24  - BC x 1 resulted with COAGULASE- NEGATIVE STAPHYLOCOCCUS SPECIES, likely contaminant  - ID following  - Continue IV abx    08/25  - BC x 1 resulted on 08/24 as likely contaminant --  COAGULASE-NEGATIVE STAPHYLOCOCCUS SPECIES  - ID recommended to repeat CRP and ESR to see if BC was contaminant. CRP 3.1, Sed rate 27  - Same BC resulted at 1334 today with VIRIDANS STREPTOCOCCUS GROUP. Susceptibility testing not routinely performed  - Informed Dr. Gomez (ID) and Dr. Nicolas (neurosurgery) of results  - Continue IV Vancomycin and Zosyn    08/26  - S/p bilateral wound debridement, hardware removal, and placement of abx vancomycin/tobramycin pellets today by Dr. Nicolas  - Continue IV Vancomycin and Zosyn    08/27  - POD # 1 stable  - PT/OT -- recommending SNF placement  - Continue IV Vancomycin and Zosyn  - Repeat BC pending  - Aerobic cultures from surgery pending    08/28  - POD #2  - Stable  - Repeat BC NGTD  - Aerobic culture from surgery NGTD  - Continue IV Vancomycin and Zosyn      Abnormal urinalysis  -UA consistent with possible infectious process   -IV antibiotics   -urine culture NGTD      Morbid obesity with BMI of 40.0-44.9, adult  - Pt may benefit from nutrition consult upon discharge   - Discussed lifestyle modifications including diet and exercise       Type 2 diabetes, controlled, with peripheral neuropathy  - A1C two months ago -- 6.0 -- controlled  - Accuchecks and low dose SSI      Hypothyroidism  - Continue Synthroid     Essential hypertension  - Continue home medications including Metoprolol and Losartan      PD (Parkinson's disease)  - Continue home Parkinson's meds        VTE Risk Mitigation (From admission, onward)        Ordered     Place QUIQUE hose  Until discontinued      08/26/19 1532     Place sequential compression device  Until discontinued      08/26/19 1532     IP VTE HIGH RISK PATIENT  Once      08/26/19 1532     Place QUIQUE hose  Until discontinued      08/26/19 1435     Place sequential compression device  Until discontinued      08/26/19 1435     Place sequential compression device  Until discontinued      08/22/19 0052                KACIE Cowart  Department of  [facility] Medicine   [facility]

## 2019-08-28 NOTE — PLAN OF CARE
Problem: Occupational Therapy Goal  Goal: Occupational Therapy Goal  Goals to be met by: 9/3/19     Patient will increase functional independence with ADLs by performing:    UE Dressing with Stand-by Assistance.  LE Dressing with Maximum Assistance.  Toileting from bedside commode with Moderate Assistance for hygiene and clothing management.   Toilet transfer to bedside commode with Moderate Assistance.  Upper extremity exercise program x10 reps per handout, with supervision.     Outcome: Ongoing (interventions implemented as appropriate)  PT FOUND WITH BILATERAL FEET HANGING OFF EDGE OF BED TRYING TO EAT BREAKFAST. ASSISTED PATIENT TO SIT EOB MIN A. DONNED LSO BRACE WITH MOD A FOR ADJUSTING STRAPS. PT TRANSFERRED TO PERSONAL WHEELCHAIR MIN A. PT PERFORMED WHEELCHAIR MOBILITY AND OCCASIONAL MIN A X40 FEET. PT THEN LOCKED BREAKS WITHOUT CUES, AND STOOD WITH HAND RAIL MIN A ~ 30 SECONDS. PT RETURNED TO ROOM AND LEFT UP IN CHAIR WITH CHAIR ALARM ARMED, CALL BUTTON IN REACH. NURSING NOTIFIED.

## 2019-08-28 NOTE — CONSULTS
Patient is a resident at [facility].  Patient will need skilled services when she returns to [facility].  Order for SNF and PT/OT notes sent to [facility] via [facility].

## 2019-08-28 NOTE — SUBJECTIVE & OBJECTIVE
Review of Systems   Constitutional: Positive for activity change and fatigue. Negative for chills and fever.   HENT: Negative for congestion, rhinorrhea and sinus pressure.    Respiratory: Negative for apnea, cough, choking, chest tightness, shortness of breath, wheezing and stridor.    Cardiovascular: Negative for chest pain, palpitations and leg swelling.   Gastrointestinal: Positive for nausea. Negative for abdominal distention, abdominal pain, diarrhea and vomiting.   Endocrine: Negative for cold intolerance and heat intolerance.   Genitourinary: Negative for difficulty urinating and hematuria.   Musculoskeletal: Positive for back pain. Negative for arthralgias and joint swelling.   Skin: Positive for color change and wound. Negative for pallor and rash.   Neurological: Positive for weakness. Negative for dizziness, seizures, numbness and headaches.   Psychiatric/Behavioral: Negative for agitation, confusion and sleep disturbance. The patient is not nervous/anxious.      Objective:     Vital Signs (Most Recent):  Temp: 98.2 °F (36.8 °C) (08/28/19 1639)  Pulse: 95 (08/28/19 1713)  Resp: 18 (08/28/19 1639)  BP: 121/77 (08/28/19 1639)  SpO2: (!) 94 % (08/28/19 1639) Vital Signs (24h Range):  Temp:  [97.5 °F (36.4 °C)-98.5 °F (36.9 °C)] 98.2 °F (36.8 °C)  Pulse:  [] 95  Resp:  [16-19] 18  SpO2:  [93 %-97 %] 94 %  BP: ()/(51-77) 121/77     Weight: 88.3 kg (194 lb 10.7 oz)  Body mass index is 31.42 kg/m².    Intake/Output Summary (Last 24 hours) at 8/28/2019 1830  Last data filed at 8/28/2019 1757  Gross per 24 hour   Intake 2390 ml   Output --   Net 2390 ml      Physical Exam   Constitutional: She is oriented to person, place, and time. She appears well-developed and well-nourished. No distress.   HENT:   Nose: Nose normal.   Mouth/Throat: No oropharyngeal exudate.   Eyes: Right eye exhibits no discharge. Left eye exhibits no discharge.   Neck: No JVD present.   Cardiovascular: Normal rate and intact  distal pulses. Exam reveals no gallop and no friction rub.   Murmur heard.  Pulmonary/Chest: Effort normal and breath sounds normal. No stridor. No respiratory distress. She has no wheezes. She has no rales. She exhibits no tenderness.   Abdominal: Soft. Normal appearance and bowel sounds are normal. She exhibits no distension. There is no tenderness. There is no rebound and no guarding. No hernia.   Musculoskeletal: She exhibits no edema or deformity.   Tremor in hands noted    Neurological: She is alert and oriented to person, place, and time.   Skin: Skin is warm and dry. Capillary refill takes 2 to 3 seconds. She is not diaphoretic. No erythema.   Lumbar dressing C/D/I   Psychiatric: She has a normal mood and affect. Her behavior is normal. Thought content normal.   Nursing note and vitals reviewed.      Significant Labs:   BMP:   Recent Labs   Lab 08/28/19  0055   *   *   K 4.3   CL 97   CO2 25   BUN 14   CREATININE 0.8   CALCIUM 9.7     CBC:   Recent Labs   Lab 08/27/19  0407 08/28/19  0055   WBC 8.20 8.01   HGB 10.0* 9.4*   HCT 30.7* 28.4*    219     POCT Glucose:   Recent Labs   Lab 08/28/19  1030 08/28/19  1046 08/28/19  1613   POCTGLUCOSE 271* 237* 148*       Significant Imaging: I have reviewed all pertinent imaging results/findings within the past 24 hours.

## 2019-08-28 NOTE — PLAN OF CARE
Problem: Physical Therapy Goal  Goal: Physical Therapy Goal  PT WILL BE SEEN FOR P.T. FOR A MIN OF 5 OUT OF 7 DAYS A WEEK  LT/3/19  1. PT WILL COMPLETE BED MOBILITY WITH MIN A  2. PT WILL T/F TO CHAIR WITH MIN A SQUAT PIVOT T/F TO CHAIR.   3. PT WILL COMPLETE B LE TE X 10 REPS  4. PT WILL PROPEL WC X 50' WITH MIN A   Outcome: Ongoing (interventions implemented as appropriate)  PT T/F TO WC WITH MIN A AND PROPELLED WC X 40' WITH CGA

## 2019-08-28 NOTE — PLAN OF CARE
Problem: Adult Inpatient Plan of Care  Goal: Patient-Specific Goal (Individualization)  Outcome: Ongoing (interventions implemented as appropriate)  Patient awake and alert, in NAD. Patient had uneventful shift. VS stable.  Patient on telemetry, NSR. Patient ambulates with assistance. Fall precautions in place. Bed locked and in lowest position. Yellow fall risk band and non-skid socks on patient. Patient free from fall/injury. Plan of care reviewed with patient. All questions answered. Will continue to monitor.

## 2019-08-28 NOTE — ASSESSMENT & PLAN NOTE
Admit to Observation   Neurosurgery managing   Plan removal of the instrumentation with placement of antibiotic beads to the incision cavity along with a wound VAC placement.   -8/23/19- MRI reviewed per Neuro Surgery with screws pulled out superiorly and plan for revision on Monday afternoon pending cultures.    Cefepime  Vancomycin    Pain control   -wound culture results pending   -wound care consulted     08/24  - Continue IV Vancomycin and Zosyn  - Aerobic culture NGTD, repeat pending  - BC x 1 resulted with COAGULASE- NEGATIVE STAPHYLOCOCCUS SPECIES, likely contaminant  - ID following  - Pt scheduled revision on Monday afternoon with Plastics and Neurosurgery pending cultures    08/25  - BC x 1 resulted on 08/24 as likely contaminant -- COAGULASE-NEGATIVE STAPHYLOCOCCUS SPECIES  - ID recommended to repeat CRP and ESR to see if BC was contaminant. CRP 3.1, Sed rate 27  - Same BC resulted at 1334 today with VIRIDANS STREPTOCOCCUS GROUP. Susceptibility testing not routinely performed  - Informed Dr. Gomez (ID) and Dr. Nicolas (neurosurgery) of results  - Continue IV Vancomycin and Zosyn    08/26  - S/p bilateral wound debridement, hardware removal, and placement of abx vancomycin/tobramycin pellets today by Dr. Nicolas  - Continue IV Vancomycin and Zosyn    08/27  - POD # 1 stable  - PT/OT -- recommending SNF placement  - Continue IV Vancomycin and Zosyn  - Repeat BC pending  - Aerobic cultures from surgery pending    08/28  - POD # 2  - BC NGTD  - Aerobic culture from surgery NGTD  - Continue IV Vancomycin and Zosyn

## 2019-08-29 VITALS
HEIGHT: 66 IN | HEART RATE: 77 BPM | BODY MASS INDEX: 31.29 KG/M2 | TEMPERATURE: 98 F | WEIGHT: 194.69 LBS | RESPIRATION RATE: 14 BRPM | DIASTOLIC BLOOD PRESSURE: 60 MMHG | OXYGEN SATURATION: 96 % | SYSTOLIC BLOOD PRESSURE: 144 MMHG

## 2019-08-29 LAB
ANION GAP SERPL CALC-SCNC: 8 MMOL/L (ref 8–16)
BASOPHILS # BLD AUTO: 0.02 K/UL (ref 0–0.2)
BASOPHILS NFR BLD: 0.3 % (ref 0–1.9)
BUN SERPL-MCNC: 11 MG/DL (ref 8–23)
CALCIUM SERPL-MCNC: 9.7 MG/DL (ref 8.7–10.5)
CHLORIDE SERPL-SCNC: 99 MMOL/L (ref 95–110)
CO2 SERPL-SCNC: 26 MMOL/L (ref 23–29)
CREAT SERPL-MCNC: 0.7 MG/DL (ref 0.5–1.4)
DIFFERENTIAL METHOD: ABNORMAL
EOSINOPHIL # BLD AUTO: 0.3 K/UL (ref 0–0.5)
EOSINOPHIL NFR BLD: 4 % (ref 0–8)
ERYTHROCYTE [DISTWIDTH] IN BLOOD BY AUTOMATED COUNT: 15 % (ref 11.5–14.5)
EST. GFR  (AFRICAN AMERICAN): >60 ML/MIN/1.73 M^2
EST. GFR  (NON AFRICAN AMERICAN): >60 ML/MIN/1.73 M^2
GLUCOSE SERPL-MCNC: 127 MG/DL (ref 70–110)
HCT VFR BLD AUTO: 29.1 % (ref 37–48.5)
HGB BLD-MCNC: 9.6 G/DL (ref 12–16)
LYMPHOCYTES # BLD AUTO: 1.9 K/UL (ref 1–4.8)
LYMPHOCYTES NFR BLD: 28 % (ref 18–48)
MCH RBC QN AUTO: 28 PG (ref 27–31)
MCHC RBC AUTO-ENTMCNC: 33 G/DL (ref 32–36)
MCV RBC AUTO: 85 FL (ref 82–98)
MONOCYTES # BLD AUTO: 0.6 K/UL (ref 0.3–1)
MONOCYTES NFR BLD: 9.2 % (ref 4–15)
NEUTROPHILS # BLD AUTO: 4 K/UL (ref 1.8–7.7)
NEUTROPHILS NFR BLD: 58.6 % (ref 38–73)
PLATELET # BLD AUTO: 209 K/UL (ref 150–350)
PMV BLD AUTO: 9.1 FL (ref 9.2–12.9)
POCT GLUCOSE: 163 MG/DL (ref 70–110)
POCT GLUCOSE: 205 MG/DL (ref 70–110)
POTASSIUM SERPL-SCNC: 3.9 MMOL/L (ref 3.5–5.1)
RBC # BLD AUTO: 3.43 M/UL (ref 4–5.4)
SODIUM SERPL-SCNC: 133 MMOL/L (ref 136–145)
VANCOMYCIN TROUGH SERPL-MCNC: 17.7 UG/ML (ref 10–22)
WBC # BLD AUTO: 6.82 K/UL (ref 3.9–12.7)

## 2019-08-29 PROCEDURE — 97530 THERAPEUTIC ACTIVITIES: CPT

## 2019-08-29 PROCEDURE — 80048 BASIC METABOLIC PNL TOTAL CA: CPT

## 2019-08-29 PROCEDURE — 63600175 PHARM REV CODE 636 W HCPCS: Performed by: NURSE PRACTITIONER

## 2019-08-29 PROCEDURE — 85025 COMPLETE CBC W/AUTO DIFF WBC: CPT

## 2019-08-29 PROCEDURE — 63600175 PHARM REV CODE 636 W HCPCS: Performed by: INTERNAL MEDICINE

## 2019-08-29 PROCEDURE — 25000003 PHARM REV CODE 250: Performed by: PHYSICIAN ASSISTANT

## 2019-08-29 PROCEDURE — 80202 ASSAY OF VANCOMYCIN: CPT

## 2019-08-29 PROCEDURE — 94799 UNLISTED PULMONARY SVC/PX: CPT

## 2019-08-29 PROCEDURE — 25000003 PHARM REV CODE 250: Performed by: NURSE PRACTITIONER

## 2019-08-29 PROCEDURE — 97535 SELF CARE MNGMENT TRAINING: CPT

## 2019-08-29 RX ORDER — HUMAN INSULIN 100 [USP'U]/ML
20 INJECTION, SUSPENSION SUBCUTANEOUS 3 TIMES DAILY
Qty: 40 ML | Refills: 6
Start: 2019-08-29

## 2019-08-29 RX ORDER — BACITRACIN ZINC 500 UNIT/G
OINTMENT (GRAM) TOPICAL SEE ADMIN INSTRUCTIONS
Refills: 0 | COMMUNITY
Start: 2019-08-29

## 2019-08-29 RX ADMIN — DOCUSATE SODIUM 100 MG: 100 CAPSULE, LIQUID FILLED ORAL at 08:08

## 2019-08-29 RX ADMIN — CARBIDOPA AND LEVODOPA 1 TABLET: 25; 250 TABLET ORAL at 05:08

## 2019-08-29 RX ADMIN — METHOCARBAMOL TABLETS 750 MG: 750 TABLET, COATED ORAL at 08:08

## 2019-08-29 RX ADMIN — CARBIDOPA AND LEVODOPA 1 TABLET: 25; 250 TABLET ORAL at 02:08

## 2019-08-29 RX ADMIN — ONDANSETRON 4 MG: 2 INJECTION INTRAMUSCULAR; INTRAVENOUS at 04:08

## 2019-08-29 RX ADMIN — MORPHINE SULFATE 2 MG: 2 INJECTION, SOLUTION INTRAMUSCULAR; INTRAVENOUS at 06:08

## 2019-08-29 RX ADMIN — VANCOMYCIN HYDROCHLORIDE 2250 MG: 1 INJECTION, POWDER, LYOPHILIZED, FOR SOLUTION INTRAVENOUS at 01:08

## 2019-08-29 RX ADMIN — OXYCODONE HYDROCHLORIDE AND ACETAMINOPHEN 1 TABLET: 10; 325 TABLET ORAL at 04:08

## 2019-08-29 RX ADMIN — PIPERACILLIN AND TAZOBACTAM 4.5 G: 4; .5 INJECTION, POWDER, LYOPHILIZED, FOR SOLUTION INTRAVENOUS; PARENTERAL at 01:08

## 2019-08-29 RX ADMIN — LEVOTHYROXINE SODIUM 125 MCG: 125 TABLET ORAL at 05:08

## 2019-08-29 RX ADMIN — METHOCARBAMOL TABLETS 750 MG: 750 TABLET, COATED ORAL at 02:08

## 2019-08-29 RX ADMIN — CARBIDOPA AND LEVODOPA 1 TABLET: 25; 250 TABLET ORAL at 09:08

## 2019-08-29 RX ADMIN — ISOSORBIDE MONONITRATE 120 MG: 30 TABLET, EXTENDED RELEASE ORAL at 08:08

## 2019-08-29 RX ADMIN — PRAVASTATIN SODIUM 40 MG: 20 TABLET ORAL at 08:08

## 2019-08-29 RX ADMIN — PANTOPRAZOLE SODIUM 40 MG: 40 TABLET, DELAYED RELEASE ORAL at 08:08

## 2019-08-29 RX ADMIN — POLYETHYLENE GLYCOL 3350 17 G: 17 POWDER, FOR SOLUTION ORAL at 08:08

## 2019-08-29 RX ADMIN — INSULIN ASPART 2 UNITS: 100 INJECTION, SOLUTION INTRAVENOUS; SUBCUTANEOUS at 11:08

## 2019-08-29 RX ADMIN — PIPERACILLIN AND TAZOBACTAM 4.5 G: 4; .5 INJECTION, POWDER, LYOPHILIZED, FOR SOLUTION INTRAVENOUS; PARENTERAL at 10:08

## 2019-08-29 RX ADMIN — OXYCODONE HYDROCHLORIDE AND ACETAMINOPHEN 1 TABLET: 10; 325 TABLET ORAL at 02:08

## 2019-08-29 RX ADMIN — CEFTRIAXONE 1 G: 1 INJECTION, SOLUTION INTRAVENOUS at 12:08

## 2019-08-29 RX ADMIN — METOPROLOL SUCCINATE 25 MG: 25 TABLET, EXTENDED RELEASE ORAL at 08:08

## 2019-08-29 RX ADMIN — LOSARTAN POTASSIUM 50 MG: 50 TABLET, FILM COATED ORAL at 08:08

## 2019-08-29 NOTE — PLAN OF CARE
Problem: Physical Therapy Goal  Goal: Physical Therapy Goal  PT WILL BE SEEN FOR P.T. FOR A MIN OF 5 OUT OF 7 DAYS A WEEK  LT/3/19  1. PT WILL COMPLETE BED MOBILITY WITH MIN A  2. PT WILL T/F TO CHAIR WITH MIN A SQUAT PIVOT T/F TO CHAIR.   3. PT WILL COMPLETE B LE TE X 10 REPS  4. PT WILL PROPEL WC X 50' WITH MIN A   Outcome: Ongoing (interventions implemented as appropriate)  PT T/F TO WC WITH MIN A.

## 2019-08-29 NOTE — PROGRESS NOTES
Pharmacokinetic Assessment Follow Up: IV Vancomycin    Vancomycin serum concentration assessment(s):    The trough level was drawn correctly and can be used to guide therapy at this time. The measurement is within the desired definitive target range of 15 to 20 mcg/mL.    Vancomycin Regimen Plan:    Continue regimen to Vancomycin 2250 mg IV every 24 hours with next serum trough concentration measured at 02:00 prior to 3rd dose on 9/1     Drug levels (last 3 results):  Recent Labs   Lab Result Units 08/26/19  2338 08/28/19  0055 08/29/19  0137   Vancomycin-Trough ug/mL 19.1 18.4 17.7       Pharmacy will continue to follow and monitor vancomycin.    Please contact pharmacy at extension [phone] for questions regarding this assessment.    Thank you for the consult,   Hussein Oakley       Patient brief summary:  Ross Isbell is a 73 y.o. female initiated on antimicrobial therapy with IV Vancomycin for treatment of bone/joint infection    Drug Allergies:   Review of patient's allergies indicates:   Allergen Reactions    Codeine      Other reaction(s): Nausea.  Patient tolerated morphine, hydrocodone, and hydromorphone in 5/2019 with no reported problems. Patient tolerated oxycodone in 6/2019 with no reported problems.     Codeine sulfate      Unknown^    Diphenhydramine hcl      Unknown^  Other reaction(s): Rash    Sulfa (sulfonamide antibiotics) Hives and Nausea And Vomiting    Penicillins Rash     Rash only as a child  Tolerated ceftriaxone 4/30/19  Tolerated piperacillin-tazobactam 5/23/19       Actual Body Weight:   88.3kg    Renal Function:   Estimated Creatinine Clearance: 80.1 mL/min (based on SCr of 0.7 mg/dL).,     Dialysis Method (if applicable):  N/A    CBC (last 72 hours):  Recent Labs   Lab Result Units 08/26/19  0515 08/27/19  0407 08/28/19  0055 08/29/19  0137   WBC K/uL 5.63 8.20 8.01 6.82   Hemoglobin g/dL 11.5* 10.0* 9.4* 9.6*   Hematocrit % 35.4* 30.7* 28.4* 29.1*   Platelets K/uL 239 213 219 209    Gran% % 53.5 66.7 67.7 58.6   Lymph% % 35.0 20.7 18.6 28.0   Mono% % 7.8 9.8 11.0 9.2   Eosinophil% % 3.7 2.7 2.6 4.0   Basophil% % 0.2 0.2 0.2 0.3   Differential Method  Automated Automated Automated Automated       Metabolic Panel (last 72 hours):  Recent Labs   Lab Result Units 08/26/19  0515 08/27/19  0407 08/28/19  0055 08/29/19  0137   Sodium mmol/L 135* 131* 132* 133*   Potassium mmol/L 4.0 4.3 4.3 3.9   Chloride mmol/L 99 96 97 99   CO2 mmol/L 25 25 25 26   Glucose mg/dL 131* 142* 172* 127*   BUN, Bld mg/dL 11 16 14 11   Creatinine mg/dL 0.7 0.8 0.8 0.7       Vancomycin Administrations:  vancomycin given in the last 96 hours                     vancomycin (VANCOCIN) 2,250 mg in dextrose 5 % 500 mL IVPB (mg) 2,250 mg New Bag 08/29/19 0156     2,250 mg New Bag 08/28/19 0238     2,250 mg New Bag 08/27/19 0122     2,250 mg New Bag 08/26/19 0022                      Microbiologic Results:  Microbiology Results (last 7 days)       Procedure Component Value Units Date/Time    Culture, Anaerobe [[id number]] Collected:  08/23/19 2200    Order Status:  Completed Specimen:  Incision site from Back Updated:  08/28/19 1258     Anaerobic Culture Culture in progress    Culture, Anaerobe [[id number]] Collected:  08/26/19 1408    Order Status:  Completed Specimen:  Wound from Back Updated:  08/28/19 1124     Anaerobic Culture Culture in progress    Blood culture [[id number]] Collected:  08/27/19 1816    Order Status:  Completed Specimen:  Blood Updated:  08/28/19 0915     Blood Culture, Routine No Growth to date    Blood culture [[id number]] Collected:  08/27/19 1822    Order Status:  Completed Specimen:  Blood Updated:  08/28/19 0915     Blood Culture, Routine No Growth to date    Aerobic culture [[id number]] Collected:  08/26/19 1408    Order Status:  Completed Specimen:  Wound from Back Updated:  08/28/19 0644     Aerobic Bacterial Culture No growth    Blood culture [[id number]]     Order Status:  Canceled Specimen:  Blood      Blood culture [[id number]]     Order Status:  Canceled Specimen:  Blood     Aerobic culture [[id number]] Collected:  08/23/19 2200    Order Status:  Completed Specimen:  Incision site from Back Updated:  08/27/19 0650     Aerobic Bacterial Culture No growth    Blood culture #2 **CANNOT BE ORDERED STAT** [[id number]] Collected:  08/21/19 2020    Order Status:  Completed Specimen:  Blood from Line, PICC Right Cephalic Updated:  08/27/19 0612     Blood Culture, Routine No growth after 5 days.    Aerobic culture [[id number]] Collected:  08/22/19 1855    Order Status:  Completed Specimen:  Wound from Back Updated:  08/26/19 0902     Aerobic Bacterial Culture No growth    Blood culture #1 **CANNOT BE ORDERED STAT** [[id number]]  (Abnormal) Collected:  08/21/19 2020    Order Status:  Completed Specimen:  Blood from Peripheral, Antecubital, Left Updated:  08/25/19 1334     Blood Culture, Routine Gram stain aer bottle: Gram positive cocci      Results called to and read back by:Viola Reaves RN 08/22/2019  18:02      Gram stain flaco bottle: Gram positive cocci       Positive results previously called      COAGULASE-NEGATIVE STAPHYLOCOCCUS SPECIES  Multiple morphotyes - probable contaminates        VIRIDANS STREPTOCOCCUS GROUP  Susceptibility testing not routinely performed      Urine culture [[id number]] Collected:  08/21/19 2200    Order Status:  Completed Specimen:  Urine Updated:  08/23/19 1357     Urine Culture, Routine No significant growth    Narrative:       Preferred Collection Type->Urine, Clean Catch

## 2019-08-29 NOTE — PT/OT/SLP PROGRESS
Physical Therapy  Treatment    Ross Isbell   MRN: [id number]   Admitting Diagnosis: Status post lumbar spinal fusion    PT Received On: 08/29/19  PT Start Time: 1056     PT Stop Time: 1120    PT Total Time (min): 24 min       Billable Minutes:  Therapeutic Activity 24    Treatment Type: Treatment  PT/PTA: PT             General Precautions: Standard, fall  Orthopedic Precautions: N/A   Braces: N/A         Subjective:  Communicated with 10 prior to session.  PT MET IN  AGREED TO TX ON 2ND TRIAL    Pain/Comfort  Pain Rating 1: 0/10  Pain Rating Post-Intervention 1: 0/10    Objective:   Patient found with: peripheral IV, telemetry    Functional Mobility:  PT LOG ROLLED TO LEFT AND SEATED EOB WITH MIN A. PT SCOOTED TO EOB WITH MIN A. PT DONNED LSO WITH MIN A AND COMPLETED STAND PIVOT T/F TO WC WITH MIN A. PT REQUIRED CGA TO SBA FOR WC PROPULSION AND VERBAL CUES X 50'. PT RETURNED TO  VIA WC AND LEFT SEATED WITH ALL NEEDS MET AND CHAIR ALARM ON.  AM-PAC 6 CLICK MOBILITY  How much help from another person does this patient currently need?   1 = Unable, Total/Dependent Assistance  2 = A lot, Maximum/Moderate Assistance  3 = A little, Minimum/Contact Guard/Supervision  4 = None, Modified [place]/Independent    Turning over in bed (including adjusting bedclothes, sheets and blankets)?: 3  Sitting down on and standing up from a chair with arms (e.g., wheelchair, bedside commode, etc.): 3  Moving from lying on back to sitting on the side of the bed?: 3  Moving to and from a bed to a chair (including a wheelchair)?: 3  Need to walk in hospital room?: 1  Climbing 3-5 steps with a railing?: 1  Basic Mobility Total Score: 14    AM-PAC Raw Score CMS G-Code Modifier Level of Impairment Assistance   6 % Total / Unable   7 - 9 CM 80 - 100% Maximal Assist   10 - 14 CL 60 - 80% Moderate Assist   15 - 19 CK 40 - 60% Moderate Assist   20 - 22 CJ 20 - 40% Minimal Assist   23 CI 1-20% SBA / CGA   24 CH 0% Independent/ Mod  I     Patient left up in chair with chair alarm on.    Assessment:  PT LUCÍA TX WELL    Rehab identified problem list/impairments: Rehab identified problem list/impairments: impaired balance, impaired functional mobilty, impaired endurance, weakness, impaired self care skills, decreased upper extremity function, decreased lower extremity function, decreased safety awareness, decreased ROM    Rehab potential is good.    Activity tolerance: Good    Discharge recommendations: Discharge Facility/Level of Care Needs: nursing facility, skilled     Barriers to discharge:      Equipment recommendations: Equipment Needed After Discharge: none     GOALS:   Multidisciplinary Problems     Physical Therapy Goals        Problem: Physical Therapy Goal    Goal Priority Disciplines Outcome Goal Variances Interventions   Physical Therapy Goal     PT, PT/OT Ongoing (interventions implemented as appropriate)     Description:  PT WILL BE SEEN FOR P.T. FOR A MIN OF 5 OUT OF 7 DAYS A WEEK  LT/3/19  1. PT WILL COMPLETE BED MOBILITY WITH MIN A  2. PT WILL T/F TO CHAIR WITH MIN A SQUAT PIVOT T/F TO CHAIR.   3. PT WILL COMPLETE B LE TE X 10 REPS  4. PT WILL PROPEL WC X 50' WITH MIN A                     PLAN:    Patient to be seen    to address the above listed problems via therapeutic activities, therapeutic exercises, wheelchair management/training  Plan of Care expires: 19  Plan of Care reviewed with: patient, spouse         Kristen Lala, PT  2019

## 2019-08-29 NOTE — PROGRESS NOTES
[facility]  Neurosurgery  Progress Note    Subjective:     History of Present Illness: No notes on file    Post-Op Info:  Procedure(s) (LRB):  DEBRIDEMENT, WOUND (Bilateral)  REMOVAL, HARDWARE (Bilateral)  WASHOUT (Bilateral)   3 Days Post-Op   Patient is seen today  No new issues  Pain controlled  Tolerating a diet  Work with PT yesterday  Able to get up and ambulate to restroom    Vitals reviewed    Moves all extremities x4  Sensation intact bilaterally  Incision with dressing removed  Cleaned and redressed without issue      Assessment/Plan:   Postop wound debridement with removal of instrumentation  Remains stable  Continue with antibiotics as directed by ID  Cultures from OR are still negative  Will continue to follow wound    Stanislav Nicolas MD  Neurosurgery  [facility]

## 2019-08-29 NOTE — PLAN OF CARE
Packet with number for report ([phone] Up nurse) and transportation time provided to Missy, primary nurse

## 2019-08-29 NOTE — PROGRESS NOTES
Clinical Pharmacy: Vancomycin Consult - Signoff Note    Therapy with vancomycin complete and/or consult discontinued by provider.  Pharmacy will sign off, please re-consult as needed.    Thank you for allowing us to participate in this patient's care.   Katherine McArdle, Pharm.D. 8/29/2019 11:25 AM

## 2019-08-29 NOTE — PLAN OF CARE
Sep10 Post OP with Mayra Long PA-C   Tuesday Sep 10, 2019 8:00 AM   Arrive at check-in approximately 15 minutes before your scheduled appointment time. Bring all outside medical records and imaging, along with a list of your current medications and insurance card.  Neurosurgery clinic is located on the first floor.  [facility] Neurosurgery   [address] [place] [address] [address] [place] [id number]   [phone]      Patient is a resident at [facility] but will return as a SNF patient for PT/OT     08/29/19 1552   Final Note   Assessment Type Final Discharge Note   Anticipated Discharge Disposition SNF   Hospital Follow Up  Appt(s) scheduled? Yes   Right Care Referral Info   Post Acute Recommendation SNF / Sub-Acute Rehab   Facility Name [facility]

## 2019-08-29 NOTE — PLAN OF CARE
Problem: Adult Inpatient Plan of Care  Goal: Plan of Care Review  Outcome: Ongoing (interventions implemented as appropriate)  Pt is ready for transport. Fall precautions maintained. Report called to SNF. All concerns and questions addressed. Awaiting for transport.

## 2019-08-29 NOTE — PT/OT/SLP PROGRESS
"Occupational Therapy  Treatment    Ross Isbell   MRN: [id number]   Admitting Diagnosis: Status post lumbar spinal fusion    OT Date of Treatment: 08/29/19   OT Start Time: 1100  OT Stop Time: 1125  OT Total Time (min): 25 min    Billable Minutes:  Self Care/Home Management 10 minutes and Therapeutic Activity 15 minutes    General Precautions: Standard, fall  Orthopedic Precautions: N/A  Braces: N/A         Subjective:  Communicated with nurse and epic chart review prior to session.    Pain/Comfort  Pain Rating 1: 0/10    Objective:  Patient found with: telemetry, peripheral IV     Functional Mobility:  Bed Mobility:  Mod a    Transfers:   Min a    Functional Ambulation: mod (I) with  Wheelchair with max  Verbal cues    Activities of Daily Living:     Feeding adaptive equipment: na     UE adaptive equipment: mod a hua gown and back brace     LE adaptive equipment: max a      Bathing adaptive equipment: na    Balance:   Static Sit: FAIR-: Maintains without assist but inconsistent   Dynamic Sit: POOR: N/A  Static Stand: POOR: Needs MODERATE assist to maintain  Dynamic stand: 0: N/A      AM-PAC 6 CLICK ADL   How much help from another person does this patient currently need?   1 = Unable, Total/Dependent Assistance  2 = A lot, Maximum/Moderate Assistance  3 = A little, Minimum/Contact Guard/Supervision  4 = None, Modified [place]/Independent    Putting on and taking off regular lower body clothing? : 1  Bathing (including washing, rinsing, drying)?: 2  Toileting, which includes using toilet, bedpan, or urinal? : 2  Putting on and taking off regular upper body clothing?: 2  Taking care of personal grooming such as brushing teeth?: 3  Eating meals?: 3  Daily Activity Total Score: 13     AM-PAC Raw Score CMS "G-Code Modifier Level of Impairment Assistance   6 % Total / Unable   7 - 8 CM 80 - 100% Maximal Assist   9-13 CL 60 - 80% Moderate Assist   14 - 19 CK 40 - 60% Moderate Assist   20 - 22 CJ 20 - 40% " Minimal Assist   23 CI 1-20% SBA / CGA   24 CH 0% Independent/ Mod I       Patient left up in chair with all lines intact, call button in reach, nurse notified and spouse present    ASSESSMENT:  Ross Isbell is a 73 y.o. female with a medical diagnosis of Status post lumbar spinal fusion and presents with debility and generalized weakness.    Rehab identified problem list/impairments: Rehab identified problem list/impairments: weakness, impaired self care skills, impaired balance, decreased safety awareness, impaired endurance, impaired functional mobilty, gait instability    Rehab potential is good.    Activity tolerance: Good    Discharge recommendations: Discharge Facility/Level of Care Needs: nursing facility, skilled     Barriers to discharge: Barriers to Discharge: None    Equipment recommendations:       GOALS:   Multidisciplinary Problems     Occupational Therapy Goals        Problem: Occupational Therapy Goal    Goal Priority Disciplines Outcome Interventions   Occupational Therapy Goal     OT, PT/OT Ongoing (interventions implemented as appropriate)    Description:  Goals to be met by: 9/3/19     Patient will increase functional independence with ADLs by performing:    UE Dressing with Stand-by Assistance.  LE Dressing with Maximum Assistance.  Toileting from bedside commode with Moderate Assistance for hygiene and clothing management.   Toilet transfer to bedside commode with Moderate Assistance.  Upper extremity exercise program x10 reps per handout, with supervision.                      Plan:  Patient to be seen 3 x/week to address the above listed problems via self-care/home management, therapeutic exercises, therapeutic activities  Plan of Care expires: 09/05/19  Plan of Care reviewed with: patient         Cyndie Sunshinezier, OT  08/29/2019

## 2019-08-30 LAB — BACTERIA SPEC AEROBE CULT: NO GROWTH

## 2019-09-02 LAB
BACTERIA BLD CULT: NORMAL
BACTERIA BLD CULT: NORMAL
BACTERIA SPEC ANAEROBE CULT: NORMAL

## 2019-09-02 NOTE — DISCHARGE SUMMARY
[facility] Hospital Medicine  Discharge Summary      Patient Name: Ross Isbell  MRN: [id number]  Admission Date: 8/21/2019  Hospital Length of Stay: 8 days  Discharge Date and Time: 8/29/2019  2:54 PM  Attending Physician: Bobbi att. providers found   Discharging Provider: KACIE Cowart  Primary Care Provider: Anna Penn MD      HPI:   Ms Isbell is a 73 year old female with PMHx of CAD, HTN, DM, Parkinson disease, HLD and Osteomyelitis of lumbar spine who presented to [facility] with increase back pain. She  had axial back pain with destruction of the disc space underwent  posterior washout and debridement with placement of instrumentation on 5/24. Patient  had dehiscence of her incision and was taken back to the OR for wound VAC placement on 6/11. She has been experiencing increase back pain, unable to walk. She had been doing well with Physical Therapy, but now has been a wheel chair. She has experienced failure of instrumentation and elevated infectious markers. Neurosurery surgery following, plan for removal of instumentation with antibotic beads placement and wound vac on tomorrow. She being placed in observation under to care of [facility] Medicine.       Procedure(s) (LRB):  DEBRIDEMENT, WOUND (Bilateral)  REMOVAL, HARDWARE (Bilateral)  WASHOUT (Bilateral)      Hospital Course:   Pt admitted to Observation Unit s/p lumbar spinal fusion.  NeuroSurgery consulted.  MRI showed favorable response to therapy with diminished abnormal bone marrow signal abnormalities relating to evolution of osteomyelitis change.  Diminished swelling of the right psoas muscle with no evidence of abscess.  2. Laminectomy at L2-L3 and L3-L4 with posterior fusion with mild to moderate spinal stenosis at L2-L3 with lateral recess stenosis that could affect the descending L3 spinal nerve roots.  No enhancing granulation tissue compresses the thecal sac or neural structures.  Grade 1 degenerative  spondylolisthesis at L4-L5 with severe lateral recess stenosis that could compress the descending L5 spinal nerve roots.  Pt treated with IV antibiotics and analgesia prn. Blood cultures with no growth to date.  Urine culture in progress.  Washout with revision of instrumentation and wound closure per NeuroSurgery and Plastics planned.  Vital signs stable. On 8/23/19, pt transferred to Inpatient status.  Blood culture grew gram positive cocci x 1 bottle.  ID consulted.  MRI reviewed by Neuro Surgery with signs of improving osteomyelitis noted and plan for revision on Monday afternoon pending cultures.  Plastic Surgery to assist with wound closure and covering.      As of 08/24 BC x 1 resulted with COAGULASE- NEGATIVE STAPHYLOCOCCUS SPECIES, likely contaminant. ID was consulted due to BC and recommended to continue IV Vancomycin and Zosyn until final sensitivity. Pt scheduled for revision on Monday afternoon with Plastics and Neurosurgery pending cultures    As of 08/25 BC x 1 likely contaminant -- COAGULASE-NEGATIVE STAPHYLOCOCCUS SPECIES. ID recommended to repeat CRP and ESR. CRP 3.1, Sed rate 27. BC resulted at 1334 today with VIRIDANS STREPTOCOCCUS GROUP. Susceptibility testing not routinely performed. Informed Dr. Gomez (ID) and Dr. Nicolas (neurosurgery) of results. Aerobic culture NGTD. Continue IV Vancomycin and Zosyn for now. NPO after midnight for surgery in AM.     As of 08/26 s/p bilateral wound debridement, hardware removal, and placement of abx vancomycin/tobramycin pellets today. Sx performed by Dr. Nicolas today. Pt just made it back from surgery. Pt currently c/o nausea. No other complaints at this time.     As of 08/27  POD # 1. Aerobic cultures from surgery pending. Repeat BC pending. PT/OT following -- recommending SNF placement. Will consult CM for placement.     As of 08/28 POD # 2. Repeat BC NGTD. Aerobic culture from surgery NGTD. Labs reviewed, stable. Remains afebrile. Discussed case with   Maria Isabel -- cleared for discharge from neurosurgery standpoint. He recommended she F/U in clinic in 2 weeks for suture removal. He also recommended daily dressing changes with bacitracin ointment. Will hold d/c today as to get ID recommendations for PO abx upon discharge. She will be discharged back to [facility] with SNF services on tomorrow, 08/29.    As of 08/29 POD # 3. Labs reviewed, stable. Repeat BC NGTD. Aerobic culture NGTD. Remains afebrile. No leukocytosis/leukopenia. WBC count stable at 6.82K. Discussed case with Dr. Gomez (ID) -- recommended upon discharge Rocephin 1 gm IV daily x 3 weeks. Per Neurosurgery, resume SQ Lovenox but hold daily ASA. Pt will follow up with Dr. Nicolas within 2 weeks after discharge for hospital follow up and suture removal. Dr. Nicolas recommended daily dressing changes with bacitracin ointment daily. Pt discharged back to [facility] for SNF services. Pt will follow up with PCP within 2-3 days after discharge for hospital follow up. This patient was seen and examined on the date of discharge and determined suitable for discharge.      Consults:   Consults (From admission, onward)        Status Ordering Provider     Inpatient consult to Plastic Surgery  Once     Provider:  Teddy Grady MD    Completed LAURA VYAS     Inpatient consult to Social Work  Once     Provider:  (Not yet assigned)    Completed EMELIA DOLL     Inpatient consult to Social Work  Once     Provider:  (Not yet assigned)    Completed EDWARD ROSAS          Final Active Diagnoses:    Diagnosis Date Noted POA    PRINCIPAL PROBLEM:  Status post lumbar spinal fusion [Z98.1] 05/25/2019 Not Applicable    Osteomyelitis of lumbar spine [M46.26] 05/23/2019 Yes    Abnormal urinalysis [R82.90] 08/22/2019 Unknown    Drainage from wound [T14.8XXA] 08/21/2019 Yes    Morbid obesity with BMI of 40.0-44.9, adult [E66.01, Z68.41] 06/17/2015 Not Applicable     Chronic    Type 2 diabetes, controlled,  with peripheral neuropathy [E11.42] 05/29/2014 Yes     Chronic    Hypothyroidism [E03.9] 11/19/2013 Yes     Chronic    Essential hypertension [I10]  Yes     Chronic    PD (Parkinson's disease) [G20]  Yes     Chronic      Problems Resolved During this Admission:       Discharged Condition: stable    Disposition: Skilled Nursing Facility    Follow Up:  Follow-up Information     Anna Penn MD. Schedule an appointment as soon as possible for a visit in 3 days.    Specialty:  Family Medicine  Why:  hospital follow up   Contact information:  [address]  [place] [address] [phone]             Karen Costa MD. Schedule an appointment as soon as possible for a visit in 2 weeks.    Specialty:  Neurosurgery  Why:  hospital follow up and suture removal  Contact information:  [address] [facility] [address] [phone]             [facility].    Specialties:  Nursing Home Agency, SNF Agency  Why:  SNF  Contact information:  [address]  [place] [facility] [id number] [phone]                 Patient Instructions:      Ambulatory Referral to Neurosurgery   Referral Priority: Routine Referral Type: Consultation   Referral Reason: Specialty Services Required   Referred to Provider: KAREN COSTA Requested Specialty: Neurosurgery   Number of Visits Requested: 1     Notify your health care provider if you experience any of the following:  increased confusion or weakness     Notify your health care provider if you experience any of the following:  persistent dizziness, light-headedness, or visual disturbances     Notify your health care provider if you experience any of the following:  difficulty breathing or increased cough     Notify your health care provider if you experience any of the following:  redness, tenderness, or signs of infection (pain, swelling, redness, odor or green/yellow discharge around incision site)     Notify your health care provider if  you experience any of the following:  severe uncontrolled pain     Change dressing (specify)   Order Comments: Dressing change: Daily dressing changes with bacitracin ointment daily     Activity as tolerated       Significant Diagnostic Studies: Labs: BMP: No results for input(s): GLU, NA, K, CL, CO2, BUN, CREATININE, CALCIUM, MG in the last 48 hours. and CBC No results for input(s): WBC, HGB, HCT, PLT in the last 48 hours.  Microbiology:   Blood Culture   Lab Results   Component Value Date    LABBLOO No growth after 5 days. 08/27/2019     Radiology:   Imaging Results          MRI Lumbar Spine W WO Cont (Final result)  Result time 08/22/19 15:57:32    Final result by Phong Perea Jr., MD (08/22/19 15:57:32)                 Impression:      1. Generally favorable response to therapy with diminished abnormal bone marrow signal abnormalities relating to evolution of osteomyelitis change.  Diminished swelling of the right psoas muscle.  No evidence of abscess.  2. Laminectomy at L2-L3 and L3-L4 with posterior fusion as described.  There is mild to moderate spinal stenosis at L2-L3 with lateral recess stenosis that could affect the descending L3 spinal nerve roots.  No enhancing granulation tissue compresses the thecal sac or neural structures.  3. Grade 1 degenerative spondylolisthesis at L4-L5 with severe lateral recess stenosis that could compress the descending L5 spinal nerve roots.      Electronically signed by: Phong Perea Jr., MD  Date:    08/22/2019  Time:    15:57             Narrative:    EXAMINATION:  MRI LUMBAR SPINE W WO CONTRAST    CLINICAL HISTORY:  Low back painLow back pain, prior surgery, new or progressive sx;    TECHNIQUE:  MR Lumbar spine with contrast. Sagittal T1, T2, STIR. Axial T1, T2. Post contrast Sagittal and Axial T1.    COMPARISON:  CT from June 11, 2019 was reviewed.  MRI from May 23, 2019 was reviewed as well.    FINDINGS:  Since the previous MRI there has been a laminectomy at L2-L3  with posterior fusion spanning L1 inferiorly to L4.  Grade 1 degenerative spondylolisthesis at L4-L5.  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  There are no peripheral enhancing fluid collections on the current exam.  There is expected enhancement of granulation tissue at the laminectomy site.  No abnormal intradural enhancement.  The conus medullaris terminates at the level ydU07-L4. No abnormal signal within the conus. Intervertebral disc levels are as follows:    T12-L1 disc: Normal disc space height with anterior osteophytes.  Normal facet joints.  The thecal sac measures 18 mm AP.  No significant foraminal stenosis.    L1-L2 disc : Level of interval posterior fusion.  Disc space height loss with a circumferential disc bulge.  Anterior osteophytes.  Normal facet joints.  Mild Modic type 1 endplate change which is similar to the prior exam.  The thecal sac measures 18 mm.  No significant foraminal stenosis.    L2-L3 disc: Level of interval laminectomy and posterior fusion.  Enhancing granulation tissue at the laminectomy site.  No compression of the thecal sac in relation to the granulation tissue.  Evolution of marrow edema pattern relating to treatment.  Posteriorly protruding disc/osteophyte material flattens the thecal sac to a trefoil configuration.  It measures 12 mm AP with mild bilateral lateral recess stenosis.  Mild/moderate bilateral foraminal stenosis.  On the preoperative study the thecal sac measured 7 mm AP at this level.    L3-L4 disc: Level of laminectomy and posterior fusion.  Enhancing granulation tissue at the laminectomy site that does not contribute to spinal stenosis or foraminal stenosis.  Fatty Modic type 2 endplate change.  The thecal sac measures 17 mm AP.  Moderate/severe left foraminal stenosis.  No significant right foraminal stenosis.    L4-L5 disc: Grade 1 degenerative  "spondylolisthesis with unroofing and bulging of disc.  Severe degenerative facet hypertrophy.  Buckling of ligamentum flavum.  Severe lateral recess stenosis bilaterally.  The thecal sac measures 10 mm AP.  Moderate bilateral foraminal stenosis.    L5-S1 disc: Normal disc space height.  Mild degenerative facet hypertrophy.  No significant spinal or foraminal stenosis.                                Pending Diagnostic Studies:     None         Medications:  Reconciled Home Medications:      Medication List      START taking these medications    bacitracin 500 unit/gram Oint  Apply topically As instructed. Apply to incision site daily     cefTRIAXone 1 g in dextrose 5 % 50 mL 1 g/50 mL Pgbk IVPB  Commonly known as:  ROCEPHIN  Inject 50 mLs (1 g total) into the vein once daily. for 21 days        CHANGE how you take these medications    NovoLIN 70/30 U-100 Insulin 100 unit/mL (70-30) injection  Generic drug:  insulin NPH-insulin regular (70/30)  Inject 20 Units into the skin 3 (three) times daily.  What changed:  how much to take        CONTINUE taking these medications    ACCU-CHEK SOFTCLIX LANCETS Misc  Generic drug:  lancets  TEST THREE TIMES DAILY     acetaminophen 325 MG tablet  Commonly known as:  TYLENOL  Take 2 tablets (650 mg total) by mouth every 6 (six) hours as needed.     blood sugar diagnostic Strp  1 strip by Misc.(Non-Drug; Combo Route) route 3 (three) times daily. Accuchek Felicia Plus Test Strips     blood-glucose meter kit  Accu-chek Felicia Plus Meter     carbidopa-levodopa  mg  mg per tablet  Commonly known as:  SINEMET  TAKE 1 TABLET BY MOUTH 5 TIMES DAILY     CENTRUM SILVER 0.4-300-250 mg-mcg-mcg Tab  Generic drug:  multivit-min-FA-lycopen-lutein  Take 1 tablet by mouth once daily.     enoxaparin 40 mg/0.4 mL Syrg  Commonly known as:  LOVENOX  Inject 0.4 mLs (40 mg total) into the skin once daily.     insulin syringe-needle U-100 0.5 mL 31 gauge x 5/16" Syrg  USE THREE TIMES DAILY   "   isosorbide mononitrate 120 MG 24 hr tablet  Commonly known as:  IMDUR  Take 1 tablet (120 mg total) by mouth once daily.     latanoprost 0.005 % ophthalmic solution  INSTILL 1 DROP INTO EACH EYE IN THE EVENING     levothyroxine 125 MCG tablet  Commonly known as:  SYNTHROID  TAKE 1 TABLET BY MOUTH IN THE MORNING BEFORE BREAKFAST     losartan 50 MG tablet  Commonly known as:  COZAAR  TAKE 1 TABLET BY MOUTH ONCE DAILY     metFORMIN 500 MG tablet  Commonly known as:  GLUCOPHAGE  TAKE ONE TABLET BY MOUTH THREE TIMES DAILY     methocarbamol 750 MG Tab  Commonly known as:  ROBAXIN  Take 1 tablet (750 mg total) by mouth 3 (three) times daily as needed (muscle spasms).     metoprolol succinate 25 MG 24 hr tablet  Commonly known as:  TOPROL-XL  TAKE 1 TABLET BY MOUTH ONCE DAILY     ondansetron 8 MG tablet  Commonly known as:  ZOFRAN  Take 1 tablet (8 mg total) by mouth every 8 (eight) hours as needed for Nausea.     oxyCODONE-acetaminophen  mg per tablet  Commonly known as:  PERCOCET  Take 1 tablet by mouth every 4 (four) hours as needed for Pain.     pantoprazole 40 MG tablet  Commonly known as:  PROTONIX  Take 1 tablet (40 mg total) by mouth once daily.     pramipexole 1 MG tablet  Commonly known as:  MIRAPEX  TAKE ONE TABLET BY MOUTH THREE TIMES DAILY     pravastatin 40 MG tablet  Commonly known as:  PRAVACHOL  TAKE ONE TABLET BY MOUTH ONCE DAILY        STOP taking these medications    aspirin 81 MG EC tablet  Commonly known as:  ECOTRIN     miconazole nitrate 2% 2 % Oint  Commonly known as:  MICOTIN            Indwelling Lines/Drains at time of discharge:   Lines/Drains/Airways     Peripherally Inserted Central Catheter Line                 PICC Double Lumen 08/22/19 1900  10 days                Time spent on the discharge of patient: 35 minutes  Patient was seen and examined on the date of discharge and determined to be suitable for discharge.         Melody Oliver, KACIE  Department of Hospital Medicine  [facility]

## 2019-09-03 ENCOUNTER — TELEPHONE (OUTPATIENT)
Dept: NEUROSURGERY | Facility: CLINIC | Age: 74
End: 2019-09-03

## 2019-09-03 LAB — BACTERIA SPEC ANAEROBE CULT: NORMAL

## 2019-09-03 NOTE — TELEPHONE ENCOUNTER
Phoned Ms Gautam in regards to message below.    The call was sent to [employer]. I did leave a brief message and a call back number.    ----- Message from Courtney Huffman sent at 9/3/2019  8:10 AM CDT -----  Contact: ms [phone]  Would like to consult with the nurse, patient has an Appt on Sept 10, and would like to know if she can get a later time on that date, Please call back at [phone], Thanks sj

## 2019-09-09 ENCOUNTER — TELEPHONE (OUTPATIENT)
Dept: NEUROSURGERY | Facility: CLINIC | Age: 74
End: 2019-09-09

## 2019-09-09 NOTE — TELEPHONE ENCOUNTER
Phoned pt in regards to rescheduling appt for tomorrow due to PA being out of the office on tomorrow.    Pt did not answer, I will attempt to reach pt at a later time today.

## 2019-09-09 NOTE — TELEPHONE ENCOUNTER
Phoned pt in regards to rescheduling tomorrow's appt due to the PA being out     I spoke with pt .    Pt is rescheduled 9/16/19 at 10am.

## 2019-09-16 ENCOUNTER — TELEPHONE (OUTPATIENT)
Dept: NEUROSURGERY | Facility: CLINIC | Age: 74
End: 2019-09-16

## 2019-09-16 ENCOUNTER — OFFICE VISIT (OUTPATIENT)
Dept: NEUROSURGERY | Facility: CLINIC | Age: 74
End: 2019-09-16
Payer: MEDICARE

## 2019-09-16 VITALS — DIASTOLIC BLOOD PRESSURE: 69 MMHG | HEART RATE: 109 BPM | SYSTOLIC BLOOD PRESSURE: 140 MMHG

## 2019-09-16 DIAGNOSIS — Z48.02 ENCOUNTER FOR REMOVAL OF SUTURES: ICD-10-CM

## 2019-09-16 DIAGNOSIS — Z48.89 ENCOUNTER FOR POSTOPERATIVE WOUND CHECK: ICD-10-CM

## 2019-09-16 DIAGNOSIS — Z98.890 STATUS POST LUMBAR SURGERY: Primary | ICD-10-CM

## 2019-09-16 PROCEDURE — 99999 PR PBB SHADOW E&M-EST. PATIENT-LVL IV: CPT | Mod: PBBFAC,,, | Performed by: PHYSICIAN ASSISTANT

## 2019-09-16 PROCEDURE — 99024 PR POST-OP FOLLOW-UP VISIT: ICD-10-PCS | Mod: S$GLB,,, | Performed by: PHYSICIAN ASSISTANT

## 2019-09-16 PROCEDURE — 99024 POSTOP FOLLOW-UP VISIT: CPT | Mod: S$GLB,,, | Performed by: PHYSICIAN ASSISTANT

## 2019-09-16 PROCEDURE — 99999 PR PBB SHADOW E&M-EST. PATIENT-LVL IV: ICD-10-PCS | Mod: PBBFAC,,, | Performed by: PHYSICIAN ASSISTANT

## 2019-09-16 NOTE — TELEPHONE ENCOUNTER
Phoned pt to remind her of her scheduled POST OP 3 appt on today.    I had spoke with  who rescheduled the appt to today.    Pt did not answer, I was not able to leave a voicemail.    Will attempt to reach pt at a later time before scheduled appt time.

## 2019-09-16 NOTE — LETTER
September 16, 2019      Melody Oliver, [facility]  [address]   [facility]  [place] [address]           [facility]  [address] [place] [address]  Phone: [phone]  Fax: [phone]          Patient: Ross Isbell   MR Number: [id number]   YOB: 1945   Date of Visit: 9/16/2019       Dear Melody Oliver:    Thank you for referring Ross Isbell to me for evaluation. Attached you will find relevant portions of my assessment and plan of care.    If you have questions, please do not hesitate to call me. I look forward to following Ross Isbell along with you.    Sincerely,    Mayra Long PA-C    Enclosure  CC:  No Recipients    If you would like to receive this communication electronically, please contact [email or web address] or [phone] to request more information on [employer] Link access.    For providers and/or their staff who would like to refer a patient to [facility], please contact us through our one-stop-shop provider referral line, [facility], at [phone].    If you feel you have received this communication in error or would no longer like to receive these types of communications, please e-mail [email or web address]

## 2019-09-16 NOTE — PROGRESS NOTES
"Subjective:      Patient ID: Ross Isbell is a 73 y.o. female.    Chief Complaint: Lumbar Spine Pain (L-Spine) and Post-op Evaluation    HPI   Mrs. Isbell is here today for postop evaluation #1 from wound exploration/closure performed on 8/26/19. She is 3 weeks out from her surgery. Patient states that she is walking a lot with therapy at the nursing home. Her groin pain is "so much better." She denies any back pain at this present time. She does have leg pain but associates this with being sore from doing therapy. She reports no bladder or bowel issues. She has no difficulty urinating. Uses adult diapers and tries to sit on the commode but finds it difficult to do so. Her last BM was yesterday.   Patient reports no fevers, headaches or wound drainage. LSO brace is in place.     Post-Op Info:  Procedure(s) (LRB):  DEBRIDEMENT, WOUND (Bilateral)  REMOVAL, HARDWARE (Bilateral)  WASHOUT (Bilateral)       Review of Systems   Constitution: Negative for fever.   HENT: Negative for congestion.    Respiratory: Negative for cough and wheezing.    Skin: Negative for poor wound healing.   Musculoskeletal: Positive for myalgias. Negative for back pain, falls and neck pain.   Gastrointestinal: Negative for abdominal pain, bowel incontinence, diarrhea, nausea and vomiting.   Genitourinary: Negative for bladder incontinence.   Neurological: Negative for numbness and seizures.   Psychiatric/Behavioral: Negative for altered mental status.         Objective:            General    Constitutional: She is oriented to person, place, and time. She appears well-developed and well-nourished.   Neck: Normal range of motion.   Cardiovascular: Normal rate and regular rhythm.    Pulmonary/Chest: Effort normal.   Abdominal: Soft.   Neurological: She is alert and oriented to person, place, and time.   Psychiatric: She has a normal mood and affect. Her behavior is normal.           Neurosurgery Exam:     Moves all four extremities  LSO brace in " place  Sensation intact to light touch    LSO brace removed for suture removal- sutures removed w/out incident  Incision on lower back is clean, dry and intact  There is no erythema.   Fluctuance noted- consistent with pseudomeningocele   No drainage on exam or during suture removal  Skin/incision is healing well.   No signs of breakdown or wound dehiscence           Imaging: no new imaging.      Assessment:       Encounter Diagnoses   Name Primary?    Status post lumbar surgery Yes    Encounter for postoperative wound check     Encounter for removal of sutures           Plan:       Ross was seen today for lumbar spine pain (l-spine) and post-op evaluation.    Diagnoses and all orders for this visit:    Status post lumbar surgery    Encounter for postoperative wound check    Encounter for removal of sutures        Sutures were removed w/out incident today. Derma bond was applied over entire incision.   After drying, dressing was secured in place.    Patient will follow-up in 2 weeks for postop assessment.  She was instructed to continue therapy at NH and wearing LSO brace as directed.    She should have follow-up with Dr. oGmez. Will contact his staff regarding this appointment.          Mayra Long PA-C

## 2019-09-18 ENCOUNTER — TELEPHONE (OUTPATIENT)
Dept: NEUROSURGERY | Facility: CLINIC | Age: 74
End: 2019-09-18

## 2019-09-18 NOTE — TELEPHONE ENCOUNTER
Phoned pt  to inform him that the patient f/u appt with Dr. Gomez is October 9th at 2:15pm.    Pt  verbalized understanding

## 2019-09-23 ENCOUNTER — TELEPHONE (OUTPATIENT)
Dept: NEUROSURGERY | Facility: CLINIC | Age: 74
End: 2019-09-23

## 2019-09-23 NOTE — TELEPHONE ENCOUNTER
Phoned pt in regards to scheduling an early appt    Spoke with pt  and he informed me that the nursing home was concerned about swelling around pt wound that's why she's being seen earlier than scheduled.    I verbalized understanding and informed  I will get that information to Mayra.

## 2019-09-24 ENCOUNTER — LAB VISIT (OUTPATIENT)
Dept: LAB | Facility: HOSPITAL | Age: 74
End: 2019-09-24
Payer: MEDICARE

## 2019-09-24 ENCOUNTER — OFFICE VISIT (OUTPATIENT)
Dept: NEUROSURGERY | Facility: CLINIC | Age: 74
End: 2019-09-24
Payer: MEDICARE

## 2019-09-24 VITALS
DIASTOLIC BLOOD PRESSURE: 64 MMHG | HEART RATE: 111 BPM | BODY MASS INDEX: 31.18 KG/M2 | SYSTOLIC BLOOD PRESSURE: 118 MMHG | WEIGHT: 194 LBS | HEIGHT: 66 IN

## 2019-09-24 DIAGNOSIS — Z98.890 STATUS POST LUMBAR SURGERY: ICD-10-CM

## 2019-09-24 DIAGNOSIS — Z48.89 ENCOUNTER FOR POSTOPERATIVE WOUND CHECK: ICD-10-CM

## 2019-09-24 DIAGNOSIS — M54.50 LOW BACK PAIN, NON-SPECIFIC: ICD-10-CM

## 2019-09-24 DIAGNOSIS — G96.198 PSEUDOMENINGOCELE: ICD-10-CM

## 2019-09-24 DIAGNOSIS — Z48.89 ENCOUNTER FOR POSTOPERATIVE WOUND CHECK: Primary | ICD-10-CM

## 2019-09-24 LAB
CREAT SERPL-MCNC: 0.7 MG/DL (ref 0.5–1.4)
EST. GFR  (AFRICAN AMERICAN): >60 ML/MIN/1.73 M^2
EST. GFR  (NON AFRICAN AMERICAN): >60 ML/MIN/1.73 M^2

## 2019-09-24 PROCEDURE — 82565 ASSAY OF CREATININE: CPT

## 2019-09-24 PROCEDURE — 99999 PR PBB SHADOW E&M-EST. PATIENT-LVL IV: CPT | Mod: PBBFAC,,, | Performed by: PHYSICIAN ASSISTANT

## 2019-09-24 PROCEDURE — 99999 PR PBB SHADOW E&M-EST. PATIENT-LVL IV: ICD-10-PCS | Mod: PBBFAC,,, | Performed by: PHYSICIAN ASSISTANT

## 2019-09-24 PROCEDURE — 36415 COLL VENOUS BLD VENIPUNCTURE: CPT

## 2019-09-24 PROCEDURE — 99024 POSTOP FOLLOW-UP VISIT: CPT | Mod: S$GLB,,, | Performed by: PHYSICIAN ASSISTANT

## 2019-09-24 PROCEDURE — 99024 PR POST-OP FOLLOW-UP VISIT: ICD-10-PCS | Mod: S$GLB,,, | Performed by: PHYSICIAN ASSISTANT

## 2019-09-24 NOTE — PROGRESS NOTES
Subjective:      Patient ID: Ross Isbell is a 74 y.o. female.    Chief Complaint: Lumbar Spine Pain (L-Spine) and Follow-up    HPI    Mrs. Isbell is here today for postop evaluation #2  from wound exploration/closure performed on 8/26/19. She is 4 weeks out from her surgery. Patient states that she is walking a lot with therapy at the nursing home.  She does c/o norma hip and leg pain but associates this with being sore from doing therapy.  She has no difficulty urinating. Uses adult diapers and tries to sit on the commode but finds it difficult to do so.   Patient reports no fevers, headaches or wound drainage. LSO brace is in place.   Temperature in office was 97.5 and 98.3 from 1:34 AM.        Post-Op Info:  Procedure(s) (LRB):  DEBRIDEMENT, WOUND (Bilateral)  REMOVAL, HARDWARE (Bilateral)  WASHOUT (Bilateral)     Patient does require assistance with bathing, dressing, transferring and eating. She is able to tolerate most foods. She is receiving therapy at [facility].   Rates her current pain a 5/10.         Review of Systems   Constitution: Negative for fever.   HENT: Negative for congestion.    Respiratory: Negative for cough and wheezing.    Skin: Negative for poor wound healing.   Musculoskeletal: Positive for back pain, joint pain (hips) and myalgias. Negative for falls.   Gastrointestinal: Negative for abdominal pain, bowel incontinence, diarrhea, nausea and vomiting.   Genitourinary: Negative for bladder incontinence.   Neurological: Negative for numbness and seizures.   Psychiatric/Behavioral: Negative for altered mental status.         Objective:            General    Constitutional: She is oriented to person, place, and time. She appears well-developed and well-nourished.   Neck: Normal range of motion.   Cardiovascular: Normal rate and regular rhythm.    Pulmonary/Chest: Effort normal.   Abdominal: Soft.   Neurological: She is alert and oriented to person, place, and time.   Psychiatric: She has a  normal mood and affect. Her behavior is normal.             Neurosurgery Exam:      Moves all four extremities  LSO brace in place  Sensation intact to light touch     LSO brace removed for wound recheck  Incision on lower back is clean, dry and intact  Fluctuance noted, more pronounced versus last office visit- consistent with pseudomeningocele   No drainage on exam   Skin/incision is healing well.   No sign of wound dehiscence             Imaging: no new images.            Assessment:       Encounter Diagnoses   Name Primary?    Low back pain, non-specific     Encounter for postoperative wound check Yes    Status post lumbar surgery     Pseudomeningocele           Plan:       Ross was seen today for lumbar spine pain (l-spine) and follow-up.    Diagnoses and all orders for this visit:    Encounter for postoperative wound check  -     Creatinine, serum; Future    Low back pain, non-specific  -     MRI Lumbar Spine W WO Contrast; Future  -     Creatinine, serum; Future    Status post lumbar surgery  -     Creatinine, serum; Future    Pseudomeningocele  -     Creatinine, serum; Future      We will obtain MRI with and without contrast for further evaluation and rule out infectious process  Patient will continue to wear LSO brace  She will follow-up after MRI within 1-2 weeks            Mayra Long PA-C

## 2019-10-02 ENCOUNTER — HOSPITAL ENCOUNTER (OUTPATIENT)
Dept: RADIOLOGY | Facility: HOSPITAL | Age: 74
Discharge: HOME | End: 2019-10-02
Attending: PHYSICIAN ASSISTANT
Payer: MEDICARE

## 2019-10-02 DIAGNOSIS — M54.50 LOW BACK PAIN, NON-SPECIFIC: ICD-10-CM

## 2019-10-11 ENCOUNTER — HOSPITAL ENCOUNTER (OUTPATIENT)
Dept: RADIOLOGY | Facility: HOSPITAL | Age: 74
Discharge: HOME | End: 2019-10-11
Attending: PHYSICIAN ASSISTANT
Payer: MEDICARE

## 2019-10-11 PROCEDURE — A9585 GADOBUTROL INJECTION: HCPCS | Performed by: PHYSICIAN ASSISTANT

## 2019-10-11 PROCEDURE — 72158 MRI LUMBAR SPINE W/O & W/DYE: CPT | Mod: TC

## 2019-10-11 PROCEDURE — 25500020 PHARM REV CODE 255: Performed by: PHYSICIAN ASSISTANT

## 2019-10-11 RX ORDER — GADOBUTROL 604.72 MG/ML
10 INJECTION INTRAVENOUS
Status: COMPLETED | OUTPATIENT
Start: 2019-10-11 | End: 2019-10-11

## 2019-10-11 RX ADMIN — GADOBUTROL 8 ML: 604.72 INJECTION INTRAVENOUS at 01:10

## 2019-10-14 ENCOUNTER — LAB VISIT (OUTPATIENT)
Dept: LAB | Facility: HOSPITAL | Age: 74
End: 2019-10-14
Attending: PHYSICIAN ASSISTANT
Payer: MEDICARE

## 2019-10-14 ENCOUNTER — OFFICE VISIT (OUTPATIENT)
Dept: NEUROSURGERY | Facility: CLINIC | Age: 74
End: 2019-10-14
Payer: MEDICARE

## 2019-10-14 VITALS
RESPIRATION RATE: 18 BRPM | SYSTOLIC BLOOD PRESSURE: 112 MMHG | HEIGHT: 66 IN | DIASTOLIC BLOOD PRESSURE: 64 MMHG | BODY MASS INDEX: 31.31 KG/M2 | HEART RATE: 92 BPM

## 2019-10-14 DIAGNOSIS — G96.198 PSEUDOMENINGOCELE: ICD-10-CM

## 2019-10-14 DIAGNOSIS — M46.46 DISCITIS OF LUMBAR REGION: ICD-10-CM

## 2019-10-14 DIAGNOSIS — G96.198 PSEUDOMENINGOCELE: Primary | ICD-10-CM

## 2019-10-14 LAB
BASOPHILS # BLD AUTO: 0.03 K/UL (ref 0–0.2)
BASOPHILS NFR BLD: 0.3 % (ref 0–1.9)
CRP SERPL-MCNC: 2.2 MG/L (ref 0–8.2)
DIFFERENTIAL METHOD: ABNORMAL
EOSINOPHIL # BLD AUTO: 0.2 K/UL (ref 0–0.5)
EOSINOPHIL NFR BLD: 2.2 % (ref 0–8)
ERYTHROCYTE [DISTWIDTH] IN BLOOD BY AUTOMATED COUNT: 14.5 % (ref 11.5–14.5)
ERYTHROCYTE [SEDIMENTATION RATE] IN BLOOD BY WESTERGREN METHOD: 16 MM/HR (ref 0–20)
HCT VFR BLD AUTO: 40.3 % (ref 37–48.5)
HGB BLD-MCNC: 12.2 G/DL (ref 12–16)
IMM GRANULOCYTES # BLD AUTO: 0.04 K/UL (ref 0–0.04)
IMM GRANULOCYTES NFR BLD AUTO: 0.4 % (ref 0–0.5)
LYMPHOCYTES # BLD AUTO: 2.5 K/UL (ref 1–4.8)
LYMPHOCYTES NFR BLD: 27.8 % (ref 18–48)
MCH RBC QN AUTO: 28.1 PG (ref 27–31)
MCHC RBC AUTO-ENTMCNC: 30.3 G/DL (ref 32–36)
MCV RBC AUTO: 93 FL (ref 82–98)
MONOCYTES # BLD AUTO: 0.7 K/UL (ref 0.3–1)
MONOCYTES NFR BLD: 7.5 % (ref 4–15)
NEUTROPHILS # BLD AUTO: 5.5 K/UL (ref 1.8–7.7)
NEUTROPHILS NFR BLD: 61.8 % (ref 38–73)
NRBC BLD-RTO: 0 /100 WBC
PLATELET # BLD AUTO: 252 K/UL (ref 150–350)
PMV BLD AUTO: 10 FL (ref 9.2–12.9)
RBC # BLD AUTO: 4.34 M/UL (ref 4–5.4)
WBC # BLD AUTO: 8.98 K/UL (ref 3.9–12.7)

## 2019-10-14 PROCEDURE — 99999 PR PBB SHADOW E&M-EST. PATIENT-LVL III: CPT | Mod: PBBFAC,,, | Performed by: PHYSICIAN ASSISTANT

## 2019-10-14 PROCEDURE — 36415 COLL VENOUS BLD VENIPUNCTURE: CPT

## 2019-10-14 PROCEDURE — 86140 C-REACTIVE PROTEIN: CPT

## 2019-10-14 PROCEDURE — 99499 UNLISTED E&M SERVICE: CPT | Mod: S$GLB,,, | Performed by: PHYSICIAN ASSISTANT

## 2019-10-14 PROCEDURE — 85025 COMPLETE CBC W/AUTO DIFF WBC: CPT

## 2019-10-14 PROCEDURE — 99024 PR POST-OP FOLLOW-UP VISIT: ICD-10-PCS | Mod: S$GLB,,, | Performed by: PHYSICIAN ASSISTANT

## 2019-10-14 PROCEDURE — 99024 POSTOP FOLLOW-UP VISIT: CPT | Mod: S$GLB,,, | Performed by: PHYSICIAN ASSISTANT

## 2019-10-14 PROCEDURE — 99999 PR PBB SHADOW E&M-EST. PATIENT-LVL III: ICD-10-PCS | Mod: PBBFAC,,, | Performed by: PHYSICIAN ASSISTANT

## 2019-10-14 PROCEDURE — 85651 RBC SED RATE NONAUTOMATED: CPT

## 2019-10-14 PROCEDURE — 99499 RISK ADDL DX/OHS AUDIT: ICD-10-PCS | Mod: S$GLB,,, | Performed by: PHYSICIAN ASSISTANT

## 2019-10-14 NOTE — PROGRESS NOTES
"Subjective:      Patient ID: Ross Isbell is a 74 y.o. female.    Chief Complaint: Lumbar Spine Pain (L-Spine) (F/U and discuss MRI results )    HPI     The patient is here today for MRI follow-up. Patient currently does not have any complaints regarding her back pain.   After walking she does have some hamstring pain in both legs. She denies any shooting pain down her legs. She reports that she still has pain "in both hipbones." States it is not horrible as it was before. She rates it a 5 on a pain scale of 1-10.   She denies bladder incontinence. States she is no longer wearing diapers during the day just depends. She is able to urinate on her own during the day time. At night she does wear an adult diaper just to make sure she sleeps well.  Overall she and her  feel that she is making progress and feeling better.       Post-Op Info: 8/26/19  Procedure(s) (LRB):  DEBRIDEMENT, WOUND (Bilateral)  REMOVAL, HARDWARE (Bilateral)  WASHOUT (Bilateral)     Review of Systems  Constitution: Negative for fever.   HENT: Negative for congestion.    Respiratory: Negative for cough and wheezing.    Skin: Negative for poor wound healing.   Musculoskeletal: Positive for back pain, joint pain (hips) and myalgias. Negative for falls.   Gastrointestinal: Negative for abdominal pain, bowel incontinence, diarrhea, nausea and vomiting.   Genitourinary: Negative for bladder incontinence.   Neurological: Negative for numbness and seizures.   Psychiatric/Behavioral: Negative for altered mental status.     Objective:       Neurosurgery Physical Exam  Ortho/SPM Exam     Constitutional: She is oriented to person, place, and time. She appears well-developed and well-nourished.   Neck: Normal range of motion.   Cardiovascular: Normal rate and regular rhythm.    Pulmonary/Chest: Effort normal.   Abdominal: Soft.   Neurological: She is alert and oriented to person, place, and time.   Psychiatric: She has a normal mood and affect. Her " behavior is normal.          Neurosurgery Exam:      Moves all four extremities  LSO brace in place  Sensation intact to light touch     LSO brace removed for wound recheck  Incision on lower back is clean, dry and intact  Fluctuance noted, smaller in appearance versus last office visit- consistent with pseudomeningocele   No drainage on exam   Skin/incision is healing well.   No sign of wound dehiscence   Patient has no pain on exam of her back  Neg SLR test          Previous Imaging:    Results for orders placed during the hospital encounter of 05/23/19   MRI Lumbar Spine Without Contrast    Narrative EXAMINATION:  MRI LUMBAR SPINE WITHOUT CONTRAST    CLINICAL HISTORY:  discitis/osteomyelitis seen on CT imaging; patient refused rest of MRI before contrast due to pain, per attending physician change to without contrast and send images;.  Lower back pain.  Discitis/osteomyelitis.    TECHNIQUE:  Multiplanar, multisequence MR images were acquired from the thoracolumbar junction to the sacrum without the administration of contrast.    COMPARISON:  None.    FINDINGS:  Examination is degraded due to motion artifact.  Minimal grade 1 spondylolisthesis.  Otherwise the alignment of the lumbar spine is normal.    Fluid is identified throughout the L2-L3 disc compatible with osteomyelitis.  Severe associated marrow edema identified within L2 and L3 with mild collapse of the left L3 superior endplate.  Findings compatible with osteomyelitis.  Mixed signal intensity is identified within the anterior epidural space, extending from the posterior aspect of L2 to the through the posterior aspect of L3 measuring approximately 3.8 cm (cc) by 3.7 cm (transverse) by 0.5 cm (AP).  Finding highly suspicious for epidural abscess.  Associated advanced facet arthropathy identified at L2-L3 the combination of findings results in moderate central canal stenosis.  Severe neural foraminal stenosis also identified at L2-3.  Severe paravertebral  soft tissue edema is identified with severe soft tissue edema within the iliopsoas muscle bellies.  No discrete intramuscular abscess detected.    At L3-4, minimal fluid identified within the disc space which could indicate early discitis.  Mild to moderate generalized disc bulging and moderate facet arthropathy.  Central canal is patent.  Left lateral recess stenosis is present.  Severe bilateral neural foraminal stenosis.    At L4-5, grade 1 spondylolisthesis is present with mild disc bulging and severe facet arthropathy.  Moderate-severe central canal and lateral recess stenosis.  Moderate neural foraminal stenosis.    Moderate facet arthropathy identified L5-S1.  Central canal neural foramina patent.    Paravertebral soft tissues demonstrate moderate grade soft tissue edema within the posterior paravertebral musculature at the L3 through L5 levels, worse on the left.      Impression 1. Limited exam due to motion.  2. Findings compatible with discitis L2-3, with osteomyelitis of L2 and L3.  Additional evidence of epidural abscess within the anterior epidural space.  Severe paravertebral soft tissue edema, as detailed above.  3. Small amount of fluid within the L3-L4 disc space suspicious for early osteomyelitis.      Electronically signed by: Farzad Wagner MD  Date:    05/23/2019  Time:    19:52      No results found for this or any previous visit.  Results for orders placed during the hospital encounter of 05/23/19   X-Ray Lumbar Spine Ap And Lateral    Narrative EXAMINATION:  XR LUMBAR SPINE AP AND LATERAL    CLINICAL HISTORY:  Intraspinal abscess and granulomapost op;    FINDINGS:  Postoperative changes noted with effusion posteriorly extending from L1-L4.  Chronic appearing compression deformity involving the superior endplate of L3.  Disc space narrowing at L1-2 and L3-4.  Mild disc space narrowing at L4-5 with arthritic changes involving the facets at L4-5 and L5-S1.  Aortic atherosclerosis.       Impression Postoperative and multilevel degenerative changes without acute bony abnormality.      Electronically signed by: Dada Jiang MD  Date:    05/25/2019  Time:    17:47          I  reviewed all pertinent imaging regarding this case.  Assessment:     1. Pseudomeningocele    2. Discitis of lumbar region      Plan:     Pseudomeningocele  -     CBC auto differential; Future; Expected date: 10/14/2019  -     C-reactive protein; Future; Expected date: 10/14/2019  -     Sedimentation rate; Future; Expected date: 10/14/2019    Discitis of lumbar region  -     CBC auto differential; Future; Expected date: 10/14/2019  -     C-reactive protein; Future; Expected date: 10/14/2019  -     Sedimentation rate; Future; Expected date: 10/14/2019      Patient is doing fair today. I explained imaging studies to patient and her . She is aware that fluid collection is walled off. I do not have any surgical recommendations at this time however I would like her to see Dr. Gomez due to the fact that there is some concern for discitis on MRI. I secure chatted him today and he informed me that they will call her today.   She will get Updated CRP, ESR, CBC w/ diff today on the way out  I will notify patient and NH if any of her labs are abnormal and/or recommend any further tx recommendations  Patient will follow-up in 4 weeks with Dr. Nicolas at the '[place] location unless any changes occur and/or further workup needed given Dr. Gomez's recommendations and labs    Mayra Long PA-C  Neurosurgery

## 2019-10-30 NOTE — H&P (VIEW-ONLY)
Subjective:       Patient ID: Ross Isbell is a 74 y.o. female.    Chief Complaint: No chief complaint on file.    HPI   74 year old woman with lumbar osteomyelitis who was recently seen in the Hospital . She was discharged on 09/02 with 3 weeks of IV Rocephin .  MRI of the lumbar region -10/11-    1. Interval removal of spinal fusion hardware with a large seroma deep to the incision site.  2. Findings concerning for ongoing discitis/osteomyelitis at L1-L2 and L2-L3 with further erosion of the superior endplate of L3 and new erosion of the superior endplate of L2.  There may be a discal abscess at L2-L3.  3. No sign of spinal canal compromise although there is moderate spinal stenosis at L2-L3 and L4-L5.  4. Foraminal stenosis of varying degree as above may correlate to radiculopathy.    Lab data showed-  Component      Latest Ref Rng & Units 10/14/2019 8/25/2019 8/21/2019   CRP      0.0 - 8.2 mg/L 2.2 3.1 2.2     Component      Latest Ref Rng & Units 10/14/2019 8/25/2019 8/21/2019   Sed Rate      0 - 20 mm/Hr 16 27 (H) 22 (H)     Previous consult note-08/24-  73 year old female with PMHx of CAD, HTN, DM, Parkinson disease, HLD and Osteomyelitis of lumbar spine who was admitted with worsening back pain. She is well known to me from previous hospital admits . All previous cultures reviewed-  06/11- candida from the back,  05/23- blood culture-  Streptococcus gallinaceus  She was also seen by Neurosurgery and had I and D done - posterior washout and debridement with placement of instrumentation on 5/24. Patient  had dehiscence of her incision and was taken back to the OR for wound VAC placement on 6/11. She was given therapy with  .IV Rocephin/Diflucan for total of 6 weeks(from 06/18)  Since admission, MRI of the lumbar region-  Generally favorable response to therapy with diminished abnormal bone marrow signal abnormalities relating to evolution of osteomyelitis change.  Diminished swelling of the right psoas  muscle.  No evidence of abscess.  2. Laminectomy at L2-L3 and L3-L4 with posterior fusion as described.       Neurosurery surgery following, plan for removal of instumentation with antibotic beads placement and wound vac on Monday .  Since admission, blood cultures are again positive for strep.     Review of Systems   Constitutional: Negative for chills and fatigue.   HENT: Negative for congestion, ear pain, facial swelling, sinus pressure and sore throat.    Eyes: Negative for pain.   Respiratory: Negative for apnea, chest tightness, shortness of breath and stridor.    Cardiovascular: Negative for chest pain, palpitations and leg swelling.   Gastrointestinal: Negative for abdominal distention, abdominal pain, diarrhea and nausea.   Endocrine: Negative for polydipsia and polyphagia.   Genitourinary: Negative for decreased urine volume, difficulty urinating, frequency and genital sores.   Musculoskeletal: Positive for back pain and gait problem. Negative for arthralgias.        Braces insitu    Neurological: Negative for light-headedness and headaches.   Hematological: Negative for adenopathy.   Psychiatric/Behavioral: Negative for agitation, confusion and decreased concentration.       Objective:      Physical Exam   Constitutional: She is oriented to person, place, and time. She appears well-developed and well-nourished.   HENT:   Head: Normocephalic and atraumatic.   Eyes: Pupils are equal, round, and reactive to light. EOM are normal.   Neck: Normal range of motion. Neck supple. No tracheal deviation present. No thyromegaly present.   Cardiovascular: Normal rate and regular rhythm.   Pulmonary/Chest: No respiratory distress. She has no wheezes. She has no rales.   Abdominal: Soft. Bowel sounds are normal. She exhibits no distension. There is no tenderness.   Musculoskeletal: She exhibits deformity.   Has braces,please see pics-seroma noted over the back   Neurological: She is alert and oriented to person, place,  and time. She has normal reflexes. No cranial nerve deficit. Coordination normal.   Skin: Skin is warm and dry. No pallor.   Psychiatric: She has a normal mood and affect. Judgment and thought content normal.   Nursing note and vitals reviewed.          Assessment:       1. Osteomyelitis, unspecified site, unspecified type  Quantiferon Gold TB    CT Guided Abscess Or Cyst Aspiration W/ Cath    AFB CULTURE & SMEAR    CULTURE, AEROBIC  (SPECIFY SOURCE)    CULTURE, ANAEROBE    CULTURE, FUNGUS   2. Essential hypertension     3. Type 2 diabetes, controlled, with peripheral neuropathy     4. Hyperlipidemia LDL goal <70     5. Acute osteomyelitis of lumbar spine         Plan:        1 Lumbar osteomyelitis /discitis- MRI showed   Findings concerning for ongoing discitis/osteomyelitis at L1-L2 and L2-L3 with further erosion of the superior endplate of L3 and new erosion of the superior endplate of L2.  There may be a discal abscess at L2-L3.  ESR , CRP -normal  Will send Gold quatiferon, will plan for CT guided biopsy , will send AFB, Fungi, aerobic and anaerobic cultures .  There is a discordance between MRI findings and inflammatory markers.She has been off antibiotics for about 3-4 weeks .  Ok to PULL PICC line under we get cultures to guide therapy at this time.  Will see patient again very soon with cultures.  Case discussed with Dr Nicolas via secure chat   2 Diabetes -follow PCP, insulin sliding scale   3 Parkinsonism -on sinemet  4 Hypertension -on losartan, will need to hold BP meds due to hypotension

## 2019-11-04 DIAGNOSIS — M86.9 OSTEOMYELITIS, UNSPECIFIED SITE, UNSPECIFIED TYPE: Primary | Chronic | ICD-10-CM

## 2019-11-05 ENCOUNTER — TELEPHONE (OUTPATIENT)
Dept: NEUROSURGERY | Facility: CLINIC | Age: 74
End: 2019-11-05

## 2019-11-05 ENCOUNTER — TELEPHONE (OUTPATIENT)
Dept: RADIOLOGY | Facility: HOSPITAL | Age: 74
End: 2019-11-05

## 2019-11-05 DIAGNOSIS — M54.50 LUMBAR SPINE PAIN: Primary | ICD-10-CM

## 2019-11-05 NOTE — TELEPHONE ENCOUNTER
Scheduled patient for bone biopsy and L2/3 disc aspiration with Dr. Clark on 11/13/19 at 1100am. Spoke with nurse, April, at [facility]. Nurse denies blood thinner use. Instructed to fast for 8 prior to procedure. Also called and informed patients spouse of the appointment.

## 2019-11-05 NOTE — TELEPHONE ENCOUNTER
Spoke with Yola at [facility] in reference to changing pt's appt d/t Dr Nicolas will be out of the office, stated the  will give call back to schedule pt//ns

## 2019-11-11 ENCOUNTER — LAB VISIT (OUTPATIENT)
Dept: LAB | Facility: HOSPITAL | Age: 74
End: 2019-11-11
Attending: INTERNAL MEDICINE
Payer: MEDICARE

## 2019-11-11 DIAGNOSIS — M86.9 OSTEOMYELITIS, UNSPECIFIED SITE, UNSPECIFIED TYPE: Chronic | ICD-10-CM

## 2019-11-11 PROCEDURE — 36415 COLL VENOUS BLD VENIPUNCTURE: CPT

## 2019-11-11 PROCEDURE — 86480 TB TEST CELL IMMUN MEASURE: CPT

## 2019-11-13 LAB
GAMMA INTERFERON BACKGROUND BLD IA-ACNC: 0.06 IU/ML
M TB IFN-G CD4+ BCKGRND COR BLD-ACNC: 0.01 IU/ML
MITOGEN IGNF BCKGRD COR BLD-ACNC: >10 IU/ML
TB GOLD PLUS: NEGATIVE
TB2 - NIL: 0.01 IU/ML

## 2019-11-18 ENCOUNTER — HOSPITAL ENCOUNTER (OUTPATIENT)
Facility: HOSPITAL | Age: 74
Discharge: HOME | End: 2019-11-18
Attending: RADIOLOGY | Admitting: RADIOLOGY
Payer: MEDICARE

## 2019-11-18 ENCOUNTER — HOSPITAL ENCOUNTER (OUTPATIENT)
Dept: RADIOLOGY | Facility: HOSPITAL | Age: 74
Discharge: HOME | End: 2019-11-18
Attending: PHYSICIAN ASSISTANT
Payer: MEDICARE

## 2019-11-18 VITALS
TEMPERATURE: 98 F | SYSTOLIC BLOOD PRESSURE: 97 MMHG | OXYGEN SATURATION: 97 % | HEART RATE: 80 BPM | DIASTOLIC BLOOD PRESSURE: 62 MMHG | BODY MASS INDEX: 31.18 KG/M2 | RESPIRATION RATE: 16 BRPM | WEIGHT: 194 LBS | HEIGHT: 66 IN

## 2019-11-18 DIAGNOSIS — M54.50 LUMBAR SPINE PAIN: ICD-10-CM

## 2019-11-18 LAB
ANION GAP SERPL CALC-SCNC: 11 MMOL/L (ref 8–16)
BASOPHILS # BLD AUTO: 0.01 K/UL (ref 0–0.2)
BASOPHILS NFR BLD: 0.2 % (ref 0–1.9)
BUN SERPL-MCNC: 29 MG/DL (ref 8–23)
CALCIUM SERPL-MCNC: 9.5 MG/DL (ref 8.7–10.5)
CHLORIDE SERPL-SCNC: 95 MMOL/L (ref 95–110)
CO2 SERPL-SCNC: 25 MMOL/L (ref 23–29)
CREAT SERPL-MCNC: 0.9 MG/DL (ref 0.5–1.4)
DIFFERENTIAL METHOD: NORMAL
EOSINOPHIL # BLD AUTO: 0.1 K/UL (ref 0–0.5)
EOSINOPHIL NFR BLD: 1.5 % (ref 0–8)
ERYTHROCYTE [DISTWIDTH] IN BLOOD BY AUTOMATED COUNT: 13.2 % (ref 11.5–14.5)
EST. GFR  (AFRICAN AMERICAN): >60 ML/MIN/1.73 M^2
EST. GFR  (NON AFRICAN AMERICAN): >60 ML/MIN/1.73 M^2
GLUCOSE SERPL-MCNC: 81 MG/DL (ref 70–110)
HCT VFR BLD AUTO: 38 % (ref 37–48.5)
HGB BLD-MCNC: 12.5 G/DL (ref 12–16)
IMM GRANULOCYTES # BLD AUTO: 0.01 K/UL (ref 0–0.04)
IMM GRANULOCYTES NFR BLD AUTO: 0.2 % (ref 0–0.5)
INR PPP: 1 (ref 0.8–1.2)
LYMPHOCYTES # BLD AUTO: 2.1 K/UL (ref 1–4.8)
LYMPHOCYTES NFR BLD: 45.3 % (ref 18–48)
MCH RBC QN AUTO: 28.1 PG (ref 27–31)
MCHC RBC AUTO-ENTMCNC: 32.9 G/DL (ref 32–36)
MCV RBC AUTO: 85 FL (ref 82–98)
MONOCYTES # BLD AUTO: 0.5 K/UL (ref 0.3–1)
MONOCYTES NFR BLD: 9.9 % (ref 4–15)
NEUTROPHILS # BLD AUTO: 2 K/UL (ref 1.8–7.7)
NEUTROPHILS NFR BLD: 42.9 % (ref 38–73)
NRBC BLD-RTO: 0 /100 WBC
PLATELET # BLD AUTO: 169 K/UL (ref 150–350)
PMV BLD AUTO: 9.2 FL (ref 9.2–12.9)
POTASSIUM SERPL-SCNC: 5 MMOL/L (ref 3.5–5.1)
PROTHROMBIN TIME: 10.6 SEC (ref 9–12.5)
RBC # BLD AUTO: 4.45 M/UL (ref 4–5.4)
SODIUM SERPL-SCNC: 131 MMOL/L (ref 136–145)
WBC # BLD AUTO: 4.66 K/UL (ref 3.9–12.7)

## 2019-11-18 PROCEDURE — 85610 PROTHROMBIN TIME: CPT

## 2019-11-18 PROCEDURE — 80048 BASIC METABOLIC PNL TOTAL CA: CPT

## 2019-11-18 PROCEDURE — 85025 COMPLETE CBC W/AUTO DIFF WBC: CPT

## 2019-11-18 NOTE — DISCHARGE SUMMARY
Radiology Discharge Summary      Hospital Course: No complications    Admit Date: 11/18/2019  Discharge Date: 11/18/2019     Instructions Given to Patient: Yes  Diet: regular diet and Resume prior diet  Activity: activity as tolerated    Description of Condition on Discharge: Stable  Vital Signs (Most Recent):      Discharge Disposition: Home    Discharge Diagnosis: post op spine     Follow-up: with Dr gomez    Procedure not performed after discussion with dr Gomez because of large posterior seroma.  Will consider seroma drain if disc asp indicated in the future.      Rashad Clark MD  Staff Radiologist  Department of Radiology  Pager: [phone]

## 2019-12-09 ENCOUNTER — LAB VISIT (OUTPATIENT)
Dept: LAB | Facility: HOSPITAL | Age: 74
End: 2019-12-09
Attending: INTERNAL MEDICINE
Payer: MEDICARE

## 2019-12-09 DIAGNOSIS — M86.9 OSTEOMYELITIS, UNSPECIFIED SITE, UNSPECIFIED TYPE: ICD-10-CM

## 2019-12-09 LAB — ERYTHROCYTE [SEDIMENTATION RATE] IN BLOOD BY WESTERGREN METHOD: 22 MM/HR (ref 0–20)

## 2019-12-09 PROCEDURE — 36415 COLL VENOUS BLD VENIPUNCTURE: CPT

## 2019-12-09 PROCEDURE — 85651 RBC SED RATE NONAUTOMATED: CPT

## 2019-12-09 PROCEDURE — 86140 C-REACTIVE PROTEIN: CPT

## 2019-12-10 LAB — CRP SERPL-MCNC: 10.8 MG/L (ref 0–8.2)

## 2019-12-16 ENCOUNTER — TELEPHONE (OUTPATIENT)
Dept: NEUROSURGERY | Facility: CLINIC | Age: 74
End: 2019-12-16

## 2019-12-16 NOTE — TELEPHONE ENCOUNTER
Spoke with Christi with [facility], informed her we will have to reschedule pt's appt, d/t Provider will be out at pt's current schedule appt, stated she will have to give us a call back once she checks with the //ns

## 2019-12-16 NOTE — TELEPHONE ENCOUNTER
Pt appt for 12/20/19 is rescheduled per kenia to 12/27/19 at 11AM    Understanding verbalized.     ----- Message from Radha Morales sent at 12/16/2019  3:35 PM CST -----  Contact[phone]  .Type:  Patient Returning Call    Who Called:Douglas  Who Left Message for Patient:Cleopatra  Does the patient know what this is regarding?:no  Would the patient rather a call back or a response via [employer]? Call back  Best Call Back Number:[phone]  Additional Information:

## 2019-12-27 ENCOUNTER — HOSPITAL ENCOUNTER (OUTPATIENT)
Dept: RADIOLOGY | Facility: HOSPITAL | Age: 74
Discharge: HOME | End: 2019-12-27
Attending: PHYSICIAN ASSISTANT
Payer: MEDICARE

## 2019-12-27 ENCOUNTER — OFFICE VISIT (OUTPATIENT)
Dept: NEUROSURGERY | Facility: CLINIC | Age: 74
End: 2019-12-27
Payer: MEDICARE

## 2019-12-27 VITALS
DIASTOLIC BLOOD PRESSURE: 74 MMHG | HEART RATE: 64 BPM | SYSTOLIC BLOOD PRESSURE: 180 MMHG | WEIGHT: 181 LBS | BODY MASS INDEX: 29.09 KG/M2 | HEIGHT: 66 IN

## 2019-12-27 DIAGNOSIS — Z91.81 HISTORY OF RECENT FALL: ICD-10-CM

## 2019-12-27 DIAGNOSIS — Z48.89 ENCOUNTER FOR POSTOPERATIVE WOUND CHECK: ICD-10-CM

## 2019-12-27 DIAGNOSIS — Z98.890 STATUS POST LUMBAR SURGERY: ICD-10-CM

## 2019-12-27 DIAGNOSIS — M46.46 DISCITIS OF LUMBAR REGION: ICD-10-CM

## 2019-12-27 DIAGNOSIS — Z98.890 STATUS POST LUMBAR SURGERY: Primary | ICD-10-CM

## 2019-12-27 PROCEDURE — 3078F PR MOST RECENT DIASTOLIC BLOOD PRESSURE < 80 MM HG: ICD-10-PCS | Mod: CPTII,S$GLB,, | Performed by: PHYSICIAN ASSISTANT

## 2019-12-27 PROCEDURE — 72040 X-RAY EXAM NECK SPINE 2-3 VW: CPT | Mod: TC

## 2019-12-27 PROCEDURE — 1159F PR MEDICATION LIST DOCUMENTED IN MEDICAL RECORD: ICD-10-PCS | Mod: S$GLB,,, | Performed by: PHYSICIAN ASSISTANT

## 2019-12-27 PROCEDURE — 1101F PR PT FALLS ASSESS DOC 0-1 FALLS W/OUT INJ PAST YR: ICD-10-PCS | Mod: CPTII,S$GLB,, | Performed by: PHYSICIAN ASSISTANT

## 2019-12-27 PROCEDURE — 99214 OFFICE O/P EST MOD 30 MIN: CPT | Mod: S$GLB,,, | Performed by: PHYSICIAN ASSISTANT

## 2019-12-27 PROCEDURE — 72040 XR CERVICAL SPINE AP LATERAL: ICD-10-PCS | Mod: 26,,, | Performed by: RADIOLOGY

## 2019-12-27 PROCEDURE — 72080 X-RAY EXAM THORACOLMB 2/> VW: CPT | Mod: TC

## 2019-12-27 PROCEDURE — 72040 X-RAY EXAM NECK SPINE 2-3 VW: CPT | Mod: 26,,, | Performed by: RADIOLOGY

## 2019-12-27 PROCEDURE — 3078F DIAST BP <80 MM HG: CPT | Mod: CPTII,S$GLB,, | Performed by: PHYSICIAN ASSISTANT

## 2019-12-27 PROCEDURE — 99999 PR PBB SHADOW E&M-EST. PATIENT-LVL III: ICD-10-PCS | Mod: PBBFAC,,, | Performed by: PHYSICIAN ASSISTANT

## 2019-12-27 PROCEDURE — 1101F PT FALLS ASSESS-DOCD LE1/YR: CPT | Mod: CPTII,S$GLB,, | Performed by: PHYSICIAN ASSISTANT

## 2019-12-27 PROCEDURE — 1126F AMNT PAIN NOTED NONE PRSNT: CPT | Mod: S$GLB,,, | Performed by: PHYSICIAN ASSISTANT

## 2019-12-27 PROCEDURE — 1159F MED LIST DOCD IN RCRD: CPT | Mod: S$GLB,,, | Performed by: PHYSICIAN ASSISTANT

## 2019-12-27 PROCEDURE — 72080 XR THORACOLUMBAR SPINE AP LATERAL: ICD-10-PCS | Mod: 26,,, | Performed by: RADIOLOGY

## 2019-12-27 PROCEDURE — 99999 PR PBB SHADOW E&M-EST. PATIENT-LVL III: CPT | Mod: PBBFAC,,, | Performed by: PHYSICIAN ASSISTANT

## 2019-12-27 PROCEDURE — 1126F PR PAIN SEVERITY QUANTIFIED, NO PAIN PRESENT: ICD-10-PCS | Mod: S$GLB,,, | Performed by: PHYSICIAN ASSISTANT

## 2019-12-27 PROCEDURE — 3077F PR MOST RECENT SYSTOLIC BLOOD PRESSURE >= 140 MM HG: ICD-10-PCS | Mod: CPTII,S$GLB,, | Performed by: PHYSICIAN ASSISTANT

## 2019-12-27 PROCEDURE — 99214 PR OFFICE/OUTPT VISIT, EST, LEVL IV, 30-39 MIN: ICD-10-PCS | Mod: S$GLB,,, | Performed by: PHYSICIAN ASSISTANT

## 2019-12-27 PROCEDURE — 3077F SYST BP >= 140 MM HG: CPT | Mod: CPTII,S$GLB,, | Performed by: PHYSICIAN ASSISTANT

## 2019-12-27 PROCEDURE — 72080 X-RAY EXAM THORACOLMB 2/> VW: CPT | Mod: 26,,, | Performed by: RADIOLOGY

## 2019-12-27 NOTE — PROGRESS NOTES
Subjective:      Patient ID: Ross Isbell is a 74 y.o. female.    Chief Complaint: Lumbar Spine Pain (L-Spine) and Follow-up    HPI  The patient is here today for follow-up regarding lower back pain and postop evaluation of wound debridement. She only c/o L hip pain. Denies back pain, numbness and tingling of her legs. She has reported two falls since her last office visit with me. Overall she continues to make progress and is working with therapy at [facility].       Post-Op Info: 8/26/19  Procedure(s) (LRB):  DEBRIDEMENT, WOUND (Bilateral)  REMOVAL, HARDWARE (Bilateral)  WASHOUT (Bilateral)      ROS   Review of Systems  Constitution: Negative for fever.   HENT: Negative for congestion.    Respiratory: Negative for cough and wheezing.    Skin: Negative for poor wound healing.   Musculoskeletal: Positive for joint pain (left hip). Negative for falls.   Gastrointestinal: Negative for abdominal pain, bowel incontinence, diarrhea, nausea and vomiting.   Genitourinary: Negative for bladder incontinence.   Neurological: Negative for numbness and seizures.   Psychiatric/Behavioral: Negative for altered mental status.       Objective:            Ortho/SPM Exam  Neurosurgery Physical Exam  Ortho/SPM Exam      Constitutional: She is oriented to person, place, and time. She appears well-developed and well-nourished.   Neck: Normal range of motion.   Cardiovascular: Normal rate and regular rhythm.    Pulmonary/Chest: Effort normal.   Abdominal: Soft.   Neurological: She is alert and oriented to person, place, and time.   Psychiatric: She has a normal mood and affect. Her behavior is normal.          Neurosurgery Exam:      Moves all four extremities well  Sensation intact to light touch     Incision on lower back is clean, dry and intact  There is no swelling, erythema or fluctuance. Significant improvement. Incision is healing very well.   No sign of wound dehiscence   Patient has no pain on exam of her  back  Neg SLR test               Imaging  Narrative     EXAMINATION:  XR THORACOLUMBAR SPINE AP LATERAL    CLINICAL HISTORY:  h/o lumbar surgery;Other specified postprocedural states    TECHNIQUE:  AP and lateral views of the thoracolumbar spine were performed.    COMPARISON:  CT from 11/08/2019    FINDINGS:  There is complete loss of the cortex of the inferior endplate of L2 and the superior endplate of L3 at the site of known osteomyelitis discitis.  The appearance is similar to the prior CT.  Osteomyelitis discitis.  Stable grade 1 spondylolisthesis of L4 on L5.  Mild-to-moderate disc space narrowing noted throughout the remainder of the lumbar spine with multilevel spondylosis noted.  Vascular calcification seen involving the aorta.      Impression       As above       Narrative     EXAMINATION:  XR CERVICAL SPINE AP LATERAL    CLINICAL HISTORY:  Other specified postprocedural states    TECHNIQUE:  AP, lateral and open mouth views of the cervical spine were performed.    COMPARISON:  None.    FINDINGS:  There is straightening of the normal lordotic curvature along with a couple mm of anterolisthesis of C3 on C4.  The C6 through the T1 levels are not well seen on the lateral film.  Mild-to-moderate disc space narrowing and cyst spondylosis present at the C4-5 and C5-6 levels.  Multilevel facet arthropathy noted and most prominent on the left at the C3-4 and C4-5 levels.  The C1-C2 articulation is within normal limits.      Impression       As above       Assessment:       Encounter Diagnoses   Name Primary?    Status post lumbar surgery Yes    Encounter for postoperative wound check     History of recent fall     Discitis of lumbar region           Plan:       Ross was seen today for lumbar spine pain (l-spine) and follow-up.    Diagnoses and all orders for this visit:    Status post lumbar surgery  -     X-Ray Cervical Spine AP And Lateral; Future  -     X-Ray Thoracolumbar Spine AP Lateral; Future  -      IR Aspiration Intervertebral Discs; Future  -     IR Biopsy Bone deep; Future    Encounter for postoperative wound check  -     X-Ray Cervical Spine AP And Lateral; Future  -     X-Ray Thoracolumbar Spine AP Lateral; Future  -     IR Aspiration Intervertebral Discs; Future  -     IR Biopsy Bone deep; Future    History of recent fall  -     X-Ray Cervical Spine AP And Lateral; Future  -     X-Ray Thoracolumbar Spine AP Lateral; Future    Discitis of lumbar region  -     IR Aspiration Intervertebral Discs; Future  -     IR Biopsy Bone deep; Future        Get AP and Lateral x-rays of T and L spine today  Consult with Dr. Clark or IR regarding CT guided biopsy  Await results from biopsy for plan          Mayra Long PA-C     [facility]

## 2019-12-31 ENCOUNTER — TELEPHONE (OUTPATIENT)
Dept: RADIOLOGY | Facility: HOSPITAL | Age: 74
End: 2019-12-31

## 2020-01-06 ENCOUNTER — TELEPHONE (OUTPATIENT)
Dept: NEUROSURGERY | Facility: CLINIC | Age: 75
End: 2020-01-06

## 2020-01-06 ENCOUNTER — TELEPHONE (OUTPATIENT)
Dept: FAMILY MEDICINE | Facility: CLINIC | Age: 75
End: 2020-01-06

## 2020-01-06 NOTE — TELEPHONE ENCOUNTER
[facility] informed me that patient is ,    Understanding verbalized.

## 2021-06-30 NOTE — ASSESSMENT & PLAN NOTE
Plan is to go to the OR for a washout  Will continue with current abx started in the ED    S/p spinal wash out, on wound vac    POD 1 s/p I&D of wound dehiscence of lumbar surgery   looks and feels much better  On IV Vanc  Continue present care  D/c back to [facility] when cleared by Dr. Nicolas     6/13  Plan for LTAC  PT / OT  ABX     What Type Of Note Output Would You Prefer (Optional)?: Standard Output Hpi Title: Evaluation of Skin Lesions How Severe Are Your Spot(S)?: moderate Have Your Spot(S) Been Treated In The Past?: has not been treated Location: Right medial upper back and right cheek Year Removed: 2018

## 2023-01-01 NOTE — HPI
Addended by: MARIANNE MILLARD on: 2023 12:01 PM     Modules accepted: Orders     Ms Gume Isbell is 73 years old  Admitted to [facility] post op  FUSION, SPINE, POSTERIOR SPINAL COLUMN, LUMBAR, USING COMPUTER-ASSISTED NAVIGATION  L1-4 with L2-3 laminectomy for debridement and drainage of epidural abscess  Was on jose f synephrine Gtt weaned off, NS bolus 500mls given  Intraop received 1700LR and 250 mls Alb, UO 800mls, then PACU 1500mls LR and 250 mls alb and UO was 175 mls.  Admitted 05/23: weakness; abdominal pain, fever.  Imaging determined: discitis L2-3, with osteomyelitis of L2 and L3.  Additional evidence of epidural abscess within the anterior epidural space.  Severe paravertebral soft tissue edema, Small amount of fluid within the L3-L4 disc space suspicious for early osteomyelitis  Blood cultures +ve for STREPTOCOCUS

## 2025-05-29 NOTE — EICU
New admit    74 y/o F history of Parkinson's disease, DM and CAD sent from home by home health nurse due to increasing weakness of LE and back pain with plans of arranging for nursing home placement. CT showed discitis vs osteomyelitis L2 and L3 vertebral bodies. MRI findings compatible with discitis L2-3, with osteomyelitis of L2 and L3. Additional evidence of epidural abscess within the anterior epidural space with severe paravertebral soft tissue edema. Patient has been started on vancomycin and ceftraixone with blood cultures now going GPC resembling Strep. Patient then underwent spinal fusion, posterior spinal column, lumbar, using computer-assisted navigation L1-4 with L2-3 laminectomy for debridement and drainage of epidural abscess.  Patient had episodes of hypotension post op that has responded to fluids.    Camera assessment:  Patient appears comfortable    Telemetry:  /36  HR 93  O2 100%    Data:   5/25/2019 05:09 5/25/2019 15:21   Hemoglobin 10.6 (L) 9.9 (L)   Hematocrit 32.9 (L) 30.8 (L)   Na 131, K 3.8, creatinine 0.8    · Epidural abscess/osteomylitis with Streptococcal bacteremia s/p drainage.  IDS following will need at least 6 weeks of antibiotics. PICC line placed.  · Hypotension likely anesthesia effect, fluid prn   patient agrees to have a pap smear
